# Patient Record
Sex: FEMALE | Race: OTHER | HISPANIC OR LATINO | Employment: UNEMPLOYED | URBAN - METROPOLITAN AREA
[De-identification: names, ages, dates, MRNs, and addresses within clinical notes are randomized per-mention and may not be internally consistent; named-entity substitution may affect disease eponyms.]

---

## 2017-02-08 ENCOUNTER — HISTORICAL (OUTPATIENT)
Dept: GASTROENTEROLOGY | Facility: CLINIC | Age: 52
End: 2017-02-08

## 2019-07-22 ENCOUNTER — HISTORICAL (OUTPATIENT)
Dept: RADIOLOGY | Facility: HOSPITAL | Age: 54
End: 2019-07-22

## 2019-12-06 ENCOUNTER — HISTORICAL (OUTPATIENT)
Dept: RADIOLOGY | Facility: HOSPITAL | Age: 54
End: 2019-12-06

## 2020-01-23 ENCOUNTER — HISTORICAL (OUTPATIENT)
Dept: RADIOLOGY | Facility: HOSPITAL | Age: 55
End: 2020-01-23

## 2020-03-06 ENCOUNTER — HISTORICAL (OUTPATIENT)
Dept: RADIOLOGY | Facility: HOSPITAL | Age: 55
End: 2020-03-06

## 2020-05-28 ENCOUNTER — HISTORICAL (OUTPATIENT)
Dept: RADIOLOGY | Facility: HOSPITAL | Age: 55
End: 2020-05-28

## 2020-10-26 ENCOUNTER — HOSPITAL ENCOUNTER (INPATIENT)
Facility: HOSPITAL | Age: 55
LOS: 3 days | Discharge: HOME | DRG: 841 | End: 2020-10-29
Attending: INTERNAL MEDICINE | Admitting: INTERNAL MEDICINE

## 2020-10-26 DIAGNOSIS — C92.10 CML (CHRONIC MYELOID LEUKEMIA): ICD-10-CM

## 2020-10-26 DIAGNOSIS — C95.00 ACUTE LEUKEMIA: ICD-10-CM

## 2020-10-26 DIAGNOSIS — R07.9 CHEST PAIN: ICD-10-CM

## 2020-10-26 DIAGNOSIS — C92.10 CML (CHRONIC MYELOCYTIC LEUKEMIA): Primary | ICD-10-CM

## 2020-10-26 DIAGNOSIS — D72.829 HYPERLEUKOCYTOSIS: ICD-10-CM

## 2020-10-26 PROBLEM — R16.1 SPLENOMEGALY: Status: ACTIVE | Noted: 2020-10-26

## 2020-10-26 PROBLEM — K76.0 NAFLD (NONALCOHOLIC FATTY LIVER DISEASE): Status: ACTIVE | Noted: 2020-10-26

## 2020-10-26 PROBLEM — E11.9 DM2 (DIABETES MELLITUS, TYPE 2): Status: ACTIVE | Noted: 2020-10-26

## 2020-10-26 PROBLEM — Z72.0 TOBACCO USER: Status: ACTIVE | Noted: 2020-10-26

## 2020-10-26 PROBLEM — E79.0 HYPERURICEMIA: Status: ACTIVE | Noted: 2020-10-26

## 2020-10-26 PROBLEM — E87.6 HYPOKALEMIA: Status: ACTIVE | Noted: 2020-10-26

## 2020-10-26 PROBLEM — E78.00 HYPERCHOLESTEROLEMIA: Status: ACTIVE | Noted: 2020-10-26

## 2020-10-26 PROBLEM — E66.01 SEVERE OBESITY (BMI >= 40): Status: ACTIVE | Noted: 2020-10-26

## 2020-10-26 PROBLEM — I10 HYPERTENSION: Status: ACTIVE | Noted: 2020-10-26

## 2020-10-26 LAB
ABO + RH BLD: NORMAL
ALBUMIN SERPL BCP-MCNC: 3.1 G/DL (ref 3.5–5.2)
ALP SERPL-CCNC: 64 U/L (ref 55–135)
ALT SERPL W/O P-5'-P-CCNC: 9 U/L (ref 10–44)
ANION GAP SERPL CALC-SCNC: 12 MMOL/L (ref 8–16)
ANISOCYTOSIS BLD QL SMEAR: SLIGHT
APTT BLDCRRT: 29 SEC (ref 21–32)
APTT BLDCRRT: 29.3 SEC (ref 21–32)
AST SERPL-CCNC: 17 U/L (ref 10–40)
B-HCG UR QL: NEGATIVE
BASOPHILS # BLD AUTO: ABNORMAL K/UL (ref 0–0.2)
BASOPHILS NFR BLD: 1.5 % (ref 0–1.9)
BILIRUB SERPL-MCNC: 0.7 MG/DL (ref 0.1–1)
BLD GP AB SCN CELLS X3 SERPL QL: NORMAL
BUN SERPL-MCNC: 14 MG/DL (ref 6–20)
CALCIUM SERPL-MCNC: 8.7 MG/DL (ref 8.7–10.5)
CHLORIDE SERPL-SCNC: 106 MMOL/L (ref 95–110)
CO2 SERPL-SCNC: 23 MMOL/L (ref 23–29)
CREAT SERPL-MCNC: 0.7 MG/DL (ref 0.5–1.4)
DIFFERENTIAL METHOD: ABNORMAL
EOSINOPHIL # BLD AUTO: ABNORMAL K/UL (ref 0–0.5)
EOSINOPHIL NFR BLD: 3 % (ref 0–8)
ERYTHROCYTE [DISTWIDTH] IN BLOOD BY AUTOMATED COUNT: 17.7 % (ref 11.5–14.5)
EST. GFR  (AFRICAN AMERICAN): >60 ML/MIN/1.73 M^2
EST. GFR  (NON AFRICAN AMERICAN): >60 ML/MIN/1.73 M^2
FIBRINOGEN PPP-MCNC: 370 MG/DL (ref 182–366)
FIBRINOGEN PPP-MCNC: 378 MG/DL (ref 182–366)
GLUCOSE SERPL-MCNC: 177 MG/DL (ref 70–110)
HBV CORE AB SERPL QL IA: NEGATIVE
HBV SURFACE AG SERPL QL IA: NEGATIVE
HCT VFR BLD AUTO: 31.9 % (ref 37–48.5)
HGB BLD-MCNC: 10 G/DL (ref 12–16)
HYPOCHROMIA BLD QL SMEAR: ABNORMAL
IMM GRANULOCYTES # BLD AUTO: ABNORMAL K/UL (ref 0–0.04)
IMM GRANULOCYTES NFR BLD AUTO: ABNORMAL % (ref 0–0.5)
INR PPP: 1.1 (ref 0.8–1.2)
INR PPP: 1.1 (ref 0.8–1.2)
LDH SERPL L TO P-CCNC: 996 U/L (ref 110–260)
LYMPHOCYTES # BLD AUTO: ABNORMAL K/UL (ref 1–4.8)
LYMPHOCYTES NFR BLD: 24 % (ref 18–48)
MAGNESIUM SERPL-MCNC: 1.8 MG/DL (ref 1.6–2.6)
MCH RBC QN AUTO: 31.5 PG (ref 27–31)
MCHC RBC AUTO-ENTMCNC: 31.3 G/DL (ref 32–36)
MCV RBC AUTO: 101 FL (ref 82–98)
METAMYELOCYTES NFR BLD MANUAL: 2.5 %
MONOCYTES # BLD AUTO: ABNORMAL K/UL (ref 0.3–1)
MONOCYTES NFR BLD: 1 % (ref 4–15)
MYELOCYTES NFR BLD MANUAL: 25 %
NEUTROPHILS NFR BLD: 23.5 % (ref 38–73)
NEUTS BAND NFR BLD MANUAL: 8.5 %
NRBC BLD-RTO: 2 /100 WBC
PHOSPHATE SERPL-MCNC: 4.9 MG/DL (ref 2.7–4.5)
PLATELET # BLD AUTO: 120 K/UL (ref 150–350)
PLATELET BLD QL SMEAR: ABNORMAL
PMV BLD AUTO: 12.4 FL (ref 9.2–12.9)
POCT GLUCOSE: 140 MG/DL (ref 70–110)
POCT GLUCOSE: 170 MG/DL (ref 70–110)
POCT GLUCOSE: 182 MG/DL (ref 70–110)
POIKILOCYTOSIS BLD QL SMEAR: SLIGHT
POLYCHROMASIA BLD QL SMEAR: ABNORMAL
POTASSIUM SERPL-SCNC: 3.3 MMOL/L (ref 3.5–5.1)
PROMYELOCYTES NFR BLD MANUAL: 10 %
PROT SERPL-MCNC: 6.4 G/DL (ref 6–8.4)
PROTHROMBIN TIME: 12.4 SEC (ref 9–12.5)
PROTHROMBIN TIME: 12.4 SEC (ref 9–12.5)
RBC # BLD AUTO: 3.17 M/UL (ref 4–5.4)
SARS-COV-2 RDRP RESP QL NAA+PROBE: NEGATIVE
SODIUM SERPL-SCNC: 141 MMOL/L (ref 136–145)
TROPONIN I SERPL DL<=0.01 NG/ML-MCNC: 0.01 NG/ML (ref 0–0.03)
URATE SERPL-MCNC: 9.2 MG/DL (ref 2.4–5.7)
WBC # BLD AUTO: 320.6 K/UL (ref 3.9–12.7)
WBC OTHER NFR BLD MANUAL: 1 %

## 2020-10-26 PROCEDURE — 38221 DX BONE MARROW BIOPSIES: CPT | Mod: LT,,, | Performed by: NURSE PRACTITIONER

## 2020-10-26 PROCEDURE — 85610 PROTHROMBIN TIME: CPT | Mod: 91

## 2020-10-26 PROCEDURE — 63600175 PHARM REV CODE 636 W HCPCS

## 2020-10-26 PROCEDURE — 85384 FIBRINOGEN ACTIVITY: CPT

## 2020-10-26 PROCEDURE — 88185 FLOWCYTOMETRY/TC ADD-ON: CPT | Performed by: PATHOLOGY

## 2020-10-26 PROCEDURE — 84100 ASSAY OF PHOSPHORUS: CPT

## 2020-10-26 PROCEDURE — 25000003 PHARM REV CODE 250: Performed by: NURSE PRACTITIONER

## 2020-10-26 PROCEDURE — 99233 PR SUBSEQUENT HOSPITAL CARE,LEVL III: ICD-10-PCS | Mod: ,,, | Performed by: INTERNAL MEDICINE

## 2020-10-26 PROCEDURE — 88342 CHG IMMUNOCYTOCHEMISTRY: ICD-10-PCS | Mod: 26,,, | Performed by: PATHOLOGY

## 2020-10-26 PROCEDURE — 36415 COLL VENOUS BLD VENIPUNCTURE: CPT

## 2020-10-26 PROCEDURE — 88305 TISSUE EXAM BY PATHOLOGIST: CPT | Performed by: PATHOLOGY

## 2020-10-26 PROCEDURE — 93010 ELECTROCARDIOGRAM REPORT: CPT | Mod: ,,, | Performed by: INTERNAL MEDICINE

## 2020-10-26 PROCEDURE — 81245 FLT3 GENE: CPT

## 2020-10-26 PROCEDURE — 85060 PATHOLOGIST REVIEW: ICD-10-PCS | Mod: ,,, | Performed by: PATHOLOGY

## 2020-10-26 PROCEDURE — 86850 RBC ANTIBODY SCREEN: CPT

## 2020-10-26 PROCEDURE — 88237 TISSUE CULTURE BONE MARROW: CPT

## 2020-10-26 PROCEDURE — 83735 ASSAY OF MAGNESIUM: CPT

## 2020-10-26 PROCEDURE — 85027 COMPLETE CBC AUTOMATED: CPT

## 2020-10-26 PROCEDURE — 81025 URINE PREGNANCY TEST: CPT

## 2020-10-26 PROCEDURE — 88341 PR IHC OR ICC EACH ADD'L SINGLE ANTIBODY  STAINPR: ICD-10-PCS | Mod: 26,,, | Performed by: PATHOLOGY

## 2020-10-26 PROCEDURE — 85730 THROMBOPLASTIN TIME PARTIAL: CPT

## 2020-10-26 PROCEDURE — 38221 PR BONE MARROW BIOPSY(IES); DIAGNOSTIC: ICD-10-PCS | Mod: LT,,, | Performed by: NURSE PRACTITIONER

## 2020-10-26 PROCEDURE — 85097 PR  BONE MARROW,SMEAR INTERPRETATION: ICD-10-PCS | Mod: ,,, | Performed by: PATHOLOGY

## 2020-10-26 PROCEDURE — 88189 PR  FLOWCYTOMETRY/READ, 16 & > MARKERS: ICD-10-PCS | Mod: ,,, | Performed by: PATHOLOGY

## 2020-10-26 PROCEDURE — 87340 HEPATITIS B SURFACE AG IA: CPT

## 2020-10-26 PROCEDURE — 88313 SPECIAL STAINS GROUP 2: CPT | Mod: 26,,, | Performed by: PATHOLOGY

## 2020-10-26 PROCEDURE — 86704 HEP B CORE ANTIBODY TOTAL: CPT

## 2020-10-26 PROCEDURE — C9399 UNCLASSIFIED DRUGS OR BIOLOG: HCPCS | Performed by: STUDENT IN AN ORGANIZED HEALTH CARE EDUCATION/TRAINING PROGRAM

## 2020-10-26 PROCEDURE — 88311 DECALCIFY TISSUE: CPT | Performed by: PATHOLOGY

## 2020-10-26 PROCEDURE — 80053 COMPREHEN METABOLIC PANEL: CPT

## 2020-10-26 PROCEDURE — 88313 PR  SPECIAL STAINS,GROUP II: ICD-10-PCS | Mod: 26,,, | Performed by: PATHOLOGY

## 2020-10-26 PROCEDURE — 99233 SBSQ HOSP IP/OBS HIGH 50: CPT | Mod: ,,, | Performed by: INTERNAL MEDICINE

## 2020-10-26 PROCEDURE — 88305 TISSUE EXAM BY PATHOLOGIST: ICD-10-PCS | Mod: 26,,, | Performed by: PATHOLOGY

## 2020-10-26 PROCEDURE — 84550 ASSAY OF BLOOD/URIC ACID: CPT

## 2020-10-26 PROCEDURE — U0002 COVID-19 LAB TEST NON-CDC: HCPCS

## 2020-10-26 PROCEDURE — 88342 IMHCHEM/IMCYTCHM 1ST ANTB: CPT | Mod: 26,,, | Performed by: PATHOLOGY

## 2020-10-26 PROCEDURE — 81450 HL NEO GSAP 5-50DNA/DNA&RNA: CPT

## 2020-10-26 PROCEDURE — 88189 FLOWCYTOMETRY/READ 16 & >: CPT | Mod: ,,, | Performed by: PATHOLOGY

## 2020-10-26 PROCEDURE — 84484 ASSAY OF TROPONIN QUANT: CPT

## 2020-10-26 PROCEDURE — 83615 LACTATE (LD) (LDH) ENZYME: CPT

## 2020-10-26 PROCEDURE — 88341 IMHCHEM/IMCYTCHM EA ADD ANTB: CPT | Mod: 26,,, | Performed by: PATHOLOGY

## 2020-10-26 PROCEDURE — 88271 CYTOGENETICS DNA PROBE: CPT | Mod: 59

## 2020-10-26 PROCEDURE — 93010 EKG 12-LEAD: ICD-10-PCS | Mod: ,,, | Performed by: INTERNAL MEDICINE

## 2020-10-26 PROCEDURE — 25000003 PHARM REV CODE 250: Performed by: STUDENT IN AN ORGANIZED HEALTH CARE EDUCATION/TRAINING PROGRAM

## 2020-10-26 PROCEDURE — 88305 TISSUE EXAM BY PATHOLOGIST: CPT | Mod: 26,,, | Performed by: PATHOLOGY

## 2020-10-26 PROCEDURE — 88313 SPECIAL STAINS GROUP 2: CPT | Mod: 59 | Performed by: PATHOLOGY

## 2020-10-26 PROCEDURE — 93005 ELECTROCARDIOGRAM TRACING: CPT

## 2020-10-26 PROCEDURE — 85007 BL SMEAR W/DIFF WBC COUNT: CPT

## 2020-10-26 PROCEDURE — 81206 BCR/ABL1 GENE MAJOR BP: CPT

## 2020-10-26 PROCEDURE — 88342 IMHCHEM/IMCYTCHM 1ST ANTB: CPT | Performed by: PATHOLOGY

## 2020-10-26 PROCEDURE — 88311 DECALCIFY TISSUE: CPT | Mod: 26,,, | Performed by: PATHOLOGY

## 2020-10-26 PROCEDURE — 85060 BLOOD SMEAR INTERPRETATION: CPT | Mod: ,,, | Performed by: PATHOLOGY

## 2020-10-26 PROCEDURE — 88311 PR  DECALCIFY TISSUE: ICD-10-PCS | Mod: 26,,, | Performed by: PATHOLOGY

## 2020-10-26 PROCEDURE — 38221 DX BONE MARROW BIOPSIES: CPT

## 2020-10-26 PROCEDURE — 63600175 PHARM REV CODE 636 W HCPCS: Performed by: NURSE PRACTITIONER

## 2020-10-26 PROCEDURE — 85097 BONE MARROW INTERPRETATION: CPT | Mod: ,,, | Performed by: PATHOLOGY

## 2020-10-26 PROCEDURE — 20600001 HC STEP DOWN PRIVATE ROOM

## 2020-10-26 PROCEDURE — 88341 IMHCHEM/IMCYTCHM EA ADD ANTB: CPT | Performed by: PATHOLOGY

## 2020-10-26 PROCEDURE — 88184 FLOWCYTOMETRY/ TC 1 MARKER: CPT | Performed by: PATHOLOGY

## 2020-10-26 RX ORDER — HYDROXYUREA 500 MG/1
1000 CAPSULE ORAL 2 TIMES DAILY
Status: DISCONTINUED | OUTPATIENT
Start: 2020-10-26 | End: 2020-10-26

## 2020-10-26 RX ORDER — GABAPENTIN 300 MG/1
300 CAPSULE ORAL 3 TIMES DAILY
Status: DISCONTINUED | OUTPATIENT
Start: 2020-10-26 | End: 2020-10-29 | Stop reason: HOSPADM

## 2020-10-26 RX ORDER — TRAMADOL HYDROCHLORIDE 50 MG/1
50 TABLET ORAL EVERY 4 HOURS PRN
Status: DISCONTINUED | OUTPATIENT
Start: 2020-10-26 | End: 2020-10-29 | Stop reason: HOSPADM

## 2020-10-26 RX ORDER — LIDOCAINE HYDROCHLORIDE 20 MG/ML
10 INJECTION, SOLUTION EPIDURAL; INFILTRATION; INTRACAUDAL; PERINEURAL ONCE AS NEEDED
Status: COMPLETED | OUTPATIENT
Start: 2020-10-26 | End: 2020-10-26

## 2020-10-26 RX ORDER — IBUPROFEN 200 MG
24 TABLET ORAL
Status: DISCONTINUED | OUTPATIENT
Start: 2020-10-26 | End: 2020-10-29 | Stop reason: HOSPADM

## 2020-10-26 RX ORDER — METOPROLOL TARTRATE 25 MG/1
25 TABLET ORAL 2 TIMES DAILY
Status: DISCONTINUED | OUTPATIENT
Start: 2020-10-26 | End: 2020-10-29 | Stop reason: HOSPADM

## 2020-10-26 RX ORDER — LIDOCAINE HYDROCHLORIDE 20 MG/ML
10 SOLUTION OROPHARYNGEAL ONCE
Status: COMPLETED | OUTPATIENT
Start: 2020-10-26 | End: 2020-10-26

## 2020-10-26 RX ORDER — HYDROXYUREA 500 MG/1
4000 CAPSULE ORAL 2 TIMES DAILY
Status: DISCONTINUED | OUTPATIENT
Start: 2020-10-26 | End: 2020-10-29 | Stop reason: HOSPADM

## 2020-10-26 RX ORDER — ALLOPURINOL 300 MG/1
300 TABLET ORAL 2 TIMES DAILY
Status: DISCONTINUED | OUTPATIENT
Start: 2020-10-26 | End: 2020-10-29 | Stop reason: HOSPADM

## 2020-10-26 RX ORDER — ONDANSETRON 2 MG/ML
8 INJECTION INTRAMUSCULAR; INTRAVENOUS EVERY 8 HOURS PRN
Status: DISCONTINUED | OUTPATIENT
Start: 2020-10-26 | End: 2020-10-27

## 2020-10-26 RX ORDER — ALUMINUM HYDROXIDE, MAGNESIUM HYDROXIDE, AND SIMETHICONE 2400; 240; 2400 MG/30ML; MG/30ML; MG/30ML
30 SUSPENSION ORAL ONCE
Status: COMPLETED | OUTPATIENT
Start: 2020-10-26 | End: 2020-10-26

## 2020-10-26 RX ORDER — SODIUM CHLORIDE 9 MG/ML
INJECTION, SOLUTION INTRAVENOUS CONTINUOUS
Status: DISCONTINUED | OUTPATIENT
Start: 2020-10-26 | End: 2020-10-29 | Stop reason: HOSPADM

## 2020-10-26 RX ORDER — FLUCONAZOLE 200 MG/1
400 TABLET ORAL DAILY
Status: DISCONTINUED | OUTPATIENT
Start: 2020-10-26 | End: 2020-10-29

## 2020-10-26 RX ORDER — INSULIN ASPART 100 [IU]/ML
0-5 INJECTION, SOLUTION INTRAVENOUS; SUBCUTANEOUS
Status: DISCONTINUED | OUTPATIENT
Start: 2020-10-26 | End: 2020-10-29 | Stop reason: HOSPADM

## 2020-10-26 RX ORDER — HYDROMORPHONE HYDROCHLORIDE 1 MG/ML
0.5 INJECTION, SOLUTION INTRAMUSCULAR; INTRAVENOUS; SUBCUTANEOUS ONCE AS NEEDED
Status: COMPLETED | OUTPATIENT
Start: 2020-10-26 | End: 2020-10-26

## 2020-10-26 RX ORDER — SODIUM CHLORIDE 0.9 % (FLUSH) 0.9 %
10 SYRINGE (ML) INJECTION
Status: DISCONTINUED | OUTPATIENT
Start: 2020-10-26 | End: 2020-10-29 | Stop reason: HOSPADM

## 2020-10-26 RX ORDER — GLUCAGON 1 MG
1 KIT INJECTION
Status: DISCONTINUED | OUTPATIENT
Start: 2020-10-26 | End: 2020-10-29 | Stop reason: HOSPADM

## 2020-10-26 RX ORDER — LEVOFLOXACIN 500 MG/1
500 TABLET, FILM COATED ORAL DAILY
Status: DISCONTINUED | OUTPATIENT
Start: 2020-10-26 | End: 2020-10-29

## 2020-10-26 RX ORDER — POTASSIUM CHLORIDE 750 MG/1
40 CAPSULE, EXTENDED RELEASE ORAL ONCE
Status: COMPLETED | OUTPATIENT
Start: 2020-10-26 | End: 2020-10-26

## 2020-10-26 RX ORDER — ACYCLOVIR 800 MG/1
800 TABLET ORAL 2 TIMES DAILY
Status: DISCONTINUED | OUTPATIENT
Start: 2020-10-26 | End: 2020-10-26

## 2020-10-26 RX ORDER — IBUPROFEN 200 MG
16 TABLET ORAL
Status: DISCONTINUED | OUTPATIENT
Start: 2020-10-26 | End: 2020-10-29 | Stop reason: HOSPADM

## 2020-10-26 RX ORDER — ONDANSETRON 2 MG/ML
INJECTION INTRAMUSCULAR; INTRAVENOUS
Status: COMPLETED
Start: 2020-10-26 | End: 2020-10-26

## 2020-10-26 RX ORDER — ACYCLOVIR 200 MG/1
400 CAPSULE ORAL 2 TIMES DAILY
Status: DISCONTINUED | OUTPATIENT
Start: 2020-10-26 | End: 2020-10-29

## 2020-10-26 RX ORDER — BENAZEPRIL HYDROCHLORIDE 10 MG/1
40 TABLET ORAL DAILY
Status: DISCONTINUED | OUTPATIENT
Start: 2020-10-26 | End: 2020-10-29 | Stop reason: HOSPADM

## 2020-10-26 RX ORDER — ATORVASTATIN CALCIUM 20 MG/1
40 TABLET, FILM COATED ORAL DAILY
Status: DISCONTINUED | OUTPATIENT
Start: 2020-10-26 | End: 2020-10-29 | Stop reason: HOSPADM

## 2020-10-26 RX ADMIN — HYDROXYUREA 4000 MG: 500 CAPSULE ORAL at 09:10

## 2020-10-26 RX ADMIN — ACYCLOVIR 400 MG: 200 CAPSULE ORAL at 10:10

## 2020-10-26 RX ADMIN — HYDROMORPHONE HYDROCHLORIDE 0.5 MG: 1 INJECTION, SOLUTION INTRAMUSCULAR; INTRAVENOUS; SUBCUTANEOUS at 02:10

## 2020-10-26 RX ADMIN — LIDOCAINE HYDROCHLORIDE 10 ML: 20 SOLUTION ORAL; TOPICAL at 06:10

## 2020-10-26 RX ADMIN — ONDANSETRON 8 MG: 2 INJECTION INTRAMUSCULAR; INTRAVENOUS at 05:10

## 2020-10-26 RX ADMIN — HYDROXYUREA 4000 MG: 500 CAPSULE ORAL at 10:10

## 2020-10-26 RX ADMIN — METOPROLOL TARTRATE 25 MG: 25 TABLET, FILM COATED ORAL at 09:10

## 2020-10-26 RX ADMIN — GABAPENTIN 300 MG: 300 CAPSULE ORAL at 09:10

## 2020-10-26 RX ADMIN — ALUMINUM HYDROXIDE, MAGNESIUM HYDROXIDE, AND DIMETHICONE 30 ML: 400; 400; 40 SUSPENSION ORAL at 09:10

## 2020-10-26 RX ADMIN — POTASSIUM CHLORIDE 40 MEQ: 750 CAPSULE, EXTENDED RELEASE ORAL at 09:10

## 2020-10-26 RX ADMIN — THERA TABS 1 TABLET: TAB at 09:10

## 2020-10-26 RX ADMIN — ALLOPURINOL 300 MG: 300 TABLET ORAL at 09:10

## 2020-10-26 RX ADMIN — LEVOFLOXACIN 500 MG: 500 TABLET, FILM COATED ORAL at 09:10

## 2020-10-26 RX ADMIN — TRAMADOL HYDROCHLORIDE 50 MG: 50 TABLET ORAL at 06:10

## 2020-10-26 RX ADMIN — TRAMADOL HYDROCHLORIDE 50 MG: 50 TABLET ORAL at 04:10

## 2020-10-26 RX ADMIN — ACYCLOVIR 400 MG: 200 CAPSULE ORAL at 09:10

## 2020-10-26 RX ADMIN — SODIUM CHLORIDE: 0.9 INJECTION, SOLUTION INTRAVENOUS at 09:10

## 2020-10-26 RX ADMIN — BENAZEPRIL HYDROCHLORIDE 40 MG: 10 TABLET ORAL at 09:10

## 2020-10-26 RX ADMIN — LIDOCAINE HYDROCHLORIDE 200 MG: 20 INJECTION, SOLUTION EPIDURAL; INFILTRATION; INTRACAUDAL; PERINEURAL at 02:10

## 2020-10-26 RX ADMIN — ATORVASTATIN CALCIUM 40 MG: 20 TABLET, FILM COATED ORAL at 09:10

## 2020-10-26 RX ADMIN — INSULIN DETEMIR 10 UNITS: 100 INJECTION, SOLUTION SUBCUTANEOUS at 09:10

## 2020-10-26 RX ADMIN — SODIUM CHLORIDE: 0.9 INJECTION, SOLUTION INTRAVENOUS at 07:10

## 2020-10-26 RX ADMIN — GABAPENTIN 300 MG: 300 CAPSULE ORAL at 04:10

## 2020-10-26 RX ADMIN — FLUCONAZOLE 400 MG: 200 TABLET ORAL at 09:10

## 2020-10-26 NOTE — PLAN OF CARE
Plan of care reviewed with the patient upon admission. Alert and oriented. GCS 15. Complaining of a headache at time time. Pt speaks Bel[place]ian only. Remained free from falls and injuries throughout shift. VSS. On 2 LPM O2. Bed in low locked position. Call bell and personal items within reach. Will continue to monitor.

## 2020-10-26 NOTE — HOSPITAL COURSE
10/26/2020: Admitted over night/early morning with hyper leukocytosis suspicious for leukemia. Has had ongoing fatigue, night sweats, headaches, and severe LUQ abdominal pain for several weeks now. Outside CT reportedly showing severe splenomegaly. Large spleen palpated on exam. Will obtain abd u/s for baseline measurement. Started on IVF, hydrea 4 grams BID and allopurinol 300 mg BID. Will plan for BMBx today. CML suspected at this time, but will check AML FISH, NGS, and flt3 in addition to BCR/ABL p210. HIV neg. Placed Hep B orders. She reports hx of tubal ligation, but will still r/o pregnancy via urine pregnancy test. Replacing K+ for K+ level of 3.3.  10/27/2020 Leukocytosis significantly  improving with hydrea 4 mg BID. Scheduled dose of zofran for nausea/vomitting. Enlarged spleen on examination, abdominal ultrasound shows splenomegaly (25x7.6 cm) and hepatomegaly(23 cm). Reported headache without any focal neuro deficit, controlled with tramadol. Uric acid improved with current dose of allopurinol.   10/28/2020: WBC count down to 186K today. BMBx results still pending. Uric acid down-trending. Abdominal pain and nausea improved. TKI submitted for insurance approval. Blood glucose in 200. Levemir dose increased.   10/29/2020: WBC count down to 107K today. Continuing 4 grams Hydrea bid. Will discharge on 2 grams BID. Patient is uninsured.  assisting with insurance situation. Outpatient f/u contingent upon insurance type. Had fever of 101.5 over night. Blood cx with NG thus far. U/a not suggestive of UTI. No sign of infection to BMBx site. C/o of coughing up phlegm. Will repeat COVID swab and check RIP, which includes influenza. Blood glucose continues to be elevated. Increased levemir dose to home dose of 20 units BID. Patient instructed to f/u with local PCP soon after discharge.
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
## 2020-10-26 NOTE — NURSING TRANSFER
Nursing Transfer Note      10/26/2020     Transfer OSH to [facility]    Transfer via EMS stretcher    Transfer with O2    Transported by EMS    Medicines sent: N/A    Chart send with patient: YES    Notified: Dr. Edwards    Patient reassessed at: 10/26/20 0446    Upon arrival to floor: Pt was moved to hospital bed. Pt does not speak english. MARRTI used for interpretor. Admission completed. MD notified. VSS. On 2 lmp O2. Bed in low locked position. Call bell and personal items within reach. Will continue to monitor. .

## 2020-10-26 NOTE — ASSESSMENT & PLAN NOTE
Takes basal bolus insulin at home in addition to oral antihyperglycemics. States she takes 50u basal BID, 10u prandial BID wm.   LDSSI, 10u aspart BID  POCT glucose x4

## 2020-10-26 NOTE — SUBJECTIVE & OBJECTIVE
Subjective:     Interval History: Admitted over night/early morning with hyper leukocytosis suspicious for leukemia. Has had ongoing fatigue, night sweats, headaches, and severe LUQ abdominal pain for several weeks now. Outside CT reportedly showing severe splenomegaly. Large spleen palpated on exam. Will obtain abd u/s for baseline measurement. Started on IVF, hydrea 4 grams BID and allopurinol 300 mg BID. Will plan for BMBx today. CML suspected at this time, but will check AML FISH, NGS, and flt3 in addition to BCR/ABL p210. HIV neg. Placed Hep B orders. She reports hx of tubal ligation, but will still r/o pregnancy via urine pregnancy test. Replacing K+ for K+ level of 3.3.    Objective:     Vital Signs (Most Recent):  Temp: 98.3 °F (36.8 °C) (10/26/20 1113)  Pulse: 72 (10/26/20 1113)  Resp: 18 (10/26/20 1113)  BP: 135/76 (10/26/20 1113)  SpO2: (!) 93 % (10/26/20 1113) Vital Signs (24h Range):  Temp:  [98.1 °F (36.7 °C)-98.5 °F (36.9 °C)] 98.3 °F (36.8 °C)  Pulse:  [72-86] 72  Resp:  [14-22] 18  SpO2:  [92 %-93 %] 93 %  BP: (121-135)/(56-76) 135/76        There is no height or weight on file to calculate BMI.  There is no height or weight on file to calculate BSA.    ECOG SCORE         [unfilled]    Intake/Output - Last 3 Shifts     None          Physical Exam  Constitutional:       Appearance: She is well-developed.   HENT:      Head: Normocephalic and atraumatic.      Mouth/Throat:      Pharynx: No oropharyngeal exudate.   Eyes:      Conjunctiva/sclera: Conjunctivae normal.      Pupils: Pupils are equal, round, and reactive to light.   Neck:      Musculoskeletal: Normal range of motion and neck supple.   Cardiovascular:      Rate and Rhythm: Normal rate and regular rhythm.      Heart sounds: Normal heart sounds. No murmur.   Pulmonary:      Effort: Pulmonary effort is normal.      Breath sounds: Normal breath sounds.   Abdominal:      General: Bowel sounds are normal. There is no distension.      Tenderness:  There is no abdominal tenderness.      Comments: Abdomen obese and distended. Large spleen palpated.   Musculoskeletal: Normal range of motion.         General: No deformity.   Skin:     General: Skin is warm and dry.      Findings: No erythema or rash.   Neurological:      Mental Status: She is alert and oriented to person, place, and time.   Psychiatric:         Behavior: Behavior normal.         Thought Content: Thought content normal.         Judgment: Judgment normal.         Significant Labs:   CBC:   Recent Labs   Lab 10/26/20  0622   .60*   HGB 10.0*   HCT 31.9*   *    and CMP:   Recent Labs   Lab 10/26/20  0622      K 3.3*      CO2 23   *   BUN 14   CREATININE 0.7   CALCIUM 8.7   PROT 6.4   ALBUMIN 3.1*   BILITOT 0.7   ALKPHOS 64   AST 17   ALT 9*   ANIONGAP 12   EGFRNONAA >60.0       Diagnostic Results:  I have reviewed all pertinent imaging results/findings within the past 24 hours.
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
## 2020-10-26 NOTE — SUBJECTIVE & OBJECTIVE
Patient information was obtained from patient, spouse/SO and past medical records.     Oncology History:     Medications Prior to Admission   Medication Sig Dispense Refill Last Dose    albuterol (VENTOLIN HFA) 90 mcg/actuation inhaler Inhale 2 puffs into the lungs every 6 (six) hours as needed for Wheezing. Rescue       atorvastatin (LIPITOR) 40 MG tablet Take 40 mg by mouth once daily.       benazepril (LOTENSIN) 40 MG tablet Take 40 mg by mouth once daily.       cyclobenzaprine (FLEXERIL) 10 MG tablet Take 10 mg by mouth 3 (three) times daily as needed for Muscle spasms.       fluticasone propionate (FLONASE) 50 mcg/actuation nasal spray 1 spray by Each Nostril route once daily.       gabapentin (NEURONTIN) 300 MG capsule Take 300 mg by mouth 3 (three) times daily.       glimepiride (AMARYL) 4 MG tablet Take 4 mg by mouth 2 (two) times daily.       guaiFENesin (MUCINEX) 600 mg 12 hr tablet Take 1,200 mg by mouth 2 (two) times daily.       insulin  unit/mL injection Inject 20 Units into the skin 2 (two) times daily before meals.       levocetirizine (XYZAL) 5 MG tablet Take 5 mg by mouth every evening.       metFORMIN (GLUCOPHAGE) 1000 MG tablet Take 1,000 mg by mouth 2 (two) times daily with meals.       metoprolol tartrate (LOPRESSOR) 25 MG tablet Take 25 mg by mouth 2 (two) times daily.       multivitamin capsule Take 1 capsule by mouth once daily.       naproxen (NAPROSYN) 500 MG tablet Take 500 mg by mouth 2 (two) times daily as needed.       omeprazole (PRILOSEC) 20 MG capsule Take 20 mg by mouth once daily.       traMADol (ULTRAM) 50 mg tablet Take 50 mg by mouth every 6 (six) hours as needed for Pain.          Patient has no known allergies.     Past Medical History:   Diagnosis Date    Cancer     DM2 (diabetes mellitus, type 2)     HTN (hypertension)     NAFLD (nonalcoholic fatty liver disease)     Neuropathy      Past Surgical History:   Procedure Laterality Date    ABDOMINAL  SURGERY       SECTION       Family History     None        Tobacco Use    Smoking status: Not on file   Substance and Sexual Activity    Alcohol use: Not on file    Drug use: Not on file    Sexual activity: Not on file       Review of Systems   Constitutional: Positive for chills, fatigue and fever.        Pt with limited english;  at bedside translate.    HENT: Negative for nosebleeds and sore throat.    Eyes: Positive for visual disturbance.   Respiratory: Positive for cough and shortness of breath. Negative for wheezing.    Cardiovascular: Positive for chest pain and leg swelling. Negative for palpitations.   Gastrointestinal: Positive for abdominal distention and abdominal pain. Negative for diarrhea, nausea and vomiting.   Genitourinary: Negative for difficulty urinating and dysuria.   Musculoskeletal: Positive for back pain and myalgias.   Skin: Negative for rash and wound.   Neurological: Negative for syncope and weakness.   Hematological: Negative for adenopathy. Does not bruise/bleed easily.   Psychiatric/Behavioral: Negative for confusion and decreased concentration.     Objective:     Vital Signs (Most Recent):    Vital Signs (24h Range):  Temp:  [98.1 °F (36.7 °C)] 98.1 °F (36.7 °C)  Pulse:  [86] 86  Resp:  [14] 14  SpO2:  [92 %] 92 %  BP: (125)/(56) 125/56        There is no height or weight on file to calculate BMI.  There is no height or weight on file to calculate BSA.    ECOG SCORE         [unfilled]    Lines/Drains/Airways     None                 Physical Exam  Constitutional:       General: She is not in acute distress.     Appearance: Normal appearance. She is obese. She is not ill-appearing.   HENT:      Head: Normocephalic and atraumatic.      Nose: Nose normal.      Mouth/Throat:      Mouth: Mucous membranes are moist.      Pharynx: No oropharyngeal exudate or posterior oropharyngeal erythema.   Eyes:      General: No scleral icterus.        Right eye: No discharge.          Left eye: No discharge.      Pupils: Pupils are equal, round, and reactive to light.   Cardiovascular:      Rate and Rhythm: Normal rate and regular rhythm.      Pulses: Normal pulses.      Heart sounds: Normal heart sounds. No murmur.   Pulmonary:      Effort: Pulmonary effort is normal.      Breath sounds: Rales (bibasilar) present.   Abdominal:      General: Bowel sounds are normal. There is distension.      Palpations: Abdomen is soft. There is hepatomegaly and splenomegaly.      Tenderness: There is abdominal tenderness in the left upper quadrant. There is no guarding or rebound.      Hernia: No hernia is present.   Musculoskeletal:         General: No deformity.      Right lower leg: No edema.      Left lower leg: No edema.   Skin:     General: Skin is warm.      Capillary Refill: Capillary refill takes less than 2 seconds.   Neurological:      General: No focal deficit present.      Mental Status: She is alert and oriented to person, place, and time. Mental status is at baseline.      Cranial Nerves: No cranial nerve deficit.   Psychiatric:         Mood and Affect: Mood normal.         Behavior: Behavior normal.         Significant Labs:   CBC: No results for input(s): WBC, HGB, HCT, PLT in the last 48 hours., CMP: No results for input(s): NA, K, CL, CO2, GLU, BUN, CREATININE, CALCIUM, PROT, ALBUMIN, BILITOT, ALKPHOS, AST, ALT, ANIONGAP, EGFRNONAA in the last 48 hours.    Invalid input(s): ESTGFAFRICA, LDH: No results for input(s): LDHCSF, BFSOURCE in the last 48 hours., Uric Acid No results for input(s): URICACID in the last 48 hours. and All pertinent labs from the last 24 hours have been reviewed.    Diagnostic Results:  I have reviewed all pertinent imaging results/findings within the past 24 hours.

## 2020-10-26 NOTE — ASSESSMENT & PLAN NOTE
Severe leucocytosis with peripheral blast 4-13%, clinically concerning for acute leukemia versus accelerated phase of chronic myeloid leukemia. 3 months of generalized fatigue/ hot flashes -B symptoms 2/2 above.    - NS infusion at 100cc/hr  -allopurinol 300mg BID  -antimicrobial prophylaxis with Levaquin 500mg, Acyclovir 800mg, and Diflucan 400mg  - If worsening respiratory status or change in neurologic status, will place CVC to initiate leukophoresis

## 2020-10-26 NOTE — ASSESSMENT & PLAN NOTE
-severe splenomegaly on outside CT per report  -large spleen palpated on exam  -2/2 hyperleukocytosis  - Abdominal US shows splenomegaly (25x7.6 cm) and hepatomegaly(23 cm)

## 2020-10-26 NOTE — PROGRESS NOTES
C/o chest pain this afternoon. Also having nausea. Checked EKG. Showing NSR with left posterior fascicular block. Checking troponin. Started prn zofran, and ordered GI cocktail.    Monse Mcdonald, KENDALLP  Hematology/Oncology/Bone Marrow Transplant

## 2020-10-26 NOTE — PROGRESS NOTES
[facility]  Hematology  Bone Marrow Transplant  Progress Note    Patient Name: Fela Sawyer  Admission Date: 10/26/2020  Hospital Length of Stay: 0 days  Code Status: Full Code    Subjective:     Interval History: Admitted over night/early morning with hyper leukocytosis suspicious for leukemia. Has had ongoing fatigue, night sweats, headaches, and severe LUQ abdominal pain for several weeks now. Outside CT reportedly showing severe splenomegaly. Large spleen palpated on exam. Will obtain abd u/s for baseline measurement. Started on IVF, hydrea 4 grams BID and allopurinol 300 mg BID. Will plan for BMBx today. CML suspected at this time, but will check AML FISH, NGS, and flt3 in addition to BCR/ABL p210. HIV neg. Placed Hep B orders. She reports hx of tubal ligation, but will still r/o pregnancy via urine pregnancy test. Replacing K+ for K+ level of 3.3.    Objective:     Vital Signs (Most Recent):  Temp: 98.3 °F (36.8 °C) (10/26/20 1113)  Pulse: 72 (10/26/20 1113)  Resp: 18 (10/26/20 1113)  BP: 135/76 (10/26/20 1113)  SpO2: (!) 93 % (10/26/20 1113) Vital Signs (24h Range):  Temp:  [98.1 °F (36.7 °C)-98.5 °F (36.9 °C)] 98.3 °F (36.8 °C)  Pulse:  [72-86] 72  Resp:  [14-22] 18  SpO2:  [92 %-93 %] 93 %  BP: (121-135)/(56-76) 135/76        There is no height or weight on file to calculate BMI.  There is no height or weight on file to calculate BSA.    ECOG SCORE         [unfilled]    Intake/Output - Last 3 Shifts     None          Physical Exam  Constitutional:       Appearance: She is well-developed.   HENT:      Head: Normocephalic and atraumatic.      Mouth/Throat:      Pharynx: No oropharyngeal exudate.   Eyes:      Conjunctiva/sclera: Conjunctivae normal.      Pupils: Pupils are equal, round, and reactive to light.   Neck:      Musculoskeletal: Normal range of motion and neck supple.   Cardiovascular:      Rate and Rhythm: Normal rate and regular rhythm.      Heart sounds: Normal heart sounds. No  murmur.   Pulmonary:      Effort: Pulmonary effort is normal.      Breath sounds: Normal breath sounds.   Abdominal:      General: Bowel sounds are normal. There is no distension.      Tenderness: There is no abdominal tenderness.      Comments: Abdomen obese and distended. Large spleen palpated.   Musculoskeletal: Normal range of motion.         General: No deformity.   Skin:     General: Skin is warm and dry.      Findings: No erythema or rash.   Neurological:      Mental Status: She is alert and oriented to person, place, and time.   Psychiatric:         Behavior: Behavior normal.         Thought Content: Thought content normal.         Judgment: Judgment normal.         Significant Labs:   CBC:   Recent Labs   Lab 10/26/20  0622   .60*   HGB 10.0*   HCT 31.9*   *    and CMP:   Recent Labs   Lab 10/26/20  0622      K 3.3*      CO2 23   *   BUN 14   CREATININE 0.7   CALCIUM 8.7   PROT 6.4   ALBUMIN 3.1*   BILITOT 0.7   ALKPHOS 64   AST 17   ALT 9*   ANIONGAP 12   EGFRNONAA >60.0       Diagnostic Results:  I have reviewed all pertinent imaging results/findings within the past 24 hours.    Assessment/Plan:     * Hyperleukocytosis  -3 months of generalized fatigue/ hot flashes   -WBC count ~ 350K prior to transfer. 329K today with peripheral blasts of 1%  -suspect accelerated phase of chronic myeloid leukemia at this time  - NS infusion at 100cc/hr  -allopurinol 300mg BID  -antimicrobial prophylaxis with Levaquin 500mg, Acyclovir 800mg, and Diflucan 400mg  -cytoreduction with 4 grams hydrea BID  - If worsening respiratory status or change in neurologic status, will place CVC to initiate leukophoresis. No indication at this time.  -LDH, Uric acid, electrolytes BID to monitor for TLS  -CBC/Fibrinogen/INR BID to monitor for DIC, transfuse to goal fibrinogen >150  -planning BMBx today    Hypokalemia  -K+ 3.3  -replaced  -daily CMP    Hyperuricemia  -uric acid 9.2  -monitoring for  TLS  -electrolytes stable  -allopurinol 300 mg BID    Hypercholesterolemia  Continue home statin    Hypertension  Continue home antihypertensives    Severe obesity (BMI >= 40)  Adjust medication doses as indicated    Diabetes mellitus  Takes basal bolus insulin at home in addition to oral antihyperglycemics. States she takes 50u basal BID, 10u prandial BID wm.   LDSSI, 10u aspart BID  POCT glucose x4        VTE Risk Mitigation (From admission, onward)         Ordered     IP VTE LOW RISK PATIENT  Once      10/26/20 9138                Disposition: Inpatient.    Monse Mcdonald, NP  Bone Marrow Transplant  [facility]

## 2020-10-26 NOTE — ASSESSMENT & PLAN NOTE
-3 months of generalized fatigue/ hot flashes   -WBC count ~ 350K prior to transfer. 329K today with peripheral blasts of 1%  -suspect accelerated phase of chronic myeloid leukemia at this time  - NS infusion at 100cc/hr  -allopurinol 300mg BID  -antimicrobial prophylaxis with Levaquin 500mg, Acyclovir 800mg, and Diflucan 400mg  -cytoreduction with 4 grams hydrea BID  - If worsening respiratory status or change in neurologic status, will place CVC to initiate leukophoresis. No indication at this time.  -LDH, Uric acid, electrolytes BID to monitor for TLS  -CBC/Fibrinogen/INR BID to monitor for DIC, transfuse to goal fibrinogen >150  -planning BMBx today

## 2020-10-26 NOTE — ASSESSMENT & PLAN NOTE
Severe leucocytosis with peripheral blast 4-13%, clinically concerning for acute leukemia versus accelerated phase of chronic myeloid leukemia. APL thought to be less likely. 3 months of generalized fatigue/ hot flashes     - NS infusion at 100cc/hr  -allopurinol 300mg BID  -antimicrobial prophylaxis with Levaquin 500mg, Acyclovir 800mg, and Diflucan 400mg  - If worsening respiratory status or change in neurologic status, will place CVC to initiate leukophoresis  -LDH/Uric acid BID to monitor for TLS  -CBC/Fibrinogen/INR BID to monitor for DIC, transfuse to goal fibrinogen >150   Dear  Duane,    Please review the enclosed prep instructions for your procedure with Gael Daly MD.    COLONOSCOPY INSTRUCTIONS - MORNING PROCEDURE  Gael Daly MD     A sedative will be given to you for the exam.  A responsible adult 18 years of age or older must accompany you from the hospital the day of your exam.  You may not leave by yourself or drive yourself home.  You may not drive for the rest of the day or return to work.  · If you do not have a  who is a responsible adult and who is 18 years of age or older, your appointment will be cancelled.   · You may take Tylenol (acetaminophen). Do not take aspirin the day of procedure.  · If you are diabetic, please see special instructions sheet.  · If you take blood thinners (Coumadin, Warfarin, Plavix), see special instructions sheet.  · You need to call your insurance company to verify coverage for your procedure.   · Medicare and state insurances do NOT need to verify coverage for your procedure.     WHAT YOU NEED TO DO: Please pick-up a Nulytely Prep Kit  at the pharmacy your physician sent the prescription to.    DAY BEFORE EXAMINATION: ON Sunday , 12/6/20  • Mix PREP according to instructions and refrigerate.  • DO NOT EAT ANY SOLID FOOD ALL DAY.   • You may drink clear liquids, such as soda, clear fruit juice, coffee or tea without milk products, beef or chicken broth, Popsicles,Gatore Elizabeth, Propell, Jell-O, and hard candy.  Avoid anything with RED coloring.  We encourage you to drink a lot of fluids for couple of days prior to procedure.  • Begin drinking the solution at 3:00 p.m (No later than 6:00pm).  Drink an 8-ounce glass every 15-20 minutes.  It is best to drink the whole glass rapidly rather than sipping small amounts. May use a straw.  • Continue drinking until the bottle is empty.  It usually takes 3 to 5 hours to completely drink the bottle, so give yourself plenty of time.  • You may drink clear liquids or suck on hard candy or  Popsicles while drinking the Prep.  • Some people feel full or bloated after about 90 minutes of drinking the Prep. This disappears with time, especially when you start passing stool.  If you feel bloated, stop drinking for about 30-45 minutes and see if you can pass some stool. The problem should resolve and you should be able to start drinking again.  If you still have problems, call us for instructions.    DAY OF EXAMINATION:  • Do not eat or drink anything after midnight the night before your exam.  • Take your regular blood pressure and heart medications on the morning of the test with a sip of water.    If you have any questions regarding these instructions, please call [phone].    Thank you for entrusting your care to [facility]
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
## 2020-10-26 NOTE — PROGRESS NOTES
PROCEDURE NOTE:  Date of Procedure: 10/26/2020  Bone Marrow Biopsy and Aspiration  Indication: leukocytosis  Consent: Informed consent was obtained from patient.  Timeout: Done and documented.  Position: Right lateral  Site: Left posterior illiac crest.  Prep: Betadine.  Needle used: 11 gauge Jamshidi needle.  Anesthetic: 1% lidocaine 5 cc.  Biopsy: The biopsy needle was introduced into the marrow cavity, and core biopsies x 3 obtained without difficulty and sent for routine histologic examination. Unable to obtain aspirate.  Complications: None.  Disposition: The patient was discharged home per anesthesia protocol.  Blood loss: Minimal.     Monse Mcdonald, FNP  Hematology/Oncology/Bone Marrow Transplant

## 2020-10-26 NOTE — PLAN OF CARE
UM noted patient is listed as self-pay. JOO sent an email to [facility] department and requested insurance follow-up and possible Medicaid screening if eligible. [facility] department to follow-up.     Amaris Alejo RN, BSN, CM  Utilization Management  [facility]

## 2020-10-27 PROBLEM — R11.2 NAUSEA & VOMITING: Status: ACTIVE | Noted: 2020-10-27

## 2020-10-27 PROBLEM — G44.89 OTHER HEADACHE SYNDROME: Status: ACTIVE | Noted: 2020-10-27

## 2020-10-27 LAB
ALBUMIN SERPL BCP-MCNC: 3.2 G/DL (ref 3.5–5.2)
ALP SERPL-CCNC: 63 U/L (ref 55–135)
ALT SERPL W/O P-5'-P-CCNC: 8 U/L (ref 10–44)
ANION GAP SERPL CALC-SCNC: 9 MMOL/L (ref 8–16)
ANION GAP SERPL CALC-SCNC: 9 MMOL/L (ref 8–16)
AST SERPL-CCNC: 19 U/L (ref 10–40)
BASOPHILS # BLD AUTO: ABNORMAL K/UL (ref 0–0.2)
BASOPHILS NFR BLD: 6 % (ref 0–1.9)
BILIRUB SERPL-MCNC: 0.6 MG/DL (ref 0.1–1)
BUN SERPL-MCNC: 15 MG/DL (ref 6–20)
BUN SERPL-MCNC: 16 MG/DL (ref 6–20)
CALCIUM SERPL-MCNC: 8.4 MG/DL (ref 8.7–10.5)
CALCIUM SERPL-MCNC: 8.4 MG/DL (ref 8.7–10.5)
CHLORIDE SERPL-SCNC: 104 MMOL/L (ref 95–110)
CHLORIDE SERPL-SCNC: 105 MMOL/L (ref 95–110)
CO2 SERPL-SCNC: 24 MMOL/L (ref 23–29)
CO2 SERPL-SCNC: 24 MMOL/L (ref 23–29)
CREAT SERPL-MCNC: 0.7 MG/DL (ref 0.5–1.4)
CREAT SERPL-MCNC: 0.7 MG/DL (ref 0.5–1.4)
DIFFERENTIAL METHOD: ABNORMAL
EOSINOPHIL # BLD AUTO: ABNORMAL K/UL (ref 0–0.5)
EOSINOPHIL NFR BLD: 3 % (ref 0–8)
ERYTHROCYTE [DISTWIDTH] IN BLOOD BY AUTOMATED COUNT: 17.5 % (ref 11.5–14.5)
EST. GFR  (AFRICAN AMERICAN): >60 ML/MIN/1.73 M^2
EST. GFR  (AFRICAN AMERICAN): >60 ML/MIN/1.73 M^2
EST. GFR  (NON AFRICAN AMERICAN): >60 ML/MIN/1.73 M^2
EST. GFR  (NON AFRICAN AMERICAN): >60 ML/MIN/1.73 M^2
FIBRINOGEN PPP-MCNC: 424 MG/DL (ref 182–366)
FIBRINOGEN PPP-MCNC: 445 MG/DL (ref 182–366)
GLUCOSE SERPL-MCNC: 245 MG/DL (ref 70–110)
GLUCOSE SERPL-MCNC: 262 MG/DL (ref 70–110)
HCT VFR BLD AUTO: 33 % (ref 37–48.5)
HGB BLD-MCNC: 10.4 G/DL (ref 12–16)
HYPOCHROMIA BLD QL SMEAR: ABNORMAL
IMM GRANULOCYTES # BLD AUTO: ABNORMAL K/UL (ref 0–0.04)
IMM GRANULOCYTES NFR BLD AUTO: ABNORMAL % (ref 0–0.5)
LDH SERPL L TO P-CCNC: 1187 U/L (ref 110–260)
LYMPHOCYTES # BLD AUTO: ABNORMAL K/UL (ref 1–4.8)
LYMPHOCYTES NFR BLD: 2 % (ref 18–48)
MAGNESIUM SERPL-MCNC: 2.1 MG/DL (ref 1.6–2.6)
MCH RBC QN AUTO: 30.4 PG (ref 27–31)
MCHC RBC AUTO-ENTMCNC: 31.5 G/DL (ref 32–36)
MCV RBC AUTO: 97 FL (ref 82–98)
METAMYELOCYTES NFR BLD MANUAL: 10 %
MONOCYTES # BLD AUTO: ABNORMAL K/UL (ref 0.3–1)
MONOCYTES NFR BLD: 1 % (ref 4–15)
MYELOCYTES NFR BLD MANUAL: 23 %
NEUTROPHILS NFR BLD: 41 % (ref 38–73)
NEUTS BAND NFR BLD MANUAL: 10 %
NRBC BLD-RTO: 2 /100 WBC
OVALOCYTES BLD QL SMEAR: ABNORMAL
PATH REV BLD -IMP: NORMAL
PHOSPHATE SERPL-MCNC: 3.6 MG/DL (ref 2.7–4.5)
PHOSPHATE SERPL-MCNC: 3.8 MG/DL (ref 2.7–4.5)
PLATELET # BLD AUTO: 132 K/UL (ref 150–350)
PMV BLD AUTO: 12.4 FL (ref 9.2–12.9)
POCT GLUCOSE: 173 MG/DL (ref 70–110)
POCT GLUCOSE: 212 MG/DL (ref 70–110)
POCT GLUCOSE: 224 MG/DL (ref 70–110)
POCT GLUCOSE: 224 MG/DL (ref 70–110)
POIKILOCYTOSIS BLD QL SMEAR: SLIGHT
POLYCHROMASIA BLD QL SMEAR: ABNORMAL
POTASSIUM SERPL-SCNC: 3.9 MMOL/L (ref 3.5–5.1)
POTASSIUM SERPL-SCNC: 4.2 MMOL/L (ref 3.5–5.1)
PROMYELOCYTES NFR BLD MANUAL: 4 %
PROT SERPL-MCNC: 6.6 G/DL (ref 6–8.4)
RBC # BLD AUTO: 3.42 M/UL (ref 4–5.4)
SODIUM SERPL-SCNC: 137 MMOL/L (ref 136–145)
SODIUM SERPL-SCNC: 138 MMOL/L (ref 136–145)
URATE SERPL-MCNC: 5.1 MG/DL (ref 2.4–5.7)
URATE SERPL-MCNC: 6 MG/DL (ref 2.4–5.7)
WBC # BLD AUTO: 218.8 K/UL (ref 3.9–12.7)

## 2020-10-27 PROCEDURE — 25000003 PHARM REV CODE 250: Performed by: NURSE PRACTITIONER

## 2020-10-27 PROCEDURE — 36415 COLL VENOUS BLD VENIPUNCTURE: CPT

## 2020-10-27 PROCEDURE — 83615 LACTATE (LD) (LDH) ENZYME: CPT

## 2020-10-27 PROCEDURE — 63600175 PHARM REV CODE 636 W HCPCS: Performed by: INTERNAL MEDICINE

## 2020-10-27 PROCEDURE — 99233 SBSQ HOSP IP/OBS HIGH 50: CPT | Mod: ,,, | Performed by: INTERNAL MEDICINE

## 2020-10-27 PROCEDURE — 20600001 HC STEP DOWN PRIVATE ROOM

## 2020-10-27 PROCEDURE — 25000003 PHARM REV CODE 250: Performed by: STUDENT IN AN ORGANIZED HEALTH CARE EDUCATION/TRAINING PROGRAM

## 2020-10-27 PROCEDURE — 84550 ASSAY OF BLOOD/URIC ACID: CPT

## 2020-10-27 PROCEDURE — 84100 ASSAY OF PHOSPHORUS: CPT

## 2020-10-27 PROCEDURE — 63600175 PHARM REV CODE 636 W HCPCS: Performed by: STUDENT IN AN ORGANIZED HEALTH CARE EDUCATION/TRAINING PROGRAM

## 2020-10-27 PROCEDURE — 80048 BASIC METABOLIC PNL TOTAL CA: CPT | Mod: 91

## 2020-10-27 PROCEDURE — 85027 COMPLETE CBC AUTOMATED: CPT

## 2020-10-27 PROCEDURE — 80053 COMPREHEN METABOLIC PANEL: CPT

## 2020-10-27 PROCEDURE — 84100 ASSAY OF PHOSPHORUS: CPT | Mod: 91

## 2020-10-27 PROCEDURE — 80048 BASIC METABOLIC PNL TOTAL CA: CPT

## 2020-10-27 PROCEDURE — 83735 ASSAY OF MAGNESIUM: CPT

## 2020-10-27 PROCEDURE — 85384 FIBRINOGEN ACTIVITY: CPT

## 2020-10-27 PROCEDURE — 99233 PR SUBSEQUENT HOSPITAL CARE,LEVL III: ICD-10-PCS | Mod: ,,, | Performed by: INTERNAL MEDICINE

## 2020-10-27 PROCEDURE — 85007 BL SMEAR W/DIFF WBC COUNT: CPT

## 2020-10-27 PROCEDURE — 84550 ASSAY OF BLOOD/URIC ACID: CPT | Mod: 91

## 2020-10-27 RX ORDER — ONDANSETRON 2 MG/ML
8 INJECTION INTRAMUSCULAR; INTRAVENOUS EVERY 8 HOURS
Status: DISCONTINUED | OUTPATIENT
Start: 2020-10-27 | End: 2020-10-27

## 2020-10-27 RX ORDER — ONDANSETRON 2 MG/ML
8 INJECTION INTRAMUSCULAR; INTRAVENOUS EVERY 8 HOURS
Status: DISCONTINUED | OUTPATIENT
Start: 2020-10-27 | End: 2020-10-29 | Stop reason: HOSPADM

## 2020-10-27 RX ORDER — POLYETHYLENE GLYCOL 3350 17 G/17G
17 POWDER, FOR SOLUTION ORAL 2 TIMES DAILY
Status: DISCONTINUED | OUTPATIENT
Start: 2020-10-27 | End: 2020-10-29 | Stop reason: HOSPADM

## 2020-10-27 RX ADMIN — INSULIN ASPART 2 UNITS: 100 INJECTION, SOLUTION INTRAVENOUS; SUBCUTANEOUS at 08:10

## 2020-10-27 RX ADMIN — ALLOPURINOL 300 MG: 300 TABLET ORAL at 09:10

## 2020-10-27 RX ADMIN — TRAMADOL HYDROCHLORIDE 50 MG: 50 TABLET ORAL at 08:10

## 2020-10-27 RX ADMIN — HYDROXYUREA 4000 MG: 500 CAPSULE ORAL at 09:10

## 2020-10-27 RX ADMIN — GABAPENTIN 300 MG: 300 CAPSULE ORAL at 03:10

## 2020-10-27 RX ADMIN — INSULIN DETEMIR 10 UNITS: 100 INJECTION, SOLUTION SUBCUTANEOUS at 09:10

## 2020-10-27 RX ADMIN — ACYCLOVIR 400 MG: 200 CAPSULE ORAL at 09:10

## 2020-10-27 RX ADMIN — ONDANSETRON 8 MG: 2 INJECTION INTRAMUSCULAR; INTRAVENOUS at 08:10

## 2020-10-27 RX ADMIN — GABAPENTIN 300 MG: 300 CAPSULE ORAL at 09:10

## 2020-10-27 RX ADMIN — METOPROLOL TARTRATE 25 MG: 25 TABLET, FILM COATED ORAL at 09:10

## 2020-10-27 RX ADMIN — THERA TABS 1 TABLET: TAB at 09:10

## 2020-10-27 RX ADMIN — LEVOFLOXACIN 500 MG: 500 TABLET, FILM COATED ORAL at 09:10

## 2020-10-27 RX ADMIN — ONDANSETRON 8 MG: 2 INJECTION INTRAMUSCULAR; INTRAVENOUS at 03:10

## 2020-10-27 RX ADMIN — TRAMADOL HYDROCHLORIDE 50 MG: 50 TABLET ORAL at 01:10

## 2020-10-27 RX ADMIN — ATORVASTATIN CALCIUM 40 MG: 20 TABLET, FILM COATED ORAL at 09:10

## 2020-10-27 RX ADMIN — POLYETHYLENE GLYCOL 3350 17 G: 17 POWDER, FOR SOLUTION ORAL at 09:10

## 2020-10-27 RX ADMIN — INSULIN DETEMIR 10 UNITS: 100 INJECTION, SOLUTION SUBCUTANEOUS at 08:10

## 2020-10-27 RX ADMIN — ONDANSETRON 8 MG: 2 INJECTION INTRAMUSCULAR; INTRAVENOUS at 09:10

## 2020-10-27 RX ADMIN — BENAZEPRIL HYDROCHLORIDE 40 MG: 10 TABLET ORAL at 09:10

## 2020-10-27 RX ADMIN — SODIUM CHLORIDE: 0.9 INJECTION, SOLUTION INTRAVENOUS at 05:10

## 2020-10-27 RX ADMIN — INSULIN ASPART 2 UNITS: 100 INJECTION, SOLUTION INTRAVENOUS; SUBCUTANEOUS at 12:10

## 2020-10-27 RX ADMIN — FLUCONAZOLE 400 MG: 200 TABLET ORAL at 09:10

## 2020-10-27 NOTE — ASSESSMENT & PLAN NOTE
-3 months of generalized fatigue/ hot flashes   -WBC count ~ 350K prior to transfer. 329K today with peripheral blasts of 1%  -suspect accelerated phase of chronic myeloid leukemia at this time  - NS infusion at 100cc/hr  -allopurinol 300mg BID  -antimicrobial prophylaxis with Levaquin 500mg, Acyclovir 800mg, and Diflucan 400mg  -cytoreduction with 4 grams hydrea BID  - If worsening respiratory status or change in neurologic status, will place CVC to initiate leukophoresis. No indication at this time.  -LDH, Uric acid, electrolytes BID to monitor for TLS  -CBC/Fibrinogen/INR BID to monitor for DIC, transfuse to goal fibrinogen >150  - BMBx on 10/26, result pending

## 2020-10-27 NOTE — PLAN OF CARE
Uneventful shift. Pt has had no c/o pain this shift. Pt blood sugar monitored ac/hs. Abd US pending. Pt has remained free from injury this shift. Bed in low locked position. Call light and personal belongings within reach. Side rails up x2. Nonskid socks in place. Pt instructed to call with any needs. Will continue to monitor.

## 2020-10-27 NOTE — PLAN OF CARE
Patient AAOx4. Abdominal ultrasound completed. Fluids continued; NS @ 100mL/hr. Complaints of headache today; relieved with PRN tramadol. Abd US completed today. Accu-checks continued ACHS; correction dose insulin administered as ordered. Bed in low, locked position. Call light in reach. Instructed to call if needed.

## 2020-10-27 NOTE — ASSESSMENT & PLAN NOTE
- Uric acid on admission 9.2, recent level 6.0  -monitoring for TLS  -electrolytes stable  -allopurinol 300 mg BID

## 2020-10-27 NOTE — SUBJECTIVE & OBJECTIVE
Subjective:     Interval History:Leukocytosis significantly  improving with hydrea 4 mg BID. Scheduled dose of zofran for nausea/vomitting. Enlarged spleen on examination, abdominal ultrasound shows splenomegaly (25x7.6 cm) and hepatomegaly(23 cm). Reported headache without any focal neuro deficit, controlled with tramadol. Uric acid improved with current dose of allopurinol.     Objective:     Vital Signs (Most Recent):  Temp: 97.7 °F (36.5 °C) (10/27/20 1142)  Pulse: 73 (10/27/20 1142)  Resp: 19 (10/27/20 1142)  BP: 122/69 (10/27/20 1142)  SpO2: (!) 93 % (10/27/20 1142) Vital Signs (24h Range):  Temp:  [97.7 °F (36.5 °C)-99.1 °F (37.3 °C)] 97.7 °F (36.5 °C)  Pulse:  [73-88] 73  Resp:  [16-22] 19  SpO2:  [92 %-96 %] 93 %  BP: ()/(54-73) 122/69     Weight: 109.5 kg (241 lb 6.5 oz)  There is no height or weight on file to calculate BMI.  There is no height or weight on file to calculate BSA.      Intake/Output - Last 3 Shifts       10/25 0700 - 10/26 0659 10/26 0700 - 10/27 0659 10/27 0700 - 10/28 0659    P.O.  480     I.V. (mL/kg)  2301.3 (21)     Total Intake(mL/kg)  2781.3 (25.4)     Urine (mL/kg/hr)  800 (0.3) 2000 (2.8)    Total Output  800 2000    Net  +1981.3 -2000           Urine Occurrence  4 x           Physical Exam  Constitutional:       Appearance: She is well-developed.   HENT:      Head: Normocephalic and atraumatic.      Mouth/Throat:      Pharynx: No oropharyngeal exudate.   Eyes:      Conjunctiva/sclera: Conjunctivae normal.      Pupils: Pupils are equal, round, and reactive to light.   Neck:      Musculoskeletal: Normal range of motion and neck supple.   Cardiovascular:      Rate and Rhythm: Normal rate and regular rhythm.      Heart sounds: Normal heart sounds. No murmur.   Pulmonary:      Effort: Pulmonary effort is normal.      Breath sounds: Normal breath sounds.   Abdominal:      General: Bowel sounds are normal. There is no distension.      Tenderness: There is no abdominal  tenderness.      Comments: Abdomen obese and distended. Large spleen palpated.   Musculoskeletal: Normal range of motion.         General: No deformity.   Skin:     General: Skin is warm and dry.      Findings: No erythema or rash.   Neurological:      Mental Status: She is alert and oriented to person, place, and time.   Psychiatric:         Behavior: Behavior normal.         Thought Content: Thought content normal.         Judgment: Judgment normal.         Significant Labs:   CBC:   Recent Labs   Lab 10/26/20  0622 10/27/20  0812   .60* 218.80*   HGB 10.0* 10.4*   HCT 31.9* 33.0*   * 132*    and CMP:   Recent Labs   Lab 10/26/20  0622 10/27/20  0018 10/27/20  0812    138 137   K 3.3* 4.2 3.9    105 104   CO2 23 24 24   * 262* 245*   BUN 14 16 15   CREATININE 0.7 0.7 0.7   CALCIUM 8.7 8.4* 8.4*   PROT 6.4  --  6.6   ALBUMIN 3.1*  --  3.2*   BILITOT 0.7  --  0.6   ALKPHOS 64  --  63   AST 17  --  19   ALT 9*  --  8*   ANIONGAP 12 9 9   EGFRNONAA >60.0 >60.0 >60.0       Diagnostic Results:  I have reviewed all pertinent imaging results/findings within the past 24 hours.

## 2020-10-27 NOTE — PROGRESS NOTES
[facility]  Hematology  Bone Marrow Transplant  Progress Note    Patient Name: Fela Sawyer  Admission Date: 10/26/2020  Hospital Length of Stay: 1 days  Code Status: Full Code    Subjective:     Interval History:Leukocytosis improving with hydrea 4 mg BID. Scheduled dose of zofran for nausea/vomitting. Enlarged spleen on examination, abdominal ultrasound shows splenomegaly (25x7.6 cm) and hepatomegaly(23 cm). Reported headache without any focal neuro deficit, controlled with tramadol. Uric acid improved with current dose of allopurinol.     Objective:     Vital Signs (Most Recent):  Temp: 97.7 °F (36.5 °C) (10/27/20 1142)  Pulse: 73 (10/27/20 1142)  Resp: 19 (10/27/20 1142)  BP: 122/69 (10/27/20 1142)  SpO2: (!) 93 % (10/27/20 1142) Vital Signs (24h Range):  Temp:  [97.7 °F (36.5 °C)-99.1 °F (37.3 °C)] 97.7 °F (36.5 °C)  Pulse:  [73-88] 73  Resp:  [16-22] 19  SpO2:  [92 %-96 %] 93 %  BP: ()/(54-73) 122/69     Weight: 109.5 kg (241 lb 6.5 oz)  There is no height or weight on file to calculate BMI.  There is no height or weight on file to calculate BSA.      Intake/Output - Last 3 Shifts       10/25 0700 - 10/26 0659 10/26 0700 - 10/27 0659 10/27 0700 - 10/28 0659    P.O.  480     I.V. (mL/kg)  2301.3 (21)     Total Intake(mL/kg)  2781.3 (25.4)     Urine (mL/kg/hr)  800 (0.3) 2000 (2.8)    Total Output  800 2000    Net  +1981.3 -2000           Urine Occurrence  4 x           Physical Exam  Constitutional:       Appearance: She is well-developed.   HENT:      Head: Normocephalic and atraumatic.      Mouth/Throat:      Pharynx: No oropharyngeal exudate.   Eyes:      Conjunctiva/sclera: Conjunctivae normal.      Pupils: Pupils are equal, round, and reactive to light.   Neck:      Musculoskeletal: Normal range of motion and neck supple.   Cardiovascular:      Rate and Rhythm: Normal rate and regular rhythm.      Heart sounds: Normal heart sounds. No murmur.   Pulmonary:      Effort: Pulmonary  effort is normal.      Breath sounds: Normal breath sounds.   Abdominal:      General: Bowel sounds are normal. There is no distension.      Tenderness: There is no abdominal tenderness.      Comments: Abdomen obese and distended. Large spleen palpated.   Musculoskeletal: Normal range of motion.         General: No deformity.   Skin:     General: Skin is warm and dry.      Findings: No erythema or rash.   Neurological:      Mental Status: She is alert and oriented to person, place, and time.   Psychiatric:         Behavior: Behavior normal.         Thought Content: Thought content normal.         Judgment: Judgment normal.         Significant Labs:   CBC:   Recent Labs   Lab 10/26/20  0622 10/27/20  0812   .60* 218.80*   HGB 10.0* 10.4*   HCT 31.9* 33.0*   * 132*    and CMP:   Recent Labs   Lab 10/26/20  0622 10/27/20  0018 10/27/20  0812    138 137   K 3.3* 4.2 3.9    105 104   CO2 23 24 24   * 262* 245*   BUN 14 16 15   CREATININE 0.7 0.7 0.7   CALCIUM 8.7 8.4* 8.4*   PROT 6.4  --  6.6   ALBUMIN 3.1*  --  3.2*   BILITOT 0.7  --  0.6   ALKPHOS 64  --  63   AST 17  --  19   ALT 9*  --  8*   ANIONGAP 12 9 9   EGFRNONAA >60.0 >60.0 >60.0       Diagnostic Results:  I have reviewed all pertinent imaging results/findings within the past 24 hours.    Assessment/Plan:     * Hyperleukocytosis  -3 months of generalized fatigue/ hot flashes   -WBC count ~ 350K prior to transfer. 329K today with peripheral blasts of 1%  -suspect accelerated phase of chronic myeloid leukemia at this time  - NS infusion at 100cc/hr  -allopurinol 300mg BID  -antimicrobial prophylaxis with Levaquin 500mg, Acyclovir 800mg, and Diflucan 400mg  -cytoreduction with 4 grams hydrea BID  - If worsening respiratory status or change in neurologic status, will place CVC to initiate leukophoresis. No indication at this time.  -LDH, Uric acid, electrolytes BID to monitor for TLS  -CBC/Fibrinogen/INR BID to monitor for DIC,  transfuse to goal fibrinogen >150  - BMBx on 10/26, result pending    Nausea & vomiting  - Nausea, vomiting x 3 over night  - Scheduled dose of Zofran     Other headache syndrome  - Reported headache without any focal neuro deficit  - Resolved with dose of tramadol    Splenomegaly  -severe splenomegaly on outside CT per report  -large spleen palpated on exam  -2/2 hyperleukocytosis  - Abdominal US shows splenomegaly (25x7.6 cm) and hepatomegaly(23 cm)              Hypokalemia  -K+ 3.3  -replaced  -daily CMP    Hyperuricemia  - Uric acid on admission 9.2, recent level 6.0  -monitoring for TLS  -electrolytes stable  -allopurinol 300 mg BID    Hypercholesterolemia  Continue home statin    Hypertension  Continue home antihypertensives    Severe obesity (BMI >= 40)  Adjust medication doses as indicated    Diabetes mellitus  Takes basal bolus insulin at home in addition to oral antihyperglycemics. States she takes 50u basal BID, 10u prandial BID wm.   LDSSI, 10u aspart BID  POCT glucose x4      VTE Risk Mitigation (From admission, onward)         Ordered     IP VTE LOW RISK PATIENT  Once      10/26/20 0454                Disposition: Remains inpatient    Cehlsie Samaniego NP  Bone Marrow Transplant  [facility]

## 2020-10-27 NOTE — PLAN OF CARE
Side rails up x2; call bell in place; bed in lowest, locked position; skid proof socks on; no evidence of skin breakdown; care plan explained to patient; pt remains free of injury.  Pt tolerated po, voids, ambulates in room. Pt with c/o pain tramadol given. BM biopsy completed site bandaid site c/d/I. Pt with c/o n/v after premed dilaudid iv given. Zofran 8 mg iv given. CBG monitored no insulin needed. Pt with c/o chest pain. 12 Lead EKG completed, new orders for troponin levels received. Pt given viscosus lidocaine solution for possible acid reflux. U/S of abdomen pending, ivf infusing, VSS and afebrile. Pt stated Chest discomfort/pain improved with zofran.

## 2020-10-28 DIAGNOSIS — C92.10 CML (CHRONIC MYELOID LEUKEMIA): Primary | ICD-10-CM

## 2020-10-28 PROBLEM — R16.2 HEPATOSPLENOMEGALY: Status: ACTIVE | Noted: 2020-10-26

## 2020-10-28 LAB
ALBUMIN SERPL BCP-MCNC: 3 G/DL (ref 3.5–5.2)
ALBUMIN SERPL BCP-MCNC: 3.1 G/DL (ref 3.5–5.2)
ALP SERPL-CCNC: 69 U/L (ref 55–135)
ALP SERPL-CCNC: 73 U/L (ref 55–135)
ALT SERPL W/O P-5'-P-CCNC: 10 U/L (ref 10–44)
ALT SERPL W/O P-5'-P-CCNC: 11 U/L (ref 10–44)
ANION GAP SERPL CALC-SCNC: 6 MMOL/L (ref 8–16)
ANION GAP SERPL CALC-SCNC: 8 MMOL/L (ref 8–16)
ANION GAP SERPL CALC-SCNC: 9 MMOL/L (ref 8–16)
ANISOCYTOSIS BLD QL SMEAR: SLIGHT
AST SERPL-CCNC: 17 U/L (ref 10–40)
AST SERPL-CCNC: 18 U/L (ref 10–40)
BASO STIPL BLD QL SMEAR: ABNORMAL
BASOPHILS # BLD AUTO: ABNORMAL K/UL (ref 0–0.2)
BASOPHILS NFR BLD: 6 % (ref 0–1.9)
BILIRUB SERPL-MCNC: 0.5 MG/DL (ref 0.1–1)
BILIRUB SERPL-MCNC: 0.6 MG/DL (ref 0.1–1)
BILIRUB UR QL STRIP: NEGATIVE
BODY SITE - BONE MARROW: NORMAL
BUN SERPL-MCNC: 14 MG/DL (ref 6–20)
BUN SERPL-MCNC: 16 MG/DL (ref 6–20)
BUN SERPL-MCNC: 20 MG/DL (ref 6–20)
CALCIUM SERPL-MCNC: 8.5 MG/DL (ref 8.7–10.5)
CALCIUM SERPL-MCNC: 8.6 MG/DL (ref 8.7–10.5)
CALCIUM SERPL-MCNC: 8.8 MG/DL (ref 8.7–10.5)
CHLORIDE SERPL-SCNC: 102 MMOL/L (ref 95–110)
CHLORIDE SERPL-SCNC: 103 MMOL/L (ref 95–110)
CHLORIDE SERPL-SCNC: 104 MMOL/L (ref 95–110)
CLARITY UR REFRACT.AUTO: CLEAR
CLINICAL DIAGNOSIS - BONE MARROW: NORMAL
CO2 SERPL-SCNC: 25 MMOL/L (ref 23–29)
CO2 SERPL-SCNC: 26 MMOL/L (ref 23–29)
CO2 SERPL-SCNC: 28 MMOL/L (ref 23–29)
COLOR UR AUTO: YELLOW
CREAT SERPL-MCNC: 0.7 MG/DL (ref 0.5–1.4)
CREAT SERPL-MCNC: 0.7 MG/DL (ref 0.5–1.4)
CREAT SERPL-MCNC: 0.8 MG/DL (ref 0.5–1.4)
DIFFERENTIAL METHOD: ABNORMAL
EOSINOPHIL # BLD AUTO: ABNORMAL K/UL (ref 0–0.5)
EOSINOPHIL NFR BLD: 5 % (ref 0–8)
ERYTHROCYTE [DISTWIDTH] IN BLOOD BY AUTOMATED COUNT: 17.2 % (ref 11.5–14.5)
EST. GFR  (AFRICAN AMERICAN): >60 ML/MIN/1.73 M^2
EST. GFR  (NON AFRICAN AMERICAN): >60 ML/MIN/1.73 M^2
FIBRINOGEN PPP-MCNC: 411 MG/DL (ref 182–366)
FIBRINOGEN PPP-MCNC: 416 MG/DL (ref 182–366)
FLOW CYTOMETRY ANTIBODIES ANALYZED - BONE MARROW: NORMAL
FLOW CYTOMETRY COMMENT - BONE MARROW: NORMAL
FLOW CYTOMETRY INTERPRETATION - BONE MARROW: NORMAL
GLUCOSE SERPL-MCNC: 227 MG/DL (ref 70–110)
GLUCOSE SERPL-MCNC: 254 MG/DL (ref 70–110)
GLUCOSE SERPL-MCNC: 323 MG/DL (ref 70–110)
GLUCOSE UR QL STRIP: ABNORMAL
HCT VFR BLD AUTO: 35 % (ref 37–48.5)
HGB BLD-MCNC: 10.5 G/DL (ref 12–16)
HGB UR QL STRIP: NEGATIVE
HYPOCHROMIA BLD QL SMEAR: ABNORMAL
IMM GRANULOCYTES # BLD AUTO: ABNORMAL K/UL (ref 0–0.04)
IMM GRANULOCYTES NFR BLD AUTO: ABNORMAL % (ref 0–0.5)
KETONES UR QL STRIP: NEGATIVE
LDH SERPL L TO P-CCNC: 1032 U/L (ref 110–260)
LEUKOCYTE ESTERASE UR QL STRIP: NEGATIVE
LYMPHOCYTES # BLD AUTO: ABNORMAL K/UL (ref 1–4.8)
LYMPHOCYTES NFR BLD: 7 % (ref 18–48)
MAGNESIUM SERPL-MCNC: 2 MG/DL (ref 1.6–2.6)
MAGNESIUM SERPL-MCNC: 2.1 MG/DL (ref 1.6–2.6)
MCH RBC QN AUTO: 30.1 PG (ref 27–31)
MCHC RBC AUTO-ENTMCNC: 30 G/DL (ref 32–36)
MCV RBC AUTO: 100 FL (ref 82–98)
METAMYELOCYTES NFR BLD MANUAL: 11.5 %
MONOCYTES # BLD AUTO: ABNORMAL K/UL (ref 0.3–1)
MONOCYTES NFR BLD: 2.5 % (ref 4–15)
MYELOCYTES NFR BLD MANUAL: 8 %
NEUTROPHILS NFR BLD: 45.5 % (ref 38–73)
NEUTS BAND NFR BLD MANUAL: 10.5 %
NITRITE UR QL STRIP: NEGATIVE
NRBC BLD-RTO: 2 /100 WBC
OVALOCYTES BLD QL SMEAR: ABNORMAL
PATH REV BLD -IMP: NORMAL
PH UR STRIP: 6 [PH] (ref 5–8)
PHOSPHATE SERPL-MCNC: 4.2 MG/DL (ref 2.7–4.5)
PHOSPHATE SERPL-MCNC: 4.6 MG/DL (ref 2.7–4.5)
PHOSPHATE SERPL-MCNC: 4.6 MG/DL (ref 2.7–4.5)
PLATELET # BLD AUTO: 134 K/UL (ref 150–350)
PLATELET BLD QL SMEAR: ABNORMAL
PMV BLD AUTO: 12.6 FL (ref 9.2–12.9)
POCT GLUCOSE: 229 MG/DL (ref 70–110)
POCT GLUCOSE: 244 MG/DL (ref 70–110)
POCT GLUCOSE: 264 MG/DL (ref 70–110)
POCT GLUCOSE: 290 MG/DL (ref 70–110)
POIKILOCYTOSIS BLD QL SMEAR: SLIGHT
POLYCHROMASIA BLD QL SMEAR: ABNORMAL
POTASSIUM SERPL-SCNC: 4.5 MMOL/L (ref 3.5–5.1)
POTASSIUM SERPL-SCNC: 4.5 MMOL/L (ref 3.5–5.1)
POTASSIUM SERPL-SCNC: 5 MMOL/L (ref 3.5–5.1)
PROMYELOCYTES NFR BLD MANUAL: 4 %
PROT SERPL-MCNC: 6.5 G/DL (ref 6–8.4)
PROT SERPL-MCNC: 6.7 G/DL (ref 6–8.4)
PROT UR QL STRIP: NEGATIVE
RBC # BLD AUTO: 3.49 M/UL (ref 4–5.4)
SMUDGE CELLS BLD QL SMEAR: PRESENT
SODIUM SERPL-SCNC: 135 MMOL/L (ref 136–145)
SODIUM SERPL-SCNC: 136 MMOL/L (ref 136–145)
SODIUM SERPL-SCNC: 140 MMOL/L (ref 136–145)
SP GR UR STRIP: 1.01 (ref 1–1.03)
URATE SERPL-MCNC: 3.5 MG/DL (ref 2.4–5.7)
URATE SERPL-MCNC: 3.5 MG/DL (ref 2.4–5.7)
URATE SERPL-MCNC: 4.1 MG/DL (ref 2.4–5.7)
URN SPEC COLLECT METH UR: ABNORMAL
WBC # BLD AUTO: 186 K/UL (ref 3.9–12.7)

## 2020-10-28 PROCEDURE — 99233 SBSQ HOSP IP/OBS HIGH 50: CPT | Mod: ,,, | Performed by: INTERNAL MEDICINE

## 2020-10-28 PROCEDURE — 83735 ASSAY OF MAGNESIUM: CPT | Mod: 91

## 2020-10-28 PROCEDURE — 80053 COMPREHEN METABOLIC PANEL: CPT | Mod: 91

## 2020-10-28 PROCEDURE — 85007 BL SMEAR W/DIFF WBC COUNT: CPT

## 2020-10-28 PROCEDURE — 85027 COMPLETE CBC AUTOMATED: CPT

## 2020-10-28 PROCEDURE — 36415 COLL VENOUS BLD VENIPUNCTURE: CPT

## 2020-10-28 PROCEDURE — 25000003 PHARM REV CODE 250: Performed by: NURSE PRACTITIONER

## 2020-10-28 PROCEDURE — 83615 LACTATE (LD) (LDH) ENZYME: CPT

## 2020-10-28 PROCEDURE — 99900035 HC TECH TIME PER 15 MIN (STAT)

## 2020-10-28 PROCEDURE — 99233 PR SUBSEQUENT HOSPITAL CARE,LEVL III: ICD-10-PCS | Mod: ,,, | Performed by: INTERNAL MEDICINE

## 2020-10-28 PROCEDURE — 81003 URINALYSIS AUTO W/O SCOPE: CPT

## 2020-10-28 PROCEDURE — 83735 ASSAY OF MAGNESIUM: CPT

## 2020-10-28 PROCEDURE — 20600001 HC STEP DOWN PRIVATE ROOM

## 2020-10-28 PROCEDURE — 87040 BLOOD CULTURE FOR BACTERIA: CPT

## 2020-10-28 PROCEDURE — 63600175 PHARM REV CODE 636 W HCPCS: Performed by: INTERNAL MEDICINE

## 2020-10-28 PROCEDURE — 84550 ASSAY OF BLOOD/URIC ACID: CPT | Mod: 91

## 2020-10-28 PROCEDURE — 25000003 PHARM REV CODE 250: Performed by: STUDENT IN AN ORGANIZED HEALTH CARE EDUCATION/TRAINING PROGRAM

## 2020-10-28 PROCEDURE — 84100 ASSAY OF PHOSPHORUS: CPT

## 2020-10-28 PROCEDURE — 85384 FIBRINOGEN ACTIVITY: CPT | Mod: 91

## 2020-10-28 PROCEDURE — 80053 COMPREHEN METABOLIC PANEL: CPT

## 2020-10-28 PROCEDURE — 27000221 HC OXYGEN, UP TO 24 HOURS

## 2020-10-28 PROCEDURE — 85060 PATHOLOGIST REVIEW: ICD-10-PCS | Mod: ,,, | Performed by: PATHOLOGY

## 2020-10-28 PROCEDURE — 84100 ASSAY OF PHOSPHORUS: CPT | Mod: 91

## 2020-10-28 PROCEDURE — 85060 BLOOD SMEAR INTERPRETATION: CPT | Mod: ,,, | Performed by: PATHOLOGY

## 2020-10-28 PROCEDURE — 84550 ASSAY OF BLOOD/URIC ACID: CPT

## 2020-10-28 RX ORDER — IMATINIB MESYLATE 400 MG/1
400 TABLET, FILM COATED ORAL DAILY
Qty: 30 TABLET | Refills: 0 | Status: ON HOLD | OUTPATIENT
Start: 2020-10-28 | End: 2023-06-02 | Stop reason: ALTCHOICE

## 2020-10-28 RX ORDER — HYDROXYZINE HYDROCHLORIDE 25 MG/1
25 TABLET, FILM COATED ORAL 3 TIMES DAILY PRN
Status: DISCONTINUED | OUTPATIENT
Start: 2020-10-28 | End: 2020-10-29 | Stop reason: HOSPADM

## 2020-10-28 RX ADMIN — THERA TABS 1 TABLET: TAB at 09:10

## 2020-10-28 RX ADMIN — HYDROXYUREA 4000 MG: 500 CAPSULE ORAL at 09:10

## 2020-10-28 RX ADMIN — METOPROLOL TARTRATE 25 MG: 25 TABLET, FILM COATED ORAL at 09:10

## 2020-10-28 RX ADMIN — LEVOFLOXACIN 500 MG: 500 TABLET, FILM COATED ORAL at 09:10

## 2020-10-28 RX ADMIN — ACYCLOVIR 400 MG: 200 CAPSULE ORAL at 09:10

## 2020-10-28 RX ADMIN — POLYETHYLENE GLYCOL 3350 17 G: 17 POWDER, FOR SOLUTION ORAL at 09:10

## 2020-10-28 RX ADMIN — TRAMADOL HYDROCHLORIDE 50 MG: 50 TABLET ORAL at 04:10

## 2020-10-28 RX ADMIN — FLUCONAZOLE 400 MG: 200 TABLET ORAL at 09:10

## 2020-10-28 RX ADMIN — INSULIN ASPART 2 UNITS: 100 INJECTION, SOLUTION INTRAVENOUS; SUBCUTANEOUS at 05:10

## 2020-10-28 RX ADMIN — GABAPENTIN 300 MG: 300 CAPSULE ORAL at 03:10

## 2020-10-28 RX ADMIN — ONDANSETRON 8 MG: 2 INJECTION INTRAMUSCULAR; INTRAVENOUS at 01:10

## 2020-10-28 RX ADMIN — INSULIN ASPART 1 UNITS: 100 INJECTION, SOLUTION INTRAVENOUS; SUBCUTANEOUS at 09:10

## 2020-10-28 RX ADMIN — ONDANSETRON 8 MG: 2 INJECTION INTRAMUSCULAR; INTRAVENOUS at 09:10

## 2020-10-28 RX ADMIN — ALLOPURINOL 300 MG: 300 TABLET ORAL at 09:10

## 2020-10-28 RX ADMIN — INSULIN DETEMIR 10 UNITS: 100 INJECTION, SOLUTION SUBCUTANEOUS at 08:10

## 2020-10-28 RX ADMIN — TRAMADOL HYDROCHLORIDE 50 MG: 50 TABLET ORAL at 09:10

## 2020-10-28 RX ADMIN — ONDANSETRON 8 MG: 2 INJECTION INTRAMUSCULAR; INTRAVENOUS at 06:10

## 2020-10-28 RX ADMIN — BENAZEPRIL HYDROCHLORIDE 40 MG: 10 TABLET ORAL at 09:10

## 2020-10-28 RX ADMIN — INSULIN ASPART 2 UNITS: 100 INJECTION, SOLUTION INTRAVENOUS; SUBCUTANEOUS at 08:10

## 2020-10-28 RX ADMIN — SODIUM CHLORIDE: 0.9 INJECTION, SOLUTION INTRAVENOUS at 01:10

## 2020-10-28 RX ADMIN — GABAPENTIN 300 MG: 300 CAPSULE ORAL at 09:10

## 2020-10-28 RX ADMIN — SODIUM CHLORIDE: 0.9 INJECTION, SOLUTION INTRAVENOUS at 11:10

## 2020-10-28 RX ADMIN — HYDROXYZINE HYDROCHLORIDE 25 MG: 25 TABLET, FILM COATED ORAL at 09:10

## 2020-10-28 RX ADMIN — ATORVASTATIN CALCIUM 40 MG: 20 TABLET, FILM COATED ORAL at 09:10

## 2020-10-28 RX ADMIN — INSULIN ASPART 3 UNITS: 100 INJECTION, SOLUTION INTRAVENOUS; SUBCUTANEOUS at 12:10

## 2020-10-28 NOTE — PROGRESS NOTES
Admit Assessment    Patient Identification  Fela Sawyer   :  1965  Admit Date:  10/26/2020  Attending Provider:  Bobby Yuen MD              Referral:   Pt was admitted to  with a diagnosis of Hyperleukocytosis, and was admitted this hospital stay due to Acute leukemia [C95.00].   is involved; was referred to the Social Work Department via routine referral.  Patient presents as a 55 y.o. year old  female.    Persons interviewed: patient and son.    Living Situation:  Lives with  and three sons in [place].  Independent with ADLS.    Resides at [address] [place] [address],   phone: [phone] (home).      (RETIRED) Functional Status Prior  Ambulation Prior: 0-->independent  Transferrin-->independent  Toiletin-->independent  Bathin-->independent  Dressin-->independent  Eatin-->independent  Communication: understands/communicates without difficulty  Swallowing: swallows foods/liquids without difficulty    Current or Past Agencies and Description of Services/Supplies    DME  Agency Name: none (other than diabetes-related supplies)  Equipment Currently Used at Home: none    Home Health  Agency Name: none    IV Infusion  Agency Name: none    Nutrition: Oral.    Outpatient Pharmacy:   No Pharmacies Listed    Patient Preference of agencies include none expressed.    Patient/Caregiver informed of right to choose providers or agencies.  Patient provides permission to release any necessary information to [facility] and to [facility] agencies as needed to facilitate patient care, treatment planning, and patient discharge planning.  Written and verbal resources provided.      Coping   Coping fairly well; requesting spiritual care from [place] speaker.       Adjustment to Diagnosis and Treatment  Adequate.    Emotional/Behavioral/Cognitive Issues   None noted.         History/Current Symptoms of Anxiety/Depression: No:   History/Current  Substance Use:   Social History     Tobacco Use    Smoking status: Not on file   Substance and Sexual Activity    Alcohol use: Not on file    Drug use: Not on file    Sexual activity: Not on file       Indications of Abuse/Neglect: No:   Abuse Screen (yes response referral indicated)  Feels Unsafe at Home or Work/School: no  Feels Threatened by Someone: no  Does Anyone Try to Keep You From Having Contact with Others or Doing Things Outside Your Home?: no  Physical Signs of Abuse Present: no    Financial:  Payor/Plan Subscr  Sex Relation Sub. Ins. ID Effective Group Num         Currently uninsured; pending [facility]AP eval.                   Other identified concerns/needs: TBD.    Plan: return home to care of family.    Interventions/Referrals: TBD.  Patient/caregiver engaged in treatment planning process.     providing psychosocial and supportive counseling, resources, education, assistance and discharge planning as appropriate.  Patient/caregiver state understanding of  available resources,  following, remains available.  Given [facility] contact info and encouraged to call with any needs or concerns.

## 2020-10-28 NOTE — PLAN OF CARE
Side rails up x2; call bell in place; bed in lowest, locked position; skid proof socks on; no evidence of skin breakdown; care plan explained to patient; pt remains free of injury.  Pt tolerated po, voids, ambulates, no BM today. Pt with c/o pain and itching to biopsy site, and c/o HA. Tramadol and Hydroxyzine given, IVF infusing , VSS and afebrile

## 2020-10-28 NOTE — ASSESSMENT & PLAN NOTE
Takes basal bolus insulin at home in addition to oral antihyperglycemics. States she takes 50u basal BID, 10u prandial BID wm.   LDSSI, 10u aspart BID. Increased dose to 15 units given blood glucose in 200s.  POCT glucose x4

## 2020-10-28 NOTE — PLAN OF CARE
Uneventful shift. Pt has had no c/o pain this shift. IVF infusing. Pt blood sugars monitored ac/hs. Pt remains on O2. Pt has had no c/o nausea this shift. Pt has remained free from injury this shift. Bed in low locked position. Call light and personal belongings within reach. Side rails up x2. Nonskid socks in place. Pt instructed to call with any needs. Will continue to monitor.

## 2020-10-28 NOTE — ASSESSMENT & PLAN NOTE
- Uric acid on admission 9.2  - monitoring for TLS  -electrolytes stable  -allopurinol 300 mg BID  -uric acid level down to 4.1 today  -no evidence of TLS at this time

## 2020-10-28 NOTE — ASSESSMENT & PLAN NOTE
-3 months of generalized fatigue/ hot flashes   -WBC count ~ 350K prior to transfer. 329K today with peripheral blasts of 1%  -suspect accelerated phase of chronic myeloid leukemia at this time  - NS infusion at 100cc/hr  -allopurinol 300mg BID  -antimicrobial prophylaxis with Levaquin 500mg, Acyclovir 800mg, and Diflucan 400mg  -cytoreduction with 4 grams hydrea BID  - If worsening respiratory status or change in neurologic status, will place CVC to initiate leukophoresis. No indication at this time.  -LDH, Uric acid, electrolytes BID to monitor for TLS  -CBC/Fibrinogen/INR BID to monitor for DIC, transfuse to goal fibrinogen >150  - BMBx on 10/26, result pending  - WBC down to 186K today  - prescription for TKI sent for insurance approval

## 2020-10-28 NOTE — CHAPLAIN
Responded to unit referral. Pt requesting spiritual care visit. Pt and one visitor in room upon visit. Pt is primarily English speaking and would prefer a  who is English speaking. Referral sent to house  to visit pt on 10.28.2020.

## 2020-10-28 NOTE — PROGRESS NOTES
[facility]  Hematology  Bone Marrow Transplant  Progress Note    Patient Name: Fela Sawyer  Admission Date: 10/26/2020  Hospital Length of Stay: 2 days  Code Status: Full Code    Subjective:     Interval History: WBC count down to 186K today. BMBx results still pending. Uric acid down-trending. Abdominal pain and nausea improved. TKI submitted for insurance approval. Blood glucose in 200. Levemir dose increased.     Objective:     Vital Signs (Most Recent):  Temp: 98.5 °F (36.9 °C) (10/28/20 1217)  Pulse: 71 (10/28/20 1217)  Resp: 16 (10/28/20 1217)  BP: 123/63 (10/28/20 1217)  SpO2: 97 % (10/28/20 1217) Vital Signs (24h Range):  Temp:  [98.2 °F (36.8 °C)-98.6 °F (37 °C)] 98.5 °F (36.9 °C)  Pulse:  [71-85] 71  Resp:  [16-22] 16  SpO2:  [93 %-97 %] 97 %  BP: (107-167)/(55-82) 123/63     Weight: 109.5 kg (241 lb 6.5 oz)  There is no height or weight on file to calculate BMI.  There is no height or weight on file to calculate BSA.    ECOG SCORE         [unfilled]    Intake/Output - Last 3 Shifts       10/26 0700 - 10/27 0659 10/27 0700 - 10/28 0659 10/28 0700 - 10/29 0659    P.O. 480 345     I.V. (mL/kg) 2301.3 (21) 2136 (19.5)     Total Intake(mL/kg) 2781.3 (25.4) 2481 (22.7)     Urine (mL/kg/hr) 800 (0.3) 3400 (1.3)     Total Output 800 3400     Net +1981.3 -919            Urine Occurrence 4 x 2 x           Physical Exam  Constitutional:       Appearance: She is well-developed.   HENT:      Head: Normocephalic and atraumatic.      Mouth/Throat:      Pharynx: No oropharyngeal exudate.   Eyes:      Conjunctiva/sclera: Conjunctivae normal.      Pupils: Pupils are equal, round, and reactive to light.   Neck:      Musculoskeletal: Normal range of motion and neck supple.   Cardiovascular:      Rate and Rhythm: Normal rate and regular rhythm.      Heart sounds: Normal heart sounds. No murmur.   Pulmonary:      Effort: Pulmonary effort is normal.      Breath sounds: Normal breath sounds.   Abdominal:       General: Bowel sounds are normal. There is no distension.      Tenderness: There is no abdominal tenderness.      Comments: Abdomen obese and distended. Large spleen palpated.   Musculoskeletal: Normal range of motion.         General: No deformity.   Skin:     General: Skin is warm and dry.      Findings: No erythema or rash.   Neurological:      Mental Status: She is alert and oriented to person, place, and time.   Psychiatric:         Behavior: Behavior normal.         Thought Content: Thought content normal.         Judgment: Judgment normal.         Significant Labs:   CBC:   Recent Labs   Lab 10/27/20  0812 10/28/20  0332   .80* 186.00*   HGB 10.4* 10.5*   HCT 33.0* 35.0*   * 134*    and CMP:   Recent Labs   Lab 10/27/20  0812 10/27/20  2341 10/28/20  0332    135* 136   K 3.9 4.5 4.5    103 102   CO2 24 26 25   * 323* 227*   BUN 15 16 14   CREATININE 0.7 0.7 0.7   CALCIUM 8.4* 8.6* 8.5*   PROT 6.6  --  6.7   ALBUMIN 3.2*  --  3.1*   BILITOT 0.6  --  0.6   ALKPHOS 63  --  69   AST 19  --  18   ALT 8*  --  10   ANIONGAP 9 6* 9   EGFRNONAA >60.0 >60.0 >60.0       Diagnostic Results:  I have reviewed all pertinent imaging results/findings within the past 24 hours.    Assessment/Plan:     * Hyperleukocytosis  -3 months of generalized fatigue/ hot flashes   -WBC count ~ 350K prior to transfer. 329K today with peripheral blasts of 1%  -suspect accelerated phase of chronic myeloid leukemia at this time  - NS infusion at 100cc/hr  -allopurinol 300mg BID  -antimicrobial prophylaxis with Levaquin 500mg, Acyclovir 800mg, and Diflucan 400mg  -cytoreduction with 4 grams hydrea BID  - If worsening respiratory status or change in neurologic status, will place CVC to initiate leukophoresis. No indication at this time.  -LDH, Uric acid, electrolytes BID to monitor for TLS  -CBC/Fibrinogen/INR BID to monitor for DIC, transfuse to goal fibrinogen >150  - BMBx on 10/26, result pending  - WBC down  to 186K today  - prescription for TKI sent for insurance approval    Nausea & vomiting  - Nausea, vomiting x 3 over night  - Scheduled Zofran and improvement noted    Other headache syndrome  - Reported headache without any focal neuro deficit  - Resolved with dose of tramadol    Hepatosplenomegaly  -severe splenomegaly on outside CT per report  -large spleen palpated on exam  -2/2 hyperleukocytosis  - Abdominal US shows splenomegaly (25x7.6 cm) and hepatomegaly(23 cm)              Hypokalemia  -K+ wnl at 4.5 today  -replaced  -daily CMP    Hyperuricemia  - Uric acid on admission 9.2  - monitoring for TLS  -electrolytes stable  -allopurinol 300 mg BID  -uric acid level down to 4.1 today  -no evidence of TLS at this time    Hypercholesterolemia  Continue home statin    Hypertension  Continue home antihypertensives    Severe obesity (BMI >= 40)  Adjust medication doses as indicated    Diabetes mellitus  Takes basal bolus insulin at home in addition to oral antihyperglycemics. States she takes 50u basal BID, 10u prandial BID wm.   LDSSI, 10u aspart BID. Increased dose to 15 units given blood glucose in 200s.  POCT glucose x4        VTE Risk Mitigation (From admission, onward)         Ordered     IP VTE LOW RISK PATIENT  Once      10/26/20 0335                Disposition: Inpatient    Monse Mcdonald, NP  Bone Marrow Transplant  [facility]

## 2020-10-28 NOTE — PLAN OF CARE
Side rails up x2; call bell in place; bed in lowest, locked position; skid proof socks on; no evidence of skin breakdown; care plan explained to patient; pt remains free of injury.  Pt tolerated po, voids, no BM today, ambulates, c/o pain and itching to biopsy site, and HA. Tramadol and Hydroxyzine given. CBG monitored insulin given, IVF infusing, VSS and afebrile.

## 2020-10-28 NOTE — SUBJECTIVE & OBJECTIVE
Subjective:     Interval History: WBC count down to 186K today. BMBx results still pending. Uric acid down-trending. Abdominal pain and nausea improved. TKI submitted for insurance approval. Blood glucose in 200. Levemir dose increased.     Objective:     Vital Signs (Most Recent):  Temp: 98.5 °F (36.9 °C) (10/28/20 1217)  Pulse: 71 (10/28/20 1217)  Resp: 16 (10/28/20 1217)  BP: 123/63 (10/28/20 1217)  SpO2: 97 % (10/28/20 1217) Vital Signs (24h Range):  Temp:  [98.2 °F (36.8 °C)-98.6 °F (37 °C)] 98.5 °F (36.9 °C)  Pulse:  [71-85] 71  Resp:  [16-22] 16  SpO2:  [93 %-97 %] 97 %  BP: (107-167)/(55-82) 123/63     Weight: 109.5 kg (241 lb 6.5 oz)  There is no height or weight on file to calculate BMI.  There is no height or weight on file to calculate BSA.    ECOG SCORE         [unfilled]    Intake/Output - Last 3 Shifts       10/26 0700 - 10/27 0659 10/27 0700 - 10/28 0659 10/28 0700 - 10/29 0659    P.O. 480 345     I.V. (mL/kg) 2301.3 (21) 2136 (19.5)     Total Intake(mL/kg) 2781.3 (25.4) 2481 (22.7)     Urine (mL/kg/hr) 800 (0.3) 3400 (1.3)     Total Output 800 3400     Net +1981.3 -919            Urine Occurrence 4 x 2 x           Physical Exam  Constitutional:       Appearance: She is well-developed.   HENT:      Head: Normocephalic and atraumatic.      Mouth/Throat:      Pharynx: No oropharyngeal exudate.   Eyes:      Conjunctiva/sclera: Conjunctivae normal.      Pupils: Pupils are equal, round, and reactive to light.   Neck:      Musculoskeletal: Normal range of motion and neck supple.   Cardiovascular:      Rate and Rhythm: Normal rate and regular rhythm.      Heart sounds: Normal heart sounds. No murmur.   Pulmonary:      Effort: Pulmonary effort is normal.      Breath sounds: Normal breath sounds.   Abdominal:      General: Bowel sounds are normal. There is no distension.      Tenderness: There is no abdominal tenderness.      Comments: Abdomen obese and distended. Large spleen palpated.   Musculoskeletal:  Normal range of motion.         General: No deformity.   Skin:     General: Skin is warm and dry.      Findings: No erythema or rash.   Neurological:      Mental Status: She is alert and oriented to person, place, and time.   Psychiatric:         Behavior: Behavior normal.         Thought Content: Thought content normal.         Judgment: Judgment normal.         Significant Labs:   CBC:   Recent Labs   Lab 10/27/20  0812 10/28/20  0332   .80* 186.00*   HGB 10.4* 10.5*   HCT 33.0* 35.0*   * 134*    and CMP:   Recent Labs   Lab 10/27/20  0812 10/27/20  2341 10/28/20  0332    135* 136   K 3.9 4.5 4.5    103 102   CO2 24 26 25   * 323* 227*   BUN 15 16 14   CREATININE 0.7 0.7 0.7   CALCIUM 8.4* 8.6* 8.5*   PROT 6.6  --  6.7   ALBUMIN 3.2*  --  3.1*   BILITOT 0.6  --  0.6   ALKPHOS 63  --  69   AST 19  --  18   ALT 8*  --  10   ANIONGAP 9 6* 9   EGFRNONAA >60.0 >60.0 >60.0       Diagnostic Results:  I have reviewed all pertinent imaging results/findings within the past 24 hours.

## 2020-10-29 ENCOUNTER — SPECIALTY PHARMACY (OUTPATIENT)
Dept: PHARMACY | Facility: CLINIC | Age: 55
End: 2020-10-29

## 2020-10-29 VITALS
TEMPERATURE: 97 F | HEIGHT: 63 IN | RESPIRATION RATE: 16 BRPM | SYSTOLIC BLOOD PRESSURE: 114 MMHG | OXYGEN SATURATION: 96 % | HEART RATE: 70 BPM | WEIGHT: 237 LBS | DIASTOLIC BLOOD PRESSURE: 55 MMHG | BODY MASS INDEX: 41.99 KG/M2

## 2020-10-29 PROBLEM — R50.9 FEVER: Status: ACTIVE | Noted: 2020-10-29

## 2020-10-29 PROBLEM — D72.829 HYPERLEUKOCYTOSIS: Status: ACTIVE | Noted: 2020-10-29

## 2020-10-29 PROBLEM — C92.10 CML (CHRONIC MYELOCYTIC LEUKEMIA): Status: ACTIVE | Noted: 2020-10-26

## 2020-10-29 PROBLEM — R05.9 COUGH: Status: ACTIVE | Noted: 2020-10-29

## 2020-10-29 LAB
ABO + RH BLD: NORMAL
ADENOVIRUS: NOT DETECTED
ALBUMIN SERPL BCP-MCNC: 2.9 G/DL (ref 3.5–5.2)
ALP SERPL-CCNC: 66 U/L (ref 55–135)
ALT SERPL W/O P-5'-P-CCNC: 11 U/L (ref 10–44)
ANION GAP SERPL CALC-SCNC: 8 MMOL/L (ref 8–16)
ANION GAP SERPL CALC-SCNC: 8 MMOL/L (ref 8–16)
ANISOCYTOSIS BLD QL SMEAR: SLIGHT
AST SERPL-CCNC: 16 U/L (ref 10–40)
BASOPHILS # BLD AUTO: ABNORMAL K/UL (ref 0–0.2)
BASOPHILS NFR BLD: 6 % (ref 0–1.9)
BILIRUB SERPL-MCNC: 0.6 MG/DL (ref 0.1–1)
BLD GP AB SCN CELLS X3 SERPL QL: NORMAL
BORDETELLA PARAPERTUSSIS (IS1001): NOT DETECTED
BORDETELLA PERTUSSIS (PTXP): NOT DETECTED
BUN SERPL-MCNC: 17 MG/DL (ref 6–20)
BUN SERPL-MCNC: 18 MG/DL (ref 6–20)
CALCIUM SERPL-MCNC: 8.4 MG/DL (ref 8.7–10.5)
CALCIUM SERPL-MCNC: 8.6 MG/DL (ref 8.7–10.5)
CHLAMYDIA PNEUMONIAE: NOT DETECTED
CHLORIDE SERPL-SCNC: 103 MMOL/L (ref 95–110)
CHLORIDE SERPL-SCNC: 103 MMOL/L (ref 95–110)
CO2 SERPL-SCNC: 24 MMOL/L (ref 23–29)
CO2 SERPL-SCNC: 25 MMOL/L (ref 23–29)
CORONAVIRUS 229E, COMMON COLD VIRUS: NOT DETECTED
CORONAVIRUS HKU1, COMMON COLD VIRUS: NOT DETECTED
CORONAVIRUS NL63, COMMON COLD VIRUS: NOT DETECTED
CORONAVIRUS OC43, COMMON COLD VIRUS: NOT DETECTED
CREAT SERPL-MCNC: 0.7 MG/DL (ref 0.5–1.4)
CREAT SERPL-MCNC: 0.7 MG/DL (ref 0.5–1.4)
DIFFERENTIAL METHOD: ABNORMAL
EOSINOPHIL # BLD AUTO: ABNORMAL K/UL (ref 0–0.5)
EOSINOPHIL NFR BLD: 4 % (ref 0–8)
ERYTHROCYTE [DISTWIDTH] IN BLOOD BY AUTOMATED COUNT: 17.3 % (ref 11.5–14.5)
EST. GFR  (AFRICAN AMERICAN): >60 ML/MIN/1.73 M^2
EST. GFR  (AFRICAN AMERICAN): >60 ML/MIN/1.73 M^2
EST. GFR  (NON AFRICAN AMERICAN): >60 ML/MIN/1.73 M^2
EST. GFR  (NON AFRICAN AMERICAN): >60 ML/MIN/1.73 M^2
FIBRINOGEN PPP-MCNC: 396 MG/DL (ref 182–366)
FLUBV RNA NPH QL NAA+NON-PROBE: NOT DETECTED
GLUCOSE SERPL-MCNC: 278 MG/DL (ref 70–110)
GLUCOSE SERPL-MCNC: 347 MG/DL (ref 70–110)
HCT VFR BLD AUTO: 33.8 % (ref 37–48.5)
HGB BLD-MCNC: 10.4 G/DL (ref 12–16)
HPIV1 RNA NPH QL NAA+NON-PROBE: NOT DETECTED
HPIV2 RNA NPH QL NAA+NON-PROBE: NOT DETECTED
HPIV3 RNA NPH QL NAA+NON-PROBE: NOT DETECTED
HPIV4 RNA NPH QL NAA+NON-PROBE: NOT DETECTED
HUMAN METAPNEUMOVIRUS: NOT DETECTED
HYPOCHROMIA BLD QL SMEAR: ABNORMAL
IMM GRANULOCYTES # BLD AUTO: ABNORMAL K/UL (ref 0–0.04)
IMM GRANULOCYTES NFR BLD AUTO: ABNORMAL % (ref 0–0.5)
INFLUENZA A (SUBTYPES H1,H1-2009,H3): NOT DETECTED
LDH SERPL L TO P-CCNC: 875 U/L (ref 110–260)
LYMPHOCYTES # BLD AUTO: ABNORMAL K/UL (ref 1–4.8)
LYMPHOCYTES NFR BLD: 5 % (ref 18–48)
MAGNESIUM SERPL-MCNC: 2 MG/DL (ref 1.6–2.6)
MCH RBC QN AUTO: 29.5 PG (ref 27–31)
MCHC RBC AUTO-ENTMCNC: 30.8 G/DL (ref 32–36)
MCV RBC AUTO: 96 FL (ref 82–98)
METAMYELOCYTES NFR BLD MANUAL: 2 %
MONOCYTES # BLD AUTO: ABNORMAL K/UL (ref 0.3–1)
MONOCYTES NFR BLD: 0 % (ref 4–15)
MYCOPLASMA PNEUMONIAE: NOT DETECTED
MYELOCYTES NFR BLD MANUAL: 4 %
NEUTROPHILS NFR BLD: 68 % (ref 38–73)
NEUTS BAND NFR BLD MANUAL: 11 %
NRBC BLD-RTO: 3 /100 WBC
OVALOCYTES BLD QL SMEAR: ABNORMAL
PHOSPHATE SERPL-MCNC: 4.6 MG/DL (ref 2.7–4.5)
PLATELET # BLD AUTO: 121 K/UL (ref 150–350)
PMV BLD AUTO: 12.4 FL (ref 9.2–12.9)
POCT GLUCOSE: 217 MG/DL (ref 70–110)
POCT GLUCOSE: 257 MG/DL (ref 70–110)
POCT GLUCOSE: 260 MG/DL (ref 70–110)
POIKILOCYTOSIS BLD QL SMEAR: SLIGHT
POLYCHROMASIA BLD QL SMEAR: ABNORMAL
POTASSIUM SERPL-SCNC: 4.3 MMOL/L (ref 3.5–5.1)
POTASSIUM SERPL-SCNC: 4.8 MMOL/L (ref 3.5–5.1)
PROT SERPL-MCNC: 6.3 G/DL (ref 6–8.4)
RBC # BLD AUTO: 3.53 M/UL (ref 4–5.4)
RESPIRATORY INFECTION PANEL SOURCE: NORMAL
RSV RNA NPH QL NAA+NON-PROBE: NOT DETECTED
RV+EV RNA NPH QL NAA+NON-PROBE: NOT DETECTED
SARS-COV-2 RDRP RESP QL NAA+PROBE: NEGATIVE
SODIUM SERPL-SCNC: 135 MMOL/L (ref 136–145)
SODIUM SERPL-SCNC: 136 MMOL/L (ref 136–145)
URATE SERPL-MCNC: 3.3 MG/DL (ref 2.4–5.7)
WBC # BLD AUTO: 107.7 K/UL (ref 3.9–12.7)

## 2020-10-29 PROCEDURE — U0002 COVID-19 LAB TEST NON-CDC: HCPCS

## 2020-10-29 PROCEDURE — 84550 ASSAY OF BLOOD/URIC ACID: CPT

## 2020-10-29 PROCEDURE — 25000003 PHARM REV CODE 250: Performed by: STUDENT IN AN ORGANIZED HEALTH CARE EDUCATION/TRAINING PROGRAM

## 2020-10-29 PROCEDURE — 63600175 PHARM REV CODE 636 W HCPCS: Performed by: INTERNAL MEDICINE

## 2020-10-29 PROCEDURE — 85384 FIBRINOGEN ACTIVITY: CPT

## 2020-10-29 PROCEDURE — 25000003 PHARM REV CODE 250: Performed by: NURSE PRACTITIONER

## 2020-10-29 PROCEDURE — 99233 SBSQ HOSP IP/OBS HIGH 50: CPT | Mod: ,,, | Performed by: INTERNAL MEDICINE

## 2020-10-29 PROCEDURE — 80053 COMPREHEN METABOLIC PANEL: CPT

## 2020-10-29 PROCEDURE — 99900035 HC TECH TIME PER 15 MIN (STAT)

## 2020-10-29 PROCEDURE — 85007 BL SMEAR W/DIFF WBC COUNT: CPT

## 2020-10-29 PROCEDURE — 84100 ASSAY OF PHOSPHORUS: CPT

## 2020-10-29 PROCEDURE — 83735 ASSAY OF MAGNESIUM: CPT

## 2020-10-29 PROCEDURE — 83615 LACTATE (LD) (LDH) ENZYME: CPT

## 2020-10-29 PROCEDURE — 36415 COLL VENOUS BLD VENIPUNCTURE: CPT

## 2020-10-29 PROCEDURE — 80048 BASIC METABOLIC PNL TOTAL CA: CPT

## 2020-10-29 PROCEDURE — 86850 RBC ANTIBODY SCREEN: CPT

## 2020-10-29 PROCEDURE — 85027 COMPLETE CBC AUTOMATED: CPT

## 2020-10-29 PROCEDURE — 87633 RESP VIRUS 12-25 TARGETS: CPT

## 2020-10-29 PROCEDURE — 99233 PR SUBSEQUENT HOSPITAL CARE,LEVL III: ICD-10-PCS | Mod: ,,, | Performed by: INTERNAL MEDICINE

## 2020-10-29 RX ORDER — ALLOPURINOL 300 MG/1
300 TABLET ORAL DAILY
Qty: 30 TABLET | Refills: 0 | Status: ON HOLD | OUTPATIENT
Start: 2020-10-29 | End: 2022-12-12 | Stop reason: HOSPADM

## 2020-10-29 RX ORDER — LEVOFLOXACIN 750 MG/1
750 TABLET ORAL DAILY
Qty: 6 TABLET | Refills: 0 | Status: CANCELLED | OUTPATIENT
Start: 2020-10-30 | End: 2020-11-05

## 2020-10-29 RX ORDER — HYDROXYUREA 500 MG/1
2000 CAPSULE ORAL 2 TIMES DAILY
Qty: 112 CAPSULE | Refills: 0 | Status: CANCELLED | OUTPATIENT
Start: 2020-10-29 | End: 2020-11-12

## 2020-10-29 RX ORDER — POLYETHYLENE GLYCOL 3350 17 G/17G
17 POWDER, FOR SOLUTION ORAL 2 TIMES DAILY PRN
Refills: 0 | Status: CANCELLED
Start: 2020-10-29

## 2020-10-29 RX ORDER — ALLOPURINOL 300 MG/1
300 TABLET ORAL DAILY
Qty: 30 TABLET | Refills: 0 | Status: CANCELLED | OUTPATIENT
Start: 2020-10-29

## 2020-10-29 RX ORDER — HYDROXYZINE HYDROCHLORIDE 25 MG/1
25 TABLET, FILM COATED ORAL 3 TIMES DAILY PRN
Qty: 30 TABLET | Refills: 0 | Status: CANCELLED | OUTPATIENT
Start: 2020-10-29

## 2020-10-29 RX ORDER — ONDANSETRON 4 MG/1
8 TABLET, FILM COATED ORAL EVERY 8 HOURS PRN
Qty: 30 TABLET | Refills: 1 | Status: CANCELLED | OUTPATIENT
Start: 2020-10-29

## 2020-10-29 RX ORDER — HYDROXYUREA 500 MG/1
2000 CAPSULE ORAL 2 TIMES DAILY
Qty: 240 CAPSULE | Refills: 0 | Status: SHIPPED | OUTPATIENT
Start: 2020-10-29 | End: 2020-11-28

## 2020-10-29 RX ORDER — LACTULOSE 10 G/15ML
20 SOLUTION ORAL 2 TIMES DAILY PRN
Status: DISCONTINUED | OUTPATIENT
Start: 2020-10-29 | End: 2020-10-29 | Stop reason: HOSPADM

## 2020-10-29 RX ORDER — LEVOFLOXACIN 750 MG/1
750 TABLET ORAL DAILY
Qty: 6 TABLET | Refills: 0 | Status: ON HOLD | OUTPATIENT
Start: 2020-10-30 | End: 2022-12-12 | Stop reason: HOSPADM

## 2020-10-29 RX ADMIN — GABAPENTIN 300 MG: 300 CAPSULE ORAL at 02:10

## 2020-10-29 RX ADMIN — LEVOFLOXACIN 750 MG: 500 TABLET, FILM COATED ORAL at 09:10

## 2020-10-29 RX ADMIN — INSULIN ASPART 3 UNITS: 100 INJECTION, SOLUTION INTRAVENOUS; SUBCUTANEOUS at 12:10

## 2020-10-29 RX ADMIN — INSULIN ASPART 2 UNITS: 100 INJECTION, SOLUTION INTRAVENOUS; SUBCUTANEOUS at 05:10

## 2020-10-29 RX ADMIN — INSULIN ASPART 3 UNITS: 100 INJECTION, SOLUTION INTRAVENOUS; SUBCUTANEOUS at 08:10

## 2020-10-29 RX ADMIN — ATORVASTATIN CALCIUM 40 MG: 20 TABLET, FILM COATED ORAL at 09:10

## 2020-10-29 RX ADMIN — BENAZEPRIL HYDROCHLORIDE 40 MG: 10 TABLET ORAL at 09:10

## 2020-10-29 RX ADMIN — ACYCLOVIR 400 MG: 200 CAPSULE ORAL at 09:10

## 2020-10-29 RX ADMIN — SODIUM CHLORIDE: 0.9 INJECTION, SOLUTION INTRAVENOUS at 08:10

## 2020-10-29 RX ADMIN — POLYETHYLENE GLYCOL 3350 17 G: 17 POWDER, FOR SOLUTION ORAL at 09:10

## 2020-10-29 RX ADMIN — METOPROLOL TARTRATE 25 MG: 25 TABLET, FILM COATED ORAL at 09:10

## 2020-10-29 RX ADMIN — ALLOPURINOL 300 MG: 300 TABLET ORAL at 09:10

## 2020-10-29 RX ADMIN — ONDANSETRON 8 MG: 2 INJECTION INTRAMUSCULAR; INTRAVENOUS at 05:10

## 2020-10-29 RX ADMIN — TRAMADOL HYDROCHLORIDE 50 MG: 50 TABLET ORAL at 08:10

## 2020-10-29 RX ADMIN — THERA TABS 1 TABLET: TAB at 09:10

## 2020-10-29 RX ADMIN — HYDROXYUREA 4000 MG: 500 CAPSULE ORAL at 11:10

## 2020-10-29 RX ADMIN — GABAPENTIN 300 MG: 300 CAPSULE ORAL at 09:10

## 2020-10-29 RX ADMIN — LACTULOSE 20 G: 20 SOLUTION ORAL at 12:10

## 2020-10-29 RX ADMIN — ONDANSETRON 8 MG: 2 INJECTION INTRAMUSCULAR; INTRAVENOUS at 02:10

## 2020-10-29 NOTE — ASSESSMENT & PLAN NOTE
-severe splenomegaly on outside CT per report  -large spleen palpated on exam  -2/2 hyperleukocytosis  - Abdominal US shows splenomegaly (25x7.6 cm) and hepatomegaly(23 cm)  - expect improvement and WBC count improves  - continue Tramadol for pain

## 2020-10-29 NOTE — ASSESSMENT & PLAN NOTE
- spiked temp of 101.5 last night (10/28/20)  - patient c/o coughing phlegm  - re-swabbed for COVID and neg  - will check RIP

## 2020-10-29 NOTE — ASSESSMENT & PLAN NOTE
-3 months of generalized fatigue/ hot flashes   -WBC count ~ 350K prior to transfer. 329K today with peripheral blasts of 1%  -suspect accelerated phase of chronic myeloid leukemia at this time  - NS infusion at 100cc/hr  -allopurinol 300mg BID  -antimicrobial prophylaxis with Levaquin 500mg, Acyclovir 800mg, and Diflucan 400mg  -cytoreduction with 4 grams hydrea BID. Will discharge on 2 grams BID  - If worsening respiratory status or change in neurologic status, will place CVC to initiate leukophoresis. No indication at this time.  -LDH, Uric acid, electrolytes BID to monitor for TLS  -CBC/Fibrinogen/INR BID to monitor for DIC, transfuse to goal fibrinogen >150  - BMBx on 10/26 showing CML  - WBC down to 107K today  - prescription for imatinib sent for insurance approval. Patient currently does not have insurance. Social work is assisting with this.  - will try to establish her with oncologist in [place] once insurance is obtained

## 2020-10-29 NOTE — ASSESSMENT & PLAN NOTE
Takes basal bolus insulin at home in addition to oral antihyperglycemics. States she takes 50u basal BID, 10u prandial BID wm.   LDSSI, 10u aspart BID. Increased dose to home dose of 20 units BID given blood glucose in 200s.  POCT glucose x4  Instructed to f/u soon after discharge with PCP

## 2020-10-29 NOTE — SUBJECTIVE & OBJECTIVE
Subjective:     Interval History: WBC count down to 107K today. Continuing 4 grams Hydrea bid. Will discharge on 2 grams BID. Patient is uninsured.  assisting with insurance situation. Outpatient f/u contingent upon insurance type. Had fever of 101.5 over night. Blood cx with NG thus far. U/a not suggestive of UTI. No sign of infection to BMBx site. C/o of coughing up phlegm. Will repeat COVID swab and check RIP, which includes influenza. Blood glucose continues to be elevated. Increased levemir dose to home dose of 20 units BID. Patient instructed to f/u with local PCP soon after discharge.    Objective:     Vital Signs (Most Recent):  Temp: 96.7 °F (35.9 °C) (10/29/20 1100)  Pulse: 69 (10/29/20 1100)  Resp: 16 (10/29/20 1100)  BP: 136/73 (10/29/20 1100)  SpO2: 95 % (10/29/20 1100) Vital Signs (24h Range):  Temp:  [96.7 °F (35.9 °C)-101.5 °F (38.6 °C)] 96.7 °F (35.9 °C)  Pulse:  [69-97] 69  Resp:  [16-24] 16  SpO2:  [92 %-96 %] 95 %  BP: ()/(52-73) 136/73     Weight: 107.5 kg (236 lb 15.9 oz)  Body mass index is 41.98 kg/m².  Body surface area is 2.19 meters squared.    ECOG SCORE         [unfilled]    Intake/Output - Last 3 Shifts       10/27 0700 - 10/28 0659 10/28 0700 - 10/29 0659 10/29 0700 - 10/30 0659    P.O. 345 1080     I.V. (mL/kg) 2136 (19.5) 1200 (11.2)     Total Intake(mL/kg) 2481 (22.7) 2280 (21.2)     Urine (mL/kg/hr) 3400 (1.3) 2503 (1)     Total Output 3400 2503     Net -919 -223            Urine Occurrence 2 x            Physical Exam  Constitutional:       Appearance: She is well-developed.   HENT:      Head: Normocephalic and atraumatic.      Mouth/Throat:      Pharynx: No oropharyngeal exudate.   Eyes:      Conjunctiva/sclera: Conjunctivae normal.      Pupils: Pupils are equal, round, and reactive to light.   Neck:      Musculoskeletal: Normal range of motion and neck supple.   Cardiovascular:      Rate and Rhythm: Normal rate and regular rhythm.      Heart sounds: Normal  heart sounds. No murmur.   Pulmonary:      Effort: Pulmonary effort is normal.      Breath sounds: Normal breath sounds.   Abdominal:      General: Bowel sounds are normal. There is no distension.      Tenderness: There is no abdominal tenderness.      Comments: Abdomen obese and distended. Large spleen palpated.   Musculoskeletal: Normal range of motion.         General: No deformity.   Skin:     General: Skin is warm and dry.      Findings: No erythema or rash.      Comments: No sign of infection to left iliac crest BMBx site   Neurological:      Mental Status: She is alert and oriented to person, place, and time.   Psychiatric:         Behavior: Behavior normal.         Thought Content: Thought content normal.         Judgment: Judgment normal.         Significant Labs:   CBC:   Recent Labs   Lab 10/28/20  0332 10/29/20  0528   .00* 107.70*   HGB 10.5* 10.4*   HCT 35.0* 33.8*   * 121*    and CMP:   Recent Labs   Lab 10/28/20  0332 10/28/20  1553 10/29/20  0032 10/29/20  0528    140 136 135*   K 4.5 5.0 4.3 4.8    104 103 103   CO2 25 28 25 24   * 254* 347* 278*   BUN 14 20 18 17   CREATININE 0.7 0.8 0.7 0.7   CALCIUM 8.5* 8.8 8.6* 8.4*   PROT 6.7 6.5  --  6.3   ALBUMIN 3.1* 3.0*  --  2.9*   BILITOT 0.6 0.5  --  0.6   ALKPHOS 69 73  --  66   AST 18 17  --  16   ALT 10 11  --  11   ANIONGAP 9 8 8 8   EGFRNONAA >60.0 >60.0 >60.0 >60.0       Diagnostic Results:  I have reviewed all pertinent imaging results/findings within the past 24 hours.

## 2020-10-29 NOTE — PROGRESS NOTES
[facility]  Hematology  Bone Marrow Transplant  Progress Note    Patient Name: Fela Sawyer  Admission Date: 10/26/2020  Hospital Length of Stay: 3 days  Code Status: Full Code    Subjective:     Interval History: WBC count down to 107K today. Continuing 4 grams Hydrea bid. Will discharge on 2 grams BID. Patient is uninsured.  assisting with insurance situation. Outpatient f/u contingent upon insurance type. Had fever of 101.5 over night. Blood cx with NG thus far. U/a not suggestive of UTI. No sign of infection to BMBx site. C/o of coughing up phlegm. Will repeat COVID swab and check RIP, which includes influenza. Blood glucose continues to be elevated. Increased levemir dose to home dose of 20 units BID. Patient instructed to f/u with local PCP soon after discharge.    Objective:     Vital Signs (Most Recent):  Temp: 96.7 °F (35.9 °C) (10/29/20 1100)  Pulse: 69 (10/29/20 1100)  Resp: 16 (10/29/20 1100)  BP: 136/73 (10/29/20 1100)  SpO2: 95 % (10/29/20 1100) Vital Signs (24h Range):  Temp:  [96.7 °F (35.9 °C)-101.5 °F (38.6 °C)] 96.7 °F (35.9 °C)  Pulse:  [69-97] 69  Resp:  [16-24] 16  SpO2:  [92 %-96 %] 95 %  BP: ()/(52-73) 136/73     Weight: 107.5 kg (236 lb 15.9 oz)  Body mass index is 41.98 kg/m².  Body surface area is 2.19 meters squared.    ECOG SCORE         [unfilled]    Intake/Output - Last 3 Shifts       10/27 0700 - 10/28 0659 10/28 0700 - 10/29 0659 10/29 0700 - 10/30 0659    P.O. 345 1080     I.V. (mL/kg) 2136 (19.5) 1200 (11.2)     Total Intake(mL/kg) 2481 (22.7) 2280 (21.2)     Urine (mL/kg/hr) 3400 (1.3) 2503 (1)     Total Output 3400 2503     Net -919 -210            Urine Occurrence 2 x            Physical Exam  Constitutional:       Appearance: She is well-developed.   HENT:      Head: Normocephalic and atraumatic.      Mouth/Throat:      Pharynx: No oropharyngeal exudate.   Eyes:      Conjunctiva/sclera: Conjunctivae normal.      Pupils: Pupils are equal,  round, and reactive to light.   Neck:      Musculoskeletal: Normal range of motion and neck supple.   Cardiovascular:      Rate and Rhythm: Normal rate and regular rhythm.      Heart sounds: Normal heart sounds. No murmur.   Pulmonary:      Effort: Pulmonary effort is normal.      Breath sounds: Normal breath sounds.   Abdominal:      General: Bowel sounds are normal. There is no distension.      Tenderness: There is no abdominal tenderness.      Comments: Abdomen obese and distended. Large spleen palpated.   Musculoskeletal: Normal range of motion.         General: No deformity.   Skin:     General: Skin is warm and dry.      Findings: No erythema or rash.      Comments: No sign of infection to left iliac crest BMBx site   Neurological:      Mental Status: She is alert and oriented to person, place, and time.   Psychiatric:         Behavior: Behavior normal.         Thought Content: Thought content normal.         Judgment: Judgment normal.         Significant Labs:   CBC:   Recent Labs   Lab 10/28/20  0332 10/29/20  0528   .00* 107.70*   HGB 10.5* 10.4*   HCT 35.0* 33.8*   * 121*    and CMP:   Recent Labs   Lab 10/28/20  0332 10/28/20  1553 10/29/20  0032 10/29/20  0528    140 136 135*   K 4.5 5.0 4.3 4.8    104 103 103   CO2 25 28 25 24   * 254* 347* 278*   BUN 14 20 18 17   CREATININE 0.7 0.8 0.7 0.7   CALCIUM 8.5* 8.8 8.6* 8.4*   PROT 6.7 6.5  --  6.3   ALBUMIN 3.1* 3.0*  --  2.9*   BILITOT 0.6 0.5  --  0.6   ALKPHOS 69 73  --  66   AST 18 17  --  16   ALT 10 11  --  11   ANIONGAP 9 8 8 8   EGFRNONAA >60.0 >60.0 >60.0 >60.0       Diagnostic Results:  I have reviewed all pertinent imaging results/findings within the past 24 hours.    Assessment/Plan:     * CML (chronic myelocytic leukemia)  -3 months of generalized fatigue/ hot flashes   -WBC count ~ 350K prior to transfer. 329K today with peripheral blasts of 1%  -suspect accelerated phase of chronic myeloid leukemia at this  time  - NS infusion at 100cc/hr  -allopurinol 300mg BID  -antimicrobial prophylaxis with Levaquin 500mg, Acyclovir 800mg, and Diflucan 400mg  -cytoreduction with 4 grams hydrea BID. Will discharge on 2 grams BID  - If worsening respiratory status or change in neurologic status, will place CVC to initiate leukophoresis. No indication at this time.  -LDH, Uric acid, electrolytes BID to monitor for TLS  -CBC/Fibrinogen/INR BID to monitor for DIC, transfuse to goal fibrinogen >150  - BMBx on 10/26 showing CML  - WBC down to 107K today  - prescription for imatinib sent for insurance approval. Patient currently does not have insurance. Social work is assisting with this.  - will try to establish her with oncologist in [place] once insurance is obtained    Hyperleukocytosis  -see CML    Fever  - temp of 101.5 night of 10/28/20  - pan cultured. Blood cx not growing thus far. U/a not indicative of UTI. cx pending  - c/o coughing phlegm  - re-swabbed for COVID and neg  - RIP pending  - started on a 7 day course of empiric LVQ    Hepatosplenomegaly  -severe splenomegaly on outside CT per report  -large spleen palpated on exam  -2/2 hyperleukocytosis  - Abdominal US shows splenomegaly (25x7.6 cm) and hepatomegaly(23 cm)  - expect improvement and WBC count improves  - continue Tramadol for pain              Diabetes mellitus  Takes basal bolus insulin at home in addition to oral antihyperglycemics. States she takes 50u basal BID, 10u prandial BID wm.   LDSSI, 10u aspart BID. Increased dose to home dose of 20 units BID given blood glucose in 200s.  POCT glucose x4  Instructed to f/u soon after discharge with PCP    Cough  - spiked temp of 101.5 last night (10/28/20)  - patient c/o coughing phlegm  - re-swabbed for COVID and neg  - will check RIP    Nausea & vomiting  - Nausea, vomiting x 3 over night  - Scheduled Zofran and improvement noted    Other headache syndrome  - Reported headache without any focal neuro deficit  -  Resolved with dose of tramadol    Hypokalemia  -K+ wnl at 4.8 today  -replace prn  -daily CMP    Hyperuricemia  - Uric acid on admission 9.2  - monitoring for TLS  -electrolytes stable  -allopurinol 300 mg BID  -uric acid level down to 3.3 today  -no evidence of TLS at this time    Hypercholesterolemia  Continue home statin    Hypertension  Continue home antihypertensives    Severe obesity (BMI >= 40)  Adjust medication doses as indicated        VTE Risk Mitigation (From admission, onward)         Ordered     IP VTE LOW RISK PATIENT  Once      10/26/20 6014                Disposition: Inpatient    Monse Mcdonald, NP  Bone Marrow Transplant  [facility]

## 2020-10-29 NOTE — ASSESSMENT & PLAN NOTE
- Uric acid on admission 9.2  - monitoring for TLS  -electrolytes stable  -allopurinol 300 mg BID  -uric acid level down to 3.3 today  -no evidence of TLS at this time

## 2020-10-29 NOTE — ASSESSMENT & PLAN NOTE
- temp of 101.5 night of 10/28/20  - pan cultured. Blood cx not growing thus far. U/a not indicative of UTI. cx pending  - c/o coughing phlegm  - re-swabbed for COVID and neg  - RIP pending  - started on a 7 day course of empiric LVQ

## 2020-10-30 LAB
AML FISH ADDITIONAL INFORMATION (BM): NORMAL
AML FISH DISCLAIMER (BM): NORMAL
AML FISH REASON FOR REFERRAL (BM): NORMAL
AML FISH RELEASED BY (BM): NORMAL
AML FISH RESULT (BM): NORMAL
AML FISH RESULT SUMMARY (BM): NORMAL
AML FISH RESULT TABLE (BM): NORMAL
CLINICAL CYTOGENETICIST REVIEW: NORMAL
FAMLB SPECIMEN: NORMAL
FLT3 RESULT: NORMAL
PATH REPORT.FINAL DX SPEC: NORMAL
REF LAB TEST METHOD: NORMAL
SPECIMEN SOURCE: NORMAL
SPECIMEN TYPE: NORMAL
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
## 2020-10-30 NOTE — PLAN OF CARE
Side rails up x2; call bell in place; bed in lowest, locked position; skid proof socks on; no evidence of skin breakdown; care plan explained to patient; pt remains free of injury.  Pt tolerated po, voids, no BM today, ambulates, c/o pain, and HA. Tramadol given. CBG monitored insulin given, VSS and afebrile iv dd dressing applied. Pt received prescription medications from out pt pharmacy. Discharge instructions, medications, prescriptions reviewed with pt and son. Pt and son also spoke with Dr benitez and Monse COTTON, they verbalized understanding for follow up care in [facility]. Escort ordered for transportation.

## 2020-11-02 LAB
BACTERIA BLD CULT: NORMAL
BACTERIA BLD CULT: NORMAL
BCR/ABL,P210 RESULT: NORMAL
CHROM BANDING METHOD: NORMAL
CHROMOSOME ANALYSIS BM ADDITIONAL INFORMATION: NORMAL
CHROMOSOME ANALYSIS BM RELEASED BY: NORMAL
CHROMOSOME ANALYSIS BM RESULT SUMMARY: NORMAL
CLINICAL CYTOGENETICIST REVIEW: NORMAL
KARYOTYP MAR: NORMAL
PATH REPORT.FINAL DX SPEC: NORMAL
REASON FOR REFERRAL (NARRATIVE): NORMAL
REF LAB TEST METHOD: NORMAL
SPECIMEN SOURCE: NORMAL
SPECIMEN TYPE: NORMAL
SPECIMEN: NORMAL

## 2020-11-06 LAB
ANNOTATION COMMENT IMP: NORMAL
DX: NORMAL
NGS CLINCIAL TRIALS: NORMAL
NGS INTERPRETATION: NORMAL
NGS ONCOHEME PANEL GENE LIST: NORMAL
NGS PATHOGENIC MUTATIONS DETECTED: NORMAL
NGS REVIEWED BY:: NORMAL
NGS VARIANTS OF UNKNOWN SIGNIFICANCE: NORMAL
NGSHM RESULT, BLOOD: NORMAL
REF LAB TEST METHOD: NORMAL
SPECIMEN SOURCE: NORMAL
TEST PERFORMANCE INFO SPEC: NORMAL

## 2020-11-09 LAB
COMMENT: NORMAL
FINAL PATHOLOGIC DIAGNOSIS: NORMAL
GROSS: NORMAL
MICROSCOPIC EXAM: NORMAL
SUPPLEMENTAL DIAGNOSIS: NORMAL

## 2020-11-13 NOTE — TELEPHONE ENCOUNTER
Contacted patient to discuss eligibility guidelines for Gleevec FA. Patient asked for a callback in a couple of hours. Will be back in touch to confirm if patient is or is not eligible for FA.

## 2020-12-02 ENCOUNTER — TELEPHONE (OUTPATIENT)
Dept: HEMATOLOGY/ONCOLOGY | Facility: CLINIC | Age: 55
End: 2020-12-02

## 2020-12-02 NOTE — TELEPHONE ENCOUNTER
----- Message from Lindsey Barone sent at 12/2/2020  4:40 PM CST -----  Regarding: Gleevec Assistance application prescription  I am refaxing the prescription for the patients application  to you @ [phone] for an additional signature. If you would like the paperwork faxed to an alternate number please let me know.  Thank you  Jenni  [id number]

## 2020-12-04 ENCOUNTER — DOCUMENTATION ONLY (OUTPATIENT)
Dept: HEMATOLOGY/ONCOLOGY | Facility: CLINIC | Age: 55
End: 2020-12-04

## 2020-12-04 NOTE — PROGRESS NOTES
Received request from pharmacy to assist with application for assistance requiring confirmation of no income.  Spoke to patient son Kvng, who she had previously consented to speak on her behalf when a  was not present, who confirms she has been unable to return to work and currently has no income following her hospitalization.  Will provide required documentation for pharmacy assistance to this effect.  Will follow.

## 2020-12-14 ENCOUNTER — HISTORICAL (OUTPATIENT)
Dept: HEMATOLOGY/ONCOLOGY | Facility: CLINIC | Age: 55
End: 2020-12-14

## 2020-12-22 NOTE — TELEPHONE ENCOUNTER
FOR DOCUMENTATION ONLY:  Financial Assistance for Gleevec is approved from 12- to 12-  Source [employer] Patient Assistance

## 2020-12-23 ENCOUNTER — HISTORICAL (OUTPATIENT)
Dept: HEMATOLOGY/ONCOLOGY | Facility: CLINIC | Age: 55
End: 2020-12-23

## 2020-12-29 ENCOUNTER — HISTORICAL (OUTPATIENT)
Dept: HEMATOLOGY/ONCOLOGY | Facility: CLINIC | Age: 55
End: 2020-12-29

## 2021-01-06 ENCOUNTER — HISTORICAL (OUTPATIENT)
Dept: HEMATOLOGY/ONCOLOGY | Facility: CLINIC | Age: 56
End: 2021-01-06

## 2021-01-13 ENCOUNTER — HISTORICAL (OUTPATIENT)
Dept: HEMATOLOGY/ONCOLOGY | Facility: CLINIC | Age: 56
End: 2021-01-13

## 2021-02-17 ENCOUNTER — HISTORICAL (OUTPATIENT)
Dept: HEMATOLOGY/ONCOLOGY | Facility: CLINIC | Age: 56
End: 2021-02-17

## 2021-02-24 ENCOUNTER — HISTORICAL (OUTPATIENT)
Dept: HEMATOLOGY/ONCOLOGY | Facility: CLINIC | Age: 56
End: 2021-02-24

## 2021-03-04 ENCOUNTER — HISTORICAL (OUTPATIENT)
Dept: HEMATOLOGY/ONCOLOGY | Facility: CLINIC | Age: 56
End: 2021-03-04

## 2021-03-31 ENCOUNTER — HISTORICAL (OUTPATIENT)
Dept: HEMATOLOGY/ONCOLOGY | Facility: CLINIC | Age: 56
End: 2021-03-31

## 2021-05-03 ENCOUNTER — HISTORICAL (OUTPATIENT)
Dept: HEMATOLOGY/ONCOLOGY | Facility: CLINIC | Age: 56
End: 2021-05-03

## 2021-05-03 ENCOUNTER — HISTORICAL (OUTPATIENT)
Dept: RADIOLOGY | Facility: HOSPITAL | Age: 56
End: 2021-05-03

## 2021-05-19 ENCOUNTER — HISTORICAL (OUTPATIENT)
Dept: RADIOLOGY | Facility: HOSPITAL | Age: 56
End: 2021-05-19

## 2021-08-03 ENCOUNTER — HISTORICAL (OUTPATIENT)
Dept: HEMATOLOGY/ONCOLOGY | Facility: CLINIC | Age: 56
End: 2021-08-03

## 2022-01-31 ENCOUNTER — HISTORICAL (OUTPATIENT)
Dept: ADMINISTRATIVE | Facility: HOSPITAL | Age: 57
End: 2022-01-31

## 2022-01-31 LAB
ABS NEUT (OLG): 0.66 (ref 2.1–9.2)
ALBUMIN SERPL-MCNC: 3.5 G/DL (ref 3.5–5)
ALBUMIN/GLOB SERPL: 1.4 {RATIO} (ref 1.1–2)
ALP SERPL-CCNC: 49 U/L (ref 40–150)
ALT SERPL-CCNC: 27 U/L (ref 0–55)
ANISOCYTOSIS BLD QL SMEAR: NORMAL
AST SERPL-CCNC: 20 U/L (ref 5–34)
BASOPHILS # BLD AUTO: 0 10*3/UL (ref 0–0.2)
BASOPHILS NFR BLD AUTO: 0 %
BILIRUB SERPL-MCNC: 0.5 MG/DL
BILIRUBIN DIRECT+TOT PNL SERPL-MCNC: 0.2 (ref 0–0.5)
BILIRUBIN DIRECT+TOT PNL SERPL-MCNC: 0.3 (ref 0–0.8)
BUN SERPL-MCNC: 11.4 MG/DL (ref 9.8–20.1)
CALCIUM SERPL-MCNC: 9.3 MG/DL (ref 8.7–10.5)
CHLORIDE SERPL-SCNC: 102 MMOL/L (ref 98–107)
CO2 SERPL-SCNC: 27 MMOL/L (ref 22–29)
CREAT SERPL-MCNC: 0.75 MG/DL (ref 0.55–1.02)
EOSINOPHIL # BLD AUTO: 0.1 10*3/UL (ref 0–0.9)
EOSINOPHIL NFR BLD AUTO: 2 %
ERYTHROCYTE [DISTWIDTH] IN BLOOD BY AUTOMATED COUNT: 14.4 % (ref 11.5–14.5)
FLAG2 (OHS): 80
FLAG3 (OHS): 80
FLAGS (OHS): 70
GLOBULIN SER-MCNC: 2.5 G/DL (ref 2.4–3.5)
GLUCOSE SERPL-MCNC: 303 MG/DL (ref 74–100)
HCT VFR BLD AUTO: 36.2 % (ref 35–46)
HEMOLYSIS INTERF INDEX SERPL-ACNC: 3
HGB BLD-MCNC: 12.1 G/DL (ref 12–16)
HYPOCHROMIA BLD QL SMEAR: NORMAL
ICTERIC INTERF INDEX SERPL-ACNC: 0
IMM GRANULOCYTES # BLD AUTO: 0.06 10*3/UL
IMM GRANULOCYTES NFR BLD AUTO: 1 %
IMM. NE 1 SUSPECT FLAG (OHS): 10
IMM. NE 2 SUSPECT FLAG (OHS): 300
LIPEMIC INTERF INDEX SERPL-ACNC: 2
LOW EVENT # SUSPECT FLAG (OHS): 70
LYMPHOCYTES # BLD AUTO: 2.3 10*3/UL (ref 0.6–4.6)
LYMPHOCYTES NFR BLD AUTO: 38 %
MANUAL DIFF? (OHS): NO
MCH RBC QN AUTO: 34 PG (ref 26–34)
MCHC RBC AUTO-ENTMCNC: 33.4 G/DL (ref 31–37)
MCV RBC AUTO: 101.7 FL (ref 80–100)
MO BLASTS SUSPECT FLAG (OHS): 300
MONOCYTES # BLD AUTO: 3 10*3/UL (ref 0.1–1.3)
MONOCYTES NFR BLD AUTO: 49 %
NEUTROPHILS # BLD AUTO: 0.66 10*3/UL (ref 2.1–9.2)
NEUTROPHILS NFR BLD AUTO: 11 %
NRBC BLD AUTO-RTO: 0.8 % (ref 0–0.2)
NRBCS SUSPECT FLAG (OHS): 0
PLATELET # BLD AUTO: 29 10*3/UL (ref 130–400)
PLATELET # BLD EST: NORMAL 10*3/UL
PLATELET CLUMPS SUSPECT FLAG (OHS): 90
PLATELETS.RETICULATED NFR BLD AUTO: 10.3 % (ref 0.9–11.2)
PMV BLD AUTO: 12.1 FL (ref 7.4–10.4)
POTASSIUM SERPL-SCNC: 4.2 MMOL/L (ref 3.5–5.1)
PROT SERPL-MCNC: 6 G/DL (ref 6.4–8.3)
RBC # BLD AUTO: 3.56 10*6/UL (ref 4–5.2)
SCAN RECIEVED (OHS): YES
SODIUM SERPL-SCNC: 137 MMOL/L (ref 136–145)
TSH SERPL-ACNC: 1.41 M[IU]/L (ref 0.35–4.94)
WBC # SPEC AUTO: 6.2 10*3/UL (ref 4.5–11)

## 2022-02-04 ENCOUNTER — HISTORICAL (OUTPATIENT)
Dept: ADMINISTRATIVE | Facility: HOSPITAL | Age: 57
End: 2022-02-04

## 2022-03-21 ENCOUNTER — TELEPHONE (OUTPATIENT)
Dept: HEMATOLOGY/ONCOLOGY | Facility: CLINIC | Age: 57
End: 2022-03-21

## 2022-03-21 DIAGNOSIS — C92.00 ACUTE MYELOID LEUKEMIA NOT HAVING ACHIEVED REMISSION: Primary | ICD-10-CM

## 2022-03-21 NOTE — TELEPHONE ENCOUNTER
ISAAC Eng spoke with ISAAC Rios who sent referral. Blanca confirmed that Fela Scot and Fela Michelle are the same person. Pt needs to establish care with [facility] heme/onc but has no insurance. Referral sent to finance for assistance. ISAAC Eng sent message to Jacinto Jones. Isaac Eng also provided [facility] 's contact info since pt is ready for d/c today and finance check turn-around-time is unknown

## 2022-03-22 ENCOUNTER — TELEPHONE (OUTPATIENT)
Dept: HEMATOLOGY/ONCOLOGY | Facility: CLINIC | Age: 57
End: 2022-03-22

## 2022-03-22 NOTE — TELEPHONE ENCOUNTER
Santiago Guallpa, ISAAC  Good Morning       Olamide unfortunately I can't assist this patient. When a patient doesn't have Medical Insurance he/she must apply for Medicaid  first. Proof of a denial must be proven before a Financial Counselor can get involved.             Previous Messages       ----- Message -----   From: Olamide Guallpa RN   Sent: 3/22/2022   9:40 AM CDT   To: Santiago Henderson, I placed a referral yesterday for this pt. AML admitted in another hospital looking to establish care with our BMT clinic. No insurance     Would you mind reaching out?     Thank you for your help,   Olamide

## 2022-04-08 ENCOUNTER — HISTORICAL (OUTPATIENT)
Dept: ADMINISTRATIVE | Facility: HOSPITAL | Age: 57
End: 2022-04-08

## 2022-04-08 LAB
ABS NEUT (OLG): 3.85 (ref 2.1–9.2)
ALBUMIN SERPL-MCNC: 2.7 G/DL (ref 3.5–5)
ALBUMIN/GLOB SERPL: 0.9 {RATIO} (ref 1.1–2)
ALP SERPL-CCNC: 129 U/L (ref 40–150)
ALT SERPL-CCNC: 19 U/L (ref 0–55)
ANISOCYTOSIS BLD QL SMEAR: NORMAL
AST SERPL-CCNC: 17 U/L (ref 5–34)
BASOPHILS # BLD AUTO: 0 10*3/UL (ref 0–0.2)
BASOPHILS NFR BLD AUTO: 1 %
BILIRUB SERPL-MCNC: 0.5 MG/DL
BILIRUBIN DIRECT+TOT PNL SERPL-MCNC: 0.2 (ref 0–0.8)
BILIRUBIN DIRECT+TOT PNL SERPL-MCNC: 0.3 (ref 0–0.5)
BUN SERPL-MCNC: 9.4 MG/DL (ref 9.8–20.1)
CALCIUM SERPL-MCNC: 9.4 MG/DL (ref 8.7–10.5)
CHLORIDE SERPL-SCNC: 102 MMOL/L (ref 98–107)
CO2 SERPL-SCNC: 27 MMOL/L (ref 22–29)
CREAT SERPL-MCNC: 0.6 MG/DL (ref 0.55–1.02)
EOSINOPHIL # BLD AUTO: 0.1 10*3/UL (ref 0–0.9)
EOSINOPHIL NFR BLD AUTO: 2 %
ERYTHROCYTE [DISTWIDTH] IN BLOOD BY AUTOMATED COUNT: 24.2 % (ref 11.5–14.5)
FLAG2 (OHS): 70
FLAG3 (OHS): 80
FLAGS (OHS): 60
GLOBULIN SER-MCNC: 3.1 G/DL (ref 2.4–3.5)
GLUCOSE SERPL-MCNC: 193 MG/DL (ref 74–100)
HCT VFR BLD AUTO: 25.5 % (ref 35–46)
HEMOLYSIS INTERF INDEX SERPL-ACNC: 1
HGB BLD-MCNC: 7.7 G/DL (ref 12–16)
HYPOCHROMIA BLD QL SMEAR: NORMAL
ICTERIC INTERF INDEX SERPL-ACNC: 1
IMM GRANULOCYTES # BLD AUTO: 0.25 10*3/UL
IMM GRANULOCYTES NFR BLD AUTO: 4 %
IMM. NE 1 SUSPECT FLAG (OHS): 30
IMM. NE 2 SUSPECT FLAG (OHS): 40
LIPEMIC INTERF INDEX SERPL-ACNC: 8
LOW EVENT # SUSPECT FLAG (OHS): 70
LYMPHOCYTES # BLD AUTO: 1.6 10*3/UL (ref 0.6–4.6)
LYMPHOCYTES NFR BLD AUTO: 25 %
MACROCYTES BLD QL SMEAR: NORMAL
MANUAL DIFF? (OHS): NO
MCH RBC QN AUTO: 31.8 PG (ref 26–34)
MCHC RBC AUTO-ENTMCNC: 30.2 G/DL (ref 31–37)
MCV RBC AUTO: 105.4 FL (ref 80–100)
MO BLASTS SUSPECT FLAG (OHS): 40
MONOCYTES # BLD AUTO: 0.5 10*3/UL (ref 0.1–1.3)
MONOCYTES NFR BLD AUTO: 8 %
NEUTROPHILS # BLD AUTO: 3.85 10*3/UL (ref 2.1–9.2)
NEUTROPHILS NFR BLD AUTO: 61 %
NRBC BLD AUTO-RTO: 2.4 % (ref 0–0.2)
NRBCS SUSPECT FLAG (OHS): 0
PLATELET # BLD AUTO: 96 10*3/UL (ref 130–400)
PLATELET # BLD EST: NORMAL 10*3/UL
PLATELET CLUMPS SUSPECT FLAG (OHS): 30
PMV BLD AUTO: 11 FL (ref 7.4–10.4)
POIKILOCYTOSIS BLD QL SMEAR: NORMAL
POLYCHROMASIA BLD QL SMEAR: NORMAL
POTASSIUM SERPL-SCNC: 3.8 MMOL/L (ref 3.5–5.1)
PROT SERPL-MCNC: 5.8 G/DL (ref 6.4–8.3)
RBC # BLD AUTO: 2.42 10*6/UL (ref 4–5.2)
SODIUM SERPL-SCNC: 138 MMOL/L (ref 136–145)
TSH SERPL-ACNC: 1.34 M[IU]/L (ref 0.35–4.94)
WBC # SPEC AUTO: 6.4 10*3/UL (ref 4.5–11)
ZZGIANT PLATELETS (OHS): 20

## 2022-04-10 ENCOUNTER — HISTORICAL (OUTPATIENT)
Dept: ADMINISTRATIVE | Facility: HOSPITAL | Age: 57
End: 2022-04-10

## 2022-04-29 VITALS
HEIGHT: 63 IN | OXYGEN SATURATION: 94 % | BODY MASS INDEX: 47.73 KG/M2 | WEIGHT: 269.38 LBS | DIASTOLIC BLOOD PRESSURE: 85 MMHG | SYSTOLIC BLOOD PRESSURE: 134 MMHG

## 2022-05-06 DIAGNOSIS — C92.10 CML (CHRONIC MYELOCYTIC LEUKEMIA): Primary | ICD-10-CM

## 2022-05-06 RX ORDER — ONDANSETRON HYDROCHLORIDE 8 MG/1
8 TABLET, FILM COATED ORAL EVERY 8 HOURS PRN
Qty: 42 TABLET | Refills: 0 | Status: SHIPPED | OUTPATIENT
Start: 2022-05-06 | End: 2022-05-20

## 2022-05-09 DIAGNOSIS — C92.10 CHRONIC MYELOID LEUKEMIA: Primary | ICD-10-CM

## 2022-05-10 ENCOUNTER — DOCUMENTATION ONLY (OUTPATIENT)
Dept: HEMATOLOGY/ONCOLOGY | Facility: CLINIC | Age: 57
End: 2022-05-10
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
## 2022-07-18 ENCOUNTER — DOCUMENTATION ONLY (OUTPATIENT)
Dept: HEMATOLOGY/ONCOLOGY | Facility: CLINIC | Age: 57
End: 2022-07-18

## 2022-07-18 NOTE — NURSING
Spoke with patient's son regarding request for Dr. Nation to write prescription for hydrocodone/APAP to obtain medication assistance through [facility]. Patient's son, Juan M, is unable to clarify patient's pain that requires this pain medication. NN informed Juan M that Dr. Nation says that patient's oncology diagnosis does not warrant pain medication. Juan M voiced understanding and that he will follow up with patient's doctor in [place].

## 2022-12-10 ENCOUNTER — HOSPITAL ENCOUNTER (INPATIENT)
Facility: HOSPITAL | Age: 57
LOS: 2 days | Discharge: HOME | DRG: 871 | End: 2022-12-12
Attending: FAMILY MEDICINE | Admitting: INTERNAL MEDICINE

## 2022-12-10 DIAGNOSIS — R94.31 QT PROLONGATION: ICD-10-CM

## 2022-12-10 DIAGNOSIS — R06.02 SOB (SHORTNESS OF BREATH): ICD-10-CM

## 2022-12-10 DIAGNOSIS — R01.1 MURMUR: ICD-10-CM

## 2022-12-10 DIAGNOSIS — J09.X1 INFLUENZA A WITH PNEUMONIA: Primary | ICD-10-CM

## 2022-12-10 DIAGNOSIS — R09.02 HYPOXEMIA: ICD-10-CM

## 2022-12-10 DIAGNOSIS — R50.9 FEVER: ICD-10-CM

## 2022-12-10 LAB
ALBUMIN SERPL-MCNC: 3.6 GM/DL (ref 3.5–5)
ALBUMIN/GLOB SERPL: 1.2 RATIO (ref 1.1–2)
ALP SERPL-CCNC: 44 UNIT/L (ref 40–150)
ALT SERPL-CCNC: 17 UNIT/L (ref 0–55)
AST SERPL-CCNC: 26 UNIT/L (ref 5–34)
BASOPHILS # BLD AUTO: 0.02 X10(3)/MCL (ref 0–0.2)
BASOPHILS NFR BLD AUTO: 0.4 %
BILIRUBIN DIRECT+TOT PNL SERPL-MCNC: 0.9 MG/DL
BUN SERPL-MCNC: 12.4 MG/DL (ref 9.8–20.1)
CALCIUM SERPL-MCNC: 8.9 MG/DL (ref 8.4–10.2)
CHLORIDE SERPL-SCNC: 105 MMOL/L (ref 98–107)
CK MB SERPL-MCNC: 0.4 NG/ML
CK SERPL-CCNC: 52 U/L (ref 29–168)
CO2 SERPL-SCNC: 21 MMOL/L (ref 22–29)
CREAT SERPL-MCNC: 0.85 MG/DL (ref 0.55–1.02)
EOSINOPHIL # BLD AUTO: 0.01 X10(3)/MCL (ref 0–0.9)
EOSINOPHIL NFR BLD AUTO: 0.2 %
ERYTHROCYTE [DISTWIDTH] IN BLOOD BY AUTOMATED COUNT: 18.7 % (ref 11.5–17)
FLUAV AG UPPER RESP QL IA.RAPID: DETECTED
FLUBV AG UPPER RESP QL IA.RAPID: NOT DETECTED
GFR SERPLBLD CREATININE-BSD FMLA CKD-EPI: >60 MLS/MIN/1.73/M2
GLOBULIN SER-MCNC: 2.9 GM/DL (ref 2.4–3.5)
GLUCOSE SERPL-MCNC: 217 MG/DL (ref 74–100)
HCT VFR BLD AUTO: 26.6 % (ref 37–47)
HGB BLD-MCNC: 8.6 GM/DL (ref 12–16)
IMM GRANULOCYTES # BLD AUTO: 0.07 X10(3)/MCL (ref 0–0.04)
IMM GRANULOCYTES NFR BLD AUTO: 1.5 %
LACTATE SERPL-SCNC: 2.2 MMOL/L (ref 0.5–2.2)
LYMPHOCYTES # BLD AUTO: 0.88 X10(3)/MCL (ref 0.6–4.6)
LYMPHOCYTES NFR BLD AUTO: 19.4 %
MCH RBC QN AUTO: 33.5 PG (ref 27–31)
MCHC RBC AUTO-ENTMCNC: 32.3 MG/DL (ref 33–36)
MCV RBC AUTO: 103.5 FL (ref 80–94)
MONOCYTES # BLD AUTO: 0.33 X10(3)/MCL (ref 0.1–1.3)
MONOCYTES NFR BLD AUTO: 7.3 %
NEUTROPHILS # BLD AUTO: 3.2 X10(3)/MCL (ref 2.1–9.2)
NEUTROPHILS NFR BLD AUTO: 71.2 %
NRBC BLD AUTO-RTO: 0.9 %
PLATELET # BLD AUTO: 75 X10(3)/MCL (ref 130–400)
PMV BLD AUTO: 13.2 FL (ref 7.4–10.4)
POTASSIUM SERPL-SCNC: 3.5 MMOL/L (ref 3.5–5.1)
PROT SERPL-MCNC: 6.5 GM/DL (ref 6.4–8.3)
RBC # BLD AUTO: 2.57 X10(6)/MCL (ref 4.2–5.4)
SARS-COV-2 RNA RESP QL NAA+PROBE: NOT DETECTED
SODIUM SERPL-SCNC: 138 MMOL/L (ref 136–145)
STREP A PCR (OHS): NOT DETECTED
TROPONIN I SERPL-MCNC: <0.01 NG/ML (ref 0–0.04)
WBC # SPEC AUTO: 4.5 X10(3)/MCL (ref 4.5–11.5)

## 2022-12-10 PROCEDURE — G0378 HOSPITAL OBSERVATION PER HR: HCPCS

## 2022-12-10 PROCEDURE — 87651 STREP A DNA AMP PROBE: CPT | Performed by: FAMILY MEDICINE

## 2022-12-10 PROCEDURE — 83036 HEMOGLOBIN GLYCOSYLATED A1C: CPT

## 2022-12-10 PROCEDURE — 96361 HYDRATE IV INFUSION ADD-ON: CPT

## 2022-12-10 PROCEDURE — 0240U COVID/FLU A&B PCR: CPT | Performed by: FAMILY MEDICINE

## 2022-12-10 PROCEDURE — 99285 EMERGENCY DEPT VISIT HI MDM: CPT | Mod: 25

## 2022-12-10 PROCEDURE — 87040 BLOOD CULTURE FOR BACTERIA: CPT | Performed by: FAMILY MEDICINE

## 2022-12-10 PROCEDURE — 82010 KETONE BODYS QUAN: CPT

## 2022-12-10 PROCEDURE — 93005 ELECTROCARDIOGRAM TRACING: CPT

## 2022-12-10 PROCEDURE — 80053 COMPREHEN METABOLIC PANEL: CPT | Performed by: FAMILY MEDICINE

## 2022-12-10 PROCEDURE — 83605 ASSAY OF LACTIC ACID: CPT | Performed by: FAMILY MEDICINE

## 2022-12-10 PROCEDURE — 25000003 PHARM REV CODE 250: Performed by: FAMILY MEDICINE

## 2022-12-10 PROCEDURE — 85025 COMPLETE CBC W/AUTO DIFF WBC: CPT | Performed by: FAMILY MEDICINE

## 2022-12-10 PROCEDURE — 82550 ASSAY OF CK (CPK): CPT | Performed by: FAMILY MEDICINE

## 2022-12-10 PROCEDURE — 96374 THER/PROPH/DIAG INJ IV PUSH: CPT

## 2022-12-10 PROCEDURE — 82553 CREATINE MB FRACTION: CPT | Performed by: FAMILY MEDICINE

## 2022-12-10 PROCEDURE — 11000001 HC ACUTE MED/SURG PRIVATE ROOM

## 2022-12-10 PROCEDURE — 36415 COLL VENOUS BLD VENIPUNCTURE: CPT | Performed by: FAMILY MEDICINE

## 2022-12-10 PROCEDURE — 63600175 PHARM REV CODE 636 W HCPCS: Performed by: FAMILY MEDICINE

## 2022-12-10 PROCEDURE — 84484 ASSAY OF TROPONIN QUANT: CPT | Performed by: FAMILY MEDICINE

## 2022-12-10 RX ORDER — FLUTICASONE PROPIONATE 50 MCG
1 SPRAY, SUSPENSION (ML) NASAL DAILY
Status: DISCONTINUED | OUTPATIENT
Start: 2022-12-11 | End: 2022-12-12 | Stop reason: HOSPADM

## 2022-12-10 RX ORDER — ATORVASTATIN CALCIUM 40 MG/1
40 TABLET, FILM COATED ORAL DAILY
Status: DISCONTINUED | OUTPATIENT
Start: 2022-12-11 | End: 2022-12-12 | Stop reason: HOSPADM

## 2022-12-10 RX ORDER — GABAPENTIN 300 MG/1
300 CAPSULE ORAL 3 TIMES DAILY
Status: DISCONTINUED | OUTPATIENT
Start: 2022-12-11 | End: 2022-12-12 | Stop reason: HOSPADM

## 2022-12-10 RX ORDER — KETOROLAC TROMETHAMINE 30 MG/ML
30 INJECTION, SOLUTION INTRAMUSCULAR; INTRAVENOUS
Status: COMPLETED | OUTPATIENT
Start: 2022-12-10 | End: 2022-12-10

## 2022-12-10 RX ORDER — OSELTAMIVIR PHOSPHATE 75 MG/1
75 CAPSULE ORAL 2 TIMES DAILY
Status: DISCONTINUED | OUTPATIENT
Start: 2022-12-11 | End: 2022-12-12 | Stop reason: HOSPADM

## 2022-12-10 RX ORDER — SODIUM CHLORIDE, SODIUM LACTATE, POTASSIUM CHLORIDE, CALCIUM CHLORIDE 600; 310; 30; 20 MG/100ML; MG/100ML; MG/100ML; MG/100ML
INJECTION, SOLUTION INTRAVENOUS CONTINUOUS
Status: DISCONTINUED | OUTPATIENT
Start: 2022-12-11 | End: 2022-12-12 | Stop reason: HOSPADM

## 2022-12-10 RX ORDER — METOPROLOL TARTRATE 25 MG/1
25 TABLET, FILM COATED ORAL 2 TIMES DAILY
Status: DISCONTINUED | OUTPATIENT
Start: 2022-12-11 | End: 2022-12-12 | Stop reason: HOSPADM

## 2022-12-10 RX ORDER — LISINOPRIL 10 MG/1
40 TABLET ORAL DAILY
Status: DISCONTINUED | OUTPATIENT
Start: 2022-12-11 | End: 2022-12-12 | Stop reason: HOSPADM

## 2022-12-10 RX ORDER — IPRATROPIUM BROMIDE AND ALBUTEROL SULFATE 2.5; .5 MG/3ML; MG/3ML
3 SOLUTION RESPIRATORY (INHALATION) EVERY 6 HOURS PRN
Status: DISCONTINUED | OUTPATIENT
Start: 2022-12-11 | End: 2022-12-12 | Stop reason: HOSPADM

## 2022-12-10 RX ORDER — IBUPROFEN 200 MG
16 TABLET ORAL
Status: DISCONTINUED | OUTPATIENT
Start: 2022-12-11 | End: 2022-12-10

## 2022-12-10 RX ORDER — OSELTAMIVIR PHOSPHATE 75 MG/1
75 CAPSULE ORAL
Status: COMPLETED | OUTPATIENT
Start: 2022-12-10 | End: 2022-12-10

## 2022-12-10 RX ORDER — INSULIN ASPART 100 [IU]/ML
0-5 INJECTION, SOLUTION INTRAVENOUS; SUBCUTANEOUS
Status: DISCONTINUED | OUTPATIENT
Start: 2022-12-11 | End: 2022-12-10

## 2022-12-10 RX ORDER — SODIUM CHLORIDE 9 MG/ML
INJECTION, SOLUTION INTRAVENOUS CONTINUOUS
Status: DISCONTINUED | OUTPATIENT
Start: 2022-12-10 | End: 2022-12-10

## 2022-12-10 RX ORDER — IMATINIB MESYLATE 400 MG/1
400 TABLET, FILM COATED ORAL DAILY
Status: DISCONTINUED | OUTPATIENT
Start: 2022-12-11 | End: 2022-12-11

## 2022-12-10 RX ORDER — PANTOPRAZOLE SODIUM 40 MG/1
40 TABLET, DELAYED RELEASE ORAL DAILY
Status: DISCONTINUED | OUTPATIENT
Start: 2022-12-11 | End: 2022-12-12 | Stop reason: HOSPADM

## 2022-12-10 RX ORDER — ENOXAPARIN SODIUM 100 MG/ML
40 INJECTION SUBCUTANEOUS EVERY 24 HOURS
Status: DISCONTINUED | OUTPATIENT
Start: 2022-12-11 | End: 2022-12-12 | Stop reason: HOSPADM

## 2022-12-10 RX ORDER — ACETAMINOPHEN 500 MG
1000 TABLET ORAL
Status: COMPLETED | OUTPATIENT
Start: 2022-12-10 | End: 2022-12-10

## 2022-12-10 RX ORDER — SODIUM CHLORIDE 0.9 % (FLUSH) 0.9 %
10 SYRINGE (ML) INJECTION
Status: DISCONTINUED | OUTPATIENT
Start: 2022-12-11 | End: 2022-12-12 | Stop reason: HOSPADM

## 2022-12-10 RX ORDER — GLUCAGON 1 MG
1 KIT INJECTION
Status: DISCONTINUED | OUTPATIENT
Start: 2022-12-11 | End: 2022-12-10

## 2022-12-10 RX ORDER — IBUPROFEN 200 MG
24 TABLET ORAL
Status: DISCONTINUED | OUTPATIENT
Start: 2022-12-11 | End: 2022-12-10

## 2022-12-10 RX ORDER — TALC
6 POWDER (GRAM) TOPICAL NIGHTLY PRN
Status: DISCONTINUED | OUTPATIENT
Start: 2022-12-11 | End: 2022-12-12 | Stop reason: HOSPADM

## 2022-12-10 RX ADMIN — ACETAMINOPHEN 1000 MG: 500 TABLET ORAL at 09:12

## 2022-12-10 RX ADMIN — SODIUM CHLORIDE: 9 INJECTION, SOLUTION INTRAVENOUS at 10:12

## 2022-12-10 RX ADMIN — OSELTAMIVIR PHOSPHATE 75 MG: 75 CAPSULE ORAL at 10:12

## 2022-12-10 RX ADMIN — SODIUM CHLORIDE 1000 ML: 9 INJECTION, SOLUTION INTRAVENOUS at 09:12

## 2022-12-10 RX ADMIN — KETOROLAC TROMETHAMINE 30 MG: 30 INJECTION, SOLUTION INTRAMUSCULAR; INTRAVENOUS at 10:12

## 2022-12-11 LAB
ALBUMIN SERPL-MCNC: 3.1 GM/DL (ref 3.5–5)
ALBUMIN/GLOB SERPL: 1.2 RATIO (ref 1.1–2)
ALP SERPL-CCNC: 39 UNIT/L (ref 40–150)
ALT SERPL-CCNC: 15 UNIT/L (ref 0–55)
APPEARANCE UR: CLEAR
AST SERPL-CCNC: 23 UNIT/L (ref 5–34)
B-OH-BUTYR SERPL-MCNC: 0.2 MMOL/L
BACTERIA #/AREA URNS AUTO: ABNORMAL /HPF
BASOPHILS # BLD AUTO: 0.01 X10(3)/MCL (ref 0–0.2)
BASOPHILS NFR BLD AUTO: 0.3 %
BILIRUB UR QL STRIP.AUTO: NEGATIVE MG/DL
BILIRUBIN DIRECT+TOT PNL SERPL-MCNC: 0.7 MG/DL
BUN SERPL-MCNC: 11.6 MG/DL (ref 9.8–20.1)
CALCIUM SERPL-MCNC: 8.2 MG/DL (ref 8.4–10.2)
CHLORIDE SERPL-SCNC: 108 MMOL/L (ref 98–107)
CHOLEST SERPL-MCNC: 121 MG/DL
CHOLEST/HDLC SERPL: 4 {RATIO} (ref 0–5)
CO2 SERPL-SCNC: 21 MMOL/L (ref 22–29)
COLOR UR AUTO: ABNORMAL
CREAT SERPL-MCNC: 0.88 MG/DL (ref 0.55–1.02)
EOSINOPHIL # BLD AUTO: 0.01 X10(3)/MCL (ref 0–0.9)
EOSINOPHIL NFR BLD AUTO: 0.3 %
ERYTHROCYTE [DISTWIDTH] IN BLOOD BY AUTOMATED COUNT: 19.1 % (ref 11.5–17)
EST. AVERAGE GLUCOSE BLD GHB EST-MCNC: 200.1 MG/DL
GFR SERPLBLD CREATININE-BSD FMLA CKD-EPI: >60 MLS/MIN/1.73/M2
GLOBULIN SER-MCNC: 2.5 GM/DL (ref 2.4–3.5)
GLUCOSE SERPL-MCNC: 260 MG/DL (ref 74–100)
GLUCOSE UR QL STRIP.AUTO: ABNORMAL MG/DL
HBA1C MFR BLD: 8.6 %
HCT VFR BLD AUTO: 24.7 % (ref 37–47)
HDLC SERPL-MCNC: 31 MG/DL (ref 35–60)
HGB BLD-MCNC: 7.9 GM/DL (ref 12–16)
HYALINE CASTS #/AREA URNS LPF: ABNORMAL /LPF
IMM GRANULOCYTES # BLD AUTO: 0.04 X10(3)/MCL (ref 0–0.04)
IMM GRANULOCYTES NFR BLD AUTO: 1.2 %
KETONES UR QL STRIP.AUTO: NEGATIVE MG/DL
LACTATE SERPL-SCNC: 1.9 MMOL/L (ref 0.5–2.2)
LDLC SERPL CALC-MCNC: 49 MG/DL (ref 50–140)
LEUKOCYTE ESTERASE UR QL STRIP.AUTO: NEGATIVE UNIT/L
LYMPHOCYTES # BLD AUTO: 0.71 X10(3)/MCL (ref 0.6–4.6)
LYMPHOCYTES NFR BLD AUTO: 22 %
MCH RBC QN AUTO: 33.5 PG (ref 27–31)
MCHC RBC AUTO-ENTMCNC: 32 MG/DL (ref 33–36)
MCV RBC AUTO: 104.7 FL (ref 80–94)
MONOCYTES # BLD AUTO: 0.25 X10(3)/MCL (ref 0.1–1.3)
MONOCYTES NFR BLD AUTO: 7.7 %
NEUTROPHILS # BLD AUTO: 2.2 X10(3)/MCL (ref 2.1–9.2)
NEUTROPHILS NFR BLD AUTO: 68.5 %
NITRITE UR QL STRIP.AUTO: NEGATIVE
NRBC BLD AUTO-RTO: 1.2 %
PH UR STRIP.AUTO: 5.5 [PH]
PLATELET # BLD AUTO: 62 X10(3)/MCL (ref 130–400)
PMV BLD AUTO: 12.1 FL (ref 7.4–10.4)
POCT GLUCOSE: 283 MG/DL (ref 70–110)
POCT GLUCOSE: 304 MG/DL (ref 70–110)
POCT GLUCOSE: 339 MG/DL (ref 70–110)
POCT GLUCOSE: 340 MG/DL (ref 70–110)
POTASSIUM SERPL-SCNC: 3.5 MMOL/L (ref 3.5–5.1)
PROT SERPL-MCNC: 5.6 GM/DL (ref 6.4–8.3)
PROT UR QL STRIP.AUTO: NEGATIVE MG/DL
RBC # BLD AUTO: 2.36 X10(6)/MCL (ref 4.2–5.4)
RBC #/AREA URNS AUTO: ABNORMAL /HPF
RBC UR QL AUTO: NEGATIVE UNIT/L
SODIUM SERPL-SCNC: 140 MMOL/L (ref 136–145)
SP GR UR STRIP.AUTO: 1.01
SQUAMOUS #/AREA URNS LPF: ABNORMAL /HPF
TRIGL SERPL-MCNC: 205 MG/DL (ref 37–140)
UROBILINOGEN UR STRIP-ACNC: NORMAL MG/DL
VLDLC SERPL CALC-MCNC: 41 MG/DL
WBC # SPEC AUTO: 3.2 X10(3)/MCL (ref 4.5–11.5)
WBC #/AREA URNS AUTO: ABNORMAL /HPF

## 2022-12-11 PROCEDURE — 63600175 PHARM REV CODE 636 W HCPCS

## 2022-12-11 PROCEDURE — 87205 SMEAR GRAM STAIN: CPT | Performed by: STUDENT IN AN ORGANIZED HEALTH CARE EDUCATION/TRAINING PROGRAM

## 2022-12-11 PROCEDURE — 80061 LIPID PANEL: CPT

## 2022-12-11 PROCEDURE — 81001 URINALYSIS AUTO W/SCOPE: CPT | Performed by: FAMILY MEDICINE

## 2022-12-11 PROCEDURE — 36415 COLL VENOUS BLD VENIPUNCTURE: CPT | Performed by: FAMILY MEDICINE

## 2022-12-11 PROCEDURE — 99900035 HC TECH TIME PER 15 MIN (STAT)

## 2022-12-11 PROCEDURE — 87070 CULTURE OTHR SPECIMN AEROBIC: CPT | Performed by: STUDENT IN AN ORGANIZED HEALTH CARE EDUCATION/TRAINING PROGRAM

## 2022-12-11 PROCEDURE — 63600175 PHARM REV CODE 636 W HCPCS: Performed by: STUDENT IN AN ORGANIZED HEALTH CARE EDUCATION/TRAINING PROGRAM

## 2022-12-11 PROCEDURE — 25000003 PHARM REV CODE 250

## 2022-12-11 PROCEDURE — 94761 N-INVAS EAR/PLS OXIMETRY MLT: CPT

## 2022-12-11 PROCEDURE — 11000001 HC ACUTE MED/SURG PRIVATE ROOM

## 2022-12-11 PROCEDURE — 80053 COMPREHEN METABOLIC PANEL: CPT

## 2022-12-11 PROCEDURE — 25000242 PHARM REV CODE 250 ALT 637 W/ HCPCS

## 2022-12-11 PROCEDURE — 83605 ASSAY OF LACTIC ACID: CPT | Performed by: FAMILY MEDICINE

## 2022-12-11 PROCEDURE — 21400001 HC TELEMETRY ROOM

## 2022-12-11 PROCEDURE — 85025 COMPLETE CBC W/AUTO DIFF WBC: CPT

## 2022-12-11 PROCEDURE — 36415 COLL VENOUS BLD VENIPUNCTURE: CPT

## 2022-12-11 RX ORDER — FLUCONAZOLE 200 MG/1
200 TABLET ORAL DAILY
Status: ON HOLD | COMMUNITY
Start: 2022-11-23 | End: 2023-08-07 | Stop reason: HOSPADM

## 2022-12-11 RX ORDER — LOSARTAN POTASSIUM 25 MG/1
25 TABLET ORAL DAILY
Status: ON HOLD | COMMUNITY
End: 2022-12-12 | Stop reason: HOSPADM

## 2022-12-11 RX ORDER — ACETAMINOPHEN 325 MG/1
650 TABLET ORAL EVERY 6 HOURS PRN
Status: DISCONTINUED | OUTPATIENT
Start: 2022-12-11 | End: 2022-12-12 | Stop reason: HOSPADM

## 2022-12-11 RX ORDER — AMLODIPINE BESYLATE 10 MG/1
10 TABLET ORAL DAILY
Status: ON HOLD | COMMUNITY
End: 2024-04-01 | Stop reason: HOSPADM

## 2022-12-11 RX ORDER — ONDANSETRON HYDROCHLORIDE 8 MG/1
8 TABLET, FILM COATED ORAL EVERY 8 HOURS PRN
Status: ON HOLD | COMMUNITY
End: 2023-09-18 | Stop reason: HOSPADM

## 2022-12-11 RX ORDER — IBUPROFEN 200 MG
24 TABLET ORAL
Status: DISCONTINUED | OUTPATIENT
Start: 2022-12-11 | End: 2022-12-12 | Stop reason: HOSPADM

## 2022-12-11 RX ORDER — OLANZAPINE 10 MG/1
10 TABLET ORAL NIGHTLY
Status: ON HOLD | COMMUNITY
End: 2023-06-02 | Stop reason: ALTCHOICE

## 2022-12-11 RX ORDER — INSULIN ASPART 100 [IU]/ML
0-5 INJECTION, SOLUTION INTRAVENOUS; SUBCUTANEOUS
Status: DISCONTINUED | OUTPATIENT
Start: 2022-12-11 | End: 2022-12-12 | Stop reason: HOSPADM

## 2022-12-11 RX ORDER — IBUPROFEN 200 MG
16 TABLET ORAL
Status: DISCONTINUED | OUTPATIENT
Start: 2022-12-11 | End: 2022-12-12 | Stop reason: HOSPADM

## 2022-12-11 RX ORDER — INSULIN GLARGINE 100 [IU]/ML
50 INJECTION, SOLUTION SUBCUTANEOUS NIGHTLY
Status: ON HOLD | COMMUNITY
Start: 2021-08-18 | End: 2022-12-12 | Stop reason: SDUPTHER

## 2022-12-11 RX ORDER — CEPHALEXIN 500 MG/1
500 CAPSULE ORAL EVERY 12 HOURS
Status: ON HOLD | COMMUNITY
End: 2022-12-12 | Stop reason: HOSPADM

## 2022-12-11 RX ORDER — FUROSEMIDE 20 MG/1
20 TABLET ORAL DAILY
COMMUNITY
End: 2023-09-20 | Stop reason: SDUPTHER

## 2022-12-11 RX ORDER — INSULIN LISPRO 100 [IU]/ML
10 INJECTION, SOLUTION INTRAVENOUS; SUBCUTANEOUS
Status: ON HOLD | COMMUNITY
End: 2022-12-12 | Stop reason: SDUPTHER

## 2022-12-11 RX ORDER — ACYCLOVIR 400 MG/1
TABLET ORAL 2 TIMES DAILY
Status: ON HOLD | COMMUNITY
End: 2023-05-29 | Stop reason: HOSPADM

## 2022-12-11 RX ORDER — MONTELUKAST SODIUM 10 MG/1
10 TABLET ORAL DAILY
Status: ON HOLD | COMMUNITY
End: 2024-04-01 | Stop reason: HOSPADM

## 2022-12-11 RX ORDER — GLUCAGON 1 MG
1 KIT INJECTION
Status: DISCONTINUED | OUTPATIENT
Start: 2022-12-11 | End: 2022-12-12 | Stop reason: HOSPADM

## 2022-12-11 RX ORDER — HYDROCODONE BITARTRATE AND ACETAMINOPHEN 5; 325 MG/1; MG/1
1 TABLET ORAL EVERY 6 HOURS PRN
Status: DISCONTINUED | OUTPATIENT
Start: 2022-12-11 | End: 2022-12-12 | Stop reason: HOSPADM

## 2022-12-11 RX ORDER — HYDROCODONE BITARTRATE AND ACETAMINOPHEN 5; 325 MG/1; MG/1
1 TABLET ORAL EVERY 6 HOURS PRN
Status: ON HOLD | COMMUNITY
End: 2023-11-21 | Stop reason: SDUPTHER

## 2022-12-11 RX ADMIN — INSULIN DETEMIR 50 UNITS: 100 INJECTION, SOLUTION SUBCUTANEOUS at 09:12

## 2022-12-11 RX ADMIN — GABAPENTIN 300 MG: 300 CAPSULE ORAL at 09:12

## 2022-12-11 RX ADMIN — GABAPENTIN 300 MG: 300 CAPSULE ORAL at 04:12

## 2022-12-11 RX ADMIN — SODIUM CHLORIDE, POTASSIUM CHLORIDE, SODIUM LACTATE AND CALCIUM CHLORIDE: 600; 310; 30; 20 INJECTION, SOLUTION INTRAVENOUS at 01:12

## 2022-12-11 RX ADMIN — ENOXAPARIN SODIUM 40 MG: 40 INJECTION SUBCUTANEOUS at 04:12

## 2022-12-11 RX ADMIN — VANCOMYCIN HYDROCHLORIDE 1500 MG: 1 INJECTION, POWDER, LYOPHILIZED, FOR SOLUTION INTRAVENOUS at 12:12

## 2022-12-11 RX ADMIN — SODIUM CHLORIDE, POTASSIUM CHLORIDE, SODIUM LACTATE AND CALCIUM CHLORIDE: 600; 310; 30; 20 INJECTION, SOLUTION INTRAVENOUS at 10:12

## 2022-12-11 RX ADMIN — ACETAMINOPHEN 650 MG: 325 TABLET ORAL at 06:12

## 2022-12-11 RX ADMIN — INSULIN ASPART 3 UNITS: 100 INJECTION, SOLUTION INTRAVENOUS; SUBCUTANEOUS at 08:12

## 2022-12-11 RX ADMIN — ATORVASTATIN CALCIUM 40 MG: 40 TABLET, FILM COATED ORAL at 08:12

## 2022-12-11 RX ADMIN — INSULIN ASPART 4 UNITS: 100 INJECTION, SOLUTION INTRAVENOUS; SUBCUTANEOUS at 11:12

## 2022-12-11 RX ADMIN — PIPERACILLIN AND TAZOBACTAM 4.5 G: 4; .5 INJECTION, POWDER, LYOPHILIZED, FOR SOLUTION INTRAVENOUS; PARENTERAL at 12:12

## 2022-12-11 RX ADMIN — GABAPENTIN 300 MG: 300 CAPSULE ORAL at 08:12

## 2022-12-11 RX ADMIN — METOPROLOL TARTRATE 25 MG: 25 TABLET, FILM COATED ORAL at 10:12

## 2022-12-11 RX ADMIN — PIPERACILLIN AND TAZOBACTAM 4.5 G: 4; .5 INJECTION, POWDER, LYOPHILIZED, FOR SOLUTION INTRAVENOUS; PARENTERAL at 08:12

## 2022-12-11 RX ADMIN — METOPROLOL TARTRATE 25 MG: 25 TABLET, FILM COATED ORAL at 09:12

## 2022-12-11 RX ADMIN — HYDROCODONE BITARTRATE AND ACETAMINOPHEN 1 TABLET: 5; 325 TABLET ORAL at 05:12

## 2022-12-11 RX ADMIN — SODIUM CHLORIDE, POTASSIUM CHLORIDE, SODIUM LACTATE AND CALCIUM CHLORIDE: 600; 310; 30; 20 INJECTION, SOLUTION INTRAVENOUS at 09:12

## 2022-12-11 RX ADMIN — ACETAMINOPHEN 650 MG: 325 TABLET ORAL at 04:12

## 2022-12-11 RX ADMIN — PIPERACILLIN AND TAZOBACTAM 4.5 G: 4; .5 INJECTION, POWDER, LYOPHILIZED, FOR SOLUTION INTRAVENOUS; PARENTERAL at 04:12

## 2022-12-11 RX ADMIN — FLUTICASONE PROPIONATE 50 MCG: 50 SPRAY, METERED NASAL at 08:12

## 2022-12-11 RX ADMIN — SODIUM CHLORIDE, POTASSIUM CHLORIDE, SODIUM LACTATE AND CALCIUM CHLORIDE 1000 ML: 600; 310; 30; 20 INJECTION, SOLUTION INTRAVENOUS at 12:12

## 2022-12-11 RX ADMIN — INSULIN ASPART 2 UNITS: 100 INJECTION, SOLUTION INTRAVENOUS; SUBCUTANEOUS at 09:12

## 2022-12-11 RX ADMIN — LISINOPRIL 40 MG: 10 TABLET ORAL at 08:12

## 2022-12-11 RX ADMIN — PANTOPRAZOLE SODIUM 40 MG: 40 TABLET, DELAYED RELEASE ORAL at 08:12

## 2022-12-11 RX ADMIN — INSULIN ASPART 4 UNITS: 100 INJECTION, SOLUTION INTRAVENOUS; SUBCUTANEOUS at 05:12

## 2022-12-11 RX ADMIN — OSELTAMIVIR PHOSPHATE 75 MG: 75 CAPSULE ORAL at 08:12

## 2022-12-11 RX ADMIN — OSELTAMIVIR PHOSPHATE 75 MG: 75 CAPSULE ORAL at 09:12

## 2022-12-11 RX ADMIN — VANCOMYCIN HYDROCHLORIDE 1250 MG: 1.25 INJECTION, POWDER, LYOPHILIZED, FOR SOLUTION INTRAVENOUS at 01:12

## 2022-12-11 NOTE — ED PROVIDER NOTES
Encounter Date: 12/10/2022       History     Chief Complaint   Patient presents with    Fever    Chills     Currently undergoing chemo for leukemia and is running fever, cough some SOB.      Cough     58 y/o  female with hx of CML ( on chemo, followed by [facility]) presents with fever.      Onset- today   Timing- constant   Context- cancer patient presenting with fever.   at bedside,.  Patient reports cough, fever, body aches.  Reviewed medication list patient currently taking levofloxacin.     The history is provided by the patient. A  was used.   Review of patient's allergies indicates:  No Known Allergies  Past Medical History:   Diagnosis Date    Cancer     DM2 (diabetes mellitus, type 2)     HTN (hypertension)     NAFLD (nonalcoholic fatty liver disease)     Neuropathy      Past Surgical History:   Procedure Laterality Date    ABDOMINAL SURGERY       SECTION       SECTION      CHOLECYSTECTOMY       Family History   Problem Relation Age of Onset    Diabetes Mother     Diabetes Father     Cancer Neg Hx      Social History     Tobacco Use    Smoking status: Never    Smokeless tobacco: Never   Substance Use Topics    Alcohol use: Not Currently    Drug use: Not Currently     Review of Systems   Constitutional:  Positive for chills, fatigue and fever.   HENT:  Negative for sore throat.    Respiratory:  Positive for cough and shortness of breath. Negative for wheezing.    Cardiovascular:  Negative for chest pain, palpitations and leg swelling.   Gastrointestinal:  Negative for nausea and vomiting.   Genitourinary:  Negative for dysuria.   Musculoskeletal:  Positive for myalgias. Negative for back pain.   Skin:  Negative for rash.   Neurological:  Negative for dizziness.   Hematological:  Does not bruise/bleed easily.     Physical Exam     Initial Vitals [12/10/22 2025]   BP Pulse Resp Temp SpO2   118/65 102 17 (!) 101 °F (38.3 °C) (!) 94 %      MAP       --          Physical Exam    Nursing note and vitals reviewed.  Constitutional: She appears well-developed and well-nourished. No distress.   HENT:   Head: Normocephalic and atraumatic.   Eyes: Conjunctivae are normal.   Cardiovascular:  Regular rhythm and intact distal pulses.           Tachycardia    Pulmonary/Chest: No respiratory distress. She has no wheezes. She has rales.   Abdominal: Abdomen is soft. Bowel sounds are normal. There is no abdominal tenderness. There is no rebound and no guarding.   Musculoskeletal:         General: Normal range of motion.     Neurological: She is alert and oriented to person, place, and time. Gait normal.   Skin: Skin is warm and dry. Capillary refill takes less than 2 seconds.   Psychiatric: She has a normal mood and affect. Her behavior is normal. Judgment and thought content normal.       ED Course   Critical Care    Date/Time: 12/10/2022 9:30 PM  Performed by: Marvel Snowden MD  Authorized by: Marvel Snowden MD   Total critical care time (exclusive of procedural time) : 30 minutes  Critical care was necessary to treat or prevent imminent or life-threatening deterioration of the following conditions: dehydration, sepsis, respiratory failure and circulatory failure.  Critical care was time spent personally by me on the following activities: development of treatment plan with patient or surrogate, evaluation of patient's response to treatment, examination of patient, obtaining history from patient or surrogate, ordering and performing treatments and interventions, ordering and review of laboratory studies, ordering and review of radiographic studies, pulse oximetry, re-evaluation of patient's condition and review of old charts.      Labs Reviewed   COVID/FLU A&B PCR - Abnormal; Notable for the following components:       Result Value    Influenza A PCR Detected (*)     All other components within normal limits    Narrative:     The Xpert Xpress SARS-CoV-2/FLU/RSV plus is a rapid, multiplexed  real-time PCR test intended for the simultaneous qualitative detection and differentiation of SARS-CoV-2, Influenza A, Influenza B, and respiratory syncytial virus (RSV) viral RNA in either nasopharyngeal swab or nasal swab specimens.         COMPREHENSIVE METABOLIC PANEL - Abnormal; Notable for the following components:    Carbon Dioxide 21 (*)     Glucose Level 217 (*)     All other components within normal limits   CBC WITH DIFFERENTIAL - Abnormal; Notable for the following components:    RBC 2.57 (*)     Hgb 8.6 (*)     Hct 26.6 (*)     .5 (*)     MCH 33.5 (*)     MCHC 32.3 (*)     RDW 18.7 (*)     Platelet 75 (*)     MPV 13.2 (*)     IG# 0.07 (*)     All other components within normal limits   STREP GROUP A BY PCR - Normal    Narrative:     The Xpert Xpress Strep A test is a rapid, qualitative in vitro diagnostic test for the detection of Streptococcus pyogenes (Group A ß-hemolytic Streptococcus, Strep A) in throat swab specimens from patients with signs and symptoms of pharyngitis.     BLOOD CULTURE [facility]   BLOOD CULTURE [facility]   CBC W/ AUTO DIFFERENTIAL    Narrative:     The following orders were created for panel order CBC Auto Differential.  Procedure                               Abnormality         Status                     ---------                               -----------         ------                     CBC with Differential[[id number]]        Abnormal            Final result                 Please view results for these tests on the individual orders.   EXTRA TUBES    Narrative:     The following orders were created for panel order EXTRA TUBES.  Procedure                               Abnormality         Status                     ---------                               -----------         ------                     Light Blue Top Hold[[id number]]                              In process                 Gold Top Hold[[id number]]                                    In process                   Please  view results for these tests on the individual orders.   LIGHT BLUE TOP HOLD   GOLD TOP HOLD   LACTIC ACID, PLASMA   URINALYSIS   CK-MB   CK   TROPONIN I          Imaging Results              X-Ray Chest PA And Lateral (Preliminary result)  Result time 12/10/22 22:33:47      ED Interpretation by Marvel Snowden MD (12/10/22 22:33:47, [facility], Emergency Medicine)    Positive for Left middle and lower lobe opacity.                                      Medications   ketorolac injection 30 mg (has no administration in time range)   0.9%  NaCl infusion (has no administration in time range)   oseltamivir capsule 75 mg (has no administration in time range)   acetaminophen tablet 1,000 mg (1,000 mg Oral Given 12/10/22 2100)   sodium chloride 0.9% bolus 1,000 mL (1,000 mLs Intravenous New Bag 12/10/22 2132)     Medical Decision Making:   ED Management:  Cancer patient that presents with fever. Workup shows pt is positive for Flu A. CXR  concerning for developing infiltrate. As I reexamined multiple times in ED- pt oxygen noted to go down to 90%. Fever treated with tylenol and then Toradol.   Pt placed on oxygen, discussed admission with patient and  at bedside. Medicine consulted for admission.                          Clinical Impression:   Final diagnoses:  [R50.9] Fever  [J09.X1] Influenza A with pneumonia (Primary)  [R06.02] SOB (shortness of breath)  [R09.02] Hypoxemia        ED Disposition Condition    Observation Stable                Marvel Snowden MD  12/10/22 3055

## 2022-12-11 NOTE — H&P
[facility] Medicine History and Physical     Attending Physician: Blake Ortiz MD    Subjective:      Brief HPI:  Mrs. Fela Ellison is a 57 y.o. female with a PMHx of T2DM, HTN and CML who presents to [facility]'s ED on 12/10/22 with a fever and cough w/ sputum production in setting of CML. Patient states she began having a headache, sore throat and cough yesterday night when going to sleep. When she awoke this morning, her cough had progressed to a cough with brownish-sputum production, in addition to new onset fever and chest pain with coughing. Patient came to [facility] because she was told if she runs fever while receiving therapy for CML, to immediately go to the ED. Patient follows with Oncologist in [place] for management of her CML. She denies SOB at rest, but endorses STRICKLAND since this morning.    Significant ED workup includes: 101 °F, 102 bpm, RR 17, /65; H/H 8.6/26.6, CO2 21, Glucose 217, Lactate 2.2, Cardiac enzymes wnl and Influenza A Positive (COVID & Strep A negative).    Social Hx: 15 pk/yr hx, quit date 2 years ago. Denies alcohol or illicit drug use.     Surgical Hx:    section x2  Cholecystectomy    Family Hx:  Mom - T2DM ()  Dad- T2DM ()  Sister - T2DM  Son/Daughter - T2DM    Past Medical History:   Diagnosis Date    Cancer     DM2 (diabetes mellitus, type 2)     HTN (hypertension)     NAFLD (nonalcoholic fatty liver disease)     Neuropathy        Past Surgical History:   Procedure Laterality Date    ABDOMINAL SURGERY       SECTION       SECTION      CHOLECYSTECTOMY         Review of patient's allergies indicates:  No Known Allergies    Current Outpatient Medications   Medication Instructions    albuterol (VENTOLIN HFA) 90 mcg/actuation inhaler 2 puffs, Inhalation, Every 6 hours PRN, Rescue     allopurinoL (ZYLOPRIM) 300 mg, Oral, Daily    atorvastatin (LIPITOR) 40 mg, Oral, Daily    benazepriL (LOTENSIN) 40 mg, Oral, Daily    cyclobenzaprine (FLEXERIL) 10 mg,  Oral, 3 times daily PRN    fluticasone propionate (FLONASE) 50 mcg/actuation nasal spray 1 spray, Each Nostril, Daily    gabapentin (NEURONTIN) 300 mg, Oral, 3 times daily    glimepiride (AMARYL) 4 mg, Oral, 2 times daily    guaiFENesin (MUCINEX) 1,200 mg, Oral, 2 times daily    imatinib (GLEEVEC) 400 mg, Oral, Daily    insulin NPH 20 Units, Subcutaneous, 2 times daily before meals    levocetirizine (XYZAL) 5 mg, Oral, Nightly    levoFLOXacin (LEVAQUIN) 750 mg, Oral, Daily    metFORMIN (GLUCOPHAGE) 1,000 mg, Oral, 2 times daily with meals    metoprolol tartrate (LOPRESSOR) 25 mg, Oral, 2 times daily    omeprazole (PRILOSEC) 20 mg, Oral, Daily    traMADoL (ULTRAM) 50 mg, Oral, Every 6 hours PRN        Family History       Problem Relation (Age of Onset)    Diabetes Mother, Father            Tobacco Use    Smoking status: Never    Smokeless tobacco: Never   Substance and Sexual Activity    Alcohol use: Not Currently    Drug use: Not Currently    Sexual activity: Not on file        Review of Systems:  Review of Systems   Constitutional:  Positive for fever. Negative for chills and diaphoresis.   HENT:  Positive for sore throat.    Eyes:  Negative for blurred vision.   Respiratory:  Positive for cough and sputum production (brown sputum.). Negative for shortness of breath and wheezing.    Cardiovascular:  Positive for chest pain (Pleuritic, hurts with coughing.). Negative for palpitations, orthopnea, leg swelling and PND.   Gastrointestinal:  Negative for abdominal pain, constipation, diarrhea, nausea and vomiting.   Genitourinary:  Positive for urgency (Endorsing urge incontinence in the morning when waking up.). Negative for dysuria, flank pain and frequency.   Musculoskeletal:  Negative for falls and myalgias.   Skin:  Negative for rash.   Neurological:  Positive for headaches. Negative for dizziness, seizures, loss of consciousness and weakness.        Objective:     Vital Signs:  Vital Signs (Most Recent):  Temp:  (!) 101 °F (38.3 °C) (12/10/22 2025)  Pulse: 102 (12/10/22 2025)  Resp: 17 (12/10/22 2025)  BP: 118/65 (12/10/22 2025)  SpO2: (!) 94 % (12/10/22 2025)   Vital Signs (24h Range):  Temp:  [101 °F (38.3 °C)] 101 °F (38.3 °C)  Pulse:  [102] 102  Resp:  [17] 17  SpO2:  [94 %] 94 %  BP: (118)/(65) 118/65   Body mass index is 42.16 kg/m².   No intake or output data in the 24 hours ending 12/10/22 2336    Physical Examination:  Physical Exam  Vitals and nursing note reviewed.   Constitutional:       General: She is not in acute distress.     Appearance: Normal appearance. She is obese. She is not ill-appearing or diaphoretic.   HENT:      Head: Normocephalic.      Mouth/Throat:      Mouth: Mucous membranes are moist.   Eyes:      General: No scleral icterus.     Extraocular Movements: Extraocular movements intact.      Pupils: Pupils are equal, round, and reactive to light.   Cardiovascular:      Rate and Rhythm: Normal rate and regular rhythm.      Pulses: Normal pulses.      Heart sounds: Murmur (S1 murmur noted in inferior sternal border.) heard.   Pulmonary:      Effort: Pulmonary effort is normal. No respiratory distress.      Breath sounds: No wheezing or rales.      Comments: Diffuse decreased breath sounds.    Abdominal:      General: There is no distension.      Palpations: Abdomen is soft.      Tenderness: There is no abdominal tenderness. There is no guarding.   Musculoskeletal:         General: Normal range of motion.      Cervical back: Normal range of motion.      Right lower leg: No edema.      Left lower leg: No edema.   Skin:     General: Skin is warm.      Capillary Refill: Capillary refill takes less than 2 seconds.      Coloration: Skin is not jaundiced or pale.      Findings: No lesion or rash.   Neurological:      General: No focal deficit present.      Mental Status: She is alert and oriented to person, place, and time. Mental status is at baseline.   Psychiatric:         Mood and Affect: Mood normal.          Behavior: Behavior normal.         Thought Content: Thought content normal.         Laboratory:    Recent Labs   Lab 12/10/22  2144   WBC 4.5   HGB 8.6*   HCT 26.6*   PLT 75*   .5*   RDW 18.7*     Recent Labs   Lab 12/10/22  2157   TROPONINI <0.010     Recent Labs   Lab 12/10/22  2157   TROPONINI <0.010     No results for input(s): CHOL, HDL, LDLCALC, TRIG, CHOLHDL in the last 168 hours. Recent Labs   Lab 12/10/22  2144      K 3.5   CHLORIDE 105   CO2 21*   BUN 12.4   CREATININE 0.85   CALCIUM 8.9     Recent Labs   Lab 12/10/22  2144   ALBUMIN 3.6   BILITOT 0.9   AST 26   ALKPHOS 44   ALT 17     No results for input(s): IRON, TIBC, FERRITIN, SATURATEDIRO, [id number], FOLATE in the last 168 hours.  No results for input(s): TSH, Y6VYVFQ, HGBA1C, INR, PROTIME, PTT in the last 168 hours.       Microbiology Data:  Microbiology Results (last 7 days)       Procedure Component Value Units Date/Time    Blood Culture #1 **CANNOT BE ORDERED STAT** [[id number]] Collected: 12/10/22 2246    Order Status: Sent Specimen: Blood Updated: 12/10/22 2250    Blood Culture #2 **CANNOT BE ORDERED STAT** [[id number]] Collected: 12/10/22 2245    Order Status: Sent Specimen: Blood Updated: 12/10/22 2250             Other Results:    Radiology:  Imaging Results              X-Ray Chest PA And Lateral (Preliminary result)  Result time 12/10/22 22:33:47      ED Interpretation by Marvel Snowden MD (12/10/22 22:33:47, [facility], Emergency Medicine)    Positive for Left middle and lower lobe opacity.                                     Current Medications:     Infusions:   [START ON 12/11/2022] lactated ringers           Scheduled:   [START ON 12/11/2022] atorvastatin  40 mg Oral Daily    [START ON 12/11/2022] enoxaparin  40 mg Subcutaneous Daily    [START ON 12/11/2022] fluticasone propionate  1 spray Each Nostril Daily    [START ON 12/11/2022] gabapentin  300 mg Oral TID    [START ON 12/11/2022] lactated  ringers  1,000 mL Intravenous Once    [START ON 12/11/2022] lisinopriL  40 mg Oral Daily    [START ON 12/11/2022] metoprolol tartrate  25 mg Oral BID    [START ON 12/11/2022] NON FORMULARY MEDICATION 400 mg  400 mg Oral Daily    [START ON 12/11/2022] oseltamivir  75 mg Oral BID    [START ON 12/11/2022] pantoprazole  40 mg Oral Daily    [START ON 12/11/2022] piperacillin-tazobactam (ZOSYN) IVPB  4.5 g Intravenous Q8H         PRN:   [START ON 12/11/2022] albuterol-ipratropium    [START ON 12/11/2022] dextrose 10%    [START ON 12/11/2022] dextrose 10%    [START ON 12/11/2022] glucagon (human recombinant)    [START ON 12/11/2022] glucose    [START ON 12/11/2022] glucose    [START ON 12/11/2022] insulin aspart U-100    [START ON 12/11/2022] melatonin    [START ON 12/11/2022] sodium chloride 0.9%    [START ON 12/11/2022] vancomycin - pharmacy to dose        Antibiotics and Day Number of Therapy:  Antibiotics (From admission, onward)      Start     Stop Route Frequency Ordered    12/11/22 0033  vancomycin - pharmacy to dose  (vancomycin IVPB)        See Hyperspace for full Linked Orders Report.    -- IV pharmacy to manage frequency 12/10/22 2334    12/11/22 0030  piperacillin-tazobactam (ZOSYN) 4.5 gram/100 mL IVPB         -- IV Every 8 hours (non-standard times) 12/10/22 2324                 Assessment & Plan:     Influenza A Positive  Influenza Pneumonitis versus Post-Influenza PNA  -CURB-65 score 0  - SIRS 2/4 on admit, Febrile to 101 °F and 102 bpm  - Given 2 L NS boluses in ED  - Ordered 1 L LR bolus, thereafter LR cont rate of 125 mL/hr  - Blood cultures x2 pending  - Started on Vancomycin, Zosyn and Tamiflu  - SpO2 97% on RA during interview  - Lactic Acid 2.2    New Murmur  - Grade I systolic murmur heard at the inferior sternal border  - Patient denying hx of murmur  - Ordered TTE  - EKG pending    HTN  - Initial BP of 118/65  - Continue home meds: Lopressor 25 mg BID and Lisinopril 40 mg once daily  - UA  pending    T2DM  - Ordered A1c  - Ordered BHOB level  - Initiated SSI-L  - Patient taking Lispro 10 units and NPH insulin 20 units in the morning; Lispro 10 units and NPH Insulin 50 units nightly  - Holding Metformin, Glipizide, and home insulin regime at this time  - Patient followed by Grace Goddard DNP, for Endocrinology in [place]    HLD  - Ordered Lipid Panel  - Continue home med: Lipitor 40 mg once daily    CML  - Patient followed by Oncologist (Dr. Rupesh Cameron) in [place]  - Current home med: Nilotinib 400 mg BID; Medication is not available [facility] at [facility]  - Will discuss with patient's  about retrieving medication for in hospital use  - Per chart review, patient receiving Azacitidine infusions (last received on 12/6), 28 day cycles for MDS    Macrocytic Anemia  - H/H on admit 8.6/26.6, .5  - H/H on 12/5/22 was 9.7/28.6  - Continue to monitor      CODE STATUS: Full  Access: PIV  Antibiotics: Zosyn, Vanc and Tamiflu  Diet: Diabetic Cardiac diet  DVT Prophylaxis: Lovenox  GI Prophylaxis: Protonix  Fluids:  mL/hr      Disposition: Patient admitted on 12/10/22 for fever in setting of CML. Influenza A positive, concern for PNA pneumonitis versus post-influenza PNA. Started on Vanc, Zosyn and Tamiflu. Blood cultures x2, UA, EKG, TTE, BHOB pending. Further dispo planning pending.        Bobby Ventura MD  [facility], HO-1

## 2022-12-11 NOTE — PROGRESS NOTES
Pharmacokinetic Initial Assessment: IV Vancomycin    Assessment/Plan:    Initiate intravenous vancomycin with loading dose of 1500 mg once followed by a maintenance dose of vancomycin 1250mg IV every 12 hours  Desired empiric serum trough concentration is 15 to 20 mcg/mL  Draw vancomycin trough level 60 min prior to fourth dose on 12/12 at approximately 1200  Pharmacy will continue to follow and monitor vancomycin.      Please contact pharmacy at extension [phone] with any questions regarding this assessment.     Thank you for the consult,   Adriennejersey Aranda       Patient brief summary:  Fela Ellison is a 57 y.o. female initiated on antimicrobial therapy with IV Vancomycin for treatment of suspected bacteremia    Drug Allergies:   Review of patient's allergies indicates:  No Known Allergies    Actual Body Weight:   108 kg    Renal Function:   Estimated Creatinine Clearance: 88.1 mL/min (based on SCr of 0.85 mg/dL).,     Dialysis Method (if applicable):  N/A    CBC (last 72 hours):  Recent Labs   Lab Result Units 12/10/22  2144   WBC x10(3)/mcL 4.5   Hgb gm/dL 8.6*   Hct % 26.6*   Platelet x10(3)/mcL 75*   Mono % % 7.3   Eos % % 0.2   Basophil % % 0.4       Metabolic Panel (last 72 hours):  Recent Labs   Lab Result Units 12/10/22  2144   Sodium Level mmol/L 138   Potassium Level mmol/L 3.5   Chloride mmol/L 105   Carbon Dioxide mmol/L 21*   Glucose Level mg/dL 217*   Blood Urea Nitrogen mg/dL 12.4   Creatinine mg/dL 0.85   Albumin Level gm/dL 3.6   Bilirubin Total mg/dL 0.9   Alkaline Phosphatase unit/L 44   Aspartate Aminotransferase unit/L 26   Alanine Aminotransferase unit/L 17       Drug levels (last 3 results):  No results for input(s): VANCOMYCINRA, VANCORANDOM, VANCOMYCINPE, VANCOPEAK, VANCOMYCINTR, VANCOTROUGH in the last 72 hours.    Microbiologic Results:  Microbiology Results (last 7 days)       Procedure Component Value Units Date/Time    Blood Culture #1 **CANNOT BE ORDERED STAT** [[id number]] Collected:  12/10/22 2246    Order Status: Sent Specimen: Blood Updated: 12/10/22 2250    Blood Culture #2 **CANNOT BE ORDERED STAT** [[id number]] Collected: 12/10/22 2245    Order Status: Sent Specimen: Blood Updated: 12/10/22 2250

## 2022-12-11 NOTE — PLAN OF CARE
PGY2 ADDENDUM:      Patient was seen in conjunction with intern, agree with assessment and plan on initial IM H&P with included inclusions and addendum. Of note, the patient is a 57 y.o. female with PMH of T2DM, HTN and CML. She presented to [facility] ED on 12/10/2022 with a primary complaint of fever.  Patient currently on monoclonal antibody this is why she was told to report to emergency room for fever.  She denied any chest pain with the exception of pleuritic chest pain that is exerted by coughing.  On physical exam a grade 2 systolic murmur is noted likely flow murmur, lungs are clear to auscultation bilaterally there is no signs of edema.  Please see entire notes for significant lab workup in the ED.  Admitted patient to Internal Medicine for sepsis 2/2 influenza.    Assessment & Plan:     Sepsis 2/2 influenza  Systolic cardiac murmur  Hypertension  Type 2 diabetes  Hyperlipidemia  CML    -SIRS 2/4 on admission  -Initial Lactic acid 2.2, repeat ordered  -Received resuscitation fluids at 30 mL/kg within the first 3 hours  -LR @ 125 cc/hr  -Broad spectrum coverage with:  Vancomycin, Zosyn and Tamiflu  -CXR: performed in the ED:  Some concern for right-sided hilar adenopathy  -Blood cx x2, Echo, Gram stain, Lactate, Resp cx pending  -PO Zofran 4 mg every 4 hours as needed for nausea  -Levemir 50 units at night, initiated LDSSI  -continue home antihypertensives  -continue home Lipitor  -continue home monoclonal antibody    CODE STATUS: Full Code  Access:  PIV  Antimicrobials:  Vancomycin, Zosyn and Tamiflu  Diet: Diet diabetic Cardiac   DVT Prophylaxis: Lovenox ppx  GI Prophylaxis:  None  Fluids: LR @ 125 cc/hr    Disposition:  Will admit for close monitoring.    Eric Vigil MD  Internal Medicine - PGY-2

## 2022-12-11 NOTE — PLAN OF CARE
Patient had fever of 101.7 at 4 AM this morning, relieve appropriately with Tylenol. Patient's chemotherapy was corrected from Imatinib to Nilotinib 200 mg BID. Will continue treating patient for Influenza A with additionally broad spectrum coverage. Blood cultures x2 pending, sputum cultures pending, gram stain pending. TTE pending.    - BHOB 0.2 (wnl)  - A1c 8.6  - Started on Detemir 50 units nightly; continue - SSI-L  - Possible discharge tomorrow    Bobby Ventura MD  [facility], HO-1

## 2022-12-12 VITALS
OXYGEN SATURATION: 91 % | BODY MASS INDEX: 43.05 KG/M2 | TEMPERATURE: 98 F | DIASTOLIC BLOOD PRESSURE: 81 MMHG | WEIGHT: 243 LBS | SYSTOLIC BLOOD PRESSURE: 143 MMHG | RESPIRATION RATE: 20 BRPM | HEART RATE: 73 BPM | HEIGHT: 63 IN

## 2022-12-12 PROBLEM — R50.81 NEUTROPENIC FEVER: Status: ACTIVE | Noted: 2022-12-12

## 2022-12-12 PROBLEM — J10.1 INFLUENZA A: Status: ACTIVE | Noted: 2022-12-12

## 2022-12-12 PROBLEM — D70.9 NEUTROPENIC FEVER: Status: ACTIVE | Noted: 2022-12-12

## 2022-12-12 LAB
ALBUMIN SERPL-MCNC: 3.3 GM/DL (ref 3.5–5)
ALBUMIN/GLOB SERPL: 1.1 RATIO (ref 1.1–2)
ALP SERPL-CCNC: 44 UNIT/L (ref 40–150)
ALT SERPL-CCNC: 18 UNIT/L (ref 0–55)
AST SERPL-CCNC: 24 UNIT/L (ref 5–34)
AV INDEX (PROSTH): 0.8
AV MEAN GRADIENT: 7 MMHG
AV PEAK GRADIENT: 13 MMHG
AV VALVE AREA: 2.55 CM2
AV VELOCITY RATIO: 0.66
BASOPHILS # BLD AUTO: 0.01 X10(3)/MCL (ref 0–0.2)
BASOPHILS NFR BLD AUTO: 0.3 %
BILIRUBIN DIRECT+TOT PNL SERPL-MCNC: 0.7 MG/DL
BSA FOR ECHO PROCEDURE: 2.21 M2
BUN SERPL-MCNC: 9.2 MG/DL (ref 9.8–20.1)
CALCIUM SERPL-MCNC: 8.6 MG/DL (ref 8.4–10.2)
CHLORIDE SERPL-SCNC: 111 MMOL/L (ref 98–107)
CO2 SERPL-SCNC: 22 MMOL/L (ref 22–29)
CREAT SERPL-MCNC: 0.85 MG/DL (ref 0.55–1.02)
CV ECHO LV RWT: 0.57 CM
DOP CALC AO PEAK VEL: 1.78 M/S
DOP CALC AO VTI: 36.8 CM
DOP CALC LVOT AREA: 3.2 CM2
DOP CALC LVOT DIAMETER: 2.02 CM
DOP CALC LVOT PEAK VEL: 1.18 M/S
DOP CALC LVOT STROKE VOLUME: 93.85 CM3
DOP CALC MV VTI: 32.8 CM
DOP CALCLVOT PEAK VEL VTI: 29.3 CM
E WAVE DECELERATION TIME: 253.22 MSEC
E/A RATIO: 0.92
ECHO LV POSTERIOR WALL: 1.2 CM (ref 0.6–1.1)
EJECTION FRACTION: 70 %
EOSINOPHIL # BLD AUTO: 0.02 X10(3)/MCL (ref 0–0.9)
EOSINOPHIL NFR BLD AUTO: 0.7 %
ERYTHROCYTE [DISTWIDTH] IN BLOOD BY AUTOMATED COUNT: 19.4 % (ref 11.5–17)
FRACTIONAL SHORTENING: 42 % (ref 28–44)
GFR SERPLBLD CREATININE-BSD FMLA CKD-EPI: >60 MLS/MIN/1.73/M2
GLOBULIN SER-MCNC: 2.9 GM/DL (ref 2.4–3.5)
GLUCOSE SERPL-MCNC: 326 MG/DL (ref 74–100)
GRAM STN SPEC: NORMAL
HCT VFR BLD AUTO: 26.5 % (ref 37–47)
HGB BLD-MCNC: 8.3 GM/DL (ref 12–16)
HR MV ECHO: 70 BPM
IMM GRANULOCYTES # BLD AUTO: 0.02 X10(3)/MCL (ref 0–0.04)
IMM GRANULOCYTES NFR BLD AUTO: 0.7 %
INTERVENTRICULAR SEPTUM: 1.31 CM (ref 0.6–1.1)
LEFT ATRIUM SIZE: 3.17 CM
LEFT INTERNAL DIMENSION IN SYSTOLE: 2.46 CM (ref 2.1–4)
LEFT VENTRICLE DIASTOLIC VOLUME INDEX: 37.67 ML/M2
LEFT VENTRICLE DIASTOLIC VOLUME: 79.11 ML
LEFT VENTRICLE MASS INDEX: 91 G/M2
LEFT VENTRICLE SYSTOLIC VOLUME INDEX: 10.2 ML/M2
LEFT VENTRICLE SYSTOLIC VOLUME: 21.45 ML
LEFT VENTRICULAR INTERNAL DIMENSION IN DIASTOLE: 4.21 CM (ref 3.5–6)
LEFT VENTRICULAR MASS: 191 G
LV LATERAL E/E' RATIO: 13.5 M/S
LVOT MG: 3.12 MMHG
LVOT MV: 0.82 CM/S
LYMPHOCYTES # BLD AUTO: 1.48 X10(3)/MCL (ref 0.6–4.6)
LYMPHOCYTES NFR BLD AUTO: 48.5 %
MCH RBC QN AUTO: 33.5 PG (ref 27–31)
MCHC RBC AUTO-ENTMCNC: 31.3 MG/DL (ref 33–36)
MCV RBC AUTO: 106.9 FL (ref 80–94)
MONOCYTES # BLD AUTO: 0.25 X10(3)/MCL (ref 0.1–1.3)
MONOCYTES NFR BLD AUTO: 8.2 %
MV MEAN GRADIENT: 2 MMHG
MV PEAK A VEL: 1.17 M/S
MV PEAK E VEL: 1.08 M/S
MV PEAK GRADIENT: 3 MMHG
MV STENOSIS PRESSURE HALF TIME: 73.43 MS
MV VALVE AREA BY CONTINUITY EQUATION: 2.86 CM2
MV VALVE AREA P 1/2 METHOD: 3 CM2
NEUTROPHILS # BLD AUTO: 1.3 X10(3)/MCL (ref 2.1–9.2)
NEUTROPHILS NFR BLD AUTO: 41.6 %
NRBC BLD AUTO-RTO: 1 %
PISA MRMAX VEL: 4.98 M/S
PISA TR MAX VEL: 2.64 M/S
PLATELET # BLD AUTO: 52 X10(3)/MCL (ref 130–400)
PLATELETS.RETICULATED NFR BLD AUTO: 3.3 % (ref 0.9–11.2)
PMV BLD AUTO: 11.2 FL (ref 7.4–10.4)
POCT GLUCOSE: 263 MG/DL (ref 70–110)
POCT GLUCOSE: 313 MG/DL (ref 70–110)
POTASSIUM SERPL-SCNC: 3.9 MMOL/L (ref 3.5–5.1)
PROT SERPL-MCNC: 6.2 GM/DL (ref 6.4–8.3)
RBC # BLD AUTO: 2.48 X10(6)/MCL (ref 4.2–5.4)
SODIUM SERPL-SCNC: 144 MMOL/L (ref 136–145)
TDI LATERAL: 0.08 M/S
TR MAX PG: 28 MMHG
TRICUSPID ANNULAR PLANE SYSTOLIC EXCURSION: 2.45 CM
VANCOMYCIN TROUGH SERPL-MCNC: 6.9 UG/ML (ref 15–20)
WBC # SPEC AUTO: 3.1 X10(3)/MCL (ref 4.5–11.5)

## 2022-12-12 PROCEDURE — 25500020 PHARM REV CODE 255: Performed by: INTERNAL MEDICINE

## 2022-12-12 PROCEDURE — 63600175 PHARM REV CODE 636 W HCPCS

## 2022-12-12 PROCEDURE — 25000003 PHARM REV CODE 250

## 2022-12-12 PROCEDURE — 99900035 HC TECH TIME PER 15 MIN (STAT)

## 2022-12-12 PROCEDURE — 80202 ASSAY OF VANCOMYCIN: CPT

## 2022-12-12 PROCEDURE — 93005 ELECTROCARDIOGRAM TRACING: CPT

## 2022-12-12 PROCEDURE — 94761 N-INVAS EAR/PLS OXIMETRY MLT: CPT

## 2022-12-12 PROCEDURE — 80053 COMPREHEN METABOLIC PANEL: CPT

## 2022-12-12 PROCEDURE — 36415 COLL VENOUS BLD VENIPUNCTURE: CPT

## 2022-12-12 PROCEDURE — 85025 COMPLETE CBC W/AUTO DIFF WBC: CPT

## 2022-12-12 RX ORDER — AZITHROMYCIN 250 MG/1
TABLET, FILM COATED ORAL
Qty: 6 TABLET | Refills: 0 | Status: SHIPPED | OUTPATIENT
Start: 2022-12-12 | End: 2022-12-12 | Stop reason: HOSPADM

## 2022-12-12 RX ORDER — PANTOPRAZOLE SODIUM 40 MG/1
40 TABLET, DELAYED RELEASE ORAL DAILY
Qty: 30 TABLET | Refills: 11 | Status: ON HOLD | OUTPATIENT
Start: 2022-12-13 | End: 2023-03-19 | Stop reason: HOSPADM

## 2022-12-12 RX ORDER — ALLOPURINOL 300 MG/1
300 TABLET ORAL DAILY
Qty: 30 TABLET | Refills: 0
Start: 2022-12-12 | End: 2023-01-11

## 2022-12-12 RX ORDER — OSELTAMIVIR PHOSPHATE 75 MG/1
75 CAPSULE ORAL 2 TIMES DAILY
Qty: 18 CAPSULE | Refills: 0 | Status: SHIPPED | OUTPATIENT
Start: 2022-12-12 | End: 2022-12-21

## 2022-12-12 RX ORDER — INSULIN GLARGINE 100 [IU]/ML
60 INJECTION, SOLUTION SUBCUTANEOUS NIGHTLY
Qty: 30 EACH | Refills: 6 | Status: ON HOLD | OUTPATIENT
Start: 2022-12-12 | End: 2023-06-02 | Stop reason: ALTCHOICE

## 2022-12-12 RX ORDER — INSULIN ASPART 100 [IU]/ML
10 INJECTION, SOLUTION INTRAVENOUS; SUBCUTANEOUS
Status: DISCONTINUED | OUTPATIENT
Start: 2022-12-12 | End: 2022-12-12 | Stop reason: HOSPADM

## 2022-12-12 RX ORDER — LEVOFLOXACIN 500 MG/1
500 TABLET, FILM COATED ORAL DAILY
Qty: 30 TABLET | Status: ON HOLD
Start: 2022-12-12 | End: 2023-06-02 | Stop reason: ALTCHOICE

## 2022-12-12 RX ORDER — GABAPENTIN 300 MG/1
300 CAPSULE ORAL 3 TIMES DAILY
Qty: 90 CAPSULE | Refills: 11 | Status: SHIPPED | OUTPATIENT
Start: 2022-12-12 | End: 2023-09-20 | Stop reason: DRUGHIGH

## 2022-12-12 RX ORDER — LOSARTAN POTASSIUM 25 MG/1
25 TABLET ORAL DAILY
Qty: 90 TABLET | Refills: 3 | Status: ON HOLD
Start: 2022-12-12 | End: 2024-04-01 | Stop reason: HOSPADM

## 2022-12-12 RX ORDER — LISINOPRIL 40 MG/1
40 TABLET ORAL DAILY
Qty: 90 TABLET | Refills: 3 | Status: SHIPPED | OUTPATIENT
Start: 2022-12-13 | End: 2022-12-12 | Stop reason: HOSPADM

## 2022-12-12 RX ORDER — INSULIN LISPRO 100 [IU]/ML
15 INJECTION, SOLUTION INTRAVENOUS; SUBCUTANEOUS
Qty: 12 EACH | Refills: 6 | Status: ON HOLD | OUTPATIENT
Start: 2022-12-12 | End: 2023-08-07 | Stop reason: SDUPTHER

## 2022-12-12 RX ADMIN — INSULIN ASPART 3 UNITS: 100 INJECTION, SOLUTION INTRAVENOUS; SUBCUTANEOUS at 08:12

## 2022-12-12 RX ADMIN — PANTOPRAZOLE SODIUM 40 MG: 40 TABLET, DELAYED RELEASE ORAL at 08:12

## 2022-12-12 RX ADMIN — PIPERACILLIN AND TAZOBACTAM 4.5 G: 4; .5 INJECTION, POWDER, LYOPHILIZED, FOR SOLUTION INTRAVENOUS; PARENTERAL at 12:12

## 2022-12-12 RX ADMIN — PIPERACILLIN AND TAZOBACTAM 4.5 G: 4; .5 INJECTION, POWDER, LYOPHILIZED, FOR SOLUTION INTRAVENOUS; PARENTERAL at 08:12

## 2022-12-12 RX ADMIN — METOPROLOL TARTRATE 25 MG: 25 TABLET, FILM COATED ORAL at 08:12

## 2022-12-12 RX ADMIN — INSULIN ASPART 10 UNITS: 100 INJECTION, SOLUTION INTRAVENOUS; SUBCUTANEOUS at 01:12

## 2022-12-12 RX ADMIN — INSULIN ASPART 4 UNITS: 100 INJECTION, SOLUTION INTRAVENOUS; SUBCUTANEOUS at 01:12

## 2022-12-12 RX ADMIN — VANCOMYCIN HYDROCHLORIDE 1500 MG: 1 INJECTION, POWDER, LYOPHILIZED, FOR SOLUTION INTRAVENOUS at 01:12

## 2022-12-12 RX ADMIN — SODIUM CHLORIDE, POTASSIUM CHLORIDE, SODIUM LACTATE AND CALCIUM CHLORIDE: 600; 310; 30; 20 INJECTION, SOLUTION INTRAVENOUS at 05:12

## 2022-12-12 RX ADMIN — HUMAN ALBUMIN MICROSPHERES AND PERFLUTREN 0.66 MG: 10; .22 INJECTION, SOLUTION INTRAVENOUS at 01:12

## 2022-12-12 RX ADMIN — LISINOPRIL 40 MG: 10 TABLET ORAL at 08:12

## 2022-12-12 RX ADMIN — GABAPENTIN 300 MG: 300 CAPSULE ORAL at 08:12

## 2022-12-12 RX ADMIN — OSELTAMIVIR PHOSPHATE 75 MG: 75 CAPSULE ORAL at 08:12

## 2022-12-12 RX ADMIN — ATORVASTATIN CALCIUM 40 MG: 40 TABLET, FILM COATED ORAL at 08:12

## 2022-12-12 RX ADMIN — VANCOMYCIN HYDROCHLORIDE 1250 MG: 1.25 INJECTION, POWDER, LYOPHILIZED, FOR SOLUTION INTRAVENOUS at 12:12

## 2022-12-12 NOTE — PROGRESS NOTES
"Inpatient Nutrition Evaluation    Admit Date: 12/10/2022   Total duration of encounter: 2 days    Nutrition Recommendation/Prescription     Continue Diabetic, Cardiac diet  Monitor Weights Weekly     Nutrition Assessment     Chart Review    Reason Seen: continuous nutrition monitoring    Diagnosis:  Sepsis d/t influenza, Systolic Cardiac Murmur, HTN, DM, HLD, CML    Relevant Medical History: DM, HTN, CML    Nutrition-Related Medications: LR @ 125 ml/hr, Atorvastatin, Insulin, Lisinopril, Protonix, Vanc, Zosyn    Nutrition-Related Labs:  22 -- Glu 326 H, K 3.9, BUN 9.2, Cr 0.85, Hgb A1C 8.6    Diet Order: Diet diabetic Cardiac  Oral Supplement Order: none  Appetite/Oral Intake: good/% of meals  Factors Affecting Nutritional Intake: none identified  Food/Mu-ism/Cultural Preferences: none reported  Food Allergies: none reported       Wound(s):   skin intact     Comments    22 -- Pt reports good appetite; no n/v; LBM reported on 12/10; no indication of wt loss per EMR wt hx    Anthropometrics    Height: 5' 3" (160 cm) Height Method: Stated  Last Weight: 110.2 kg (243 lb) (22 0019) Weight Method: Bed Scale  BMI (Calculated): 43.1  BMI Classification: obese grade III (BMI >/=40)     Ideal Body Weight (IBW), Female: 115 lb     % Ideal Body Weight, Female (lb): 211.3 %                    Usual Body Weight (UBW), k kg  % Usual Body Weight: 102.27     Usual Weight Provided By: EMR weight history    Wt Readings from Last 5 Encounters:   22 110.2 kg (243 lb)   10/18/22 108.9 kg (239 lb)   22 107 kg (235 lb)     Weight Change(s) Since Admission:  Admit Weight: 108 kg (238 lb) (12/10/22 2025)      Patient Education    Not applicable.    Monitoring & Evaluation     Dietitian will monitor food and beverage intake, weight change, glucose/endocrine profile, and gastrointestinal profile.  Nutrition Risk/Follow-Up: low (follow-up in 5-7 days)  Patients assigned 'low nutrition risk' status do " not qualify for a full nutritional assessment but will be monitored and re-evaluated in a 5-7 day time period. Please consult if re-evaluation needed sooner.

## 2022-12-12 NOTE — DISCHARGE SUMMARY
[facility] Discharge Summary    Admitting Physician: Blake Ortiz MD  Attending Physician: Blake Ortiz MD  Date of Admit: 12/10/2022  Date of Discharge: 12/12/2022    Condition: Stable  Outcome: Condition has improved and patient is now ready for discharge.  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    Diabetes mellitus    Severe obesity (BMI >= 40)    NAFLD (nonalcoholic fatty liver disease)    Hypertension    Tobacco user    Hypercholesterolemia    Hyperuricemia    Hypokalemia    Hepatosplenomegaly    Other headache syndrome    Nausea & vomiting    Cough    Fever    Hyperleukocytosis    Neutropenic fever    Influenza A       Principal Problem:  Neutropenic Fever, Influenza A Infection    Consultants and Procedures:     Consultants:  IP CONSULT TO INTERNAL MEDICINE  PHARMACY TO DOSE VANCOMYCIN CONSULT  IP CONSULT TO SOCIAL WORK/CASE MANAGEMENT       Brief Admission History:      Mrs. Fela Ellison is a 57 y.o. female with a PMHx of T2DM, HTN and CML who presents to [facility]'s ED on 12/10/22 with a fever and cough w/ sputum production in setting of CML. Patient states she began having a headache, sore throat and cough yesterday night when going to sleep. When she awoke this morning, her cough had progressed to a cough with brownish-sputum production, in addition to new onset fever and chest pain with coughing. Patient came to [facility] because she was told if she runs fever while receiving therapy for CML, to immediately go to the ED. Patient follows with Oncologist in [place] for management of her CML. She denies SOB at rest, but endorses STRICKLAND since this morning. Significant ED workup includes: 101 °F, 102 bpm, RR 17, /65; H/H 8.6/26.6, CO2 21, Glucose 217, Lactate 2.2, Cardiac enzymes wnl and Influenza A Positive (COVID & Strep A negative).    Hospital Course with  "Pertinent Findings:      CXR on admission was notable for possible interstitial opacity concerning for atypical process. Patient was started on broad spectrum antibiotics and oseltamivir, as well as fluid resuscitation. Sugars mildly elevated, a1c noted to be 8.6% and insulin increased/ Patient was seen this AM improved on SOB, VSS with no fever.   TASHIA noted to have mild to moderate mitral regurgitation with an EF of 70%. Blood cultures on no growth day 1. Patient thus discharged to continue outpatient medications covering for atypicals, to complete oseltamivir for 9 more days. Will follow up with PCP at scheduled appointment.    Discharge physical exam:  Vitals  BP: (!) 143/81  Temp: 97.7 °F (36.5 °C)  Temp src: Oral  Pulse: 73  Resp: 20  SpO2: (!) 91 %  Height: 5' 3" (160 cm)  Weight: 110.2 kg (243 lb)    Physical Exam  Constitutional:       General: She is not in acute distress.     Appearance: Normal appearance. She is obese. She is not ill-appearing.   HENT:      Head: Normocephalic and atraumatic.      Nose: Nose normal.      Mouth/Throat:      Mouth: Mucous membranes are dry.      Pharynx: Oropharynx is clear.   Eyes:      Extraocular Movements: Extraocular movements intact.      Conjunctiva/sclera: Conjunctivae normal.      Pupils: Pupils are equal, round, and reactive to light.   Cardiovascular:      Rate and Rhythm: Normal rate and regular rhythm.      Pulses: Normal pulses.      Heart sounds: Normal heart sounds.   Pulmonary:      Effort: Pulmonary effort is normal.      Breath sounds: Wheezing present.   Abdominal:      General: Abdomen is flat. Bowel sounds are normal.      Palpations: Abdomen is soft.   Musculoskeletal:         General: No swelling. Normal range of motion.   Skin:     General: Skin is warm.      Capillary Refill: Capillary refill takes less than 2 seconds.   Neurological:      General: No focal deficit present.      Mental Status: She is alert.   Psychiatric:         Mood and Affect: " Mood normal.         Behavior: Behavior normal.         TIME SPENT ON DISCHARGE: 35 minutes    Discharge Medications:         Medication List        START taking these medications      oseltamivir 75 MG capsule  Commonly known as: TAMIFLU  Take 1 capsule (75 mg total) by mouth 2 (two) times daily. for 9 days     pantoprazole 40 MG tablet  Commonly known as: PROTONIX  Take 1 tablet (40 mg total) by mouth once daily.  Start taking on: December 13, 2022  Replaces: omeprazole 20 MG capsule            CHANGE how you take these medications      gabapentin 300 MG capsule  Commonly known as: NEURONTIN  Take 1 capsule (300 mg total) by mouth 3 (three) times daily.  What changed: when to take this     insulin glargine 100 unit/mL injection  Commonly known as: Lantus  Inject 60 Units into the skin nightly.  What changed: how much to take     insulin lispro 100 unit/mL injection  Inject 15 Units into the skin 3 (three) times daily before meals.  What changed: how much to take     levoFLOXacin 500 MG tablet  Commonly known as: LEVAQUIN  Take 1 tablet (500 mg total) by mouth once daily.  What changed:   medication strength  how much to take            CONTINUE taking these medications      acyclovir 400 MG tablet  Commonly known as: ZOVIRAX     allopurinoL 300 MG tablet  Commonly known as: ZYLOPRIM  Take 1 tablet (300 mg total) by mouth once daily.     amLODIPine 10 MG tablet  Commonly known as: NORVASC     atorvastatin 40 MG tablet  Commonly known as: LIPITOR     fluconazole 200 MG Tab  Commonly known as: DIFLUCAN     furosemide 20 MG tablet  Commonly known as: LASIX     HYDROcodone-acetaminophen 5-325 mg per tablet  Commonly known as: NORCO     imatinib 400 MG Tab  Commonly known as: GLEEVEC  Take 1 tablet (400 mg total) by mouth once daily.     losartan 25 MG tablet  Commonly known as: COZAAR  Take 1 tablet (25 mg total) by mouth once daily.     metFORMIN 1000 MG tablet  Commonly known as: GLUCOPHAGE     metoprolol tartrate 25  MG tablet  Commonly known as: LOPRESSOR     montelukast 10 mg tablet  Commonly known as: SINGULAIR     OLANZapine 10 MG tablet  Commonly known as: ZyPREXA     ondansetron 8 MG tablet  Commonly known as: ZOFRAN     VENTOLIN HFA 90 mcg/actuation inhaler  Generic drug: albuterol            STOP taking these medications      omeprazole 20 MG capsule  Commonly known as: PRILOSEC  Replaced by: pantoprazole 40 MG tablet               Where to Get Your Medications        These medications were sent to [address] [address]      Phone: [phone]   gabapentin 300 MG capsule  insulin glargine 100 unit/mL injection  insulin lispro 100 unit/mL injection  oseltamivir 75 MG capsule  pantoprazole 40 MG tablet       Information about where to get these medications is not yet available    Ask your nurse or doctor about these medications  allopurinoL 300 MG tablet  levoFLOXacin 500 MG tablet  losartan 25 MG tablet         Discharge Instructions:         Fela Ellison is being discharged Home or Self Care.      Follow-Up Appointments:   Follow-up Information       Primary Doctor No. Go to.    Why: Scheduled Appointment             [facility] - Emergency Dept Follow up.    Specialty: Emergency Medicine  Why: If symptoms worsen  Contact information:  [facility] [address] [phone]                             To address at follow-up:      At this time, Fela Ellison is determined to have maximized benefits of IP hospitalization. she is discharged in stable condition with OP f/u recommendations and instructions. All questions answered, and family verbalized agreement with the POC. They were given return precautions prior to d/c including symptoms that should prompt return to ED or to call PCP. Total time spent of DC of 35 minutes.       Rupesh Thomas MD  [facility] Internal Medicine PGY-1

## 2022-12-12 NOTE — PROGRESS NOTES
Pharmacokinetic Assessment Follow Up: IV Vancomycin    Vancomycin serum concentration assessment(s):    The trough level was drawn correctly and can be used to guide therapy at this time. The measurement is below the desired definitive target range of 15 to 20 mcg/mL.    Vancomycin Regimen Plan:    Change regimen to Vancomycin 1500 mg IV every 12 hours with next serum trough concentration measured at 0000 prior to 4th dose on 12/14/2022    Drug levels (last 3 results):  Recent Labs   Lab Result Units 12/12/22  1154   Vancomycin Trough ug/ml 6.9*       Pharmacy will continue to follow and monitor vancomycin.    Please contact pharmacy at extension [phone] for questions regarding this assessment.    Thank you for the consult,   Lorrie Luna       Patient brief summary:  Fela Ellison is a 57 y.o. female initiated on antimicrobial therapy with IV Vancomycin for treatment of bacteremia    The patient's current regimen is 1500 mg IV q12h    Drug Allergies:   Review of patient's allergies indicates:  No Known Allergies    Actual Body Weight:   110.2 kg    Renal Function:   Estimated Creatinine Clearance: 89.1 mL/min (based on SCr of 0.85 mg/dL).,     Dialysis Method (if applicable):  N/A    CBC (last 72 hours):  Recent Labs   Lab Result Units 12/10/22  2144 12/10/22  2350 12/11/22 0410 12/12/22  0326   WBC x10(3)/mcL 4.5  --  3.2* 3.1*   Hgb gm/dL 8.6*  --  7.9* 8.3*   Hemoglobin A1c %  --  8.6*  --   --    Hct % 26.6*  --  24.7* 26.5*   Platelet x10(3)/mcL 75*  --  62* 52*   Mono % % 7.3  --  7.7 8.2   Eos % % 0.2  --  0.3 0.7   Basophil % % 0.4  --  0.3 0.3       Metabolic Panel (last 72 hours):  Recent Labs   Lab Result Units 12/10/22  2144 12/11/22  0027 12/11/22 0410 12/12/22  0326   Sodium Level mmol/L 138  --  140 144   Potassium Level mmol/L 3.5  --  3.5 3.9   Chloride mmol/L 105  --  108* 111*   Carbon Dioxide mmol/L 21*  --  21* 22   Glucose Level mg/dL 217*  --  260* 326*   Glucose, UA mg/dL  --  Trace*  --   --     Blood Urea Nitrogen mg/dL 12.4  --  11.6 9.2*   Creatinine mg/dL 0.85  --  0.88 0.85   Albumin Level gm/dL 3.6  --  3.1* 3.3*   Bilirubin Total mg/dL 0.9  --  0.7 0.7   Alkaline Phosphatase unit/L 44  --  39* 44   Aspartate Aminotransferase unit/L 26  --  23 24   Alanine Aminotransferase unit/L 17  --  15 18       Vancomycin Administrations:  vancomycin given in the last 96 hours                     vancomycin (VANCOCIN) 1,250 mg in sodium chloride 0.9% 250 mL IVPB (mg) 1,250 mg New Bag 12/12/22 0041     1,250 mg New Bag 12/11/22 1304    vancomycin (VANCOCIN) 1,500 mg in sodium chloride 0.9% 250 mL IVPB (mg) 1,500 mg New Bag 12/11/22 0054                    Microbiologic Results:  Microbiology Results (last 7 days)       Procedure Component Value Units Date/Time    Blood Culture #1 **CANNOT BE ORDERED STAT** [[id number]]  (Normal) Collected: 12/10/22 2246    Order Status: Completed Specimen: Blood Updated: 12/12/22 1100     CULTURE, BLOOD ([facility]) No Growth At 24 Hours    Blood Culture #2 **CANNOT BE ORDERED STAT** [[id number]]  (Normal) Collected: 12/10/22 2245    Order Status: Completed Specimen: Blood Updated: 12/12/22 1100     CULTURE, BLOOD ([facility]) No Growth At 24 Hours    Respiratory Culture [[id number]] Collected: 12/11/22 1137    Order Status: Completed Specimen: Sputum, Expectorated Updated: 12/12/22 0832     Respiratory Culture Rare normal respiratory chad    Gram Stain [[id number]] Collected: 12/11/22 1137    Order Status: Completed Specimen: Sputum, Expectorated Updated: 12/12/22 0737     GRAM STAIN Quality 3+      Few Gram Negative Rods      Rare Gram positive cocci

## 2022-12-13 ENCOUNTER — PATIENT OUTREACH (OUTPATIENT)
Dept: ADMINISTRATIVE | Facility: CLINIC | Age: 57
End: 2022-12-13

## 2022-12-13 LAB — BACTERIA SPEC CULT: NORMAL

## 2022-12-13 NOTE — PROGRESS NOTES
C3 nurse spoke with Fela Elliosn and patient's  via interpreterTayari [phone] for a TCC post hospital discharge follow up call. The patient has a scheduled HOSFU appointment with Dr. Walters on 12/22/2022. Chemo appointment 01/09/2023.       Patient stated taking;  Tasigna (nilotinib)200 mg twice a day

## 2022-12-14 NOTE — PHYSICIAN QUERY
PT Name: Fela Ellison  MR #: [id number]     DOCUMENTATION CLARIFICATION     CDS: Angélica Romero RN, CCDS   Contact Information: [email or web address]    This form is a permanent document in the medical record.    Query Date: December 14, 2022    By submitting this query, we are merely seeking further clarification of documentation.  Please utilize your independent clinical judgment when addressing the question(s) below.  The Medical Record contains the following:   Indicators   Supporting Clinical Findings Location in Medical Record    x Pneumonia documented  Influenza A with pneumonia     Influenza Pneumonitis versus Post-Influenza PNA   12/10 ED, Dr. Snowden     12/11 H&P, Dr. Ventura / Dr. Ortiz    x Chest X-Ray/CT Scan  12/10 CXR: There are hazy interstitial opacities, most pronounced in the left perihilar lung zone.    Interstitial opacities may be related to atypical infection or interstitial edema.  Radiology    PaO2      PaCO2       O2 sat      x WBC      12/10/22 21:44 12/11/22 04:10 12/12/22 03:26   WBC 4.5 3.2 (L) 3.1 (L)        Labs    x Vital Signs  Vital Signs (24h Range):   Temp:  [101 °F (38.3 °C)] 101 °F (38.3 °C)   Pulse:  [102] 102   Resp:  [17] 17   SpO2:  [94 %] 94 %   BP: (118)/(65) 118/65  12/11 H&P, Dr. Ventura / Dr. Ortiz    x Cultures   12/11 Respiratory Culture: Normal respiratory chad   Microbiology    Treatment   NS 100mL/hr continuous infusion   Acetaminophen 1g PO x1   Acetaminophen 650mg PO Q6H PRN x2   LR 1L Bolus x1   LR 125mL/hr continuous infusion    Tamiflu 75mg PO Daily   Zosyn 4.5g IV Q8H    NS 1L Bolus x1   Vancomycin 1.25g IV Q12H   Vancomycin 1.5g IV Q12H x2  12/10 MAR   12/10 MAR   12/11 MAR   12/11 MAR   12/11 MAR   12/10-12/12 MAR   12/11-12/12 MAR   12/10 MAR   12/11-12/12 MAR   12/11 MAR    Supplemental O2      Dysphagia/Swallow study      x Other  57 y.o. female with a PMHx of T2DM, HTN and CML who presents to [facility]'s ED on 12/10/22 with a fever and cough  w/ sputum production in setting of CML.   Influenza A Positive   Influenza Pneumonitis versus Post-Influenza PNA   - SIRS 2/4 on admit, Febrile to 101 °F and 102 bpm     12/10/22 20:56   Influenza A, Molecular Detected !     12/11 H&P, Dr. Ventura / Dr. Ortiz             Labs     Provider, please clarify the final determination of the diagnosis of Influenza A Pneumonia:    [ x  ]  Influenza A Pneumonia ruled in   [   ]  Influenza A Pneumonia ruled out   [   ]  Influenza A Pneumonia unable to be ruled out at the time of discharge   [   ]  Other clarification (please specify): _________   [  ]  Clinically undetermined     Please document in your progress notes daily for the duration of treatment, until resolved, and include in your discharge summary.     Form No. [id number]

## 2022-12-14 NOTE — PHYSICIAN QUERY
PT Name: Fela Ellison  MR #: [id number]     DOCUMENTATION CLARIFICATION     CDS: Angélica Romero RN, CCDS   Contact Information: [email or web address]    This form is a permanent document in the medical record.     Query Date: December 14, 2022    By submitting this query, we are merely seeking further clarification of documentation.  Please utilize your independent clinical judgment when addressing the question(s) below.  The Medical Record contains the following:  Indicators Supporting Clinical Findings Location in Medical Record   x HR         RR          BP        Temp Vital Signs (24h Range):  Temp:  [101 °F (38.3 °C)] 101 °F (38.3 °C)  Pulse:  [102] 102  Resp:  [17] 17  SpO2:  [94 %] 94 %  BP: (118)/(65) 118/65 12/11 H&P, Dr. Ventura / Dr. Ortiz   x Lactic Acid          Procalcitonin  12/10/22 22:45 12/11/22 00:47   Lactate, Gregory 2.2 1.9    Labs   x WBC           Bands          CRP     12/10/22 21:44 12/11/22 04:10 12/12/22 03:26   WBC 4.5 3.2 (L) 3.1 (L)    Labs   x Culture(s) 12/10 Blood Culture: Preliminary, No growth at 72 hours  12/11 Respiratory Culture: Normal respiratory chad  Microbiology    AMS, Confusion, LOC, etc.      Organ Dysfunction/Failure     x Bacteremia or Sepsis / Septic Sepsis 2/2 influenza  SIRS 2/4 on admission 12/11  Plan of Care, Dr. Vigil   x Known or Suspected Source of Infection documented Influenza A Positive  Influenza Pneumonitis versus Post-Influenza PNA 12/11 H&P, Dr. Ventura / Dr. Ortiz    (Failed) Outpatient Treatment      Medication     x Treatment NS 100mL/hr continuous infusion  Acetaminophen 1g PO x1  Acetaminophen 650mg PO Q6H PRN x2  LR 1L Bolus x1  LR 125mL/hr continuous infusion   Tamiflu 75mg PO Daily  Zosyn 4.5g IV Q8H  NS 1L Bolus x1  Vancomycin 1.25g IV Q12H  Vancomycin 1.5g IV Q12H x2 12/10 MAR  12/10 MAR  12/11 MAR  12/11 MAR  12/11 MAR  12/10-12/12 MAR  12/11-12/12 MAR  12/10 MAR  12/11-12/12 MAR  12/11 MAR   x Other 57 y.o. female with  a PMHx of T2DM, HTN and CML who presents to [facility]'s ED on 12/10/22 with a fever and cough w/ sputum production in setting of CML.  Influenza A Positive  Influenza Pneumonitis versus Post-Influenza PNA  - SIRS 2/4 on admit, Febrile to 101 °F and 102 bpm      12/10/22 20:56   Influenza A, Molecular Detected !    12/11 H&P, Dr. Ventura / Dr. Ortiz            Labs        Provider, please clarify the final determination of the diagnosis of Sepsis.    [  x ]  Sepsis Influenza A Ruled In   [   ]  Sepsis Influenza A Ruled Out   [   ]  Sepsis Influenza A unable to be ruled out at the time of discharge   [   ]  Other clarification (please specify): __________   [  ]  Clinically Undetermined       Please document in your progress notes daily for the duration of treatment until resolved and include in your discharge summary.

## 2022-12-16 LAB
BACTERIA BLD CULT: NORMAL
BACTERIA BLD CULT: NORMAL

## 2023-03-17 ENCOUNTER — HOSPITAL ENCOUNTER (EMERGENCY)
Facility: HOSPITAL | Age: 58
Discharge: ANOTHER HOSPITAL | End: 2023-03-18
Attending: FAMILY MEDICINE

## 2023-03-17 DIAGNOSIS — C92.10 BLAST CRISIS PHASE OF CHRONIC MYELOID LEUKEMIA: Primary | ICD-10-CM

## 2023-03-17 DIAGNOSIS — D64.9 ANEMIA, UNSPECIFIED TYPE: ICD-10-CM

## 2023-03-17 DIAGNOSIS — C92.10 CML (CHRONIC MYELOCYTIC LEUKEMIA): ICD-10-CM

## 2023-03-17 LAB
ABO + RH BLD: NORMAL
ABS NEUT CALC (OHS): 0.52 X10(3)/MCL (ref 2.1–9.2)
ALBUMIN SERPL-MCNC: 2.9 G/DL (ref 3.5–5)
ALBUMIN/GLOB SERPL: 0.9 RATIO (ref 1.1–2)
ALP SERPL-CCNC: 65 UNIT/L (ref 40–150)
ALT SERPL-CCNC: 15 UNIT/L (ref 0–55)
ANISOCYTOSIS BLD QL SMEAR: ABNORMAL
AST SERPL-CCNC: 10 UNIT/L (ref 5–34)
BILIRUBIN DIRECT+TOT PNL SERPL-MCNC: 0.6 MG/DL
BLD PROD TYP BPU: NORMAL
BLOOD UNIT EXPIRATION DATE: NORMAL
BLOOD UNIT TYPE CODE: 7300
BUN SERPL-MCNC: 12.1 MG/DL (ref 9.8–20.1)
CALCIUM SERPL-MCNC: 9.3 MG/DL (ref 8.4–10.2)
CHLORIDE SERPL-SCNC: 105 MMOL/L (ref 98–107)
CO2 SERPL-SCNC: 25 MMOL/L (ref 22–29)
CREAT SERPL-MCNC: 0.83 MG/DL (ref 0.55–1.02)
CROSSMATCH INTERPRETATION: NORMAL
D DIMER PPP IA.FEU-MCNC: 0.64 UG/ML FEU (ref 0–0.5)
DISPENSE STATUS: NORMAL
ERYTHROCYTE [DISTWIDTH] IN BLOOD BY AUTOMATED COUNT: 14.2 % (ref 11.5–17)
FIBRINOGEN PPP-MCNC: 659 MG/DL (ref 210–463)
GFR SERPLBLD CREATININE-BSD FMLA CKD-EPI: >60 MLS/MIN/1.73/M2
GLOBULIN SER-MCNC: 3.3 GM/DL (ref 2.4–3.5)
GLUCOSE SERPL-MCNC: 157 MG/DL (ref 74–100)
GROUP & RH: NORMAL
HCT VFR BLD AUTO: 16.9 % (ref 37–47)
HGB BLD-MCNC: 5.6 G/DL (ref 12–16)
INDIRECT COOMBS GEL: NORMAL
LDH SERPL-CCNC: 244 U/L (ref 125–220)
LYMPH ABN # BLD MANUAL: 1 %
LYMPHOCYTES NFR BLD MANUAL: 2.32 X10(3)/MCL
LYMPHOCYTES NFR BLD MANUAL: 80 % (ref 13–40)
MACROCYTES BLD QL SMEAR: ABNORMAL
MCH RBC QN AUTO: 31.8 PG
MCHC RBC AUTO-ENTMCNC: 33.1 G/DL (ref 33–36)
MCV RBC AUTO: 96 FL (ref 80–94)
MICROCYTES BLD QL SMEAR: SLIGHT
MONOCYTES NFR BLD MANUAL: 0.03 X10(3)/MCL (ref 0.1–1.3)
MONOCYTES NFR BLD MANUAL: 1 % (ref 2–11)
NEUTROPHILS NFR BLD MANUAL: 18 % (ref 47–80)
NRBC BLD AUTO-RTO: 1.4 %
OVALOCYTES (OLG): SLIGHT
PLATELET # BLD AUTO: 1 X10(3)/MCL (ref 130–400)
PLATELET # BLD EST: ABNORMAL 10*3/UL
PMV BLD AUTO: ABNORMAL FL
POCT GLUCOSE: 165 MG/DL (ref 70–110)
POIKILOCYTOSIS BLD QL SMEAR: ABNORMAL
POLYCHROMASIA BLD QL SMEAR: ABNORMAL
POTASSIUM SERPL-SCNC: 4 MMOL/L (ref 3.5–5.1)
PROT SERPL-MCNC: 6.2 GM/DL (ref 6.4–8.3)
RBC # BLD AUTO: 1.76 X10(6)/MCL (ref 4.2–5.4)
SCHISTOCYTE (OLG): ABNORMAL
SODIUM SERPL-SCNC: 142 MMOL/L (ref 136–145)
STIPPLED RBC (OHS): SLIGHT
TEAR DROP CELL (OLG): SLIGHT
UNIT NUMBER: NORMAL
URATE SERPL-MCNC: 3.3 MG/DL (ref 2.6–6)
WBC # SPEC AUTO: 2.9 X10(3)/MCL (ref 4.5–11.5)

## 2023-03-17 PROCEDURE — 86900 BLOOD TYPING SEROLOGIC ABO: CPT | Performed by: FAMILY MEDICINE

## 2023-03-17 PROCEDURE — 85610 PROTHROMBIN TIME: CPT | Performed by: PHYSICIAN ASSISTANT

## 2023-03-17 PROCEDURE — 83615 LACTATE (LD) (LDH) ENZYME: CPT | Performed by: PHYSICIAN ASSISTANT

## 2023-03-17 PROCEDURE — 85379 FIBRIN DEGRADATION QUANT: CPT | Performed by: PHYSICIAN ASSISTANT

## 2023-03-17 PROCEDURE — P9040 RBC LEUKOREDUCED IRRADIATED: HCPCS | Performed by: FAMILY MEDICINE

## 2023-03-17 PROCEDURE — 85060 BLOOD SMEAR INTERPRETATION: CPT | Performed by: PHYSICIAN ASSISTANT

## 2023-03-17 PROCEDURE — 99285 EMERGENCY DEPT VISIT HI MDM: CPT | Mod: 25

## 2023-03-17 PROCEDURE — 63600175 PHARM REV CODE 636 W HCPCS: Performed by: FAMILY MEDICINE

## 2023-03-17 PROCEDURE — 36430 TRANSFUSION BLD/BLD COMPNT: CPT

## 2023-03-17 PROCEDURE — 85384 FIBRINOGEN ACTIVITY: CPT | Performed by: PHYSICIAN ASSISTANT

## 2023-03-17 PROCEDURE — 86920 COMPATIBILITY TEST SPIN: CPT | Performed by: FAMILY MEDICINE

## 2023-03-17 PROCEDURE — 96372 THER/PROPH/DIAG INJ SC/IM: CPT | Mod: 59 | Performed by: FAMILY MEDICINE

## 2023-03-17 PROCEDURE — 85027 COMPLETE CBC AUTOMATED: CPT | Performed by: PHYSICIAN ASSISTANT

## 2023-03-17 PROCEDURE — 96374 THER/PROPH/DIAG INJ IV PUSH: CPT

## 2023-03-17 PROCEDURE — 84550 ASSAY OF BLOOD/URIC ACID: CPT | Performed by: PHYSICIAN ASSISTANT

## 2023-03-17 PROCEDURE — 80053 COMPREHEN METABOLIC PANEL: CPT | Performed by: PHYSICIAN ASSISTANT

## 2023-03-17 PROCEDURE — 96375 TX/PRO/DX INJ NEW DRUG ADDON: CPT

## 2023-03-17 PROCEDURE — 82962 GLUCOSE BLOOD TEST: CPT

## 2023-03-17 RX ORDER — MORPHINE SULFATE 2 MG/ML
2 INJECTION, SOLUTION INTRAMUSCULAR; INTRAVENOUS
Status: COMPLETED | OUTPATIENT
Start: 2023-03-17 | End: 2023-03-17

## 2023-03-17 RX ORDER — GLUCAGON 1 MG
1 KIT INJECTION
Status: DISCONTINUED | OUTPATIENT
Start: 2023-03-17 | End: 2023-03-18 | Stop reason: HOSPADM

## 2023-03-17 RX ORDER — HYDROCODONE BITARTRATE AND ACETAMINOPHEN 500; 5 MG/1; MG/1
TABLET ORAL
Status: DISCONTINUED | OUTPATIENT
Start: 2023-03-17 | End: 2023-03-18 | Stop reason: HOSPADM

## 2023-03-17 RX ORDER — DEXTROSE 40 %
15 GEL (GRAM) ORAL
Status: DISCONTINUED | OUTPATIENT
Start: 2023-03-17 | End: 2023-03-18 | Stop reason: HOSPADM

## 2023-03-17 RX ORDER — ONDANSETRON 2 MG/ML
4 INJECTION INTRAMUSCULAR; INTRAVENOUS
Status: COMPLETED | OUTPATIENT
Start: 2023-03-17 | End: 2023-03-17

## 2023-03-17 RX ORDER — DEXTROSE 40 %
30 GEL (GRAM) ORAL
Status: DISCONTINUED | OUTPATIENT
Start: 2023-03-17 | End: 2023-03-18 | Stop reason: HOSPADM

## 2023-03-17 RX ORDER — INSULIN ASPART 100 [IU]/ML
1-10 INJECTION, SOLUTION INTRAVENOUS; SUBCUTANEOUS
Status: DISCONTINUED | OUTPATIENT
Start: 2023-03-17 | End: 2023-03-18 | Stop reason: HOSPADM

## 2023-03-17 RX ADMIN — ONDANSETRON 4 MG: 2 INJECTION INTRAMUSCULAR; INTRAVENOUS at 09:03

## 2023-03-17 RX ADMIN — INSULIN ASPART 2 UNITS: 100 INJECTION, SOLUTION INTRAVENOUS; SUBCUTANEOUS at 11:03

## 2023-03-17 RX ADMIN — MORPHINE SULFATE 2 MG: 2 INJECTION, SOLUTION INTRAMUSCULAR; INTRAVENOUS at 09:03

## 2023-03-18 ENCOUNTER — HOSPITAL ENCOUNTER (OUTPATIENT)
Facility: HOSPITAL | Age: 58
Discharge: HOME | End: 2023-03-19
Attending: EMERGENCY MEDICINE | Admitting: INTERNAL MEDICINE

## 2023-03-18 VITALS
TEMPERATURE: 98 F | SYSTOLIC BLOOD PRESSURE: 134 MMHG | BODY MASS INDEX: 43.22 KG/M2 | OXYGEN SATURATION: 98 % | WEIGHT: 243.94 LBS | HEART RATE: 71 BPM | RESPIRATION RATE: 17 BRPM | DIASTOLIC BLOOD PRESSURE: 79 MMHG | HEIGHT: 63 IN

## 2023-03-18 DIAGNOSIS — D64.9 SYMPTOMATIC ANEMIA: ICD-10-CM

## 2023-03-18 DIAGNOSIS — C92.10 BLASTIC PHASE CHRONIC MYELOID LEUKEMIA: Primary | ICD-10-CM

## 2023-03-18 DIAGNOSIS — D69.6 SEVERE THROMBOCYTOPENIA: ICD-10-CM

## 2023-03-18 LAB
ABO + RH BLD: NORMAL
ABS NEUT (OLG): 0.84 X10(3)/MCL (ref 2.1–9.2)
ALBUMIN SERPL-MCNC: 3 G/DL (ref 3.5–5)
ALBUMIN/GLOB SERPL: 0.9 RATIO (ref 1.1–2)
ALP SERPL-CCNC: 61 UNIT/L (ref 40–150)
ALT SERPL-CCNC: 18 UNIT/L (ref 0–55)
ANISOCYTOSIS BLD QL SMEAR: ABNORMAL
AST SERPL-CCNC: 13 UNIT/L (ref 5–34)
BASOPHILS # BLD AUTO: 0 X10(3)/MCL (ref 0–0.2)
BASOPHILS NFR BLD AUTO: 0 %
BILIRUBIN DIRECT+TOT PNL SERPL-MCNC: 1.2 MG/DL
BLD PROD TYP BPU: NORMAL
BLOOD UNIT EXPIRATION DATE: NORMAL
BLOOD UNIT TYPE CODE: 1700
BLOOD UNIT TYPE CODE: 1700
BLOOD UNIT TYPE CODE: 5100
BLOOD UNIT TYPE CODE: 600
BUN SERPL-MCNC: 12.7 MG/DL (ref 9.8–20.1)
CALCIUM SERPL-MCNC: 8.8 MG/DL (ref 8.4–10.2)
CHLORIDE SERPL-SCNC: 106 MMOL/L (ref 98–107)
CO2 SERPL-SCNC: 26 MMOL/L (ref 22–29)
CREAT SERPL-MCNC: 0.69 MG/DL (ref 0.55–1.02)
CROSSMATCH INTERPRETATION: NORMAL
DISPENSE STATUS: NORMAL
EOSINOPHIL # BLD AUTO: 0.02 X10(3)/MCL (ref 0–0.9)
EOSINOPHIL NFR BLD AUTO: 0.9 %
EOSINOPHIL NFR BLD MANUAL: 0.03 X10(3)/MCL (ref 0–0.9)
EOSINOPHIL NFR BLD MANUAL: 1 %
ERYTHROCYTE [DISTWIDTH] IN BLOOD BY AUTOMATED COUNT: 15.8 % (ref 11.5–17)
ERYTHROCYTE [DISTWIDTH] IN BLOOD BY AUTOMATED COUNT: 17.6 % (ref 11.5–17)
GFR SERPLBLD CREATININE-BSD FMLA CKD-EPI: >60 MLS/MIN/1.73/M2
GLOBULIN SER-MCNC: 3.2 GM/DL (ref 2.4–3.5)
GLUCOSE SERPL-MCNC: 203 MG/DL (ref 74–100)
GROUP & RH: NORMAL
HCT VFR BLD AUTO: 17.3 % (ref 37–47)
HCT VFR BLD AUTO: 21.2 % (ref 37–47)
HEMATOLOGIST REVIEW: NORMAL
HGB BLD-MCNC: 5.9 G/DL (ref 12–16)
HGB BLD-MCNC: 7.5 G/DL (ref 12–16)
IMM GRANULOCYTES # BLD AUTO: 0.05 X10(3)/MCL (ref 0–0.04)
IMM GRANULOCYTES NFR BLD AUTO: 2.2 %
INDIRECT COOMBS GEL: NORMAL
INSTRUMENT WBC (OLG): 2.8 X10(3)/MCL
LYMPHOCYTES # BLD AUTO: 1.37 X10(3)/MCL (ref 0.6–4.6)
LYMPHOCYTES NFR BLD AUTO: 60.9 %
LYMPHOCYTES NFR BLD MANUAL: 1.93 X10(3)/MCL
LYMPHOCYTES NFR BLD MANUAL: 69 %
MCH RBC QN AUTO: 30.7 PG
MCH RBC QN AUTO: 31.4 PG
MCHC RBC AUTO-ENTMCNC: 34.1 G/DL (ref 33–36)
MCHC RBC AUTO-ENTMCNC: 35.4 G/DL (ref 33–36)
MCV RBC AUTO: 86.9 FL (ref 80–94)
MCV RBC AUTO: 92 FL (ref 80–94)
MICROCYTES BLD QL SMEAR: ABNORMAL
MONOCYTES # BLD AUTO: 0.09 X10(3)/MCL (ref 0.1–1.3)
MONOCYTES NFR BLD AUTO: 4 %
NEUTROPHILS # BLD AUTO: 0.72 X10(3)/MCL (ref 2.1–9.2)
NEUTROPHILS NFR BLD AUTO: 32 %
NEUTROPHILS NFR BLD MANUAL: 30 %
NRBC BLD AUTO-RTO: 1.3 %
NRBC BLD AUTO-RTO: 1.4 %
NRBC BLD MANUAL-RTO: 2 %
PLATELET # BLD AUTO: 1 X10(3)/MCL (ref 130–400)
PLATELET # BLD AUTO: 45 X10(3)/MCL (ref 130–400)
PLATELET # BLD EST: ABNORMAL 10*3/UL
PMV BLD AUTO: 10.2 FL (ref 7.4–10.4)
PMV BLD AUTO: ABNORMAL FL
POCT GLUCOSE: 210 MG/DL (ref 70–110)
POIKILOCYTOSIS BLD QL SMEAR: ABNORMAL
POTASSIUM SERPL-SCNC: 4.1 MMOL/L (ref 3.5–5.1)
PROT SERPL-MCNC: 6.2 GM/DL (ref 6.4–8.3)
RBC # BLD AUTO: 1.88 X10(6)/MCL (ref 4.2–5.4)
RBC # BLD AUTO: 2.44 X10(6)/MCL (ref 4.2–5.4)
RBC MORPH BLD: ABNORMAL
SODIUM SERPL-SCNC: 141 MMOL/L (ref 136–145)
TEAR DROP CELL (OLG): ABNORMAL
UNIT NUMBER: NORMAL
WBC # SPEC AUTO: 2.3 X10(3)/MCL (ref 4.5–11.5)
WBC # SPEC AUTO: 2.8 X10(3)/MCL (ref 4.5–11.5)

## 2023-03-18 PROCEDURE — 25000003 PHARM REV CODE 250: Performed by: INTERNAL MEDICINE

## 2023-03-18 PROCEDURE — G0378 HOSPITAL OBSERVATION PER HR: HCPCS

## 2023-03-18 PROCEDURE — 80053 COMPREHEN METABOLIC PANEL: CPT | Performed by: INTERNAL MEDICINE

## 2023-03-18 PROCEDURE — 85060 BLOOD SMEAR INTERPRETATION: CPT | Performed by: EMERGENCY MEDICINE

## 2023-03-18 PROCEDURE — 27000221 HC OXYGEN, UP TO 24 HOURS

## 2023-03-18 PROCEDURE — 63700000 PHARM REV CODE 250 ALT 637 W/O HCPCS: Performed by: INTERNAL MEDICINE

## 2023-03-18 PROCEDURE — 36430 TRANSFUSION BLD/BLD COMPNT: CPT

## 2023-03-18 PROCEDURE — 99285 EMERGENCY DEPT VISIT HI MDM: CPT | Mod: 25

## 2023-03-18 PROCEDURE — 85027 COMPLETE CBC AUTOMATED: CPT | Performed by: EMERGENCY MEDICINE

## 2023-03-18 PROCEDURE — P9037 PLATE PHERES LEUKOREDU IRRAD: HCPCS | Performed by: EMERGENCY MEDICINE

## 2023-03-18 PROCEDURE — P9040 RBC LEUKOREDUCED IRRADIATED: HCPCS | Performed by: EMERGENCY MEDICINE

## 2023-03-18 PROCEDURE — 86923 COMPATIBILITY TEST ELECTRIC: CPT | Mod: 91 | Performed by: EMERGENCY MEDICINE

## 2023-03-18 PROCEDURE — 86900 BLOOD TYPING SEROLOGIC ABO: CPT | Performed by: EMERGENCY MEDICINE

## 2023-03-18 PROCEDURE — 85025 COMPLETE CBC W/AUTO DIFF WBC: CPT | Performed by: EMERGENCY MEDICINE

## 2023-03-18 PROCEDURE — 85027 COMPLETE CBC AUTOMATED: CPT | Mod: 91 | Performed by: INTERNAL MEDICINE

## 2023-03-18 PROCEDURE — 63600175 PHARM REV CODE 636 W HCPCS: Performed by: INTERNAL MEDICINE

## 2023-03-18 PROCEDURE — 96372 THER/PROPH/DIAG INJ SC/IM: CPT | Performed by: INTERNAL MEDICINE

## 2023-03-18 RX ORDER — TALC
6 POWDER (GRAM) TOPICAL NIGHTLY PRN
Status: DISCONTINUED | OUTPATIENT
Start: 2023-03-18 | End: 2023-03-19 | Stop reason: HOSPADM

## 2023-03-18 RX ORDER — HYDROCODONE BITARTRATE AND ACETAMINOPHEN 5; 325 MG/1; MG/1
1 TABLET ORAL EVERY 6 HOURS PRN
Status: DISCONTINUED | OUTPATIENT
Start: 2023-03-18 | End: 2023-03-19 | Stop reason: HOSPADM

## 2023-03-18 RX ORDER — FUROSEMIDE 20 MG/1
20 TABLET ORAL DAILY
Status: DISCONTINUED | OUTPATIENT
Start: 2023-03-18 | End: 2023-03-19 | Stop reason: HOSPADM

## 2023-03-18 RX ORDER — METFORMIN HYDROCHLORIDE 500 MG/1
1000 TABLET ORAL 2 TIMES DAILY WITH MEALS
Status: DISCONTINUED | OUTPATIENT
Start: 2023-03-18 | End: 2023-03-19 | Stop reason: HOSPADM

## 2023-03-18 RX ORDER — ACYCLOVIR 200 MG/1
400 CAPSULE ORAL 2 TIMES DAILY
Status: DISCONTINUED | OUTPATIENT
Start: 2023-03-18 | End: 2023-03-19 | Stop reason: HOSPADM

## 2023-03-18 RX ORDER — ATORVASTATIN CALCIUM 40 MG/1
40 TABLET, FILM COATED ORAL DAILY
Status: DISCONTINUED | OUTPATIENT
Start: 2023-03-18 | End: 2023-03-19 | Stop reason: HOSPADM

## 2023-03-18 RX ORDER — HYDROCODONE BITARTRATE AND ACETAMINOPHEN 500; 5 MG/1; MG/1
TABLET ORAL
Status: DISCONTINUED | OUTPATIENT
Start: 2023-03-18 | End: 2023-03-19 | Stop reason: HOSPADM

## 2023-03-18 RX ORDER — LOSARTAN POTASSIUM 25 MG/1
25 TABLET ORAL DAILY
Status: DISCONTINUED | OUTPATIENT
Start: 2023-03-18 | End: 2023-03-19 | Stop reason: HOSPADM

## 2023-03-18 RX ORDER — PANTOPRAZOLE SODIUM 40 MG/1
40 TABLET, DELAYED RELEASE ORAL DAILY
Status: DISCONTINUED | OUTPATIENT
Start: 2023-03-18 | End: 2023-03-19 | Stop reason: HOSPADM

## 2023-03-18 RX ORDER — GABAPENTIN 300 MG/1
300 CAPSULE ORAL 3 TIMES DAILY
Status: DISCONTINUED | OUTPATIENT
Start: 2023-03-18 | End: 2023-03-19 | Stop reason: HOSPADM

## 2023-03-18 RX ORDER — MONTELUKAST SODIUM 10 MG/1
10 TABLET ORAL DAILY
Status: DISCONTINUED | OUTPATIENT
Start: 2023-03-18 | End: 2023-03-19 | Stop reason: HOSPADM

## 2023-03-18 RX ORDER — GLUCAGON 1 MG
1 KIT INJECTION
Status: DISCONTINUED | OUTPATIENT
Start: 2023-03-18 | End: 2023-03-19 | Stop reason: HOSPADM

## 2023-03-18 RX ORDER — METOPROLOL TARTRATE 25 MG/1
25 TABLET, FILM COATED ORAL 2 TIMES DAILY
Status: DISCONTINUED | OUTPATIENT
Start: 2023-03-18 | End: 2023-03-19 | Stop reason: HOSPADM

## 2023-03-18 RX ORDER — OLANZAPINE 5 MG/1
10 TABLET ORAL NIGHTLY
Status: DISCONTINUED | OUTPATIENT
Start: 2023-03-18 | End: 2023-03-19 | Stop reason: HOSPADM

## 2023-03-18 RX ORDER — IBUPROFEN 200 MG
24 TABLET ORAL
Status: DISCONTINUED | OUTPATIENT
Start: 2023-03-18 | End: 2023-03-19 | Stop reason: HOSPADM

## 2023-03-18 RX ORDER — IBUPROFEN 200 MG
16 TABLET ORAL
Status: DISCONTINUED | OUTPATIENT
Start: 2023-03-18 | End: 2023-03-19 | Stop reason: HOSPADM

## 2023-03-18 RX ORDER — SODIUM CHLORIDE 0.9 % (FLUSH) 0.9 %
10 SYRINGE (ML) INJECTION
Status: DISCONTINUED | OUTPATIENT
Start: 2023-03-18 | End: 2023-03-19 | Stop reason: HOSPADM

## 2023-03-18 RX ORDER — AMLODIPINE BESYLATE 5 MG/1
10 TABLET ORAL DAILY
Status: DISCONTINUED | OUTPATIENT
Start: 2023-03-18 | End: 2023-03-19 | Stop reason: HOSPADM

## 2023-03-18 RX ORDER — INSULIN ASPART 100 [IU]/ML
1-10 INJECTION, SOLUTION INTRAVENOUS; SUBCUTANEOUS
Status: DISCONTINUED | OUTPATIENT
Start: 2023-03-18 | End: 2023-03-19 | Stop reason: HOSPADM

## 2023-03-18 RX ORDER — LEVOFLOXACIN 500 MG/1
500 TABLET, FILM COATED ORAL DAILY
Status: DISCONTINUED | OUTPATIENT
Start: 2023-03-18 | End: 2023-03-19 | Stop reason: HOSPADM

## 2023-03-18 RX ORDER — FLUCONAZOLE 200 MG/1
200 TABLET ORAL DAILY
Status: DISCONTINUED | OUTPATIENT
Start: 2023-03-18 | End: 2023-03-19 | Stop reason: HOSPADM

## 2023-03-18 RX ADMIN — GABAPENTIN 300 MG: 300 CAPSULE ORAL at 09:03

## 2023-03-18 RX ADMIN — MONTELUKAST SODIUM 10 MG: 10 TABLET, COATED ORAL at 08:03

## 2023-03-18 RX ADMIN — METFORMIN HYDROCHLORIDE 1000 MG: 500 TABLET, FILM COATED ORAL at 08:03

## 2023-03-18 RX ADMIN — GABAPENTIN 300 MG: 300 CAPSULE ORAL at 02:03

## 2023-03-18 RX ADMIN — LOSARTAN POTASSIUM 25 MG: 25 TABLET, FILM COATED ORAL at 08:03

## 2023-03-18 RX ADMIN — FLUCONAZOLE 200 MG: 100 TABLET ORAL at 08:03

## 2023-03-18 RX ADMIN — METFORMIN HYDROCHLORIDE 1000 MG: 500 TABLET, FILM COATED ORAL at 04:03

## 2023-03-18 RX ADMIN — PANTOPRAZOLE SODIUM 40 MG: 40 TABLET, DELAYED RELEASE ORAL at 08:03

## 2023-03-18 RX ADMIN — LEVOFLOXACIN 500 MG: 500 TABLET, FILM COATED ORAL at 08:03

## 2023-03-18 RX ADMIN — INSULIN DETEMIR 35 UNITS: 100 INJECTION, SOLUTION SUBCUTANEOUS at 09:03

## 2023-03-18 RX ADMIN — METOPROLOL TARTRATE 25 MG: 25 TABLET, FILM COATED ORAL at 09:03

## 2023-03-18 RX ADMIN — AMLODIPINE BESYLATE 10 MG: 5 TABLET ORAL at 08:03

## 2023-03-18 RX ADMIN — OLANZAPINE 10 MG: 5 TABLET, FILM COATED ORAL at 09:03

## 2023-03-18 RX ADMIN — FUROSEMIDE 20 MG: 20 TABLET ORAL at 08:03

## 2023-03-18 RX ADMIN — GABAPENTIN 300 MG: 300 CAPSULE ORAL at 08:03

## 2023-03-18 RX ADMIN — METOPROLOL TARTRATE 25 MG: 25 TABLET, FILM COATED ORAL at 08:03

## 2023-03-18 RX ADMIN — ACYCLOVIR 400 MG: 200 CAPSULE ORAL at 09:03

## 2023-03-18 RX ADMIN — ACYCLOVIR 400 MG: 200 CAPSULE ORAL at 08:03

## 2023-03-18 RX ADMIN — ATORVASTATIN CALCIUM 40 MG: 40 TABLET, FILM COATED ORAL at 08:03

## 2023-03-18 NOTE — PROGRESS NOTES
Patient presented to our facility because outpatient lab work revealed anemia.  She is currently undergoing chemotherapy for CML which was noted to be in blastic phase she follows closely with hematologist/oncologist in [place].    Patient is asymptomatic at this time.  Will repeat H and H post transfusion right now and again in a.m. and monitor appropriately.  If less than 7 will plan for repeat transfusion.  Will need to follow up with heme oncology establish in outpatient setting.  If patient hemodynamically stable, she can likely discharge in 24-48 hours.  Repeat labs in a.m  Reviewed and restarted appropriate home medications.

## 2023-03-18 NOTE — CONSULTS
[facility] INTERNAL MEDICINE  CONSULT NOTE    SUBJECTIVE:      HPI: Fela Ellison is a 57 y.o. female with PMH of CML currently not in remission (diagnosed in 2020), DM II w/ neuropathy, HTN, NAFLD presented to the ED after being told by her oncologist in [place] that her H/H were low and advised her to visit the nearest ED for blood transfusion. She states having increased weakness for the past week and worsening bruising that started 3-4 days ago. No other acute complaints at this time.     PMH: As stated above  Family HX: Parents (DM)  Allergy: NKDA  Social HX: Never used tobacco products, alcohol, and illicit drug  Surgical HX: C/S, cholecystectomy  Home meds:   Current Outpatient Medications   Medication Instructions    acyclovir (ZOVIRAX) 400 MG tablet Oral, 2 times daily    albuterol (PROVENTIL/VENTOLIN HFA) 90 mcg/actuation inhaler 2 puffs, Inhalation, Every 6 hours PRN, Rescue     amLODIPine (NORVASC) 10 mg, Oral, Daily    atorvastatin (LIPITOR) 40 mg, Oral, Daily    fluconazole (DIFLUCAN) 200 mg, Oral, Daily    furosemide (LASIX) 20 mg, Oral, Daily    gabapentin (NEURONTIN) 300 mg, Oral, 3 times daily    HYDROcodone-acetaminophen (NORCO) 5-325 mg per tablet 1 tablet, Oral, Every 6 hours PRN    imatinib (GLEEVEC) 400 mg, Oral, Daily    insulin glargine (LANTUS) 60 Units, Subcutaneous, Nightly    insulin lispro 15 Units, Subcutaneous, 3 times daily before meals    levoFLOXacin (LEVAQUIN) 500 mg, Oral, Daily    losartan (COZAAR) 25 mg, Oral, Daily    metFORMIN (GLUCOPHAGE) 1,000 mg, Oral, 2 times daily with meals    metoprolol tartrate (LOPRESSOR) 25 mg, Oral, 2 times daily    montelukast (SINGULAIR) 10 mg, Oral, Daily    OLANZapine (ZYPREXA) 10 mg, Oral, Nightly    ondansetron (ZOFRAN) 8 mg, Oral, Every 8 hours PRN    pantoprazole (PROTONIX) 40 mg, Oral, Daily      In the ED: WBC 2.9k, H/H 5.6/16.9, PLT 1,000, . CXR revealed enlarged cardiac silhouette with increased pulmonary vascular markings.  "    ROS:   (+) Weakness, easy bruising  (-) Chest pain, palpitations, shortness of breath, fever, night sweat, chills, diarrhea, constipation, hematuria, hematochezia, melena     OBJECTIVE:     Vital signs:   BP (!) 151/80   Pulse 83   Temp 98.8 °F (37.1 °C) (Oral)   Resp (!) 22   Ht 5' 3" (1.6 m)   Wt 110.7 kg (243 lb 15.4 oz)   SpO2 95%   BMI 43.22 kg/m²      Physical Examination:  General: Obese w/ mild distress  HEENT: NC/AT; PERRL; nasal mucosa moist and clear; petechiae in buccal mucosa and lips  Pulm: CTA bilaterally, normal work of breathing on room air  CV: S1, S2 w/o murmurs or gallops; no edema noted  GI: Hepatomegaly palpated; normal bowel sounds in all quadrants  MSK: Full ROM of all extremities and spine w/o limitation or discomfort  Derm: Petechiae on chest and multiple bruises on lower abdomen  Neuro: AAOx4; motor/sensory function intact  Psych: Cooperative; appropriate mood and affect    Laboratory:  Most Recent Data:  CBC:   Lab Results   Component Value Date    WBC 2.9 (L) 03/17/2023    HGB 5.6 (LL) 03/17/2023    HCT 16.9 (LL) 03/17/2023    PLT 1 (LL) 03/17/2023    MCV 96.0 (H) 03/17/2023    RDW 14.2 03/17/2023     WBC Differential:   Recent Labs   Lab 03/17/23  1901   WBC 2.9*   HGB 5.6*   HCT 16.9*   PLT 1*   MCV 96.0*     BMP:   Lab Results   Component Value Date     03/17/2023    K 4.0 03/17/2023     10/29/2020    CO2 25 03/17/2023    BUN 12.1 03/17/2023    CREATININE 0.83 03/17/2023     (H) 10/29/2020    CALCIUM 9.3 03/17/2023    MG 2.0 10/29/2020    PHOS 4.6 (H) 10/29/2020     LFTs:   Lab Results   Component Value Date    PROT 6.3 10/29/2020    ALBUMIN 2.9 (L) 03/17/2023    BILITOT 0.6 03/17/2023    AST 10 03/17/2023    ALKPHOS 65 03/17/2023    ALT 15 03/17/2023     Coags:   Lab Results   Component Value Date    INR 0.99 03/17/2023    PROTIME 12.9 03/17/2023     FLP:   Lab Results   Component Value Date    CHOL 121 12/11/2022    HDL 31 (L) 12/11/2022    TRIG 205 (H) " 12/11/2022     DM:   Lab Results   Component Value Date    HGBA1C 8.2 (H) 01/09/2023    HGBA1C 8.6 (H) 12/10/2022    CREATININE 0.83 03/17/2023     Thyroid:   Lab Results   Component Value Date    TSH 1.3404 04/08/2022     Anemia: No results found for: IRON, TIBC, FERRITIN, [id number], FOLATE  Cardiac:   Lab Results   Component Value Date    TROPONINI <0.010 12/10/2022     Urinalysis:   Lab Results   Component Value Date    COLORU Colorless 02/04/2022    PHUA 5.5 12/11/2022    SPECGRAV 1.015 10/28/2020    NITRITE Negative 02/04/2022    KETONESU Negative 02/04/2022    UROBILINOGEN Normal 12/11/2022    WBCUA 0-5 12/11/2022       ASSESSMENT & PLAN:     Blastic phase of CML not currently in remission (diagnosed in 2020)  -Spoke with patient's oncologist in [place] over the phone. Per oncologist, progression of disease was observed in December 2022, her chemo regimen was changed to pazopanib + azacitidine approximately 3 weeks ago. Her oncologist plans to see her on Monday (3/20/2023) and agreed with giving 2 units PRBC and 2 units platelet at this time.   -Given the lack of oncology services at [facility], transferring patient is of the patient's best interest at this time.  -Spoke to medicine team attending and ED provider regarding transferring patient to [place] where she has been receiving oncology services.     Eda Rick DO   [facility] Internal Medicine, PGY-II

## 2023-03-18 NOTE — ED NOTES
Bed: 11  Expected date:   Expected time:   Means of arrival:   Comments:  [facility]  
[facility]

## 2023-03-18 NOTE — ED PROVIDER NOTES
Encounter Date: 3/17/2023       History     Chief Complaint   Patient presents with    Weakness     Seen at [facility] LISA dx anemia      Patient reports to the ER for evaluation of anemia on labs today requiring transfusion; pt reports that she was being seen for ingrown toenails that require wound care/podiatry and that due to her CML diagnosis and reports of some petechiae on her chest/abdomen, labs were performed to check her hemoglobin/hematocrit and platelet count; after finishing [facility] clinic for her toes, she was sent home and on the way home she was called and told her labs showed a critically low hemoglobin/hematocrit of 5.5/16.2 and to reports to the nearest ER, which is [facility] since the patient is from [place]; pt reports generalized weakness as her only complaint, but does reports multiple blood transfusions in the past for similar issues    The history is provided by the patient.   Illness   The problem occurs occasionally. Pertinent negatives include no fever, no decreased vision, no vomiting, no sore throat, no swollen glands, no neck stiffness, no shortness of breath, no wheezing, no rash and no pain. Services received include tests performed. Recently, medical care has been given by a specialist and at another facility.   Review of patient's allergies indicates:  No Known Allergies  Past Medical History:   Diagnosis Date    Cancer     DM2 (diabetes mellitus, type 2)     HTN (hypertension)     NAFLD (nonalcoholic fatty liver disease)     Neuropathy      Past Surgical History:   Procedure Laterality Date    ABDOMINAL SURGERY       SECTION       SECTION      CHOLECYSTECTOMY       Family History   Problem Relation Age of Onset    Diabetes Mother     Diabetes Father     Cancer Neg Hx      Social History     Tobacco Use    Smoking status: Never    Smokeless tobacco: Never   Substance Use Topics    Alcohol use: Not Currently    Drug use: Not Currently     Review of Systems   Constitutional:   Negative for fever.   HENT:  Negative for sore throat.    Eyes: Negative.  Negative for pain.   Respiratory:  Negative for shortness of breath and wheezing.    Cardiovascular:  Negative for chest pain.   Gastrointestinal:  Negative for vomiting.   Genitourinary:  Negative for dysuria.   Musculoskeletal:  Negative for back pain.   Skin:  Negative for rash.   Neurological:  Positive for weakness.   Hematological:  Does not bruise/bleed easily.   Psychiatric/Behavioral: Negative.       Physical Exam     Initial Vitals [03/17/23 1757]   BP Pulse Resp Temp SpO2   122/74 84 16 98.8 °F (37.1 °C) 95 %      MAP       --         Physical Exam    Vitals reviewed.  Constitutional: She appears well-developed and well-nourished.   HENT:   Head: Normocephalic and atraumatic.   Eyes: Conjunctivae and EOM are normal. Pupils are equal, round, and reactive to light.   Neck: Neck supple.   Normal range of motion.  Cardiovascular:  Normal rate, regular rhythm and normal heart sounds.           Pulmonary/Chest: Breath sounds normal. No respiratory distress. She has no wheezes. She exhibits no tenderness.   Abdominal: Abdomen is soft. Bowel sounds are normal. There is no abdominal tenderness.   Musculoskeletal:         General: Normal range of motion.      Cervical back: Normal range of motion and neck supple.     Neurological: She is alert and oriented to person, place, and time. She displays normal reflexes. No cranial nerve deficit or sensory deficit.   Skin: Skin is warm and dry. Petechiae noted.        Area marked in a square appears as a hematoma/bruising that the patient and family report is from her chemotherapy from x2 weeks ago and does not appear to be larger, and [place]d is aware    Area marked with lines are small area of petichiae   Psychiatric: She has a normal mood and affect. Her behavior is normal. Judgment and thought content normal.       ED Course   Procedures  Labs Reviewed   COMPREHENSIVE METABOLIC PANEL -  Abnormal; Notable for the following components:       Result Value    Glucose Level 157 (*)     Protein Total 6.2 (*)     Albumin Level 2.9 (*)     Albumin/Globulin Ratio 0.9 (*)     All other components within normal limits   CBC WITH DIFFERENTIAL - Abnormal; Notable for the following components:    WBC 2.9 (*)     RBC 1.76 (*)     Hgb 5.6 (*)     Hct 16.9 (*)     MCV 96.0 (*)     Platelet 1 (*)     All other components within normal limits   D DIMER, QUANTITATIVE - Abnormal; Notable for the following components:    D-Dimer 0.64 (*)     All other components within normal limits   FIBRINOGEN - Abnormal; Notable for the following components:    Fibrinogen 659.0 (*)     All other components within normal limits   LACTATE DEHYDROGENASE - Abnormal; Notable for the following components:    Lactate Dehydrogenase 244 (*)     All other components within normal limits   MANUAL DIFFERENTIAL - Abnormal; Notable for the following components:    Neut Man 18 (*)     Lymph Man 80 (*)     Monocyte Man 1 (*)     Abs Neut calc 0.522 (*)     Abs Mono 0.029 (*)     Anisocyte 1+ (*)     Poik 1+ (*)     Microcyte Slight (*)     Macrocyte 1+ (*)     Polychrom 1+ (*)     Schistocyte 1+ (*)     Stippled RBC Slight (*)     Tear drop cell Slight (*)     Ovalocytes Slight (*)     Platelet Est Decreased (*)     All other components within normal limits   URIC ACID - Normal   CBC W/ AUTO DIFFERENTIAL    Narrative:     The following orders were created for panel order CBC auto differential.  Procedure                               Abnormality         Status                     ---------                               -----------         ------                     CBC with Differential[[id number]]        Abnormal            Final result               Manual Differential[[id number]]          Abnormal            Final result                 Please view results for these tests on the individual orders.   PROTIME-INR   EXTRA TUBES    Narrative:     The  following orders were created for panel order EXTRA TUBES.  Procedure                               Abnormality         Status                     ---------                               -----------         ------                     Red Top Hold[[id number]]                                     In process                 Light Green Top Hold[[id number]]                             In process                 Gold Top Hold[[id number]]                                    In process                 Pink Top Hold[[id number]]                                    In process                   Please view results for these tests on the individual orders.   RED TOP HOLD   LIGHT GREEN TOP HOLD   GOLD TOP HOLD   PINK TOP HOLD   EXTRA TUBES    Narrative:     The following orders were created for panel order EXTRA TUBES.  Procedure                               Abnormality         Status                     ---------                               -----------         ------                     Light Green Top Hold[[id number]]                             In process                 Lavender Top Hold[[id number]]                                In process                 Gold Top Hold[[id number]]                                    In process                 Pink Top Hold[[id number]]                                    In process                   Please view results for these tests on the individual orders.   LIGHT GREEN TOP HOLD   LAVENDER TOP HOLD   GOLD TOP HOLD   PINK TOP HOLD   PATH REVIEW OF BLOOD SMEAR   TYPE & SCREEN   PREPARE RBC SOFT   PREPARE PLATELETS (DOSE) SOFT   PREPARE PLATELETS (DOSE) SOFT          Imaging Results              X-Ray Chest 1 View (Final result)  Result time 03/17/23 19:09:57      Final result by Laila Frost MD (03/17/23 19:09:57)                   Impression:      Enlarged cardiac silhouette with vascular congestion without overt edema.      Electronically signed by: Laila  "Santosh  Date:    03/17/2023  Time:    19:09               Narrative:    EXAMINATION:  XR CHEST 1 VIEW    CLINICAL HISTORY:  weakness;    TECHNIQUE:  Single frontal view of the chest was performed.    COMPARISON:  12/10/2022    FINDINGS:  LINES AND TUBES: EKG/telemetry leads overlie the chest.    MEDIASTINUM AND ELEONORA: Cardiac silhouette is enlarged.    LUNGS: Pulmonary vascular congestion.    PLEURA:No pleural effusion. No pneumothorax.    OTHER: No acute osseous abnormality.                                       Medications   0.9%  NaCl infusion (for blood administration) (has no administration in time range)   0.9%  NaCl infusion (for blood administration) (has no administration in time range)   0.9%  NaCl infusion (for blood administration) (has no administration in time range)   morphine injection 2 mg (2 mg Intravenous Given 3/17/23 2157)   ondansetron injection 4 mg (4 mg Intravenous Given 3/17/23 2158)                 ED Course as of 03/17/23 2202   Fri Mar 17, 2023   1908 Transitioned to Dr Snowden [AL]   1910 Pt care transitioned to me at 1900. I, Dr. Snowden have seen and examined the patient.   Pt has given me permission to use her son as - Kvng at bedside. Pt declines  services at this time. Pt reports that she gets all of her CML treatment in [facility] where her oncologist is ( Dr. Russell) She reports she lives in [place], and Dr. Russell is trying to get her set up here. She reports she went to [facility] today to see her podiatrist for her toenails. On the way back home to [place], Dr. Russell called her and told her to go to the nearest  ER " because I need blood".  Pt endorses weakness. Discussed transfer with patient and family at bedside as we do not have hem/onc.  Pt reports she wants to say in [place] if she will get transferred. Spoke with Dr. Russell ( pt Hem/onc).   Reviewed labs that Dr. Russell ordered in the Epic system. Manual diff shows 2 % blasts.  Discussed case with him. Explained to " him that we do not have hem/onc at this facility.  He reports that she only needs 2 units PRBCs and 2 units platelets.  He reports her last blood transfusion was 4 weeks ago with no problem . Pt gets frequent blood transfusions secondary to CML. He reports pt is very well known to him.   Her last ECHO 3 weeks ago showed EF 55%. I explained to him that I will try my best to keep the patient here but she might need to be transferred.  He verbalized understanding.      Dr. Russell number [phone] [AK]   1930 IM consulted.   [AK]   2013 Spoke with Internal Medicine on-call they recommend transfer back to [facility] as this facility does not have Heme-Onc.  Please see their consultation note. [AK]   2138 Spoke with Dr. Mckeon  At [facility] who has accepted the patient.  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

## 2023-03-18 NOTE — NURSING
Nurses Note -- 4 Eyes      3/18/2023   10:52 AM      Skin assessed during: Admit      [x] No Pressure Injuries Present    []Prevention Measures Documented    Bruising noted from chemo treatments, generalized. No pressure injury present.     [] Yes- Altered Skin Integrity Present or Discovered   [] LDA Added if Not in Epic (Describe Wound)   [] New Altered Skin Integrity was Present on Admit and Documented in LDA   [] Wound Image Taken    Wound Care Consulted? No    Attending Nurse:  Priti Nichols RN     Second RN/Staff Member:  Ld Overton LPN

## 2023-03-18 NOTE — H&P
[facility] Hospital Medicine History & Physical Examination       Patient Name: Fela Ellison  MRN: [id number]  Patient Class: OP- Observation   Admission Date: 3/18/2023  2:12 AM  Length of Stay: 0  Admitting Service: Hospital Medicine   Attending Physician: Jalil Tariq MD   Primary Care Provider: Primary Doctor No  History source: EMR, patient and/or patient's family    CHIEF COMPLAINT   Anemia (Pt arrives via [facility], Transfer from [facility], for anemia. )    HISTORY OF PRESENT ILLNESS:   Patient is a pleasant 57-year-old female with history of hypertension, diabetes and CML who was followed by Dr. ANGELIC Forman in [place] and is currently in blastic phase as of December and receiving chemotherapy (pazopanib and as azacitidine) with reported last treatment 2023.  She had minor gum bleeding and significant bruising on her abdomen over the last few days for which outpatient laboratory work was ordered by her oncologist in [place] and based on the results she was referred to the ER for transfusion.  She denies fever chills, active bleeding, nausea or vomiting.    On arrival to the ER she was afebrile hemodynamically stable but laboratory work did show platelets of 1000, hemoglobin of 5.6 and WBC of 2.9.  ER physician spoke with patient's oncologist in [place] who recommended no need for transfuse for to that facility but to transfuse 2 units of platelets, 2 units of blood, and patient has an appointment scheduled at their facility on 2023.  Her last transfusion was reportedly 4 weeks ago.     PAST MEDICAL HISTORY:   CML not in remission, and blastic phase, on  (pazopanib and as azacitidine)  Chronic immunosuppression on prophylactic antibiotics  Type 2 diabetes mellitus  Essential hypertension    PAST SURGICAL HISTORY:     Past Surgical History:   Procedure Laterality Date    ABDOMINAL SURGERY       SECTION       SECTION      CHOLECYSTECTOMY          ALLERGIES:   Patient has no known allergies.    FAMILY HISTORY:   Reviewed and non-contributory     SOCIAL HISTORY:     Social History     Tobacco Use    Smoking status: Never    Smokeless tobacco: Never   Substance Use Topics    Alcohol use: Not Currently        HOME MEDICATIONS:     Prior to Admission medications    Medication Sig Start Date End Date Taking? Authorizing Provider   acyclovir (ZOVIRAX) 400 MG tablet Take by mouth 2 (two) times daily.    Historical Provider   albuterol (PROVENTIL/VENTOLIN HFA) 90 mcg/actuation inhaler Inhale 2 puffs into the lungs every 6 (six) hours as needed for Wheezing. Rescue    Historical Provider   amLODIPine (NORVASC) 10 MG tablet Take 10 mg by mouth once daily.    Historical Provider   atorvastatin (LIPITOR) 40 MG tablet Take 40 mg by mouth once daily.    Historical Provider   fluconazole (DIFLUCAN) 200 MG Tab Take 200 mg by mouth once daily. 11/23/22   Historical Provider   furosemide (LASIX) 20 MG tablet Take 20 mg by mouth once daily.    Historical Provider   gabapentin (NEURONTIN) 300 MG capsule Take 1 capsule (300 mg total) by mouth 3 (three) times daily. 12/12/22 12/12/23  Rupesh Thomas MD   HYDROcodone-acetaminophen (NORCO) 5-325 mg per tablet Take 1 tablet by mouth every 6 (six) hours as needed for Pain.    Historical Provider   imatinib (GLEEVEC) 400 MG Tab Take 1 tablet (400 mg total) by mouth once daily. 10/28/20   Bobby Yuen MD   insulin glargine (LANTUS) 100 unit/mL injection Inject 60 Units into the skin nightly. 12/12/22   Rupesh Thomas MD   insulin lispro 100 unit/mL injection Inject 15 Units into the skin 3 (three) times daily before meals. 12/12/22   Rupesh Thomas MD   levoFLOXacin (LEVAQUIN) 500 MG tablet Take 1 tablet (500 mg total) by mouth once daily. 12/12/22   Rupesh Thomas MD   losartan (COZAAR) 25 MG tablet Take 1 tablet (25 mg total) by mouth once daily. 12/12/22 12/12/23  Rupesh Thomas MD   metFORMIN  (GLUCOPHAGE) 1000 MG tablet Take 1,000 mg by mouth 2 (two) times daily with meals.    Historical Provider   metoprolol tartrate (LOPRESSOR) 25 MG tablet Take 25 mg by mouth 2 (two) times daily.    Historical Provider   montelukast (SINGULAIR) 10 mg tablet Take 10 mg by mouth once daily.    Historical Provider   OLANZapine (ZYPREXA) 10 MG tablet Take 10 mg by mouth every evening.    Historical Provider   ondansetron (ZOFRAN) 8 MG tablet Take 8 mg by mouth every 8 (eight) hours as needed for Nausea.    Historical Provider   pantoprazole (PROTONIX) 40 MG tablet Take 1 tablet (40 mg total) by mouth once daily. 12/13/22 12/13/23  Rupesh Thomas MD       REVIEW OF SYSTEMS:   Except as documented, all other systems reviewed and negative     PHYSICAL EXAM:   T 99 °F (37.2 °C)   BP (!) 113/58   P 75   RR 18   O2 99 %  GENERAL: awake, alert, oriented and in no acute distress, non-toxic appearing   HEENT: normocephalic atraumatic   NECK: supple   LUNGS: Clear bilaterally, no wheezing or rales, no accessory muscle use   CVS: Regular rate and rhythm, normal peripheral perfusion  ABD: Soft, non-tender, non-distended, bowel sounds present  EXTREMITIES: no clubbing or cyanosis  SKIN: Warm, dry.  Bilateral great toenails have been removed with overlying scab been no bleeding.  Large bruise in the left lower quadrant from prior injections.  NEURO: alert and oriented, grossly without focal deficits   PSYCHIATRIC: Cooperative    LABS AND IMAGING:     Recent Labs     03/17/23 1901 03/18/23  0254   WBC 2.9* 2.8*   RBC 1.76* 1.88*   HGB 5.6* 5.9*   HCT 16.9* 17.3*   MCV 96.0* 92.0   MCH 31.8 31.4   MCHC 33.1 34.1   RDW 14.2 15.8   PLT 1* 1*     No results for input(s): LACTIC in the last 72 hours.  Recent Labs     03/17/23 1901 03/17/23 1924   INR 0.99  --    D-DIMER  --  0.64*     No results for input(s): HGBA1C, CHOL, TRIG, LDL, VLDL, HDL in the last 72 hours.   Recent Labs     03/17/23 1901 03/17/23 1924     --    K 4.0   --    CHLORIDE 105  --    CO2 25  --    BUN 12.1  --    CREATININE 0.83  --    GLUCOSE 157*  --    CALCIUM 9.3  --    ALBUMIN 2.9*  --    GLOBULIN 3.3  --    ALKPHOS 65  --    ALT 15  --    AST 10  --    BILITOT 0.6  --    LDH  --  244*     No results for input(s): BNP, CPK, TROPONINI in the last 72 hours.           ASSESSMENT & PLAN:   Pancytopenia, requiring transfusion  CML not in remission, and blastic phase, on  (pazopanib and as azacitidine)  Chronic immunosuppression on prophylactic antibiotics  Type 2 diabetes mellitus  Essential hypertension    - patient's oncologist Dr Heber Forman ([phone]) recommends transfusing 2 units of blood 2 units of platelets.    - repeat counts afterwards, patient's hope is to be discharged after transfusion  - resume home medications as appropriate  - needs close follow-up at discharge vs inpatient oncology consultation if counts do not improve    DVT prophylaxis: none, severe thrombocytopenia   Code status: full     If patient was admitted under observational status it is with my approval/permission.     At least 55 min was spent on this history and physical.  Time seen: 5 AM 3/18/23  Critical care time = 35 min; Critical care diagnosis = severe anemia/transfusion   Jalil Tariq MD

## 2023-03-18 NOTE — Clinical Note
Diagnosis: Symptomatic anemia [1285180]   Future Attending Provider: INDIRA MYESR [[id number]]   Admitting Provider:: INDIRA MYERS [[id number]]

## 2023-03-18 NOTE — ED PROVIDER NOTES
Encounter Date: 3/17/2023       History     Chief Complaint   Patient presents with    Anemia     Pt arrives via [facility], Transfer from [facility], for anemia.      57-year-old female with a history of CML, followed in [place], presents to the emergency department as transfer from [facility] for transfusion.  She was called by her oncologist today with outpatient labs demonstrating severe anemia and thrombocytopenia, directed to go to the nearest emergency department for transfusion.  She went to [facility], labs there with hemoglobin 5, platelets of 1.  They had consulted Hospital Medicine there for admission; however, did not want to keep the patient there due to lack of hematology/oncology resources.  They did call and talk with the patient's hematologist who advised no indication to transfer to [place] at this time, can be managed locally as just needs transfusion and they will follow up with her on Monday.  Patient denies any clinical bleeding at home.  Denies any hematochezia or melena.  Reports that she has had some generalized weakness but otherwise has felt okay.    The history is provided by the patient, the spouse and medical records.   Anemia  This is a recurrent problem. The current episode started more than 1 week ago. The problem has not changed since onset.Pertinent negatives include no chest pain, no abdominal pain, no headaches and no shortness of breath. Nothing aggravates the symptoms. Nothing relieves the symptoms.   Review of patient's allergies indicates:  No Known Allergies  Past Medical History:   Diagnosis Date    Cancer     DM2 (diabetes mellitus, type 2)     HTN (hypertension)     NAFLD (nonalcoholic fatty liver disease)     Neuropathy      Past Surgical History:   Procedure Laterality Date    ABDOMINAL SURGERY       SECTION       SECTION      CHOLECYSTECTOMY       Family History   Problem Relation Age of Onset    Diabetes Mother     Diabetes Father     Cancer Neg Hx      Social History      Tobacco Use    Smoking status: Never    Smokeless tobacco: Never   Substance Use Topics    Alcohol use: Not Currently    Drug use: Not Currently     Review of Systems   Constitutional:  Positive for fatigue.   Respiratory:  Negative for shortness of breath.    Cardiovascular:  Negative for chest pain.   Gastrointestinal:  Negative for abdominal pain, anal bleeding and blood in stool.   Genitourinary:  Negative for hematuria.   Skin:  Negative for wound.   Allergic/Immunologic: Positive for immunocompromised state.   Neurological:  Negative for headaches.     Physical Exam     Initial Vitals [03/18/23 0217]   BP Pulse Resp Temp SpO2   (!) 113/58 75 18 99 °F (37.2 °C) 99 %      MAP       --         Physical Exam    Nursing note and vitals reviewed.  Constitutional: She appears well-developed and well-nourished. No distress.   HENT:   Head: Normocephalic and atraumatic.   Mouth/Throat: Oropharynx is clear and moist.   Eyes: Conjunctivae are normal. No scleral icterus.   Neck: Neck supple.   Normal range of motion.  Cardiovascular:  Normal rate and regular rhythm.           Pulmonary/Chest: No respiratory distress.   Abdominal: Abdomen is soft. She exhibits no distension. There is no abdominal tenderness.   Musculoskeletal:      Cervical back: Normal range of motion and neck supple.     Neurological: She is alert and oriented to person, place, and time. GCS score is 15. GCS eye subscore is 4. GCS verbal subscore is 5. GCS motor subscore is 6.   Skin: Skin is warm and dry. No rash noted.       ED Course   Critical Care    Date/Time: 3/18/2023 2:39 AM  Performed by: Chelsea Melendez MD  Authorized by: Chelsea Melendez MD   Direct patient critical care time: 13 minutes  Additional history critical care time: 5 minutes  Ordering / reviewing critical care time: 3 minutes  Documentation critical care time: 4 minutes  Consulting other physicians critical care time: 6 minutes  Total critical care time (exclusive of  procedural time) : 31 minutes  Critical care time was exclusive of separately billable procedures and treating other patients.  Critical care was necessary to treat or prevent imminent or life-threatening deterioration of the following conditions: circulatory failure (severe anemia, thrombocytopenia).  Critical care was time spent personally by me on the following activities: blood draw for specimens, development of treatment plan with patient or surrogate, discussions with consultants, evaluation of patient's response to treatment, examination of patient, obtaining history from patient or surrogate, ordering and performing treatments and interventions, ordering and review of laboratory studies, pulse oximetry, re-evaluation of patient's condition and review of old charts.      Labs Reviewed   CBC WITH DIFFERENTIAL - Abnormal; Notable for the following components:       Result Value    WBC 2.8 (*)     RBC 1.88 (*)     Hgb 5.9 (*)     Hct 17.3 (*)     Platelet 1 (*)     All other components within normal limits   MANUAL DIFFERENTIAL - Abnormal; Notable for the following components:    Abs Neut 0.84 (*)     RBC Morph Abnormal (*)     Anisocyte 1+ (*)     Poik 1+ (*)     Microcyte 1+ (*)     Tear drop cell 1+ (*)     Platelet Est Decreased (*)     All other components within normal limits   CBC W/ AUTO DIFFERENTIAL    Narrative:     The following orders were created for panel order CBC auto differential.  Procedure                               Abnormality         Status                     ---------                               -----------         ------                     CBC with Differential[[id number]]        Abnormal            Final result               Manual Differential[[id number]]          Abnormal            Edited Result - FINAL        Please view results for these tests on the individual orders.   TYPE & SCREEN   PREPARE PLATELETS (DOSE) SOFT                 Medications   0.9%  NaCl infusion (for blood  administration) (has no administration in time range)   0.9%  NaCl infusion (for blood administration) (has no administration in time range)         Medical Decision Making  Problems Addressed:  Blastic phase chronic myeloid leukemia: chronic illness or injury with exacerbation, progression, or side effects of treatment  Severe thrombocytopenia: acute illness or injury that poses a threat to life or bodily functions  Symptomatic anemia: acute illness or injury that poses a threat to life or bodily functions      ED assessment:    Ms. Ellison was transferred here for higher level of care, plan for transfusion of PRBCs and platelets per direction of her [place] hematologist, outpatient follow-up scheduled for Monday.  Denies any overt bleeding at home, presently hemodynamically stable.  Received 1 unit PRBC transfusion prior to transfer.      Differential diagnosis (including but not limited to):   CML with associated pancytopenia, medication effect, lysis, considered acute blood loss anemia however patient denies any symptoms to suggest acute blood loss though certainly is at risk given severe thrombocytopenia    ED management:   Patient remained hemodynamically stable while in the emergency department.  Repeat CBC following 1st unit PRBC transfusion with minimal improvement, additional 2 unit PRBCs and 2 unit platelet transfusion has been ordered and initiated in the emergency department.  Case discussed with the hospitalist who is agreeable to admission at this time.    Amount and/or Complexity of Data Reviewed  Independent historian: spouse    Summary of history:  Reports no overt bleeding.  Has had intermittent transfusions throughout her course, usually coordinated as outpatient.  External data reviewed: transfer records and prior labs  Summary of data reviewed:  As below, reviewed the transfer note as well as the IM consult note and the contact as reported with the patient's hematologist who advised does  not need to be transferred to [place], transfusion can be completed locally and they will follow up with the patient on Monday.  Risk and benefits of testing: discussed   Labs: ordered and reviewed      Discussion of management or test interpretation with external provider(s): discussed with hospitalist physician   Summary of discussion:  As above, discussed with hospitalist who accepts admission at this time.    Risk  Decision regarding hospitalization  Shared decision making     Critical Care  30-74 minutes     I, Chelsea Melendez MD personally performed the history, PE, MDM, and procedures as documented above and agree with the scribe's documentation.           ED Course as of 03/18/23 2252   Sat Mar 18, 2023   0356 I reviewed the note from the sending facility.  They discussed with the patient's oncologist who advised she did not need transfer to [place], simply needed 2 unit PRBC and 2 unit platelet transfusion and they will follow up with her on Monday.  She has received PRBCs prior to transfer, will check CBC and plan to transfuse platelets here, arms overnight to ensure labs appropriately improving. [KS]      ED Course User Index  [KS] Chelsea Melendez MD                 Clinical Impression:   Final diagnoses:  [D64.9] Symptomatic anemia  [C92.10] Blastic phase chronic myeloid leukemia (Primary)  [D69.6] Severe thrombocytopenia        ED Disposition Condition    Observation Stable                Chelsea Melendez MD  03/18/23 6260

## 2023-03-19 VITALS
HEART RATE: 77 BPM | DIASTOLIC BLOOD PRESSURE: 77 MMHG | RESPIRATION RATE: 18 BRPM | HEIGHT: 63 IN | BODY MASS INDEX: 42.7 KG/M2 | OXYGEN SATURATION: 96 % | WEIGHT: 241 LBS | SYSTOLIC BLOOD PRESSURE: 135 MMHG | TEMPERATURE: 98 F

## 2023-03-19 LAB
BASOPHILS # BLD AUTO: 0 X10(3)/MCL (ref 0–0.2)
BASOPHILS NFR BLD AUTO: 0 %
EOSINOPHIL # BLD AUTO: 0.02 X10(3)/MCL (ref 0–0.9)
EOSINOPHIL NFR BLD AUTO: 0.8 %
ERYTHROCYTE [DISTWIDTH] IN BLOOD BY AUTOMATED COUNT: 17.8 % (ref 11.5–17)
HCT VFR BLD AUTO: 21.5 % (ref 37–47)
HGB BLD-MCNC: 7.6 G/DL (ref 12–16)
IMM GRANULOCYTES # BLD AUTO: 0.04 X10(3)/MCL (ref 0–0.04)
IMM GRANULOCYTES NFR BLD AUTO: 1.6 %
LYMPHOCYTES # BLD AUTO: 1.53 X10(3)/MCL (ref 0.6–4.6)
LYMPHOCYTES NFR BLD AUTO: 59.8 %
MCH RBC QN AUTO: 30.9 PG
MCHC RBC AUTO-ENTMCNC: 35.3 G/DL (ref 33–36)
MCV RBC AUTO: 87.4 FL (ref 80–94)
MONOCYTES # BLD AUTO: 0.09 X10(3)/MCL (ref 0.1–1.3)
MONOCYTES NFR BLD AUTO: 3.5 %
NEUTROPHILS # BLD AUTO: 0.88 X10(3)/MCL (ref 2.1–9.2)
NEUTROPHILS NFR BLD AUTO: 34.3 %
NRBC BLD AUTO-RTO: 1.2 %
PLATELET # BLD AUTO: 38 X10(3)/MCL (ref 130–400)
PMV BLD AUTO: 9.6 FL (ref 7.4–10.4)
POCT GLUCOSE: 157 MG/DL (ref 70–110)
RBC # BLD AUTO: 2.46 X10(6)/MCL (ref 4.2–5.4)
WBC # SPEC AUTO: 2.6 X10(3)/MCL (ref 4.5–11.5)

## 2023-03-19 PROCEDURE — 25000003 PHARM REV CODE 250: Performed by: INTERNAL MEDICINE

## 2023-03-19 PROCEDURE — 85025 COMPLETE CBC W/AUTO DIFF WBC: CPT | Performed by: STUDENT IN AN ORGANIZED HEALTH CARE EDUCATION/TRAINING PROGRAM

## 2023-03-19 PROCEDURE — G0378 HOSPITAL OBSERVATION PER HR: HCPCS

## 2023-03-19 RX ADMIN — METFORMIN HYDROCHLORIDE 1000 MG: 500 TABLET, FILM COATED ORAL at 09:03

## 2023-03-19 RX ADMIN — MONTELUKAST SODIUM 10 MG: 10 TABLET, COATED ORAL at 09:03

## 2023-03-19 RX ADMIN — AMLODIPINE BESYLATE 10 MG: 5 TABLET ORAL at 09:03

## 2023-03-19 RX ADMIN — METOPROLOL TARTRATE 25 MG: 25 TABLET, FILM COATED ORAL at 09:03

## 2023-03-19 RX ADMIN — ACYCLOVIR 400 MG: 200 CAPSULE ORAL at 09:03

## 2023-03-19 RX ADMIN — LOSARTAN POTASSIUM 25 MG: 25 TABLET, FILM COATED ORAL at 09:03

## 2023-03-19 RX ADMIN — ATORVASTATIN CALCIUM 40 MG: 40 TABLET, FILM COATED ORAL at 09:03

## 2023-03-19 RX ADMIN — FUROSEMIDE 20 MG: 20 TABLET ORAL at 09:03

## 2023-03-19 RX ADMIN — PANTOPRAZOLE SODIUM 40 MG: 40 TABLET, DELAYED RELEASE ORAL at 09:03

## 2023-03-19 RX ADMIN — LEVOFLOXACIN 500 MG: 500 TABLET, FILM COATED ORAL at 09:03

## 2023-03-19 RX ADMIN — GABAPENTIN 300 MG: 300 CAPSULE ORAL at 09:03

## 2023-03-19 RX ADMIN — FLUCONAZOLE 200 MG: 100 TABLET ORAL at 09:03

## 2023-03-19 NOTE — NURSING
DC note: Discharge instructions explained and given to patient and spouse. All questions answered. NAD noted.

## 2023-03-19 NOTE — DISCHARGE SUMMARY
[facility] Hospital Medicine Discharge Summary    Admit Date: 3/18/2023  Discharge Date and Time: 3/19/463423:23 AM  Admitting Physician: LEEROY Team  Discharging Physician: Wesley Morris MD.  Primary Care Physician: Primary Doctor No      Discharge Diagnoses:  Pancytopenia, requiring transfusion  CML not in remission, and blastic phase, on  (pazopanib and as azacitidine)  Chronic immunosuppression on prophylactic antibiotics  Type 2 diabetes mellitus  Essential hypertension    Hospital Course:   Patient is a pleasant 57-year-old female with history of hypertension, diabetes and CML who was followed by Dr. ANGELIC Forman in [place] and is currently in blastic phase as of December and receiving chemotherapy (pazopanib and as azacitidine) with reported last treatment 03/07/2023.  She had minor gum bleeding and significant bruising on her abdomen over the last few days for which outpatient laboratory work was ordered by her oncologist in [place] and based on the results she was referred to the ER for transfusion.  She denies fever chills, active bleeding, nausea or vomiting.     On arrival to the ER she was afebrile hemodynamically stable but laboratory work did show platelets of 1000, hemoglobin of 5.6 and WBC of 2.9.  ER physician spoke with patient's oncologist in [place] who recommended no need for transfer to that facility but to transfuse 2 units of platelets, 2 units of blood, and patient has an appointment scheduled at their facility on 03/20/2023.  Her last transfusion was reportedly 4 weeks ago.     Patient was transfused blood products per her hematologist recommendations. She as stable and asymptomatic. Pancytopenia improved. She had no bleeding, rash or lesions.    at bedside, states he will be taking her to [facility] tomorrow and meet with their Hematologist. She will be discharged home today. Advised to hold home PPI.     Pt was seen and examined on the day of  discharge  Vitals:  VITAL SIGNS: 24 HRS MIN & MAX LAST   Temp  Min: 97.9 °F (36.6 °C)  Max: 99.2 °F (37.3 °C) 98.1 °F (36.7 °C)   BP  Min: 111/71  Max: 137/87 135/77   Pulse  Min: 64  Max: 77  77   Resp  Min: 18  Max: 18 18   SpO2  Min: 95 %  Max: 99 % 96 %       Physical Exam:  Heart RRR  Lungs clear   Abdomen soft and non tender   Neuro: No FND      Procedures Performed: No admission procedures for hospital encounter.     Significant Diagnostic Studies: See Full reports for all details    Recent Labs   Lab 03/18/23  0254 03/18/23  1827 03/19/23  0746   WBC 2.8* 2.3* 2.6*   RBC 1.88* 2.44* 2.46*   HGB 5.9* 7.5* 7.6*   HCT 17.3* 21.2* 21.5*   MCV 92.0 86.9 87.4   MCH 31.4 30.7 30.9   MCHC 34.1 35.4 35.3   RDW 15.8 17.6* 17.8*   PLT 1* 45* 38*   MPV  --  10.2 9.6       Recent Labs   Lab 03/17/23  1901 03/18/23  1827    141   K 4.0 4.1   CO2 25 26   BUN 12.1 12.7   CREATININE 0.83 0.69   CALCIUM 9.3 8.8   ALBUMIN 2.9* 3.0*   ALKPHOS 65 61   ALT 15 18   AST 10 13   BILITOT 0.6 1.2        Microbiology Results (last 7 days)       ** No results found for the last 168 hours. **             X-Ray Chest 1 View  Narrative: EXAMINATION:  XR CHEST 1 VIEW    CLINICAL HISTORY:  weakness;    TECHNIQUE:  Single frontal view of the chest was performed.    COMPARISON:  12/10/2022    FINDINGS:  LINES AND TUBES: EKG/telemetry leads overlie the chest.    MEDIASTINUM AND ELEONORA: Cardiac silhouette is enlarged.    LUNGS: Pulmonary vascular congestion.    PLEURA:No pleural effusion. No pneumothorax.    OTHER: No acute osseous abnormality.  Impression: Enlarged cardiac silhouette with vascular congestion without overt edema.    Electronically signed by: Laila Frost  Date:    03/17/2023  Time:    19:09         Medication List        CONTINUE taking these medications      acyclovir 400 MG tablet  Commonly known as: ZOVIRAX     albuterol 90 mcg/actuation inhaler  Commonly known as: PROVENTIL/VENTOLIN HFA     amLODIPine 10 MG  tablet  Commonly known as: NORVASC     atorvastatin 40 MG tablet  Commonly known as: LIPITOR     fluconazole 200 MG Tab  Commonly known as: DIFLUCAN     furosemide 20 MG tablet  Commonly known as: LASIX     gabapentin 300 MG capsule  Commonly known as: NEURONTIN  Take 1 capsule (300 mg total) by mouth 3 (three) times daily.     HYDROcodone-acetaminophen 5-325 mg per tablet  Commonly known as: NORCO     imatinib 400 MG Tab  Commonly known as: GLEEVEC  Take 1 tablet (400 mg total) by mouth once daily.     insulin glargine 100 unit/mL injection  Commonly known as: Lantus  Inject 60 Units into the skin nightly.     insulin lispro 100 unit/mL injection  Inject 15 Units into the skin 3 (three) times daily before meals.     levoFLOXacin 500 MG tablet  Commonly known as: LEVAQUIN  Take 1 tablet (500 mg total) by mouth once daily.     losartan 25 MG tablet  Commonly known as: COZAAR  Take 1 tablet (25 mg total) by mouth once daily.     metFORMIN 1000 MG tablet  Commonly known as: GLUCOPHAGE     metoprolol tartrate 25 MG tablet  Commonly known as: LOPRESSOR     montelukast 10 mg tablet  Commonly known as: SINGULAIR     OLANZapine 10 MG tablet  Commonly known as: ZyPREXA     ondansetron 8 MG tablet  Commonly known as: ZOFRAN            STOP taking these medications      pantoprazole 40 MG tablet  Commonly known as: PROTONIX               Explained in detail to the patient about the discharge plan, medications, and follow-up visits. Pt understands and agrees with the treatment plan  Discharge Disposition: Home   Discharged Condition: stable  Diet-   Dietary Orders (From admission, onward)       Start     Ordered    03/18/23 0508  Diet diabetic  (Diet/Nutrition [facility])  Diet effective now         03/18/23 0508                   Medications Per [place] med rec  Activities as tolerated    For further questions contact hospitalist office    Discharge time 33 minutes    For worsening symptoms, chest pain, shortness of breath, increased  abdominal pain, high grade fever, stroke or stroke like symptoms, immediately go to the nearest Emergency Room or call 911 as soon as possible.      Wesley Don M.D, on 3/19/2023. at 10:23 AM.

## 2023-03-20 LAB — HEMATOLOGIST REVIEW: NORMAL

## 2023-03-24 ENCOUNTER — TELEPHONE (OUTPATIENT)
Dept: HEMATOLOGY/ONCOLOGY | Facility: CLINIC | Age: 58
End: 2023-03-24

## 2023-03-27 ENCOUNTER — TELEPHONE (OUTPATIENT)
Dept: HEMATOLOGY/ONCOLOGY | Facility: CLINIC | Age: 58
End: 2023-03-27

## 2023-03-27 NOTE — TELEPHONE ENCOUNTER
Left message on home phone and called [phone]; spoke to son Juan M who will relay message to pt:    ----- Message -----   From: Jason Nation MD   Sent: 3/26/2023  10:55 PM CDT   To: Daisy Castillo LPN, [facility] Staff     This patient needs to follow-up with [facility], [place], for acute myeloid leukemia developing from CML.     Please cancel appointment with us.

## 2023-04-10 DIAGNOSIS — D64.9 SYMPTOMATIC ANEMIA: ICD-10-CM

## 2023-04-10 DIAGNOSIS — D72.829 HYPERLEUKOCYTOSIS: ICD-10-CM

## 2023-04-10 DIAGNOSIS — C92.10 BLAST CRISIS PHASE OF CHRONIC MYELOID LEUKEMIA: Primary | ICD-10-CM

## 2023-04-11 ENCOUNTER — OFFICE VISIT (OUTPATIENT)
Dept: HEMATOLOGY/ONCOLOGY | Facility: CLINIC | Age: 58
End: 2023-04-11
Attending: INTERNAL MEDICINE

## 2023-04-11 ENCOUNTER — TELEPHONE (OUTPATIENT)
Dept: HEMATOLOGY/ONCOLOGY | Facility: CLINIC | Age: 58
End: 2023-04-11

## 2023-04-11 VITALS
OXYGEN SATURATION: 98 % | BODY MASS INDEX: 42.06 KG/M2 | SYSTOLIC BLOOD PRESSURE: 125 MMHG | HEIGHT: 63 IN | TEMPERATURE: 99 F | DIASTOLIC BLOOD PRESSURE: 68 MMHG | HEART RATE: 74 BPM | WEIGHT: 237.38 LBS | RESPIRATION RATE: 20 BRPM

## 2023-04-11 DIAGNOSIS — D64.9 SYMPTOMATIC ANEMIA: ICD-10-CM

## 2023-04-11 DIAGNOSIS — D72.829 HYPERLEUKOCYTOSIS: ICD-10-CM

## 2023-04-11 DIAGNOSIS — D69.6 THROMBOCYTOPENIA: ICD-10-CM

## 2023-04-11 DIAGNOSIS — C92.10 BLAST CRISIS PHASE OF CHRONIC MYELOID LEUKEMIA: Primary | ICD-10-CM

## 2023-04-11 DIAGNOSIS — D64.9 ANEMIA, UNSPECIFIED TYPE: Primary | ICD-10-CM

## 2023-04-11 PROCEDURE — 99213 OFFICE O/P EST LOW 20 MIN: CPT | Mod: PBBFAC | Performed by: INTERNAL MEDICINE

## 2023-04-11 PROCEDURE — 99215 OFFICE O/P EST HI 40 MIN: CPT | Mod: S$PBB,,, | Performed by: INTERNAL MEDICINE

## 2023-04-11 PROCEDURE — 99215 PR OFFICE/OUTPT VISIT, EST, LEVL V, 40-54 MIN: ICD-10-PCS | Mod: S$PBB,,, | Performed by: INTERNAL MEDICINE

## 2023-04-11 RX ORDER — DIPHENHYDRAMINE HYDROCHLORIDE 50 MG/ML
25 INJECTION INTRAMUSCULAR; INTRAVENOUS
Status: CANCELLED | OUTPATIENT
Start: 2023-04-11

## 2023-04-11 RX ORDER — ACETAMINOPHEN 325 MG/1
650 TABLET ORAL
Status: CANCELLED | OUTPATIENT
Start: 2023-04-11
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
## 2023-04-11 NOTE — Clinical Note
Orders for today:   Transfuse PRBC x2 units today  Management of AML per oncologist in [place] Check CBC and CMP twice a week (every Monday and Thursday)  Transfuse PRBC x2 units if hemoglobin < 7 gm/dL; all unit irradiated and leukopoor  Platelet transfusion x1 unit single donor platelets if platelet count <10 K or if bleeding; all units irradiated and leukopoor  Follow-up with NP in 2 weeks

## 2023-04-12 ENCOUNTER — DOCUMENTATION ONLY (OUTPATIENT)
Dept: HEMATOLOGY/ONCOLOGY | Facility: CLINIC | Age: 58
End: 2023-04-12

## 2023-04-12 ENCOUNTER — INFUSION (OUTPATIENT)
Dept: INFUSION THERAPY | Facility: HOSPITAL | Age: 58
End: 2023-04-12
Attending: INTERNAL MEDICINE

## 2023-04-12 VITALS
DIASTOLIC BLOOD PRESSURE: 77 MMHG | TEMPERATURE: 98 F | RESPIRATION RATE: 20 BRPM | OXYGEN SATURATION: 95 % | SYSTOLIC BLOOD PRESSURE: 134 MMHG | HEART RATE: 67 BPM

## 2023-04-12 DIAGNOSIS — D64.9 ANEMIA, UNSPECIFIED TYPE: ICD-10-CM

## 2023-04-12 LAB
ABO + RH BLD: NORMAL
ABO + RH BLD: NORMAL
BLD PROD TYP BPU: NORMAL
BLD PROD TYP BPU: NORMAL
BLOOD UNIT EXPIRATION DATE: NORMAL
BLOOD UNIT EXPIRATION DATE: NORMAL
BLOOD UNIT TYPE CODE: 7300
BLOOD UNIT TYPE CODE: 7300
CROSSMATCH INTERPRETATION: NORMAL
CROSSMATCH INTERPRETATION: NORMAL
DISPENSE STATUS: NORMAL
DISPENSE STATUS: NORMAL
UNIT NUMBER: NORMAL
UNIT NUMBER: NORMAL

## 2023-04-12 PROCEDURE — 96375 TX/PRO/DX INJ NEW DRUG ADDON: CPT

## 2023-04-12 PROCEDURE — P9040 RBC LEUKOREDUCED IRRADIATED: HCPCS | Performed by: INTERNAL MEDICINE

## 2023-04-12 PROCEDURE — 25000003 PHARM REV CODE 250: Performed by: INTERNAL MEDICINE

## 2023-04-12 PROCEDURE — 63600175 PHARM REV CODE 636 W HCPCS: Performed by: INTERNAL MEDICINE

## 2023-04-12 PROCEDURE — 36430 TRANSFUSION BLD/BLD COMPNT: CPT

## 2023-04-12 PROCEDURE — 96374 THER/PROPH/DIAG INJ IV PUSH: CPT

## 2023-04-12 RX ORDER — ACETAMINOPHEN 325 MG/1
650 TABLET ORAL
Status: COMPLETED | OUTPATIENT
Start: 2023-04-12 | End: 2023-04-12

## 2023-04-12 RX ORDER — DIPHENHYDRAMINE HYDROCHLORIDE 50 MG/ML
25 INJECTION INTRAMUSCULAR; INTRAVENOUS
Status: COMPLETED | OUTPATIENT
Start: 2023-04-12 | End: 2023-04-12

## 2023-04-12 RX ADMIN — DIPHENHYDRAMINE HYDROCHLORIDE 25 MG: 50 INJECTION, SOLUTION INTRAMUSCULAR; INTRAVENOUS at 07:04

## 2023-04-12 RX ADMIN — ACETAMINOPHEN 650 MG: 325 TABLET ORAL at 07:04

## 2023-04-12 NOTE — NURSING
0730 Patient is here to receive 2 U LR IRR PRBC's accompanied by her spouse and son , who speaks english and is . Patient arrived by wheelchair.

## 2023-04-12 NOTE — NURSING
"ISAAC Eng met with patient, her spouse and son regarding financial resources. Patient's son, Joseph, interpreted at patient's request. Joseph reports that patient was denied for SSDI due to not having (immigration) "papers". Says patient is now working with an  but this will take time. Requesting assistance in paying her bills such as utilities, rent/mortgage, etc. ISAAC Eng provided contact information for [employer], Cancer [facility], [facility]. [facility], [place] [id number], and [employer]. ISAAC Eng explained that once these resources are contacted patient will received paperwork to complete. Once completed, if there is a section for medical staff to complete, notify ISAAC Eng to coordinate completion of paperwork and to assist in sending to appropriate resource. Patient and family voice understanding and agree to contact ISAAC Eng as needed.   "

## 2023-04-13 ENCOUNTER — DOCUMENTATION ONLY (OUTPATIENT)
Dept: HEMATOLOGY/ONCOLOGY | Facility: CLINIC | Age: 58
End: 2023-04-13

## 2023-04-17 ENCOUNTER — APPOINTMENT (OUTPATIENT)
Dept: HEMATOLOGY/ONCOLOGY | Facility: CLINIC | Age: 58
End: 2023-04-17

## 2023-04-17 ENCOUNTER — TELEPHONE (OUTPATIENT)
Dept: HEMATOLOGY/ONCOLOGY | Facility: CLINIC | Age: 58
End: 2023-04-17

## 2023-04-17 DIAGNOSIS — C92.10 BLAST CRISIS PHASE OF CHRONIC MYELOID LEUKEMIA: ICD-10-CM

## 2023-04-17 DIAGNOSIS — D72.829 HYPERLEUKOCYTOSIS: ICD-10-CM

## 2023-04-17 LAB
ALBUMIN SERPL-MCNC: 3.8 G/DL (ref 3.5–5)
ALBUMIN/GLOB SERPL: 1.3 RATIO (ref 1.1–2)
ALP SERPL-CCNC: 77 UNIT/L (ref 40–150)
ALT SERPL-CCNC: 15 UNIT/L (ref 0–55)
AST SERPL-CCNC: 11 UNIT/L (ref 5–34)
BASOPHILS # BLD AUTO: 0 X10(3)/MCL (ref 0–0.2)
BASOPHILS NFR BLD AUTO: 0 %
BILIRUBIN DIRECT+TOT PNL SERPL-MCNC: 0.6 MG/DL
BUN SERPL-MCNC: 13.6 MG/DL (ref 9.8–20.1)
CALCIUM SERPL-MCNC: 9.6 MG/DL (ref 8.4–10.2)
CHLORIDE SERPL-SCNC: 109 MMOL/L (ref 98–107)
CO2 SERPL-SCNC: 25 MMOL/L (ref 22–29)
CREAT SERPL-MCNC: 0.68 MG/DL (ref 0.55–1.02)
EOSINOPHIL # BLD AUTO: 0 X10(3)/MCL (ref 0–0.9)
EOSINOPHIL NFR BLD AUTO: 0 %
ERYTHROCYTE [DISTWIDTH] IN BLOOD BY AUTOMATED COUNT: 19.2 % (ref 11.5–17)
GFR SERPLBLD CREATININE-BSD FMLA CKD-EPI: >60 MLS/MIN/1.73/M2
GLOBULIN SER-MCNC: 3 GM/DL (ref 2.4–3.5)
GLUCOSE SERPL-MCNC: 199 MG/DL (ref 74–100)
HCT VFR BLD AUTO: 25.9 % (ref 37–47)
HGB BLD-MCNC: 8.6 G/DL (ref 12–16)
IMM GRANULOCYTES # BLD AUTO: 0.02 X10(3)/MCL (ref 0–0.04)
IMM GRANULOCYTES NFR BLD AUTO: 0.8 %
LYMPHOCYTES # BLD AUTO: 1.42 X10(3)/MCL (ref 0.6–4.6)
LYMPHOCYTES NFR BLD AUTO: 53.4 %
MCH RBC QN AUTO: 31 PG (ref 27–31)
MCHC RBC AUTO-ENTMCNC: 33.2 G/DL (ref 33–36)
MCV RBC AUTO: 93.5 FL (ref 80–94)
MONOCYTES # BLD AUTO: 0.14 X10(3)/MCL (ref 0.1–1.3)
MONOCYTES NFR BLD AUTO: 5.3 %
NEUTROPHILS # BLD AUTO: 1.08 X10(3)/MCL (ref 2.1–9.2)
NEUTROPHILS NFR BLD AUTO: 40.5 %
NRBC BLD AUTO-RTO: 2.3 %
PLATELET # BLD AUTO: 29 X10(3)/MCL (ref 130–400)
PLATELETS.RETICULATED NFR BLD AUTO: 7.9 % (ref 0.9–11.2)
PMV BLD AUTO: 10.5 FL (ref 7.4–10.4)
POTASSIUM SERPL-SCNC: 3.8 MMOL/L (ref 3.5–5.1)
PROT SERPL-MCNC: 6.8 GM/DL (ref 6.4–8.3)
RBC # BLD AUTO: 2.77 X10(6)/MCL (ref 4.2–5.4)
SODIUM SERPL-SCNC: 144 MMOL/L (ref 136–145)
WBC # SPEC AUTO: 2.7 X10(3)/MCL (ref 4.5–11.5)

## 2023-04-17 PROCEDURE — 80053 COMPREHEN METABOLIC PANEL: CPT

## 2023-04-17 PROCEDURE — 85025 COMPLETE CBC W/AUTO DIFF WBC: CPT

## 2023-04-17 PROCEDURE — 36415 COLL VENOUS BLD VENIPUNCTURE: CPT

## 2023-04-21 ENCOUNTER — TELEPHONE (OUTPATIENT)
Dept: HEMATOLOGY/ONCOLOGY | Facility: CLINIC | Age: 58
End: 2023-04-21
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
## 2023-04-24 ENCOUNTER — TELEPHONE (OUTPATIENT)
Dept: HEMATOLOGY/ONCOLOGY | Facility: CLINIC | Age: 58
End: 2023-04-24

## 2023-04-24 ENCOUNTER — OFFICE VISIT (OUTPATIENT)
Dept: HEMATOLOGY/ONCOLOGY | Facility: CLINIC | Age: 58
End: 2023-04-24

## 2023-04-24 VITALS
HEART RATE: 73 BPM | WEIGHT: 239.38 LBS | TEMPERATURE: 98 F | RESPIRATION RATE: 20 BRPM | BODY MASS INDEX: 42.41 KG/M2 | HEIGHT: 63 IN | OXYGEN SATURATION: 96 % | SYSTOLIC BLOOD PRESSURE: 130 MMHG | DIASTOLIC BLOOD PRESSURE: 74 MMHG

## 2023-04-24 DIAGNOSIS — C92.10 CHRONIC MYELOID LEUKEMIA: Primary | ICD-10-CM

## 2023-04-24 PROCEDURE — 99213 OFFICE O/P EST LOW 20 MIN: CPT | Mod: PBBFAC

## 2023-04-24 PROCEDURE — 99214 OFFICE O/P EST MOD 30 MIN: CPT | Mod: S$PBB,,,

## 2023-04-24 PROCEDURE — 99214 PR OFFICE/OUTPT VISIT, EST, LEVL IV, 30-39 MIN: ICD-10-PCS | Mod: S$PBB,,,

## 2023-04-24 RX ORDER — GLIMEPIRIDE 4 MG/1
4 TABLET ORAL
Status: ON HOLD | COMMUNITY
End: 2023-06-02 | Stop reason: ALTCHOICE

## 2023-04-24 RX ORDER — LISINOPRIL 10 MG/1
40 TABLET ORAL
Status: ON HOLD | COMMUNITY
Start: 2021-08-18 | End: 2023-06-02 | Stop reason: ALTCHOICE

## 2023-04-24 RX ORDER — CHOLECALCIFEROL (VITAMIN D3) 25 MCG
1000 TABLET ORAL
Status: ON HOLD | COMMUNITY
End: 2023-06-02 | Stop reason: ALTCHOICE

## 2023-04-24 RX ORDER — IBUPROFEN 600 MG/1
TABLET ORAL
COMMUNITY
Start: 2023-03-29 | End: 2023-12-30 | Stop reason: CLARIF

## 2023-04-24 RX ORDER — ALPRAZOLAM 0.25 MG/1
0.25 TABLET ORAL 3 TIMES DAILY PRN
COMMUNITY
Start: 2023-04-04 | End: 2023-12-30 | Stop reason: CLARIF

## 2023-04-24 RX ORDER — ALLOPURINOL 300 MG/1
300 TABLET ORAL
Status: ON HOLD | COMMUNITY
Start: 2023-04-07 | End: 2023-05-29 | Stop reason: HOSPADM

## 2023-04-24 RX ORDER — LORATADINE 10 MG/1
10 TABLET ORAL
Status: ON HOLD | COMMUNITY
End: 2023-06-02 | Stop reason: ALTCHOICE

## 2023-04-24 RX ORDER — BUSPIRONE HYDROCHLORIDE 5 MG/1
5 TABLET ORAL
Status: ON HOLD | COMMUNITY
End: 2023-05-29 | Stop reason: HOSPADM

## 2023-04-24 RX ORDER — METOPROLOL SUCCINATE 25 MG/1
25 TABLET, EXTENDED RELEASE ORAL 2 TIMES DAILY
Status: ON HOLD | COMMUNITY
End: 2023-06-02

## 2023-04-24 RX ORDER — LANOLIN ALCOHOL/MO/W.PET/CERES
400 CREAM (GRAM) TOPICAL
Status: ON HOLD | COMMUNITY
End: 2023-06-02 | Stop reason: ALTCHOICE

## 2023-04-24 RX ORDER — ALLOPURINOL 300 MG/1
300 TABLET ORAL DAILY
Status: ON HOLD | COMMUNITY
Start: 2023-02-27 | End: 2024-04-01 | Stop reason: HOSPADM

## 2023-04-24 RX ORDER — AMMONIUM LACTATE 12 G/100G
CREAM TOPICAL DAILY PRN
COMMUNITY
End: 2023-12-30 | Stop reason: CLARIF

## 2023-04-24 RX ORDER — ESCITALOPRAM OXALATE 10 MG/1
10 TABLET ORAL
Status: ON HOLD | COMMUNITY
Start: 2021-08-18 | End: 2023-05-29 | Stop reason: HOSPADM

## 2023-04-24 RX ORDER — HYDROXYZINE PAMOATE 25 MG/1
25 CAPSULE ORAL 3 TIMES DAILY PRN
Status: ON HOLD | COMMUNITY
Start: 2023-04-13 | End: 2023-06-02 | Stop reason: ALTCHOICE

## 2023-04-24 RX ORDER — HUMAN INSULIN 100 [IU]/ML
20 INJECTION, SUSPENSION SUBCUTANEOUS
Status: ON HOLD | COMMUNITY
Start: 2023-03-22 | End: 2024-04-01 | Stop reason: HOSPADM
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
## 2023-05-08 ENCOUNTER — OFFICE VISIT (OUTPATIENT)
Dept: HEMATOLOGY/ONCOLOGY | Facility: CLINIC | Age: 58
End: 2023-05-08
Attending: INTERNAL MEDICINE

## 2023-05-08 VITALS
RESPIRATION RATE: 20 BRPM | DIASTOLIC BLOOD PRESSURE: 78 MMHG | WEIGHT: 240 LBS | HEART RATE: 71 BPM | HEIGHT: 63 IN | SYSTOLIC BLOOD PRESSURE: 134 MMHG | TEMPERATURE: 98 F | BODY MASS INDEX: 42.52 KG/M2 | OXYGEN SATURATION: 97 %

## 2023-05-08 DIAGNOSIS — D69.6 THROMBOCYTOPENIA: ICD-10-CM

## 2023-05-08 DIAGNOSIS — C92.10 BLAST CRISIS PHASE OF CHRONIC MYELOID LEUKEMIA: Primary | ICD-10-CM

## 2023-05-08 DIAGNOSIS — D64.9 SYMPTOMATIC ANEMIA: ICD-10-CM

## 2023-05-08 PROCEDURE — 99215 OFFICE O/P EST HI 40 MIN: CPT | Mod: PBBFAC | Performed by: INTERNAL MEDICINE

## 2023-05-08 PROCEDURE — 99213 OFFICE O/P EST LOW 20 MIN: CPT | Mod: S$PBB,,, | Performed by: INTERNAL MEDICINE

## 2023-05-08 PROCEDURE — 99213 PR OFFICE/OUTPT VISIT, EST, LEVL III, 20-29 MIN: ICD-10-PCS | Mod: S$PBB,,, | Performed by: INTERNAL MEDICINE

## 2023-05-08 NOTE — Clinical Note
Orders for today:   Check CBC and CMP weekly Transfuse packed PRBCs if hemoglobin < 7  Transfuse platelets if platelet count < 10 K  All blood units irradiated and leukopoor  Follow-up with NP in 2 weeks.

## 2023-05-24 ENCOUNTER — OFFICE VISIT (OUTPATIENT)
Dept: HEMATOLOGY/ONCOLOGY | Facility: CLINIC | Age: 58
End: 2023-05-24

## 2023-05-24 ENCOUNTER — HOSPITAL ENCOUNTER (OUTPATIENT)
Dept: RADIOLOGY | Facility: HOSPITAL | Age: 58
Discharge: HOME | End: 2023-05-24
Payer: MEDICAID

## 2023-05-24 ENCOUNTER — DOCUMENTATION ONLY (OUTPATIENT)
Dept: HEMATOLOGY/ONCOLOGY | Facility: CLINIC | Age: 58
End: 2023-05-24

## 2023-05-24 VITALS
SYSTOLIC BLOOD PRESSURE: 119 MMHG | HEIGHT: 65 IN | DIASTOLIC BLOOD PRESSURE: 66 MMHG | BODY MASS INDEX: 39.92 KG/M2 | HEART RATE: 66 BPM | WEIGHT: 239.63 LBS | RESPIRATION RATE: 20 BRPM | TEMPERATURE: 99 F

## 2023-05-24 DIAGNOSIS — R05.9 COUGH IN ADULT: ICD-10-CM

## 2023-05-24 DIAGNOSIS — C92.00 ACUTE MYELOID LEUKEMIA NOT HAVING ACHIEVED REMISSION: Primary | ICD-10-CM

## 2023-05-24 DIAGNOSIS — D69.6 THROMBOCYTOPENIA: ICD-10-CM

## 2023-05-24 PROCEDURE — 71046 X-RAY EXAM CHEST 2 VIEWS: CPT | Mod: TC

## 2023-05-24 PROCEDURE — 99215 PR OFFICE/OUTPT VISIT, EST, LEVL V, 40-54 MIN: ICD-10-PCS | Mod: S$PBB,,,

## 2023-05-24 PROCEDURE — 99215 OFFICE O/P EST HI 40 MIN: CPT | Mod: PBBFAC,25

## 2023-05-24 PROCEDURE — 99215 OFFICE O/P EST HI 40 MIN: CPT | Mod: S$PBB,,,

## 2023-05-24 RX ORDER — FLUCONAZOLE 200 MG/1
1 TABLET ORAL DAILY
Status: ON HOLD | COMMUNITY
Start: 2023-05-08 | End: 2023-05-29 | Stop reason: HOSPADM

## 2023-05-24 RX ORDER — QUETIAPINE FUMARATE 25 MG/1
25 TABLET, FILM COATED ORAL NIGHTLY
Status: ON HOLD | COMMUNITY
Start: 2023-03-27 | End: 2023-06-02 | Stop reason: ALTCHOICE

## 2023-05-24 RX ORDER — ONDANSETRON 4 MG/1
4 TABLET, ORALLY DISINTEGRATING ORAL EVERY 8 HOURS PRN
Status: ON HOLD | COMMUNITY
Start: 2023-05-16 | End: 2023-05-29 | Stop reason: HOSPADM

## 2023-05-24 RX ORDER — OFLOXACIN 3 MG/ML
SOLUTION AURICULAR (OTIC)
Status: ON HOLD | COMMUNITY
Start: 2023-04-27 | End: 2023-06-02 | Stop reason: ALTCHOICE

## 2023-05-24 RX ORDER — ACYCLOVIR 400 MG/1
400 TABLET ORAL
Status: ON HOLD | COMMUNITY
Start: 2022-04-08 | End: 2023-05-29 | Stop reason: HOSPADM

## 2023-05-24 RX ORDER — HYDROCORTISONE 25 MG/ML
SOLUTION TOPICAL 2 TIMES DAILY
Status: ON HOLD | COMMUNITY
Start: 2023-05-16 | End: 2023-08-07 | Stop reason: HOSPADM

## 2023-05-24 RX ORDER — DOXYCYCLINE 100 MG/1
1 CAPSULE ORAL
Status: ON HOLD | COMMUNITY
End: 2023-06-02 | Stop reason: ALTCHOICE

## 2023-05-24 RX ORDER — LOSARTAN POTASSIUM 25 MG/1
25 TABLET ORAL
Status: ON HOLD | COMMUNITY
Start: 2022-04-08 | End: 2023-06-02 | Stop reason: SDUPTHER

## 2023-05-24 RX ORDER — HYDROCHLOROTHIAZIDE 12.5 MG/1
TABLET ORAL
Status: ON HOLD | COMMUNITY
End: 2023-06-02 | Stop reason: ALTCHOICE

## 2023-05-24 RX ORDER — AMOXICILLIN AND CLAVULANATE POTASSIUM 875; 125 MG/1; MG/1
TABLET, FILM COATED ORAL
Status: ON HOLD | COMMUNITY
End: 2023-06-02 | Stop reason: ALTCHOICE

## 2023-05-24 RX ORDER — SILVER SULFADIAZINE 10 G/1000G
CREAM TOPICAL
COMMUNITY
Start: 2023-01-20 | End: 2023-12-30 | Stop reason: CLARIF

## 2023-05-24 RX ORDER — INSULIN LISPRO 100 [IU]/ML
INJECTION, SOLUTION INTRAVENOUS; SUBCUTANEOUS
Status: ON HOLD | COMMUNITY
Start: 2023-03-27 | End: 2023-06-02 | Stop reason: ALTCHOICE

## 2023-05-24 RX ORDER — OMEPRAZOLE 40 MG/1
1 CAPSULE, DELAYED RELEASE ORAL DAILY
Status: ON HOLD | COMMUNITY
Start: 2023-05-22 | End: 2024-04-01 | Stop reason: HOSPADM

## 2023-05-24 RX ORDER — AMLODIPINE BESYLATE 10 MG/1
10 TABLET ORAL
Status: ON HOLD | COMMUNITY
Start: 2022-04-08 | End: 2023-05-29 | Stop reason: HOSPADM

## 2023-05-24 RX ORDER — ONDANSETRON 4 MG/1
8 TABLET, ORALLY DISINTEGRATING ORAL EVERY 8 HOURS PRN
COMMUNITY
Start: 2023-05-16 | End: 2023-12-28 | Stop reason: DRUGHIGH

## 2023-05-24 RX ORDER — MULTIVIT WITH IRON,MINERALS
1 TABLET ORAL
Status: ON HOLD | COMMUNITY
End: 2023-06-02 | Stop reason: ALTCHOICE

## 2023-05-24 RX ORDER — VORICONAZOLE 40 MG/ML
200 POWDER, FOR SUSPENSION ORAL
Status: ON HOLD | COMMUNITY
Start: 2022-04-08 | End: 2023-06-02 | Stop reason: ALTCHOICE

## 2023-05-24 RX ORDER — KETOCONAZOLE 20 MG/G
CREAM TOPICAL
COMMUNITY
Start: 2023-05-22 | End: 2023-12-30 | Stop reason: CLARIF

## 2023-05-24 RX ORDER — ATORVASTATIN CALCIUM 40 MG/1
40 TABLET, FILM COATED ORAL
Status: ON HOLD | COMMUNITY
End: 2023-05-29 | Stop reason: HOSPADM

## 2023-05-24 RX ORDER — IMATINIB MESYLATE 400 MG/1
TABLET, FILM COATED ORAL
Status: ON HOLD | COMMUNITY
Start: 2021-08-10 | End: 2023-05-29 | Stop reason: HOSPADM

## 2023-05-24 RX ORDER — INSULIN LISPRO 100 [IU]/ML
20 INJECTION, SOLUTION INTRAVENOUS; SUBCUTANEOUS
Status: ON HOLD | COMMUNITY
Start: 2023-04-06 | End: 2023-06-02 | Stop reason: ALTCHOICE

## 2023-05-24 RX ORDER — SILVER SULFADIAZINE 10 G/1000G
CREAM TOPICAL
Status: ON HOLD | COMMUNITY
Start: 2023-01-20 | End: 2023-08-07 | Stop reason: HOSPADM

## 2023-05-24 RX ORDER — PROMETHAZINE HYDROCHLORIDE AND DEXTROMETHORPHAN HYDROBROMIDE 6.25; 15 MG/5ML; MG/5ML
SYRUP ORAL
Status: ON HOLD | COMMUNITY
Start: 2023-04-27 | End: 2023-06-02 | Stop reason: ALTCHOICE

## 2023-05-24 RX ORDER — HYDROCORTISONE 25 MG/G
CREAM TOPICAL 2 TIMES DAILY
COMMUNITY
Start: 2023-05-22 | End: 2023-12-30 | Stop reason: CLARIF
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
## 2023-05-24 NOTE — NURSING
Received critical result from [facility] lab for platelets = 26. (Range 130-400)  Last result on 5/8/23 platelets were 66.  Verbal report given to SHARI Bradford

## 2023-05-28 ENCOUNTER — HOSPITAL ENCOUNTER (OUTPATIENT)
Facility: HOSPITAL | Age: 58
Discharge: HOME | End: 2023-05-29
Attending: FAMILY MEDICINE | Admitting: INTERNAL MEDICINE
Payer: MEDICAID

## 2023-05-28 DIAGNOSIS — D64.9 SYMPTOMATIC ANEMIA: ICD-10-CM

## 2023-05-28 DIAGNOSIS — E11.9 TYPE 2 DIABETES MELLITUS WITHOUT COMPLICATION, WITH LONG-TERM CURRENT USE OF INSULIN: ICD-10-CM

## 2023-05-28 DIAGNOSIS — C92.10 BLAST CRISIS PHASE OF CHRONIC MYELOID LEUKEMIA: ICD-10-CM

## 2023-05-28 DIAGNOSIS — Z79.4 TYPE 2 DIABETES MELLITUS WITHOUT COMPLICATION, WITH LONG-TERM CURRENT USE OF INSULIN: ICD-10-CM

## 2023-05-28 DIAGNOSIS — C92.10 CML (CHRONIC MYELOCYTIC LEUKEMIA): ICD-10-CM

## 2023-05-28 DIAGNOSIS — D69.6 THROMBOCYTOPENIA: Primary | ICD-10-CM

## 2023-05-28 LAB
ABO + RH BLD: NORMAL
ABO + RH BLD: NORMAL
ABS NEUT CALC (OHS): 0.75 X10(3)/MCL (ref 2.1–9.2)
ABS NEUT CALC (OHS): 1.13 X10(3)/MCL (ref 2.1–9.2)
ALBUMIN SERPL-MCNC: 3.3 G/DL (ref 3.5–5)
ALBUMIN/GLOB SERPL: 1.5 RATIO (ref 1.1–2)
ALP SERPL-CCNC: 68 UNIT/L (ref 40–150)
ALT SERPL-CCNC: 24 UNIT/L (ref 0–55)
ANISOCYTOSIS BLD QL SMEAR: ABNORMAL
ANISOCYTOSIS BLD QL SMEAR: ABNORMAL
APTT PPP: 21.8 SECONDS
AST SERPL-CCNC: 14 UNIT/L (ref 5–34)
BILIRUBIN DIRECT+TOT PNL SERPL-MCNC: 0.5 MG/DL
BLD PROD TYP BPU: NORMAL
BLD PROD TYP BPU: NORMAL
BLOOD UNIT EXPIRATION DATE: NORMAL
BLOOD UNIT EXPIRATION DATE: NORMAL
BLOOD UNIT TYPE CODE: 5100
BLOOD UNIT TYPE CODE: 5100
BUN SERPL-MCNC: 18.1 MG/DL (ref 9.8–20.1)
CALCIUM SERPL-MCNC: 8.7 MG/DL (ref 8.4–10.2)
CHLORIDE SERPL-SCNC: 109 MMOL/L (ref 98–107)
CO2 SERPL-SCNC: 21 MMOL/L (ref 22–29)
CREAT SERPL-MCNC: 0.81 MG/DL (ref 0.55–1.02)
CROSSMATCH INTERPRETATION: NORMAL
CROSSMATCH INTERPRETATION: NORMAL
D DIMER PPP IA.FEU-MCNC: 0.3 UG/ML FEU (ref 0–0.5)
DISPENSE STATUS: NORMAL
DISPENSE STATUS: NORMAL
ERYTHROCYTE [DISTWIDTH] IN BLOOD BY AUTOMATED COUNT: 20.4 % (ref 11.5–17)
ERYTHROCYTE [DISTWIDTH] IN BLOOD BY AUTOMATED COUNT: 20.9 % (ref 11.5–17)
FERRITIN SERPL-MCNC: 298.15 NG/ML (ref 4.63–204)
FIBRINOGEN PPP-MCNC: 379 MG/DL (ref 210–463)
FOLATE SERPL-MCNC: 10.8 NG/ML (ref 7–31.4)
GFR SERPLBLD CREATININE-BSD FMLA CKD-EPI: >60 MLS/MIN/1.73/M2
GLOBULIN SER-MCNC: 2.2 GM/DL (ref 2.4–3.5)
GLUCOSE SERPL-MCNC: 228 MG/DL (ref 74–100)
GLUCOSE SERPL-MCNC: 271 MG/DL (ref 70–110)
GROUP & RH: NORMAL
HCT VFR BLD AUTO: 25.6 % (ref 37–47)
HCT VFR BLD AUTO: 25.7 % (ref 37–47)
HCT VFR BLD AUTO: 27.2 % (ref 37–47)
HGB BLD-MCNC: 8 G/DL (ref 12–16)
HGB BLD-MCNC: 8.1 G/DL (ref 12–16)
HGB BLD-MCNC: 8.6 G/DL (ref 12–16)
INDIRECT COOMBS GEL: NORMAL
IRON SATN MFR SERPL: 29 % (ref 20–50)
IRON SERPL-MCNC: 85 UG/DL (ref 50–170)
LYMPHOCYTES NFR BLD MANUAL: 1.02 X10(3)/MCL
LYMPHOCYTES NFR BLD MANUAL: 1.33 X10(3)/MCL
LYMPHOCYTES NFR BLD MANUAL: 47 % (ref 13–40)
LYMPHOCYTES NFR BLD MANUAL: 62 % (ref 13–40)
MACROCYTES BLD QL SMEAR: ABNORMAL
MAGNESIUM SERPL-MCNC: 1.9 MG/DL (ref 1.6–2.6)
MCH RBC QN AUTO: 33.5 PG (ref 27–31)
MCH RBC QN AUTO: 33.9 PG (ref 27–31)
MCHC RBC AUTO-ENTMCNC: 31.3 G/DL (ref 33–36)
MCHC RBC AUTO-ENTMCNC: 31.6 G/DL (ref 33–36)
MCV RBC AUTO: 107.1 FL (ref 80–94)
MCV RBC AUTO: 107.1 FL (ref 80–94)
MONOCYTES NFR BLD MANUAL: 0.02 X10(3)/MCL (ref 0.1–1.3)
MONOCYTES NFR BLD MANUAL: 0.06 X10(3)/MCL (ref 0.1–1.3)
MONOCYTES NFR BLD MANUAL: 1 % (ref 2–11)
MONOCYTES NFR BLD MANUAL: 3 % (ref 2–11)
NEUTROPHILS NFR BLD MANUAL: 35 % (ref 47–80)
NEUTROPHILS NFR BLD MANUAL: 52 % (ref 47–80)
NRBC BLD AUTO-RTO: 7 %
NRBC BLD AUTO-RTO: 7.4 %
NRBC BLD MANUAL-RTO: 10 %
NRBC BLD MANUAL-RTO: 5 %
PLATELET # BLD AUTO: 6 X10(3)/MCL (ref 130–400)
PLATELET # BLD AUTO: 81 X10(3)/MCL (ref 130–400)
PLATELET # BLD EST: ABNORMAL 10*3/UL
PLATELET # BLD EST: ABNORMAL 10*3/UL
PLATELETS.RETICULATED NFR BLD AUTO: 6.9 % (ref 0.9–11.2)
PMV BLD AUTO: 10.9 FL (ref 7.4–10.4)
PMV BLD AUTO: ABNORMAL FL
POCT GLUCOSE: 156 MG/DL (ref 70–110)
POCT GLUCOSE: 173 MG/DL (ref 70–110)
POCT GLUCOSE: 233 MG/DL (ref 70–110)
POCT GLUCOSE: 271 MG/DL (ref 70–110)
POIKILOCYTOSIS BLD QL SMEAR: SLIGHT
POLYCHROMASIA BLD QL SMEAR: ABNORMAL
POLYCHROMASIA BLD QL SMEAR: SLIGHT
POTASSIUM SERPL-SCNC: 3.5 MMOL/L (ref 3.5–5.1)
PROT SERPL-MCNC: 5.5 GM/DL (ref 6.4–8.3)
RBC # BLD AUTO: 2.39 X10(6)/MCL (ref 4.2–5.4)
RBC # BLD AUTO: 2.54 X10(6)/MCL (ref 4.2–5.4)
RBC MORPH BLD: ABNORMAL
RBC MORPH BLD: ABNORMAL
SODIUM SERPL-SCNC: 142 MMOL/L (ref 136–145)
SPECIMEN OUTDATE: NORMAL
STIPPLED RBC (OHS): SLIGHT
TEAR DROP CELL (OLG): ABNORMAL
TIBC SERPL-MCNC: 208 UG/DL (ref 70–310)
TIBC SERPL-MCNC: 293 UG/DL (ref 250–450)
TRANSFERRIN SERPL-MCNC: 249 MG/DL (ref 180–382)
TSH SERPL-ACNC: 1.53 UIU/ML (ref 0.35–4.94)
UNIT NUMBER: NORMAL
UNIT NUMBER: NORMAL
URATE SERPL-MCNC: 2.7 MG/DL (ref 2.6–6)
VIT B12 SERPL-MCNC: 217 PG/ML (ref 213–816)
WBC # SPEC AUTO: 2.15 X10(3)/MCL (ref 4.5–11.5)
WBC # SPEC AUTO: 2.17 X10(3)/MCL (ref 4.5–11.5)

## 2023-05-28 PROCEDURE — 36430 TRANSFUSION BLD/BLD COMPNT: CPT

## 2023-05-28 PROCEDURE — 96372 THER/PROPH/DIAG INJ SC/IM: CPT | Performed by: INTERNAL MEDICINE

## 2023-05-28 PROCEDURE — 86900 BLOOD TYPING SEROLOGIC ABO: CPT | Performed by: FAMILY MEDICINE

## 2023-05-28 PROCEDURE — 63600175 PHARM REV CODE 636 W HCPCS

## 2023-05-28 PROCEDURE — 96372 THER/PROPH/DIAG INJ SC/IM: CPT

## 2023-05-28 PROCEDURE — 63700000 PHARM REV CODE 250 ALT 637 W/O HCPCS

## 2023-05-28 PROCEDURE — 63600175 PHARM REV CODE 636 W HCPCS: Performed by: INTERNAL MEDICINE

## 2023-05-28 PROCEDURE — G0378 HOSPITAL OBSERVATION PER HR: HCPCS

## 2023-05-28 PROCEDURE — 84550 ASSAY OF BLOOD/URIC ACID: CPT

## 2023-05-28 PROCEDURE — 83735 ASSAY OF MAGNESIUM: CPT | Performed by: FAMILY MEDICINE

## 2023-05-28 PROCEDURE — 25000003 PHARM REV CODE 250

## 2023-05-28 PROCEDURE — 99900035 HC TECH TIME PER 15 MIN (STAT)

## 2023-05-28 PROCEDURE — S0088 IMATINIB 100 MG: HCPCS

## 2023-05-28 PROCEDURE — 85014 HEMATOCRIT: CPT

## 2023-05-28 PROCEDURE — 80053 COMPREHEN METABOLIC PANEL: CPT | Performed by: FAMILY MEDICINE

## 2023-05-28 PROCEDURE — 83550 IRON BINDING TEST: CPT

## 2023-05-28 PROCEDURE — 82746 ASSAY OF FOLIC ACID SERUM: CPT

## 2023-05-28 PROCEDURE — 85027 COMPLETE CBC AUTOMATED: CPT | Mod: 91 | Performed by: FAMILY MEDICINE

## 2023-05-28 PROCEDURE — 84443 ASSAY THYROID STIM HORMONE: CPT

## 2023-05-28 PROCEDURE — 94760 N-INVAS EAR/PLS OXIMETRY 1: CPT

## 2023-05-28 PROCEDURE — 99285 EMERGENCY DEPT VISIT HI MDM: CPT | Mod: 25

## 2023-05-28 PROCEDURE — 85379 FIBRIN DEGRADATION QUANT: CPT

## 2023-05-28 PROCEDURE — P9037 PLATE PHERES LEUKOREDU IRRAD: HCPCS | Performed by: FAMILY MEDICINE

## 2023-05-28 PROCEDURE — 85730 THROMBOPLASTIN TIME PARTIAL: CPT

## 2023-05-28 PROCEDURE — 82962 GLUCOSE BLOOD TEST: CPT

## 2023-05-28 PROCEDURE — 85384 FIBRINOGEN ACTIVITY: CPT

## 2023-05-28 PROCEDURE — 85610 PROTHROMBIN TIME: CPT

## 2023-05-28 PROCEDURE — 85027 COMPLETE CBC AUTOMATED: CPT | Performed by: FAMILY MEDICINE

## 2023-05-28 PROCEDURE — 25000003 PHARM REV CODE 250: Performed by: FAMILY MEDICINE

## 2023-05-28 PROCEDURE — 82728 ASSAY OF FERRITIN: CPT

## 2023-05-28 PROCEDURE — 82607 VITAMIN B-12: CPT

## 2023-05-28 PROCEDURE — 85007 BL SMEAR W/DIFF WBC COUNT: CPT | Mod: 91 | Performed by: FAMILY MEDICINE

## 2023-05-28 RX ORDER — VORICONAZOLE 40 MG/ML
200 POWDER, FOR SUSPENSION ORAL EVERY 12 HOURS
Status: DISCONTINUED | OUTPATIENT
Start: 2023-05-28 | End: 2023-05-28

## 2023-05-28 RX ORDER — OLANZAPINE 10 MG/1
10 TABLET ORAL NIGHTLY
Status: DISCONTINUED | OUTPATIENT
Start: 2023-05-28 | End: 2023-05-29 | Stop reason: HOSPADM

## 2023-05-28 RX ORDER — ESCITALOPRAM OXALATE 10 MG/1
10 TABLET ORAL DAILY
Status: DISCONTINUED | OUTPATIENT
Start: 2023-05-28 | End: 2023-05-29 | Stop reason: HOSPADM

## 2023-05-28 RX ORDER — QUETIAPINE FUMARATE 25 MG/1
25 TABLET, FILM COATED ORAL NIGHTLY
Status: DISCONTINUED | OUTPATIENT
Start: 2023-05-28 | End: 2023-05-29 | Stop reason: HOSPADM

## 2023-05-28 RX ORDER — ALBUTEROL SULFATE 0.83 MG/ML
2.5 SOLUTION RESPIRATORY (INHALATION) EVERY 6 HOURS PRN
Status: DISCONTINUED | OUTPATIENT
Start: 2023-05-28 | End: 2023-05-29 | Stop reason: HOSPADM

## 2023-05-28 RX ORDER — BUSPIRONE HYDROCHLORIDE 5 MG/1
5 TABLET ORAL 2 TIMES DAILY
Status: DISCONTINUED | OUTPATIENT
Start: 2023-05-28 | End: 2023-05-29 | Stop reason: HOSPADM

## 2023-05-28 RX ORDER — GLUCAGON 1 MG
1 KIT INJECTION
Status: DISCONTINUED | OUTPATIENT
Start: 2023-05-28 | End: 2023-05-29 | Stop reason: HOSPADM

## 2023-05-28 RX ORDER — ACYCLOVIR 400 MG/1
400 TABLET ORAL 2 TIMES DAILY
Status: DISCONTINUED | OUTPATIENT
Start: 2023-05-28 | End: 2023-05-29 | Stop reason: HOSPADM

## 2023-05-28 RX ORDER — FLUCONAZOLE 100 MG/1
200 TABLET ORAL DAILY
Status: DISCONTINUED | OUTPATIENT
Start: 2023-05-28 | End: 2023-05-29 | Stop reason: HOSPADM

## 2023-05-28 RX ORDER — INSULIN ASPART 100 [IU]/ML
15 INJECTION, SOLUTION INTRAVENOUS; SUBCUTANEOUS
Status: DISCONTINUED | OUTPATIENT
Start: 2023-05-28 | End: 2023-05-29 | Stop reason: HOSPADM

## 2023-05-28 RX ORDER — DEXTROSE 40 %
15 GEL (GRAM) ORAL
Status: DISCONTINUED | OUTPATIENT
Start: 2023-05-28 | End: 2023-05-29 | Stop reason: HOSPADM

## 2023-05-28 RX ORDER — INSULIN ASPART 100 [IU]/ML
1-10 INJECTION, SOLUTION INTRAVENOUS; SUBCUTANEOUS
Status: DISCONTINUED | OUTPATIENT
Start: 2023-05-28 | End: 2023-05-29 | Stop reason: HOSPADM

## 2023-05-28 RX ORDER — NALOXONE HCL 0.4 MG/ML
0.02 VIAL (ML) INJECTION
Status: DISCONTINUED | OUTPATIENT
Start: 2023-05-28 | End: 2023-05-29 | Stop reason: HOSPADM

## 2023-05-28 RX ORDER — HYDROCODONE BITARTRATE AND ACETAMINOPHEN 500; 5 MG/1; MG/1
TABLET ORAL
Status: DISCONTINUED | OUTPATIENT
Start: 2023-05-28 | End: 2023-05-29 | Stop reason: HOSPADM

## 2023-05-28 RX ORDER — ATORVASTATIN CALCIUM 40 MG/1
40 TABLET, FILM COATED ORAL DAILY
Status: DISCONTINUED | OUTPATIENT
Start: 2023-05-28 | End: 2023-05-29 | Stop reason: HOSPADM

## 2023-05-28 RX ORDER — IMATINIB MESYLATE 400 MG/1
400 TABLET, FILM COATED ORAL DAILY
Status: DISCONTINUED | OUTPATIENT
Start: 2023-05-28 | End: 2023-05-29 | Stop reason: HOSPADM

## 2023-05-28 RX ORDER — INSULIN LISPRO 100 [IU]/ML
20 INJECTION, SOLUTION INTRAVENOUS; SUBCUTANEOUS NIGHTLY
Status: DISCONTINUED | OUTPATIENT
Start: 2023-05-28 | End: 2023-05-28

## 2023-05-28 RX ORDER — LOSARTAN POTASSIUM 25 MG/1
25 TABLET ORAL DAILY
Status: DISCONTINUED | OUTPATIENT
Start: 2023-05-28 | End: 2023-05-29 | Stop reason: HOSPADM

## 2023-05-28 RX ORDER — METOPROLOL SUCCINATE 25 MG/1
25 TABLET, EXTENDED RELEASE ORAL 2 TIMES DAILY
Status: DISCONTINUED | OUTPATIENT
Start: 2023-05-28 | End: 2023-05-29 | Stop reason: HOSPADM

## 2023-05-28 RX ORDER — INSULIN LISPRO 100 [IU]/ML
15 INJECTION, SOLUTION INTRAVENOUS; SUBCUTANEOUS
Status: DISCONTINUED | OUTPATIENT
Start: 2023-05-28 | End: 2023-05-28

## 2023-05-28 RX ORDER — HYDROCHLOROTHIAZIDE 12.5 MG/1
12.5 TABLET ORAL DAILY
Status: DISCONTINUED | OUTPATIENT
Start: 2023-05-28 | End: 2023-05-29 | Stop reason: HOSPADM

## 2023-05-28 RX ORDER — GABAPENTIN 300 MG/1
300 CAPSULE ORAL 3 TIMES DAILY
Status: DISCONTINUED | OUTPATIENT
Start: 2023-05-28 | End: 2023-05-29 | Stop reason: HOSPADM

## 2023-05-28 RX ORDER — SODIUM CHLORIDE 0.9 % (FLUSH) 0.9 %
10 SYRINGE (ML) INJECTION EVERY 12 HOURS PRN
Status: DISCONTINUED | OUTPATIENT
Start: 2023-05-28 | End: 2023-05-29 | Stop reason: HOSPADM

## 2023-05-28 RX ORDER — ALPRAZOLAM 0.25 MG/1
0.25 TABLET ORAL 3 TIMES DAILY PRN
Status: DISCONTINUED | OUTPATIENT
Start: 2023-05-28 | End: 2023-05-29 | Stop reason: HOSPADM

## 2023-05-28 RX ORDER — FUROSEMIDE 20 MG/1
20 TABLET ORAL DAILY
Status: DISCONTINUED | OUTPATIENT
Start: 2023-05-28 | End: 2023-05-29 | Stop reason: HOSPADM

## 2023-05-28 RX ORDER — ALLOPURINOL 100 MG/1
300 TABLET ORAL DAILY
Status: DISCONTINUED | OUTPATIENT
Start: 2023-05-28 | End: 2023-05-29 | Stop reason: HOSPADM

## 2023-05-28 RX ORDER — AMLODIPINE BESYLATE 10 MG/1
10 TABLET ORAL DAILY
Status: DISCONTINUED | OUTPATIENT
Start: 2023-05-28 | End: 2023-05-29 | Stop reason: HOSPADM

## 2023-05-28 RX ORDER — DEXTROSE 40 %
30 GEL (GRAM) ORAL
Status: DISCONTINUED | OUTPATIENT
Start: 2023-05-28 | End: 2023-05-29 | Stop reason: HOSPADM

## 2023-05-28 RX ADMIN — FUROSEMIDE 20 MG: 20 TABLET ORAL at 08:05

## 2023-05-28 RX ADMIN — ACYCLOVIR 400 MG: 400 TABLET ORAL at 09:05

## 2023-05-28 RX ADMIN — ATORVASTATIN CALCIUM 40 MG: 40 TABLET, FILM COATED ORAL at 09:05

## 2023-05-28 RX ADMIN — GABAPENTIN 300 MG: 300 CAPSULE ORAL at 09:05

## 2023-05-28 RX ADMIN — FLUCONAZOLE 200 MG: 100 TABLET ORAL at 09:05

## 2023-05-28 RX ADMIN — INSULIN ASPART 15 UNITS: 100 INJECTION, SOLUTION INTRAVENOUS; SUBCUTANEOUS at 11:05

## 2023-05-28 RX ADMIN — OLANZAPINE 10 MG: 10 TABLET, FILM COATED ORAL at 09:05

## 2023-05-28 RX ADMIN — SODIUM CHLORIDE: 9 INJECTION, SOLUTION INTRAVENOUS at 10:05

## 2023-05-28 RX ADMIN — INSULIN ASPART 15 UNITS: 100 INJECTION, SOLUTION INTRAVENOUS; SUBCUTANEOUS at 04:05

## 2023-05-28 RX ADMIN — BUSPIRONE HYDROCHLORIDE 5 MG: 5 TABLET ORAL at 09:05

## 2023-05-28 RX ADMIN — QUETIAPINE FUMARATE 25 MG: 25 TABLET ORAL at 09:05

## 2023-05-28 RX ADMIN — INSULIN DETEMIR 20 UNITS: 100 INJECTION, SOLUTION SUBCUTANEOUS at 10:05

## 2023-05-28 RX ADMIN — AMLODIPINE BESYLATE 10 MG: 10 TABLET ORAL at 09:05

## 2023-05-28 RX ADMIN — LOSARTAN POTASSIUM 25 MG: 25 TABLET, FILM COATED ORAL at 08:05

## 2023-05-28 RX ADMIN — METOPROLOL SUCCINATE 25 MG: 25 TABLET, EXTENDED RELEASE ORAL at 09:05

## 2023-05-28 RX ADMIN — INSULIN ASPART 2 UNITS: 100 INJECTION, SOLUTION INTRAVENOUS; SUBCUTANEOUS at 04:05

## 2023-05-28 RX ADMIN — ALLOPURINOL 300 MG: 300 TABLET ORAL at 09:05

## 2023-05-28 RX ADMIN — ESCITALOPRAM OXALATE 10 MG: 10 TABLET ORAL at 08:05

## 2023-05-28 RX ADMIN — IMATINIB MESYLATE 400 MG: 400 TABLET, FILM COATED ORAL at 09:05

## 2023-05-28 RX ADMIN — HYDROCHLOROTHIAZIDE 12.5 MG: 12.5 TABLET ORAL at 09:05

## 2023-05-28 RX ADMIN — INSULIN ASPART 1 UNITS: 100 INJECTION, SOLUTION INTRAVENOUS; SUBCUTANEOUS at 10:05

## 2023-05-28 RX ADMIN — GABAPENTIN 300 MG: 300 CAPSULE ORAL at 04:05

## 2023-05-28 RX ADMIN — ALPRAZOLAM 0.25 MG: 0.25 TABLET ORAL at 09:05

## 2023-05-28 NOTE — H&P
[facility] INTERNAL MEDICINE  ADMISSION HISTORY AND PHYSICAL    Resident Team: [facility] Medicine List 1  Attending Physician: Kim Snowden MD    Date of Admit: 2023    SUBJECTIVE:      HPI: Fela Ellison is a 57 y.o. female with PMH of diabetes mellitus type 2 on long-term insulin, CML on ponatinib/venetoclax and azacitidine, nonalcoholic fatty liver disease, neuropathy, hypertension presented to the ED with a CC of rash to the bilateral calves and abdomen that began yesterday.  Patient has also been feeling fatigued for the last week.  No abnormal bleeding.  No vaginal bleeding, bleeding from the gums, or bleeding from IV sites.  Last chemotherapy treatment was on 2022.  No other associated symptoms.  She denies fever, chills, nausea, vomiting, diarrhea, chest pain, abdominal pain, numbness, weakness, tingling, back pain, chest pain, and shortness of breath.   is available for translation.  Of note, patient has been evaluated at this facility multiple times requiring platelet and blood transfusion.  PRBCs have to be irradiated and leuko reduced.  Patient is followed by Oncology at [facility].  She is compliant.    In the ED:  CBC showed a platelet count of 6.  H&H is 8.6 and 27.2 respectively.  MCV is 107.1.  Patient is leukopenic at 2.15.  Glucose is 233.  1 unit of platelets ordered per ED.    PMH: As stated above  Family HX:   Family History   Problem Relation Age of Onset    Diabetes Mother     Diabetes Father     Cancer Neg Hx       Allergy: Review of patient's allergies indicates:  No Known Allergies  Social HX:   Social History     Socioeconomic History    Marital status:    Tobacco Use    Smoking status: Never    Smokeless tobacco: Never   Substance and Sexual Activity    Alcohol use: Not Currently    Drug use: Not Currently   Social History Narrative    ** Merged History Encounter **          Past Surgical History:   Procedure Laterality Date    ABDOMINAL SURGERY       SECTION        SECTION      CHOLECYSTECTOMY        Home meds:   Current Outpatient Medications   Medication Instructions    acyclovir (ZOVIRAX) 400 MG tablet Oral, 2 times daily    acyclovir (ZOVIRAX) 400 mg, Oral    albuterol (PROVENTIL/VENTOLIN HFA) 90 mcg/actuation inhaler 2 puffs, Inhalation, Every 6 hours PRN, Rescue     allopurinoL (ZYLOPRIM) 300 mg, Oral    allopurinoL (ZYLOPRIM) 300 mg, Oral    ALPRAZolam (XANAX) 0.25 mg, Oral    amLODIPine (NORVASC) 10 mg, Oral, Daily    amLODIPine (NORVASC) 10 mg, Oral    ammonium lactate 12 % Crea Topical, Daily PRN    amoxicillin-clavulanate 875-125mg (AUGMENTIN) 875-125 mg per tablet 1 tablet Orally every 12 hrs for 10 day(s)    atorvastatin (LIPITOR) 40 mg, Oral, Daily    atorvastatin (LIPITOR) 40 mg, Oral    busPIRone (BUSPAR) 5 mg, Oral    doxycycline (VIBRAMYCIN) 100 MG Cap 1 capsule    EScitalopram oxalate (LEXAPRO) 10 mg, Oral    fluconazole (DIFLUCAN) 200 MG Tab 1 tablet, Oral, Daily    fluconazole (DIFLUCAN) 200 mg, Oral, Daily    furosemide (LASIX) 20 mg, Oral, Daily    gabapentin (NEURONTIN) 300 mg, Oral, 3 times daily    glimepiride (AMARYL) 4 mg, Oral    hydroCHLOROthiazide (HYDRODIURIL) 12.5 MG Tab TAKE 1 TABLET BY MOUTH IN THE MORNING ONCE A DAY FOR FLUID RETENTION for 30    HYDROcodone-acetaminophen (NORCO) 5-325 mg per tablet 1 tablet, Oral, Every 6 hours PRN    hydrocortisone 2.5 % cream Topical, 2 times daily    hydrOXYzine pamoate (VISTARIL) 25 mg, Oral, 3 times daily PRN    ibuprofen (ADVIL,MOTRIN) 600 MG tablet TAKE 1 TABLET WITH FOOD OR MILK AS NEEDED THREE TIMES A DAY ORALLY 30    imatinib (GLEEVEC) 400 MG Tab Oral    imatinib (GLEEVEC) 400 mg, Oral, Daily    insulin glargine (LANTUS) 60 Units, Subcutaneous, Nightly    insulin lispro 100 unit/mL pen SMARTSIG:10 Unit(s) SUB-Q Every Evening    insulin lispro 15 Units, Subcutaneous, 3 times daily before meals    insulin lispro 20 Units, Subcutaneous    insulin NPH (NOVOLIN N NPH U-100 INSULIN) 100 unit/mL  "injection INJECT 20 UNITS UNDER THE SKIN EVERY MORNING AND 50 UNITS EVERY NIGHT AT BEDTIME    ketoconazole (NIZORAL) 2 % cream Topical    ketoconazole-hydrocortisone 2-2.5 % Crea 1 application, Topical    levoFLOXacin (LEVAQUIN) 500 mg, Oral, Daily    lisinopriL 40 mg, Oral    loratadine (CLARITIN) 10 mg, Oral    losartan (COZAAR) 25 mg, Oral, Daily    losartan (COZAAR) 25 mg, Oral    magnesium oxide (MAG-OX) 400 mg, Oral    metFORMIN (GLUCOPHAGE) 1,000 mg, Oral, 2 times daily with meals    metoprolol succinate (TOPROL-XL) 25 mg, Oral, 2 times daily    metoprolol tartrate (LOPRESSOR) 25 mg, Oral, 2 times daily    montelukast (SINGULAIR) 10 mg, Oral, Daily    ofloxacin (FLOXIN) 0.3 % otic solution instill 10 drops into affected ear ONCE a DAY FOR SEVEN DAYS    OLANZapine (ZYPREXA) 10 mg, Oral, Nightly    omeprazole (PRILOSEC) 40 MG capsule 1 capsule, Oral, Daily    ondansetron (ZOFRAN) 8 mg, Oral, Every 8 hours PRN    ondansetron (ZOFRAN-ODT) 4 mg, Oral, Every 8 hours PRN    ondansetron (ZOFRAN-ODT) 4 mg, Oral, Every 8 hours PRN    potassium gluconate 2.5 mEq Tab 1 tablet, Oral    promethazine-dextromethorphan (PROMETHAZINE-DM) 6.25-15 mg/5 mL Syrp take 5ml's BY MOUTH EVERY 6 HOURS AS NEEDED    QUEtiapine (SEROQUEL) 25 mg, Oral, Nightly    silver sulfADIAZINE 1% (SILVADENE) 1 % cream Topical    SSD 1 % cream Topical (Top)    TEXACORT 2.5 % Soln Topical (Top), 2 times daily    vitamin D (VITAMIN D3) 1,000 Units, Oral    voriconazole (VFEND) 200 mg, Oral          ROS:   (+) fatigue, easy bruising and bleeding  (-) Chest pain, palpitations, shortness of breath, fever, night sweat, chills, diarrhea, constipation     OBJECTIVE:     Vital signs:   /72 (BP Location: Right arm, Patient Position: Sitting)   Pulse 91   Temp 98.2 °F (36.8 °C) (Tympanic)   Resp 18   Ht 5' 3" (1.6 m)   Wt 108.4 kg (239 lb)   SpO2 98%   BMI 42.34 kg/m²      Physical Examination:  General: Well nourished w/o distress.  Morbidly " obese.  HEENT: NC/AT; PERRL; nasal and oral mucosa moist and clear; no sinus tenderness; no thyromegaly  Neck: Full ROM; no lymphadenopathy  Pulm: CTA bilaterally, normal work of breathing  CV: S1, S2 w/o murmurs or gallops; no edema noted  GI:  Distention with palpable hiatal hernia.  No guarding, rebound, or tenderness.  Bowel sounds normal.  Splenomegaly  MSK: Full ROM of all extremities and spine w/o limitation or discomfort  Derm:  Petechiae noted to the bilateral lower extremities.  Also noted to the abdomen.  Is ecchymosis to the abdomen.  Neuro: AAOx4; CN II-XII intact; motor/sensory function intact  Psych: Cooperative; appropriate mood and affect    Laboratory:  Most Recent Data:  CBC:   Lab Results   Component Value Date    WBC 2.15 (L) 05/28/2023    HGB 8.6 (L) 05/28/2023    HCT 27.2 (L) 05/28/2023    PLT 6 (LL) 05/28/2023    .1 (H) 05/28/2023    RDW 20.9 (H) 05/28/2023     WBC Differential:   Recent Labs   Lab 05/24/23  1316 05/28/23  0150   WBC 2.54* 2.15*   HGB 9.4* 8.6*   HCT 29.0* 27.2*   PLT 26* 6*   .1* 107.1*     BMP:   Lab Results   Component Value Date     05/28/2023    K 3.5 05/28/2023     10/29/2020    CO2 21 (L) 05/28/2023    BUN 18.1 05/28/2023    CREATININE 0.81 05/28/2023     (H) 10/29/2020    CALCIUM 8.7 05/28/2023    MG 1.90 05/28/2023    PHOS 4.6 (H) 10/29/2020     LFTs:   Lab Results   Component Value Date    PROT 6.3 10/29/2020    ALBUMIN 3.3 (L) 05/28/2023    BILITOT 0.5 05/28/2023    AST 14 05/28/2023    ALKPHOS 68 05/28/2023    ALT 24 05/28/2023     Coags:   Lab Results   Component Value Date    INR 0.99 03/17/2023    PROTIME 12.9 03/17/2023     FLP:   Lab Results   Component Value Date    CHOL 121 12/11/2022    HDL 31 (L) 12/11/2022    TRIG 205 (H) 12/11/2022     DM:   Lab Results   Component Value Date    HGBA1C 9.0 (H) 03/17/2023    HGBA1C 8.2 (H) 01/09/2023    HGBA1C 8.6 (H) 12/10/2022    CREATININE 0.81 05/28/2023     Thyroid:   Lab Results    Component Value Date    TSH 1.3404 04/08/2022     Anemia: No results found for: IRON, TIBC, FERRITIN, [id number], FOLATE  Cardiac:   Lab Results   Component Value Date    TROPONINI <0.010 12/10/2022     Urinalysis:   Lab Results   Component Value Date    COLORU Colorless 02/04/2022    PHUA 5.5 12/11/2022    SPECGRAV 1.015 10/28/2020    NITRITE Negative 02/04/2022    KETONESU Negative 02/04/2022    UROBILINOGEN Normal 12/11/2022    WBCUA 0-5 12/11/2022       Imaging:   Imaging Results    None           ASSESSMENT & PLAN:     Severe thrombocytopenia secondary to CML  -platelet count 6 upon admission  -1 unit of platelets ordered per ED  -DIC workup pending: Fibrinogen, D-dimer, PT INR, APTT  -will monitor    Macrocytic anemia  -H&H 8.6 and 27.2 upon admission with MCV of 107.1  -2 units PRBC prepared; transfuse hemoglobin below 7  -H&H Q 4    CML on ponatinib/venetoclax and azacitidine  -diagnosed in 2020  -history of blast crisis  -WBC count at about 2  -last chemotherapy session Tuesday 5-23  -will continue oral medications: Acyclovir, fluconazole, Gleevec, voriconazole, allopurinol    Diabetes mellitus type 2/neuropathy  -restarting home insulin  -moderate sliding scale insulin and place  -holding all antihyperglycemics  -on gabapentin 300 mg t.i.d.    Hypertension  -will continue oral home medications  -amlodipine 10 mg, furosemide 20 mg, hydrochlorothiazide 12.5 mg, and lisinopril 40 mg, losartan 25 mg, Toprol 25 mg    Depression/anxiety  -restart oral meds  -BuSpar 5 mg, Lexapro 10 mg, Seroquel 25 mg, Zyprexa 10 mg  -Xanax 0.25 mg p.r.n.    DVT PPx: None (thrombocytopenia)  GI PPx:  None  Diet:  Diabetic  ABX:  None  Fluids:  None  PRNs:  None  O2: Room air  PCP: SHARI Ashton    Disposition (05/28/2023):  Discharge plan:  Admit patient to hospital medicine observation for platelet transfusion and H&H monitoring.  Transfuse PRBCs if hemoglobin falls below 7.  DIC workup pending.      Lucas FRYEU  Internal Medicine, PGY-I

## 2023-05-28 NOTE — ED PROVIDER NOTES
Encounter Date: 2023       History     Chief Complaint   Patient presents with    Rash    cramping foot      states noticed small bilateral petechial rash on legs around 2pm.  States worsened and now complains of cramping to right foot.  Patient had blood work done last week and Plts were low.       Patient is a 57-year-old female brought in the emergency room for evaluation due to rash on bilateral lower extremities.  Patient has a history of CML, currently followed in oncology here as well as in [place].  Patient has a history of pancytopenia also with thrombocytopenia.  When they noticed this evening that patient had petechiae bilateral lower extremities decided come the emergency room for evaluation.  Patient reports that she has been feeling okay lately, sometimes slightly lightheaded but no significant fatigue.  No chest pain or shortness of breath.    The history is provided by the patient. The history is limited by a language barrier.   Review of patient's allergies indicates:  No Known Allergies  Past Medical History:   Diagnosis Date    Cancer     CML (chronic myelocytic leukemia)     DM2 (diabetes mellitus, type 2)     HTN (hypertension)     NAFLD (nonalcoholic fatty liver disease)     Neuropathy      Past Surgical History:   Procedure Laterality Date    ABDOMINAL SURGERY       SECTION       SECTION      CHOLECYSTECTOMY       Family History   Problem Relation Age of Onset    Diabetes Mother     Diabetes Father     Cancer Neg Hx      Social History     Tobacco Use    Smoking status: Never    Smokeless tobacco: Never   Substance Use Topics    Alcohol use: Not Currently    Drug use: Not Currently     Review of Systems   Constitutional:  Negative for chills, fatigue and fever.   HENT:  Negative for ear pain, rhinorrhea and sore throat.    Eyes:  Negative for photophobia and pain.   Respiratory:  Negative for cough, shortness of breath and wheezing.    Cardiovascular:  Negative  for chest pain.   Gastrointestinal:  Negative for abdominal pain, diarrhea, nausea and vomiting.   Genitourinary:  Negative for dysuria.   Neurological:  Negative for dizziness, weakness and headaches.   All other systems reviewed and are negative.    Physical Exam     Initial Vitals [05/28/23 0117]   BP Pulse Resp Temp SpO2   133/72 91 18 98.2 °F (36.8 °C) 98 %      MAP       --         Physical Exam    Nursing note and vitals reviewed.  Constitutional: She appears well-developed and well-nourished. No distress.   HENT:   Head: Normocephalic and atraumatic.   Eyes: Conjunctivae and EOM are normal. Pupils are equal, round, and reactive to light.   Neck: Neck supple.   Normal range of motion.  Cardiovascular:  Normal rate, regular rhythm, normal heart sounds and intact distal pulses.           Pulmonary/Chest: Breath sounds normal. No respiratory distress. She has no wheezes. She has no rhonchi. She has no rales.   Abdominal: Abdomen is soft. Bowel sounds are normal. She exhibits no distension. There is no abdominal tenderness. There is no rebound and no guarding.   Musculoskeletal:         General: Normal range of motion.      Cervical back: Normal range of motion and neck supple.     Neurological: She is alert and oriented to person, place, and time.   Skin: Skin is warm and dry. Capillary refill takes less than 2 seconds. No erythema.   Non blanchable   Psychiatric: She has a normal mood and affect. Her behavior is normal. Judgment and thought content normal.           ED Course   Critical Care    Date/Time: 5/31/2023 9:48 PM  Performed by: Freddy Ford MD  Authorized by: Freddy Ford MD   Direct patient critical care time: 10 minutes  Additional history critical care time: 10 minutes  Ordering / reviewing critical care time: 10 minutes  Documentation critical care time: 15 minutes  Consulting other physicians critical care time: 5 minutes  Total critical care time (exclusive of procedural time)  : 50 minutes  Critical care time was exclusive of separately billable procedures and treating other patients and teaching time.  Critical care was necessary to treat or prevent imminent or life-threatening deterioration of the following conditions: hepatic failure and circulatory failure.  Critical care was time spent personally by me on the following activities: discussions with consultants, evaluation of patient's response to treatment, examination of patient, ordering and review of laboratory studies, re-evaluation of patient's condition and review of old charts.      Labs Reviewed   COMPREHENSIVE METABOLIC PANEL - Abnormal; Notable for the following components:       Result Value    Chloride 109 (*)     Carbon Dioxide 21 (*)     Glucose Level 228 (*)     Protein Total 5.5 (*)     Albumin Level 3.3 (*)     Globulin 2.2 (*)     All other components within normal limits   CBC WITH DIFFERENTIAL - Abnormal; Notable for the following components:    WBC 2.15 (*)     RBC 2.54 (*)     Hgb 8.6 (*)     Hct 27.2 (*)     .1 (*)     MCH 33.9 (*)     MCHC 31.6 (*)     RDW 20.9 (*)     Platelet 6 (*)     All other components within normal limits   MANUAL DIFFERENTIAL - Abnormal; Notable for the following components:    Neut Man 35 (*)     Lymph Man 62 (*)     Abs Neut calc 0.7525 (*)     Abs Mono 0.0645 (*)     RBC Morph Abnormal (*)     Anisocyte 2+ (*)     Poik Slight (*)     Macrocyte 1+ (*)     Polychrom Slight (*)     Platelet Est Decreased (*)     All other components within normal limits   FERRITIN - Abnormal; Notable for the following components:    Ferritin Level 298.15 (*)     All other components within normal limits   HEMOGLOBIN AND HEMATOCRIT, BLOOD - Abnormal; Notable for the following components:    Hgb 8.1 (*)     Hct 25.7 (*)     All other components within normal limits   POCT GLUCOSE - Abnormal; Notable for the following components:    POCT Glucose 233 (*)     All other components within normal limits    MAGNESIUM - Normal   URIC ACID - Normal   FIBRINOGEN - Normal   IRON AND TIBC - Normal   TSH - Normal   VITAMIN B12 - Normal   FOLATE - Normal   CBC W/ AUTO DIFFERENTIAL    Narrative:     The following orders were created for panel order CBC Auto Differential.  Procedure                               Abnormality         Status                     ---------                               -----------         ------                     CBC with Differential[[id number]]        Abnormal            Final result               Manual Differential[[id number]]          Abnormal            Final result                 Please view results for these tests on the individual orders.   EXTRA TUBES    Narrative:     The following orders were created for panel order EXTRA TUBES.  Procedure                               Abnormality         Status                     ---------                               -----------         ------                     Light Blue Top Hold[[id number]]                              In process                 Lavender Top Hold[[id number]]                                In process                 Gold Top Hold[[id number]]                                    In process                   Please view results for these tests on the individual orders.   LIGHT BLUE TOP HOLD   LAVENDER TOP HOLD   GOLD TOP HOLD   TYPE & SCREEN   PREPARE PLATELETS (DOSE) SOFT   PREPARE PLATELETS (DOSE) SOFT          Imaging Results              X-Ray Abdomen Flat And Erect (Final result)  Result time 05/28/23 15:25:19      Final result by Maged Connolly MD (05/28/23 15:25:19)                   Impression:      No acute radiographic findings.      Electronically signed by: Maged Connolly  Date:    05/28/2023  Time:    15:25               Narrative:    EXAMINATION:  XR ABDOMEN FLAT AND ERECT    CLINICAL HISTORY:  distension;  Chronic myeloid leukemia, BCR/ABL-positive, not having achieved remission    COMPARISON:  None    FINDINGS:  Flat  and upright views of the abdomen demonstrate a nonspecific, nonobstructive bowel gas pattern.  Moderate stool is scattered in the colon.  No free air is seen.  There are cholecystectomy clips.                                       Medications   potassium chloride 10 mEq in 100 mL IVPB (10 mEq Intravenous New Bag 5/29/23 1334)   potassium chloride CR capsule 30 mEq (30 mEq Oral Given 5/29/23 1024)                 ED Course as of 05/31/23 2149   Sun May 28, 2023   0341 Platelets(!!): 6 [MW]   0341 Hemoglobin(!): 8.6 [MW]   0341 Hematocrit(!): 27.2 [MW]   0341 WBC(!): 2.15  Patient noted to be thrombocytopenic at a platelet count of 6.  Internal medicine consulted for platelet transfusion. [MW]   0343 A-line started on patient.  Will now order ABG. [MW]      ED Course User Index  [MW] Freddy Ford MD                 Clinical Impression:   Final diagnoses:  [D69.6] Thrombocytopenia (Primary)        ED Disposition Condition    Observation Stable                Freddy Ford MD  05/31/23 2149

## 2023-05-29 VITALS
HEART RATE: 78 BPM | WEIGHT: 239 LBS | RESPIRATION RATE: 18 BRPM | OXYGEN SATURATION: 97 % | HEIGHT: 63 IN | DIASTOLIC BLOOD PRESSURE: 54 MMHG | SYSTOLIC BLOOD PRESSURE: 122 MMHG | BODY MASS INDEX: 42.35 KG/M2 | TEMPERATURE: 98 F

## 2023-05-29 LAB
ALBUMIN SERPL-MCNC: 3.1 G/DL (ref 3.5–5)
ALBUMIN/GLOB SERPL: 1.3 RATIO (ref 1.1–2)
ALP SERPL-CCNC: 61 UNIT/L (ref 40–150)
ALT SERPL-CCNC: 26 UNIT/L (ref 0–55)
AST SERPL-CCNC: 14 UNIT/L (ref 5–34)
BASOPHILS # BLD AUTO: 0 X10(3)/MCL
BASOPHILS NFR BLD AUTO: 0 %
BILIRUBIN DIRECT+TOT PNL SERPL-MCNC: 0.8 MG/DL
BUN SERPL-MCNC: 15.8 MG/DL (ref 9.8–20.1)
CALCIUM SERPL-MCNC: 8.6 MG/DL (ref 8.4–10.2)
CHLORIDE SERPL-SCNC: 108 MMOL/L (ref 98–107)
CO2 SERPL-SCNC: 25 MMOL/L (ref 22–29)
CREAT SERPL-MCNC: 0.76 MG/DL (ref 0.55–1.02)
EOSINOPHIL # BLD AUTO: 0 X10(3)/MCL (ref 0–0.9)
EOSINOPHIL NFR BLD AUTO: 0 %
ERYTHROCYTE [DISTWIDTH] IN BLOOD BY AUTOMATED COUNT: 20.2 % (ref 11.5–17)
GFR SERPLBLD CREATININE-BSD FMLA CKD-EPI: >60 MLS/MIN/1.73/M2
GLOBULIN SER-MCNC: 2.3 GM/DL (ref 2.4–3.5)
GLUCOSE SERPL-MCNC: 241 MG/DL (ref 74–100)
HCT VFR BLD AUTO: 24.5 % (ref 37–47)
HGB BLD-MCNC: 7.6 G/DL (ref 12–16)
IMM GRANULOCYTES # BLD AUTO: 0.05 X10(3)/MCL (ref 0–0.04)
IMM GRANULOCYTES NFR BLD AUTO: 3.2 %
LYMPHOCYTES # BLD AUTO: 0.95 X10(3)/MCL (ref 0.6–4.6)
LYMPHOCYTES NFR BLD AUTO: 60.9 %
MAGNESIUM SERPL-MCNC: 2.1 MG/DL (ref 1.6–2.6)
MCH RBC QN AUTO: 32.9 PG (ref 27–31)
MCHC RBC AUTO-ENTMCNC: 31 G/DL (ref 33–36)
MCV RBC AUTO: 106.1 FL (ref 80–94)
MONOCYTES # BLD AUTO: 0.03 X10(3)/MCL (ref 0.1–1.3)
MONOCYTES NFR BLD AUTO: 1.9 %
NEUTROPHILS # BLD AUTO: 0.53 X10(3)/MCL (ref 2.1–9.2)
NEUTROPHILS NFR BLD AUTO: 34 %
NRBC BLD AUTO-RTO: 10.3 %
PHOSPHATE SERPL-MCNC: 3.6 MG/DL (ref 2.3–4.7)
PLATELET # BLD AUTO: 74 X10(3)/MCL (ref 130–400)
PMV BLD AUTO: 0 FL (ref 7.4–10.4)
POCT GLUCOSE: 243 MG/DL (ref 70–110)
POCT GLUCOSE: 247 MG/DL (ref 70–110)
POCT GLUCOSE: 330 MG/DL (ref 70–110)
POCT GLUCOSE: 345 MG/DL (ref 70–110)
POTASSIUM SERPL-SCNC: 3.4 MMOL/L (ref 3.5–5.1)
PROT SERPL-MCNC: 5.4 GM/DL (ref 6.4–8.3)
RBC # BLD AUTO: 2.31 X10(6)/MCL (ref 4.2–5.4)
SODIUM SERPL-SCNC: 143 MMOL/L (ref 136–145)
WBC # SPEC AUTO: 1.56 X10(3)/MCL (ref 4.5–11.5)

## 2023-05-29 PROCEDURE — 96374 THER/PROPH/DIAG INJ IV PUSH: CPT

## 2023-05-29 PROCEDURE — 85025 COMPLETE CBC W/AUTO DIFF WBC: CPT

## 2023-05-29 PROCEDURE — 63600175 PHARM REV CODE 636 W HCPCS

## 2023-05-29 PROCEDURE — 63600175 PHARM REV CODE 636 W HCPCS: Performed by: INTERNAL MEDICINE

## 2023-05-29 PROCEDURE — 25000003 PHARM REV CODE 250

## 2023-05-29 PROCEDURE — 84100 ASSAY OF PHOSPHORUS: CPT

## 2023-05-29 PROCEDURE — 96372 THER/PROPH/DIAG INJ SC/IM: CPT

## 2023-05-29 PROCEDURE — 83735 ASSAY OF MAGNESIUM: CPT

## 2023-05-29 PROCEDURE — 96372 THER/PROPH/DIAG INJ SC/IM: CPT | Performed by: INTERNAL MEDICINE

## 2023-05-29 PROCEDURE — 80053 COMPREHEN METABOLIC PANEL: CPT

## 2023-05-29 PROCEDURE — 63700000 PHARM REV CODE 250 ALT 637 W/O HCPCS

## 2023-05-29 PROCEDURE — 96376 TX/PRO/DX INJ SAME DRUG ADON: CPT

## 2023-05-29 PROCEDURE — 94760 N-INVAS EAR/PLS OXIMETRY 1: CPT

## 2023-05-29 PROCEDURE — G0378 HOSPITAL OBSERVATION PER HR: HCPCS

## 2023-05-29 PROCEDURE — 99900035 HC TECH TIME PER 15 MIN (STAT)

## 2023-05-29 RX ORDER — ESCITALOPRAM OXALATE 10 MG/1
10 TABLET ORAL DAILY
Qty: 30 TABLET | Refills: 11 | Status: ON HOLD | OUTPATIENT
Start: 2023-05-30 | End: 2023-06-02 | Stop reason: ALTCHOICE

## 2023-05-29 RX ORDER — POTASSIUM CHLORIDE 750 MG/1
30 CAPSULE, EXTENDED RELEASE ORAL ONCE
Status: COMPLETED | OUTPATIENT
Start: 2023-05-29 | End: 2023-05-29

## 2023-05-29 RX ORDER — ACYCLOVIR 400 MG/1
400 TABLET ORAL 2 TIMES DAILY
Qty: 60 TABLET | Refills: 11 | Status: ON HOLD | OUTPATIENT
Start: 2023-05-29 | End: 2024-04-01 | Stop reason: HOSPADM

## 2023-05-29 RX ORDER — BUSPIRONE HYDROCHLORIDE 5 MG/1
5 TABLET ORAL 2 TIMES DAILY
Qty: 60 TABLET | Refills: 11 | Status: ON HOLD | OUTPATIENT
Start: 2023-05-29 | End: 2023-06-02 | Stop reason: ALTCHOICE

## 2023-05-29 RX ORDER — POTASSIUM CHLORIDE 7.45 MG/ML
10 INJECTION INTRAVENOUS
Status: COMPLETED | OUTPATIENT
Start: 2023-05-29 | End: 2023-05-29

## 2023-05-29 RX ADMIN — ATORVASTATIN CALCIUM 40 MG: 40 TABLET, FILM COATED ORAL at 10:05

## 2023-05-29 RX ADMIN — INSULIN DETEMIR 20 UNITS: 100 INJECTION, SOLUTION SUBCUTANEOUS at 08:05

## 2023-05-29 RX ADMIN — BUSPIRONE HYDROCHLORIDE 5 MG: 5 TABLET ORAL at 10:05

## 2023-05-29 RX ADMIN — FLUCONAZOLE 200 MG: 100 TABLET ORAL at 10:05

## 2023-05-29 RX ADMIN — INSULIN ASPART 4 UNITS: 100 INJECTION, SOLUTION INTRAVENOUS; SUBCUTANEOUS at 08:05

## 2023-05-29 RX ADMIN — OLANZAPINE 10 MG: 10 TABLET, FILM COATED ORAL at 08:05

## 2023-05-29 RX ADMIN — POTASSIUM CHLORIDE 10 MEQ: 7.46 INJECTION, SOLUTION INTRAVENOUS at 01:05

## 2023-05-29 RX ADMIN — METOPROLOL SUCCINATE 25 MG: 25 TABLET, EXTENDED RELEASE ORAL at 08:05

## 2023-05-29 RX ADMIN — QUETIAPINE FUMARATE 25 MG: 25 TABLET ORAL at 08:05

## 2023-05-29 RX ADMIN — FUROSEMIDE 20 MG: 20 TABLET ORAL at 10:05

## 2023-05-29 RX ADMIN — INSULIN ASPART 4 UNITS: 100 INJECTION, SOLUTION INTRAVENOUS; SUBCUTANEOUS at 04:05

## 2023-05-29 RX ADMIN — POTASSIUM CHLORIDE 10 MEQ: 7.46 INJECTION, SOLUTION INTRAVENOUS at 10:05

## 2023-05-29 RX ADMIN — GABAPENTIN 300 MG: 300 CAPSULE ORAL at 10:05

## 2023-05-29 RX ADMIN — LOSARTAN POTASSIUM 25 MG: 25 TABLET, FILM COATED ORAL at 10:05

## 2023-05-29 RX ADMIN — ACYCLOVIR 400 MG: 400 TABLET ORAL at 08:05

## 2023-05-29 RX ADMIN — INSULIN ASPART 8 UNITS: 100 INJECTION, SOLUTION INTRAVENOUS; SUBCUTANEOUS at 11:05

## 2023-05-29 RX ADMIN — HYDROCHLOROTHIAZIDE 12.5 MG: 12.5 TABLET ORAL at 10:05

## 2023-05-29 RX ADMIN — ESCITALOPRAM OXALATE 10 MG: 10 TABLET ORAL at 10:05

## 2023-05-29 RX ADMIN — INSULIN ASPART 15 UNITS: 100 INJECTION, SOLUTION INTRAVENOUS; SUBCUTANEOUS at 04:05

## 2023-05-29 RX ADMIN — GABAPENTIN 300 MG: 300 CAPSULE ORAL at 08:05

## 2023-05-29 RX ADMIN — ACYCLOVIR 400 MG: 400 TABLET ORAL at 10:05

## 2023-05-29 RX ADMIN — GABAPENTIN 300 MG: 300 CAPSULE ORAL at 04:05

## 2023-05-29 RX ADMIN — POTASSIUM CHLORIDE 10 MEQ: 7.46 INJECTION, SOLUTION INTRAVENOUS at 12:05

## 2023-05-29 RX ADMIN — METOPROLOL SUCCINATE 25 MG: 25 TABLET, EXTENDED RELEASE ORAL at 10:05

## 2023-05-29 RX ADMIN — POTASSIUM CHLORIDE 30 MEQ: 10 CAPSULE, COATED, EXTENDED RELEASE ORAL at 10:05

## 2023-05-29 RX ADMIN — ALLOPURINOL 300 MG: 300 TABLET ORAL at 10:05

## 2023-05-29 RX ADMIN — AMLODIPINE BESYLATE 10 MG: 10 TABLET ORAL at 10:05

## 2023-05-29 RX ADMIN — BUSPIRONE HYDROCHLORIDE 5 MG: 5 TABLET ORAL at 08:05

## 2023-05-29 RX ADMIN — INSULIN ASPART 15 UNITS: 100 INJECTION, SOLUTION INTRAVENOUS; SUBCUTANEOUS at 12:05

## 2023-05-29 NOTE — PROGRESS NOTES
"Inpatient Nutrition Evaluation    Admit Date: 5/28/2023   Total duration of encounter: 1 day    Nutrition Recommendation/Prescription     Continue Diabetic diet; consider adding Cardiac restriction  Replenish electrolytes as needed  Monitor po intake, wt, labs    Nutrition Assessment     Chart Review    Reason Seen: continuous nutrition monitoring and malnutrition screening tool (MST)    Malnutrition Screening Tool Results   Have you recently lost weight without trying?: Unsure  Have you been eating poorly because of a decreased appetite?: No   MST Score: 2     Diagnosis: Severe thrombocytopenia secondary to CML  Macrocytic anemia  CML on ponatinib/venetoclax and azacitidine  Diabetes mellitus type 2/neuropathy  Hypertension  Depression/anxiety    Relevant Medical History: diabetes mellitus type 2 on long-term insulin, CML on ponatinib/venetoclax and azacitidine, nonalcoholic fatty liver disease, neuropathy, hypertension    Nutrition-Related Medications: allopurinol, statin, lasix, HCTZ, novolog, levemir, KCl, SSI    Nutrition-Related Labs: 5/29-K 3.4, Cl 108, Gluc 241, Pro Tot 5.4, Alb 3.1, GFR >60, H&H 7.6/24.5      Diet Order: Diet diabetic  Oral Supplement Order: none  Appetite/Oral Intake: good/% of meals  Factors Affecting Nutritional Intake: none identified  Food/[employer]/Cultural Preferences: none reported  Food Allergies: no known food allergies    Skin Integrity: other (see comments) (petechiae noted bilaterial extremities)  Wound(s):   see above    Comments  5/29: Visited pt, resting during visit. Pt's  present in room.  reports pt with good po intake of meals with no GI complaints.  reports pt with good appetite and no recent unintentional wt loss pta; states pt's UBW ~227-242#; current wt stable.      Anthropometrics    Height: 5' 3" (160 cm) Height Method: Stated  Last Weight: 108.4 kg (239 lb) (05/28/23 0117) Weight Method: Stated  BMI (Calculated): 42.3  BMI " Classification: obese grade III (BMI >/=40)     Ideal Body Weight (IBW), Female: 115 lb     % Ideal Body Weight, Female (lb): 207.83 %                    Usual Body Weight (UBW), k.18 kg (-242#)  % Usual Body Weight: 105.29     Usual Weight Provided By: family/caregiver and family/caregiver denies unintentional weight loss    Wt Readings from Last 5 Encounters:   23 108.4 kg (239 lb)   23 108.7 kg (239 lb 9.6 oz)   23 108.9 kg (240 lb)   23 108.6 kg (239 lb 6.4 oz)   23 107.7 kg (237 lb 6.4 oz)     Weight Change(s) Since Admission:  Admit Weight: 108.4 kg (239 lb) (23 0117)  : -242# per pt ; wt stable    Patient Education    Not applicable.    Monitoring & Evaluation     Dietitian will monitor food and beverage intake, weight, electrolyte/renal panel, and glucose/endocrine profile.  Nutrition Risk/Follow-Up: low (follow-up in 5-7 days)  Patients assigned 'low nutrition risk' status do not qualify for a full nutritional assessment but will be monitored and re-evaluated in a 5-7 day time period. Please consult if re-evaluation needed sooner.

## 2023-05-29 NOTE — DISCHARGE SUMMARY
[facility] Internal Medicine Discharge Summary    Admitting Physician: Kim Snowden MD  Attending Physician: Kim Snowden MD  Date of Admit: 5/28/2023  Date of Discharge: 5/29/2023    Condition: Stable  Outcome: Condition has improved and patient is now ready for discharge.  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 female with PMH of diabetes mellitus type 2 on long-term insulin, CML on ponatinib/venetoclax and azacitidine, nonalcoholic fatty liver disease, neuropathy, hypertension presented to the ED with a CC of rash to the bilateral calves and abdomen that began yesterday.  Patient has also been feeling fatigued for the last week.  No abnormal bleeding.  No vaginal bleeding, bleeding from the gums, or bleeding from IV sites.  Last chemotherapy treatment was on 05/23/2022.  No other associated symptoms.  She denies fever, chills, nausea, vomiting, diarrhea, chest pain, abdominal pain, numbness, weakness, tingling, back pain, chest pain, and shortness of breath.   is available for translation.  Of note, patient has been evaluated at this facility multiple times requiring platelet and blood transfusion.  PRBCs have to be irradiated and leuko reduced.  Patient is followed by Oncology at [facility].  She is compliant.     In the ED:  " CBC showed a platelet count of 6.  H&H is 8.6 and 27.2 respectively.  MCV is 107.1.  Patient is leukopenic at 2.15.  Glucose is 233.  1 unit of platelets ordered per ED.    Hospital Course with Pertinent Findings:      Patient admitted to internal medicine for severe thrombocytopenia with platelet count 6 secondary to CML and H&H 8.6/27.2.  She was transfused 1 unit platelets with improvement in platelets to 81.  H&H stable on discharge.  Vitals and labs stable.  She will be discharged on her home medications.  She will follow-up in Internal Medicine post wards clinic.    Discharge physical exam:  Vitals  BP: 108/66  Temp: 98.3 °F (36.8 °C)  Temp Source: Oral  Pulse: 69  Resp: 20  SpO2: 97 %  Height: 5' 3" (160 cm)  Weight: 108.4 kg (239 lb)    General: Well nourished w/o distress.  Morbidly obese.  HEENT: NC/AT; PERRL; nasal and oral mucosa moist and clear; no sinus tenderness; no thyromegaly  Neck: Full ROM; no lymphadenopathy  Pulm: CTA bilaterally, normal work of breathing  CV: S1, S2 w/o murmurs or gallops; no edema noted  GI:  Distention with palpable hiatal hernia.  No guarding, rebound, or tenderness.  Bowel sounds normal.  Splenomegaly  MSK: Full ROM of all extremities and spine w/o limitation or discomfort  Derm:  Resolving petechiae noted to the bilateral lower extremities.    Neuro: AAOx4; CN II-XII intact; motor/sensory function intact  Psych: Cooperative; appropriate mood and affect  TIME SPENT ON DISCHARGE: 35 minutes    Discharge Medications:         Medication List        CHANGE how you take these medications      acyclovir 400 MG tablet  Commonly known as: ZOVIRAX  Take 1 tablet (400 mg total) by mouth 2 (two) times daily.  What changed:   how much to take  Another medication with the same name was removed. Continue taking this medication, and follow the directions you see here.     allopurinoL 300 MG tablet  Commonly known as: ZYLOPRIM  What changed: Another medication with the same name was " removed. Continue taking this medication, and follow the directions you see here.     amLODIPine 10 MG tablet  Commonly known as: NORVASC  What changed: Another medication with the same name was removed. Continue taking this medication, and follow the directions you see here.     atorvastatin 40 MG tablet  Commonly known as: LIPITOR  What changed: Another medication with the same name was removed. Continue taking this medication, and follow the directions you see here.     busPIRone 5 MG Tab  Commonly known as: BUSPAR  Take 1 tablet (5 mg total) by mouth 2 (two) times daily.  What changed: when to take this     EScitalopram oxalate 10 MG tablet  Commonly known as: LEXAPRO  Take 1 tablet (10 mg total) by mouth once daily.  Start taking on: May 30, 2023  What changed: when to take this     fluconazole 200 MG Tab  Commonly known as: DIFLUCAN  What changed: Another medication with the same name was removed. Continue taking this medication, and follow the directions you see here.     imatinib 400 MG Tab  Commonly known as: GLEEVEC  Take 1 tablet (400 mg total) by mouth once daily.  What changed: Another medication with the same name was removed. Continue taking this medication, and follow the directions you see here.     ondansetron 4 MG Tbdl  Commonly known as: ZOFRAN-ODT  What changed: Another medication with the same name was removed. Continue taking this medication, and follow the directions you see here.            CONTINUE taking these medications      albuterol 90 mcg/actuation inhaler  Commonly known as: PROVENTIL/VENTOLIN HFA     ALPRAZolam 0.25 MG tablet  Commonly known as: XANAX     ammonium lactate 12 % Crea     amoxicillin-clavulanate 875-125mg 875-125 mg per tablet  Commonly known as: AUGMENTIN     doxycycline 100 MG Cap  Commonly known as: VIBRAMYCIN     furosemide 20 MG tablet  Commonly known as: LASIX     gabapentin 300 MG capsule  Commonly known as: NEURONTIN  Take 1 capsule (300 mg total) by mouth 3  (three) times daily.     glimepiride 4 MG tablet  Commonly known as: AMARYL     hydroCHLOROthiazide 12.5 MG Tab  Commonly known as: HYDRODIURIL     HYDROcodone-acetaminophen 5-325 mg per tablet  Commonly known as: NORCO     * TEXACORT 2.5 % Soln  Generic drug: hydrocortisone     * hydrocortisone 2.5 % cream     hydrOXYzine pamoate 25 MG Cap  Commonly known as: VISTARIL     ibuprofen 600 MG tablet  Commonly known as: ADVIL,MOTRIN     insulin glargine 100 unit/mL injection  Commonly known as: Lantus  Inject 60 Units into the skin nightly.     * insulin lispro 100 unit/mL injection  Inject 15 Units into the skin 3 (three) times daily before meals.     * insulin lispro 100 unit/mL pen     * insulin lispro 100 unit/mL injection     ketoconazole 2 % cream  Commonly known as: NIZORAL     ketoconazole-hydrocortisone 2-2.5 % Crea     levoFLOXacin 500 MG tablet  Commonly known as: LEVAQUIN  Take 1 tablet (500 mg total) by mouth once daily.     lisinopriL 10 MG tablet     loratadine 10 mg tablet  Commonly known as: CLARITIN     * losartan 25 MG tablet  Commonly known as: COZAAR     * losartan 25 MG tablet  Commonly known as: COZAAR  Take 1 tablet (25 mg total) by mouth once daily.     magnesium oxide 400 mg (241.3 mg magnesium) tablet  Commonly known as: MAG-OX     metFORMIN 1000 MG tablet  Commonly known as: GLUCOPHAGE     metoprolol succinate 25 MG 24 hr tablet  Commonly known as: TOPROL-XL     metoprolol tartrate 25 MG tablet  Commonly known as: LOPRESSOR     montelukast 10 mg tablet  Commonly known as: SINGULAIR     NovoLIN N NPH U-100 Insulin 100 unit/mL injection  Generic drug: insulin NPH     ofloxacin 0.3 % otic solution  Commonly known as: FLOXIN     OLANZapine 10 MG tablet  Commonly known as: ZyPREXA     omeprazole 40 MG capsule  Commonly known as: PRILOSEC     ondansetron 8 MG tablet  Commonly known as: ZOFRAN     potassium gluconate 2.5 mEq Tab     promethazine-dextromethorphan 6.25-15 mg/5 mL Syrp  Commonly known  as: PROMETHAZINE-DM     QUEtiapine 25 MG Tab  Commonly known as: SEROQUEL     * SSD 1 % cream  Generic drug: silver sulfADIAZINE 1%     * silver sulfADIAZINE 1% 1 % cream  Commonly known as: SILVADENE     vitamin D 1000 units Tab  Commonly known as: VITAMIN D3     voriconazole 200 mg/5 mL (40 mg/mL) Susr  Commonly known as: VFEND           * This list has 9 medication(s) that are the same as other medications prescribed for you. Read the directions carefully, and ask your doctor or other care provider to review them with you.                   Where to Get Your Medications        These medications were sent to [place] [id number] [facility]  [address] [facility] [address]      Phone: [phone]   acyclovir 400 MG tablet  busPIRone 5 MG Tab  EScitalopram oxalate 10 MG tablet         Discharge Instructions:         Fela Ellison is being discharged .    Discharge Procedure Orders   Ambulatory referral/consult to Internal Medicine   Standing Status: Future   Referral Priority: Routine Referral Type: Consultation   Referral Reason: Specialty Services Required   Requested Specialty: Internal Medicine   Number of Visits Requested: 1        Follow-Up Appointments:        To address at follow-up:  -The following labs are to be drawn at the Post Jin visit: CBC, CMP  -The following imaging studies are to be ordered at the post jin visit:     At this time, Fela Ellison is determined to have maximized benefits of IP hospitalization. she is discharged in stable condition with OP f/u recommendations and instructions. All questions answered, and patient verbalized agreement with the POC. They were given return precautions prior to d/c including symptoms that should prompt return to ED or to call PCP. Total time spent of DC of 35 minutes.       Natalya Ojeda MD  [facility] Internal Medicine, HO[facility]

## 2023-06-01 ENCOUNTER — HOSPITAL ENCOUNTER (OUTPATIENT)
Facility: HOSPITAL | Age: 58
Discharge: HOME | End: 2023-06-04
Attending: FAMILY MEDICINE | Admitting: INTERNAL MEDICINE
Payer: MEDICAID

## 2023-06-01 DIAGNOSIS — D69.6 THROMBOCYTOPENIA: Primary | ICD-10-CM

## 2023-06-01 DIAGNOSIS — D64.9 ANEMIA: ICD-10-CM

## 2023-06-01 DIAGNOSIS — D64.9 ANEMIA, UNSPECIFIED TYPE: ICD-10-CM

## 2023-06-01 DIAGNOSIS — C92.10 CML (CHRONIC MYELOCYTIC LEUKEMIA): ICD-10-CM

## 2023-06-01 DIAGNOSIS — C92.10 CML (CHRONIC MYELOID LEUKEMIA): ICD-10-CM

## 2023-06-01 PROCEDURE — 99285 EMERGENCY DEPT VISIT HI MDM: CPT

## 2023-06-02 LAB
ABO + RH BLD: NORMAL
ABO + RH BLD: NORMAL
ABS NEUT CALC (OHS): 0.5 X10(3)/MCL (ref 2.1–9.2)
ABS NEUT CALC (OHS): 0.61 X10(3)/MCL (ref 2.1–9.2)
ALBUMIN SERPL-MCNC: 3.3 G/DL (ref 3.5–5)
ALBUMIN/GLOB SERPL: 1.2 RATIO (ref 1.1–2)
ALP SERPL-CCNC: 74 UNIT/L (ref 40–150)
ALT SERPL-CCNC: 22 UNIT/L (ref 0–55)
ANISOCYTOSIS BLD QL SMEAR: ABNORMAL
ANISOCYTOSIS BLD QL SMEAR: ABNORMAL
APPEARANCE UR: CLEAR
APTT PPP: 24.9 SECONDS
AST SERPL-CCNC: 15 UNIT/L (ref 5–34)
BACTERIA #/AREA URNS AUTO: ABNORMAL /HPF
BILIRUB UR QL STRIP.AUTO: NEGATIVE MG/DL
BILIRUBIN DIRECT+TOT PNL SERPL-MCNC: 0.7 MG/DL
BLD PROD TYP BPU: NORMAL
BLD PROD TYP BPU: NORMAL
BLOOD UNIT EXPIRATION DATE: NORMAL
BLOOD UNIT EXPIRATION DATE: NORMAL
BLOOD UNIT TYPE CODE: 7300
BLOOD UNIT TYPE CODE: 7300
BUN SERPL-MCNC: 12.9 MG/DL (ref 9.8–20.1)
CALCIUM SERPL-MCNC: 9.6 MG/DL (ref 8.4–10.2)
CHLORIDE SERPL-SCNC: 103 MMOL/L (ref 98–107)
CO2 SERPL-SCNC: 25 MMOL/L (ref 22–29)
COLOR UR: ABNORMAL
CREAT SERPL-MCNC: 0.79 MG/DL (ref 0.55–1.02)
CROSSMATCH INTERPRETATION: NORMAL
CROSSMATCH INTERPRETATION: NORMAL
DISPENSE STATUS: NORMAL
DISPENSE STATUS: NORMAL
ERYTHROCYTE [DISTWIDTH] IN BLOOD BY AUTOMATED COUNT: 20.3 % (ref 11.5–17)
ERYTHROCYTE [DISTWIDTH] IN BLOOD BY AUTOMATED COUNT: 22.2 % (ref 11.5–17)
FIBRINOGEN PPP-MCNC: 637 MG/DL (ref 210–463)
GFR SERPLBLD CREATININE-BSD FMLA CKD-EPI: >60 MLS/MIN/1.73/M2
GLOBULIN SER-MCNC: 2.8 GM/DL (ref 2.4–3.5)
GLUCOSE SERPL-MCNC: 301 MG/DL (ref 74–100)
GLUCOSE UR QL STRIP.AUTO: ABNORMAL MG/DL
GROUP & RH: NORMAL
HCT VFR BLD AUTO: 24.8 % (ref 37–47)
HCT VFR BLD AUTO: 29.8 % (ref 37–47)
HGB BLD-MCNC: 10.2 G/DL (ref 12–16)
HGB BLD-MCNC: 7.9 G/DL (ref 12–16)
HYALINE CASTS #/AREA URNS LPF: ABNORMAL /LPF
HYPOCHROMIA BLD QL SMEAR: ABNORMAL
INDIRECT COOMBS GEL: NORMAL
KETONES UR QL STRIP.AUTO: ABNORMAL MG/DL
LEUKOCYTE ESTERASE UR QL STRIP.AUTO: 75 UNIT/L
LIPASE SERPL-CCNC: 36 U/L
LYMPHOCYTES NFR BLD MANUAL: 1.17 X10(3)/MCL
LYMPHOCYTES NFR BLD MANUAL: 1.31 X10(3)/MCL
LYMPHOCYTES NFR BLD MANUAL: 68 % (ref 13–40)
LYMPHOCYTES NFR BLD MANUAL: 68 % (ref 13–40)
MACROCYTES BLD QL SMEAR: ABNORMAL
MACROCYTES BLD QL SMEAR: ABNORMAL
MCH RBC QN AUTO: 33.3 PG (ref 27–31)
MCH RBC QN AUTO: 33.8 PG (ref 27–31)
MCHC RBC AUTO-ENTMCNC: 31.9 G/DL (ref 33–36)
MCHC RBC AUTO-ENTMCNC: 34.2 G/DL (ref 33–36)
MCV RBC AUTO: 106 FL (ref 80–94)
MCV RBC AUTO: 97.4 FL (ref 80–94)
METAMYELOCYTES NFR BLD MANUAL: ABNORMAL %
MICROCYTES BLD QL SMEAR: ABNORMAL
MICROCYTES BLD QL SMEAR: ABNORMAL
MONOCYTES NFR BLD MANUAL: 0.05 X10(3)/MCL (ref 0.1–1.3)
MONOCYTES NFR BLD MANUAL: 3 % (ref 2–11)
MUCOUS THREADS URNS QL MICRO: ABNORMAL /LPF
MYELOCYTES NFR BLD MANUAL: 4 %
NEUTROPHILS NFR BLD MANUAL: 28 % (ref 47–80)
NEUTROPHILS NFR BLD MANUAL: 29 % (ref 47–80)
NITRITE UR QL STRIP.AUTO: NEGATIVE
NRBC BLD AUTO-RTO: 24 %
NRBC BLD AUTO-RTO: 40.7 %
NRBC BLD MANUAL-RTO: 11 %
NRBC BLD MANUAL-RTO: 4 %
PH UR STRIP.AUTO: 5.5 [PH]
PLATELET # BLD AUTO: 14 X10(3)/MCL (ref 130–400)
PLATELET # BLD AUTO: 15 X10(3)/MCL (ref 130–400)
PLATELET # BLD EST: ABNORMAL 10*3/UL
PLATELET # BLD EST: ADEQUATE 10*3/UL
PLATELETS.RETICULATED NFR BLD AUTO: 1.7 % (ref 0.9–11.2)
PMV BLD AUTO: 0 FL (ref 7.4–10.4)
PMV BLD AUTO: ABNORMAL FL
POCT GLUCOSE: 167 MG/DL (ref 70–110)
POCT GLUCOSE: 211 MG/DL (ref 70–110)
POCT GLUCOSE: 221 MG/DL (ref 70–110)
POCT GLUCOSE: 222 MG/DL (ref 70–110)
POCT GLUCOSE: 234 MG/DL (ref 70–110)
POIKILOCYTOSIS BLD QL SMEAR: ABNORMAL
POIKILOCYTOSIS BLD QL SMEAR: ABNORMAL
POLYCHROMASIA BLD QL SMEAR: ABNORMAL
POTASSIUM SERPL-SCNC: 3.7 MMOL/L (ref 3.5–5.1)
PROT SERPL-MCNC: 6.1 GM/DL (ref 6.4–8.3)
PROT UR QL STRIP.AUTO: ABNORMAL MG/DL
RBC # BLD AUTO: 2.34 X10(6)/MCL (ref 4.2–5.4)
RBC # BLD AUTO: 3.06 X10(6)/MCL (ref 4.2–5.4)
RBC #/AREA URNS AUTO: ABNORMAL /HPF
RBC MORPH BLD: ABNORMAL
RBC MORPH BLD: ABNORMAL
RBC UR QL AUTO: NEGATIVE UNIT/L
SODIUM SERPL-SCNC: 139 MMOL/L (ref 136–145)
SP GR UR STRIP.AUTO: 1.02
SPECIMEN OUTDATE: NORMAL
SQUAMOUS #/AREA URNS LPF: ABNORMAL /HPF
STOMATOCYTES (OLG): ABNORMAL
STOMATOCYTES (OLG): ABNORMAL
TEAR DROP CELL (OLG): ABNORMAL
UNIT NUMBER: NORMAL
UNIT NUMBER: NORMAL
UROBILINOGEN UR STRIP-ACNC: NORMAL MG/DL
WBC # SPEC AUTO: 1.72 X10(3)/MCL (ref 4.5–11.5)
WBC # SPEC AUTO: 1.92 X10(3)/MCL (ref 4.5–11.5)
WBC #/AREA URNS AUTO: ABNORMAL /HPF

## 2023-06-02 PROCEDURE — 81001 URINALYSIS AUTO W/SCOPE: CPT | Performed by: PHYSICIAN ASSISTANT

## 2023-06-02 PROCEDURE — 86900 BLOOD TYPING SEROLOGIC ABO: CPT | Performed by: FAMILY MEDICINE

## 2023-06-02 PROCEDURE — 85007 BL SMEAR W/DIFF WBC COUNT: CPT | Mod: 91

## 2023-06-02 PROCEDURE — 82962 GLUCOSE BLOOD TEST: CPT

## 2023-06-02 PROCEDURE — 94761 N-INVAS EAR/PLS OXIMETRY MLT: CPT

## 2023-06-02 PROCEDURE — G0378 HOSPITAL OBSERVATION PER HR: HCPCS

## 2023-06-02 PROCEDURE — 94640 AIRWAY INHALATION TREATMENT: CPT | Mod: XB

## 2023-06-02 PROCEDURE — 25000003 PHARM REV CODE 250

## 2023-06-02 PROCEDURE — 25000242 PHARM REV CODE 250 ALT 637 W/ HCPCS

## 2023-06-02 PROCEDURE — 85384 FIBRINOGEN ACTIVITY: CPT

## 2023-06-02 PROCEDURE — 83690 ASSAY OF LIPASE: CPT | Performed by: PHYSICIAN ASSISTANT

## 2023-06-02 PROCEDURE — 96374 THER/PROPH/DIAG INJ IV PUSH: CPT

## 2023-06-02 PROCEDURE — 36430 TRANSFUSION BLD/BLD COMPNT: CPT

## 2023-06-02 PROCEDURE — 85730 THROMBOPLASTIN TIME PARTIAL: CPT

## 2023-06-02 PROCEDURE — 85027 COMPLETE CBC AUTOMATED: CPT | Performed by: PHYSICIAN ASSISTANT

## 2023-06-02 PROCEDURE — 80053 COMPREHEN METABOLIC PANEL: CPT | Performed by: PHYSICIAN ASSISTANT

## 2023-06-02 PROCEDURE — 86920 COMPATIBILITY TEST SPIN: CPT | Mod: 91

## 2023-06-02 PROCEDURE — 27100098 HC SPACER

## 2023-06-02 PROCEDURE — 25000003 PHARM REV CODE 250: Performed by: FAMILY MEDICINE

## 2023-06-02 PROCEDURE — 63600175 PHARM REV CODE 636 W HCPCS

## 2023-06-02 PROCEDURE — 85610 PROTHROMBIN TIME: CPT

## 2023-06-02 PROCEDURE — 63600175 PHARM REV CODE 636 W HCPCS: Performed by: FAMILY MEDICINE

## 2023-06-02 PROCEDURE — P9040 RBC LEUKOREDUCED IRRADIATED: HCPCS

## 2023-06-02 PROCEDURE — 96372 THER/PROPH/DIAG INJ SC/IM: CPT

## 2023-06-02 PROCEDURE — 85027 COMPLETE CBC AUTOMATED: CPT | Mod: 91

## 2023-06-02 RX ORDER — ONDANSETRON 4 MG/1
4 TABLET, ORALLY DISINTEGRATING ORAL EVERY 8 HOURS PRN
Status: DISCONTINUED | OUTPATIENT
Start: 2023-06-02 | End: 2023-06-04 | Stop reason: HOSPADM

## 2023-06-02 RX ORDER — BUSPIRONE HYDROCHLORIDE 5 MG/1
5 TABLET ORAL 2 TIMES DAILY
Status: DISCONTINUED | OUTPATIENT
Start: 2023-06-02 | End: 2023-06-04 | Stop reason: HOSPADM

## 2023-06-02 RX ORDER — AMLODIPINE BESYLATE 10 MG/1
10 TABLET ORAL DAILY
Status: DISCONTINUED | OUTPATIENT
Start: 2023-06-02 | End: 2023-06-04 | Stop reason: HOSPADM

## 2023-06-02 RX ORDER — TALC
6 POWDER (GRAM) TOPICAL NIGHTLY PRN
Status: DISCONTINUED | OUTPATIENT
Start: 2023-06-02 | End: 2023-06-04 | Stop reason: HOSPADM

## 2023-06-02 RX ORDER — ACYCLOVIR 400 MG/1
400 TABLET ORAL 2 TIMES DAILY
Status: DISCONTINUED | OUTPATIENT
Start: 2023-06-02 | End: 2023-06-04 | Stop reason: HOSPADM

## 2023-06-02 RX ORDER — METOPROLOL SUCCINATE 25 MG/1
25 TABLET, EXTENDED RELEASE ORAL 2 TIMES DAILY
Status: DISCONTINUED | OUTPATIENT
Start: 2023-06-02 | End: 2023-06-04 | Stop reason: HOSPADM

## 2023-06-02 RX ORDER — IBUPROFEN 200 MG
24 TABLET ORAL
Status: DISCONTINUED | OUTPATIENT
Start: 2023-06-02 | End: 2023-06-04 | Stop reason: HOSPADM

## 2023-06-02 RX ORDER — LOSARTAN POTASSIUM 25 MG/1
25 TABLET ORAL DAILY
Status: DISCONTINUED | OUTPATIENT
Start: 2023-06-02 | End: 2023-06-04 | Stop reason: HOSPADM

## 2023-06-02 RX ORDER — GABAPENTIN 300 MG/1
300 CAPSULE ORAL 3 TIMES DAILY
Status: DISCONTINUED | OUTPATIENT
Start: 2023-06-02 | End: 2023-06-04 | Stop reason: HOSPADM

## 2023-06-02 RX ORDER — MONTELUKAST SODIUM 5 MG/1
5 TABLET, CHEWABLE ORAL DAILY
Status: DISCONTINUED | OUTPATIENT
Start: 2023-06-02 | End: 2023-06-04 | Stop reason: HOSPADM

## 2023-06-02 RX ORDER — SODIUM CHLORIDE 0.9 % (FLUSH) 0.9 %
10 SYRINGE (ML) INJECTION
Status: DISCONTINUED | OUTPATIENT
Start: 2023-06-02 | End: 2023-06-04 | Stop reason: HOSPADM

## 2023-06-02 RX ORDER — FUROSEMIDE 20 MG/1
20 TABLET ORAL DAILY
Status: DISCONTINUED | OUTPATIENT
Start: 2023-06-02 | End: 2023-06-04 | Stop reason: HOSPADM

## 2023-06-02 RX ORDER — HYDROXYZINE PAMOATE 25 MG/1
25 CAPSULE ORAL 3 TIMES DAILY PRN
Status: DISCONTINUED | OUTPATIENT
Start: 2023-06-02 | End: 2023-06-04 | Stop reason: HOSPADM

## 2023-06-02 RX ORDER — IBUPROFEN 200 MG
16 TABLET ORAL
Status: DISCONTINUED | OUTPATIENT
Start: 2023-06-02 | End: 2023-06-04 | Stop reason: HOSPADM

## 2023-06-02 RX ORDER — INSULIN ASPART 100 [IU]/ML
0-15 INJECTION, SOLUTION INTRAVENOUS; SUBCUTANEOUS
Status: DISCONTINUED | OUTPATIENT
Start: 2023-06-02 | End: 2023-06-04 | Stop reason: HOSPADM

## 2023-06-02 RX ORDER — HYDROCODONE BITARTRATE AND ACETAMINOPHEN 5; 325 MG/1; MG/1
1 TABLET ORAL EVERY 6 HOURS PRN
Status: DISCONTINUED | OUTPATIENT
Start: 2023-06-02 | End: 2023-06-04 | Stop reason: HOSPADM

## 2023-06-02 RX ORDER — PANTOPRAZOLE SODIUM 40 MG/1
40 TABLET, DELAYED RELEASE ORAL DAILY
Status: DISCONTINUED | OUTPATIENT
Start: 2023-06-02 | End: 2023-06-04 | Stop reason: HOSPADM

## 2023-06-02 RX ORDER — ATORVASTATIN CALCIUM 40 MG/1
40 TABLET, FILM COATED ORAL DAILY
Status: DISCONTINUED | OUTPATIENT
Start: 2023-06-02 | End: 2023-06-04 | Stop reason: HOSPADM

## 2023-06-02 RX ORDER — OLANZAPINE 10 MG/1
10 TABLET ORAL NIGHTLY
Status: DISCONTINUED | OUTPATIENT
Start: 2023-06-02 | End: 2023-06-04 | Stop reason: HOSPADM

## 2023-06-02 RX ORDER — HYDROCHLOROTHIAZIDE 12.5 MG/1
12.5 TABLET ORAL DAILY
Status: DISCONTINUED | OUTPATIENT
Start: 2023-06-02 | End: 2023-06-04 | Stop reason: HOSPADM

## 2023-06-02 RX ORDER — CETIRIZINE HYDROCHLORIDE 10 MG/1
10 TABLET ORAL DAILY
Status: DISCONTINUED | OUTPATIENT
Start: 2023-06-02 | End: 2023-06-04 | Stop reason: HOSPADM

## 2023-06-02 RX ORDER — ALLOPURINOL 100 MG/1
300 TABLET ORAL DAILY
Status: DISCONTINUED | OUTPATIENT
Start: 2023-06-02 | End: 2023-06-04 | Stop reason: HOSPADM

## 2023-06-02 RX ORDER — AMMONIUM LACTATE 12 G/100G
1 CREAM TOPICAL DAILY PRN
Status: DISCONTINUED | OUTPATIENT
Start: 2023-06-02 | End: 2023-06-04 | Stop reason: HOSPADM

## 2023-06-02 RX ORDER — SILVER SULFADIAZINE 10 G/1000G
CREAM TOPICAL DAILY
Status: DISCONTINUED | OUTPATIENT
Start: 2023-06-02 | End: 2023-06-04 | Stop reason: HOSPADM

## 2023-06-02 RX ORDER — INSULIN ASPART 100 [IU]/ML
15 INJECTION, SOLUTION INTRAVENOUS; SUBCUTANEOUS
Status: DISCONTINUED | OUTPATIENT
Start: 2023-06-02 | End: 2023-06-04 | Stop reason: HOSPADM

## 2023-06-02 RX ORDER — ESCITALOPRAM OXALATE 10 MG/1
10 TABLET ORAL DAILY
Status: DISCONTINUED | OUTPATIENT
Start: 2023-06-02 | End: 2023-06-04 | Stop reason: HOSPADM

## 2023-06-02 RX ORDER — ALPRAZOLAM 0.25 MG/1
0.25 TABLET ORAL 2 TIMES DAILY PRN
Status: DISCONTINUED | OUTPATIENT
Start: 2023-06-02 | End: 2023-06-04 | Stop reason: HOSPADM

## 2023-06-02 RX ORDER — ALBUTEROL SULFATE 90 UG/1
2 AEROSOL, METERED RESPIRATORY (INHALATION) EVERY 6 HOURS PRN
Status: DISCONTINUED | OUTPATIENT
Start: 2023-06-02 | End: 2023-06-04 | Stop reason: HOSPADM

## 2023-06-02 RX ORDER — IBUPROFEN 600 MG/1
600 TABLET ORAL EVERY 8 HOURS PRN
Status: DISCONTINUED | OUTPATIENT
Start: 2023-06-02 | End: 2023-06-04 | Stop reason: HOSPADM

## 2023-06-02 RX ORDER — HYDROCODONE BITARTRATE AND ACETAMINOPHEN 500; 5 MG/1; MG/1
TABLET ORAL
Status: DISCONTINUED | OUTPATIENT
Start: 2023-06-02 | End: 2023-06-04 | Stop reason: HOSPADM

## 2023-06-02 RX ORDER — HYDROCORTISONE 25 MG/G
CREAM TOPICAL 2 TIMES DAILY
Status: DISCONTINUED | OUTPATIENT
Start: 2023-06-02 | End: 2023-06-04 | Stop reason: HOSPADM

## 2023-06-02 RX ORDER — IMATINIB MESYLATE 400 MG/1
400 TABLET, FILM COATED ORAL DAILY
Status: DISCONTINUED | OUTPATIENT
Start: 2023-06-02 | End: 2023-06-04 | Stop reason: HOSPADM

## 2023-06-02 RX ORDER — PONATINIB HYDROCHLORIDE 30 MG/1
30 TABLET, FILM COATED ORAL DAILY
Status: ON HOLD | COMMUNITY
End: 2024-04-01 | Stop reason: HOSPADM

## 2023-06-02 RX ORDER — GLUCAGON 1 MG
1 KIT INJECTION
Status: DISCONTINUED | OUTPATIENT
Start: 2023-06-02 | End: 2023-06-04 | Stop reason: HOSPADM

## 2023-06-02 RX ADMIN — GABAPENTIN 300 MG: 300 CAPSULE ORAL at 02:06

## 2023-06-02 RX ADMIN — ESCITALOPRAM OXALATE 10 MG: 10 TABLET ORAL at 09:06

## 2023-06-02 RX ADMIN — MONTELUKAST SODIUM 5 MG: 5 TABLET, CHEWABLE ORAL at 09:06

## 2023-06-02 RX ADMIN — METOPROLOL SUCCINATE 25 MG: 25 TABLET, EXTENDED RELEASE ORAL at 09:06

## 2023-06-02 RX ADMIN — HYDROCORTISONE: 25 CREAM TOPICAL at 09:06

## 2023-06-02 RX ADMIN — AMLODIPINE BESYLATE 10 MG: 10 TABLET ORAL at 09:06

## 2023-06-02 RX ADMIN — INSULIN DETEMIR 60 UNITS: 100 INJECTION, SOLUTION SUBCUTANEOUS at 09:06

## 2023-06-02 RX ADMIN — GABAPENTIN 300 MG: 300 CAPSULE ORAL at 09:06

## 2023-06-02 RX ADMIN — FUROSEMIDE 20 MG: 20 TABLET ORAL at 09:06

## 2023-06-02 RX ADMIN — LOSARTAN POTASSIUM 25 MG: 25 TABLET, FILM COATED ORAL at 09:06

## 2023-06-02 RX ADMIN — ALBUTEROL SULFATE 2 PUFF: 90 AEROSOL, METERED RESPIRATORY (INHALATION) at 07:06

## 2023-06-02 RX ADMIN — PANTOPRAZOLE SODIUM 40 MG: 40 TABLET, DELAYED RELEASE ORAL at 09:06

## 2023-06-02 RX ADMIN — OLANZAPINE 10 MG: 10 TABLET, FILM COATED ORAL at 02:06

## 2023-06-02 RX ADMIN — ALLOPURINOL 300 MG: 100 TABLET ORAL at 09:06

## 2023-06-02 RX ADMIN — ATORVASTATIN CALCIUM 40 MG: 40 TABLET, FILM COATED ORAL at 09:06

## 2023-06-02 RX ADMIN — CETIRIZINE HYDROCHLORIDE 10 MG: 10 TABLET, FILM COATED ORAL at 09:06

## 2023-06-02 RX ADMIN — SILVER SULFADIAZINE: 10 CREAM TOPICAL at 09:06

## 2023-06-02 RX ADMIN — ACYCLOVIR 400 MG: 400 TABLET ORAL at 09:06

## 2023-06-02 RX ADMIN — IBUPROFEN 600 MG: 600 TABLET, FILM COATED ORAL at 07:06

## 2023-06-02 RX ADMIN — HUMAN INSULIN 4 UNITS: 100 INJECTION, SOLUTION SUBCUTANEOUS at 02:06

## 2023-06-02 RX ADMIN — POTASSIUM BICARBONATE 40 MEQ: 391 TABLET, EFFERVESCENT ORAL at 01:06

## 2023-06-02 RX ADMIN — OLANZAPINE 10 MG: 10 TABLET, FILM COATED ORAL at 09:06

## 2023-06-02 RX ADMIN — INSULIN ASPART 12 UNITS: 100 INJECTION, SOLUTION INTRAVENOUS; SUBCUTANEOUS at 12:06

## 2023-06-02 RX ADMIN — ALBUTEROL SULFATE 2 PUFF: 90 AEROSOL, METERED RESPIRATORY (INHALATION) at 01:06

## 2023-06-02 RX ADMIN — ALPRAZOLAM 0.25 MG: 0.25 TABLET ORAL at 09:06

## 2023-06-02 RX ADMIN — BUSPIRONE HYDROCHLORIDE 5 MG: 5 TABLET ORAL at 09:06

## 2023-06-02 RX ADMIN — HYDROCHLOROTHIAZIDE 12.5 MG: 12.5 TABLET ORAL at 09:06

## 2023-06-02 RX ADMIN — INSULIN ASPART 15 UNITS: 100 INJECTION, SOLUTION INTRAVENOUS; SUBCUTANEOUS at 09:06

## 2023-06-02 RX ADMIN — INSULIN ASPART 15 UNITS: 100 INJECTION, SOLUTION INTRAVENOUS; SUBCUTANEOUS at 06:06

## 2023-06-02 NOTE — H&P
[facility] Medicine  History & Physical Note      Patient Name: Fela Ellison  : 1965  MRN: [id number]  Patient Class: OP- Observation   Admission Date: 2023   Length of Stay: 0  Admitting Service: Hospital Medicine  Attending Physician: Blake Ortiz MD  PCP: Primary Doctor No  Source of history: Patient, patient's family, and EMR.   Code status: Full Code    Chief Complaint   Rash and Abdominal Pain (Rash   to  both  legs)    History of Present Illness   57 y.o. female with CML, thrombocytopenia, amenia, DM2, HTN, depression presents to ED for worsening rash on lower extremities and area where she injects her insulin. Discharged 3 days ago for similar episode where patient received transfusion of platelets and RBC. She is followed by oncology, receiving chemo with last session on . Denies CP, hemoptysis, hematochezia/melena, SOB, HA, vision changes.     ROS   Pertinent positive and negative as mentioned in HPI     Past Medical History     Past Medical History:   Diagnosis Date    Cancer     CML (chronic myelocytic leukemia)     DM2 (diabetes mellitus, type 2)     HTN (hypertension)     NAFLD (nonalcoholic fatty liver disease)     Neuropathy      Past Surgical History     Past Surgical History:   Procedure Laterality Date    ABDOMINAL SURGERY       SECTION       SECTION      CHOLECYSTECTOMY       Social History     Social History     Tobacco Use    Smoking status: Never    Smokeless tobacco: Never   Substance Use Topics    Alcohol use: Not Currently      Family History   Reviewed and noncontributory    Allergies   Patient has no known allergies.  Home Medications     Prior to Admission medications    Medication Sig Start Date End Date Taking? Authorizing Provider   acyclovir (ZOVIRAX) 400 MG tablet Take 1 tablet (400 mg total) by mouth 2 (two) times daily. 23  Natalya Ojeda MD   albuterol (PROVENTIL/VENTOLIN HFA) 90 mcg/actuation  inhaler Inhale 2 puffs into the lungs every 6 (six) hours as needed for Wheezing. Rescue    Historical Provider   allopurinoL (ZYLOPRIM) 300 MG tablet Take 300 mg by mouth. 2/27/23   Historical Provider   ALPRAZolam (XANAX) 0.25 MG tablet Take 0.25 mg by mouth. 4/4/23   Historical Provider   amLODIPine (NORVASC) 10 MG tablet Take 10 mg by mouth once daily.    Historical Provider   ammonium lactate 12 % Crea Apply topically daily as needed.    Historical Provider   amoxicillin-clavulanate 875-125mg (AUGMENTIN) 875-125 mg per tablet 1 tablet Orally every 12 hrs for 10 day(s)    Historical Provider   atorvastatin (LIPITOR) 40 MG tablet Take 40 mg by mouth once daily.    Historical Provider   busPIRone (BUSPAR) 5 MG Tab Take 1 tablet (5 mg total) by mouth 2 (two) times daily. 5/29/23 5/28/24  Natalya Ojeda MD   doxycycline (VIBRAMYCIN) 100 MG Cap 1 capsule.    Historical Provider   EScitalopram oxalate (LEXAPRO) 10 MG tablet Take 1 tablet (10 mg total) by mouth once daily. 5/30/23 5/29/24  Natalya Ojeda MD   fluconazole (DIFLUCAN) 200 MG Tab Take 200 mg by mouth once daily. 11/23/22   Historical Provider   furosemide (LASIX) 20 MG tablet Take 20 mg by mouth once daily.    Historical Provider   gabapentin (NEURONTIN) 300 MG capsule Take 1 capsule (300 mg total) by mouth 3 (three) times daily. 12/12/22 12/12/23  Rupesh Thomas MD   glimepiride (AMARYL) 4 MG tablet Take 4 mg by mouth.    Historical Provider   hydroCHLOROthiazide (HYDRODIURIL) 12.5 MG Tab TAKE 1 TABLET BY MOUTH IN THE MORNING ONCE A DAY FOR FLUID RETENTION for 30    Historical Provider   HYDROcodone-acetaminophen (NORCO) 5-325 mg per tablet Take 1 tablet by mouth every 6 (six) hours as needed for Pain.    Historical Provider   hydrocortisone 2.5 % cream Apply topically 2 (two) times daily. 5/22/23 5/21/24  Historical Provider   hydrOXYzine pamoate (VISTARIL) 25 MG Cap Take 25 mg by mouth 3 (three) times daily as needed. 4/13/23   Historical  Provider   ibuprofen (ADVIL,MOTRIN) 600 MG tablet TAKE 1 TABLET WITH FOOD OR MILK AS NEEDED THREE TIMES A DAY ORALLY 30 3/29/23   Historical Provider   imatinib (GLEEVEC) 400 MG Tab Take 1 tablet (400 mg total) by mouth once daily. 10/28/20   Bobby Yuen MD   insulin glargine (LANTUS) 100 unit/mL injection Inject 60 Units into the skin nightly. 12/12/22   Rupesh Thomas MD   insulin lispro 100 unit/mL injection Inject 15 Units into the skin 3 (three) times daily before meals. 12/12/22   Rupesh Thomas MD   insulin lispro 100 unit/mL injection Inject 20 Units into the skin. 4/6/23 7/26/23  Historical Provider   insulin lispro 100 unit/mL pen SMARTSIG:10 Unit(s) SUB-Q Every Evening 3/27/23   Historical Provider   insulin NPH (NOVOLIN N NPH U-100 INSULIN) 100 unit/mL injection INJECT 20 UNITS UNDER THE SKIN EVERY MORNING AND 50 UNITS EVERY NIGHT AT BEDTIME 3/22/23   Historical Provider   ketoconazole (NIZORAL) 2 % cream Apply topically. 5/22/23 5/21/24  Historical Provider   levoFLOXacin (LEVAQUIN) 500 MG tablet Take 1 tablet (500 mg total) by mouth once daily. 12/12/22   Rupehs Thomas MD   lisinopriL 10 MG tablet Take 40 mg by mouth. 8/18/21   Historical Provider   loratadine (CLARITIN) 10 mg tablet Take 10 mg by mouth.    Historical Provider   losartan (COZAAR) 25 MG tablet Take 1 tablet (25 mg total) by mouth once daily. 12/12/22 12/12/23  Rupesh Thomas MD   losartan (COZAAR) 25 MG tablet Take 25 mg by mouth. 4/8/22   Historical Provider   magnesium oxide (MAG-OX) 400 mg (241.3 mg magnesium) tablet Take 400 mg by mouth.    Historical Provider   metFORMIN (GLUCOPHAGE) 1000 MG tablet Take 1,000 mg by mouth 2 (two) times daily with meals.    Historical Provider   metoprolol succinate (TOPROL-XL) 25 MG 24 hr tablet Take 25 mg by mouth 2 (two) times daily.    Historical Provider   metoprolol tartrate (LOPRESSOR) 25 MG tablet Take 25 mg by mouth 2 (two) times daily.    Historical Provider    montelukast (SINGULAIR) 10 mg tablet Take 10 mg by mouth once daily.    Historical Provider   ofloxacin (FLOXIN) 0.3 % otic solution instill 10 drops into affected ear ONCE a DAY FOR SEVEN DAYS 4/27/23   Historical Provider   OLANZapine (ZYPREXA) 10 MG tablet Take 10 mg by mouth every evening.    Historical Provider   omeprazole (PRILOSEC) 40 MG capsule Take 1 capsule by mouth once daily. 5/22/23   Historical Provider   ondansetron (ZOFRAN) 8 MG tablet Take 8 mg by mouth every 8 (eight) hours as needed for Nausea.    Historical Provider   ondansetron (ZOFRAN-ODT) 4 MG TbDL Take 4 mg by mouth every 8 (eight) hours as needed. 5/16/23   Historical Provider   potassium gluconate 2.5 mEq Tab Take 1 tablet by mouth.    Historical Provider   promethazine-dextromethorphan (PROMETHAZINE-DM) 6.25-15 mg/5 mL Syrp take 5ml's BY MOUTH EVERY 6 HOURS AS NEEDED 4/27/23   Historical Provider   QUEtiapine (SEROQUEL) 25 MG Tab Take 25 mg by mouth every evening. 3/27/23   Historical Provider   silver sulfADIAZINE 1% (SILVADENE) 1 % cream Apply topically. 1/20/23 1/20/24  Historical Provider   SSD 1 % cream Apply topically. 1/20/23   Historical Provider   TEXACORT 2.5 % Soln Apply topically 2 (two) times daily. 5/16/23   Historical Provider   vitamin D (VITAMIN D3) 1000 units Tab Take 1,000 Units by mouth.    Historical Provider   voriconazole (VFEND) 200 mg/5 mL (40 mg/mL) SusR Take 200 mg by mouth. 4/8/22   Historical Provider      Inpatient Medications   Scheduled Meds   acyclovir  400 mg Oral BID    allopurinoL  300 mg Oral Daily    amLODIPine  10 mg Oral Daily    atorvastatin  40 mg Oral Daily    busPIRone  5 mg Oral BID    cetirizine  10 mg Oral Daily    EScitalopram oxalate  10 mg Oral Daily    furosemide  20 mg Oral Daily    gabapentin  300 mg Oral TID    hydroCHLOROthiazide  12.5 mg Oral Daily    hydrocortisone   Topical (Top) BID    imatinib  400 mg Oral Daily    insulin aspart U-100  15 Units Subcutaneous TIDWM    insulin  detemir U-100  60 Units Subcutaneous QHS    losartan  25 mg Oral Daily    metoprolol succinate  25 mg Oral BID    montelukast  5 mg Oral Daily    OLANZapine  10 mg Oral QHS    pantoprazole  40 mg Oral Daily    silver sulfADIAZINE 1%   Topical (Top) Daily     Continuous Infusions    PRN Meds  albuterol, ALPRAZolam, ammonium lactate, dextrose 50%, dextrose 50%, glucagon (human recombinant), glucose, glucose, HYDROcodone-acetaminophen, hydrOXYzine pamoate, ibuprofen, insulin aspart U-100, melatonin, ondansetron, sodium chloride 0.9%    Physical Exam   Vital Signs  Temp:  [100.1 °F (37.8 °C)]   Pulse:  [73-89]   Resp:  [18-20]   BP: (145-154)/(77-78)   SpO2:  [94 %-95 %]       General: Appears comfortable  HEENT: NC/AT  Neck:  No JVD  CVS: Regular rhythm. Normal S1/S2.  Abdomen: nondistended, normoactive BS, soft and non-tender.  MSK: No obvious deformity or joint swelling  Skin: Warm and dry. Chronic with acute areas of petechiae on lower legs b/l.  Neuro: AAOx3, no focal neurological deficit. CN II-XII grossly intact   Psych: Cooperative              Labs     CBC  Recent Labs     06/02/23  0014   WBC 1.92*   RBC 2.34*   HGB 7.9*   HCT 24.8*   .0*   MCH 33.8*   MCHC 31.9*   RDW 20.3*   PLT 15*     Diabetes  Recent Labs     06/02/23  0014   GLUCOSE 301*      BMP  Recent Labs     06/02/23  0014      K 3.7   CHLORIDE 103   CO2 25   BUN 12.9   CREATININE 0.79   GLUCOSE 301*   CALCIUM 9.6     LFTs  Recent Labs     06/02/23  0014   ALBUMIN 3.3*   GLOBULIN 2.8   ALKPHOS 74   ALT 22   AST 15   BILITOT 0.7   LIPASE 36        Microbiology Results (last 7 days)       ** No results found for the last 168 hours. **           Imaging     No orders to display     Assessment & Plan     Severe thrombocytopenia secondary to CML   Plt 15 in ED   pending: Fibrinogen, PT INR, APTT   Per 05/2023 onc note, transfuse if plt<10  Hold DVT ppx       M Anemia  H/H 7.9/24.8 upon admission with MCV of 106  Will transfuse 2u pRBC  Per  05/2023 onc note, transfuse if hgb <8     CML on ponatinib/venetoclax and azacitidine  Dx in 2020  Hx blast crisis   WBC in ED 1.92  Last chemo session 4/20/23  Continue home medication: Acyclovir, Gleevec, allopurinol     DM2 with neuropathy  Last a1c 9.0 in 03/2023  Continue home insulin 60U nightly and aspart 15U TIDWM  Hold PO antihyperglycemics  High intensity scale SSI  Continue home gabapentin     5.   HTN  Continue home medications: amlodipine 10 mg, furosemide 20 mg, hydrochlorothiazide 12.5 mg, losartan 25 mg, Toprol 25 mg     6.   Depression/Anxiety   Continue home medications: buspar, lexapro, zyprexa  Hold Seroquel d/t thrombocytopenia       Disposition: admit to obs, transfuse pRBC, CTM thrombocytopenia     Access: pIV  Antibiotics: none  Diet: diabetic  DVT Prophylaxis: SCDs none 2/2 thrombocytopenia  GI Prophylaxis: Protonix  Fluids: none    Tammy Lundberg MD  [facility], [facility]

## 2023-06-02 NOTE — PLAN OF CARE
Problem: Adult Inpatient Plan of Care  Goal: Plan of Care Review  Outcome: Ongoing, Progressing  Goal: Patient-Specific Goal (Individualized)  Outcome: Ongoing, Progressing  Goal: Absence of Hospital-Acquired Illness or Injury  Outcome: Ongoing, Progressing  Goal: Optimal Comfort and Wellbeing  Outcome: Ongoing, Progressing  Goal: Readiness for Transition of Care  Outcome: Ongoing, Progressing     Problem: Bariatric Environmental Safety  Goal: Safety Maintained with Care  Outcome: Ongoing, Progressing     Problem: Diabetes Comorbidity  Goal: Blood Glucose Level Within Targeted Range  Outcome: Ongoing, Progressing     Problem: Infection  Goal: Absence of Infection Signs and Symptoms  Outcome: Ongoing, Progressing

## 2023-06-02 NOTE — ED PROVIDER NOTES
Encounter Date: 2023       History     Chief Complaint   Patient presents with    Rash    Abdominal Pain     Rash   to  both  legs     Patient is a 57-year-old female presents emergency room tonight for evaluation due to concerns of worsening lower extremity rash.  Patient has a history of CML, currently followed by oncology clinic.  History of pancytopenia as well as thrombocytopenia.  Patient was recently admitted to the hospital due to thrombocytopenia.  Reports tonight, that the rash is slightly worsening lower extremities, therefore family decided come back to emergency room for evaluation.  Patient denies chest pain or shortness of breath.  Patient reports to areas on her anterior abdominal wall that are uncomfortable.  Reports that she had received insulin in these areas.    The history is provided by the patient.   Review of patient's allergies indicates:  No Known Allergies  Past Medical History:   Diagnosis Date    Cancer     CML (chronic myelocytic leukemia)     DM2 (diabetes mellitus, type 2)     HTN (hypertension)     NAFLD (nonalcoholic fatty liver disease)     Neuropathy      Past Surgical History:   Procedure Laterality Date    ABDOMINAL SURGERY       SECTION       SECTION      CHOLECYSTECTOMY       Family History   Problem Relation Age of Onset    Diabetes Mother     Diabetes Father     Cancer Neg Hx      Social History     Tobacco Use    Smoking status: Never    Smokeless tobacco: Never   Substance Use Topics    Alcohol use: Not Currently    Drug use: Not Currently     Review of Systems   Constitutional:  Negative for chills, fatigue and fever.   HENT:  Negative for ear pain, rhinorrhea and sore throat.    Eyes:  Negative for photophobia and pain.   Respiratory:  Negative for cough, shortness of breath and wheezing.    Cardiovascular:  Negative for chest pain.   Gastrointestinal:  Positive for abdominal pain. Negative for diarrhea, nausea and vomiting.   Genitourinary:  Negative  for dysuria.   Skin:  Positive for rash.   Neurological:  Negative for dizziness, weakness and headaches.   All other systems reviewed and are negative.    Physical Exam     Initial Vitals [06/01/23 2343]   BP Pulse Resp Temp SpO2   (!) 145/78 89 20 100.1 °F (37.8 °C) (!) 94 %      MAP       --         Physical Exam    Nursing note and vitals reviewed.  Constitutional: She appears well-developed and well-nourished. No distress.   HENT:   Head: Normocephalic and atraumatic.   Eyes: Conjunctivae and EOM are normal. Pupils are equal, round, and reactive to light.   Neck: Neck supple.   Normal range of motion.  Cardiovascular:  Normal rate, regular rhythm, normal heart sounds and intact distal pulses.           Pulmonary/Chest: Breath sounds normal. No respiratory distress. She has no wheezes. She has no rhonchi. She has no rales.   Abdominal: Abdomen is soft. Bowel sounds are normal. She exhibits no distension. There is no abdominal tenderness. There is no rebound and no guarding.   Musculoskeletal:         General: Normal range of motion.      Cervical back: Normal range of motion and neck supple.     Neurological: She is alert and oriented to person, place, and time.   Skin: Skin is warm and dry. Capillary refill takes less than 2 seconds. No erythema.   Area of healing ecchymosis of bilateral lower extremities, with slight area of petechiae around periphery on inferior aspect.    On abdominal wall, 2 subcutaneous nodules of the lower abdomen without erythema or drainage.   Psychiatric: She has a normal mood and affect. Her behavior is normal. Judgment and thought content normal.       ED Course   Procedures  Labs Reviewed   COMPREHENSIVE METABOLIC PANEL - Abnormal; Notable for the following components:       Result Value    Glucose Level 301 (*)     Protein Total 6.1 (*)     Albumin Level 3.3 (*)     All other components within normal limits   URINALYSIS, REFLEX TO URINE CULTURE - Abnormal; Notable for the following  components:    Protein, UA 1+ (*)     Glucose, UA 4+ (*)     Ketones, UA Trace (*)     Leukocyte Esterase, UA 75 (*)     Bacteria, UA Trace (*)     Squamous Epithelial Cells, UA Trace (*)     Mucous, UA Trace (*)     All other components within normal limits   CBC WITH DIFFERENTIAL - Abnormal; Notable for the following components:    WBC 1.92 (*)     RBC 2.34 (*)     Hgb 7.9 (*)     Hct 24.8 (*)     .0 (*)     MCH 33.8 (*)     MCHC 31.9 (*)     RDW 20.3 (*)     Platelet 15 (*)     MPV 0.0 (*)     All other components within normal limits   MANUAL DIFFERENTIAL - Abnormal; Notable for the following components:    Neut Man 28 (*)     Lymph Man 68 (*)     Abs Neut calc 0.6144 (*)     RBC Morph Abnormal (*)     Anisocyte 1+ (*)     Poik 1+ (*)     Microcyte 1+ (*)     Macrocyte 1+ (*)     Hypochrom 1+ (*)     Tear drop cell 1+ (*)     Stomatocytes 1+ (*)     Platelet Est Decreased (*)     All other components within normal limits   POCT GLUCOSE - Abnormal; Notable for the following components:    POCT Glucose 234 (*)     All other components within normal limits   LIPASE - Normal   CBC W/ AUTO DIFFERENTIAL    Narrative:     The following orders were created for panel order CBC auto differential.  Procedure                               Abnormality         Status                     ---------                               -----------         ------                     CBC with Differential[[id number]]        Abnormal            Final result               Manual Differential[[id number]]          Abnormal            Final result                 Please view results for these tests on the individual orders.   EXTRA TUBES    Narrative:     The following orders were created for panel order EXTRA TUBES.  Procedure                               Abnormality         Status                     ---------                               -----------         ------                     Light Blue Top Hold[[id number]]                               In process                 Light Green Top Hold[[id number]]                             In process                 Lavender Top Hold[[id number]]                                In process                 Gold Top Hold[[id number]]                                    In process                 Pink Top Hold[[id number]]                                    In process                   Please view results for these tests on the individual orders.   LIGHT BLUE TOP HOLD   LIGHT GREEN TOP HOLD   LAVENDER TOP HOLD   GOLD TOP HOLD   PINK TOP HOLD   TYPE & SCREEN   POCT GLUCOSE MONITORING CONTINUOUS          Imaging Results    None          Medications   acyclovir tablet 400 mg (has no administration in time range)   albuterol inhaler 2 puff (has no administration in time range)   allopurinoL tablet 300 mg (has no administration in time range)   ALPRAZolam tablet 0.25 mg (has no administration in time range)   amLODIPine tablet 10 mg (has no administration in time range)   ammonium lactate 12 % Crea 1 g (has no administration in time range)   atorvastatin tablet 40 mg (has no administration in time range)   busPIRone tablet 5 mg (has no administration in time range)   EScitalopram oxalate tablet 10 mg (has no administration in time range)   furosemide tablet 20 mg (has no administration in time range)   gabapentin capsule 300 mg (has no administration in time range)   hydroCHLOROthiazide tablet 12.5 mg (has no administration in time range)   HYDROcodone-acetaminophen 5-325 mg per tablet 1 tablet (has no administration in time range)   hydrocortisone 2.5 % cream (has no administration in time range)   hydrOXYzine pamoate capsule 25 mg (has no administration in time range)   ibuprofen tablet 600 mg (has no administration in time range)   imatinib (GLEEVEC) tablet 400 mg (has no administration in time range)   insulin detemir U-100 injection 60 Units (0 Units Subcutaneous Hold 6/2/23 1565)   insulin aspart U-100 injection 15  Units (has no administration in time range)   cetirizine tablet 10 mg (has no administration in time range)   losartan tablet 25 mg (has no administration in time range)   metoprolol succinate (TOPROL-XL) 24 hr tablet 25 mg (has no administration in time range)   montelukast chewable tablet 5 mg (has no administration in time range)   OLANZapine tablet 10 mg (has no administration in time range)   pantoprazole EC tablet 40 mg (has no administration in time range)   ondansetron disintegrating tablet 4 mg (has no administration in time range)   silver sulfADIAZINE 1% cream (has no administration in time range)   glucose chewable tablet 16 g (has no administration in time range)   glucose chewable tablet 24 g (has no administration in time range)   dextrose 50% injection 12.5 g (has no administration in time range)   dextrose 50% injection 25 g (has no administration in time range)   glucagon (human recombinant) injection 1 mg (has no administration in time range)   sodium chloride 0.9% flush 10 mL (has no administration in time range)   melatonin tablet 6 mg (has no administration in time range)   insulin aspart U-100 injection 0-15 Units (has no administration in time range)   potassium bicarbonate disintegrating tablet 40 mEq (40 mEq Oral Given 6/2/23 0148)   insulin regular injection 4 Units 0.04 mL (4 Units Intravenous Given 6/2/23 0200)     Medical Decision Making:   History:   Old Records Summarized: records from previous admission(s).       <> Summary of Records: Patient recently admitted to the hospital on 05/28/2023 for platelet transfusion, discharged on 05/29/2023 I have reviewed the discharge summary from this hospitalization.  Initial Assessment:   Patient is a 57-year-old female presenting with: Petechiae bilateral lower extremity, mostly chronic, but a few areas that appears slightly acute.  Patient overall feels in her normal state of health, eating and drinking well without difficulty.      To  subcutaneous nodules on the anterior abdomen, when right lower abdomen, the other left lower abdomen.  Patient reports these are secondary to where she had insulin previously given.  No erythema or fluctuance to the area.  Differential diagnosis includes contusion, scarring, abscess.  Bedside ultrasound performed on both areas, and no fluid accumulation appreciated.  Differential Diagnosis:   Thrombocytopenia, lower extremity rash, lower abdominal cellulitis/scarring.           ED Course as of 06/02/23 0236   Fri Jun 02, 2023   0046 Sodium: 139 [MW]   0046 Potassium: 3.7 [MW]   0046 Chloride: 103 [MW]   0046 CO2: 25 [MW]   0046 Glucose(!): 301 [MW]   0046 BUN: 12.9 [MW]   0046 Creatinine: 0.79 [MW]   0046 Calcium: 9.6 [MW]   0046 PROTEIN TOTAL(!): 6.1 [MW]   0046 Albumin(!): 3.3 [MW]   0046 Globulin, Total: 2.8 [MW]   0046 BILIRUBIN TOTAL: 0.7 [MW]   0046 Alkaline Phosphatase: 74 [MW]   0046 ALT: 22 [MW]   0046 AST: 15 [MW]   0046 eGFR: >60 [MW]   0046 Lipase: 36 [MW]   0117 Color, UA: Light-Yellow [MW]   0117 Appearance, UA: Clear [MW]   0117 Specific Gravity,UA: 1.018 [MW]   0117 Protein, UA(!): 1+ [MW]   0117 Glucose, UA(!): 4+ [MW]   0117 Ketones, UA(!): Trace [MW]   0117 Bilirubin, UA: Negative [MW]   0117 Urobilinogen, UA: Normal [MW]   0117 NITRITE UA: Negative [MW]   0117 Leukocytes, UA(!): 75 [MW]   0129 WBC(!!): 1.92 [MW]   0129 Hemoglobin(!): 7.9 [MW]   0129 Hematocrit(!): 24.8 [MW]   0129 Platelets(!!): 15  Wbc 1.92, Hemoglobin 7.9, Plt 15.  Usually patient received pRBCs for Hb < 8 and Plt < 15.  Will discuss with IM regarding observation admission and transfusion. [MW]   0132 On labs, glucose 301.  Will give 4 units of regular insulin IV for hyerglycemia; will give 40meq of KCL due to potassium of 3.7 [MW]   8016 Internal Medicine has seen and evaluated the patient.  Please see consultation note.  Will admit for transfusion and monitoring for worsening thrombocytopenia. [MW]      ED Course User  Index  [MW] Freddy Ford MD                 Clinical Impression:   Final diagnoses:  [D64.9] Anemia        ED Disposition Condition    Observation Stable                Freddy Ford MD  06/02/23 0236

## 2023-06-03 LAB
ABO + RH BLD: NORMAL
ABS NEUT CALC (OHS): 0.43 X10(3)/MCL (ref 2.1–9.2)
ALBUMIN SERPL-MCNC: 3.1 G/DL (ref 3.5–5)
ALBUMIN/GLOB SERPL: 1.1 RATIO (ref 1.1–2)
ALP SERPL-CCNC: 67 UNIT/L (ref 40–150)
ALT SERPL-CCNC: 19 UNIT/L (ref 0–55)
ANISOCYTOSIS BLD QL SMEAR: ABNORMAL
AST SERPL-CCNC: 14 UNIT/L (ref 5–34)
BILIRUBIN DIRECT+TOT PNL SERPL-MCNC: 0.9 MG/DL
BLD PROD TYP BPU: NORMAL
BLOOD UNIT EXPIRATION DATE: NORMAL
BLOOD UNIT TYPE CODE: 6200
BUN SERPL-MCNC: 13.1 MG/DL (ref 9.8–20.1)
CALCIUM SERPL-MCNC: 9.7 MG/DL (ref 8.4–10.2)
CHLORIDE SERPL-SCNC: 102 MMOL/L (ref 98–107)
CO2 SERPL-SCNC: 27 MMOL/L (ref 22–29)
CREAT SERPL-MCNC: 0.72 MG/DL (ref 0.55–1.02)
CROSSMATCH INTERPRETATION: NORMAL
DISPENSE STATUS: NORMAL
ERYTHROCYTE [DISTWIDTH] IN BLOOD BY AUTOMATED COUNT: 22.5 % (ref 11.5–17)
GFR SERPLBLD CREATININE-BSD FMLA CKD-EPI: >60 MLS/MIN/1.73/M2
GLOBULIN SER-MCNC: 2.8 GM/DL (ref 2.4–3.5)
GLUCOSE SERPL-MCNC: 294 MG/DL (ref 74–100)
HCT VFR BLD AUTO: 31.9 % (ref 37–47)
HGB BLD-MCNC: 11 G/DL (ref 12–16)
LYMPHOCYTES NFR BLD MANUAL: 0.88 X10(3)/MCL
LYMPHOCYTES NFR BLD MANUAL: 66 % (ref 13–40)
MCH RBC QN AUTO: 33.7 PG (ref 27–31)
MCHC RBC AUTO-ENTMCNC: 34.5 G/DL (ref 33–36)
MCV RBC AUTO: 97.9 FL (ref 80–94)
MONOCYTES NFR BLD MANUAL: 0.03 X10(3)/MCL (ref 0.1–1.3)
MONOCYTES NFR BLD MANUAL: 2 % (ref 2–11)
NEUTROPHILS NFR BLD MANUAL: 32 % (ref 47–80)
NRBC BLD AUTO-RTO: 47.4 %
NRBC BLD MANUAL-RTO: 19 %
OVALOCYTES (OLG): ABNORMAL
PLATELET # BLD AUTO: 7 X10(3)/MCL (ref 130–400)
PLATELET # BLD EST: ABNORMAL 10*3/UL
PLATELETS.RETICULATED NFR BLD AUTO: 5.1 % (ref 0.9–11.2)
PMV BLD AUTO: ABNORMAL FL
POCT GLUCOSE: 209 MG/DL (ref 70–110)
POCT GLUCOSE: 254 MG/DL (ref 70–110)
POCT GLUCOSE: 287 MG/DL (ref 70–110)
POCT GLUCOSE: 305 MG/DL (ref 70–110)
POCT GLUCOSE: 314 MG/DL (ref 70–110)
POIKILOCYTOSIS BLD QL SMEAR: ABNORMAL
POLYCHROMASIA BLD QL SMEAR: ABNORMAL
POTASSIUM SERPL-SCNC: 3.6 MMOL/L (ref 3.5–5.1)
PROT SERPL-MCNC: 5.9 GM/DL (ref 6.4–8.3)
RBC # BLD AUTO: 3.26 X10(6)/MCL (ref 4.2–5.4)
RBC MORPH BLD: ABNORMAL
SODIUM SERPL-SCNC: 140 MMOL/L (ref 136–145)
UNIT NUMBER: NORMAL
WBC # SPEC AUTO: 1.33 X10(3)/MCL (ref 4.5–11.5)

## 2023-06-03 PROCEDURE — 94640 AIRWAY INHALATION TREATMENT: CPT

## 2023-06-03 PROCEDURE — 25000003 PHARM REV CODE 250

## 2023-06-03 PROCEDURE — P9037 PLATE PHERES LEUKOREDU IRRAD: HCPCS | Performed by: STUDENT IN AN ORGANIZED HEALTH CARE EDUCATION/TRAINING PROGRAM

## 2023-06-03 PROCEDURE — 85027 COMPLETE CBC AUTOMATED: CPT

## 2023-06-03 PROCEDURE — 94761 N-INVAS EAR/PLS OXIMETRY MLT: CPT

## 2023-06-03 PROCEDURE — 96372 THER/PROPH/DIAG INJ SC/IM: CPT

## 2023-06-03 PROCEDURE — 80053 COMPREHEN METABOLIC PANEL: CPT

## 2023-06-03 PROCEDURE — 36430 TRANSFUSION BLD/BLD COMPNT: CPT

## 2023-06-03 PROCEDURE — 63600175 PHARM REV CODE 636 W HCPCS

## 2023-06-03 PROCEDURE — G0378 HOSPITAL OBSERVATION PER HR: HCPCS

## 2023-06-03 PROCEDURE — 94760 N-INVAS EAR/PLS OXIMETRY 1: CPT

## 2023-06-03 RX ORDER — LEVOFLOXACIN 750 MG/1
750 TABLET ORAL DAILY
Status: DISCONTINUED | OUTPATIENT
Start: 2023-06-03 | End: 2023-06-04 | Stop reason: HOSPADM

## 2023-06-03 RX ORDER — HYDROCODONE BITARTRATE AND ACETAMINOPHEN 500; 5 MG/1; MG/1
TABLET ORAL
Status: DISCONTINUED | OUTPATIENT
Start: 2023-06-03 | End: 2023-06-04 | Stop reason: HOSPADM

## 2023-06-03 RX ADMIN — INSULIN ASPART 4 UNITS: 100 INJECTION, SOLUTION INTRAVENOUS; SUBCUTANEOUS at 09:06

## 2023-06-03 RX ADMIN — HYDROCODONE BITARTRATE AND ACETAMINOPHEN 1 TABLET: 5; 325 TABLET ORAL at 03:06

## 2023-06-03 RX ADMIN — INSULIN ASPART 10 UNITS: 100 INJECTION, SOLUTION INTRAVENOUS; SUBCUTANEOUS at 12:06

## 2023-06-03 RX ADMIN — INSULIN ASPART 15 UNITS: 100 INJECTION, SOLUTION INTRAVENOUS; SUBCUTANEOUS at 05:06

## 2023-06-03 RX ADMIN — HYDROCORTISONE: 25 CREAM TOPICAL at 09:06

## 2023-06-03 RX ADMIN — HYDROCHLOROTHIAZIDE 12.5 MG: 12.5 TABLET ORAL at 08:06

## 2023-06-03 RX ADMIN — ATORVASTATIN CALCIUM 40 MG: 40 TABLET, FILM COATED ORAL at 08:06

## 2023-06-03 RX ADMIN — GABAPENTIN 300 MG: 300 CAPSULE ORAL at 08:06

## 2023-06-03 RX ADMIN — INSULIN ASPART 15 UNITS: 100 INJECTION, SOLUTION INTRAVENOUS; SUBCUTANEOUS at 08:06

## 2023-06-03 RX ADMIN — ALBUTEROL SULFATE 2 PUFF: 90 AEROSOL, METERED RESPIRATORY (INHALATION) at 07:06

## 2023-06-03 RX ADMIN — INSULIN DETEMIR 60 UNITS: 100 INJECTION, SOLUTION SUBCUTANEOUS at 09:06

## 2023-06-03 RX ADMIN — GABAPENTIN 300 MG: 300 CAPSULE ORAL at 03:06

## 2023-06-03 RX ADMIN — OLANZAPINE 10 MG: 10 TABLET, FILM COATED ORAL at 09:06

## 2023-06-03 RX ADMIN — ALBUTEROL SULFATE 2 PUFF: 90 AEROSOL, METERED RESPIRATORY (INHALATION) at 01:06

## 2023-06-03 RX ADMIN — CETIRIZINE HYDROCHLORIDE 10 MG: 10 TABLET, FILM COATED ORAL at 08:06

## 2023-06-03 RX ADMIN — ACYCLOVIR 400 MG: 400 TABLET ORAL at 08:06

## 2023-06-03 RX ADMIN — METOPROLOL SUCCINATE 25 MG: 25 TABLET, EXTENDED RELEASE ORAL at 09:06

## 2023-06-03 RX ADMIN — ACYCLOVIR 400 MG: 400 TABLET ORAL at 09:06

## 2023-06-03 RX ADMIN — MONTELUKAST SODIUM 5 MG: 5 TABLET, CHEWABLE ORAL at 08:06

## 2023-06-03 RX ADMIN — METOPROLOL SUCCINATE 25 MG: 25 TABLET, EXTENDED RELEASE ORAL at 08:06

## 2023-06-03 RX ADMIN — INSULIN ASPART 15 UNITS: 100 INJECTION, SOLUTION INTRAVENOUS; SUBCUTANEOUS at 12:06

## 2023-06-03 RX ADMIN — ALBUTEROL SULFATE 2 PUFF: 90 AEROSOL, METERED RESPIRATORY (INHALATION) at 08:06

## 2023-06-03 RX ADMIN — HYDROCORTISONE: 25 CREAM TOPICAL at 08:06

## 2023-06-03 RX ADMIN — GABAPENTIN 300 MG: 300 CAPSULE ORAL at 09:06

## 2023-06-03 RX ADMIN — INSULIN ASPART 6 UNITS: 100 INJECTION, SOLUTION INTRAVENOUS; SUBCUTANEOUS at 05:06

## 2023-06-03 RX ADMIN — FUROSEMIDE 20 MG: 20 TABLET ORAL at 08:06

## 2023-06-03 RX ADMIN — INSULIN ASPART 9 UNITS: 100 INJECTION, SOLUTION INTRAVENOUS; SUBCUTANEOUS at 08:06

## 2023-06-03 RX ADMIN — BUSPIRONE HYDROCHLORIDE 5 MG: 5 TABLET ORAL at 08:06

## 2023-06-03 RX ADMIN — AMLODIPINE BESYLATE 10 MG: 10 TABLET ORAL at 08:06

## 2023-06-03 RX ADMIN — HYDROCODONE BITARTRATE AND ACETAMINOPHEN 1 TABLET: 5; 325 TABLET ORAL at 09:06

## 2023-06-03 RX ADMIN — LOSARTAN POTASSIUM 25 MG: 25 TABLET, FILM COATED ORAL at 08:06

## 2023-06-03 RX ADMIN — PANTOPRAZOLE SODIUM 40 MG: 40 TABLET, DELAYED RELEASE ORAL at 08:06

## 2023-06-03 RX ADMIN — BUSPIRONE HYDROCHLORIDE 5 MG: 5 TABLET ORAL at 09:06

## 2023-06-03 RX ADMIN — HYDROCODONE BITARTRATE AND ACETAMINOPHEN 1 TABLET: 5; 325 TABLET ORAL at 08:06

## 2023-06-03 RX ADMIN — LEVOFLOXACIN 750 MG: 750 TABLET, FILM COATED ORAL at 03:06

## 2023-06-03 RX ADMIN — ALLOPURINOL 300 MG: 100 TABLET ORAL at 08:06

## 2023-06-03 RX ADMIN — ESCITALOPRAM OXALATE 10 MG: 10 TABLET ORAL at 08:06

## 2023-06-03 RX ADMIN — SILVER SULFADIAZINE: 10 CREAM TOPICAL at 08:06

## 2023-06-03 RX ADMIN — ALPRAZOLAM 0.25 MG: 0.25 TABLET ORAL at 09:06

## 2023-06-03 NOTE — PROGRESS NOTES
[facility]ards   Progress Note     Resident Team: [place] List 3  Attending Physician: Blake Ortiz MD  Resident: Alexx Kessler  Intern: Cris Rodriguez   [facility] Length of Stay: 0 days    Subjective:      Brief HPI:  57 y.o. female with CML, thrombocytopenia, amenia, DM2, HTN, depression presents to ED for worsening rash on lower extremities and area where she injects her insulin. Discharged 3 days ago for similar episode where patient received transfusion of platelets and RBC. She is followed by oncology, receiving chemo with last session on 04/20. Denies CP, hemoptysis, hematochezia/melena, SOB, HA, vision changes.    Interval History:   Patient had no acute events overnight. Unable to get in touch with Heme/Onc on call yesterday as no numbers listed on call schedule. Patient repeat platelet was 14 yesterday. Patient was transfused yesterday with improvement of H/H to 10.2/29.8. No labs in this AM. Will reorder now.      Review of Systems   Constitutional:  Negative for chills and fever.   HENT:  Negative for congestion, sinus pain and sore throat.    Eyes:  Negative for blurred vision and double vision.   Respiratory:  Negative for cough, sputum production, shortness of breath and wheezing.    Cardiovascular:  Negative for chest pain, palpitations and leg swelling.   Gastrointestinal:  Negative for abdominal pain, nausea and vomiting.   Genitourinary:  Negative for dysuria.   Musculoskeletal:  Negative for myalgias.        Rash present   Neurological:  Negative for dizziness, focal weakness, seizures and weakness.        Objective:     Vital Signs (Most Recent):  Temp: 97 °F (36.1 °C) (06/03/23 0352)  Pulse: 70 (06/03/23 0352)  Resp: 18 (06/03/23 0352)  BP: 126/77 (06/03/23 0352)  SpO2: (!) 93 % (06/03/23 0352) Vital Signs (24h Range):  Temp:  [97 °F (36.1 °C)-99.5 °F (37.5 °C)] 97 °F (36.1 °C)  Pulse:  [66-79] 70  Resp:  [18-20] 18  SpO2:  [93 %-97 %] 93 %  BP: (113-148)/(65-83) 126/77        Physical Examination:  Physical Exam  Constitutional:       Appearance: Normal appearance.   HENT:      Head: Normocephalic and atraumatic.      Mouth/Throat:      Mouth: Mucous membranes are moist.      Pharynx: Oropharynx is clear.   Eyes:      Conjunctiva/sclera: Conjunctivae normal.      Pupils: Pupils are equal, round, and reactive to light.   Cardiovascular:      Rate and Rhythm: Normal rate and regular rhythm.      Pulses: Normal pulses.      Heart sounds: No murmur heard.  Pulmonary:      Effort: Pulmonary effort is normal. No respiratory distress.      Breath sounds: No wheezing.   Chest:      Chest wall: No tenderness.   Abdominal:      General: Abdomen is flat. Bowel sounds are normal. There is no distension.      Palpations: Abdomen is soft.      Tenderness: There is no abdominal tenderness.   Musculoskeletal:         General: No swelling. Normal range of motion.      Cervical back: Normal range of motion.   Skin:     General: Skin is warm.      Comments: Petechial rash present on extremities   Neurological:      General: No focal deficit present.      Mental Status: She is alert and oriented to person, place, and time.        Laboratory:  Most Recent Data:  CBC:   Recent Labs   Lab 06/02/23  0014 06/02/23  1543   WBC 1.92* 1.72*   HGB 7.9* 10.2*   HCT 24.8* 29.8*   PLT 15* 14*     CMP:   Recent Labs   Lab 06/02/23  0014   CALCIUM 9.6   ALBUMIN 3.3*      K 3.7   CO2 25   BUN 12.9   CREATININE 0.79   ALKPHOS 74   ALT 22   AST 15   BILITOT 0.7         Microbiology Data Reviewed: yes  Pertinent Findings:  N/A      Radiology:  Imaging Results    None         Current Medications:     Infusions:       Scheduled:   acyclovir  400 mg Oral BID    allopurinoL  300 mg Oral Daily    amLODIPine  10 mg Oral Daily    atorvastatin  40 mg Oral Daily    busPIRone  5 mg Oral BID    cetirizine  10 mg Oral Daily    EScitalopram oxalate  10 mg Oral Daily    furosemide  20 mg Oral Daily    gabapentin  300 mg Oral  TID    hydroCHLOROthiazide  12.5 mg Oral Daily    hydrocortisone   Topical (Top) BID    imatinib  400 mg Oral Daily    insulin aspart U-100  15 Units Subcutaneous TIDWM    insulin detemir U-100  60 Units Subcutaneous QHS    losartan  25 mg Oral Daily    metoprolol succinate  25 mg Oral BID    montelukast  5 mg Oral Daily    OLANZapine  10 mg Oral QHS    pantoprazole  40 mg Oral Daily    silver sulfADIAZINE 1%   Topical (Top) Daily        PRN:  sodium chloride, albuterol, ALPRAZolam, ammonium lactate, dextrose 50%, dextrose 50%, glucagon (human recombinant), glucose, glucose, HYDROcodone-acetaminophen, hydrOXYzine pamoate, ibuprofen, insulin aspart U-100, melatonin, ondansetron, sodium chloride 0.9%    Antibiotics and Day Number of Therapy:  None      Intake/Output Summary (Last 24 hours) at 6/3/2023 0708  Last data filed at 6/2/2023 1800  Gross per 24 hour   Intake 1240.67 ml   Output --   Net 1240.67 ml       Lines/Drains/Airways       Peripheral Intravenous Line  Duration                  Peripheral IV - Single Lumen 06/02/23 0203 18 G Posterior;Right Hand 1 day                      Assessment & Plan:     1) Rash on leg, likely 2/2 thrombocytopenia  - Platelets 15 in the ED  - Fibrinogen 637, PT/INR 11.8/0.88, aPTT 24.9  - Oncology recommends transfusing PLT if plt < 10  - No Heme/Onc on call  - Consider platelet transfusion in setting of symptomatic thrombocytopenia    2) Macrocytic Anemia  - H/H 7.9/24.8 with MCV of 106  - Transfused 2u pRBC  - Oncology recommends transfusing if hgb < 8  - Will check CMP in AM    3) CML on ponatinib/venetoclax and azacitidine  - Diagnosed in 2020 with history of blast crisis  - WBC in ED 1.92, last visit 1 week ago  - Repeat platelets 14 yesterday afternoon  - Unable to get in touch with Heme/Onc to further discuss case    4) Diabetes mellitus, Type 2  - Start on Detemir 60 units nightly and Aspart 15 units with meals  - Start on H-SSI    5) Hypertension  -Continue amlodipine  10, Lasix 20, HCTZ 12.5, Losartan 25, Toprol 25    6) Depression/Anxiety  -Continue buspar, lexapro, zyprexa  -Hold seroquel 2/2 thrombocytopenia      CODE STATUS: Full Code  Access: Peripheral  Antibiotics: None  Diet:  cardiac, diabetic  DVT Prophylaxis: none  GI Prophylaxis: none needed  Fluids: none.     Disposition: day 0 of admission for  thrombocytopenia and macrocytic anemia. Received 2U pRBCs and responded appropriately. Awaiting CBC for evaluation of thrombocytopenia.    Fede Lynch MD  [facility], [facility]

## 2023-06-03 NOTE — CARE UPDATE
On CBC this AM, patient WBC 1.33 and absolute neutrophil calc 0.42. This leaves patient with . Due to decreasing ANC and closing to 500, plan to start patient on prophylactic Levaquin at this time. Neutropenic precautions already in place. Also found to be thrombocytopenic with platelet 7. Will transfuse 1 pack platelets today.    Fede Lynch MD  [facility] [place] [facility], [facility]

## 2023-06-04 VITALS
RESPIRATION RATE: 18 BRPM | WEIGHT: 233.25 LBS | HEART RATE: 85 BPM | SYSTOLIC BLOOD PRESSURE: 124 MMHG | HEIGHT: 63 IN | DIASTOLIC BLOOD PRESSURE: 70 MMHG | OXYGEN SATURATION: 95 % | TEMPERATURE: 99 F | BODY MASS INDEX: 41.33 KG/M2

## 2023-06-04 LAB
ANION GAP SERPL CALC-SCNC: 8 MEQ/L
BUN SERPL-MCNC: 14.7 MG/DL (ref 9.8–20.1)
CALCIUM SERPL-MCNC: 9.3 MG/DL (ref 8.4–10.2)
CHLORIDE SERPL-SCNC: 102 MMOL/L (ref 98–107)
CO2 SERPL-SCNC: 30 MMOL/L (ref 22–29)
CREAT SERPL-MCNC: 0.77 MG/DL (ref 0.55–1.02)
CREAT/UREA NIT SERPL: 19
ERYTHROCYTE [DISTWIDTH] IN BLOOD BY AUTOMATED COUNT: 20.8 % (ref 11.5–17)
GFR SERPLBLD CREATININE-BSD FMLA CKD-EPI: >60 MLS/MIN/1.73/M2
GLUCOSE SERPL-MCNC: 245 MG/DL (ref 74–100)
HCT VFR BLD AUTO: 31.4 % (ref 37–47)
HGB BLD-MCNC: 10.4 G/DL (ref 12–16)
MAGNESIUM SERPL-MCNC: 1.8 MG/DL (ref 1.6–2.6)
MCH RBC QN AUTO: 33 PG (ref 27–31)
MCHC RBC AUTO-ENTMCNC: 33.1 G/DL (ref 33–36)
MCV RBC AUTO: 99.7 FL (ref 80–94)
NRBC BLD AUTO-RTO: 23.3 %
PHOSPHATE SERPL-MCNC: 4.3 MG/DL (ref 2.3–4.7)
PLATELET # BLD AUTO: 37 X10(3)/MCL (ref 130–400)
PLATELETS.RETICULATED NFR BLD AUTO: 1.3 % (ref 0.9–11.2)
PMV BLD AUTO: 11.2 FL (ref 7.4–10.4)
POCT GLUCOSE: 207 MG/DL (ref 70–110)
POCT GLUCOSE: 284 MG/DL (ref 70–110)
POTASSIUM SERPL-SCNC: 3.5 MMOL/L (ref 3.5–5.1)
RBC # BLD AUTO: 3.15 X10(6)/MCL (ref 4.2–5.4)
SODIUM SERPL-SCNC: 140 MMOL/L (ref 136–145)
WBC # SPEC AUTO: 2.15 X10(3)/MCL (ref 4.5–11.5)

## 2023-06-04 PROCEDURE — 85027 COMPLETE CBC AUTOMATED: CPT | Performed by: STUDENT IN AN ORGANIZED HEALTH CARE EDUCATION/TRAINING PROGRAM

## 2023-06-04 PROCEDURE — 63600175 PHARM REV CODE 636 W HCPCS

## 2023-06-04 PROCEDURE — 96372 THER/PROPH/DIAG INJ SC/IM: CPT

## 2023-06-04 PROCEDURE — 80048 BASIC METABOLIC PNL TOTAL CA: CPT | Performed by: STUDENT IN AN ORGANIZED HEALTH CARE EDUCATION/TRAINING PROGRAM

## 2023-06-04 PROCEDURE — 94640 AIRWAY INHALATION TREATMENT: CPT

## 2023-06-04 PROCEDURE — 25000003 PHARM REV CODE 250

## 2023-06-04 PROCEDURE — 84100 ASSAY OF PHOSPHORUS: CPT | Performed by: STUDENT IN AN ORGANIZED HEALTH CARE EDUCATION/TRAINING PROGRAM

## 2023-06-04 PROCEDURE — 94761 N-INVAS EAR/PLS OXIMETRY MLT: CPT

## 2023-06-04 PROCEDURE — G0378 HOSPITAL OBSERVATION PER HR: HCPCS

## 2023-06-04 PROCEDURE — 83735 ASSAY OF MAGNESIUM: CPT | Performed by: STUDENT IN AN ORGANIZED HEALTH CARE EDUCATION/TRAINING PROGRAM

## 2023-06-04 RX ORDER — LORATADINE 10 MG/1
10 TABLET ORAL DAILY
Refills: 0 | Status: ON HOLD
Start: 2023-06-04 | End: 2023-11-08 | Stop reason: HOSPADM

## 2023-06-04 RX ORDER — BUSPIRONE HYDROCHLORIDE 5 MG/1
5 TABLET ORAL 2 TIMES DAILY
Qty: 60 TABLET | Refills: 11 | Status: SHIPPED | OUTPATIENT
Start: 2023-06-04 | End: 2023-12-30 | Stop reason: CLARIF

## 2023-06-04 RX ORDER — ESCITALOPRAM OXALATE 10 MG/1
10 TABLET ORAL DAILY
Qty: 30 TABLET | Refills: 11 | Status: SHIPPED | OUTPATIENT
Start: 2023-06-04 | End: 2023-12-30 | Stop reason: CLARIF

## 2023-06-04 RX ORDER — HYDROXYZINE PAMOATE 25 MG/1
25 CAPSULE ORAL EVERY 8 HOURS PRN
Qty: 60 CAPSULE | Refills: 0 | Status: ON HOLD | OUTPATIENT
Start: 2023-06-04 | End: 2023-11-08 | Stop reason: HOSPADM

## 2023-06-04 RX ORDER — INSULIN GLARGINE 100 [IU]/ML
60 INJECTION, SOLUTION SUBCUTANEOUS NIGHTLY
Qty: 30 EACH | Refills: 6 | Status: SHIPPED | OUTPATIENT
Start: 2023-06-04 | End: 2023-09-29 | Stop reason: SDUPTHER

## 2023-06-04 RX ADMIN — INSULIN ASPART 9 UNITS: 100 INJECTION, SOLUTION INTRAVENOUS; SUBCUTANEOUS at 12:06

## 2023-06-04 RX ADMIN — INSULIN ASPART 6 UNITS: 100 INJECTION, SOLUTION INTRAVENOUS; SUBCUTANEOUS at 10:06

## 2023-06-04 RX ADMIN — LEVOFLOXACIN 750 MG: 750 TABLET, FILM COATED ORAL at 09:06

## 2023-06-04 RX ADMIN — INSULIN ASPART 15 UNITS: 100 INJECTION, SOLUTION INTRAVENOUS; SUBCUTANEOUS at 10:06

## 2023-06-04 RX ADMIN — MONTELUKAST SODIUM 5 MG: 5 TABLET, CHEWABLE ORAL at 09:06

## 2023-06-04 RX ADMIN — FUROSEMIDE 20 MG: 20 TABLET ORAL at 09:06

## 2023-06-04 RX ADMIN — LOSARTAN POTASSIUM 25 MG: 25 TABLET, FILM COATED ORAL at 09:06

## 2023-06-04 RX ADMIN — ESCITALOPRAM OXALATE 10 MG: 10 TABLET ORAL at 09:06

## 2023-06-04 RX ADMIN — HYDROCHLOROTHIAZIDE 12.5 MG: 12.5 TABLET ORAL at 09:06

## 2023-06-04 RX ADMIN — ALLOPURINOL 300 MG: 100 TABLET ORAL at 09:06

## 2023-06-04 RX ADMIN — ATORVASTATIN CALCIUM 40 MG: 40 TABLET, FILM COATED ORAL at 09:06

## 2023-06-04 RX ADMIN — ALBUTEROL SULFATE 2 PUFF: 90 AEROSOL, METERED RESPIRATORY (INHALATION) at 07:06

## 2023-06-04 RX ADMIN — AMLODIPINE BESYLATE 10 MG: 10 TABLET ORAL at 09:06

## 2023-06-04 RX ADMIN — INSULIN ASPART 15 UNITS: 100 INJECTION, SOLUTION INTRAVENOUS; SUBCUTANEOUS at 12:06

## 2023-06-04 RX ADMIN — HYDROCORTISONE: 25 CREAM TOPICAL at 09:06

## 2023-06-04 RX ADMIN — SILVER SULFADIAZINE: 10 CREAM TOPICAL at 09:06

## 2023-06-04 RX ADMIN — BUSPIRONE HYDROCHLORIDE 5 MG: 5 TABLET ORAL at 09:06

## 2023-06-04 RX ADMIN — GABAPENTIN 300 MG: 300 CAPSULE ORAL at 10:06

## 2023-06-04 RX ADMIN — ACYCLOVIR 400 MG: 400 TABLET ORAL at 09:06

## 2023-06-04 RX ADMIN — PANTOPRAZOLE SODIUM 40 MG: 40 TABLET, DELAYED RELEASE ORAL at 09:06

## 2023-06-04 RX ADMIN — CETIRIZINE HYDROCHLORIDE 10 MG: 10 TABLET, FILM COATED ORAL at 09:06

## 2023-06-04 RX ADMIN — METOPROLOL SUCCINATE 25 MG: 25 TABLET, EXTENDED RELEASE ORAL at 09:06

## 2023-06-04 NOTE — PLAN OF CARE
Problem: Adult Inpatient Plan of Care  Goal: Plan of Care Review  Outcome: Ongoing, Progressing  Goal: Patient-Specific Goal (Individualized)  Outcome: Ongoing, Progressing  Goal: Absence of Hospital-Acquired Illness or Injury  Outcome: Ongoing, Progressing  Goal: Optimal Comfort and Wellbeing  Outcome: Ongoing, Progressing  Goal: Readiness for Transition of Care  Outcome: Ongoing, Progressing     Problem: Bariatric Environmental Safety  Goal: Safety Maintained with Care  Outcome: Ongoing, Progressing     Problem: Diabetes Comorbidity  Goal: Blood Glucose Level Within Targeted Range  Outcome: Ongoing, Progressing     Problem: Infection  Goal: Absence of Infection Signs and Symptoms  Outcome: Ongoing, Progressing     Problem: Fall Injury Risk  Goal: Absence of Fall and Fall-Related Injury  Outcome: Ongoing, Progressing

## 2023-06-04 NOTE — DISCHARGE SUMMARY
[facility] Discharge Summary    Admitting Physician: Blake Ortiz MD  Attending Physician: No att. providers found  Date of Admit: 6/1/2023  Date of Discharge: 6/4/2023    Condition: Stable  Outcome: Patient tolerated treatment/procedure well without complication and is now ready for discharge.  DISPOSITION: Home or Self Care        Discharge Diagnoses:     Patient Active Problem List   Diagnosis    CML (chronic myelocytic leukemia)    Diabetes mellitus    Severe obesity (BMI >= 40)    NAFLD (nonalcoholic fatty liver disease)    Hypertension    Tobacco user    Hypercholesterolemia    Hyperuricemia    Hypokalemia    Hepatosplenomegaly    Other headache syndrome    Nausea & vomiting    Cough    Fever    Hyperleukocytosis    Neutropenic fever    Influenza A    Anemia    Thrombocytopenia       Principal Problem:  Anemia    Consultants and Procedures:     Consultants:  None    Procedures:   * No surgery found *      Brief Admission History:      57 y.o. female with CML, thrombocytopenia, amenia, DM2, HTN, depression presents to ED for worsening rash on lower extremities and area where she injects her insulin. Discharged 3 days ago for similar episode where patient received transfusion of platelets and RBC. She is followed by oncology, receiving chemo with last session on 04/20. Denies CP, hemoptysis, hematochezia/melena, SOB, HA, vision changes.        Hospital Course with Pertinent Findings:      Admitted to internal medicine for further management of anemia. Oncology instructed to transfuse when Hgb < 8 and platelets < 10k. Patient hgb on admission was 7.9, and Plt was 15, down from 70s. Patient was monitored overnight and platelets did drop down to 7, and patient was transfused with one pack. CBC responded accordingly to transfusions of 2 pRBC and 1 dose of platelets. Petechiae improved upon discharge. Patient did have an elevated temp while receiving pRBC transfusion and received one dose of  "levaquin, but did not develop any persistent fevers. Vitals stable upon discharge. No other complaints otherwise. Strict ED precautions given.     Discharge physical exam:  Vitals  BP: 124/70  Temp: 98.7 °F (37.1 °C)  Temp Source: Oral  Pulse: 85  Resp: 18  SpO2: 95 %  Height: 5' 3" (160 cm)  Weight: 105.8 kg (233 lb 4 oz)    Physical Exam  Vitals and nursing note reviewed.   Constitutional:       General: She is not in acute distress.     Appearance: Normal appearance. She is obese. She is not ill-appearing.   HENT:      Mouth/Throat:      Mouth: Mucous membranes are moist.      Pharynx: Oropharynx is clear. No oropharyngeal exudate.   Eyes:      Extraocular Movements: Extraocular movements intact.   Cardiovascular:      Rate and Rhythm: Normal rate and regular rhythm.      Pulses: Normal pulses.      Heart sounds: Normal heart sounds. No murmur heard.    No friction rub. No gallop.   Pulmonary:      Effort: Pulmonary effort is normal.   Abdominal:      Palpations: Abdomen is soft.      Tenderness: There is abdominal tenderness.      Comments: Tenderness at chemotherapy injection site   Musculoskeletal:         General: Normal range of motion.      Right lower leg: No edema.      Left lower leg: No edema.   Skin:     General: Skin is warm and dry.      Capillary Refill: Capillary refill takes less than 2 seconds.      Findings: Rash (petechiae no longer present) present.   Neurological:      General: No focal deficit present.      Mental Status: She is alert and oriented to person, place, and time.         TIME SPENT ON DISCHARGE: 35 minutes    Discharge Medications:         Medication List        CHANGE how you take these medications      hydrOXYzine pamoate 25 MG Cap  Commonly known as: VISTARIL  Take 1 capsule (25 mg total) by mouth every 8 (eight) hours as needed.  What changed: when to take this     loratadine 10 mg tablet  Commonly known as: CLARITIN  Take 1 tablet (10 mg total) by mouth once daily.  What " changed: when to take this            CONTINUE taking these medications      acyclovir 400 MG tablet  Commonly known as: ZOVIRAX  Take 1 tablet (400 mg total) by mouth 2 (two) times daily.     albuterol 90 mcg/actuation inhaler  Commonly known as: PROVENTIL/VENTOLIN HFA     allopurinoL 300 MG tablet  Commonly known as: ZYLOPRIM     ALPRAZolam 0.25 MG tablet  Commonly known as: XANAX     amLODIPine 10 MG tablet  Commonly known as: NORVASC     ammonium lactate 12 % Crea     atorvastatin 40 MG tablet  Commonly known as: LIPITOR     busPIRone 5 MG Tab  Commonly known as: BUSPAR  Take 1 tablet (5 mg total) by mouth 2 (two) times daily.     EScitalopram oxalate 10 MG tablet  Commonly known as: LEXAPRO  Take 1 tablet (10 mg total) by mouth once daily.     fluconazole 200 MG Tab  Commonly known as: DIFLUCAN     furosemide 20 MG tablet  Commonly known as: LASIX     gabapentin 300 MG capsule  Commonly known as: NEURONTIN  Take 1 capsule (300 mg total) by mouth 3 (three) times daily.     HYDROcodone-acetaminophen 5-325 mg per tablet  Commonly known as: NORCO     * TEXACORT 2.5 % Soln  Generic drug: hydrocortisone     * hydrocortisone 2.5 % cream     ibuprofen 600 MG tablet  Commonly known as: ADVIL,MOTRIN     ICLUSIG 30 mg Tab  Generic drug: PONATinib     insulin glargine 100 unit/mL injection  Commonly known as: Lantus  Inject 60 Units into the skin nightly.     insulin lispro 100 unit/mL injection  Inject 15 Units into the skin 3 (three) times daily before meals.     ketoconazole 2 % cream  Commonly known as: NIZORAL     losartan 25 MG tablet  Commonly known as: COZAAR  Take 1 tablet (25 mg total) by mouth once daily.     metFORMIN 1000 MG tablet  Commonly known as: GLUCOPHAGE     metoprolol tartrate 25 MG tablet  Commonly known as: LOPRESSOR     montelukast 10 mg tablet  Commonly known as: SINGULAIR     NovoLIN N NPH U-100 Insulin 100 unit/mL injection  Generic drug: insulin NPH     omeprazole 40 MG capsule  Commonly known  as: PRILOSEC     ondansetron 4 MG Tbdl  Commonly known as: ZOFRAN-ODT     ondansetron 8 MG tablet  Commonly known as: ZOFRAN     * SSD 1 % cream  Generic drug: silver sulfADIAZINE 1%     * silver sulfADIAZINE 1% 1 % cream  Commonly known as: SILVADENE     venetoclax 100 mg Tab  Commonly known as: VENCLEXTA           * This list has 4 medication(s) that are the same as other medications prescribed for you. Read the directions carefully, and ask your doctor or other care provider to review them with you.                   Where to Get Your Medications        These medications were sent to [address] [address]      Phone: [phone]   busPIRone 5 MG Tab  EScitalopram oxalate 10 MG tablet  hydrOXYzine pamoate 25 MG Cap  insulin glargine 100 unit/mL injection       Information about where to get these medications is not yet available    Ask your nurse or doctor about these medications  loratadine 10 mg tablet         Discharge Instructions:         Fela Ellison is being discharged Home or Self Care.    No discharge procedures on file.     Follow-Up Appointments:   Follow-up Information       [facility] - [facility] Dept Follow up.    Specialty: Emergency Medicine  Why: If symptoms worsen  Contact information:  [address] [place] [id number] [phone]             Jason Nation MD Follow up.    Specialty: Hematology and Oncology  Contact information:  [address] [place] [address]  [phone]                               To address at follow-up:  -The following labs are to be drawn at the Post Jin visit: cbc  -The following imaging studies are to be ordered at the post jin visit: none    At this time, Fela Ellison is determined to have maximized benefits of IP hospitalization. she is discharged in stable condition with OP f/u recommendations and instructions. All  questions answered, and patient and family verbalized agreement with the POC. They were given return precautions prior to d/c including symptoms that should prompt return to ED or to call PCP. Total time spent of DC of 35 minutes.       Julio Cesar Rodriguez DO  [facility], PGY-1

## 2023-06-09 ENCOUNTER — OFFICE VISIT (OUTPATIENT)
Dept: HEMATOLOGY/ONCOLOGY | Facility: CLINIC | Age: 58
End: 2023-06-09

## 2023-06-09 VITALS
WEIGHT: 233.25 LBS | BODY MASS INDEX: 41.33 KG/M2 | HEIGHT: 63 IN | SYSTOLIC BLOOD PRESSURE: 123 MMHG | RESPIRATION RATE: 20 BRPM | TEMPERATURE: 99 F | DIASTOLIC BLOOD PRESSURE: 74 MMHG | HEART RATE: 77 BPM | OXYGEN SATURATION: 95 %

## 2023-06-09 DIAGNOSIS — C92.00 ACUTE MYELOID LEUKEMIA NOT HAVING ACHIEVED REMISSION: Primary | ICD-10-CM

## 2023-06-09 PROCEDURE — 99215 OFFICE O/P EST HI 40 MIN: CPT | Mod: PBBFAC | Performed by: NURSE PRACTITIONER

## 2023-06-09 PROCEDURE — 99215 OFFICE O/P EST HI 40 MIN: CPT | Mod: S$PBB,,, | Performed by: NURSE PRACTITIONER

## 2023-06-09 PROCEDURE — 99215 PR OFFICE/OUTPT VISIT, EST, LEVL V, 40-54 MIN: ICD-10-PCS | Mod: S$PBB,,, | Performed by: NURSE PRACTITIONER
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
## 2023-07-02 ENCOUNTER — HOSPITAL ENCOUNTER (EMERGENCY)
Facility: HOSPITAL | Age: 58
Discharge: HOME | End: 2023-07-03
Attending: EMERGENCY MEDICINE
Payer: MEDICAID

## 2023-07-02 DIAGNOSIS — D61.818 PANCYTOPENIA: Primary | ICD-10-CM

## 2023-07-02 LAB
ABO + RH BLD: NORMAL
ABS NEUT CALC (OHS): 0.49 X10(3)/MCL (ref 2.1–9.2)
ALBUMIN SERPL-MCNC: 3.1 G/DL (ref 3.5–5)
ALBUMIN/GLOB SERPL: 1.3 RATIO (ref 1.1–2)
ALP SERPL-CCNC: 64 UNIT/L (ref 40–150)
ALT SERPL-CCNC: 16 UNIT/L (ref 0–55)
ANISOCYTOSIS BLD QL SMEAR: ABNORMAL
APTT PPP: 22.4 SECONDS
AST SERPL-CCNC: 11 UNIT/L (ref 5–34)
BILIRUBIN DIRECT+TOT PNL SERPL-MCNC: 0.5 MG/DL
BLD PROD TYP BPU: NORMAL
BLOOD UNIT EXPIRATION DATE: NORMAL
BLOOD UNIT TYPE CODE: 6200
BUN SERPL-MCNC: 18.7 MG/DL (ref 9.8–20.1)
CALCIUM SERPL-MCNC: 9 MG/DL (ref 8.4–10.2)
CHLORIDE SERPL-SCNC: 107 MMOL/L (ref 98–107)
CO2 SERPL-SCNC: 25 MMOL/L (ref 22–29)
CREAT SERPL-MCNC: 1.09 MG/DL (ref 0.55–1.02)
CROSSMATCH INTERPRETATION: NORMAL
DISPENSE STATUS: NORMAL
ERYTHROCYTE [DISTWIDTH] IN BLOOD BY AUTOMATED COUNT: 18.3 % (ref 11.5–17)
GFR SERPLBLD CREATININE-BSD FMLA CKD-EPI: 60 MLS/MIN/1.73/M2
GLOBULIN SER-MCNC: 2.3 GM/DL (ref 2.4–3.5)
GLUCOSE SERPL-MCNC: 311 MG/DL (ref 74–100)
GROUP & RH: NORMAL
HCT VFR BLD AUTO: 25.9 % (ref 37–47)
HGB BLD-MCNC: 8.4 G/DL (ref 12–16)
INDIRECT COOMBS GEL: NORMAL
LYMPHOCYTES NFR BLD MANUAL: 1.19 X10(3)/MCL
LYMPHOCYTES NFR BLD MANUAL: 70 % (ref 13–40)
MACROCYTES BLD QL SMEAR: ABNORMAL
MCH RBC QN AUTO: 31.6 PG (ref 27–31)
MCHC RBC AUTO-ENTMCNC: 32.4 G/DL (ref 33–36)
MCV RBC AUTO: 97.4 FL (ref 80–94)
MICROCYTES BLD QL SMEAR: ABNORMAL
MONOCYTES NFR BLD MANUAL: 0.02 X10(3)/MCL (ref 0.1–1.3)
MONOCYTES NFR BLD MANUAL: 1 % (ref 2–11)
NEUTROPHILS NFR BLD MANUAL: 29 % (ref 47–80)
NRBC BLD AUTO-RTO: 9.4 %
NRBC BLD MANUAL-RTO: 7 %
OVALOCYTES (OLG): SLIGHT
PLATELET # BLD AUTO: 7 X10(3)/MCL (ref 130–400)
PLATELET # BLD EST: ABNORMAL 10*3/UL
PMV BLD AUTO: ABNORMAL FL
POIKILOCYTOSIS BLD QL SMEAR: ABNORMAL
POLYCHROMASIA BLD QL SMEAR: ABNORMAL
POTASSIUM SERPL-SCNC: 3.9 MMOL/L (ref 3.5–5.1)
PROT SERPL-MCNC: 5.4 GM/DL (ref 6.4–8.3)
RBC # BLD AUTO: 2.66 X10(6)/MCL (ref 4.2–5.4)
RBC MORPH BLD: ABNORMAL
SCHISTOCYTE (OLG): ABNORMAL
SMUDGE CELL (OLG): SLIGHT
SODIUM SERPL-SCNC: 142 MMOL/L (ref 136–145)
SPECIMEN OUTDATE: NORMAL
STIPPLED RBC (OHS): ABNORMAL
TEAR DROP CELL (OLG): SLIGHT
UNIT NUMBER: NORMAL
WBC # SPEC AUTO: 1.7 X10(3)/MCL (ref 4.5–11.5)

## 2023-07-02 PROCEDURE — 99285 EMERGENCY DEPT VISIT HI MDM: CPT | Mod: 25

## 2023-07-02 PROCEDURE — 85049 AUTOMATED PLATELET COUNT: CPT | Performed by: EMERGENCY MEDICINE

## 2023-07-02 PROCEDURE — 80053 COMPREHEN METABOLIC PANEL: CPT | Performed by: EMERGENCY MEDICINE

## 2023-07-02 PROCEDURE — 85610 PROTHROMBIN TIME: CPT | Performed by: EMERGENCY MEDICINE

## 2023-07-02 PROCEDURE — 25000003 PHARM REV CODE 250: Performed by: EMERGENCY MEDICINE

## 2023-07-02 PROCEDURE — P9037 PLATE PHERES LEUKOREDU IRRAD: HCPCS | Performed by: EMERGENCY MEDICINE

## 2023-07-02 PROCEDURE — 86900 BLOOD TYPING SEROLOGIC ABO: CPT | Performed by: EMERGENCY MEDICINE

## 2023-07-02 PROCEDURE — 85060 BLOOD SMEAR INTERPRETATION: CPT | Performed by: EMERGENCY MEDICINE

## 2023-07-02 PROCEDURE — 36430 TRANSFUSION BLD/BLD COMPNT: CPT

## 2023-07-02 PROCEDURE — 96360 HYDRATION IV INFUSION INIT: CPT

## 2023-07-02 PROCEDURE — 85730 THROMBOPLASTIN TIME PARTIAL: CPT | Performed by: EMERGENCY MEDICINE

## 2023-07-02 PROCEDURE — 85027 COMPLETE CBC AUTOMATED: CPT | Performed by: EMERGENCY MEDICINE

## 2023-07-02 RX ORDER — HYDROCODONE BITARTRATE AND ACETAMINOPHEN 500; 5 MG/1; MG/1
TABLET ORAL
Status: DISCONTINUED | OUTPATIENT
Start: 2023-07-02 | End: 2023-07-03 | Stop reason: HOSPADM

## 2023-07-02 RX ADMIN — SODIUM CHLORIDE 1000 ML: 9 INJECTION, SOLUTION INTRAVENOUS at 08:07

## 2023-07-03 VITALS
RESPIRATION RATE: 18 BRPM | TEMPERATURE: 98 F | DIASTOLIC BLOOD PRESSURE: 84 MMHG | BODY MASS INDEX: 40.63 KG/M2 | HEART RATE: 76 BPM | WEIGHT: 238 LBS | HEIGHT: 64 IN | OXYGEN SATURATION: 95 % | SYSTOLIC BLOOD PRESSURE: 136 MMHG

## 2023-07-03 LAB
PLATELET # BLD AUTO: 46 X10(3)/MCL (ref 130–400)
PLATELETS.RETICULATED NFR BLD AUTO: 3 % (ref 0.9–11.2)
POCT GLUCOSE: 199 MG/DL (ref 70–110)

## 2023-07-03 PROCEDURE — 82962 GLUCOSE BLOOD TEST: CPT

## 2023-07-03 NOTE — ED PROVIDER NOTES
ED PROVIDER NOTE  2023    CHIEF COMPLAINT:   Chief Complaint   Patient presents with    Rash     Pt has red spots on bilateral legs below the knees. Pt reports she receives chemotherapy once a month in [facility], for leukemia, last tx was on . Pt c/o burning pain to bilateral legs that began 2 days ago. Pt reports this has happened before and that she needed to receive blood as a result. Pt vss.        HISTORY OF PRESENT ILLNESS:   Fela Ellison is a 57 y.o. female who presents with chief complaint Rash.  Onset was yesterday whenever she noticed a rash having red spots to both of her lower legs that got much worse today after doing a lot of walking around the neighborhood.  Reports she has had this happened before and has required blood product transfusions.  She was on chemotherapy for leukemia with last infusion 5 days ago.  Denies fever, abdominal pain, nausea, vomiting, diarrhea, headache, neck pain or stiffness, gingival bleeding, joint pain and swelling.    The history is provided by the patient and a significant other. The history is limited by a language barrier. A  was used.       REVIEW OF SYSTEMS: as noted in the HPI.  NURSING NOTES REVIEWED      PAST MEDICAL/SURGICAL HISTORY:   Past Medical History:   Diagnosis Date    Cancer     CML (chronic myelocytic leukemia)     DM2 (diabetes mellitus, type 2)     HTN (hypertension)     NAFLD (nonalcoholic fatty liver disease)     Neuropathy       Past Surgical History:   Procedure Laterality Date    ABDOMINAL SURGERY       SECTION       SECTION      CHOLECYSTECTOMY         FAMILY HISTORY:   Family History   Problem Relation Age of Onset    Diabetes Mother     Diabetes Father     Cancer Neg Hx        SOCIAL HISTORY:   Social History     Tobacco Use    Smoking status: Never    Smokeless tobacco: Never   Substance Use Topics    Alcohol use: Not Currently    Drug use: Not Currently       ALLERGIES: Review of patient's allergies  indicates:  No Known Allergies    PHYSICAL EXAM:  Initial Vitals [07/02/23 1848]   BP Pulse Resp Temp SpO2   126/79 77 20 99.7 °F (37.6 °C) 97 %      MAP       --         Physical Exam    Nursing note and vitals reviewed.  Constitutional: She appears well-developed and well-nourished. She is Obese .   HENT:   Head: Normocephalic and atraumatic.   Mouth/Throat: Uvula is midline and mucous membranes are normal.   Eyes: EOM are normal. Pupils are equal, round, and reactive to light.   Neck: Trachea normal. Neck supple.   Cardiovascular:  Normal rate, regular rhythm and normal pulses.           Pulmonary/Chest: Effort normal and breath sounds normal.   Abdominal: Abdomen is soft. Bowel sounds are normal. There is no rebound and no guarding.   Musculoskeletal:         General: Normal range of motion.      Cervical back: Neck supple.     Neurological: She is alert and oriented to person, place, and time. GCS eye subscore is 4. GCS verbal subscore is 5. GCS motor subscore is 6.   Skin: Skin is warm and dry. Petechiae noted.   Petechia bilateral lower legs   Psychiatric: She has a normal mood and affect. Her speech is normal. Thought content normal.       RESULTS:  Labs Reviewed   COMPREHENSIVE METABOLIC PANEL - Abnormal; Notable for the following components:       Result Value    Glucose Level 311 (*)     Creatinine 1.09 (*)     Protein Total 5.4 (*)     Albumin Level 3.1 (*)     Globulin 2.3 (*)     All other components within normal limits   CBC WITH DIFFERENTIAL - Abnormal; Notable for the following components:    WBC 1.70 (*)     RBC 2.66 (*)     Hgb 8.4 (*)     Hct 25.9 (*)     MCV 97.4 (*)     MCH 31.6 (*)     MCHC 32.4 (*)     RDW 18.3 (*)     Platelet 7 (*)     All other components within normal limits   MANUAL DIFFERENTIAL - Abnormal; Notable for the following components:    Neutrophils % 29 (*)     Lymphs % 70 (*)     Monocytes % 1 (*)     Neutrophils Abs Calc 0.493 (*)     Monocytes Abs 0.017 (*)     Platelets  Decreased (*)     RBC Morph Abnormal (*)     Anisocytosis 1+ (*)     Poikilocytosis 1+ (*)     Microcytosis 1+ (*)     Macrocytosis 1+ (*)     Polychromasia 2+ (*)     Schistocytes 1+ (*)     Stippled RBCs 1+ (*)     Smudge Cells Slight (*)     Tear Drops Slight (*)     Ovalocytes Slight (*)     All other components within normal limits   PLATELET COUNT - Abnormal; Notable for the following components:    Platelet 46 (*)     All other components within normal limits   POCT GLUCOSE - Abnormal; Notable for the following components:    POCT Glucose 199 (*)     All other components within normal limits   APTT - Normal   PROTIME-INR   CBC W/ AUTO DIFFERENTIAL    Narrative:     The following orders were created for panel order CBC auto differential.  Procedure                               Abnormality         Status                     ---------                               -----------         ------                     CBC with Differential[[id number]]        Abnormal            Final result               Manual Differential[[id number]]          Abnormal            Final result                 Please view results for these tests on the individual orders.   EXTRA TUBES    Narrative:     The following orders were created for panel order EXTRA TUBES.  Procedure                               Abnormality         Status                     ---------                               -----------         ------                     Red Top Hold[[id number]]                                     In process                 Light Green Top Hold[[id number]]                             In process                 Pink Top Hold[[id number]]                                    In process                   Please view results for these tests on the individual orders.   RED TOP HOLD   LIGHT GREEN TOP HOLD   PINK TOP HOLD   EXTRA TUBES    Narrative:     The following orders were created for panel order EXTRA TUBES.  Procedure                                Abnormality         Status                     ---------                               -----------         ------                     Lavender Top Hold[[id number]]                                In process                   Please view results for these tests on the individual orders.   LAVENDER TOP HOLD   PATH REVIEW OF BLOOD SMEAR   EXTRA TUBES    Narrative:     The following orders were created for panel order EXTRA TUBES.  Procedure                               Abnormality         Status                     ---------                               -----------         ------                     Light Blue Top Hold[[id number]]                              In process                   Please view results for these tests on the individual orders.   LIGHT BLUE TOP HOLD   TYPE & SCREEN   PREPARE PLATELETS (DOSE) SOFT     Imaging Results    None         PROCEDURES:  Procedures    ECG:       ED COURSE AND MEDICAL DECISION MAKING:  Medications   sodium chloride 0.9% bolus 1,000 mL 1,000 mL (0 mLs Intravenous Stopped 7/2/23 2215)     ED Course as of 07/04/23 0627   Sun Jul 02, 2023 1944 WBC(!!): 1.70 [IB]   1944 Platelets(!!): 7 [IB]   1944 Hemoglobin(!): 8.4 [IB]   2001 Creatinine(!): 1.09  Increased from 0.71. [IB]   2001 Glucose(!): 311 [IB]   2014 Neutrophils Abs Calc(!): 0.493 [IB]   Mon Jul 03, 2023   0040 Patient and  would prefer to be discharged home. Will have medicine come and see her and make sure that this is in her best interest given her severe neutropenia. [IB]      ED Course User Index  [IB] Manoj Overton, DO        Medical Decision Making  57-year-old female who presents for evaluation of petechial rash, stating that when she is had this in the past she was required transfusion.  CBC shows pancytopenia with platelet count of 7, so she was given pack of platelets with improvement in her platelet count to 46.  She has no evidence of active bleeding.  She does have neutropenia but is afebrile in  his not had any recent fevers to warrant septic workup.  Admission was offered, but patient and significant other state that they would prefer to be discharged home and we will just follow up in clinic as they have an upcoming appointment.  Given strict ED return precautions. I have spoken with the patient and/or caregivers. I have explained the patient's condition, diagnoses and treatment plan based on the information available to me at this time. I have answered the patient's and/or caregiver's questions and addressed any concerns. The patient and/or caregivers have as good an understanding of the patient's diagnosis, condition and treatment plan as can be expected at this point. The vital signs have been stable. The patient's condition is stable and appropriate for discharge from the emergency department.     The patient will pursue further outpatient evaluation with the primary care physician or other designated or consulting physician as outlined in the discharge instructions. The patient and/or caregivers are agreeable to this plan of care and follow-up instructions have been explained in detail. The patient and/or caregivers have received these instructions in written format and have expressed an understanding of the discharge instructions. The patient and/or caregivers are aware that any significant change in condition or worsening of symptoms should prompt an immediate return to this or the closest emergency department or a call to 911.    Amount and/or Complexity of Data Reviewed  External Data Reviewed: labs, radiology and notes.     Details: ? The left ventricle is normal in size with mild concentric hypertrophy and normal systolic function.  ? The estimated ejection fraction is 70%.  ? Indeterminate left ventricular diastolic function.  ? Moderate right ventricular enlargement with normal right ventricular systolic function.  ? Mild-to-moderate mitral regurgitation.  ? There is no pulmonary  hypertension.  ? Moderate right atrial enlargement.  ? Mild left atrial enlargement.  Labs: ordered. Decision-making details documented in ED Course.    Risk  Decision regarding hospitalization.        CLINICAL IMPRESSION:  1. Pancytopenia        DISPOSITION:   ED Disposition Condition    Discharge Stable            ED Prescriptions    None       Follow-up Information       Follow up With Specialties Details Why Contact Info    [facility] Dept Emergency Medicine  If symptoms worsen [address] [place] [address]  [phone]               Manoj Overton,   07/04/23 0627

## 2023-07-10 LAB — HEMATOLOGIST REVIEW: NORMAL

## 2023-07-12 DIAGNOSIS — C92.00 ACUTE MYELOID LEUKEMIA NOT HAVING ACHIEVED REMISSION: Primary | ICD-10-CM

## 2023-07-13 ENCOUNTER — APPOINTMENT (OUTPATIENT)
Dept: HEMATOLOGY/ONCOLOGY | Facility: CLINIC | Age: 58
End: 2023-07-13

## 2023-07-13 ENCOUNTER — OFFICE VISIT (OUTPATIENT)
Dept: HEMATOLOGY/ONCOLOGY | Facility: CLINIC | Age: 58
End: 2023-07-13

## 2023-07-13 VITALS
BODY MASS INDEX: 40.63 KG/M2 | RESPIRATION RATE: 20 BRPM | HEART RATE: 75 BPM | OXYGEN SATURATION: 96 % | TEMPERATURE: 98 F | DIASTOLIC BLOOD PRESSURE: 80 MMHG | HEIGHT: 64 IN | SYSTOLIC BLOOD PRESSURE: 130 MMHG | WEIGHT: 238 LBS

## 2023-07-13 DIAGNOSIS — D69.6 THROMBOCYTOPENIA: ICD-10-CM

## 2023-07-13 DIAGNOSIS — L72.9 SKIN CYSTS, GENERALIZED: ICD-10-CM

## 2023-07-13 DIAGNOSIS — D72.829 HYPERLEUKOCYTOSIS: ICD-10-CM

## 2023-07-13 DIAGNOSIS — C92.00 ACUTE MYELOID LEUKEMIA NOT HAVING ACHIEVED REMISSION: ICD-10-CM

## 2023-07-13 DIAGNOSIS — N89.8 VAGINAL CYSTS: ICD-10-CM

## 2023-07-13 DIAGNOSIS — Z12.31 BREAST CANCER SCREENING BY MAMMOGRAM: Primary | ICD-10-CM

## 2023-07-13 DIAGNOSIS — C92.10 BLAST CRISIS PHASE OF CHRONIC MYELOID LEUKEMIA: ICD-10-CM

## 2023-07-13 LAB
ABS NEUT CALC (OHS): 0.94 X10(3)/MCL (ref 2.1–9.2)
ACANTHOCYTES (OLG): SLIGHT
ALBUMIN SERPL-MCNC: 3 G/DL (ref 3.5–5)
ALBUMIN/GLOB SERPL: 0.9 RATIO (ref 1.1–2)
ALP SERPL-CCNC: 66 UNIT/L (ref 40–150)
ALT SERPL-CCNC: 25 UNIT/L (ref 0–55)
ANISOCYTOSIS BLD QL SMEAR: ABNORMAL
AST SERPL-CCNC: 13 UNIT/L (ref 5–34)
BILIRUBIN DIRECT+TOT PNL SERPL-MCNC: 0.5 MG/DL
BUN SERPL-MCNC: 12.8 MG/DL (ref 9.8–20.1)
CALCIUM SERPL-MCNC: 9.8 MG/DL (ref 8.4–10.2)
CHLORIDE SERPL-SCNC: 102 MMOL/L (ref 98–107)
CO2 SERPL-SCNC: 26 MMOL/L (ref 22–29)
CREAT SERPL-MCNC: 0.74 MG/DL (ref 0.55–1.02)
ERYTHROCYTE [DISTWIDTH] IN BLOOD BY AUTOMATED COUNT: 19.3 % (ref 11.5–17)
GFR SERPLBLD CREATININE-BSD FMLA CKD-EPI: >60 MLS/MIN/1.73/M2
GLOBULIN SER-MCNC: 3.3 GM/DL (ref 2.4–3.5)
GLUCOSE SERPL-MCNC: 315 MG/DL (ref 74–100)
HCT VFR BLD AUTO: 29.7 % (ref 37–47)
HGB BLD-MCNC: 9.1 G/DL (ref 12–16)
LYMPHOCYTES NFR BLD MANUAL: 1.2 X10(3)/MCL
LYMPHOCYTES NFR BLD MANUAL: 55 % (ref 13–40)
MACROCYTES BLD QL SMEAR: ABNORMAL
MCH RBC QN AUTO: 30.5 PG (ref 27–31)
MCHC RBC AUTO-ENTMCNC: 30.6 G/DL (ref 33–36)
MCV RBC AUTO: 99.7 FL (ref 80–94)
MONOCYTES NFR BLD MANUAL: 0.04 X10(3)/MCL (ref 0.1–1.3)
MONOCYTES NFR BLD MANUAL: 2 % (ref 2–11)
NEUTROPHILS NFR BLD MANUAL: 43 % (ref 47–80)
NRBC BLD AUTO-RTO: 12.8 %
NRBC BLD MANUAL-RTO: 17 %
PLATELET # BLD AUTO: 25 X10(3)/MCL (ref 130–400)
PLATELET # BLD EST: ABNORMAL 10*3/UL
PMV BLD AUTO: ABNORMAL FL
POIKILOCYTOSIS BLD QL SMEAR: SLIGHT
POLYCHROMASIA BLD QL SMEAR: ABNORMAL
POTASSIUM SERPL-SCNC: 4.1 MMOL/L (ref 3.5–5.1)
PROT SERPL-MCNC: 6.3 GM/DL (ref 6.4–8.3)
RBC # BLD AUTO: 2.98 X10(6)/MCL (ref 4.2–5.4)
RBC MORPH BLD: ABNORMAL
SODIUM SERPL-SCNC: 141 MMOL/L (ref 136–145)
STIPPLED RBC (OHS): SLIGHT
WBC # SPEC AUTO: 2.18 X10(3)/MCL (ref 4.5–11.5)

## 2023-07-13 PROCEDURE — 80053 COMPREHEN METABOLIC PANEL: CPT

## 2023-07-13 PROCEDURE — 99215 OFFICE O/P EST HI 40 MIN: CPT | Mod: PBBFAC | Performed by: NURSE PRACTITIONER

## 2023-07-13 PROCEDURE — 99215 OFFICE O/P EST HI 40 MIN: CPT | Mod: S$PBB,,, | Performed by: NURSE PRACTITIONER

## 2023-07-13 PROCEDURE — 36415 COLL VENOUS BLD VENIPUNCTURE: CPT

## 2023-07-13 PROCEDURE — 85027 COMPLETE CBC AUTOMATED: CPT

## 2023-07-13 PROCEDURE — 99215 PR OFFICE/OUTPT VISIT, EST, LEVL V, 40-54 MIN: ICD-10-PCS | Mod: S$PBB,,, | Performed by: NURSE PRACTITIONER
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
## 2023-07-15 ENCOUNTER — HOSPITAL ENCOUNTER (EMERGENCY)
Facility: HOSPITAL | Age: 58
Discharge: HOME | End: 2023-07-15
Attending: INTERNAL MEDICINE
Payer: MEDICAID

## 2023-07-15 VITALS
RESPIRATION RATE: 18 BRPM | SYSTOLIC BLOOD PRESSURE: 130 MMHG | TEMPERATURE: 99 F | DIASTOLIC BLOOD PRESSURE: 84 MMHG | HEART RATE: 70 BPM | OXYGEN SATURATION: 100 % | WEIGHT: 269.63 LBS | BODY MASS INDEX: 47.77 KG/M2 | HEIGHT: 63 IN

## 2023-07-15 DIAGNOSIS — N76.4 LEFT GENITAL LABIAL ABSCESS: ICD-10-CM

## 2023-07-15 DIAGNOSIS — D61.818 PANCYTOPENIA: Primary | ICD-10-CM

## 2023-07-15 DIAGNOSIS — L02.412 ABSCESS OF AXILLA, LEFT: ICD-10-CM

## 2023-07-15 DIAGNOSIS — E11.65 UNCONTROLLED TYPE 2 DIABETES MELLITUS WITH HYPERGLYCEMIA: ICD-10-CM

## 2023-07-15 LAB
ABS NEUT CALC (OHS): 1 X10(3)/MCL (ref 2.1–9.2)
ALBUMIN SERPL-MCNC: 3 G/DL (ref 3.5–5)
ALBUMIN/GLOB SERPL: 0.9 RATIO (ref 1.1–2)
ALP SERPL-CCNC: 61 UNIT/L (ref 40–150)
ALT SERPL-CCNC: 22 UNIT/L (ref 0–55)
ANISOCYTOSIS BLD QL SMEAR: ABNORMAL
AST SERPL-CCNC: 14 UNIT/L (ref 5–34)
BILIRUBIN DIRECT+TOT PNL SERPL-MCNC: 0.7 MG/DL
BUN SERPL-MCNC: 10.8 MG/DL (ref 9.8–20.1)
CALCIUM SERPL-MCNC: 9.2 MG/DL (ref 8.4–10.2)
CHLORIDE SERPL-SCNC: 104 MMOL/L (ref 98–107)
CO2 SERPL-SCNC: 22 MMOL/L (ref 22–29)
CREAT SERPL-MCNC: 0.69 MG/DL (ref 0.55–1.02)
ERYTHROCYTE [DISTWIDTH] IN BLOOD BY AUTOMATED COUNT: 18.6 % (ref 11.5–17)
GFR SERPLBLD CREATININE-BSD FMLA CKD-EPI: >60 MLS/MIN/1.73/M2
GLOBULIN SER-MCNC: 3.3 GM/DL (ref 2.4–3.5)
GLUCOSE SERPL-MCNC: 322 MG/DL (ref 74–100)
GROUP & RH: NORMAL
HCT VFR BLD AUTO: 28.4 % (ref 37–47)
HGB BLD-MCNC: 8.8 G/DL (ref 12–16)
HYPOCHROMIA BLD QL SMEAR: ABNORMAL
INDIRECT COOMBS GEL: NORMAL
LACTATE SERPL-SCNC: 2.4 MMOL/L (ref 0.5–2.2)
LACTATE SERPL-SCNC: 3.5 MMOL/L (ref 0.5–2.2)
LYMPHOCYTES NFR BLD MANUAL: 1.2 X10(3)/MCL
LYMPHOCYTES NFR BLD MANUAL: 53 % (ref 13–40)
MCH RBC QN AUTO: 30.2 PG (ref 27–31)
MCHC RBC AUTO-ENTMCNC: 31 G/DL (ref 33–36)
MCV RBC AUTO: 97.6 FL (ref 80–94)
MICROCYTES BLD QL SMEAR: ABNORMAL
MONOCYTES NFR BLD MANUAL: 0.07 X10(3)/MCL (ref 0.1–1.3)
MONOCYTES NFR BLD MANUAL: 3 % (ref 2–11)
NEUTROPHILS NFR BLD MANUAL: 44 % (ref 47–80)
NRBC BLD AUTO-RTO: 6.6 %
PLATELET # BLD AUTO: 44 X10(3)/MCL (ref 130–400)
PLATELET # BLD EST: ABNORMAL 10*3/UL
PLATELETS.RETICULATED NFR BLD AUTO: 8.7 % (ref 0.9–11.2)
PMV BLD AUTO: ABNORMAL FL
POCT GLUCOSE: 227 MG/DL (ref 70–110)
POCT GLUCOSE: 265 MG/DL (ref 70–110)
POIKILOCYTOSIS BLD QL SMEAR: ABNORMAL
POTASSIUM SERPL-SCNC: 4.1 MMOL/L (ref 3.5–5.1)
PROT SERPL-MCNC: 6.3 GM/DL (ref 6.4–8.3)
RBC # BLD AUTO: 2.91 X10(6)/MCL (ref 4.2–5.4)
RBC MORPH BLD: ABNORMAL
SODIUM SERPL-SCNC: 139 MMOL/L (ref 136–145)
SPECIMEN OUTDATE: NORMAL
WBC # SPEC AUTO: 2.27 X10(3)/MCL (ref 4.5–11.5)

## 2023-07-15 PROCEDURE — 87040 BLOOD CULTURE FOR BACTERIA: CPT | Performed by: INTERNAL MEDICINE

## 2023-07-15 PROCEDURE — 63600175 PHARM REV CODE 636 W HCPCS: Performed by: INTERNAL MEDICINE

## 2023-07-15 PROCEDURE — 25000003 PHARM REV CODE 250: Performed by: INTERNAL MEDICINE

## 2023-07-15 PROCEDURE — 96365 THER/PROPH/DIAG IV INF INIT: CPT

## 2023-07-15 PROCEDURE — 10060 I&D ABSCESS SIMPLE/SINGLE: CPT

## 2023-07-15 PROCEDURE — 96361 HYDRATE IV INFUSION ADD-ON: CPT

## 2023-07-15 PROCEDURE — 99284 EMERGENCY DEPT VISIT MOD MDM: CPT

## 2023-07-15 PROCEDURE — 82962 GLUCOSE BLOOD TEST: CPT

## 2023-07-15 PROCEDURE — 80053 COMPREHEN METABOLIC PANEL: CPT | Performed by: INTERNAL MEDICINE

## 2023-07-15 PROCEDURE — 83605 ASSAY OF LACTIC ACID: CPT | Performed by: INTERNAL MEDICINE

## 2023-07-15 PROCEDURE — 96372 THER/PROPH/DIAG INJ SC/IM: CPT | Performed by: INTERNAL MEDICINE

## 2023-07-15 PROCEDURE — 56405 I&D VULVA/PERINEAL ABSCESS: CPT

## 2023-07-15 PROCEDURE — 96375 TX/PRO/DX INJ NEW DRUG ADDON: CPT

## 2023-07-15 PROCEDURE — 85027 COMPLETE CBC AUTOMATED: CPT | Performed by: INTERNAL MEDICINE

## 2023-07-15 PROCEDURE — 86900 BLOOD TYPING SEROLOGIC ABO: CPT | Performed by: INTERNAL MEDICINE

## 2023-07-15 RX ORDER — CLINDAMYCIN PHOSPHATE 600 MG/50ML
600 INJECTION, SOLUTION INTRAVENOUS
Status: COMPLETED | OUTPATIENT
Start: 2023-07-15 | End: 2023-07-15

## 2023-07-15 RX ORDER — HYDROMORPHONE HYDROCHLORIDE 1 MG/ML
1 INJECTION, SOLUTION INTRAMUSCULAR; INTRAVENOUS; SUBCUTANEOUS
Status: COMPLETED | OUTPATIENT
Start: 2023-07-15 | End: 2023-07-15

## 2023-07-15 RX ORDER — CLINDAMYCIN HYDROCHLORIDE 300 MG/1
300 CAPSULE ORAL EVERY 6 HOURS
Qty: 40 CAPSULE | Refills: 0 | Status: SHIPPED | OUTPATIENT
Start: 2023-07-15 | End: 2023-07-25

## 2023-07-15 RX ORDER — SODIUM CHLORIDE 9 MG/ML
500 INJECTION, SOLUTION INTRAVENOUS
Status: COMPLETED | OUTPATIENT
Start: 2023-07-15 | End: 2023-07-15

## 2023-07-15 RX ORDER — INSULIN ASPART 100 [IU]/ML
2 INJECTION, SOLUTION INTRAVENOUS; SUBCUTANEOUS
Status: COMPLETED | OUTPATIENT
Start: 2023-07-15 | End: 2023-07-15

## 2023-07-15 RX ADMIN — HYDROMORPHONE HYDROCHLORIDE 1 MG: 1 INJECTION, SOLUTION INTRAMUSCULAR; INTRAVENOUS; SUBCUTANEOUS at 10:07

## 2023-07-15 RX ADMIN — INSULIN ASPART 2 UNITS: 100 INJECTION, SOLUTION INTRAVENOUS; SUBCUTANEOUS at 12:07

## 2023-07-15 RX ADMIN — LIDOCAINE HYDROCHLORIDE 5 ML: 10; .005 INJECTION, SOLUTION EPIDURAL; INFILTRATION; INTRACAUDAL; PERINEURAL at 11:07

## 2023-07-15 RX ADMIN — CLINDAMYCIN PHOSPHATE 600 MG: 600 INJECTION, SOLUTION INTRAVENOUS at 12:07

## 2023-07-15 RX ADMIN — SODIUM CHLORIDE 500 ML: 9 INJECTION, SOLUTION INTRAVENOUS at 11:07

## 2023-07-15 NOTE — ED PROVIDER NOTES
Encounter Date: 7/15/2023       History     Chief Complaint   Patient presents with    Abscess     Abcess to vaginal area and L hollie      Presents with an abscess to her vaginal area and her left axilla for the last 3 days. Hx of leukemia, in chemotherapy. Was evaluated by her PCP 2 days ago who prescribed her Doxyxycline. States pain among vagina abscess worsening. Took her morphine today AM w/o relief    The history is provided by the patient and the spouse.   Review of patient's allergies indicates:  No Known Allergies  Past Medical History:   Diagnosis Date    Cancer     CML (chronic myelocytic leukemia)     DM2 (diabetes mellitus, type 2)     HTN (hypertension)     NAFLD (nonalcoholic fatty liver disease)     Neuropathy      Past Surgical History:   Procedure Laterality Date    ABDOMINAL SURGERY       SECTION       SECTION      CHOLECYSTECTOMY       Family History   Problem Relation Age of Onset    Diabetes Mother     Diabetes Father     Cancer Neg Hx      Social History     Tobacco Use    Smoking status: Never    Smokeless tobacco: Never   Substance Use Topics    Alcohol use: Not Currently    Drug use: Not Currently     Review of Systems   Constitutional:  Negative for fever.   HENT:  Negative for sore throat.    Respiratory:  Negative for shortness of breath.    Cardiovascular:  Negative for chest pain.   Gastrointestinal:  Negative for nausea.   Genitourinary:  Positive for genital sores and vaginal pain. Negative for dysuria.   Musculoskeletal:  Negative for back pain.   Skin:  Positive for color change and wound. Negative for rash.   Neurological:  Negative for weakness.   Hematological:  Does not bruise/bleed easily.   All other systems reviewed and are negative.    Physical Exam     Initial Vitals [07/15/23 1014]   BP Pulse Resp Temp SpO2   125/80 93 (!) 22 98.4 °F (36.9 °C) 97 %      MAP       --         Physical Exam    Nursing note and vitals reviewed.  Constitutional: She appears  well-developed and well-nourished.   HENT:   Head: Normocephalic and atraumatic.   Mouth/Throat: Oropharynx is clear and moist.   Eyes: Conjunctivae are normal. Pupils are equal, round, and reactive to light.   Neck: Neck supple. No JVD present.   Normal range of motion.  Cardiovascular:  Normal rate, regular rhythm, normal heart sounds and intact distal pulses.           Pulmonary/Chest: Breath sounds normal.   Abdominal: Abdomen is soft. Bowel sounds are normal. She exhibits no distension. There is no abdominal tenderness. There is no rebound and no guarding.   Genitourinary:    Genitourinary Comments: Left external labia 2 cm abscess with associated induration and tenderness     Musculoskeletal:         General: No edema. Normal range of motion.      Cervical back: Normal range of motion and neck supple.     Neurological: She is alert and oriented to person, place, and time. She has normal strength. GCS score is 15. GCS eye subscore is 4. GCS verbal subscore is 5. GCS motor subscore is 6.   Skin: Skin is warm. Abscess noted. No rash noted. There is erythema.   Abdominal wall and bilateral lower legs petechiae      Left axilla 2 cm abscess with purulent drainage, left external labia 2 cm abscess with associated purulent drainage, induration and tenderness   Psychiatric: Her behavior is normal.       ED Course   I & D - Incision and Drainage    Date/Time: 7/15/2023 12:16 PM  Location procedure was performed: [facility] EMERGENCY DEPARTMENT  Performed by: Elroy Evans MD  Authorized by: Elroy Evans MD   Pre-operative diagnosis: left external labia majora abscess  Post-operative diagnosis: left external labia majora abscess  Consent Done: Yes  Consent: Verbal consent obtained. Written consent not obtained.  Risks and benefits: risks, benefits and alternatives were discussed  Consent given by: patient  Patient understanding: patient states understanding of the procedure being  "performed  Patient consent: the patient's understanding of the procedure matches consent given  Procedure consent: procedure consent matches procedure scheduled  Relevant documents: relevant documents present and verified  Test results: test results available and properly labeled  Site marked: the operative site was not marked  Imaging studies: imaging studies not available  Required items: required blood products, implants, devices, and special equipment available  Patient identity confirmed: verbally with patient  Time out: Immediately prior to procedure a "time out" was called to verify the correct patient, procedure, equipment, support staff and site/side marked as required.  Type: abscess  Body area: anogenital  Location details: vulva  Anesthesia: local infiltration    Anesthesia:  Local Anesthetic: lidocaine 2% with epinephrine  Anesthetic total: 2 mL    Patient sedated: no  Risk factor: coagulopathy  Scalpel size: 11  Incision type: single straight  Incision depth: dermal  Complexity: simple  Drainage: bloody and purulent  Drainage amount: scant  Wound treatment: wound left open and incision  Packing material: 1/2 in iodoform gauze  Complications: No  Estimated blood loss (mL): 1  Specimens: No  Implants: No  Patient tolerance: Patient tolerated the procedure well with no immediate complications    Incision depth: dermal      I & D - Incision and Drainage    Date/Time: 7/15/2023 12:18 PM  Location procedure was performed: [facility] EMERGENCY DEPARTMENT  Performed by: Elroy Evans MD  Authorized by: Elroy Evans MD   Pre-operative diagnosis: Left axilla abscess  Post-operative diagnosis: Left axilla abscess  Consent Done: Yes  Consent: Verbal consent obtained. Written consent not obtained.  Risks and benefits: risks, benefits and alternatives were discussed  Consent given by: patient  Patient understanding: patient states understanding of the procedure being performed  Patient " "consent: the patient's understanding of the procedure matches consent given  Procedure consent: procedure consent matches procedure scheduled  Relevant documents: relevant documents present and verified  Test results: test results available and properly labeled  Site marked: the operative site was not marked  Imaging studies: imaging studies not available  Required items: required blood products, implants, devices, and special equipment available  Patient identity confirmed: verbally with patient  Time out: Immediately prior to procedure a "time out" was called to verify the correct patient, procedure, equipment, support staff and site/side marked as required.  Type: abscess  Body area: trunk  Location details: left arm  Anesthesia: local infiltration    Anesthesia:  Local Anesthetic: lidocaine 2% with epinephrine  Anesthetic total: 2 mL  Risk factor: coagulopathy  Scalpel size: 11  Incision type: single straight  Incision depth: dermal  Complexity: simple  Drainage: pus  Drainage amount: scant  Wound treatment: incision and wound left open  Packing material: 1/2 in iodoform gauze  Complications: No  Estimated blood loss (mL): 0  Specimens: No  Implants: No  Patient tolerance: Patient tolerated the procedure well with no immediate complications    Incision depth: dermal      Labs Reviewed   COMPREHENSIVE METABOLIC PANEL - Abnormal; Notable for the following components:       Result Value    Glucose Level 322 (*)     Protein Total 6.3 (*)     Albumin Level 3.0 (*)     Albumin/Globulin Ratio 0.9 (*)     All other components within normal limits   LACTIC ACID, PLASMA - Abnormal; Notable for the following components:    Lactic Acid Level 3.5 (*)     All other components within normal limits   CBC WITH DIFFERENTIAL - Abnormal; Notable for the following components:    WBC 2.27 (*)     RBC 2.91 (*)     Hgb 8.8 (*)     Hct 28.4 (*)     MCV 97.6 (*)     MCHC 31.0 (*)     RDW 18.6 (*)     Platelet 44 (*)     All other " components within normal limits   LACTIC ACID, PLASMA - Abnormal; Notable for the following components:    Lactic Acid Level 2.4 (*)     All other components within normal limits   MANUAL DIFFERENTIAL - Abnormal; Notable for the following components:    Neutrophils % 44 (*)     Lymphs % 53 (*)     Neutrophils Abs Calc 0.9988 (*)     Monocytes Abs 0.0681 (*)     Platelets Decreased (*)     RBC Morph Abnormal (*)     Anisocytosis 2+ (*)     Poikilocytosis 2+ (*)     Microcytosis 2+ (*)     Hypochromasia 2+ (*)     All other components within normal limits   POCT GLUCOSE - Abnormal; Notable for the following components:    POCT Glucose 265 (*)     All other components within normal limits   CHLAMYDIA/GONORRHOEAE(GC), PCR   BLOOD CULTURE [facility]   BLOOD CULTURE [facility]   CBC W/ AUTO DIFFERENTIAL    Narrative:     The following orders were created for panel order CBC auto differential.  Procedure                               Abnormality         Status                     ---------                               -----------         ------                     CBC with Differential[[id number]]        Abnormal            Final result               Manual Differential[[id number]]          Abnormal            Final result                 Please view results for these tests on the individual orders.   URINALYSIS   EXTRA TUBES    Narrative:     The following orders were created for panel order EXTRA TUBES.  Procedure                               Abnormality         Status                     ---------                               -----------         ------                     Light Blue Top Hold[[id number]]                              In process                 Red Top Hold[[id number]]                                     In process                 Gold Top Hold[[id number]]                                    In process                   Please view results for these tests on the individual orders.   LIGHT BLUE TOP HOLD   RED TOP HOLD    GOLD TOP HOLD   EXTRA TUBES    Narrative:     The following orders were created for panel order EXTRA TUBES.  Procedure                               Abnormality         Status                     ---------                               -----------         ------                     Pink Top Hold[[id number]]                                    In process                   Please view results for these tests on the individual orders.   PINK TOP HOLD   TYPE & SCREEN          Imaging Results    None          Medications   HYDROmorphone injection 1 mg (1 mg Intravenous Given 7/15/23 1051)   LIDOcaine-EPINEPHrine (PF) 1%-1:200,000 injection 5 mL (5 mLs Intradermal Given by Other 7/15/23 1150)   0.9%  NaCl infusion (0 mLs Intravenous Stopped 7/15/23 1305)   clindamycin in D5W 600 mg/50 mL IVPB 600 mg (0 mg Intravenous Stopped 7/15/23 1238)   insulin aspart U-100 injection 2 Units (2 Units Subcutaneous Given 7/15/23 1234)     Medical Decision Making:   Differential Diagnosis:   Cellulitis, abscess, mass, cyst among others    Clinical Tests:   Lab Tests: Ordered                        Clinical Impression:   Final diagnoses:  [D61.818] Pancytopenia (Primary)  [N76.4] Left genital labial abscess  [L02.412] Abscess of axilla, left  [E11.65] Uncontrolled type 2 diabetes mellitus with hyperglycemia        ED Disposition Condition    Discharge Stable          ED Prescriptions       Medication Sig Dispense Start Date End Date Auth. Provider    clindamycin (CLEOCIN) 300 MG capsule Take 1 capsule (300 mg total) by mouth every 6 (six) hours. for 10 days 40 capsule 7/15/2023 7/25/2023 Elroy Evans MD          Follow-up Information       Follow up With Specialties Details Why Contact Info    [facility] - Emergency Dept Emergency Medicine  If symptoms worsen [facility] [address] [phone]    [facility]  In 1 week  [address] [address]     Elroy Evans MD Internal Medicine In 2 days For wound re-check; Packing removal [address] [facility] [address]  [phone]               Elroy Evans MD  07/15/23 9732

## 2023-07-17 ENCOUNTER — HOSPITAL ENCOUNTER (EMERGENCY)
Facility: HOSPITAL | Age: 58
Discharge: HOME | End: 2023-07-17
Attending: INTERNAL MEDICINE
Payer: MEDICAID

## 2023-07-17 VITALS
HEART RATE: 77 BPM | TEMPERATURE: 98 F | DIASTOLIC BLOOD PRESSURE: 75 MMHG | HEIGHT: 64 IN | WEIGHT: 237 LBS | BODY MASS INDEX: 40.46 KG/M2 | RESPIRATION RATE: 20 BRPM | SYSTOLIC BLOOD PRESSURE: 120 MMHG | OXYGEN SATURATION: 96 %

## 2023-07-17 DIAGNOSIS — Z09 ENCOUNTER FOR RECHECK OF ABSCESS FOLLOWING INCISION AND DRAINAGE: Primary | ICD-10-CM

## 2023-07-17 PROCEDURE — 99283 EMERGENCY DEPT VISIT LOW MDM: CPT | Mod: 25

## 2023-07-17 PROCEDURE — 25000003 PHARM REV CODE 250: Performed by: PHYSICIAN ASSISTANT

## 2023-07-17 PROCEDURE — 10160 PNXR ASPIR ABSC HMTMA BULLA: CPT

## 2023-07-17 RX ORDER — HYDROCODONE BITARTRATE AND ACETAMINOPHEN 5; 325 MG/1; MG/1
1 TABLET ORAL
Status: COMPLETED | OUTPATIENT
Start: 2023-07-17 | End: 2023-07-17

## 2023-07-17 RX ADMIN — HYDROCODONE BITARTRATE AND ACETAMINOPHEN 1 TABLET: 5; 325 TABLET ORAL at 12:07

## 2023-07-17 NOTE — ED PROVIDER NOTES
Encounter Date: 2023       History     Chief Complaint   Patient presents with    Packing removal     Here for packing removal from groin and left axilla area that was placed after I&D on Saturday.      Patient reports to the ER for an abscess I&D recheck; pt reports improvement of symptoms, with decreased pain and size of the area    The history is provided by the patient.   Abscess   The current episode started two days ago. The problem has been gradually improving. The pain is at a severity of 4/10. The abscess is characterized by painfulness, redness and draining. Pertinent negatives include no fever and no sore throat. Recently, medical care has been given at this facility. Services received include medications given and tests performed.   Review of patient's allergies indicates:  No Known Allergies  Past Medical History:   Diagnosis Date    Cancer     CML (chronic myelocytic leukemia)     DM2 (diabetes mellitus, type 2)     HTN (hypertension)     NAFLD (nonalcoholic fatty liver disease)     Neuropathy      Past Surgical History:   Procedure Laterality Date    ABDOMINAL SURGERY       SECTION       SECTION      CHOLECYSTECTOMY       Family History   Problem Relation Age of Onset    Diabetes Mother     Diabetes Father     Cancer Neg Hx      Social History     Tobacco Use    Smoking status: Never    Smokeless tobacco: Never   Substance Use Topics    Alcohol use: Not Currently    Drug use: Not Currently     Review of Systems   Constitutional:  Negative for fever.   HENT:  Negative for sore throat.    Eyes: Negative.    Respiratory:  Negative for shortness of breath.    Cardiovascular:  Negative for chest pain.   Gastrointestinal:  Negative for nausea.   Genitourinary:  Negative for dysuria.   Musculoskeletal:  Negative for back pain.   Skin:  Negative for rash.   Neurological:  Negative for weakness.   Hematological:  Does not bruise/bleed easily.   Psychiatric/Behavioral: Negative.        Physical Exam     Initial Vitals [07/17/23 1010]   BP Pulse Resp Temp SpO2   120/75 77 18 98.2 °F (36.8 °C) 96 %      MAP       --         Physical Exam    Vitals reviewed.  Constitutional: She appears well-developed and well-nourished.   HENT:   Head: Normocephalic and atraumatic.   Eyes: Conjunctivae and EOM are normal. Pupils are equal, round, and reactive to light.   Neck: Neck supple.   Normal range of motion.  Cardiovascular:  Normal rate, regular rhythm, normal heart sounds and intact distal pulses.           Pulmonary/Chest: Breath sounds normal. No respiratory distress. She has no wheezes. She exhibits no tenderness.   Abdominal: Abdomen is soft. Bowel sounds are normal. There is no abdominal tenderness. There is no rebound.   Musculoskeletal:         General: Normal range of motion.      Cervical back: Normal range of motion and neck supple.     Neurological: She is alert and oriented to person, place, and time. She displays normal reflexes. No cranial nerve deficit or sensory deficit.   Skin: Skin is warm and dry. Abscess noted.        Left arm packing removed; area approx 1.5cm in diameter    Packing removed from Left labia abscess; approx 1cm    Both areas have mild erythema, no warmth, and no necrotic tissue    New dressings applied; no new packing; Trudy RN at bedside   Psychiatric: She has a normal mood and affect. Her behavior is normal. Judgment and thought content normal.       ED Course   Abscess Aspiration    Date/Time: 7/17/2023 11:53 AM  Performed by: SATISH Graves  Authorized by: SATISH Graves     A time out verifies correct patient, procedure, equipment, support staff and site/side marked as required:   Labs Reviewed - No data to display       Imaging Results    None          Medications   HYDROcodone-acetaminophen 5-325 mg per tablet 1 tablet (has no administration in time range)                              Clinical Impression:   Final diagnoses:  [Z09] Encounter for recheck  of abscess following incision and drainage (Primary)        ED Disposition Condition    Discharge Stable          ED Prescriptions    None       Follow-up Information       Follow up With Specialties Details Why Contact Info    discharge followup  In 2 days For wound re-check If your symptoms become WORSE or you DO NOT IMPROVE and you are unable to reach your health care provider, you should RETURN to the emergency department    discharge info    Discussed all pertinent ED information, results, diagnosis and treatment plan; All questions and concerns were addressed at this time. Patient voices understanding of information and instructions. Patient is comfortable with plan and discharge             SATISH Graves  07/17/23 7186

## 2023-07-19 ENCOUNTER — HOSPITAL ENCOUNTER (INPATIENT)
Facility: HOSPITAL | Age: 58
LOS: 1 days | Discharge: HOME | DRG: 872 | End: 2023-07-20
Attending: INTERNAL MEDICINE | Admitting: INTERNAL MEDICINE
Payer: MEDICAID

## 2023-07-19 DIAGNOSIS — D61.818 PANCYTOPENIA: Primary | ICD-10-CM

## 2023-07-19 DIAGNOSIS — N76.0 LABIAL INFECTION: ICD-10-CM

## 2023-07-19 PROBLEM — A41.9 SEVERE SEPSIS: Status: ACTIVE | Noted: 2023-07-19

## 2023-07-19 PROBLEM — N90.7 LABIAL CYST: Status: ACTIVE | Noted: 2023-07-19

## 2023-07-19 PROBLEM — R65.20 SEVERE SEPSIS: Status: ACTIVE | Noted: 2023-07-19

## 2023-07-19 LAB
ABS NEUT CALC (OHS): 0.8 X10(3)/MCL (ref 2.1–9.2)
ALBUMIN SERPL-MCNC: 3.1 G/DL (ref 3.5–5)
ALBUMIN/GLOB SERPL: 1 RATIO (ref 1.1–2)
ALP SERPL-CCNC: 171 UNIT/L (ref 40–150)
ALT SERPL-CCNC: 49 UNIT/L (ref 0–55)
AST SERPL-CCNC: 18 UNIT/L (ref 5–34)
BILIRUBIN DIRECT+TOT PNL SERPL-MCNC: 0.6 MG/DL
BUN SERPL-MCNC: 14 MG/DL (ref 9.8–20.1)
CALCIUM SERPL-MCNC: 9.8 MG/DL (ref 8.4–10.2)
CHLORIDE SERPL-SCNC: 105 MMOL/L (ref 98–107)
CO2 SERPL-SCNC: 22 MMOL/L (ref 22–29)
CREAT SERPL-MCNC: 0.75 MG/DL (ref 0.55–1.02)
CRP SERPL-MCNC: 26 MG/L
ERYTHROCYTE [DISTWIDTH] IN BLOOD BY AUTOMATED COUNT: 18.8 % (ref 11.5–17)
GFR SERPLBLD CREATININE-BSD FMLA CKD-EPI: >60 MLS/MIN/1.73/M2
GLOBULIN SER-MCNC: 3 GM/DL (ref 2.4–3.5)
GLUCOSE SERPL-MCNC: 336 MG/DL (ref 74–100)
HCT VFR BLD AUTO: 28.5 % (ref 37–47)
HGB BLD-MCNC: 8.8 G/DL (ref 12–16)
LACTATE SERPL-SCNC: 3.1 MMOL/L (ref 0.5–2.2)
LACTATE SERPL-SCNC: 3.3 MMOL/L (ref 0.5–2.2)
LYMPHOCYTES NFR BLD MANUAL: 0.98 X10(3)/MCL
LYMPHOCYTES NFR BLD MANUAL: 50 % (ref 13–40)
MCH RBC QN AUTO: 30.6 PG (ref 27–31)
MCHC RBC AUTO-ENTMCNC: 30.9 G/DL (ref 33–36)
MCV RBC AUTO: 99 FL (ref 80–94)
METAMYELOCYTES NFR BLD MANUAL: 1 %
MONOCYTES NFR BLD MANUAL: 0.16 X10(3)/MCL (ref 0.1–1.3)
MONOCYTES NFR BLD MANUAL: 8 % (ref 2–11)
NEUTROPHILS NFR BLD MANUAL: 41 % (ref 47–80)
NRBC BLD AUTO-RTO: 3.6 %
NRBC BLD MANUAL-RTO: 5 %
PLATELET # BLD AUTO: 88 X10(3)/MCL (ref 130–400)
PLATELET # BLD EST: ABNORMAL 10*3/UL
PMV BLD AUTO: 0 FL (ref 7.4–10.4)
POCT GLUCOSE: 152 MG/DL (ref 70–110)
POCT GLUCOSE: 248 MG/DL (ref 70–110)
POCT GLUCOSE: 253 MG/DL (ref 70–110)
POTASSIUM SERPL-SCNC: 4.2 MMOL/L (ref 3.5–5.1)
PROT SERPL-MCNC: 6.1 GM/DL (ref 6.4–8.3)
RBC # BLD AUTO: 2.88 X10(6)/MCL (ref 4.2–5.4)
RBC MORPH BLD: NORMAL
SODIUM SERPL-SCNC: 140 MMOL/L (ref 136–145)
WBC # SPEC AUTO: 1.96 X10(3)/MCL (ref 4.5–11.5)

## 2023-07-19 PROCEDURE — 83605 ASSAY OF LACTIC ACID: CPT | Performed by: PHYSICIAN ASSISTANT

## 2023-07-19 PROCEDURE — 25000003 PHARM REV CODE 250: Performed by: PHYSICIAN ASSISTANT

## 2023-07-19 PROCEDURE — 25000003 PHARM REV CODE 250: Performed by: INTERNAL MEDICINE

## 2023-07-19 PROCEDURE — 96365 THER/PROPH/DIAG IV INF INIT: CPT

## 2023-07-19 PROCEDURE — 63600175 PHARM REV CODE 636 W HCPCS: Performed by: STUDENT IN AN ORGANIZED HEALTH CARE EDUCATION/TRAINING PROGRAM

## 2023-07-19 PROCEDURE — 63600175 PHARM REV CODE 636 W HCPCS: Performed by: INTERNAL MEDICINE

## 2023-07-19 PROCEDURE — 96375 TX/PRO/DX INJ NEW DRUG ADDON: CPT

## 2023-07-19 PROCEDURE — 80053 COMPREHEN METABOLIC PANEL: CPT | Performed by: PHYSICIAN ASSISTANT

## 2023-07-19 PROCEDURE — 96372 THER/PROPH/DIAG INJ SC/IM: CPT | Performed by: PHYSICIAN ASSISTANT

## 2023-07-19 PROCEDURE — 96361 HYDRATE IV INFUSION ADD-ON: CPT

## 2023-07-19 PROCEDURE — 63600175 PHARM REV CODE 636 W HCPCS: Performed by: PHYSICIAN ASSISTANT

## 2023-07-19 PROCEDURE — 82962 GLUCOSE BLOOD TEST: CPT

## 2023-07-19 PROCEDURE — 87040 BLOOD CULTURE FOR BACTERIA: CPT | Performed by: PHYSICIAN ASSISTANT

## 2023-07-19 PROCEDURE — 85027 COMPLETE CBC AUTOMATED: CPT | Performed by: PHYSICIAN ASSISTANT

## 2023-07-19 PROCEDURE — 99285 EMERGENCY DEPT VISIT HI MDM: CPT | Mod: 25

## 2023-07-19 PROCEDURE — 86140 C-REACTIVE PROTEIN: CPT | Performed by: PHYSICIAN ASSISTANT

## 2023-07-19 PROCEDURE — 25000003 PHARM REV CODE 250: Performed by: STUDENT IN AN ORGANIZED HEALTH CARE EDUCATION/TRAINING PROGRAM

## 2023-07-19 PROCEDURE — 21400001 HC TELEMETRY ROOM

## 2023-07-19 RX ORDER — GLUCAGON 1 MG
1 KIT INJECTION
Status: DISCONTINUED | OUTPATIENT
Start: 2023-07-19 | End: 2023-07-20 | Stop reason: HOSPADM

## 2023-07-19 RX ORDER — ONDANSETRON 4 MG/1
4 TABLET, ORALLY DISINTEGRATING ORAL EVERY 8 HOURS PRN
Status: DISCONTINUED | OUTPATIENT
Start: 2023-07-19 | End: 2023-07-20 | Stop reason: HOSPADM

## 2023-07-19 RX ORDER — LOSARTAN POTASSIUM 25 MG/1
25 TABLET ORAL DAILY
Status: DISCONTINUED | OUTPATIENT
Start: 2023-07-20 | End: 2023-07-20 | Stop reason: HOSPADM

## 2023-07-19 RX ORDER — ESCITALOPRAM OXALATE 10 MG/1
10 TABLET ORAL DAILY
Status: DISCONTINUED | OUTPATIENT
Start: 2023-07-20 | End: 2023-07-20 | Stop reason: HOSPADM

## 2023-07-19 RX ORDER — ONDANSETRON 2 MG/ML
4 INJECTION INTRAMUSCULAR; INTRAVENOUS
Status: COMPLETED | OUTPATIENT
Start: 2023-07-19 | End: 2023-07-19

## 2023-07-19 RX ORDER — ACETAMINOPHEN 325 MG/1
650 TABLET ORAL EVERY 8 HOURS PRN
Status: DISCONTINUED | OUTPATIENT
Start: 2023-07-19 | End: 2023-07-20 | Stop reason: HOSPADM

## 2023-07-19 RX ORDER — FUROSEMIDE 20 MG/1
20 TABLET ORAL DAILY
Status: DISCONTINUED | OUTPATIENT
Start: 2023-07-20 | End: 2023-07-20 | Stop reason: HOSPADM

## 2023-07-19 RX ORDER — GABAPENTIN 300 MG/1
300 CAPSULE ORAL 3 TIMES DAILY
Status: DISCONTINUED | OUTPATIENT
Start: 2023-07-19 | End: 2023-07-20 | Stop reason: HOSPADM

## 2023-07-19 RX ORDER — ATORVASTATIN CALCIUM 40 MG/1
40 TABLET, FILM COATED ORAL DAILY
Status: DISCONTINUED | OUTPATIENT
Start: 2023-07-20 | End: 2023-07-20 | Stop reason: HOSPADM

## 2023-07-19 RX ORDER — METOPROLOL TARTRATE 25 MG/1
25 TABLET, FILM COATED ORAL 2 TIMES DAILY
Status: DISCONTINUED | OUTPATIENT
Start: 2023-07-19 | End: 2023-07-20 | Stop reason: HOSPADM

## 2023-07-19 RX ORDER — HYDROCODONE BITARTRATE AND ACETAMINOPHEN 5; 325 MG/1; MG/1
1 TABLET ORAL EVERY 6 HOURS PRN
Status: DISCONTINUED | OUTPATIENT
Start: 2023-07-19 | End: 2023-07-20 | Stop reason: HOSPADM

## 2023-07-19 RX ORDER — HEPARIN SODIUM 5000 [USP'U]/ML
7500 INJECTION, SOLUTION INTRAVENOUS; SUBCUTANEOUS EVERY 12 HOURS
Status: DISCONTINUED | OUTPATIENT
Start: 2023-07-19 | End: 2023-07-20 | Stop reason: HOSPADM

## 2023-07-19 RX ORDER — TALC
6 POWDER (GRAM) TOPICAL NIGHTLY PRN
Status: DISCONTINUED | OUTPATIENT
Start: 2023-07-19 | End: 2023-07-20 | Stop reason: HOSPADM

## 2023-07-19 RX ORDER — FLUCONAZOLE 100 MG/1
200 TABLET ORAL DAILY
Status: DISCONTINUED | OUTPATIENT
Start: 2023-07-20 | End: 2023-07-20 | Stop reason: HOSPADM

## 2023-07-19 RX ORDER — IBUPROFEN 200 MG
24 TABLET ORAL
Status: DISCONTINUED | OUTPATIENT
Start: 2023-07-19 | End: 2023-07-20 | Stop reason: HOSPADM

## 2023-07-19 RX ORDER — IBUPROFEN 200 MG
16 TABLET ORAL
Status: DISCONTINUED | OUTPATIENT
Start: 2023-07-19 | End: 2023-07-20 | Stop reason: HOSPADM

## 2023-07-19 RX ORDER — HYDROMORPHONE HYDROCHLORIDE 1 MG/ML
0.5 INJECTION, SOLUTION INTRAMUSCULAR; INTRAVENOUS; SUBCUTANEOUS
Status: COMPLETED | OUTPATIENT
Start: 2023-07-19 | End: 2023-07-19

## 2023-07-19 RX ORDER — SODIUM CHLORIDE 0.9 % (FLUSH) 0.9 %
10 SYRINGE (ML) INJECTION
Status: DISCONTINUED | OUTPATIENT
Start: 2023-07-19 | End: 2023-07-20 | Stop reason: HOSPADM

## 2023-07-19 RX ORDER — INSULIN ASPART 100 [IU]/ML
6 INJECTION, SOLUTION INTRAVENOUS; SUBCUTANEOUS ONCE
Status: COMPLETED | OUTPATIENT
Start: 2023-07-19 | End: 2023-07-19

## 2023-07-19 RX ORDER — ACYCLOVIR 400 MG/1
400 TABLET ORAL 2 TIMES DAILY
Status: DISCONTINUED | OUTPATIENT
Start: 2023-07-19 | End: 2023-07-20 | Stop reason: HOSPADM

## 2023-07-19 RX ORDER — SODIUM CHLORIDE, SODIUM LACTATE, POTASSIUM CHLORIDE, CALCIUM CHLORIDE 600; 310; 30; 20 MG/100ML; MG/100ML; MG/100ML; MG/100ML
1000 INJECTION, SOLUTION INTRAVENOUS ONCE
Status: COMPLETED | OUTPATIENT
Start: 2023-07-19 | End: 2023-07-19

## 2023-07-19 RX ORDER — ALPRAZOLAM 0.25 MG/1
0.25 TABLET ORAL 3 TIMES DAILY PRN
Status: DISCONTINUED | OUTPATIENT
Start: 2023-07-19 | End: 2023-07-19

## 2023-07-19 RX ORDER — INSULIN ASPART 100 [IU]/ML
0-21 INJECTION, SOLUTION INTRAVENOUS; SUBCUTANEOUS
Status: DISCONTINUED | OUTPATIENT
Start: 2023-07-19 | End: 2023-07-20 | Stop reason: HOSPADM

## 2023-07-19 RX ORDER — AMLODIPINE BESYLATE 10 MG/1
10 TABLET ORAL DAILY
Status: DISCONTINUED | OUTPATIENT
Start: 2023-07-20 | End: 2023-07-20 | Stop reason: HOSPADM

## 2023-07-19 RX ADMIN — BACITRACIN ZINC, POLYMYXIN B SULFATE, NEOMYCIN SULFATE: 400; 5000; 3.5 OINTMENT TOPICAL at 03:07

## 2023-07-19 RX ADMIN — INSULIN DETEMIR 25 UNITS: 100 INJECTION, SOLUTION SUBCUTANEOUS at 08:07

## 2023-07-19 RX ADMIN — INSULIN ASPART 9 UNITS: 100 INJECTION, SOLUTION INTRAVENOUS; SUBCUTANEOUS at 08:07

## 2023-07-19 RX ADMIN — ONDANSETRON 4 MG: 2 INJECTION INTRAMUSCULAR; INTRAVENOUS at 01:07

## 2023-07-19 RX ADMIN — HYDROMORPHONE HYDROCHLORIDE 0.5 MG: 1 INJECTION, SOLUTION INTRAMUSCULAR; INTRAVENOUS; SUBCUTANEOUS at 01:07

## 2023-07-19 RX ADMIN — PIPERACILLIN AND TAZOBACTAM 4.5 G: 4; .5 INJECTION, POWDER, LYOPHILIZED, FOR SOLUTION INTRAVENOUS; PARENTERAL at 01:07

## 2023-07-19 RX ADMIN — HEPARIN SODIUM 7500 UNITS: 5000 INJECTION, SOLUTION INTRAVENOUS; SUBCUTANEOUS at 08:07

## 2023-07-19 RX ADMIN — METOPROLOL TARTRATE 25 MG: 25 TABLET, FILM COATED ORAL at 08:07

## 2023-07-19 RX ADMIN — GABAPENTIN 300 MG: 300 CAPSULE ORAL at 08:07

## 2023-07-19 RX ADMIN — INSULIN ASPART 6 UNITS: 100 INJECTION, SOLUTION INTRAVENOUS; SUBCUTANEOUS at 01:07

## 2023-07-19 RX ADMIN — HYDROCODONE BITARTRATE AND ACETAMINOPHEN 1 TABLET: 5; 325 TABLET ORAL at 08:07

## 2023-07-19 RX ADMIN — VANCOMYCIN HYDROCHLORIDE 2000 MG: 1 INJECTION, POWDER, LYOPHILIZED, FOR SOLUTION INTRAVENOUS at 07:07

## 2023-07-19 RX ADMIN — ACYCLOVIR 400 MG: 400 TABLET ORAL at 08:07

## 2023-07-19 RX ADMIN — PIPERACILLIN AND TAZOBACTAM 4.5 G: 4; .5 INJECTION, POWDER, LYOPHILIZED, FOR SOLUTION INTRAVENOUS; PARENTERAL at 08:07

## 2023-07-19 RX ADMIN — SODIUM CHLORIDE, POTASSIUM CHLORIDE, SODIUM LACTATE AND CALCIUM CHLORIDE 1000 ML: 600; 310; 30; 20 INJECTION, SOLUTION INTRAVENOUS at 05:07

## 2023-07-19 RX ADMIN — SODIUM CHLORIDE, POTASSIUM CHLORIDE, SODIUM LACTATE AND CALCIUM CHLORIDE 1000 ML: 600; 310; 30; 20 INJECTION, SOLUTION INTRAVENOUS at 02:07

## 2023-07-19 NOTE — CONSULTS
[facility] CLINIC NOTE  [facility]  [address] [place] [address]  Phone: [phone]  Fax: [phone]    [facility] Consult - Resident Note    Consulting Service: Internal Medicine  Reason for consult: Labial Lesion  Subjective:        HPI:   Fela Ellison is an 57 y.o. year old woman who has a past medical history of Cancer, CML (chronic myelocytic leukemia), DM2 (diabetes mellitus, type 2), HTN (hypertension), NAFLD (nonalcoholic fatty liver disease), and Neuropathy.  Who presents for worsening pain at site of labial abscess. Currently admitted to medicine for pancytopenia  with gyn consulted for concern for necrotizing fasciitis.     Patient reports about 2 weeks ago noted left liabial swelling which worsened over two weeks leading to difficulty walking and sitting. She was sen in the ED on 7/15 and noted to have left labial abscess which was drained and packed and sent home on a course of clindamycin. She represented on  for removal of packing. She reports discontinuing the clindamycin on Monday due to vomiting. She otherwise denies chills, and reports while the left side is still painful she is not having as much difficulty walking. Also denies vaginal bleeding, chest pain,shortness of breath abnormal discharge lightheadedness or dizziness. She has never had sores like this prior but she also has one on her left axilla which is much improved.    Review of systems  Negative unless noted in HPI    Past Medical History  Past Medical History:   Diagnosis Date    Cancer     CML (chronic myelocytic leukemia)     DM2 (diabetes mellitus, type 2)     HTN (hypertension)     NAFLD (nonalcoholic fatty liver disease)     Neuropathy        Past Surgical History  Past Surgical History:   Procedure Laterality Date    ABDOMINAL SURGERY       SECTION      x4    CHOLECYSTECTOMY         Obstetrical History  OB History    Para Term  AB Living   5 4 2 2 1 4   SAB IAB Ectopic Multiple Live  Births                  # Outcome Date GA Lbr Daniel/2nd Weight Sex Delivery Anes PTL Lv   5 AB            4       CS-Unspec      3       CS-Unspec      2 Term      CS-Unspec      1 Term      CS-Unspec          Gynecologic History  No LMP recorded. Patient is postmenopausal.  Menopausal unsure how many years  Denies abnormal paps    Allergies  Review of patient's allergies indicates:  No Known Allergies    Family History  Family History   Problem Relation Age of Onset    Diabetes Mother     Diabetes Father     Cancer Neg Hx        Social History  Social History     Tobacco Use    Smoking status: Never    Smokeless tobacco: Never   Substance Use Topics    Alcohol use: Not Currently    Drug use: Not Currently       Overview of active problems  Active Problem List with Overview Notes    Diagnosis Date Noted    Severe sepsis 2023    Labial cyst 2023    Thrombocytopenia 2023    Anemia 2023    Neutropenic fever 2022    Influenza A 2022    Cough 10/29/2020    Fever 10/29/2020    Hyperleukocytosis 10/29/2020    Other headache syndrome 10/27/2020    Nausea & vomiting 10/27/2020    CML (chronic myelocytic leukemia) 10/26/2020    Diabetes mellitus 10/26/2020    Severe obesity (BMI >= 40) 10/26/2020    NAFLD (nonalcoholic fatty liver disease) 10/26/2020    Hypertension 10/26/2020    Tobacco user 10/26/2020    Hypercholesterolemia 10/26/2020    Hyperuricemia 10/26/2020    Hypokalemia 10/26/2020    Hepatosplenomegaly 10/26/2020       Medications  Home medications  (Not in a hospital admission)      Current Inpatient medications    Current Facility-Administered Medications:     acetaminophen tablet 650 mg, 650 mg, Oral, Q8H PRN, Albert Kessler DO    acyclovir tablet 400 mg, 400 mg, Oral, BID, Albert Kessler DO    [START ON 2023] amLODIPine tablet 10 mg, 10 mg, Oral, Daily, Albert Kessler DO    [START ON 2023] atorvastatin tablet 40 mg, 40 mg, Oral, Daily,  Albert Kessler, DO    dextrose 10% bolus 125 mL 125 mL, 12.5 g, Intravenous, PRN, Albert Kessler, DO    dextrose 10% bolus 250 mL 250 mL, 25 g, Intravenous, PRN, Albert Kessler, DO    [START ON 7/20/2023] EScitalopram oxalate tablet 10 mg, 10 mg, Oral, Daily, Albert Kessler, DO    [START ON 7/20/2023] fluconazole tablet 200 mg, 200 mg, Oral, Daily, Albert Kessler, DO    [START ON 7/20/2023] furosemide tablet 20 mg, 20 mg, Oral, Daily, Albert Kessler, DO    gabapentin capsule 300 mg, 300 mg, Oral, TID, Albert Kessler, DO    glucagon (human recombinant) injection 1 mg, 1 mg, Intramuscular, PRN, Albert Kessler,     glucose chewable tablet 16 g, 16 g, Oral, PRN, Albert Kessler,     glucose chewable tablet 24 g, 24 g, Oral, PRN, Albert Kessler, DO    heparin (porcine) injection 7,500 Units, 7,500 Units, Subcutaneous, Q12H, Albert Kessler, DO    HYDROcodone-acetaminophen 5-325 mg per tablet 1 tablet, 1 tablet, Oral, Q6H PRN, Albert Kessler, DO    insulin aspart U-100 injection 0-21 Units, 0-21 Units, Subcutaneous, QID (AC + HS) PRN, Albert Kessler, DO    insulin detemir U-100 injection 25 Units, 25 Units, Subcutaneous, BID, Albert Kessler,     lactated ringers infusion, 1,000 mL, Intravenous, Once, Albert Kessler,     [START ON 7/20/2023] losartan tablet 25 mg, 25 mg, Oral, Daily, Albert Kessler, DO    melatonin tablet 6 mg, 6 mg, Oral, Nightly PRN, Albert Kessler, DO    metoprolol tartrate (LOPRESSOR) tablet 25 mg, 25 mg, Oral, BID, Albert eKssler, DO    ondansetron disintegrating tablet 4 mg, 4 mg, Oral, Q8H PRN, Albert Kessler, DO    piperacillin-tazobactam (ZOSYN) 4.5 g in dextrose 5 % in water (D5W) 5 % 100 mL IVPB (MB+), 4.5 g, Intravenous, Q8H, Albert Kessler, DO    sodium chloride 0.9% flush 10 mL, 10 mL, Intravenous, PRN, Albert Kessler, DO    vancomycin (VANCOCIN) 2,000 mg in dextrose 5 % (D5W) 500 mL  IVPB, 2,000 mg, Intravenous, Once, Karen Ramires MD    Pharmacy to dose Vancomycin consult, , , Once **AND** vancomycin - pharmacy to dose, , Intravenous, pharmacy to manage frequency, Albert Kessler, DO    Current Outpatient Medications:     acyclovir (ZOVIRAX) 400 MG tablet, Take 1 tablet (400 mg total) by mouth 2 (two) times daily., Disp: 60 tablet, Rfl: 11    albuterol (PROVENTIL/VENTOLIN HFA) 90 mcg/actuation inhaler, Inhale 2 puffs into the lungs every 6 (six) hours as needed for Wheezing. Rescue, Disp: , Rfl:     allopurinoL (ZYLOPRIM) 300 MG tablet, Take 300 mg by mouth once daily., Disp: , Rfl:     ALPRAZolam (XANAX) 0.25 MG tablet, Take 0.25 mg by mouth 3 (three) times daily as needed for Anxiety., Disp: , Rfl:     amLODIPine (NORVASC) 10 MG tablet, Take 10 mg by mouth once daily., Disp: , Rfl:     ammonium lactate 12 % Crea, Apply topically daily as needed., Disp: , Rfl:     atorvastatin (LIPITOR) 40 MG tablet, Take 40 mg by mouth once daily., Disp: , Rfl:     busPIRone (BUSPAR) 5 MG Tab, Take 1 tablet (5 mg total) by mouth 2 (two) times daily., Disp: 60 tablet, Rfl: 11    clindamycin (CLEOCIN) 300 MG capsule, Take 1 capsule (300 mg total) by mouth every 6 (six) hours. for 10 days, Disp: 40 capsule, Rfl: 0    EScitalopram oxalate (LEXAPRO) 10 MG tablet, Take 1 tablet (10 mg total) by mouth once daily., Disp: 30 tablet, Rfl: 11    fluconazole (DIFLUCAN) 200 MG Tab, Take 200 mg by mouth once daily., Disp: , Rfl:     furosemide (LASIX) 20 MG tablet, Take 20 mg by mouth once daily., Disp: , Rfl:     gabapentin (NEURONTIN) 300 MG capsule, Take 1 capsule (300 mg total) by mouth 3 (three) times daily., Disp: 90 capsule, Rfl: 11    HYDROcodone-acetaminophen (NORCO) 5-325 mg per tablet, Take 1 tablet by mouth every 6 (six) hours as needed for Pain., Disp: , Rfl:     hydrocortisone 2.5 % cream, Apply topically 2 (two) times daily., Disp: , Rfl:     hydrOXYzine pamoate (VISTARIL) 25 MG Cap, Take 1 capsule (25  mg total) by mouth every 8 (eight) hours as needed., Disp: 60 capsule, Rfl: 0    ibuprofen (ADVIL,MOTRIN) 600 MG tablet, TAKE 1 TABLET WITH FOOD OR MILK AS NEEDED THREE TIMES A DAY ORALLY 30, Disp: , Rfl:     insulin glargine (LANTUS) 100 unit/mL injection, Inject 60 Units into the skin nightly., Disp: 30 each, Rfl: 6    insulin lispro 100 unit/mL injection, Inject 15 Units into the skin 3 (three) times daily before meals. (Patient taking differently: Inject 22 Units into the skin 3 (three) times daily before meals. Takes 22 units TID AC while on chemo - (Gives between 12 to 15 units TID AC when not on chemo)), Disp: 12 each, Rfl: 6    insulin NPH (NOVOLIN N NPH U-100 INSULIN) 100 unit/mL injection, 50 Units 2 (two) times daily before meals. No longer 20 in AM, Disp: , Rfl:     ketoconazole (NIZORAL) 2 % cream, Apply topically., Disp: , Rfl:     loratadine (CLARITIN) 10 mg tablet, Take 1 tablet (10 mg total) by mouth once daily., Disp: , Rfl: 0    losartan (COZAAR) 25 MG tablet, Take 1 tablet (25 mg total) by mouth once daily., Disp: 90 tablet, Rfl: 3    metFORMIN (GLUCOPHAGE) 1000 MG tablet, Take 1,000 mg by mouth 2 (two) times daily with meals., Disp: , Rfl:     metoprolol tartrate (LOPRESSOR) 25 MG tablet, Take 25 mg by mouth 2 (two) times daily., Disp: , Rfl:     montelukast (SINGULAIR) 10 mg tablet, Take 10 mg by mouth once daily., Disp: , Rfl:     omeprazole (PRILOSEC) 40 MG capsule, Take 1 capsule by mouth once daily., Disp: , Rfl:     ondansetron (ZOFRAN) 8 MG tablet, Take 8 mg by mouth every 8 (eight) hours as needed for Nausea., Disp: , Rfl:     ondansetron (ZOFRAN-ODT) 4 MG TbDL, Take 4 mg by mouth every 8 (eight) hours as needed., Disp: , Rfl:     PONATinib (ICLUSIG) 30 mg Tab, Take 30 mg by mouth Daily., Disp: , Rfl:     silver sulfADIAZINE 1% (SILVADENE) 1 % cream, Apply topically., Disp: , Rfl:     SSD 1 % cream, Apply topically., Disp: , Rfl:     TEXACORT 2.5 % Soln, Apply topically 2 (two) times  daily., Disp: , Rfl:     venetoclax (VENCLEXTA) 100 mg Tab, Take 400 mg by mouth Only takes while taking chemo ., Disp: , Rfl:        Objective:     Vitals:    07/19/23 1003 07/19/23 1340 07/19/23 1641 07/19/23 1654   BP: 121/71   119/70   Pulse: 86   70   Resp: 18 20  20   Temp: 98.4 °F (36.9 °C)      SpO2: 96%   98%   Weight:   108.9 kg (240 lb)      Body mass index is 41.2 kg/m².    No intake/output data recorded.    Physical Exam:  General: alert and oriented, in no acute distress   Lungs: clear to auscultation bilaterally   Heart: regular rate and rhythm   Abdomen: Obese, distended, non-tender, right sided bruising and firmness on hip in location of prior biopsy, additional upper abdominal scab in area of upper abdomen. Additional firmer areas across abdomen in areas from chemo injections (reports stable over past several months)     Bowel Sounds: Active   Extremities: Non-tender, non-edematous in bilateral lower extremities    External genitalia: No inguinal lymphadenopathy, Groin with seval small skin tags. No erythema on labia minor. Left labia majora with 1 cm carea of induration and warmth no extension into vagina, mild tenderness, no crepitus,    Note: Chaperone present for entirety of exam.    Results:     LABS  Trended:  Recent Labs   Lab 07/13/23  0811 07/15/23  1046 07/19/23  1058   WBC 2.18* 2.27* 1.96*   HGB 9.1* 8.8* 8.8*   HCT 29.7* 28.4* 28.5*   PLT 25* 44* 88*   MCV 99.7* 97.6* 99.0*   RDW 19.3* 18.6* 18.8*    139 140   K 4.1 4.1 4.2   CO2 26 22 22   BUN 12.8 10.8 14.0   CREATININE 0.74 0.69 0.75   CALCIUM 9.8 9.2 9.8   ALBUMIN 3.0* 3.0* 3.1*   BILITOT 0.5 0.7 0.6   AST 13 14 18   ALT 25 22 49   ALKPHOS 66 61 171*     Recent Results (from the past 24 hour(s))   Comprehensive Metabolic Panel    Collection Time: 07/19/23 10:58 AM   Result Value Ref Range    Sodium Level 140 136 - 145 mmol/L    Potassium Level 4.2 3.5 - 5.1 mmol/L    Chloride 105 98 - 107 mmol/L    Carbon Dioxide 22 22 - 29  mmol/L    Glucose Level 336 (H) 74 - 100 mg/dL    Blood Urea Nitrogen 14.0 9.8 - 20.1 mg/dL    Creatinine 0.75 0.55 - 1.02 mg/dL    Calcium Level Total 9.8 8.4 - 10.2 mg/dL    Protein Total 6.1 (L) 6.4 - 8.3 gm/dL    Albumin Level 3.1 (L) 3.5 - 5.0 g/dL    Globulin 3.0 2.4 - 3.5 gm/dL    Albumin/Globulin Ratio 1.0 (L) 1.1 - 2.0 ratio    Bilirubin Total 0.6 <=1.5 mg/dL    Alkaline Phosphatase 171 (H) 40 - 150 unit/L    Alanine Aminotransferase 49 0 - 55 unit/L    Aspartate Aminotransferase 18 5 - 34 unit/L    eGFR >60 mls/min/1.73/m2   C-Reactive Protein    Collection Time: 07/19/23 10:58 AM   Result Value Ref Range    C-Reactive Protein 26.00 (H) <5.00 mg/L   CBC with Differential    Collection Time: 07/19/23 10:58 AM   Result Value Ref Range    WBC 1.96 (LL) 4.50 - 11.50 x10(3)/mcL    RBC 2.88 (L) 4.20 - 5.40 x10(6)/mcL    Hgb 8.8 (L) 12.0 - 16.0 g/dL    Hct 28.5 (L) 37.0 - 47.0 %    MCV 99.0 (H) 80.0 - 94.0 fL    MCH 30.6 27.0 - 31.0 pg    MCHC 30.9 (L) 33.0 - 36.0 g/dL    RDW 18.8 (H) 11.5 - 17.0 %    Platelet 88 (L) 130 - 400 x10(3)/mcL    MPV 0.0 (L) 7.4 - 10.4 fL    NRBC% 3.6 %   Manual Differential    Collection Time: 07/19/23 10:58 AM   Result Value Ref Range    Neutrophils % 41 (L) 47 - 80 %    Lymphs % 50 (H) 13 - 40 %    Monocytes % 8 2 - 11 %    Metamyelocytes % 1 %    nRBC % 5 %    Neutrophils Abs Calc 0.8036 (L) 2.1 - 9.2 x10(3)/mcL    Lymphs Abs 0.98 0.6 - 4.6 x10(3)/mcL    Monocytes Abs 0.1568 0.1 - 1.3 x10(3)/mcL    Platelets Decreased (A) Normal, Adequate    RBC Morph Normal Normal   Lactic Acid, Plasma    Collection Time: 07/19/23  1:10 PM   Result Value Ref Range    Lactic Acid Level 3.1 (H) 0.5 - 2.2 mmol/L   POCT glucose    Collection Time: 07/19/23  1:29 PM   Result Value Ref Range    POCT Glucose 248 (H) 70 - 110 mg/dL   Lactic Acid, Plasma    Collection Time: 07/19/23  3:02 PM   Result Value Ref Range    Lactic Acid Level 3.3 (H) 0.5 - 2.2 mmol/L   POCT glucose    Collection Time: 07/19/23   4:33 PM   Result Value Ref Range    POCT Glucose 152 (H) 70 - 110 mg/dL        MICRO:  No wound cultures collected    IMAGING  N/A     Assessment:      has a past medical history of Cancer, CML (chronic myelocytic leukemia), DM2 (diabetes mellitus, type 2), HTN (hypertension), NAFLD (nonalcoholic fatty liver disease), and Neuropathy     [facility] consulted for assistance with left labial abscess.      Recommendations:     - Patient with left labial abscess, increased risk in setting of diabetes and requiring steroids for cancer treatment and pancytopenia  - In regards to her left labia, low index of suspicion that this is necrotizing fasciitis given no hyperalgesia or lack of sensation, also without crepitus  - Recommend warm compresses to her labia PRN and to keep area as clean as possible  - Antibiotics and remainder of chronic medical condition management per internal medicine    Discussed with staff, Dr. Provost Priscilla Brown  [facility], PGY4

## 2023-07-19 NOTE — PROGRESS NOTES
Pharmacokinetic Initial Assessment: IV Vancomycin    Assessment/Plan:    Initiate intravenous vancomycin with loading dose of 2000 mg once followed by a maintenance dose of vancomycin 1750 mg IV every 12 hours  Desired empiric serum trough concentration is 15 to 20 mcg/mL  Draw vancomycin trough level 60 min prior to fourth dose on 7/21 at approximately 0500  Pharmacy will continue to follow and monitor vancomycin.      Please contact pharmacy at extension [phone] with any questions regarding this assessment.     Thank you for the consult,   Bernard Chavezwin       Patient brief summary:  Fela Ellison is a 57 y.o. female initiated on antimicrobial therapy with IV Vancomycin for treatment of suspected sepsis    Drug Allergies:   Review of patient's allergies indicates:  No Known Allergies    Actual Body Weight:   108.9 kg    Renal Function:   Estimated Creatinine Clearance: 102.2 mL/min (based on SCr of 0.75 mg/dL).,         CBC (last 72 hours):  Recent Labs   Lab Result Units 07/19/23  1058   WBC x10(3)/mcL 1.96*   Hgb g/dL 8.8*   Hct % 28.5*   Platelet x10(3)/mcL 88*   Monocytes % % 8       Metabolic Panel (last 72 hours):  Recent Labs   Lab Result Units 07/19/23  1058   Sodium Level mmol/L 140   Potassium Level mmol/L 4.2   Chloride mmol/L 105   Carbon Dioxide mmol/L 22   Glucose Level mg/dL 336*   Blood Urea Nitrogen mg/dL 14.0   Creatinine mg/dL 0.75   Albumin Level g/dL 3.1*   Bilirubin Total mg/dL 0.6   Alkaline Phosphatase unit/L 171*   Aspartate Aminotransferase unit/L 18   Alanine Aminotransferase unit/L 49       Drug levels (last 3 results):  No results for input(s): VANCOMYCINRA, VANCORANDOM, VANCOMYCINPE, VANCOPEAK, VANCOMYCINTR, VANCOTROUGH in the last 72 hours.    Microbiologic Results:  Microbiology Results (last 7 days)       Procedure Component Value Units Date/Time    Blood Culture [[id number]] Collected: 07/19/23 1310    Order Status: Sent Specimen: Blood from Ankle, Left Updated: 07/19/23 1400    Blood  Culture [[id number]] Collected: 07/19/23 1310    Order Status: Sent Specimen: Blood from Antecubital, Right Updated: 07/19/23 1400

## 2023-07-19 NOTE — H&P
[facility] Medicine Wards   History & Physical Note     Resident Team: [facility] Medicine List 2  Attending Physician: Karen Ramires MD  Resident: Albert Kessler DO  Date of Admit: 7/19/2023    Chief Complaint:     Wound Care (Patient reports having an abscess drained near vagina on Saturday . Reports increased pain following abscess drainage and packing today. Reports continuous red/clear drainage since. )    Subjective:      History of Present Illness:  Fela Ellison is a 57 y.o. 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 ED on 7/19/2023  after being instructed to follow up post I&D of a labial abcess.  History elicited with aid of patient's  at bedside, patient is Portuguese-speaking only.  Patient initially presented on 7/15 with left axillary abscess and left labial abscess.  Patient had I and D on that ED visit and was discharged post I&D with p.o. clindamycin.  Patient then presented on 07/17 for wound recheck and had improved at that time.  Dressings were changed and patient was instructed to follow up again on 07/19/2023.  On this presentation, patient reports that she developed nausea on 07/18/2023 and was able unable to tolerate her p.o. clindamycin for 2 days.  Patient also reporting poor p.o. intake over this time.  She reports that both her axillary and labial I and D's have improved and are continuing to heal.  She denies any acute changes to her wounds, seeping, drainage, vaginal discharge, fevers, chills, chest pain, shortness O breath, vomiting, diarrhea, constipation.  Reports she was developing nausea while taking the clindamycin.  Also reports a history of night sweats however says that this has a chronic secondary to her CML/AML.  Patient was originally instructed to follow-up with her oncologist on 07/17/2023 however her infusion therapy has been postponed secondary to this abscess.  Upon presentation to the ED, patient found to be tachycardic,  tachypneic, with lactic acid elevation.  OBGYN evaluated patient and believed no surgical intervention at this time.  [facility] IM was consulted for admission.      Past Medical History:   has a past medical history of Cancer, CML (chronic myelocytic leukemia), DM2 (diabetes mellitus, type 2), HTN (hypertension), NAFLD (nonalcoholic fatty liver disease), and Neuropathy.     Past Surgical History:   has a past surgical history that includes Abdominal surgery;  section; Cholecystectomy; and  section.     Family History:  family history includes Diabetes in her father and mother.     Social History:   reports that she has never smoked. She has never used smokeless tobacco. She reports that she does not currently use alcohol. She reports that she does not currently use drugs.     Allergies:  has No Known Allergies.     Home Medications:  Current Outpatient Medications   Medication Instructions    acyclovir (ZOVIRAX) 400 mg, Oral, 2 times daily    albuterol (PROVENTIL/VENTOLIN HFA) 90 mcg/actuation inhaler 2 puffs, Inhalation, Every 6 hours PRN, Rescue     allopurinoL (ZYLOPRIM) 300 mg, Oral, Daily    ALPRAZolam (XANAX) 0.25 mg, Oral, 3 times daily PRN    amLODIPine (NORVASC) 10 mg, Oral, Daily    ammonium lactate 12 % Crea Topical, Daily PRN    atorvastatin (LIPITOR) 40 mg, Oral, Daily    busPIRone (BUSPAR) 5 mg, Oral, 2 times daily    clindamycin (CLEOCIN) 300 mg, Oral, Every 6 hours    EScitalopram oxalate (LEXAPRO) 10 mg, Oral, Daily    fluconazole (DIFLUCAN) 200 mg, Oral, Daily    furosemide (LASIX) 20 mg, Oral, Daily    gabapentin (NEURONTIN) 300 mg, Oral, 3 times daily    HYDROcodone-acetaminophen (NORCO) 5-325 mg per tablet 1 tablet, Oral, Every 6 hours PRN    hydrocortisone 2.5 % cream Topical, 2 times daily    hydrOXYzine pamoate (VISTARIL) 25 mg, Oral, Every 8 hours PRN    ibuprofen (ADVIL,MOTRIN) 600 MG tablet TAKE 1 TABLET WITH FOOD OR MILK AS NEEDED THREE TIMES A DAY ORALLY 30     ICLUSIG 30 mg, Oral, Daily    insulin glargine (LANTUS) 60 Units, Subcutaneous, Nightly    insulin lispro 15 Units, Subcutaneous, 3 times daily before meals    ketoconazole (NIZORAL) 2 % cream Topical    loratadine (CLARITIN) 10 mg, Oral, Daily    losartan (COZAAR) 25 mg, Oral, Daily    metFORMIN (GLUCOPHAGE) 1,000 mg, Oral, 2 times daily with meals    metoprolol tartrate (LOPRESSOR) 25 mg, Oral, 2 times daily    montelukast (SINGULAIR) 10 mg, Oral, Daily    NovoLIN N NPH U-100 Insulin 50 Units, 2 times daily before meals, No longer 20 in AM    omeprazole (PRILOSEC) 40 MG capsule 1 capsule, Oral, Daily    ondansetron (ZOFRAN) 8 mg, Oral, Every 8 hours PRN    ondansetron (ZOFRAN-ODT) 4 mg, Oral, Every 8 hours PRN    silver sulfADIAZINE 1% (SILVADENE) 1 % cream Topical    SSD 1 % cream Topical (Top)    TEXACORT 2.5 % Soln Topical (Top), 2 times daily    venetoclax (VENCLEXTA) 400 mg, Oral, Only takes while taking chemo           Review of Systems:  Constitutional: no fever, reports fatigue, reports night sweats, weakness  Eye: no vision loss, eye redness, drainage, or pain  ENMT: no sore throat, ear pain, sinus pain/congestion, nasal congestion/drainage  Respiratory: no cough, no wheezing, no shortness of breath  Cardiovascular: no chest pain, no palpitations, no edema  Gastrointestinal:  Reports nausea, vomiting, or diarrhea. No abdominal pain  Genitourinary: no dysuria, no urinary frequency or urgency, no hematuria  Hema/Lymph: no abnormal bruising or bleeding  Endocrine: no heat or cold intolerance, no excessive thirst or excessive urination  Musculoskeletal: no muscle or joint pain, no joint swelling  Integumentary: no skin rash or abnormal lesion  Neurologic: no headache, no dizziness, no weakness or numbness         Objective:     Vital Signs (Most Recent):  Temp: 98.4 °F (36.9 °C) (07/19/23 1003)  Pulse: 86 (07/19/23 1003)  Resp: 20 (07/19/23 1340)  BP: 121/71 (07/19/23 1003)  SpO2: 96 %  (07/19/23 1003) Vital Signs (24h Range):  Temp:  [98.4 °F (36.9 °C)] 98.4 °F (36.9 °C)  Pulse:  [86] 86  Resp:  [18-20] 20  SpO2:  [96 %] 96 %  BP: (121)/(71) 121/71       Physical Examination:  General: Patient resting comfortably in bed, in no acute distress   Eye: EOMI, clear conjunctiva, eyelids normal  HENT: Head-normocephalic and atraumatic  Neck: full range of motion, no thyromegaly or lymphadenopathy, trachea midline, supple, no palpable thyroid nodules  Respiratory: clear to auscultation bilaterally without wheezes, rales, rhonchi  Cardiovascular: tachycardic rate and rhythm without murmurs.  No gallops or rubs no JVD.  Capillary refill within normal limits.  Gastrointestinal:  Obese abdomen, soft, nontender to palpation, small nodules noted to palpation of left lower quadrant  Musculoskeletal: full range of motion of all extremities/spine without limitation or discomfort  Integumentary:  Lesion noted to left axillary region, healing well, mild serosanguineous drainage, bandage over top, lesion noted to left labial region, mild drainage of serosanguineous fluid, healing well, bandage over top  Neurologic: no signs of peripheral neurological deficit, motor/sensory function intact  Psychiatric:  alert and oriented, cognitive function intact, cooperative with exam, good eye contact, judgement and insight intact, mood and affect full range.      Laboratory:  Most Recent Data:  CBC:  Recent Labs   Lab 07/13/23  0811 07/15/23  1046 07/19/23  1058   WBC 2.18* 2.27* 1.96*   HGB 9.1* 8.8* 8.8*   HCT 29.7* 28.4* 28.5*   PLT 25* 44* 88*   MCV 99.7* 97.6* 99.0*   RDW 19.3* 18.6* 18.8*     CMP:  Recent Labs   Lab 07/13/23  0811 07/15/23  1046 07/19/23  1058   K 4.1 4.1 4.2   CO2 26 22 22   BUN 12.8 10.8 14.0   CREATININE 0.74 0.69 0.75   ALBUMIN 3.0* 3.0* 3.1*   BILITOT 0.5 0.7 0.6   AST 13 14 18   ALKPHOS 66 61 171*   ALT 25 22 49     Coags:   Recent Labs   Lab 07/13/23  0811 07/15/23  1046 07/19/23  1058   LABPROT  6.3* 6.3* 6.1*     DM:   Recent Labs   Lab 07/13/23  0811 07/15/23  1046 07/19/23  1058   CREATININE 0.74 0.69 0.75     Urinalysis:   Lab Results   Component Value Date    COLORU Colorless 02/04/2022    PHUR 5.5 02/04/2022    APPEARANCEUA Clear 06/02/2023    SPECGRAV 1.015 10/28/2020    NITRITE Negative 02/04/2022    PROTEINUA 1+ (A) 06/02/2023    GLUCUA Normal 02/04/2022    KETONESU Negative 02/04/2022    UROBILINOGEN Normal 06/02/2023    BILIRUBINUA Negative 06/02/2023    OCCULTUA Negative 02/04/2022    RBCUA 0-5 06/02/2023    WBCUA 0-5 06/02/2023         Microbiology Data:  Completed 4 days of clindamycin  Vanco/Zosyn (Day 1)    Other Results:  EKG (my interpretation): normal EKG, normal sinus rhythm, unchanged from previous tracings    Radiology:  Imaging Results    None            Lines/Drains/Airways       None                    Assessment & Plan:     Sepsis   Left labial abscess  Left axillary abscess  - patient intolerant of PO clindamycin 2/2 to nausea, previously completed 4 days of therapy  - s/p I&D on 7/15/23 of both abscesses  - Gynecology consulted, appreciate their assistance  - recommend warm compresses, no indication for surgery at this time  - will begin Vanco/Zosyn to continue as IV abx, can likely retransition to PO once patient can tolerate again  - Zofran ODT for nausea  - continue with IV hydration with LR @125 cc/hr    AML  Pancytopenia  - patient follows with oncology at [facility]  - ANC with no indication of blast crisis   - discussed with oncology, will monitor patient for now  - will follow with her personal oncologist as OP for continuation of care  -will continue patient's outpatient acyclovir and fluconazole  -monitor patient's platelets with daily CBC with differential    DM2  -hold home antihyperglycemics   -home regimen of long-acting insulin 50 units b.i.d., short-acting insulin 12-22 units t.i.d.  -begin long-acting insulin 25 units b.i.d., moderate SSI, Accu Cheks a.c. and  HS    HTN  - continue home amlodipine 10 mg daily, furosemide 20 mg daily, Lopressor 25 mg b.i.d.,  -monitor hemodynamics on these medications      CODE STATUS: Full Code   Access: Peripheral  Antibiotics: Vanco/Zosyn (Day 1)   Analgesia: acetaminophen  Diet: Diet diabetic  DVT Prophylaxis: Heparin  GI Prophylaxis: proton pump inhibitor per orders  O2:  room air  Fluids: lactated Ringer's 125 ml/hr.    Consults: OB/GYN    Disposition: day 0 of admission for  sepsis secondary to left labial abscess and left axillary abscess, completed 4 days of p.o. antibiotics, intolerance secondary to nausea, receiving IV antibiotics, potential DC in a.m. if tolerating p.o..    Albert Kessler DO  [facility], -3  07/19/2023

## 2023-07-19 NOTE — ED PROVIDER NOTES
Encounter Date: 2023       History     Chief Complaint   Patient presents with    Wound Care     Patient reports having an abscess drained near vagina on Saturday . Reports increased pain following abscess drainage and packing today. Reports continuous red/clear drainage since.      57-year-old Chinese-speaking female, with diabetes, hypertension, NAFLD, presents to the emergency department for wound check.  She reports having an abscess on her left labia drained & packed on 07/15.  She returned on  for packing removal.  Patient states that she is had increased pain following abscess drainage and packing.  She rates her pain 9/10 and states it is had continuous clear/red drainage.  She has been taking clindamycin however it is caused nausea and vomiting.  She denies fever, chills, shortness of breath, chest pain.    The history is provided by the patient. No  was used.   Review of patient's allergies indicates:  No Known Allergies  Past Medical History:   Diagnosis Date    Cancer     CML (chronic myelocytic leukemia)     DM2 (diabetes mellitus, type 2)     HTN (hypertension)     NAFLD (nonalcoholic fatty liver disease)     Neuropathy      Past Surgical History:   Procedure Laterality Date    ABDOMINAL SURGERY       SECTION       SECTION      CHOLECYSTECTOMY       Family History   Problem Relation Age of Onset    Diabetes Mother     Diabetes Father     Cancer Neg Hx      Social History     Tobacco Use    Smoking status: Never    Smokeless tobacco: Never   Substance Use Topics    Alcohol use: Not Currently    Drug use: Not Currently     Review of Systems   Constitutional:  Negative for appetite change, chills and fever.   Respiratory:  Negative for cough and shortness of breath.    Cardiovascular:  Negative for chest pain.   Gastrointestinal:  Negative for abdominal pain and diarrhea.   Genitourinary:  Negative for dysuria, hematuria and vaginal discharge.   Skin:   Negative for color change and rash.        Painful area on left labia   Neurological:  Negative for dizziness, weakness and headaches.     Physical Exam     Initial Vitals [07/19/23 1003]   BP Pulse Resp Temp SpO2   121/71 86 18 98.4 °F (36.9 °C) 96 %      MAP       --         Physical Exam    Nursing note and vitals reviewed.  Constitutional: She appears well-developed and well-nourished.   HENT:   Nose: Nose normal.   Mouth/Throat: Oropharynx is clear and moist.   Eyes: Conjunctivae are normal.   Neck: Neck supple.   Normal range of motion.  Cardiovascular:  Normal rate, normal heart sounds and intact distal pulses.           Pulmonary/Chest: Breath sounds normal.   Abdominal: Abdomen is soft. Bowel sounds are normal. There is no abdominal tenderness.   Genitourinary: There is tenderness (2 cm x 2 cm indurated tissue inferior left labia with mild erythema, no active drainage) on the left labia.   Musculoskeletal:         General: Normal range of motion.      Cervical back: Normal range of motion and neck supple.     Neurological: She is alert and oriented to person, place, and time. GCS score is 15. GCS eye subscore is 4. GCS verbal subscore is 5. GCS motor subscore is 6.   Skin: Skin is warm. Capillary refill takes less than 2 seconds.       ED Course   Procedures  Labs Reviewed   COMPREHENSIVE METABOLIC PANEL - Abnormal; Notable for the following components:       Result Value    Glucose Level 336 (*)     Protein Total 6.1 (*)     Albumin Level 3.1 (*)     Albumin/Globulin Ratio 1.0 (*)     Alkaline Phosphatase 171 (*)     All other components within normal limits   C-REACTIVE PROTEIN - Abnormal; Notable for the following components:    C-Reactive Protein 26.00 (*)     All other components within normal limits   CBC WITH DIFFERENTIAL - Abnormal; Notable for the following components:    WBC 1.96 (*)     RBC 2.88 (*)     Hgb 8.8 (*)     Hct 28.5 (*)     MCV 99.0 (*)     MCHC 30.9 (*)     RDW 18.8 (*)     Platelet  88 (*)     MPV 0.0 (*)     All other components within normal limits   MANUAL DIFFERENTIAL - Abnormal; Notable for the following components:    Neutrophils % 41 (*)     Lymphs % 50 (*)     Neutrophils Abs Calc 0.8036 (*)     Platelets Decreased (*)     All other components within normal limits   LACTIC ACID, PLASMA - Abnormal; Notable for the following components:    Lactic Acid Level 3.1 (*)     All other components within normal limits   LACTIC ACID, PLASMA - Abnormal; Notable for the following components:    Lactic Acid Level 3.3 (*)     All other components within normal limits   POCT GLUCOSE - Abnormal; Notable for the following components:    POCT Glucose 248 (*)     All other components within normal limits   BLOOD CULTURE OLG   BLOOD CULTURE OLG   CBC W/ AUTO DIFFERENTIAL    Narrative:     The following orders were created for panel order CBC Auto Differential.  Procedure                               Abnormality         Status                     ---------                               -----------         ------                     CBC with Differential[[id number]]        Abnormal            Final result               Manual Differential[[id number]]          Abnormal            Final result                 Please view results for these tests on the individual orders.          Imaging Results    None          Medications   neomycin-bacitracnZn-polymyxnB packet (has no administration in time range)   lactated ringers bolus 1,000 mL (0 mLs Intravenous Stopped 7/19/23 1531)   piperacillin-tazobactam (ZOSYN) 4.5 g in dextrose 5 % in water (D5W) 5 % 100 mL IVPB (MB+) (0 g Intravenous Stopped 7/19/23 1400)   insulin aspart U-100 injection 6 Units (6 Units Subcutaneous Given 7/19/23 1331)   HYDROmorphone injection 0.5 mg (0.5 mg Intravenous Given 7/19/23 1340)   ondansetron injection 4 mg (4 mg Intravenous Given 7/19/23 1340)     Medical Decision Making:   Initial Assessment:   57-year-old Hungarian-speaking female,  with diabetes, hypertension, NAFLD, presents to the emergency department for wound check.  She reports having an abscess on her left labia drained & packed on 07/15.    Differential Diagnosis:   Cellulitis  Skin abscess    Clinical Tests:   Lab Tests: Reviewed and Ordered       <> Summary of Lab: CRP: 26  Glucose 336  Lactate: 3.3  ANC: 803.6  Radiological Study: Ordered and Reviewed  ED Management:  Patient has pancytopenia with healing left labial infection.  With her CML and difficulty taking oral antibiotics, medicine team was consulted for admission for observation    Gyn also consulted for evaluation  Other:   I have discussed this case with another health care provider.       <> Summary of the Discussion: I consulted Dr. Moses for face-to-face evaluation with the patient prior to admission.           ED Course as of 07/19/23 1603   Wed Jul 19, 2023   1234 CRP(!): 26.00 [ER]   1234 Glucose(!): 336 [ER]   1345 POCT Glucose(!): 248 [ER]   1418 ANC: 803.6 [ER]   1444 Consulted Internal medicine for admission for pancytopenia with left labial infection. [ER]   1539 Lactate, Gregory(!): 3.3 [ER]   1545 Gyn at bedside evaluating patient [ER]      ED Course User Index  [ER] SATISH Novoa                 Clinical Impression:   Final diagnoses:  [D61.818] Pancytopenia (Primary)  [N76.0] Labial infection        ED Disposition Condition    Admit Stable                SATISH Novoa  07/19/23 1603

## 2023-07-20 VITALS
RESPIRATION RATE: 20 BRPM | HEIGHT: 64 IN | WEIGHT: 238.13 LBS | BODY MASS INDEX: 40.65 KG/M2 | DIASTOLIC BLOOD PRESSURE: 77 MMHG | TEMPERATURE: 99 F | HEART RATE: 72 BPM | OXYGEN SATURATION: 94 % | SYSTOLIC BLOOD PRESSURE: 123 MMHG

## 2023-07-20 PROBLEM — N76.0 LABIAL INFECTION: Status: ACTIVE | Noted: 2023-07-20

## 2023-07-20 PROBLEM — D61.818 PANCYTOPENIA: Status: ACTIVE | Noted: 2023-07-20

## 2023-07-20 LAB
ABS NEUT CALC (OHS): 0.62 X10(3)/MCL (ref 2.1–9.2)
ANION GAP SERPL CALC-SCNC: 9 MEQ/L
BACTERIA BLD CULT: NORMAL
BACTERIA BLD CULT: NORMAL
BUN SERPL-MCNC: 12.7 MG/DL (ref 9.8–20.1)
CALCIUM SERPL-MCNC: 9.2 MG/DL (ref 8.4–10.2)
CHLORIDE SERPL-SCNC: 105 MMOL/L (ref 98–107)
CO2 SERPL-SCNC: 26 MMOL/L (ref 22–29)
CREAT SERPL-MCNC: 0.8 MG/DL (ref 0.55–1.02)
CREAT/UREA NIT SERPL: 16
ERYTHROCYTE [DISTWIDTH] IN BLOOD BY AUTOMATED COUNT: 19 % (ref 11.5–17)
EST. AVERAGE GLUCOSE BLD GHB EST-MCNC: 171.4 MG/DL
GFR SERPLBLD CREATININE-BSD FMLA CKD-EPI: >60 MLS/MIN/1.73/M2
GLUCOSE SERPL-MCNC: 246 MG/DL (ref 74–100)
HBA1C MFR BLD: 7.6 %
HCT VFR BLD AUTO: 26.7 % (ref 37–47)
HGB BLD-MCNC: 8.1 G/DL (ref 12–16)
LACTATE SERPL-SCNC: 1.8 MMOL/L (ref 0.5–2.2)
LYMPHOCYTES NFR BLD MANUAL: 1.16 X10(3)/MCL
LYMPHOCYTES NFR BLD MANUAL: 60 % (ref 13–40)
MCH RBC QN AUTO: 30 PG (ref 27–31)
MCHC RBC AUTO-ENTMCNC: 30.3 G/DL (ref 33–36)
MCV RBC AUTO: 98.9 FL (ref 80–94)
MONOCYTES NFR BLD MANUAL: 0.16 X10(3)/MCL (ref 0.1–1.3)
MONOCYTES NFR BLD MANUAL: 8 % (ref 2–11)
NEUTROPHILS NFR BLD MANUAL: 32 % (ref 47–80)
NRBC BLD AUTO-RTO: 3.1 %
PLATELET # BLD AUTO: 93 X10(3)/MCL (ref 130–400)
PLATELET # BLD EST: ABNORMAL 10*3/UL
PMV BLD AUTO: 13.6 FL (ref 7.4–10.4)
POCT GLUCOSE: 276 MG/DL (ref 70–110)
POTASSIUM SERPL-SCNC: 3.8 MMOL/L (ref 3.5–5.1)
RBC # BLD AUTO: 2.7 X10(6)/MCL (ref 4.2–5.4)
RBC MORPH BLD: NORMAL
SODIUM SERPL-SCNC: 140 MMOL/L (ref 136–145)
WBC # SPEC AUTO: 1.94 X10(3)/MCL (ref 4.5–11.5)

## 2023-07-20 PROCEDURE — 83605 ASSAY OF LACTIC ACID: CPT | Performed by: STUDENT IN AN ORGANIZED HEALTH CARE EDUCATION/TRAINING PROGRAM

## 2023-07-20 PROCEDURE — 63600175 PHARM REV CODE 636 W HCPCS: Performed by: STUDENT IN AN ORGANIZED HEALTH CARE EDUCATION/TRAINING PROGRAM

## 2023-07-20 PROCEDURE — 25000003 PHARM REV CODE 250: Performed by: STUDENT IN AN ORGANIZED HEALTH CARE EDUCATION/TRAINING PROGRAM

## 2023-07-20 PROCEDURE — 63700000 PHARM REV CODE 250 ALT 637 W/O HCPCS: Performed by: STUDENT IN AN ORGANIZED HEALTH CARE EDUCATION/TRAINING PROGRAM

## 2023-07-20 PROCEDURE — 83036 HEMOGLOBIN GLYCOSYLATED A1C: CPT | Performed by: STUDENT IN AN ORGANIZED HEALTH CARE EDUCATION/TRAINING PROGRAM

## 2023-07-20 PROCEDURE — 25000003 PHARM REV CODE 250: Performed by: INTERNAL MEDICINE

## 2023-07-20 PROCEDURE — 80048 BASIC METABOLIC PNL TOTAL CA: CPT | Performed by: STUDENT IN AN ORGANIZED HEALTH CARE EDUCATION/TRAINING PROGRAM

## 2023-07-20 PROCEDURE — 63600175 PHARM REV CODE 636 W HCPCS: Performed by: INTERNAL MEDICINE

## 2023-07-20 PROCEDURE — 94761 N-INVAS EAR/PLS OXIMETRY MLT: CPT

## 2023-07-20 PROCEDURE — 85027 COMPLETE CBC AUTOMATED: CPT | Performed by: STUDENT IN AN ORGANIZED HEALTH CARE EDUCATION/TRAINING PROGRAM

## 2023-07-20 RX ORDER — DOXYCYCLINE HYCLATE 100 MG
100 TABLET ORAL EVERY 12 HOURS
Qty: 20 TABLET | Refills: 0 | Status: SHIPPED | OUTPATIENT
Start: 2023-07-20 | End: 2023-07-30

## 2023-07-20 RX ORDER — MUPIROCIN 20 MG/G
OINTMENT TOPICAL DAILY
Status: DISCONTINUED | OUTPATIENT
Start: 2023-07-21 | End: 2023-07-20 | Stop reason: HOSPADM

## 2023-07-20 RX ADMIN — METOPROLOL TARTRATE 25 MG: 25 TABLET, FILM COATED ORAL at 09:07

## 2023-07-20 RX ADMIN — FLUCONAZOLE 200 MG: 100 TABLET ORAL at 09:07

## 2023-07-20 RX ADMIN — INSULIN ASPART 9 UNITS: 100 INJECTION, SOLUTION INTRAVENOUS; SUBCUTANEOUS at 05:07

## 2023-07-20 RX ADMIN — INSULIN DETEMIR 50 UNITS: 100 INJECTION, SOLUTION SUBCUTANEOUS at 09:07

## 2023-07-20 RX ADMIN — ESCITALOPRAM OXALATE 10 MG: 10 TABLET ORAL at 09:07

## 2023-07-20 RX ADMIN — LOSARTAN POTASSIUM 25 MG: 25 TABLET, FILM COATED ORAL at 09:07

## 2023-07-20 RX ADMIN — PIPERACILLIN AND TAZOBACTAM 4.5 G: 4; .5 INJECTION, POWDER, LYOPHILIZED, FOR SOLUTION INTRAVENOUS; PARENTERAL at 03:07

## 2023-07-20 RX ADMIN — ACYCLOVIR 400 MG: 400 TABLET ORAL at 09:07

## 2023-07-20 RX ADMIN — ATORVASTATIN CALCIUM 40 MG: 40 TABLET, FILM COATED ORAL at 09:07

## 2023-07-20 RX ADMIN — HYDROCODONE BITARTRATE AND ACETAMINOPHEN 1 TABLET: 5; 325 TABLET ORAL at 10:07

## 2023-07-20 RX ADMIN — VANCOMYCIN HYDROCHLORIDE 1750 MG: 1 INJECTION, POWDER, LYOPHILIZED, FOR SOLUTION INTRAVENOUS at 05:07

## 2023-07-20 RX ADMIN — HYDROCODONE BITARTRATE AND ACETAMINOPHEN 1 TABLET: 5; 325 TABLET ORAL at 05:07

## 2023-07-20 RX ADMIN — AMLODIPINE BESYLATE 10 MG: 10 TABLET ORAL at 09:07

## 2023-07-20 RX ADMIN — HEPARIN SODIUM 7500 UNITS: 5000 INJECTION, SOLUTION INTRAVENOUS; SUBCUTANEOUS at 09:07

## 2023-07-20 RX ADMIN — INSULIN ASPART 12 UNITS: 100 INJECTION, SOLUTION INTRAVENOUS; SUBCUTANEOUS at 09:07

## 2023-07-20 RX ADMIN — GABAPENTIN 300 MG: 300 CAPSULE ORAL at 09:07

## 2023-07-20 RX ADMIN — FUROSEMIDE 20 MG: 20 TABLET ORAL at 09:07

## 2023-07-20 NOTE — PROGRESS NOTES
"Inpatient Nutrition Evaluation    Admit Date: 2023   Total duration of encounter: 1 day    Nutrition Recommendation/Prescription     Continue diabetic diet  Pt education on diet/complete  MVI/fe  Biweekly wt  Will monitor nutrition status     Nutrition Assessment     Chart Review    Reason Seen: continuous nutrition monitoring    Malnutrition Screening Tool Results   Have you recently lost weight without trying?: No  Have you been eating poorly because of a decreased appetite?: No   MST Score: 0     Diagnosis:  Sepsis, L Labial abscess/L axillary abscess, AML, pancytopenia, DM, HTN     Relevant Medical History: CML, AML, IDDM, steatohepatitis, obesity, HTN, labial abscess     Nutrition-Related Medications: acyclovir, atorvastatin, furosemide, heparin, losartan, zosyn, vancomycin     Nutrition-Related Labs:  () H/H 8.1/26.7(L) Gluc 246(H) Bun 12.7 Cr 0.8 K 3.8     Diet Order: Diet diabetic  Oral Supplement Order: none  Appetite/Oral Intake: good/% of meals  Factors Affecting Nutritional Intake: nausea  Food/Presybeterian/Cultural Preferences: none reported  Food Allergies: none reported    Skin Integrity: wound (wounds under left arm and labia)  Wound(s):   as above     Comments    () Pt in bed; family at bedside; reported pt did have nausea /decreased appetite 1-2 days PTA while taking antibiotic; meds now changed; eating better; no wt loss; pt is morbidly obese--BMI 40. Reviewed diabetic diet with pt/family; Ukrainian handout provided for reference. Labs acknowledged--Gluc (H)--hx DM. Possible discharge home today.     Anthropometrics    Height: 5' 4" (162.6 cm)    Last Weight: 108 kg (238 lb 1.6 oz) (23 0549) Weight Method: Bed Scale  BMI (Calculated): 40.8  BMI Classification: obese grade III (BMI >/=40)     Ideal Body Weight (IBW), Female: 120 lb     % Ideal Body Weight, Female (lb): 200.07 %                    Usual Body Weight (UBW), k.7 kg  % Usual Body Weight: 100.49     Usual Weight " Provided By: patient, family/caregiver, and EMR weight history    Wt Readings from Last 5 Encounters:   07/20/23 108 kg (238 lb 1.6 oz)   07/17/23 107.5 kg (237 lb)   07/15/23 122.3 kg (269 lb 10 oz)   07/13/23 108 kg (238 lb)   07/02/23 108 kg (238 lb)     Weight Change(s) Since Admission:  Admit Weight: 108.9 kg (240 lb) (07/19/23 1641)  No wt loss     Patient Education    Education Provided: diabetic diet  Teaching Method: explanation and printed materials  Comprehension: verbalizes understanding  Barriers to Learning: none evident  Expected Compliance: fair  Comments: All questions were answered and dietitian's contact information was provided.     Monitoring & Evaluation     Dietitian will monitor food and beverage intake, parenteral nutrition intake, and glucose/endocrine profile.  Nutrition Risk/Follow-Up: low (follow-up in 5-7 days)  Patients assigned 'low nutrition risk' status do not qualify for a full nutritional assessment but will be monitored and re-evaluated in a 5-7 day time period. Please consult if re-evaluation needed sooner.

## 2023-07-20 NOTE — PLAN OF CARE
Problem: Adult Inpatient Plan of Care  Goal: Plan of Care Review  Outcome: Ongoing, Progressing  Goal: Patient-Specific Goal (Individualized)  Outcome: Ongoing, Progressing  Goal: Absence of Hospital-Acquired Illness or Injury  Outcome: Ongoing, Progressing  Goal: Optimal Comfort and Wellbeing  Outcome: Ongoing, Progressing  Goal: Readiness for Transition of Care  Outcome: Ongoing, Progressing     Problem: Bariatric Environmental Safety  Goal: Safety Maintained with Care  Outcome: Ongoing, Progressing     Problem: Diabetes Comorbidity  Goal: Blood Glucose Level Within Targeted Range  Outcome: Ongoing, Progressing     Problem: Adjustment to Illness (Sepsis/Septic Shock)  Goal: Optimal Coping  Outcome: Ongoing, Progressing     Problem: Bleeding (Sepsis/Septic Shock)  Goal: Absence of Bleeding  Outcome: Ongoing, Progressing     Problem: Glycemic Control Impaired (Sepsis/Septic Shock)  Goal: Blood Glucose Level Within Desired Range  Outcome: Ongoing, Progressing     Problem: Infection Progression (Sepsis/Septic Shock)  Goal: Absence of Infection Signs and Symptoms  Outcome: Ongoing, Progressing     Problem: Nutrition Impaired (Sepsis/Septic Shock)  Goal: Optimal Nutrition Intake  Outcome: Ongoing, Progressing

## 2023-07-20 NOTE — PLAN OF CARE
Problem: Adult Inpatient Plan of Care  Goal: Plan of Care Review  Outcome: Ongoing, Progressing  Goal: Patient-Specific Goal (Individualized)  Outcome: Ongoing, Progressing  Goal: Absence of Hospital-Acquired Illness or Injury  Outcome: Ongoing, Progressing  Goal: Optimal Comfort and Wellbeing  Outcome: Ongoing, Progressing  Goal: Readiness for Transition of Care  Outcome: Ongoing, Progressing     Problem: Bariatric Environmental Safety  Goal: Safety Maintained with Care  Outcome: Ongoing, Progressing     Problem: Diabetes Comorbidity  Goal: Blood Glucose Level Within Targeted Range  Outcome: Ongoing, Progressing     Problem: Adjustment to Illness (Sepsis/Septic Shock)  Goal: Optimal Coping  Outcome: Ongoing, Progressing     Problem: Bleeding (Sepsis/Septic Shock)  Goal: Absence of Bleeding  Outcome: Ongoing, Progressing     Problem: Glycemic Control Impaired (Sepsis/Septic Shock)  Goal: Blood Glucose Level Within Desired Range  Outcome: Ongoing, Progressing     Problem: Infection Progression (Sepsis/Septic Shock)  Goal: Absence of Infection Signs and Symptoms  Outcome: Ongoing, Progressing     Problem: Nutrition Impaired (Sepsis/Septic Shock)  Goal: Optimal Nutrition Intake  Outcome: Ongoing, Progressing     Problem: Impaired Wound Healing  Goal: Optimal Wound Healing  Outcome: Ongoing, Progressing

## 2023-07-20 NOTE — DISCHARGE SUMMARY
[facility] Discharge Summary    Admitting Physician: Kim Snowden MD  Attending Physician: Yahaira att. providers found  Date of Admit: 7/19/2023  Date of Discharge: 7/20/2023    Discharge to: Home or Self Care   Condition: Stable    Discharge Diagnoses     Patient Active Problem List   Diagnosis    CML (chronic myelocytic leukemia)    Diabetes mellitus    Severe obesity (BMI >= 40)    NAFLD (nonalcoholic fatty liver disease)    Hypertension    Tobacco user    Hypercholesterolemia    Hyperuricemia    Hypokalemia    Hepatosplenomegaly    Other headache syndrome    Nausea & vomiting    Cough    Fever    Hyperleukocytosis    Neutropenic fever    Influenza A    Anemia    Thrombocytopenia    Severe sepsis    Labial cyst       Consultants and Procedures     Consultants:         Procedures:   N/A    Brief History of Present Illness      Fela Ellison is a 57 y.o. 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 ED on 7/19/2023  after being instructed to follow up post I&D of a labial abcess.  History elicited with aid of patient's  at bedside, patient is Ukrainian-speaking only.  Patient initially presented on 7/15 with left axillary abscess and left labial abscess.  Patient had I and D on that ED visit and was discharged post I&D with p.o. clindamycin.  Patient then presented on 07/17 for wound recheck and had improved at that time.  Dressings were changed and patient was instructed to follow up again on 07/19/2023.  On this presentation, patient reports that she developed nausea on 07/18/2023 and was able unable to tolerate her p.o. clindamycin for 2 days.  Patient also reporting poor p.o. intake over this time.  She reports that both her axillary and labial I and D's have improved and are continuing to heal.  She denies any acute changes to her wounds, seeping, drainage, vaginal discharge, fevers, chills, chest pain, shortness O breath, vomiting, diarrhea,  constipation.  Reports she was developing nausea while taking the clindamycin.  Also reports a history of night sweats however says that this has a chronic secondary to her CML/AML.  Patient was originally instructed to follow-up with her oncologist on 07/17/2023 however her infusion therapy has been postponed secondary to this abscess.  Upon presentation to the ED, patient found to be tachycardic, tachypneic, with lactic acid elevation.  OBGYN evaluated patient and believed no surgical intervention at this time.  [facility] was consulted for admission.    Hospital Course with Pertinent Findings     Patient admitted for Sepsis secondary to left labial abscess intolerant of oral Clindamycin. She was treated with broad spectrum antibiotics (Vanc/Zosyn), IVF hydration and antiemetics. Gynecology was consulted and recommended conservative management, no surgical intervention. Wound care saw patient at bedside and provided patient with wound care instructions. Patient tolerated po intake and was stable for discharge. Vitals remained stable throughout hospital stay. She is discharged home with Doxycycline 100mg bid x 10days.     Discharge physical exam:  Vitals:    07/20/23 0549 07/20/23 0604 07/20/23 0918 07/20/23 1006   BP:   123/77    Pulse:   72    Resp:   20 20   Temp:   98.5 °F (36.9 °C)    TempSrc:   Oral    SpO2:  98% (!) 94%    Weight: 108 kg (238 lb 1.6 oz)      Height:             General: NAD   HEENT: Atraumatic, Normocephalic.   Pulm: CTAB. No wheezes. No crackles. Symmetrical chest expansion.  Cardio: Regular rate. Regular rhythm. No murmurs/gallops.   Abd: +BS, soft, non-distended, non-tender to palpation.   : Left labia major erythematous, tender, indurated  without drainage, previous I&D incision  Extremities: good 2+ pulses in all extremities. No LE edema.   MSK: No obvious deformities. Moves all extremities purposely.   Neuro: Alert, responsive. Oriented x 3. Responds to questions appropriately  Skin:  left axilla hyperpigmented lesion with previous incision, clean and healing well, mild serosanguinous drainage expressed     See media for photos    Chaperone RN Jazlyn Diaz present for  exam     TIME SPENT ON DISCHARGE: 35 minutes    Discharge Medications        Medication List        START taking these medications      doxycycline 100 MG tablet  Commonly known as: VIBRA-TABS  Take 1 tablet (100 mg total) by mouth every 12 (twelve) hours. for 10 days            CONTINUE taking these medications      acyclovir 400 MG tablet  Commonly known as: ZOVIRAX  Take 1 tablet (400 mg total) by mouth 2 (two) times daily.     albuterol 90 mcg/actuation inhaler  Commonly known as: PROVENTIL/VENTOLIN HFA     allopurinoL 300 MG tablet  Commonly known as: ZYLOPRIM     ALPRAZolam 0.25 MG tablet  Commonly known as: XANAX     amLODIPine 10 MG tablet  Commonly known as: NORVASC     ammonium lactate 12 % Crea     atorvastatin 40 MG tablet  Commonly known as: LIPITOR     busPIRone 5 MG Tab  Commonly known as: BUSPAR  Take 1 tablet (5 mg total) by mouth 2 (two) times daily.     clindamycin 300 MG capsule  Commonly known as: CLEOCIN  Take 1 capsule (300 mg total) by mouth every 6 (six) hours. for 10 days     EScitalopram oxalate 10 MG tablet  Commonly known as: LEXAPRO  Take 1 tablet (10 mg total) by mouth once daily.     fluconazole 200 MG Tab  Commonly known as: DIFLUCAN     furosemide 20 MG tablet  Commonly known as: LASIX     gabapentin 300 MG capsule  Commonly known as: NEURONTIN  Take 1 capsule (300 mg total) by mouth 3 (three) times daily.     HYDROcodone-acetaminophen 5-325 mg per tablet  Commonly known as: NORCO     * TEXACORT 2.5 % Soln  Generic drug: hydrocortisone     * hydrocortisone 2.5 % cream     hydrOXYzine pamoate 25 MG Cap  Commonly known as: VISTARIL  Take 1 capsule (25 mg total) by mouth every 8 (eight) hours as needed.     ibuprofen 600 MG tablet  Commonly known as: ADVIL,MOTRIN     ICLUSIG 30 mg  Tab  Generic drug: PONATinib     insulin glargine 100 unit/mL injection  Commonly known as: Lantus  Inject 60 Units into the skin nightly.     insulin lispro 100 unit/mL injection  Inject 15 Units into the skin 3 (three) times daily before meals.     ketoconazole 2 % cream  Commonly known as: NIZORAL     loratadine 10 mg tablet  Commonly known as: CLARITIN  Take 1 tablet (10 mg total) by mouth once daily.     losartan 25 MG tablet  Commonly known as: COZAAR  Take 1 tablet (25 mg total) by mouth once daily.     metFORMIN 1000 MG tablet  Commonly known as: GLUCOPHAGE     metoprolol tartrate 25 MG tablet  Commonly known as: LOPRESSOR     montelukast 10 mg tablet  Commonly known as: SINGULAIR     NovoLIN N NPH U-100 Insulin 100 unit/mL injection  Generic drug: insulin NPH     omeprazole 40 MG capsule  Commonly known as: PRILOSEC     ondansetron 4 MG Tbdl  Commonly known as: ZOFRAN-ODT     ondansetron 8 MG tablet  Commonly known as: ZOFRAN     * SSD 1 % cream  Generic drug: silver sulfADIAZINE 1%     * silver sulfADIAZINE 1% 1 % cream  Commonly known as: SILVADENE     venetoclax 100 mg Tab  Commonly known as: VENCLEXTA           * This list has 4 medication(s) that are the same as other medications prescribed for you. Read the directions carefully, and ask your doctor or other care provider to review them with you.                   Where to Get Your Medications        These medications were sent to [place] [id number]      Phone: [phone]   doxycycline 100 MG tablet           Discharge Information:      Follow-up Information       Mayra Miranda MD Follow up in 1 week(s).    Specialty: Family Medicine  Why: Office will call you with appointment  Contact information:  [facility] [place] [address]  [phone]                             Mayra Miranda MD  [facility] PGY-3

## 2023-07-21 ENCOUNTER — PATIENT OUTREACH (OUTPATIENT)
Dept: ADMINISTRATIVE | Facility: CLINIC | Age: 58
End: 2023-07-21

## 2023-07-24 LAB
BACTERIA BLD CULT: NORMAL
BACTERIA BLD CULT: NORMAL

## 2023-08-04 DIAGNOSIS — D70.9 NEUTROPENIA, UNSPECIFIED TYPE: Primary | ICD-10-CM

## 2023-08-05 ENCOUNTER — HOSPITAL ENCOUNTER (OUTPATIENT)
Facility: HOSPITAL | Age: 58
Discharge: HOME | End: 2023-08-07
Attending: EMERGENCY MEDICINE | Admitting: INTERNAL MEDICINE
Payer: MEDICAID

## 2023-08-05 DIAGNOSIS — L02.91 ABSCESS: ICD-10-CM

## 2023-08-05 DIAGNOSIS — L03.314 CELLULITIS OF GROIN: ICD-10-CM

## 2023-08-05 DIAGNOSIS — R07.9 CHEST PAIN: ICD-10-CM

## 2023-08-05 DIAGNOSIS — I10 HYPERTENSION, UNSPECIFIED TYPE: ICD-10-CM

## 2023-08-05 DIAGNOSIS — L03.314 CELLULITIS OF LEFT GROIN: Primary | ICD-10-CM

## 2023-08-05 LAB
ABS NEUT CALC (OHS): 0.97 X10(3)/MCL (ref 2.1–9.2)
ALBUMIN SERPL-MCNC: 3.3 G/DL (ref 3.5–5)
ALBUMIN/GLOB SERPL: 1.2 RATIO (ref 1.1–2)
ALP SERPL-CCNC: 70 UNIT/L (ref 40–150)
ALT SERPL-CCNC: 13 UNIT/L (ref 0–55)
ANISOCYTOSIS BLD QL SMEAR: ABNORMAL
AST SERPL-CCNC: 11 UNIT/L (ref 5–34)
BILIRUBIN DIRECT+TOT PNL SERPL-MCNC: 0.4 MG/DL
BUN SERPL-MCNC: 10.8 MG/DL (ref 9.8–20.1)
CALCIUM SERPL-MCNC: 9.3 MG/DL (ref 8.4–10.2)
CHLORIDE SERPL-SCNC: 103 MMOL/L (ref 98–107)
CO2 SERPL-SCNC: 25 MMOL/L (ref 22–29)
CREAT SERPL-MCNC: 0.77 MG/DL (ref 0.55–1.02)
ERYTHROCYTE [DISTWIDTH] IN BLOOD BY AUTOMATED COUNT: 21.1 % (ref 11.5–17)
GFR SERPLBLD CREATININE-BSD FMLA CKD-EPI: >60 MLS/MIN/1.73/M2
GLOBULIN SER-MCNC: 2.8 GM/DL (ref 2.4–3.5)
GLUCOSE SERPL-MCNC: 287 MG/DL (ref 74–100)
HCT VFR BLD AUTO: 26.3 % (ref 37–47)
HGB BLD-MCNC: 8.3 G/DL (ref 12–16)
LYMPHOCYTES NFR BLD MANUAL: 1.46 X10(3)/MCL
LYMPHOCYTES NFR BLD MANUAL: 59 % (ref 13–40)
MACROCYTES BLD QL SMEAR: SLIGHT
MCH RBC QN AUTO: 31 PG (ref 27–31)
MCHC RBC AUTO-ENTMCNC: 31.6 G/DL (ref 33–36)
MCV RBC AUTO: 98.1 FL (ref 80–94)
MICROCYTES BLD QL SMEAR: ABNORMAL
MONOCYTES NFR BLD MANUAL: 0.05 X10(3)/MCL (ref 0.1–1.3)
MONOCYTES NFR BLD MANUAL: 2 % (ref 2–11)
NEUTROPHILS NFR BLD MANUAL: 38 % (ref 47–80)
NEUTS BAND NFR BLD MANUAL: 1 % (ref 0–11)
NRBC BLD AUTO-RTO: 4.4 %
NRBC BLD MANUAL-RTO: 2 %
OVALOCYTES (OLG): SLIGHT
PLATELET # BLD AUTO: 87 X10(3)/MCL (ref 130–400)
PLATELET # BLD EST: ABNORMAL 10*3/UL
PLATELETS.RETICULATED NFR BLD AUTO: 8.1 % (ref 0.9–11.2)
PMV BLD AUTO: ABNORMAL FL
POLYCHROMASIA BLD QL SMEAR: ABNORMAL
POTASSIUM SERPL-SCNC: 3.8 MMOL/L (ref 3.5–5.1)
PROT SERPL-MCNC: 6.1 GM/DL (ref 6.4–8.3)
RBC # BLD AUTO: 2.68 X10(6)/MCL (ref 4.2–5.4)
RBC MORPH BLD: ABNORMAL
SCHISTOCYTE (OLG): SLIGHT
SODIUM SERPL-SCNC: 140 MMOL/L (ref 136–145)
STIPPLED RBC (OHS): ABNORMAL
WBC # SPEC AUTO: 2.48 X10(3)/MCL (ref 4.5–11.5)

## 2023-08-05 PROCEDURE — 80053 COMPREHEN METABOLIC PANEL: CPT | Performed by: PHYSICIAN ASSISTANT

## 2023-08-05 PROCEDURE — 25000003 PHARM REV CODE 250: Performed by: PHYSICIAN ASSISTANT

## 2023-08-05 PROCEDURE — 99285 EMERGENCY DEPT VISIT HI MDM: CPT | Mod: 25

## 2023-08-05 PROCEDURE — 96372 THER/PROPH/DIAG INJ SC/IM: CPT | Mod: 59 | Performed by: PHYSICIAN ASSISTANT

## 2023-08-05 PROCEDURE — 25000003 PHARM REV CODE 250: Performed by: EMERGENCY MEDICINE

## 2023-08-05 PROCEDURE — 85027 COMPLETE CBC AUTOMATED: CPT | Performed by: PHYSICIAN ASSISTANT

## 2023-08-05 PROCEDURE — 83605 ASSAY OF LACTIC ACID: CPT | Performed by: EMERGENCY MEDICINE

## 2023-08-05 PROCEDURE — 96361 HYDRATE IV INFUSION ADD-ON: CPT

## 2023-08-05 PROCEDURE — 87040 BLOOD CULTURE FOR BACTERIA: CPT | Performed by: EMERGENCY MEDICINE

## 2023-08-05 PROCEDURE — 87077 CULTURE AEROBIC IDENTIFY: CPT

## 2023-08-05 PROCEDURE — 63600175 PHARM REV CODE 636 W HCPCS: Performed by: PHYSICIAN ASSISTANT

## 2023-08-05 PROCEDURE — 10060 I&D ABSCESS SIMPLE/SINGLE: CPT

## 2023-08-05 PROCEDURE — G0378 HOSPITAL OBSERVATION PER HR: HCPCS

## 2023-08-05 PROCEDURE — 63600175 PHARM REV CODE 636 W HCPCS: Performed by: EMERGENCY MEDICINE

## 2023-08-05 PROCEDURE — 96365 THER/PROPH/DIAG IV INF INIT: CPT

## 2023-08-05 RX ORDER — IBUPROFEN 200 MG
16 TABLET ORAL
Status: DISCONTINUED | OUTPATIENT
Start: 2023-08-05 | End: 2023-08-07 | Stop reason: HOSPADM

## 2023-08-05 RX ORDER — LABETALOL HCL 20 MG/4 ML
10 SYRINGE (ML) INTRAVENOUS EVERY 4 HOURS PRN
Status: DISCONTINUED | OUTPATIENT
Start: 2023-08-05 | End: 2023-08-06

## 2023-08-05 RX ORDER — IBUPROFEN 200 MG
16 TABLET ORAL
Status: DISCONTINUED | OUTPATIENT
Start: 2023-08-06 | End: 2023-08-07 | Stop reason: HOSPADM

## 2023-08-05 RX ORDER — GLUCAGON 1 MG
1 KIT INJECTION
Status: DISCONTINUED | OUTPATIENT
Start: 2023-08-05 | End: 2023-08-07 | Stop reason: HOSPADM

## 2023-08-05 RX ORDER — SODIUM CHLORIDE, SODIUM LACTATE, POTASSIUM CHLORIDE, CALCIUM CHLORIDE 600; 310; 30; 20 MG/100ML; MG/100ML; MG/100ML; MG/100ML
INJECTION, SOLUTION INTRAVENOUS CONTINUOUS
Status: DISCONTINUED | OUTPATIENT
Start: 2023-08-05 | End: 2023-08-07 | Stop reason: HOSPADM

## 2023-08-05 RX ORDER — IBUPROFEN 200 MG
24 TABLET ORAL
Status: DISCONTINUED | OUTPATIENT
Start: 2023-08-06 | End: 2023-08-07 | Stop reason: HOSPADM

## 2023-08-05 RX ORDER — SODIUM CHLORIDE 0.9 % (FLUSH) 0.9 %
10 SYRINGE (ML) INJECTION EVERY 12 HOURS PRN
Status: DISCONTINUED | OUTPATIENT
Start: 2023-08-05 | End: 2023-08-07 | Stop reason: HOSPADM

## 2023-08-05 RX ORDER — LIDOCAINE HYDROCHLORIDE 10 MG/ML
5 INJECTION, SOLUTION EPIDURAL; INFILTRATION; INTRACAUDAL; PERINEURAL
Status: COMPLETED | OUTPATIENT
Start: 2023-08-05 | End: 2023-08-05

## 2023-08-05 RX ORDER — DIPHENHYDRAMINE HYDROCHLORIDE 50 MG/ML
50 INJECTION INTRAMUSCULAR; INTRAVENOUS
Status: DISCONTINUED | OUTPATIENT
Start: 2023-08-05 | End: 2023-08-05

## 2023-08-05 RX ORDER — INSULIN ASPART 100 [IU]/ML
1-10 INJECTION, SOLUTION INTRAVENOUS; SUBCUTANEOUS
Status: DISCONTINUED | OUTPATIENT
Start: 2023-08-06 | End: 2023-08-07 | Stop reason: HOSPADM

## 2023-08-05 RX ORDER — NALOXONE HCL 0.4 MG/ML
0.02 VIAL (ML) INJECTION
Status: DISCONTINUED | OUTPATIENT
Start: 2023-08-05 | End: 2023-08-07 | Stop reason: HOSPADM

## 2023-08-05 RX ORDER — HYDROMORPHONE HYDROCHLORIDE 1 MG/ML
0.5 INJECTION, SOLUTION INTRAMUSCULAR; INTRAVENOUS; SUBCUTANEOUS
Status: COMPLETED | OUTPATIENT
Start: 2023-08-05 | End: 2023-08-05

## 2023-08-05 RX ORDER — METOCLOPRAMIDE HYDROCHLORIDE 5 MG/ML
10 INJECTION INTRAMUSCULAR; INTRAVENOUS
Status: COMPLETED | OUTPATIENT
Start: 2023-08-05 | End: 2023-08-05

## 2023-08-05 RX ORDER — GLUCAGON 1 MG
1 KIT INJECTION
Status: DISCONTINUED | OUTPATIENT
Start: 2023-08-06 | End: 2023-08-07 | Stop reason: HOSPADM

## 2023-08-05 RX ORDER — GABAPENTIN 300 MG/1
300 CAPSULE ORAL 2 TIMES DAILY
Status: DISCONTINUED | OUTPATIENT
Start: 2023-08-06 | End: 2023-08-07 | Stop reason: HOSPADM

## 2023-08-05 RX ORDER — IBUPROFEN 200 MG
24 TABLET ORAL
Status: DISCONTINUED | OUTPATIENT
Start: 2023-08-05 | End: 2023-08-07 | Stop reason: HOSPADM

## 2023-08-05 RX ORDER — HYDRALAZINE HYDROCHLORIDE 20 MG/ML
10 INJECTION INTRAMUSCULAR; INTRAVENOUS EVERY 4 HOURS PRN
Status: DISCONTINUED | OUTPATIENT
Start: 2023-08-05 | End: 2023-08-07 | Stop reason: HOSPADM

## 2023-08-05 RX ORDER — OXYCODONE AND ACETAMINOPHEN 5; 325 MG/1; MG/1
1 TABLET ORAL EVERY 4 HOURS PRN
Status: DISCONTINUED | OUTPATIENT
Start: 2023-08-05 | End: 2023-08-07 | Stop reason: HOSPADM

## 2023-08-05 RX ORDER — ENOXAPARIN SODIUM 100 MG/ML
40 INJECTION SUBCUTANEOUS EVERY 24 HOURS
Status: DISCONTINUED | OUTPATIENT
Start: 2023-08-05 | End: 2023-08-07 | Stop reason: HOSPADM

## 2023-08-05 RX ORDER — DIPHENHYDRAMINE HCL 25 MG
50 CAPSULE ORAL
Status: COMPLETED | OUTPATIENT
Start: 2023-08-05 | End: 2023-08-05

## 2023-08-05 RX ADMIN — METOCLOPRAMIDE 10 MG: 5 INJECTION, SOLUTION INTRAMUSCULAR; INTRAVENOUS at 08:08

## 2023-08-05 RX ADMIN — SODIUM CHLORIDE 1641 ML: 9 INJECTION, SOLUTION INTRAVENOUS at 10:08

## 2023-08-05 RX ADMIN — LIDOCAINE HYDROCHLORIDE 50 MG: 10 INJECTION, SOLUTION EPIDURAL; INFILTRATION; INTRACAUDAL; PERINEURAL at 09:08

## 2023-08-05 RX ADMIN — PIPERACILLIN AND TAZOBACTAM 4.5 G: 4; .5 INJECTION, POWDER, LYOPHILIZED, FOR SOLUTION INTRAVENOUS; PARENTERAL at 11:08

## 2023-08-05 RX ADMIN — HYDROMORPHONE HYDROCHLORIDE 0.5 MG: 1 INJECTION, SOLUTION INTRAMUSCULAR; INTRAVENOUS; SUBCUTANEOUS at 08:08

## 2023-08-05 RX ADMIN — DIPHENHYDRAMINE HYDROCHLORIDE 50 MG: 25 CAPSULE ORAL at 08:08

## 2023-08-06 LAB
ABS NEUT CALC (OHS): 0.78 X10(3)/MCL (ref 2.1–9.2)
ALBUMIN SERPL-MCNC: 3 G/DL (ref 3.5–5)
ALBUMIN/GLOB SERPL: 1.2 RATIO (ref 1.1–2)
ALP SERPL-CCNC: 70 UNIT/L (ref 40–150)
ALT SERPL-CCNC: 11 UNIT/L (ref 0–55)
ANISOCYTOSIS BLD QL SMEAR: ABNORMAL
AST SERPL-CCNC: 11 UNIT/L (ref 5–34)
BILIRUBIN DIRECT+TOT PNL SERPL-MCNC: 0.4 MG/DL
BUN SERPL-MCNC: 9.2 MG/DL (ref 9.8–20.1)
CALCIUM SERPL-MCNC: 8.5 MG/DL (ref 8.4–10.2)
CHLORIDE SERPL-SCNC: 104 MMOL/L (ref 98–107)
CO2 SERPL-SCNC: 23 MMOL/L (ref 22–29)
CREAT SERPL-MCNC: 0.73 MG/DL (ref 0.55–1.02)
CRP SERPL-MCNC: 44.1 MG/L
ERYTHROCYTE [DISTWIDTH] IN BLOOD BY AUTOMATED COUNT: 21.3 % (ref 11.5–17)
GFR SERPLBLD CREATININE-BSD FMLA CKD-EPI: >60 MLS/MIN/1.73/M2
GLOBULIN SER-MCNC: 2.6 GM/DL (ref 2.4–3.5)
GLUCOSE SERPL-MCNC: 368 MG/DL (ref 74–100)
HCT VFR BLD AUTO: 25.3 % (ref 37–47)
HGB BLD-MCNC: 7.9 G/DL (ref 12–16)
LACTATE SERPL-SCNC: 2 MMOL/L (ref 0.5–2.2)
LYMPHOCYTES NFR BLD MANUAL: 1.34 X10(3)/MCL
LYMPHOCYTES NFR BLD MANUAL: 63 % (ref 13–40)
MACROCYTES BLD QL SMEAR: ABNORMAL
MAGNESIUM SERPL-MCNC: 1.6 MG/DL (ref 1.6–2.6)
MCH RBC QN AUTO: 31.7 PG (ref 27–31)
MCHC RBC AUTO-ENTMCNC: 31.2 G/DL (ref 33–36)
MCV RBC AUTO: 101.6 FL (ref 80–94)
NEUTROPHILS NFR BLD MANUAL: 37 % (ref 47–80)
NRBC BLD AUTO-RTO: 3.8 %
NRBC BLD MANUAL-RTO: 3 %
PHOSPHATE SERPL-MCNC: 3.3 MG/DL (ref 2.3–4.7)
PLATELET # BLD AUTO: 85 X10(3)/MCL (ref 130–400)
PLATELET # BLD EST: ABNORMAL 10*3/UL
PLATELETS.RETICULATED NFR BLD AUTO: 8.6 % (ref 0.9–11.2)
PMV BLD AUTO: 13.7 FL (ref 7.4–10.4)
POCT GLUCOSE: 240 MG/DL (ref 70–110)
POCT GLUCOSE: 252 MG/DL (ref 70–110)
POCT GLUCOSE: 270 MG/DL (ref 70–110)
POCT GLUCOSE: 296 MG/DL (ref 70–110)
POCT GLUCOSE: 319 MG/DL (ref 70–110)
POIKILOCYTOSIS BLD QL SMEAR: ABNORMAL
POLYCHROMASIA BLD QL SMEAR: ABNORMAL
POTASSIUM SERPL-SCNC: 3.5 MMOL/L (ref 3.5–5.1)
PROT SERPL-MCNC: 5.6 GM/DL (ref 6.4–8.3)
RBC # BLD AUTO: 2.49 X10(6)/MCL (ref 4.2–5.4)
RBC MORPH BLD: ABNORMAL
SCHISTOCYTE (OLG): ABNORMAL
SODIUM SERPL-SCNC: 139 MMOL/L (ref 136–145)
WBC # SPEC AUTO: 2.12 X10(3)/MCL (ref 4.5–11.5)

## 2023-08-06 PROCEDURE — 63600175 PHARM REV CODE 636 W HCPCS

## 2023-08-06 PROCEDURE — 96361 HYDRATE IV INFUSION ADD-ON: CPT

## 2023-08-06 PROCEDURE — 86140 C-REACTIVE PROTEIN: CPT | Performed by: STUDENT IN AN ORGANIZED HEALTH CARE EDUCATION/TRAINING PROGRAM

## 2023-08-06 PROCEDURE — 25000003 PHARM REV CODE 250

## 2023-08-06 PROCEDURE — 96372 THER/PROPH/DIAG INJ SC/IM: CPT

## 2023-08-06 PROCEDURE — 25500020 PHARM REV CODE 255: Performed by: INTERNAL MEDICINE

## 2023-08-06 PROCEDURE — G0378 HOSPITAL OBSERVATION PER HR: HCPCS

## 2023-08-06 PROCEDURE — 63700000 PHARM REV CODE 250 ALT 637 W/O HCPCS

## 2023-08-06 PROCEDURE — 96372 THER/PROPH/DIAG INJ SC/IM: CPT | Mod: 59

## 2023-08-06 PROCEDURE — 96367 TX/PROPH/DG ADDL SEQ IV INF: CPT

## 2023-08-06 PROCEDURE — 84100 ASSAY OF PHOSPHORUS: CPT

## 2023-08-06 PROCEDURE — 25000003 PHARM REV CODE 250: Performed by: EMERGENCY MEDICINE

## 2023-08-06 PROCEDURE — 63600175 PHARM REV CODE 636 W HCPCS: Performed by: EMERGENCY MEDICINE

## 2023-08-06 PROCEDURE — 96366 THER/PROPH/DIAG IV INF ADDON: CPT

## 2023-08-06 PROCEDURE — 85027 COMPLETE CBC AUTOMATED: CPT

## 2023-08-06 PROCEDURE — 83735 ASSAY OF MAGNESIUM: CPT

## 2023-08-06 PROCEDURE — 80053 COMPREHEN METABOLIC PANEL: CPT

## 2023-08-06 RX ORDER — OXYCODONE AND ACETAMINOPHEN 5; 325 MG/1; MG/1
TABLET ORAL
Status: DISPENSED
Start: 2023-08-06 | End: 2023-08-06

## 2023-08-06 RX ORDER — BUSPIRONE HYDROCHLORIDE 5 MG/1
5 TABLET ORAL 2 TIMES DAILY
Status: DISCONTINUED | OUTPATIENT
Start: 2023-08-06 | End: 2023-08-07 | Stop reason: HOSPADM

## 2023-08-06 RX ORDER — ACYCLOVIR 200 MG/1
400 CAPSULE ORAL 2 TIMES DAILY
Status: DISCONTINUED | OUTPATIENT
Start: 2023-08-06 | End: 2023-08-07 | Stop reason: HOSPADM

## 2023-08-06 RX ORDER — INSULIN ASPART 100 [IU]/ML
10 INJECTION, SOLUTION INTRAVENOUS; SUBCUTANEOUS
Status: DISCONTINUED | OUTPATIENT
Start: 2023-08-06 | End: 2023-08-07

## 2023-08-06 RX ORDER — ONDANSETRON 4 MG/1
8 TABLET, FILM COATED ORAL EVERY 8 HOURS PRN
Status: DISCONTINUED | OUTPATIENT
Start: 2023-08-06 | End: 2023-08-07 | Stop reason: HOSPADM

## 2023-08-06 RX ORDER — LOSARTAN POTASSIUM 25 MG/1
25 TABLET ORAL DAILY
Status: DISCONTINUED | OUTPATIENT
Start: 2023-08-06 | End: 2023-08-07 | Stop reason: HOSPADM

## 2023-08-06 RX ORDER — ATORVASTATIN CALCIUM 40 MG/1
40 TABLET, FILM COATED ORAL DAILY
Status: DISCONTINUED | OUTPATIENT
Start: 2023-08-06 | End: 2023-08-07 | Stop reason: HOSPADM

## 2023-08-06 RX ORDER — ESCITALOPRAM OXALATE 10 MG/1
10 TABLET ORAL DAILY
Status: DISCONTINUED | OUTPATIENT
Start: 2023-08-06 | End: 2023-08-07 | Stop reason: HOSPADM

## 2023-08-06 RX ORDER — ALPRAZOLAM 0.25 MG/1
0.25 TABLET ORAL 3 TIMES DAILY PRN
Status: DISCONTINUED | OUTPATIENT
Start: 2023-08-06 | End: 2023-08-07 | Stop reason: HOSPADM

## 2023-08-06 RX ORDER — ALLOPURINOL 100 MG/1
300 TABLET ORAL DAILY
Status: DISCONTINUED | OUTPATIENT
Start: 2023-08-06 | End: 2023-08-07 | Stop reason: HOSPADM

## 2023-08-06 RX ORDER — INSULIN ASPART 100 [IU]/ML
10 INJECTION, SOLUTION INTRAVENOUS; SUBCUTANEOUS
Status: DISCONTINUED | OUTPATIENT
Start: 2023-08-06 | End: 2023-08-06

## 2023-08-06 RX ORDER — LABETALOL HCL 20 MG/4 ML
10 SYRINGE (ML) INTRAVENOUS EVERY 4 HOURS PRN
Status: DISCONTINUED | OUTPATIENT
Start: 2023-08-06 | End: 2023-08-07 | Stop reason: HOSPADM

## 2023-08-06 RX ORDER — AMLODIPINE BESYLATE 10 MG/1
10 TABLET ORAL DAILY
Status: DISCONTINUED | OUTPATIENT
Start: 2023-08-06 | End: 2023-08-07 | Stop reason: HOSPADM

## 2023-08-06 RX ORDER — METOPROLOL TARTRATE 25 MG/1
25 TABLET, FILM COATED ORAL 2 TIMES DAILY
Status: DISCONTINUED | OUTPATIENT
Start: 2023-08-06 | End: 2023-08-07 | Stop reason: HOSPADM

## 2023-08-06 RX ORDER — FUROSEMIDE 20 MG/1
20 TABLET ORAL DAILY
Status: DISCONTINUED | OUTPATIENT
Start: 2023-08-06 | End: 2023-08-07 | Stop reason: HOSPADM

## 2023-08-06 RX ORDER — IBUPROFEN 200 MG
24 TABLET ORAL
Status: DISCONTINUED | OUTPATIENT
Start: 2023-08-06 | End: 2023-08-07 | Stop reason: HOSPADM

## 2023-08-06 RX ORDER — FLUCONAZOLE 100 MG/1
200 TABLET ORAL DAILY
Status: DISCONTINUED | OUTPATIENT
Start: 2023-08-06 | End: 2023-08-07 | Stop reason: HOSPADM

## 2023-08-06 RX ORDER — IBUPROFEN 600 MG/1
600 TABLET ORAL EVERY 6 HOURS PRN
Status: DISCONTINUED | OUTPATIENT
Start: 2023-08-06 | End: 2023-08-07 | Stop reason: HOSPADM

## 2023-08-06 RX ORDER — ENOXAPARIN SODIUM 100 MG/ML
INJECTION SUBCUTANEOUS
Status: DISPENSED
Start: 2023-08-06 | End: 2023-08-06

## 2023-08-06 RX ADMIN — METOPROLOL TARTRATE 25 MG: 25 TABLET, FILM COATED ORAL at 08:08

## 2023-08-06 RX ADMIN — BUSPIRONE HYDROCHLORIDE 5 MG: 5 TABLET ORAL at 08:08

## 2023-08-06 RX ADMIN — GABAPENTIN 300 MG: 300 CAPSULE ORAL at 08:08

## 2023-08-06 RX ADMIN — ESCITALOPRAM OXALATE 10 MG: 10 TABLET ORAL at 08:08

## 2023-08-06 RX ADMIN — IOHEXOL 100 ML: 350 INJECTION, SOLUTION INTRAVENOUS at 12:08

## 2023-08-06 RX ADMIN — SODIUM CHLORIDE, POTASSIUM CHLORIDE, SODIUM LACTATE AND CALCIUM CHLORIDE: 600; 310; 30; 20 INJECTION, SOLUTION INTRAVENOUS at 11:08

## 2023-08-06 RX ADMIN — INSULIN ASPART 10 UNITS: 100 INJECTION, SOLUTION INTRAVENOUS; SUBCUTANEOUS at 08:08

## 2023-08-06 RX ADMIN — VANCOMYCIN HYDROCHLORIDE 1250 MG: 1 INJECTION, POWDER, LYOPHILIZED, FOR SOLUTION INTRAVENOUS at 12:08

## 2023-08-06 RX ADMIN — ACYCLOVIR 400 MG: 200 CAPSULE ORAL at 01:08

## 2023-08-06 RX ADMIN — OXYCODONE AND ACETAMINOPHEN 1 TABLET: 325; 5 TABLET ORAL at 08:08

## 2023-08-06 RX ADMIN — ALLOPURINOL 300 MG: 100 TABLET ORAL at 08:08

## 2023-08-06 RX ADMIN — INSULIN ASPART 2 UNITS: 100 INJECTION, SOLUTION INTRAVENOUS; SUBCUTANEOUS at 08:08

## 2023-08-06 RX ADMIN — OXYCODONE AND ACETAMINOPHEN 1 TABLET: 325; 5 TABLET ORAL at 12:08

## 2023-08-06 RX ADMIN — INSULIN DETEMIR 35 UNITS: 100 INJECTION, SOLUTION SUBCUTANEOUS at 08:08

## 2023-08-06 RX ADMIN — INSULIN ASPART 8 UNITS: 100 INJECTION, SOLUTION INTRAVENOUS; SUBCUTANEOUS at 04:08

## 2023-08-06 RX ADMIN — GABAPENTIN 300 MG: 300 CAPSULE ORAL at 01:08

## 2023-08-06 RX ADMIN — AMLODIPINE BESYLATE 10 MG: 10 TABLET ORAL at 08:08

## 2023-08-06 RX ADMIN — LOSARTAN POTASSIUM 25 MG: 25 TABLET ORAL at 08:08

## 2023-08-06 RX ADMIN — OXYCODONE AND ACETAMINOPHEN 1 TABLET: 325; 5 TABLET ORAL at 04:08

## 2023-08-06 RX ADMIN — ACYCLOVIR 400 MG: 200 CAPSULE ORAL at 08:08

## 2023-08-06 RX ADMIN — INSULIN ASPART 6 UNITS: 100 INJECTION, SOLUTION INTRAVENOUS; SUBCUTANEOUS at 08:08

## 2023-08-06 RX ADMIN — INSULIN ASPART 6 UNITS: 100 INJECTION, SOLUTION INTRAVENOUS; SUBCUTANEOUS at 12:08

## 2023-08-06 RX ADMIN — FUROSEMIDE 20 MG: 20 TABLET ORAL at 08:08

## 2023-08-06 RX ADMIN — OXYCODONE AND ACETAMINOPHEN 1 TABLET: 325; 5 TABLET ORAL at 05:08

## 2023-08-06 RX ADMIN — SODIUM CHLORIDE, POTASSIUM CHLORIDE, SODIUM LACTATE AND CALCIUM CHLORIDE: 600; 310; 30; 20 INJECTION, SOLUTION INTRAVENOUS at 12:08

## 2023-08-06 RX ADMIN — INSULIN ASPART 10 UNITS: 100 INJECTION, SOLUTION INTRAVENOUS; SUBCUTANEOUS at 04:08

## 2023-08-06 RX ADMIN — OXYCODONE AND ACETAMINOPHEN 1 TABLET: 325; 5 TABLET ORAL at 09:08

## 2023-08-06 RX ADMIN — ENOXAPARIN SODIUM 40 MG: 40 INJECTION SUBCUTANEOUS at 04:08

## 2023-08-06 RX ADMIN — ENOXAPARIN SODIUM 40 MG: 40 INJECTION SUBCUTANEOUS at 12:08

## 2023-08-06 RX ADMIN — PIPERACILLIN AND TAZOBACTAM 4.5 G: 4; .5 INJECTION, POWDER, LYOPHILIZED, FOR SOLUTION INTRAVENOUS; PARENTERAL at 04:08

## 2023-08-06 RX ADMIN — VANCOMYCIN HYDROCHLORIDE 1250 MG: 1 INJECTION, POWDER, LYOPHILIZED, FOR SOLUTION INTRAVENOUS at 11:08

## 2023-08-06 RX ADMIN — ATORVASTATIN CALCIUM 40 MG: 40 TABLET, FILM COATED ORAL at 08:08

## 2023-08-06 RX ADMIN — FLUCONAZOLE 200 MG: 100 TABLET ORAL at 08:08

## 2023-08-06 RX ADMIN — PIPERACILLIN AND TAZOBACTAM 4.5 G: 4; .5 INJECTION, POWDER, LYOPHILIZED, FOR SOLUTION INTRAVENOUS; PARENTERAL at 08:08

## 2023-08-06 RX ADMIN — VANCOMYCIN HYDROCHLORIDE 2000 MG: 1 INJECTION, POWDER, LYOPHILIZED, FOR SOLUTION INTRAVENOUS at 01:08

## 2023-08-06 RX ADMIN — INSULIN ASPART 10 UNITS: 100 INJECTION, SOLUTION INTRAVENOUS; SUBCUTANEOUS at 12:08

## 2023-08-06 NOTE — PROGRESS NOTES
"Pharmacokinetic Initial Assessment: IV Vancomycin    Assessment/Plan:    Initiate intravenous vancomycin with loading dose of 2000 mg once followed by a maintenance dose of vancomycin 1250mg IV every 12 hours  Desired empiric serum trough concentration is 10 to 20 mcg/mL  Draw vancomycin trough level 60 min prior to fourth dose on 8/7 at approximately 1100  Pharmacy will continue to follow and monitor vancomycin.      Please contact pharmacy at extension [phone] with any questions regarding this assessment.     Thank you for the consult,   Adrienne Aranda       Patient brief summary:  Fela Ellison is a 58 y.o. female initiated on antimicrobial therapy with IV Vancomycin for treatment of suspected skin & soft tissue infection    Drug Allergies:   Review of patient's allergies indicates:  No Known Allergies    Actual Body Weight:   107.5 kg    Renal Function:   Estimated Creatinine Clearance: 97.6 mL/min (based on SCr of 0.77 mg/dL).,     Dialysis Method (if applicable):  N/A    CBC (last 72 hours):  Recent Labs   Lab Result Units 08/05/23  2053   WBC x10(3)/mcL 2.48*   Hgb g/dL 8.3*   Hct % 26.3*   Platelet x10(3)/mcL 87*   Monocytes % % 2       Metabolic Panel (last 72 hours):  Recent Labs   Lab Result Units 08/05/23  2053   Sodium Level mmol/L 140   Potassium Level mmol/L 3.8   Chloride mmol/L 103   Carbon Dioxide mmol/L 25   Glucose Level mg/dL 287*   Blood Urea Nitrogen mg/dL 10.8   Creatinine mg/dL 0.77   Albumin Level g/dL 3.3*   Bilirubin Total mg/dL 0.4   Alkaline Phosphatase unit/L 70   Aspartate Aminotransferase unit/L 11   Alanine Aminotransferase unit/L 13       Drug levels (last 3 results):  No results for input(s): "VANCOMYCINRA", "VANCORANDOM", "VANCOMYCINPE", "VANCOPEAK", "VANCOMYCINTR", "VANCOTROUGH" in the last 72 hours.    Microbiologic Results:  Microbiology Results (last 7 days)       Procedure Component Value Units Date/Time    Blood culture #1 **CANNOT BE ORDERED STAT** [[id number]] Collected: " 08/05/23 2319    Order Status: Sent Specimen: Blood from Antecubital, Left Updated: 08/05/23 2334    Blood culture #2 **CANNOT BE ORDERED STAT** [[id number]] Collected: 08/05/23 2323    Order Status: Sent Specimen: Blood from Hand, Left Updated: 08/05/23 2334    Wound Culture [[id number]] Collected: 08/05/23 2328    Order Status: Sent Specimen: Abscess from Groin

## 2023-08-06 NOTE — CONSULTS
[facility] Consult H&P     Subjective:      History of Present Illness:  Fela Ellison is a 58 y.o.  Gestational Age: <None>  who  has a past medical history of Cancer, CML (chronic myelocytic leukemia), DM2 (diabetes mellitus, type 2), HTN (hypertension), NAFLD (nonalcoholic fatty liver disease), and Neuropathy.. GYN consulted given concern for infectious process in groin.    She presented to ED on , reported painful area on left upper labia majora/left side of mons which had started since Thursday. Of note, underwent I&D of abscess on left inferior labia majora and left axilla in ED 7/15.  With current infectious process, on presentation in ED noted to have erythema, ttp, warmth at the area, but no fluctuance or drainage. I&D was performed with a single straight incision, and scant purulent drainage was noted.  Today, patient reports pain is worst when she moves, but not as much when she is lying still. No drainage. CTAP was performed this morning and demonstrated inflammatory changes consistent with cellulitis, no drainable fluid collection.  Patient at increased risk for infections given diabetes, chemotherapy, pancytopenia; wbc on admission 2.12.    Review of Systems:  Pertinent items are noted in HPI. All other systems are reviewed and are negative.    Past Medical History:  Past Medical History:   Diagnosis Date    Cancer     CML (chronic myelocytic leukemia)     DM2 (diabetes mellitus, type 2)     HTN (hypertension)     NAFLD (nonalcoholic fatty liver disease)     Neuropathy        Past Surgical History:  Past Surgical History:   Procedure Laterality Date    ABDOMINAL SURGERY       SECTION      x4    CHOLECYSTECTOMY         Obstetrical History:  OB History    Para Term  AB Living   5 4 2 2 1 4   SAB IAB Ectopic Multiple Live Births                  # Outcome Date GA Lbr Daniel/2nd Weight Sex Delivery Anes PTL Lv   5 AB            4       CS-Unspec      3        CS-Unspec      2 Term      CS-Unspec      1 Term      CS-Unspec          Gynecologic History:   No LMP recorded. Patient is postmenopausal.  Menopausal unsure how many years  Denies abnormal paps    Allergies:  Review of patient's allergies indicates:  No Known Allergies    Medications:   In-Hospital Scheduled Medications:   acyclovir  400 mg Oral BID    allopurinoL  300 mg Oral Daily    amLODIPine  10 mg Oral Daily    atorvastatin  40 mg Oral Daily    busPIRone  5 mg Oral BID    enoxparin  40 mg Subcutaneous Daily    EScitalopram oxalate  10 mg Oral Daily    fluconazole  200 mg Oral Daily    furosemide  20 mg Oral Daily    gabapentin  300 mg Oral BID    insulin aspart U-100  10 Units Subcutaneous TIDWM    insulin detemir U-100  35 Units Subcutaneous QHS    iohexoL        losartan  25 mg Oral Daily    metoprolol tartrate  25 mg Oral BID    piperacillin-tazobactam (Zosyn) IV (PEDS and ADULTS) (extended infusion is not appropriate)  4.5 g Intravenous Q8H    vancomycin (VANCOCIN) IV (PEDS and ADULTS)  1,250 mg Intravenous Q12H      In-Hospital PRN Medications:  ALPRAZolam, glucagon (human recombinant), glucagon (human recombinant), glucose, glucose, glucose, glucose, glucose, hydrALAZINE **AND** [DISCONTINUED] labetalol, ibuprofen, insulin aspart U-100, iohexoL, labetalol, naloxone, ondansetron, oxyCODONE-acetaminophen, sodium chloride 0.9%   In-Hospital IV Infusion Medications:   lactated ringers 125 mL/hr at 08/06/23 1410      Home Medications:  Prior to Admission medications    Medication Sig Start Date End Date Taking? Authorizing Provider   acyclovir (ZOVIRAX) 400 MG tablet Take 1 tablet (400 mg total) by mouth 2 (two) times daily. 5/29/23 5/28/24  Natalya Ojeda MD   albuterol (PROVENTIL/VENTOLIN HFA) 90 mcg/actuation inhaler Inhale 2 puffs into the lungs every 6 (six) hours as needed for Wheezing. Rescue    Provider, Historical   allopurinoL (ZYLOPRIM) 300 MG tablet Take 300 mg by mouth once daily. 2/27/23    Provider, Historical   ALPRAZolam (XANAX) 0.25 MG tablet Take 0.25 mg by mouth 3 (three) times daily as needed for Anxiety. 4/4/23   Provider, Historical   amLODIPine (NORVASC) 10 MG tablet Take 10 mg by mouth once daily.    Provider, Historical   ammonium lactate 12 % Crea Apply topically daily as needed.    Provider, Historical   atorvastatin (LIPITOR) 40 MG tablet Take 40 mg by mouth once daily.    Provider, Historical   busPIRone (BUSPAR) 5 MG Tab Take 1 tablet (5 mg total) by mouth 2 (two) times daily. 6/4/23 6/3/24  Julio Cesar Rodriguez DO   EScitalopram oxalate (LEXAPRO) 10 MG tablet Take 1 tablet (10 mg total) by mouth once daily. 6/4/23 6/3/24  Julio Cesar Rodriguez DO   fluconazole (DIFLUCAN) 200 MG Tab Take 200 mg by mouth once daily. 11/23/22   Provider, Historical   furosemide (LASIX) 20 MG tablet Take 20 mg by mouth once daily.    Provider, Historical   gabapentin (NEURONTIN) 300 MG capsule Take 1 capsule (300 mg total) by mouth 3 (three) times daily. 12/12/22 12/12/23  Rupesh Thomas MD   HYDROcodone-acetaminophen (NORCO) 5-325 mg per tablet Take 1 tablet by mouth every 6 (six) hours as needed for Pain.    Provider, Historical   hydrocortisone 2.5 % cream Apply topically 2 (two) times daily. 5/22/23 5/21/24  Provider, Historical   hydrOXYzine pamoate (VISTARIL) 25 MG Cap Take 1 capsule (25 mg total) by mouth every 8 (eight) hours as needed. 6/4/23   Julio Cesar Rodriguez DO   ibuprofen (ADVIL,MOTRIN) 600 MG tablet TAKE 1 TABLET WITH FOOD OR MILK AS NEEDED THREE TIMES A DAY ORALLY 30 3/29/23   Provider, Historical   insulin glargine (LANTUS) 100 unit/mL injection Inject 60 Units into the skin nightly. 6/4/23   Julio Cesar Rodriguez DO   insulin lispro 100 unit/mL injection Inject 15 Units into the skin 3 (three) times daily before meals.  Patient taking differently: Inject 22 Units into the skin 3 (three) times daily before meals. Takes 22 units TID AC while on chemo - (Gives between 12 to 15 units TID AC when not on chemo)  12/12/22   Rupesh Thomas MD   insulin NPH (NOVOLIN N NPH U-100 INSULIN) 100 unit/mL injection 50 Units 2 (two) times daily before meals. No longer 20 in AM 3/22/23   Provider, Historical   ketoconazole (NIZORAL) 2 % cream Apply topically. 5/22/23 5/21/24  Provider, Historical   loratadine (CLARITIN) 10 mg tablet Take 1 tablet (10 mg total) by mouth once daily. 6/4/23   Julio Cesar Rodriguez DO   losartan (COZAAR) 25 MG tablet Take 1 tablet (25 mg total) by mouth once daily. 12/12/22 12/12/23  Rupesh Thomas MD   metFORMIN (GLUCOPHAGE) 1000 MG tablet Take 1,000 mg by mouth 2 (two) times daily with meals.    Provider, Historical   metoprolol tartrate (LOPRESSOR) 25 MG tablet Take 25 mg by mouth 2 (two) times daily.    Provider, Historical   montelukast (SINGULAIR) 10 mg tablet Take 10 mg by mouth once daily.    Provider, Historical   omeprazole (PRILOSEC) 40 MG capsule Take 1 capsule by mouth once daily. 5/22/23   Provider, Historical   ondansetron (ZOFRAN) 8 MG tablet Take 8 mg by mouth every 8 (eight) hours as needed for Nausea.    Provider, Historical   ondansetron (ZOFRAN-ODT) 4 MG TbDL Take 4 mg by mouth every 8 (eight) hours as needed. 5/16/23   Provider, Historical   PONATinib (ICLUSIG) 30 mg Tab Take 30 mg by mouth Daily.    Provider, Historical   silver sulfADIAZINE 1% (SILVADENE) 1 % cream Apply topically. 1/20/23 1/20/24  Provider, Historical   SSD 1 % cream Apply topically. 1/20/23   Provider, Historical   TEXACORT 2.5 % Soln Apply topically 2 (two) times daily. 5/16/23   Provider, Historical   venetoclax (VENCLEXTA) 100 mg Tab Take 400 mg by mouth Only takes while taking chemo .    Provider, Historical       Family History:  Family History   Problem Relation Age of Onset    Diabetes Mother     Diabetes Father     Cancer Neg Hx        Social History:  Social History     Tobacco Use    Smoking status: Never    Smokeless tobacco: Never   Substance Use Topics    Alcohol use: Not Currently    Drug use:  "Not Currently        Objective:   Last 24 Hour Vital Signs:  BP  Min: 100/64  Max: 159/82  Temp  Av.1 °F (37.3 °C)  Min: 98.6 °F (37 °C)  Max: 99.6 °F (37.6 °C)  Pulse  Av.8  Min: 80  Max: 100  Resp  Av.9  Min: 18  Max: 22  SpO2  Av.8 %  Min: 91 %  Max: 98 %  Height  Av' 4" (162.6 cm)  Min: 5' 4" (162.6 cm)  Max: 5' 4.02" (162.6 cm)  Weight  Av.5 kg (237 lb)  Min: 107.5 kg (237 lb)  Max: 107.5 kg (237 lb)  I/O last 3 completed shifts:  In: 215 [P.O.:200; IV Piggyback:15]  Out: 600 [Urine:600]  Body mass index is 40.66 kg/m².    Physical Examination:  Vitals:    23 0737 23 0800 23 0843 23 1133   BP: 120/75 120/75  100/64   Pulse: 100 100  80   Resp: (!) 22 (!) 22 19    Temp: 99.6 °F (37.6 °C) 99.6 °F (37.6 °C)  99.3 °F (37.4 °C)   TempSrc: Oral   Oral   SpO2: (!) 94% (!) 94%  96%   Weight:  107.5 kg (237 lb)     Height:  5' 4.02" (1.626 m)       Body mass index is 40.66 kg/m².    Gen: Alert, cooperative, no distress, appears stated age  CV: RRR  Chest: No increased work of breathing  Abdomen: Soft, non-tender to palpation  Extrem: Extremities normal, atraumatic, no cyanosis or edema.  No calf tenderness or erythema.  External genitalia: Groin with seval small skin tags. No erythema on labia minor. Incision from prior I&D noted on inferior left labia majora, minimally ttp. Upper left labia majora and mons firm, indurated, erythematous, without fluctuance. Site of I&D noted without drainage, bruising around area which is in groin fold. TTP, no crepitus.  Note: Chaperone present for entirety of exam.    Laboratory Results:  Recent Results (from the past 24 hour(s))   Comprehensive metabolic panel    Collection Time: 23  8:53 PM   Result Value Ref Range    Sodium Level 140 136 - 145 mmol/L    Potassium Level 3.8 3.5 - 5.1 mmol/L    Chloride 103 98 - 107 mmol/L    Carbon Dioxide 25 22 - 29 mmol/L    Glucose Level 287 (H) 74 - 100 mg/dL    Blood Urea Nitrogen 10.8 " 9.8 - 20.1 mg/dL    Creatinine 0.77 0.55 - 1.02 mg/dL    Calcium Level Total 9.3 8.4 - 10.2 mg/dL    Protein Total 6.1 (L) 6.4 - 8.3 gm/dL    Albumin Level 3.3 (L) 3.5 - 5.0 g/dL    Globulin 2.8 2.4 - 3.5 gm/dL    Albumin/Globulin Ratio 1.2 1.1 - 2.0 ratio    Bilirubin Total 0.4 <=1.5 mg/dL    Alkaline Phosphatase 70 40 - 150 unit/L    Alanine Aminotransferase 13 0 - 55 unit/L    Aspartate Aminotransferase 11 5 - 34 unit/L    eGFR >60 mls/min/1.73/m2   CBC with Differential    Collection Time: 08/05/23  8:53 PM   Result Value Ref Range    WBC 2.48 (L) 4.50 - 11.50 x10(3)/mcL    RBC 2.68 (L) 4.20 - 5.40 x10(6)/mcL    Hgb 8.3 (L) 12.0 - 16.0 g/dL    Hct 26.3 (L) 37.0 - 47.0 %    MCV 98.1 (H) 80.0 - 94.0 fL    MCH 31.0 27.0 - 31.0 pg    MCHC 31.6 (L) 33.0 - 36.0 g/dL    RDW 21.1 (H) 11.5 - 17.0 %    Platelet 87 (L) 130 - 400 x10(3)/mcL    MPV      IPF 8.1 0.9 - 11.2 %    NRBC% 4.4 %   Manual Differential    Collection Time: 08/05/23  8:53 PM   Result Value Ref Range    Neutrophils % 38 (L) 47 - 80 %    Bands % 1 0 - 11 %    Lymphs % 59 (H) 13 - 40 %    Monocytes % 2 2 - 11 %    nRBC % 2 %    Neutrophils Abs Calc 0.9672 (L) 2.1 - 9.2 x10(3)/mcL    Lymphs Abs 1.4632 0.6 - 4.6 x10(3)/mcL    Monocytes Abs 0.0496 (L) 0.1 - 1.3 x10(3)/mcL    Platelets Decreased (A) Normal, Adequate    RBC Morph Abnormal (A) Normal    Anisocytosis 1+ (A) (none)    Microcytosis 1+ (A) (none)    Macrocytosis Slight (A) (none)    Polychromasia 2+ (A) (none)    Schistocytes Slight (A) (none)    Stippled RBCs 1+ (A) (none)    Ovalocytes Slight (A) (none)   Lactic acid, plasma    Collection Time: 08/05/23 11:20 PM   Result Value Ref Range    Lactic Acid Level 2.0 0.5 - 2.2 mmol/L   POCT glucose    Collection Time: 08/06/23 12:55 AM   Result Value Ref Range    POCT Glucose 296 (H) 70 - 110 mg/dL   Comprehensive Metabolic Panel (CMP)    Collection Time: 08/06/23  3:10 AM   Result Value Ref Range    Sodium Level 139 136 - 145 mmol/L    Potassium Level  3.5 3.5 - 5.1 mmol/L    Chloride 104 98 - 107 mmol/L    Carbon Dioxide 23 22 - 29 mmol/L    Glucose Level 368 (H) 74 - 100 mg/dL    Blood Urea Nitrogen 9.2 (L) 9.8 - 20.1 mg/dL    Creatinine 0.73 0.55 - 1.02 mg/dL    Calcium Level Total 8.5 8.4 - 10.2 mg/dL    Protein Total 5.6 (L) 6.4 - 8.3 gm/dL    Albumin Level 3.0 (L) 3.5 - 5.0 g/dL    Globulin 2.6 2.4 - 3.5 gm/dL    Albumin/Globulin Ratio 1.2 1.1 - 2.0 ratio    Bilirubin Total 0.4 <=1.5 mg/dL    Alkaline Phosphatase 70 40 - 150 unit/L    Alanine Aminotransferase 11 0 - 55 unit/L    Aspartate Aminotransferase 11 5 - 34 unit/L    eGFR >60 mls/min/1.73/m2   Magnesium    Collection Time: 08/06/23  3:10 AM   Result Value Ref Range    Magnesium Level 1.60 1.60 - 2.60 mg/dL   Phosphorus    Collection Time: 08/06/23  3:10 AM   Result Value Ref Range    Phosphorus Level 3.3 2.3 - 4.7 mg/dL   CBC with Differential    Collection Time: 08/06/23  3:10 AM   Result Value Ref Range    WBC 2.12 (L) 4.50 - 11.50 x10(3)/mcL    RBC 2.49 (L) 4.20 - 5.40 x10(6)/mcL    Hgb 7.9 (L) 12.0 - 16.0 g/dL    Hct 25.3 (L) 37.0 - 47.0 %    .6 (H) 80.0 - 94.0 fL    MCH 31.7 (H) 27.0 - 31.0 pg    MCHC 31.2 (L) 33.0 - 36.0 g/dL    RDW 21.3 (H) 11.5 - 17.0 %    Platelet 85 (L) 130 - 400 x10(3)/mcL    MPV 13.7 (H) 7.4 - 10.4 fL    IPF 8.6 0.9 - 11.2 %    NRBC% 3.8 %   Manual Differential    Collection Time: 08/06/23  3:10 AM   Result Value Ref Range    Neutrophils % 37 (L) 47 - 80 %    Lymphs % 63 (H) 13 - 40 %    nRBC % 3 %    Neutrophils Abs Calc 0.7844 (L) 2.1 - 9.2 x10(3)/mcL    Lymphs Abs 1.3356 0.6 - 4.6 x10(3)/mcL    Platelets Decreased (A) Normal, Adequate    RBC Morph Abnormal (A) Normal    Anisocytosis 2+ (A) (none)    Poikilocytosis 1+ (A) (none)    Macrocytosis 1+ (A) (none)    Polychromasia 1+ (A) (none)    Schistocytes 1+ (A) (none)   C-Reactive Protein    Collection Time: 08/06/23  3:10 AM   Result Value Ref Range    C-Reactive Protein 44.10 (H) <5.00 mg/L   POCT glucose     Collection Time: 08/06/23  7:38 AM   Result Value Ref Range    POCT Glucose 252 (H) 70 - 110 mg/dL   POCT glucose    Collection Time: 08/06/23 11:33 AM   Result Value Ref Range    POCT Glucose 270 (H) 70 - 110 mg/dL   POCT glucose    Collection Time: 08/06/23  3:18 PM   Result Value Ref Range    POCT Glucose 319 (H) 70 - 110 mg/dL         Radiology:  CT Abdomen Pelvis With Contrast    Result Date: 8/6/2023  EXAMINATION: CT ABDOMEN PELVIS WITH CONTRAST CLINICAL HISTORY: Abdominal abscess/infection suspected;groin abscess; Cutaneous abscess, unspecified TECHNIQUE: Low dose axial images, sagittal and coronal reformations were obtained from the lung bases to the pubic symphysis following the IV administration of contrast. Automatic exposure control (AEC) is utilized to reduce patient radiation exposure. COMPARISON: None. FINDINGS: The lung bases are clear. The liver appears normal.  No liver mass or lesion is seen.  Portal and hepatic veins appear normal. The patient is status post cholecystectomy The pancreas appears normal.  No pancreatic mass or lesion is seen. The spleen shows no acute abnormality. The adrenal glands appear normal.  No adrenal nodule is seen. The kidneys appear normal.  No hydronephrosis is seen.  No hydroureter is seen.  No nephrolithiasis is seen.  No obvious ureteral stones are seen. Urinary bladder appears grossly unremarkable. No colitis is seen.  No diverticulitis is seen.  No obvious colonic mass or lesion is seen. No free air is seen.  No free fluid is seen. There is some inflammatory changes seen in the left inguinal region.  Findings are consistent with cellulitis.  I do not see any evidence of an abscess or fluid collection.     Inflammatory changes seen in the soft tissues in the left inguinal region.  Findings are consistent with cellulitis.  No fluid collection or abscess is seen. Electronically signed by: Jonna Castaneda Date:    08/06/2023 Time:    13:07     Soft Tissue  Abdomen    Result Date: 7/28/2023  INDICATION: C92.10   Blast crisis phase of chronic myeloid leukemia (CMS/HCC) swelling and TTP LLQ, nodules RLQ TECHNIQUE: Targeted sonographic evaluation of the right and left abdominal wall in the lower abdomen, at sites of palpable clinical concern. Images were obtained in grayscale and color. COMPARISON: IR CT procedure from July 5, 2023. FINDINGS: There are multiple punctate sites of palpable clinical concern at the right and left lower abdominal wall, and the patient reports that the most painful is located at the right lower quadrant abdominal wall. These palpable clinical concerns appear to correspond with punctate superficial soft tissue lesions, some of which demonstrate posterior acoustic shadowing, and none which demonstrate internal vascularity on color Doppler imaging. These were also seen on the prior CT from July 5, 2023, and are likely injection granulomas. In particular, the most painful of these palpable clinical concerns likely correlates with the puncture site for the recent IR CT-guided biopsy of the right iliac crest performed on July 5, 2023, suggestive of granulomatous reaction. No large fluid collection or suspicious masses identified.    There are multiple injection granulomas seen at the right and left lower abdominal wall superficially. No large fluid collection or suspicious masses identified. Preliminary Report Dictated By: ROSS WARD MD Electronically Signed By: Brenda Cook MD 7/28/2023 7:33 AM CDT     Assessment:     Fela Ellison is a 58 y.o. female wit ho CML, T2DM, HTN, NAFLD, and neuropathy. GYN consulted for assistance with infectious process on superior left labia majora and mons. Infectious process consistent with cellulitis; no abscess present at this time on physical exam or imaging.     Recommendations:     -left labial/mons cellulitis, no drainable collection at this time  -recommend warm compresses to the area, and to keep  area clean as possible  -antibiotics and management of chronic medical conditions per internal medicine primary team    Discussed with staff, Dr. Provost Chanel Tai MD  [facility] PGY3  08/06/2023 5:19 PM

## 2023-08-06 NOTE — H&P
[facility] Hospital Medicine  History & Physical      Patient Name: Fela Ellison  : 1965  MRN: [id number]  Patient Class: OP- Observation   Admission Date: 2023   Length of Stay: 0  Admitting Service: Hospital Medicine  Attending Physician: Blake Ortiz MD  PCP: No, Primary Doctor  Source of history: Patient, patient's family, and EMR.   Code status: Full Code    Chief Complaint   Abscess (Presents with abscess in left groin. Hx of cancer and on chemo.     History of Present Illness   58 y.o. female has a past medical history of CML (chronic myelocytic leukemia), pancytopenia, DM2 (diabetes mellitus, type 2), HTN (hypertension), NAFLD (nonalcoholic fatty liver disease), Neuropathy, HLD (Hyperlipidemia), former tobacco user, umbilical hernia, and recurrent abscesses presents to ED for painful abscess in left groin that started on Thursday (8/3/2023). Pt most recently came to ED on 7/15 for abscesses of vaginal area. Clindamycin and Doxycycline have both been prescribed in the past month for pt's abscesses. Pt has not been compliant with oral home abx, reports GI intolerance. In ED, an attempt was made to drain abscess and pt was started on IV vancomycin and zosyn. Will start continuous fluids and order wound and blood cx.        Home Meds  Acyclovir 400 bid  Metformin 1000 bid  Metoprolol 25 bid  Gabapentin 300 tid  Furosemide 20 daily  Fluconazole 200 daily  Percocet 5 q6 PRN  Amlodipine 10mg  Losartan 25 daily  Buspirone 5 bid  Escitalopram 10 daily  Alprazolam 0.25 PRN  Insulin Lispro  Insulin Novolin  Insulin Glargine?  Allopurinol 300mg    ROS   Pertinent positive and negative as mentioned in HPI   Review of Systems   Constitutional:  Positive for chills. Negative for fever.   Respiratory:  Negative for cough.    Cardiovascular:  Negative for chest pain and leg swelling.   Gastrointestinal:  Negative for nausea.   Psychiatric/Behavioral:  Negative for memory loss.      Past  Medical History     Past Medical History:   Diagnosis Date    Cancer     CML (chronic myelocytic leukemia)     DM2 (diabetes mellitus, type 2)     HTN (hypertension)     NAFLD (nonalcoholic fatty liver disease)     Neuropathy      Past Surgical History     Past Surgical History:   Procedure Laterality Date    ABDOMINAL SURGERY       SECTION      x4    CHOLECYSTECTOMY       Social History     Social History     Tobacco Use    Smoking status: Never    Smokeless tobacco: Never   Substance Use Topics    Alcohol use: Not Currently      Family History   Reviewed and noncontributory    Allergies   Patient has no known allergies.  Home Medications     Prior to Admission medications    Medication Sig Start Date End Date Taking? Authorizing Provider   acyclovir (ZOVIRAX) 400 MG tablet Take 1 tablet (400 mg total) by mouth 2 (two) times daily. 23  Natalya Ojeda MD   albuterol (PROVENTIL/VENTOLIN HFA) 90 mcg/actuation inhaler Inhale 2 puffs into the lungs every 6 (six) hours as needed for Wheezing. Rescue    Provider, Historical   allopurinoL (ZYLOPRIM) 300 MG tablet Take 300 mg by mouth once daily. 23   Provider, Historical   ALPRAZolam (XANAX) 0.25 MG tablet Take 0.25 mg by mouth 3 (three) times daily as needed for Anxiety. 23   Provider, Historical   amLODIPine (NORVASC) 10 MG tablet Take 10 mg by mouth once daily.    Provider, Historical   ammonium lactate 12 % Crea Apply topically daily as needed.    Provider, Historical   atorvastatin (LIPITOR) 40 MG tablet Take 40 mg by mouth once daily.    Provider, Historical   busPIRone (BUSPAR) 5 MG Tab Take 1 tablet (5 mg total) by mouth 2 (two) times daily. 6/4/23 6/3/24  Julio Cesar Rodriguez DO   EScitalopram oxalate (LEXAPRO) 10 MG tablet Take 1 tablet (10 mg total) by mouth once daily. 6/4/23 6/3/24  Julio Cesar Rodriguez DO   fluconazole (DIFLUCAN) 200 MG Tab Take 200 mg by mouth once daily. 22   Provider, Historical   furosemide (LASIX) 20  MG tablet Take 20 mg by mouth once daily.    Provider, Historical   gabapentin (NEURONTIN) 300 MG capsule Take 1 capsule (300 mg total) by mouth 3 (three) times daily. 12/12/22 12/12/23  Rupesh Thomas MD   HYDROcodone-acetaminophen (NORCO) 5-325 mg per tablet Take 1 tablet by mouth every 6 (six) hours as needed for Pain.    Provider, Historical   hydrocortisone 2.5 % cream Apply topically 2 (two) times daily. 5/22/23 5/21/24  Provider, Historical   hydrOXYzine pamoate (VISTARIL) 25 MG Cap Take 1 capsule (25 mg total) by mouth every 8 (eight) hours as needed. 6/4/23   Julio Cesar Rodriguez DO   ibuprofen (ADVIL,MOTRIN) 600 MG tablet TAKE 1 TABLET WITH FOOD OR MILK AS NEEDED THREE TIMES A DAY ORALLY 30 3/29/23   Provider, Historical   insulin glargine (LANTUS) 100 unit/mL injection Inject 60 Units into the skin nightly. 6/4/23   Julio Cesar Rodriguez DO   insulin lispro 100 unit/mL injection Inject 15 Units into the skin 3 (three) times daily before meals.  Patient taking differently: Inject 22 Units into the skin 3 (three) times daily before meals. Takes 22 units TID AC while on chemo - (Gives between 12 to 15 units TID AC when not on chemo) 12/12/22   Rupesh Thomas MD   insulin NPH (NOVOLIN N NPH U-100 INSULIN) 100 unit/mL injection 50 Units 2 (two) times daily before meals. No longer 20 in AM 3/22/23   Provider, Historical   ketoconazole (NIZORAL) 2 % cream Apply topically. 5/22/23 5/21/24  Provider, Historical   loratadine (CLARITIN) 10 mg tablet Take 1 tablet (10 mg total) by mouth once daily. 6/4/23   Julio Cesar Rodriguez DO   losartan (COZAAR) 25 MG tablet Take 1 tablet (25 mg total) by mouth once daily. 12/12/22 12/12/23  Rupesh Thomas MD   metFORMIN (GLUCOPHAGE) 1000 MG tablet Take 1,000 mg by mouth 2 (two) times daily with meals.    Provider, Historical   metoprolol tartrate (LOPRESSOR) 25 MG tablet Take 25 mg by mouth 2 (two) times daily.    Provider, Historical   montelukast (SINGULAIR) 10 mg tablet Take 10  mg by mouth once daily.    Provider, Historical   omeprazole (PRILOSEC) 40 MG capsule Take 1 capsule by mouth once daily. 5/22/23   Provider, Historical   ondansetron (ZOFRAN) 8 MG tablet Take 8 mg by mouth every 8 (eight) hours as needed for Nausea.    Provider, Historical   ondansetron (ZOFRAN-ODT) 4 MG TbDL Take 4 mg by mouth every 8 (eight) hours as needed. 5/16/23   Provider, Historical   PONATinib (ICLUSIG) 30 mg Tab Take 30 mg by mouth Daily.    Provider, Historical   silver sulfADIAZINE 1% (SILVADENE) 1 % cream Apply topically. 1/20/23 1/20/24  Provider, Historical   SSD 1 % cream Apply topically. 1/20/23   Provider, Historical   TEXACORT 2.5 % Soln Apply topically 2 (two) times daily. 5/16/23   Provider, Historical   venetoclax (VENCLEXTA) 100 mg Tab Take 400 mg by mouth Only takes while taking chemo .    Provider, Historical      Inpatient Medications   Scheduled Meds   enoxparin  40 mg Subcutaneous Daily    piperacillin-tazobactam (Zosyn) IV (PEDS and ADULTS) (extended infusion is not appropriate)  4.5 g Intravenous Q8H    sodium chloride 0.9%  30 mL/kg (Ideal) Intravenous Once    [START ON 8/6/2023] vancomycin (VANCOCIN) IV (PEDS and ADULTS)  2,000 mg Intravenous Once     Continuous Infusions    PRN Meds  glucagon (human recombinant), glucose, glucose, naloxone, sodium chloride 0.9%    Physical Exam   Vital Signs  Temp:  [98.6 °F (37 °C)]   Pulse:  [82-87]   Resp:  [18-20]   BP: (128-134)/(73-86)   SpO2:  [96 %-98 %]       General: Appears comfortable  HEENT: NC/AT  Neck:  No JVD  CVS: Regular rhythm. Normal S1/S2.  Abdomen: moderate distention, normoactive BS, soft and non-tender, hernia present  MSK: No obvious deformity or joint swelling  Skin: Warm and dry  : Indurated warm area representing abscess in L groin  Neuro: AAOx3, no focal neurological deficit. CN II-XII grossly intact   Psych: Cooperative    Labs   I have reviewed the following results below:  CBC  Recent Labs     08/05/23 2053  "  WBC 2.48*   RBC 2.68*   HGB 8.3*   HCT 26.3*   MCV 98.1*   MCH 31.0   MCHC 31.6*   RDW 21.1*   PLT 87*     Anemia  No results for input(s): "HAPTOGLOBIN", "FERRITIN", "IRON", "TIBC" in the last 72 hours.  Coags  No results for input(s): "INR", "APTT", "D-DIMER" in the last 72 hours.  Cardiac  No results for input(s): "BNP", "CPK", "CKMB", "TROPONINI" in the last 72 hours.  ABG/Lactate  No results for input(s): "PH", "PCO2", "PO2", "HCO3", "POCSATURATED", "BE", "LACTIC" in the last 72 hours.   Urinalysis  No results for input(s): "COLORUA", "APPEARANCEUA", "SGUA", "PHUA", "PROTEINUA", "GLUCOSEUA", "KETONESUA", "BLOODUA", "UROBILINOGEN", "NITRITESUA", "LEUKOCYTESUR" in the last 72 hours.   BMP  Recent Labs     08/05/23 2053      K 3.8   CHLORIDE 103   CO2 25   BUN 10.8   CREATININE 0.77   GLUCOSE 287*   CALCIUM 9.3     LFTs  Recent Labs     08/05/23 2053   ALBUMIN 3.3*   GLOBULIN 2.8   ALKPHOS 70   ALT 13   AST 11   BILITOT 0.4     Inflammatory Markers  No results for input(s): "CRP", "LDH", "ESR" in the last 72 hours.  Lipid  No results for input(s): "CHOL", "TRIG", "LDL", "VLDL", "HDL" in the last 72 hours.  Diabetes  Recent Labs     08/05/23 2053   GLUCOSE 287*     Thyroid  No results for input(s): "TSH", "FREET4" in the last 72 hours.       Microbiology Results (last 7 days)       Procedure Component Value Units Date/Time    Wound Culture [[id number]]     Order Status: Sent Specimen: Abscess from Groin     Blood culture #1 **CANNOT BE ORDERED STAT** [[id number]]     Order Status: Sent Specimen: Blood     Blood culture #2 **CANNOT BE ORDERED STAT** [[id number]]     Order Status: Sent Specimen: Blood            Imaging     No orders to display     Assessment & Plan     Abscess   -History of inguinal abscess, last admission for abscess (vaginal) on 7/15   -Prescribed oral clindamycin and doxycyline for abscesses, pt has not been compliant with treatment. Reports GI distress with both abx   -Start IV zosyn and " vancomycin   -wound cx, blood cx ordered     CML/Pancytopenia   -Pt has diagnoses of CML, followed by Oncologist in [place]: Heber Forman MD (hematology oncology fellow, [facility]) and Juan M Michel MD (attending) ([facility])   -Pt has pancytopenia on admission, 2/2 to chemotherapy and radiation. WBC (2.48), Hgb (8.3), and Platelets (87)   -Last chemotherapy treatment was on 6/20, treatment scheduled for 7/20 was delayed due to myelosuppression and vaginal abscess. Next treatment scheduled for 8/20.    -continue home acyclovir 400 bid, fluconazole 200, furosemide 20, allopurinol 300, Norco 5 Q6 PRN    DM2   -On metformin at home, home insulin regiment needs further clarification  -SSI-M, admission glucose 287    HTN   -Hydralazine, Labetalol PRN ordered   -continue home metoprolol 25 bid, amlodipine 10, losartan 25    Neuropathy   -continue home gabapentin 300mg BID    Anxiety   - continue home buspirone 5 bid, escitalopram 10, alprazolam 0.25 prn    Access: PIV  Antibiotics: Zosyn and Vancomycin  Diet: heart healthy  DVT Prophylaxis: subq enoxaparin  GI Prophylaxis: none  Fluids: LR 125cc/hr    Eloy Pratt MD  Family Medicine, [facility]

## 2023-08-06 NOTE — CARE UPDATE
"HPI  Fela Ellison is a 57 y/o female with a past medical history of CML currently on palliative chemotherapy, pancytopenia, DM type 2, and HTN presented to the ED for painful abscess in left groin that began on Thursday (8/3/23) and continued to worsen.     Interval history: VSS, NAEO. Patient is expressing continued pain in groin area and a headache. Was given Narco 5mg at 4am per her nurse Rach, however pain has not improved. She denies fever, chills, nausea, vomiting, change in vision, chest pain, shortness of breath.    Vitals:    08/06/23 0052 08/06/23 0355 08/06/23 0417 08/06/23 0737   BP: 123/77 (!) 159/82  120/75   Pulse: 91 97  100   Resp: 20 20 20 (!) 22   Temp: 99.1 °F (37.3 °C) 98.6 °F (37 °C)  99.6 °F (37.6 °C)   TempSrc: Oral Oral  Oral   SpO2: 96% (!) 94%  (!) 94%   Weight:       Height: 5' 4" (1.626 m)         Physical Exam  Gen: moderate distress de to pain with walking/moving and at rest  CV: regular rate and rhythm  Resp: lungs clear bilaterally  Abd: +BS, soft, non-tender, umbilical hernia present  : warm indurated abscess on left labia with dried crusted red-brown drainage on surrounding area    A/P     Abscess  - History of inguinal abscess, last admission for abscess (vaginal) on 7/15, received I&D  - Recently prescribed oral clindamycin and doxycyline for abscesses, pt has not been compliant with treatment. Reports GI distress with both abx  - Continue IV zosyn and vancomycin  - wound cx, blood cx pending   -narco 5 Q4h PRN, ibuprofen 600mg Q6h PRN for pain  -Gynecology consulted  -CT abdomen/pelvis with contrast did not do ultrasound due to intolerable pain      CML/Pancytopenia  -Pt has diagnoses of CML, currently in acute, followed by Oncologist in [place]: Heber Forman MD (hematology oncology fellow, [employer]) and Juan M Michel MD (attending) ([facility])  -Pt has pancytopenia on admission, 2/2 to chemotherapy and radiation. WBC (2.48), Hgb " (8.3), and Platelets (87)  -Last chemotherapy treatment was on 6/20, treatment scheduled for 7/20 was delayed due to myelosuppression and axillary and vaginal abscesses. Next treatment scheduled for 8/20.   -continue home acyclovir 400 bid, fluconazole 200, furosemide 20, allopurinol 300,      DM2  -On metformin at home, taking lispro 12 units & novolin 20 units in AM, lispro 12 units at 5pm, and lispro 12 units and 50-60 units novolin before bed depending on blood sugar level.   -Initiating detemir 35 units at night and aspart 10 units with meals with prn sliding scale insulin.   - Last HbA1c 7.6 on 7/20/23; admission glucose 287; glucose this morning was 368, she did not receive any insulin last night     HTN  - Hydralazine, Labetalol PRN ordered  - continue home metoprolol 25 bid, amlodipine 10, losartan 25      William Thomas DO  [employer]  Resident, [facility]

## 2023-08-06 NOTE — ED PROVIDER NOTES
Encounter Date: 2023       History     Chief Complaint   Patient presents with    Abscess     Presents with abscess in left groin. Hx of cancer and on chemo.  States she is worried because she feels feverish     Patient reports pain and redness to the left groin that started x3 days ago with assoc chills    The history is provided by the patient.   Abscess   This is a new problem. The current episode started several days ago. The problem occurs occasionally. The problem has been gradually worsening. The abscess is present on the groin. The pain is at a severity of 8/10. The abscess is characterized by redness and painfulness. Pertinent negatives include no anorexia, no decrease in physical activity, not sleeping less, no fever, no vomiting, no rhinorrhea, no sore throat and no decreased responsiveness. Recently, medical care has been given by a specialist, at this facility and at another facility. Services received include tests performed.     Review of patient's allergies indicates:  No Known Allergies  Past Medical History:   Diagnosis Date    Cancer     CML (chronic myelocytic leukemia)     DM2 (diabetes mellitus, type 2)     HTN (hypertension)     NAFLD (nonalcoholic fatty liver disease)     Neuropathy      Past Surgical History:   Procedure Laterality Date    ABDOMINAL SURGERY       SECTION      x4    CHOLECYSTECTOMY       Family History   Problem Relation Age of Onset    Diabetes Mother     Diabetes Father     Cancer Neg Hx      Social History     Tobacco Use    Smoking status: Never    Smokeless tobacco: Never   Substance Use Topics    Alcohol use: Not Currently    Drug use: Not Currently     Review of Systems   Constitutional:  Negative for decreased responsiveness and fever.   HENT:  Negative for rhinorrhea and sore throat.    Eyes: Negative.    Respiratory:  Negative for shortness of breath.    Cardiovascular:  Negative for chest pain.   Gastrointestinal:  Negative for anorexia, nausea and  vomiting.   Genitourinary:  Negative for dysuria.   Musculoskeletal:  Negative for back pain.   Skin:  Negative for rash.   Neurological:  Negative for weakness.   Hematological:  Does not bruise/bleed easily.   Psychiatric/Behavioral: Negative.         Physical Exam     Initial Vitals [08/05/23 1756]   BP Pulse Resp Temp SpO2   134/80 87 20 98.6 °F (37 °C) 98 %      MAP       --         Physical Exam    Vitals reviewed.  Constitutional: She appears well-developed and well-nourished.   HENT:   Head: Normocephalic and atraumatic.   Eyes: Conjunctivae and EOM are normal. Pupils are equal, round, and reactive to light.   Neck: Neck supple.   Normal range of motion.  Cardiovascular:  Normal rate, regular rhythm, normal heart sounds and intact distal pulses.           Pulmonary/Chest: Breath sounds normal. No respiratory distress. She has no wheezes. She exhibits no tenderness.   Abdominal: Abdomen is soft. Bowel sounds are normal. There is no abdominal tenderness.   Genitourinary:    Genitourinary Comments: Erythema with assoc TTP and warmth; no drainage or fluctuance present         Musculoskeletal:         General: Normal range of motion.      Cervical back: Normal range of motion and neck supple.     Neurological: She is alert and oriented to person, place, and time. She displays normal reflexes. No cranial nerve deficit or sensory deficit.   Skin: Skin is warm and dry.   Psychiatric: She has a normal mood and affect. Her behavior is normal. Judgment and thought content normal.         ED Course   I & D - Incision and Drainage    Date/Time: 8/5/2023 10:38 PM  Location procedure was performed: [facility] EMERGENCY DEPARTMENT    Performed by: Manoj Overton DO  Authorized by: Manoj Overton DO  Consent Done: Yes  Consent: Verbal consent obtained.  Risks and benefits: risks, benefits and alternatives were discussed  Consent given by: patient  Patient understanding: patient states understanding of the procedure being  "performed  Patient identity confirmed: verbally with patient  Time out: Immediately prior to procedure a "time out" was called to verify the correct patient, procedure, equipment, support staff and site/side marked as required.  Type: abscess  Body area: trunk  Location details: abdomen  Anesthesia: local infiltration    Anesthesia:  Local Anesthetic: lidocaine 1% without epinephrine    Patient sedated: no  Scalpel size: 11  Incision type: single straight  Incision depth: dermal  Complexity: simple  Drainage: purulent  Drainage amount: scant  Wound treatment: incision, drainage and wound left open  Complications: No  Estimated blood loss (mL): 1  Specimens: No  Implants: No    Incision depth: dermal        Labs Reviewed   COMPREHENSIVE METABOLIC PANEL - Abnormal; Notable for the following components:       Result Value    Glucose Level 287 (*)     Protein Total 6.1 (*)     Albumin Level 3.3 (*)     All other components within normal limits   CBC WITH DIFFERENTIAL - Abnormal; Notable for the following components:    WBC 2.48 (*)     RBC 2.68 (*)     Hgb 8.3 (*)     Hct 26.3 (*)     MCV 98.1 (*)     MCHC 31.6 (*)     RDW 21.1 (*)     Platelet 87 (*)     All other components within normal limits   MANUAL DIFFERENTIAL - Abnormal; Notable for the following components:    Neutrophils % 38 (*)     Lymphs % 59 (*)     Neutrophils Abs Calc 0.9672 (*)     Monocytes Abs 0.0496 (*)     Platelets Decreased (*)     RBC Morph Abnormal (*)     Anisocytosis 1+ (*)     Microcytosis 1+ (*)     Macrocytosis Slight (*)     Polychromasia 2+ (*)     Schistocytes Slight (*)     Stippled RBCs 1+ (*)     Ovalocytes Slight (*)     All other components within normal limits   LACTIC ACID, PLASMA - Normal   BLOOD CULTURE [facility]   BLOOD CULTURE [facility]   WOUND CULTURE ([facility])   CBC W/ AUTO DIFFERENTIAL    Narrative:     The following orders were created for panel order CBC auto differential.  Procedure                               Abnormality         " Status                     ---------                               -----------         ------                     CBC with Differential[[id number]]        Abnormal            Final result               Manual Differential[[id number]]          Abnormal            Final result                 Please view results for these tests on the individual orders.   EXTRA TUBES    Narrative:     The following orders were created for panel order EXTRA TUBES.  Procedure                               Abnormality         Status                     ---------                               -----------         ------                     Light Blue Top Hold[[id number]]                              In process                 Gold Top Hold[[id number]]                                    In process                   Please view results for these tests on the individual orders.   LIGHT BLUE TOP HOLD   GOLD TOP HOLD   PATH REVIEW OF BLOOD SMEAR   EXTRA TUBES    Narrative:     The following orders were created for panel order EXTRA TUBES.  Procedure                               Abnormality         Status                     ---------                               -----------         ------                     Light Blue Top Hold[[id number]]                              In process                 Light Green Top Hold[[id number]]                             In process                 Lavender Top Hold[[id number]]                                In process                 Gold Top Hold[[id number]]                                    In process                   Please view results for these tests on the individual orders.   LIGHT BLUE TOP HOLD   LIGHT GREEN TOP HOLD   LAVENDER TOP HOLD   GOLD TOP HOLD   POCT GLUCOSE MONITORING CONTINUOUS          Imaging Results    None          Medications   piperacillin-tazobactam (ZOSYN) 4.5 g in dextrose 5 % in water (D5W) 100 mL IVPB (MB+) ( Intravenous Verify Only 8/6/23 0030)   vancomycin (VANCOCIN) 2,000 mg in  dextrose 5 % (D5W) 500 mL IVPB (has no administration in time range)   sodium chloride 0.9% flush 10 mL (has no administration in time range)   enoxaparin injection 40 mg (40 mg Subcutaneous Given 8/6/23 0009)   naloxone 0.4 mg/mL injection 0.02 mg (has no administration in time range)   glucose chewable tablet 16 g (has no administration in time range)   glucose chewable tablet 24 g (has no administration in time range)   glucagon (human recombinant) injection 1 mg (has no administration in time range)   glucose chewable tablet 16 g (has no administration in time range)   glucose chewable tablet 24 g (has no administration in time range)   glucagon (human recombinant) injection 1 mg (has no administration in time range)   insulin aspart U-100 injection 1-10 Units (has no administration in time range)   lactated ringers infusion (has no administration in time range)   vancomycin (VANCOCIN) 1,250 mg in dextrose 5 % (D5W) 250 mL IVPB (has no administration in time range)   oxyCODONE-acetaminophen 5-325 mg per tablet 1 tablet (1 tablet Oral Given 8/6/23 0008)   hydrALAZINE injection 10 mg (has no administration in time range)     And   labetalol 20 mg/4 mL (5 mg/mL) IV syring (has no administration in time range)   gabapentin capsule 300 mg (has no administration in time range)   HYDROmorphone injection 0.5 mg (0.5 mg Intramuscular Given 8/5/23 2030)   metoclopramide HCl injection 10 mg (10 mg Intramuscular Given 8/5/23 2028)   diphenhydrAMINE capsule 50 mg (50 mg Oral Given 8/5/23 2034)   LIDOcaine (PF) 10 mg/ml (1%) injection 50 mg (50 mg Infiltration Given by Provider 8/5/23 2145)   sodium chloride 0.9% bolus 1,641 mL 1,641 mL (1,641 mLs Intravenous New Bag 8/5/23 2241)                Attending Attestation:     Physician Attestation Statement for NP/PA:   I have directed and reviewed the workup performed by the PA/NP.  I performed the substantive portion of the medical decision making.     Other NP/PA Attestation  Additions:    History of Present Illness: 58-year-old female who presents with progressively worsening pain and swelling to left groin that began 3 days ago.  She has a history of recurrent abscesses and was recently admitted for the same.  She has a history of CML but has not received chemotherapy recently due to her concurrent infections.  Endorses having some chills but has not had any fever.  This pain in her right groin is aggravated with activity such as sitting up and moving her right leg.   Physical Exam: Morbidly obese female in no acute distress.  She has erythema and warmth with induration small area of fluctuance to left groin consistent with abscess and cellulitis.  Heart regular rate.  Lungs clear to auscultation.   Medical Decision Makin-year-old female presents with progressively worsening cellulitis with a small abscess to left groin that started 3 days ago.  She does have a small fluid collection visualized on bedside ultrasound.  After verbal consent obtained, the skin was anesthetized with lidocaine and a linear incision was made with a very small amount of purulence drained, patient unable to tolerate probing or attempts to manually express the purulence, so dressing was left in place for it to hopefully continue to drain spontaneously.  CBC shows pancytopenia with .  She had been on doxycycline unclear exactly how long she had been on it or if she was taking it as directed.  Given her history of failing to improve with outpatient treatment requiring admission for IV antibiotics in the presence of her current neutropenia, I feel that she warrants admission so sepsis orders were initiated and she was given Zosyn and vancomycin.  I have spoken with the patient and/or caregivers. I have explained the patient's condition, diagnoses and treatment plan based on the information available to me at this time. I have answered the patient's and/or caregiver's questions and addressed any  concerns. The patient and/or caregivers have as good an understanding of the patient's diagnosis, condition and treatment plan as can be expected at this point. The patient has been stabilized within the capability of the emergency department. The patient will be transported for further care and management or will be moved to an observation or inpatient service. I have communicated with the staff or medical practitioner taking over this patient's care.               ED Course as of 08/06/23 0043   Sat Aug 05, 2023   2111 Transitioned to Dr Overton [AL]   2131 WBC(!): 2.48  Improved from 1.2 back on 08/01/2023 [IB]   2131 Hemoglobin(!): 8.3  Previously 9.1 on 08/01/2023. [IB]   2132 Platelets(!): 87  Slightly up from 80 back on 08/01/2023. [IB]   2211 Neutrophils Abs Calc(!): 0.9672 [IB]      ED Course User Index  [AL] Jose Epstein PA  [IB] Manoj Overton DO                 Clinical Impression:   Final diagnoses:  [L02.91] Abscess (Primary)  [L03.314] Cellulitis of groin        ED Disposition Condition    Observation Stable                Manoj Overton DO  08/06/23 0043

## 2023-08-07 VITALS
SYSTOLIC BLOOD PRESSURE: 116 MMHG | HEART RATE: 69 BPM | OXYGEN SATURATION: 94 % | BODY MASS INDEX: 40.46 KG/M2 | WEIGHT: 237 LBS | DIASTOLIC BLOOD PRESSURE: 72 MMHG | HEIGHT: 64 IN | RESPIRATION RATE: 18 BRPM | TEMPERATURE: 98 F

## 2023-08-07 DIAGNOSIS — E11.69 TYPE 2 DIABETES MELLITUS WITH OTHER SPECIFIED COMPLICATION, UNSPECIFIED WHETHER LONG TERM INSULIN USE: Primary | ICD-10-CM

## 2023-08-07 PROBLEM — L03.314 CELLULITIS OF LEFT GROIN: Status: ACTIVE | Noted: 2023-08-07

## 2023-08-07 LAB
ABS NEUT CALC (OHS): 0.48 X10(3)/MCL (ref 2.1–9.2)
ALBUMIN SERPL-MCNC: 2.9 G/DL (ref 3.5–5)
ALBUMIN/GLOB SERPL: 1.1 RATIO (ref 1.1–2)
ALP SERPL-CCNC: 58 UNIT/L (ref 40–150)
ALT SERPL-CCNC: 14 UNIT/L (ref 0–55)
ANISOCYTOSIS BLD QL SMEAR: ABNORMAL
AST SERPL-CCNC: 14 UNIT/L (ref 5–34)
BILIRUBIN DIRECT+TOT PNL SERPL-MCNC: 0.4 MG/DL
BUN SERPL-MCNC: 7.7 MG/DL (ref 9.8–20.1)
CALCIUM SERPL-MCNC: 9.1 MG/DL (ref 8.4–10.2)
CHLORIDE SERPL-SCNC: 104 MMOL/L (ref 98–107)
CO2 SERPL-SCNC: 26 MMOL/L (ref 22–29)
CREAT SERPL-MCNC: 0.64 MG/DL (ref 0.55–1.02)
ERYTHROCYTE [DISTWIDTH] IN BLOOD BY AUTOMATED COUNT: 21.2 % (ref 11.5–17)
GFR SERPLBLD CREATININE-BSD FMLA CKD-EPI: >60 MLS/MIN/1.73/M2
GLOBULIN SER-MCNC: 2.7 GM/DL (ref 2.4–3.5)
GLUCOSE SERPL-MCNC: 295 MG/DL (ref 74–100)
HCT VFR BLD AUTO: 25 % (ref 37–47)
HGB BLD-MCNC: 7.7 G/DL (ref 12–16)
LYMPH ABN # BLD MANUAL: 3 %
LYMPHOCYTES NFR BLD MANUAL: 1.39 X10(3)/MCL
LYMPHOCYTES NFR BLD MANUAL: 69 % (ref 13–40)
MAGNESIUM SERPL-MCNC: 2 MG/DL (ref 1.6–2.6)
MCH RBC QN AUTO: 31.2 PG (ref 27–31)
MCHC RBC AUTO-ENTMCNC: 30.8 G/DL (ref 33–36)
MCV RBC AUTO: 101.2 FL (ref 80–94)
MONOCYTES NFR BLD MANUAL: 0.08 X10(3)/MCL (ref 0.1–1.3)
MONOCYTES NFR BLD MANUAL: 4 % (ref 2–11)
NEUTROPHILS NFR BLD MANUAL: 24 % (ref 47–80)
NRBC BLD AUTO-RTO: 3 %
PHOSPHATE SERPL-MCNC: 4.2 MG/DL (ref 2.3–4.7)
PLATELET # BLD AUTO: 83 X10(3)/MCL (ref 130–400)
PLATELET # BLD EST: ABNORMAL 10*3/UL
PLATELETS.RETICULATED NFR BLD AUTO: 8.4 % (ref 0.9–11.2)
PMV BLD AUTO: ABNORMAL FL
POCT GLUCOSE: 292 MG/DL (ref 70–110)
POCT GLUCOSE: 305 MG/DL (ref 70–110)
POTASSIUM SERPL-SCNC: 3.7 MMOL/L (ref 3.5–5.1)
PROT SERPL-MCNC: 5.6 GM/DL (ref 6.4–8.3)
RBC # BLD AUTO: 2.47 X10(6)/MCL (ref 4.2–5.4)
RBC MORPH BLD: ABNORMAL
SODIUM SERPL-SCNC: 140 MMOL/L (ref 136–145)
VANCOMYCIN TROUGH SERPL-MCNC: 7.8 UG/ML (ref 15–20)
WBC # SPEC AUTO: 2.01 X10(3)/MCL (ref 4.5–11.5)

## 2023-08-07 PROCEDURE — 63600175 PHARM REV CODE 636 W HCPCS

## 2023-08-07 PROCEDURE — 84100 ASSAY OF PHOSPHORUS: CPT

## 2023-08-07 PROCEDURE — 25000003 PHARM REV CODE 250: Performed by: INTERNAL MEDICINE

## 2023-08-07 PROCEDURE — 96366 THER/PROPH/DIAG IV INF ADDON: CPT

## 2023-08-07 PROCEDURE — 85027 COMPLETE CBC AUTOMATED: CPT

## 2023-08-07 PROCEDURE — 25000003 PHARM REV CODE 250

## 2023-08-07 PROCEDURE — 80053 COMPREHEN METABOLIC PANEL: CPT

## 2023-08-07 PROCEDURE — 96372 THER/PROPH/DIAG INJ SC/IM: CPT | Performed by: STUDENT IN AN ORGANIZED HEALTH CARE EDUCATION/TRAINING PROGRAM

## 2023-08-07 PROCEDURE — 25000003 PHARM REV CODE 250: Performed by: STUDENT IN AN ORGANIZED HEALTH CARE EDUCATION/TRAINING PROGRAM

## 2023-08-07 PROCEDURE — 80202 ASSAY OF VANCOMYCIN: CPT

## 2023-08-07 PROCEDURE — 96361 HYDRATE IV INFUSION ADD-ON: CPT

## 2023-08-07 PROCEDURE — 83735 ASSAY OF MAGNESIUM: CPT

## 2023-08-07 PROCEDURE — 63600175 PHARM REV CODE 636 W HCPCS: Performed by: INTERNAL MEDICINE

## 2023-08-07 PROCEDURE — 63700000 PHARM REV CODE 250 ALT 637 W/O HCPCS

## 2023-08-07 PROCEDURE — 63600175 PHARM REV CODE 636 W HCPCS: Performed by: STUDENT IN AN ORGANIZED HEALTH CARE EDUCATION/TRAINING PROGRAM

## 2023-08-07 PROCEDURE — G0378 HOSPITAL OBSERVATION PER HR: HCPCS

## 2023-08-07 PROCEDURE — 96376 TX/PRO/DX INJ SAME DRUG ADON: CPT

## 2023-08-07 PROCEDURE — 96372 THER/PROPH/DIAG INJ SC/IM: CPT

## 2023-08-07 RX ORDER — CIPROFLOXACIN 750 MG/1
750 TABLET, FILM COATED ORAL EVERY 12 HOURS
Qty: 24 TABLET | Refills: 0 | OUTPATIENT
Start: 2023-08-07 | End: 2023-08-19

## 2023-08-07 RX ORDER — DOXYCYCLINE HYCLATE 100 MG
100 TABLET ORAL ONCE
Status: COMPLETED | OUTPATIENT
Start: 2023-08-07 | End: 2023-08-07

## 2023-08-07 RX ORDER — INSULIN ASPART 100 [IU]/ML
12 INJECTION, SOLUTION INTRAVENOUS; SUBCUTANEOUS
Status: DISCONTINUED | OUTPATIENT
Start: 2023-08-07 | End: 2023-08-07 | Stop reason: HOSPADM

## 2023-08-07 RX ORDER — INSULIN LISPRO 100 [IU]/ML
22 INJECTION, SOLUTION INTRAVENOUS; SUBCUTANEOUS
Qty: 10 ML | Refills: 2 | Status: SHIPPED | OUTPATIENT
Start: 2023-08-07 | End: 2023-09-29 | Stop reason: SDUPTHER

## 2023-08-07 RX ORDER — DOXYCYCLINE HYCLATE 100 MG
100 TABLET ORAL 2 TIMES DAILY
Qty: 24 TABLET | Refills: 0 | Status: SHIPPED | OUTPATIENT
Start: 2023-08-07 | End: 2023-08-19

## 2023-08-07 RX ORDER — POTASSIUM CHLORIDE 20 MEQ/1
20 TABLET, EXTENDED RELEASE ORAL ONCE
Status: COMPLETED | OUTPATIENT
Start: 2023-08-07 | End: 2023-08-07

## 2023-08-07 RX ORDER — DOXYCYCLINE HYCLATE 100 MG
100 TABLET ORAL EVERY 12 HOURS
Qty: 24 TABLET | Refills: 0 | OUTPATIENT
Start: 2023-08-07 | End: 2023-08-19

## 2023-08-07 RX ORDER — CIPROFLOXACIN 750 MG/1
750 TABLET, FILM COATED ORAL EVERY 12 HOURS
Qty: 24 TABLET | Refills: 0 | Status: SHIPPED | OUTPATIENT
Start: 2023-08-07 | End: 2023-08-19

## 2023-08-07 RX ADMIN — LOSARTAN POTASSIUM 25 MG: 25 TABLET ORAL at 08:08

## 2023-08-07 RX ADMIN — ESCITALOPRAM OXALATE 10 MG: 10 TABLET ORAL at 08:08

## 2023-08-07 RX ADMIN — INSULIN ASPART 6 UNITS: 100 INJECTION, SOLUTION INTRAVENOUS; SUBCUTANEOUS at 12:08

## 2023-08-07 RX ADMIN — GABAPENTIN 300 MG: 300 CAPSULE ORAL at 08:08

## 2023-08-07 RX ADMIN — BUSPIRONE HYDROCHLORIDE 5 MG: 5 TABLET ORAL at 08:08

## 2023-08-07 RX ADMIN — FLUCONAZOLE 200 MG: 100 TABLET ORAL at 08:08

## 2023-08-07 RX ADMIN — PIPERACILLIN AND TAZOBACTAM 4.5 G: 4; .5 INJECTION, POWDER, LYOPHILIZED, FOR SOLUTION INTRAVENOUS; PARENTERAL at 08:08

## 2023-08-07 RX ADMIN — POTASSIUM CHLORIDE 20 MEQ: 1500 TABLET, EXTENDED RELEASE ORAL at 08:08

## 2023-08-07 RX ADMIN — DOXYCYCLINE HYCLATE 100 MG: 100 TABLET, COATED ORAL at 12:08

## 2023-08-07 RX ADMIN — VANCOMYCIN HYDROCHLORIDE 1500 MG: 1.5 INJECTION, POWDER, LYOPHILIZED, FOR SOLUTION INTRAVENOUS at 01:08

## 2023-08-07 RX ADMIN — INSULIN ASPART 12 UNITS: 100 INJECTION, SOLUTION INTRAVENOUS; SUBCUTANEOUS at 08:08

## 2023-08-07 RX ADMIN — INSULIN ASPART 12 UNITS: 100 INJECTION, SOLUTION INTRAVENOUS; SUBCUTANEOUS at 12:08

## 2023-08-07 RX ADMIN — INSULIN ASPART 8 UNITS: 100 INJECTION, SOLUTION INTRAVENOUS; SUBCUTANEOUS at 08:08

## 2023-08-07 RX ADMIN — METOPROLOL TARTRATE 25 MG: 25 TABLET, FILM COATED ORAL at 08:08

## 2023-08-07 RX ADMIN — CIPROFLOXACIN HYDROCHLORIDE 750 MG: 250 TABLET, FILM COATED ORAL at 12:08

## 2023-08-07 RX ADMIN — ALLOPURINOL 300 MG: 100 TABLET ORAL at 08:08

## 2023-08-07 RX ADMIN — AMLODIPINE BESYLATE 10 MG: 10 TABLET ORAL at 08:08

## 2023-08-07 RX ADMIN — ACYCLOVIR 400 MG: 200 CAPSULE ORAL at 08:08

## 2023-08-07 RX ADMIN — ATORVASTATIN CALCIUM 40 MG: 40 TABLET, FILM COATED ORAL at 08:08

## 2023-08-07 RX ADMIN — FUROSEMIDE 20 MG: 20 TABLET ORAL at 08:08

## 2023-08-07 RX ADMIN — SODIUM CHLORIDE, POTASSIUM CHLORIDE, SODIUM LACTATE AND CALCIUM CHLORIDE: 600; 310; 30; 20 INJECTION, SOLUTION INTRAVENOUS at 07:08

## 2023-08-07 RX ADMIN — OXYCODONE AND ACETAMINOPHEN 1 TABLET: 325; 5 TABLET ORAL at 07:08

## 2023-08-07 NOTE — DISCHARGE SUMMARY
[facility] Discharge Summary    Admitting Physician: Blake Ortiz MD  Attending Physician: Blake Ortiz MD  Date of Admit: 8/5/2023  Date of Discharge: 8/7/2023    Condition: Good  Outcome: Patient tolerated treatment/procedure well without complication and is now ready for discharge.  DISPOSITION: Home or Self Care    Discharge Diagnoses     Principal Problem:  Cellulitis of left groin  Active Hospital Problems    Diagnosis  POA    *Cellulitis of left groin [L03.314]  Unknown      Resolved Hospital Problems   No resolved problems to display.       Patient Active Problem List   Diagnosis    CML (chronic myelocytic leukemia)    Diabetes mellitus    Severe obesity (BMI >= 40)    NAFLD (nonalcoholic fatty liver disease)    Hypertension    Tobacco user    Hypercholesterolemia    Hyperuricemia    Hypokalemia    Hepatosplenomegaly    Other headache syndrome    Nausea & vomiting    Cough    Fever    Hyperleukocytosis    Neutropenic fever    Influenza A    Anemia    Thrombocytopenia    Severe sepsis    Labial cyst    Labial infection    Pancytopenia    Cellulitis of left groin       Consultants and Procedures     Consultants:  IP CONSULT TO HOSPITAL MEDICINE  PHARMACY TO DOSE VANCOMYCIN CONSULT  IP CONSULT TO SOCIAL WORK/CASE MANAGEMENT  IP CONSULT TO GYNECOLOGY      Brief Admission History       58 y.o. female has a past medical history of CML (chronic myelocytic leukemia), pancytopenia, DM2 (diabetes mellitus, type 2), HTN (hypertension), NAFLD (nonalcoholic fatty liver disease), Neuropathy, HLD (Hyperlipidemia), former tobacco user, umbilical hernia, and recurrent abscesses presents to ED for painful abscess in left groin that started on Thursday (8/3/2023). Pt most recently came to ED on 7/15 for abscesses of vaginal area. Clindamycin and Doxycycline have both been prescribed in the past month for pt's abscesses. Pt has not been compliant with oral home abx, reports GI intolerance.    [facility]  "Course with Pertinent Findings     Patient was admitted to internal medicine service for left groin abscess vs cellulitis. She received 4 doses of Zosyn IV and 2 doses of Vancomycin. A CT Abdomen Pelvis With Contrast revealed inflammatory changes seen in the soft tissues in the left inguinal region, consistent with cellulitis with no fluid collection or abscess. Gynecology was consulted and recommended warm compresses and antibiotic treatment. Prior to discharge, her pain was controlled with oral medications. Patient is medically stable and ready for discharge.    Discharge physical exam:  Vitals  BP: 135/81  Temp: 97.9 °F (36.6 °C)  Temp Source: Oral  Pulse: 77  Resp: 18  SpO2: (!) 94 %  Height: 5' 4.02" (162.6 cm)  Weight: 107.5 kg (237 lb)    Physical Exam  Gen: moderate distress due to pain with walking/moving and at rest  CV: regular rate and rhythm  Resp: lungs clear bilaterally  Abd: +BS, soft, non-tender, umbilical hernia present  : clean, dry, erythematous, warm indurated lesion on left labia/mons, tender to palpation    TIME SPENT ON DISCHARGE: 60 minutes    Discharge Medications        Medication List        CHANGE how you take these medications      hydrocortisone 2.5 % cream  What changed: Another medication with the same name was removed. Continue taking this medication, and follow the directions you see here.     silver sulfADIAZINE 1% 1 % cream  Commonly known as: SILVADENE  What changed: Another medication with the same name was removed. Continue taking this medication, and follow the directions you see here.            ASK your doctor about these medications      acyclovir 400 MG tablet  Commonly known as: ZOVIRAX  Take 1 tablet (400 mg total) by mouth 2 (two) times daily.     albuterol 90 mcg/actuation inhaler  Commonly known as: PROVENTIL/VENTOLIN HFA     allopurinoL 300 MG tablet  Commonly known as: ZYLOPRIM     ALPRAZolam 0.25 MG tablet  Commonly known as: XANAX     amLODIPine 10 MG " tablet  Commonly known as: NORVASC     ammonium lactate 12 % Crea     atorvastatin 40 MG tablet  Commonly known as: LIPITOR     busPIRone 5 MG Tab  Commonly known as: BUSPAR  Take 1 tablet (5 mg total) by mouth 2 (two) times daily.     EScitalopram oxalate 10 MG tablet  Commonly known as: LEXAPRO  Take 1 tablet (10 mg total) by mouth once daily.     fluconazole 200 MG Tab  Commonly known as: DIFLUCAN     furosemide 20 MG tablet  Commonly known as: LASIX     gabapentin 300 MG capsule  Commonly known as: NEURONTIN  Take 1 capsule (300 mg total) by mouth 3 (three) times daily.     HYDROcodone-acetaminophen 5-325 mg per tablet  Commonly known as: NORCO     hydrOXYzine pamoate 25 MG Cap  Commonly known as: VISTARIL  Take 1 capsule (25 mg total) by mouth every 8 (eight) hours as needed.     ibuprofen 600 MG tablet  Commonly known as: ADVIL,MOTRIN     ICLUSIG 30 mg Tab  Generic drug: PONATinib     insulin glargine 100 unit/mL injection  Commonly known as: Lantus  Inject 60 Units into the skin nightly.     insulin lispro 100 unit/mL injection  Inject 15 Units into the skin 3 (three) times daily before meals.     ketoconazole 2 % cream  Commonly known as: NIZORAL     loratadine 10 mg tablet  Commonly known as: CLARITIN  Take 1 tablet (10 mg total) by mouth once daily.     losartan 25 MG tablet  Commonly known as: COZAAR  Take 1 tablet (25 mg total) by mouth once daily.     metFORMIN 1000 MG tablet  Commonly known as: GLUCOPHAGE     metoprolol tartrate 25 MG tablet  Commonly known as: LOPRESSOR     montelukast 10 mg tablet  Commonly known as: SINGULAIR     NovoLIN N NPH U-100 Insulin 100 unit/mL injection  Generic drug: insulin NPH     omeprazole 40 MG capsule  Commonly known as: PRILOSEC     ondansetron 4 MG Tbdl  Commonly known as: ZOFRAN-ODT     ondansetron 8 MG tablet  Commonly known as: ZOFRAN     venetoclax 100 mg Tab  Commonly known as: VENCLEXTA              Discharge Information:     Complete all medications as  prescribed.     ED precautions discussed including but not limited to fever/chills, worsening pain, swelling, redness of the infected area, or if you are unable to tolerate the oral antibiotics.  Referred to [facility] and [facility]  Maintain the following appointments:   Follow-up Information       William Thomas DO. Schedule an appointment as soon as possible for a visit.    Specialty: Family Medicine  Why: for 1-2 weeks.  Contact information:  [address] [address]  [phone]                             William Thomas DO  [facility] Resident, HO-1

## 2023-08-07 NOTE — PROGRESS NOTES
"Inpatient Nutrition Evaluation    Admit Date: 2023   Total duration of encounter: 2 days    Nutrition Recommendation/Prescription     Diabetic, Low Na diet  Monitor Weights Biweekly     Nutrition Assessment     Chart Review    Reason Seen: continuous nutrition monitoring    Malnutrition Screening Tool Results   Have you recently lost weight without trying?: No  Have you been eating poorly because of a decreased appetite?: No   MST Score: 0     Diagnosis:  Left labia/mons pubis cellulitis    Relevant Medical History: CML, DM, HTN, NAFLD, Neuropathy, left labial cellulitis s/p I&D on 7/15    Nutrition-Related Medications: LR @ 125 ml/hr, Amlodipine, Atorvastatin, Lasix, Insulin, Vanc, Zosyn    Nutrition-Related Labs:  23 -- H/H 7.7/25 L, Glu 295 H, K 3.7, BUN 7.7 L, Cr 0.64    Diet Order: Diet heart healthy Diabetic  Oral Supplement Order: none  Appetite/Oral Intake: good/% of meals  Factors Affecting Nutritional Intake: none identified  Food/Caodaism/Cultural Preferences: none reported  Food Allergies: none reported    Skin Integrity: incision  Wound(s):   left labial cellulitis s/p I&D on 7/15    Comments    23 -- Pt with good appetite denies difficulty eating; no n/v, LBM ; denies wt loss, wt fluctuations noted per EMR wt hx, no significant changes    Anthropometrics    Height: 5' 4.02" (162.6 cm) Height Method: Stated  Last Weight: 107.5 kg (237 lb) (23 0800) Weight Method: Bed Scale  BMI (Calculated): 40.7  BMI Classification: obese grade III (BMI >/=40)     Ideal Body Weight (IBW), Female: 120.1 lb     % Ideal Body Weight, Female (lb): 197.34 %                    Usual Body Weight (UBW), k kg  % Usual Body Weight: 101.63     Usual Weight Provided By: patient and EMR weight history    Wt Readings from Last 5 Encounters:   23 107.5 kg (237 lb)   23 108 kg (238 lb 1.6 oz)   23 107.5 kg (237 lb)   07/15/23 122.3 kg (269 lb 10 oz)   23 108 kg (238 lb) "     Weight Change(s) Since Admission:  Admit Weight: 107.5 kg (237 lb) (08/05/23 0775)      Patient Education    Not applicable.    Monitoring & Evaluation     Dietitian will monitor food and beverage intake, weight change, electrolyte/renal panel, and glucose/endocrine profile.  Nutrition Risk/Follow-Up: low (follow-up in 5-7 days)  Patients assigned 'low nutrition risk' status do not qualify for a full nutritional assessment but will be monitored and re-evaluated in a 5-7 day time period. Please consult if re-evaluation needed sooner.

## 2023-08-07 NOTE — DISCHARGE INSTRUCTIONS
Complete todos los medicamentos según lo prescrito. Termine toda la botella de ambos antibióticos   y ciprorfloxacina. Panacea 1 dosis de doxiciclina esta noche.Panacea Doxycycline con shonna comida o un vaso lleno de agua para prevenir la indigestión y permanezca en posición vertical andi 30 minutos después.     Se analizan las precauciones para la margarito de urgencias, que incluyen, entre otras, fiebre/escalofríos, empeoramiento del dolor, hinchazón, enrojecimiento del área infectada o si no puede tolerar los antibióticos orales.    Se programará shonna brittney de seguimiento con la clínica de medicina familiar de OU al lado. Alguien lo llamará para programar shonna brittney dentro de 1 a 2 semanas. Si no lo ferrari llamado dentro de los próximos 2 días, llame a la clínica al [phone] para programar shonna.

## 2023-08-07 NOTE — PLAN OF CARE
Problem: Adult Inpatient Plan of Care  Goal: Plan of Care Review  8/7/2023 0554 by Maxine Bustos RN  Outcome: Ongoing, Progressing  8/7/2023 0552 by Maxine Bustos RN  Outcome: Ongoing, Progressing  Goal: Patient-Specific Goal (Individualized)  8/7/2023 0554 by Maxine Bustos RN  Outcome: Ongoing, Progressing  8/7/2023 0552 by Maxine Bustos RN  Outcome: Ongoing, Progressing  Goal: Absence of Hospital-Acquired Illness or Injury  8/7/2023 0554 by Maxine Bustos RN  Outcome: Ongoing, Progressing  8/7/2023 0552 by Maxine Bustos RN  Outcome: Ongoing, Progressing  Goal: Optimal Comfort and Wellbeing  8/7/2023 0554 by Maxine Bustos RN  Outcome: Ongoing, Progressing  8/7/2023 0552 by Maxine Bustos RN  Outcome: Ongoing, Progressing  Goal: Readiness for Transition of Care  8/7/2023 0554 by Maxine Bustos RN  Outcome: Ongoing, Progressing  8/7/2023 0552 by Maxine Bustos RN  Outcome: Ongoing, Progressing     Problem: Bariatric Environmental Safety  Goal: Safety Maintained with Care  8/7/2023 0554 by Maxine Bustos RN  Outcome: Ongoing, Progressing  8/7/2023 0552 by Maxine Bustos RN  Outcome: Ongoing, Progressing     Problem: Diabetes Comorbidity  Goal: Blood Glucose Level Within Targeted Range  8/7/2023 0554 by Maxine Bustos RN  Outcome: Ongoing, Progressing  8/7/2023 0552 by Maxine Bustos RN  Outcome: Ongoing, Progressing     Problem: Adjustment to Illness (Sepsis/Septic Shock)  Goal: Optimal Coping  8/7/2023 0554 by Maxine Bustos RN  Outcome: Ongoing, Progressing  8/7/2023 0552 by Maxine Bustos RN  Outcome: Ongoing, Progressing     Problem: Bleeding (Sepsis/Septic Shock)  Goal: Absence of Bleeding  8/7/2023 0554 by Maxine Bustos RN  Outcome: Ongoing, Progressing  8/7/2023 0552 by Maxine Bsutos RN  Outcome: Ongoing, Progressing     Problem: Glycemic Control Impaired (Sepsis/Septic Shock)  Goal: Blood Glucose Level Within Desired Range  8/7/2023 0554 by Maxine Bustos RN  Outcome: Ongoing, Progressing  8/7/2023  0552 by Maxine Bustos RN  Outcome: Ongoing, Progressing     Problem: Infection Progression (Sepsis/Septic Shock)  Goal: Absence of Infection Signs and Symptoms  8/7/2023 0554 by Maxine Bustos RN  Outcome: Ongoing, Progressing  8/7/2023 0552 by Maxine Bustos RN  Outcome: Ongoing, Progressing     Problem: Nutrition Impaired (Sepsis/Septic Shock)  Goal: Optimal Nutrition Intake  8/7/2023 0554 by Maxine Bustos RN  Outcome: Ongoing, Progressing  8/7/2023 0552 by Maxine Bustos RN  Outcome: Ongoing, Progressing     Problem: Impaired Wound Healing  Goal: Optimal Wound Healing  8/7/2023 0554 by Maxine Bustos RN  Outcome: Ongoing, Progressing  8/7/2023 0552 by Maxine Bustos RN  Outcome: Ongoing, Progressing

## 2023-08-07 NOTE — PROGRESS NOTES
Pharmacokinetic Assessment Follow Up: IV Vancomycin    Vancomycin serum concentration assessment(s):    The trough level was drawn correctly and can be used to guide therapy at this time. The measurement is below the desired definitive target range of 10 to 15 mcg/mL.    Vancomycin Regimen Plan:    Change regimen to Vancomycin 1500 mg IV every 12 hours with next serum trough concentration measured at 1100 prior to 1200 dose on 8/08    Drug levels (last 3 results):  Recent Labs   Lab Result Units 08/07/23  1043   Vancomycin Trough ug/ml 7.8*       Pharmacy will continue to follow and monitor vancomycin.    Please contact pharmacy at extension [phone] for questions regarding this assessment.    Thank you for the consult,   Nusrat Borja       Patient brief summary:  Fela Ellison is a 58 y.o. female initiated on antimicrobial therapy with IV Vancomycin for treatment of skin & soft tissue infection    The patient's current regimen is vancomycin 1500mg q 12 h    Drug Allergies:   Review of patient's allergies indicates:  No Known Allergies    Actual Body Weight:   107.5kg    Renal Function:   Estimated Creatinine Clearance: 117.5 mL/min (based on SCr of 0.64 mg/dL).,     Dialysis Method (if applicable):  N/A    CBC (last 72 hours):  Recent Labs   Lab Result Units 08/05/23 2053 08/06/23 0310 08/07/23  0320   WBC x10(3)/mcL 2.48* 2.12* 2.01*   Hgb g/dL 8.3* 7.9* 7.7*   Hct % 26.3* 25.3* 25.0*   Platelet x10(3)/mcL 87* 85* 83*   Monocytes % % 2  --  4       Metabolic Panel (last 72 hours):  Recent Labs   Lab Result Units 08/05/23 2053 08/06/23  0310 08/07/23  0320   Sodium Level mmol/L 140 139 140   Potassium Level mmol/L 3.8 3.5 3.7   Chloride mmol/L 103 104 104   Carbon Dioxide mmol/L 25 23 26   Glucose Level mg/dL 287* 368* 295*   Blood Urea Nitrogen mg/dL 10.8 9.2* 7.7*   Creatinine mg/dL 0.77 0.73 0.64   Albumin Level g/dL 3.3* 3.0* 2.9*   Bilirubin Total mg/dL 0.4 0.4 0.4   Alkaline Phosphatase unit/L 70 70 58    Aspartate Aminotransferase unit/L 11 11 14   Alanine Aminotransferase unit/L 13 11 14   Magnesium Level mg/dL  --  1.60 2.00   Phosphorus Level mg/dL  --  3.3 4.2       Vancomycin Administrations:  vancomycin given in the last 96 hours                     vancomycin (VANCOCIN) 1,250 mg in dextrose 5 % (D5W) 250 mL IVPB (mg) 1,250 mg New Bag 08/06/23 2358     1,250 mg New Bag  1235    vancomycin (VANCOCIN) 2,000 mg in dextrose 5 % (D5W) 500 mL IVPB (mg) 2,000 mg New Bag 08/06/23 0158                    Microbiologic Results:  Microbiology Results (last 7 days)       Procedure Component Value Units Date/Time    Blood culture #1 **CANNOT BE ORDERED STAT** [[id number]]  (Normal) Collected: 08/05/23 2319    Order Status: Completed Specimen: Blood from Antecubital, Left Updated: 08/07/23 1100     CULTURE, BLOOD ([facility]) No Growth At 24 Hours    Blood culture #2 **CANNOT BE ORDERED STAT** [[id number]]  (Normal) Collected: 08/05/23 2323    Order Status: Completed Specimen: Blood from Hand, Left Updated: 08/07/23 1100     CULTURE, BLOOD ([facility]) No Growth At 24 Hours    Wound Culture [[id number]] Collected: 08/05/23 2328    Order Status: Completed Specimen: Abscess from Groin Updated: 08/07/23 0704     Wound Culture No Growth At 24 Hours

## 2023-08-07 NOTE — CONSULTS
[facility] CLINIC NOTE  [facility]  [address] [place] [address]  Phone: [phone]  Fax: [phone]    [facility] Consult - Resident Note    Consulting Physician: Dr. Ortiz   Reason for consult: L labia/mons pubis  cellulitis   Subjective:        HPI:   Fela Ellison is an 58 y.o. year old woman who has a past medical history of Cancer, CML (chronic myelocytic leukemia), DM2 (diabetes mellitus, type 2), HTN (hypertension), NAFLD (nonalcoholic fatty liver disease), and Neuropathy. and presents to ED on  with painful leeft upper labia majora. CTAP demonstrates cellulitis without a drainable fluid colelction    Today she reports that she feels better. However, does still have that left sided mons/labia pain. She denies any vaginal bleeding and states that there hasn't been much drainage from the labia.  Pt has been ambulating to the bathroom, tolerating PO diet, and pain is well controlled with regimen     Review of systems  Negative unless noted in HPI    Past Medical History  Past Medical History:   Diagnosis Date    Cancer     CML (chronic myelocytic leukemia)     DM2 (diabetes mellitus, type 2)     HTN (hypertension)     NAFLD (nonalcoholic fatty liver disease)     Neuropathy        Past Surgical History  Past Surgical History:   Procedure Laterality Date    ABDOMINAL SURGERY       SECTION      x4    CHOLECYSTECTOMY         Obstetrical History  OB History    Para Term  AB Living   5 4 2 2 1 4   SAB IAB Ectopic Multiple Live Births                  # Outcome Date GA Lbr Daniel/2nd Weight Sex Delivery Anes PTL Lv   5 AB            4       CS-Unspec      3       CS-Unspec      2 Term      CS-Unspec      1 Term      CS-Unspec          Allergies  Review of patient's allergies indicates:  No Known Allergies    Family History  Family History   Problem Relation Age of Onset    Diabetes Mother     Diabetes Father     Cancer Neg Hx        Social History  Social History      Tobacco Use    Smoking status: Never    Smokeless tobacco: Never   Substance Use Topics    Alcohol use: Not Currently    Drug use: Not Currently       Overview of active problems  Active Problem List with Overview Notes    Diagnosis Date Noted    Labial infection 07/20/2023    Pancytopenia 07/20/2023    Severe sepsis 07/19/2023    Labial cyst 07/19/2023    Thrombocytopenia 04/11/2023    Anemia 03/18/2023    Neutropenic fever 12/12/2022    Influenza A 12/12/2022    Cough 10/29/2020    Fever 10/29/2020    Hyperleukocytosis 10/29/2020    Other headache syndrome 10/27/2020    Nausea & vomiting 10/27/2020    CML (chronic myelocytic leukemia) 10/26/2020    Diabetes mellitus 10/26/2020    Severe obesity (BMI >= 40) 10/26/2020    NAFLD (nonalcoholic fatty liver disease) 10/26/2020    Hypertension 10/26/2020    Tobacco user 10/26/2020    Hypercholesterolemia 10/26/2020    Hyperuricemia 10/26/2020    Hypokalemia 10/26/2020    Hepatosplenomegaly 10/26/2020       Medications  Home medications  Medications Prior to Admission   Medication Sig Dispense Refill Last Dose    acyclovir (ZOVIRAX) 400 MG tablet Take 1 tablet (400 mg total) by mouth 2 (two) times daily. 60 tablet 11     albuterol (PROVENTIL/VENTOLIN HFA) 90 mcg/actuation inhaler Inhale 2 puffs into the lungs every 6 (six) hours as needed for Wheezing. Rescue       allopurinoL (ZYLOPRIM) 300 MG tablet Take 300 mg by mouth once daily.       ALPRAZolam (XANAX) 0.25 MG tablet Take 0.25 mg by mouth 3 (three) times daily as needed for Anxiety.       amLODIPine (NORVASC) 10 MG tablet Take 10 mg by mouth once daily.       ammonium lactate 12 % Crea Apply topically daily as needed.       atorvastatin (LIPITOR) 40 MG tablet Take 40 mg by mouth once daily.       busPIRone (BUSPAR) 5 MG Tab Take 1 tablet (5 mg total) by mouth 2 (two) times daily. 60 tablet 11     EScitalopram oxalate (LEXAPRO) 10 MG tablet Take 1 tablet (10 mg total) by mouth once daily. 30 tablet 11      fluconazole (DIFLUCAN) 200 MG Tab Take 200 mg by mouth once daily.       furosemide (LASIX) 20 MG tablet Take 20 mg by mouth once daily.       gabapentin (NEURONTIN) 300 MG capsule Take 1 capsule (300 mg total) by mouth 3 (three) times daily. 90 capsule 11     HYDROcodone-acetaminophen (NORCO) 5-325 mg per tablet Take 1 tablet by mouth every 6 (six) hours as needed for Pain.       hydrocortisone 2.5 % cream Apply topically 2 (two) times daily.       hydrOXYzine pamoate (VISTARIL) 25 MG Cap Take 1 capsule (25 mg total) by mouth every 8 (eight) hours as needed. 60 capsule 0     ibuprofen (ADVIL,MOTRIN) 600 MG tablet TAKE 1 TABLET WITH FOOD OR MILK AS NEEDED THREE TIMES A DAY ORALLY 30       insulin glargine (LANTUS) 100 unit/mL injection Inject 60 Units into the skin nightly. 30 each 6     insulin lispro 100 unit/mL injection Inject 15 Units into the skin 3 (three) times daily before meals. (Patient taking differently: Inject 22 Units into the skin 3 (three) times daily before meals. Takes 22 units TID AC while on chemo - (Gives between 12 to 15 units TID AC when not on chemo)) 12 each 6     insulin NPH (NOVOLIN N NPH U-100 INSULIN) 100 unit/mL injection 50 Units 2 (two) times daily before meals. No longer 20 in AM       ketoconazole (NIZORAL) 2 % cream Apply topically.       loratadine (CLARITIN) 10 mg tablet Take 1 tablet (10 mg total) by mouth once daily.  0     losartan (COZAAR) 25 MG tablet Take 1 tablet (25 mg total) by mouth once daily. 90 tablet 3     metFORMIN (GLUCOPHAGE) 1000 MG tablet Take 1,000 mg by mouth 2 (two) times daily with meals.       metoprolol tartrate (LOPRESSOR) 25 MG tablet Take 25 mg by mouth 2 (two) times daily.       montelukast (SINGULAIR) 10 mg tablet Take 10 mg by mouth once daily.       omeprazole (PRILOSEC) 40 MG capsule Take 1 capsule by mouth once daily.       ondansetron (ZOFRAN) 8 MG tablet Take 8 mg by mouth every 8 (eight) hours as needed for Nausea.       ondansetron  (ZOFRAN-ODT) 4 MG TbDL Take 4 mg by mouth every 8 (eight) hours as needed.       PONATinib (ICLUSIG) 30 mg Tab Take 30 mg by mouth Daily.       silver sulfADIAZINE 1% (SILVADENE) 1 % cream Apply topically.       SSD 1 % cream Apply topically.       TEXACORT 2.5 % Soln Apply topically 2 (two) times daily.       venetoclax (VENCLEXTA) 100 mg Tab Take 400 mg by mouth Only takes while taking chemo .          Current Inpatient medications    Current Facility-Administered Medications:     acyclovir capsule 400 mg, 400 mg, Oral, BID, Eloy Pratt MD, 400 mg at 08/06/23 2038    allopurinoL tablet 300 mg, 300 mg, Oral, Daily, Eloy Pratt MD, 300 mg at 08/06/23 0843    ALPRAZolam tablet 0.25 mg, 0.25 mg, Oral, TID PRN, Eloy Pratt MD    amLODIPine tablet 10 mg, 10 mg, Oral, Daily, Eloy Pratt MD, 10 mg at 08/06/23 0842    atorvastatin tablet 40 mg, 40 mg, Oral, Daily, Eloy Pratt MD, 40 mg at 08/06/23 0843    busPIRone tablet 5 mg, 5 mg, Oral, BID, Eloy Pratt MD, 5 mg at 08/06/23 2037    enoxaparin injection 40 mg, 40 mg, Subcutaneous, Daily, Eloy Pratt MD, 40 mg at 08/06/23 1620    EScitalopram oxalate tablet 10 mg, 10 mg, Oral, Daily, Elyo Pratt MD, 10 mg at 08/06/23 0842    fluconazole tablet 200 mg, 200 mg, Oral, Daily, Eloy Pratt MD, 200 mg at 08/06/23 0842    furosemide tablet 20 mg, 20 mg, Oral, Daily, Eloy Pratt MD, 20 mg at 08/06/23 0843    gabapentin capsule 300 mg, 300 mg, Oral, BID, Eloy Pratt MD, 300 mg at 08/06/23 2037    glucagon (human recombinant) injection 1 mg, 1 mg, Intramuscular, PRN, Eloy Pratt MD    glucagon (human recombinant) injection 1 mg, 1 mg, Intramuscular, Terence LEIVA Gabriel, MD    glucose chewable tablet 16 g, 16 g, Oral, Terence LEIVA Gabriel, MD    glucose chewable tablet 16 g, 16 g, Oral, Terence LEIVA Gabriel, MD    glucose chewable tablet 24 g, 24 g, Oral, Terence LEIVA Gabriel, MD    glucose chewable tablet 24 g, 24 g, Oral, PRN, Terence,  MD Eloy    glucose chewable tablet 24 g, 24 g, Oral, PRN, Blake Ortiz MD    hydrALAZINE injection 10 mg, 10 mg, Intravenous, Q4H PRN **AND** [DISCONTINUED] labetalol 20 mg/4 mL (5 mg/mL) IV syring, 10 mg, Intravenous, Q4H PRN, Eloy Pratt MD    ibuprofen tablet 600 mg, 600 mg, Oral, Q6H PRN, William Thomas DO    insulin aspart U-100 injection 1-10 Units, 1-10 Units, Subcutaneous, QID (AC + HS) PRN, Eloy Pratt MD, 2 Units at 08/06/23 2027    insulin aspart U-100 injection 10 Units, 10 Units, Subcutaneous, TIDWM, William Thomas DO, 10 Units at 08/06/23 1647    insulin detemir U-100 injection 35 Units, 35 Units, Subcutaneous, QHS, William Thomas DO, 35 Units at 08/06/23 2027    labetalol 20 mg/4 mL (5 mg/mL) IV syring, 10 mg, Intravenous, Q4H PRN, Blake Ortiz MD    lactated ringers infusion, , Intravenous, Continuous, Eloy Pratt MD, Last Rate: 125 mL/hr at 08/07/23 0643, Rate Verify at 08/07/23 0643    losartan tablet 25 mg, 25 mg, Oral, Daily, lEoy Pratt MD, 25 mg at 08/06/23 0842    metoprolol tartrate (LOPRESSOR) tablet 25 mg, 25 mg, Oral, BID, Eloy Pratt MD, 25 mg at 08/06/23 2037    naloxone 0.4 mg/mL injection 0.02 mg, 0.02 mg, Intravenous, PRN, Eloy Pratt MD    ondansetron tablet 8 mg, 8 mg, Oral, Q8H PRN, Eloy Pratt MD    oxyCODONE-acetaminophen 5-325 mg per tablet 1 tablet, 1 tablet, Oral, Q4H PRN, Eloy Pratt MD, 1 tablet at 08/06/23 2133    piperacillin-tazobactam (ZOSYN) 4.5 g in dextrose 5 % in water (D5W) 100 mL IVPB (MB+), 4.5 g, Intravenous, Q8H, Ambar Giles MD    sodium chloride 0.9% flush 10 mL, 10 mL, Intravenous, Q12H PRN, lEoy Pratt MD    vancomycin (VANCOCIN) 1,250 mg in dextrose 5 % (D5W) 250 mL IVPB, 1,250 mg, Intravenous, Q12H, Eloy Pratt MD, Stopped at 08/07/23 0128       Objective:     Vitals:    08/06/23 2037 08/06/23 2133 08/06/23 2328 08/07/23 0324   BP: 117/73  118/77 121/77   Pulse: 79  69 67   Resp:  18 16 16    Temp:   97.8 °F (36.6 °C) 98.2 °F (36.8 °C)   TempSrc:   Oral Oral   SpO2:   95% (!) 94%   Weight:       Height:         Body mass index is 40.66 kg/m².    I/O last 3 completed shifts:  In: 5787.4 [P.O.:1650; I.V.:2878.5; IV Piggyback:1258.8]  Out: 1700 [Urine:1700]    Physical Exam:  General: alert and oriented, in no acute distress, resting in bed    Lungs: clear to auscultation bilaterally   Heart: regular rate and rhythm   Abdomen: Soft, non-distended, non-tender   Bowel Sounds: Active   DVT Evaluation: No cords or calf tenderness.  No significant calf/ankle edema.   Extremities: Normal, atraumatic, non-edematous   External genitalia: Normal female genitalia. Left mons visibly edematous and mildly erythematous with skin break down in labial fold. Induration palpated around the edges of wound. No fluctuance appreciated. Unable to palpate lymph nodes due to tenderness. There was a drop of serosanguinous drainage on gauze.  No crepitus    Note: RN chaperone present for entirety of exam.    Results:     LABS  Trended:  Recent Labs   Lab 08/05/23  2053 08/06/23  0310 08/07/23  0320   WBC 2.48* 2.12* 2.01*   HGB 8.3* 7.9* 7.7*   HCT 26.3* 25.3* 25.0*   PLT 87* 85* 83*   MCV 98.1* 101.6* 101.2*   RDW 21.1* 21.3* 21.2*    139 140   K 3.8 3.5 3.7   CO2 25 23 26   BUN 10.8 9.2* 7.7*   CREATININE 0.77 0.73 0.64   CALCIUM 9.3 8.5 9.1   ALBUMIN 3.3* 3.0* 2.9*   BILITOT 0.4 0.4 0.4   AST 11 11 14   ALT 13 11 14   ALKPHOS 70 70 58     Recent Results (from the past 24 hour(s))   POCT glucose    Collection Time: 08/06/23  7:38 AM   Result Value Ref Range    POCT Glucose 252 (H) 70 - 110 mg/dL   POCT glucose    Collection Time: 08/06/23 11:33 AM   Result Value Ref Range    POCT Glucose 270 (H) 70 - 110 mg/dL   POCT glucose    Collection Time: 08/06/23  3:18 PM   Result Value Ref Range    POCT Glucose 319 (H) 70 - 110 mg/dL   POCT glucose    Collection Time: 08/06/23  7:36 PM   Result Value Ref Range    POCT Glucose 240  (H) 70 - 110 mg/dL   Comprehensive Metabolic Panel (CMP)    Collection Time: 08/07/23  3:20 AM   Result Value Ref Range    Sodium Level 140 136 - 145 mmol/L    Potassium Level 3.7 3.5 - 5.1 mmol/L    Chloride 104 98 - 107 mmol/L    Carbon Dioxide 26 22 - 29 mmol/L    Glucose Level 295 (H) 74 - 100 mg/dL    Blood Urea Nitrogen 7.7 (L) 9.8 - 20.1 mg/dL    Creatinine 0.64 0.55 - 1.02 mg/dL    Calcium Level Total 9.1 8.4 - 10.2 mg/dL    Protein Total 5.6 (L) 6.4 - 8.3 gm/dL    Albumin Level 2.9 (L) 3.5 - 5.0 g/dL    Globulin 2.7 2.4 - 3.5 gm/dL    Albumin/Globulin Ratio 1.1 1.1 - 2.0 ratio    Bilirubin Total 0.4 <=1.5 mg/dL    Alkaline Phosphatase 58 40 - 150 unit/L    Alanine Aminotransferase 14 0 - 55 unit/L    Aspartate Aminotransferase 14 5 - 34 unit/L    eGFR >60 mls/min/1.73/m2   Magnesium    Collection Time: 08/07/23  3:20 AM   Result Value Ref Range    Magnesium Level 2.00 1.60 - 2.60 mg/dL   Phosphorus    Collection Time: 08/07/23  3:20 AM   Result Value Ref Range    Phosphorus Level 4.2 2.3 - 4.7 mg/dL   CBC with Differential    Collection Time: 08/07/23  3:20 AM   Result Value Ref Range    WBC 2.01 (L) 4.50 - 11.50 x10(3)/mcL    RBC 2.47 (L) 4.20 - 5.40 x10(6)/mcL    Hgb 7.7 (L) 12.0 - 16.0 g/dL    Hct 25.0 (L) 37.0 - 47.0 %    .2 (H) 80.0 - 94.0 fL    MCH 31.2 (H) 27.0 - 31.0 pg    MCHC 30.8 (L) 33.0 - 36.0 g/dL    RDW 21.2 (H) 11.5 - 17.0 %    Platelet 83 (L) 130 - 400 x10(3)/mcL    MPV      IPF 8.4 0.9 - 11.2 %    NRBC% 3.0 %   Manual Differential    Collection Time: 08/07/23  3:20 AM   Result Value Ref Range    Neutrophils % 24 (L) 47 - 80 %    Lymphs % 69 (H) 13 - 40 %    Monocytes % 4 2 - 11 %    Abnormal Lymphs % 3 %    Neutrophils Abs Calc 0.4824 (L) 2.1 - 9.2 x10(3)/mcL    Lymphs Abs 1.3869 0.6 - 4.6 x10(3)/mcL    Monocytes Abs 0.0804 (L) 0.1 - 1.3 x10(3)/mcL    Platelets Decreased (A) Normal, Adequate    RBC Morph Abnormal (A) Normal    Anisocytosis 1+ (A) (none)        MICRO:  Wound culture:  No growth at 24 hrs     IMAGING  FINDINGS:  The lung bases are clear.     The liver appears normal.  No liver mass or lesion is seen.  Portal and hepatic veins appear normal.     The patient is status post cholecystectomy     The pancreas appears normal.  No pancreatic mass or lesion is seen.     The spleen shows no acute abnormality.     The adrenal glands appear normal.  No adrenal nodule is seen.     The kidneys appear normal.  No hydronephrosis is seen.  No hydroureter is seen.  No nephrolithiasis is seen.  No obvious ureteral stones are seen.     Urinary bladder appears grossly unremarkable.     No colitis is seen.  No diverticulitis is seen.  No obvious colonic mass or lesion is seen.     No free air is seen.  No free fluid is seen.     There is some inflammatory changes seen in the left inguinal region.  Findings are consistent with cellulitis.  I do not see any evidence of an abscess or fluid collection.     Impression:     Inflammatory changes seen in the soft tissues in the left inguinal region.  Findings are consistent with cellulitis.  No fluid collection or abscess is seen.     Assessment:     Fela Ellison is a 58 y.o. female with CML, T2DM, HTN, NAFLD, and neuropathy. GYN consulted for assistance with infectious process on superior left labia majora and mons. Infectious process consistent with cellulitis; no abscess present at this time on physical exam or imaging.. Admitted to Medicine.  Pt is currently hemodynamically and clinically stable.    [facility] consulted for assistance with left mons/labial abscess .      Recommendations:     #Left labial/mons cellulitis   -CTAP does not demonstrate a drainable collection at this time  - recommend warm compresses to the area and keep blaze  - Pt on IV vanc/zosyn   - would cultrure : no growth at 24 hours       Discussed with staff, Dr. Provost Kellie Kent MD   [facility], PGY2

## 2023-08-08 ENCOUNTER — OFFICE VISIT (OUTPATIENT)
Dept: HEMATOLOGY/ONCOLOGY | Facility: CLINIC | Age: 58
End: 2023-08-08

## 2023-08-08 VITALS
OXYGEN SATURATION: 98 % | SYSTOLIC BLOOD PRESSURE: 134 MMHG | TEMPERATURE: 99 F | WEIGHT: 234.38 LBS | RESPIRATION RATE: 18 BRPM | DIASTOLIC BLOOD PRESSURE: 83 MMHG | HEART RATE: 70 BPM | HEIGHT: 64 IN | BODY MASS INDEX: 40.01 KG/M2

## 2023-08-08 DIAGNOSIS — C92.00 ACUTE MYELOID LEUKEMIA NOT HAVING ACHIEVED REMISSION: Primary | ICD-10-CM

## 2023-08-08 DIAGNOSIS — D69.6 THROMBOCYTOPENIA: ICD-10-CM

## 2023-08-08 DIAGNOSIS — L72.9 SKIN CYSTS, GENERALIZED: ICD-10-CM

## 2023-08-08 DIAGNOSIS — D64.9 ANEMIA, UNSPECIFIED TYPE: ICD-10-CM

## 2023-08-08 PROCEDURE — 3008F BODY MASS INDEX DOCD: CPT | Mod: CPTII,,, | Performed by: NURSE PRACTITIONER

## 2023-08-08 PROCEDURE — 3075F PR MOST RECENT SYSTOLIC BLOOD PRESS GE 130-139MM HG: ICD-10-PCS | Mod: CPTII,,, | Performed by: NURSE PRACTITIONER

## 2023-08-08 PROCEDURE — 1111F DSCHRG MED/CURRENT MED MERGE: CPT | Mod: CPTII,,, | Performed by: NURSE PRACTITIONER

## 2023-08-08 PROCEDURE — 99214 OFFICE O/P EST MOD 30 MIN: CPT | Mod: S$PBB,,, | Performed by: NURSE PRACTITIONER

## 2023-08-08 PROCEDURE — 3008F PR BODY MASS INDEX (BMI) DOCUMENTED: ICD-10-PCS | Mod: CPTII,,, | Performed by: NURSE PRACTITIONER

## 2023-08-08 PROCEDURE — 99215 OFFICE O/P EST HI 40 MIN: CPT | Mod: PBBFAC | Performed by: NURSE PRACTITIONER

## 2023-08-08 PROCEDURE — 3079F PR MOST RECENT DIASTOLIC BLOOD PRESSURE 80-89 MM HG: ICD-10-PCS | Mod: CPTII,,, | Performed by: NURSE PRACTITIONER

## 2023-08-08 PROCEDURE — 3079F DIAST BP 80-89 MM HG: CPT | Mod: CPTII,,, | Performed by: NURSE PRACTITIONER

## 2023-08-08 PROCEDURE — 3075F SYST BP GE 130 - 139MM HG: CPT | Mod: CPTII,,, | Performed by: NURSE PRACTITIONER

## 2023-08-08 PROCEDURE — 1111F PR DISCHARGE MEDS RECONCILED W/ CURRENT OUTPATIENT MED LIST: ICD-10-PCS | Mod: CPTII,,, | Performed by: NURSE PRACTITIONER

## 2023-08-08 PROCEDURE — 1159F MED LIST DOCD IN RCRD: CPT | Mod: CPTII,,, | Performed by: NURSE PRACTITIONER

## 2023-08-08 PROCEDURE — 99214 PR OFFICE/OUTPT VISIT, EST, LEVL IV, 30-39 MIN: ICD-10-PCS | Mod: S$PBB,,, | Performed by: NURSE PRACTITIONER

## 2023-08-08 PROCEDURE — 3051F HG A1C>EQUAL 7.0%<8.0%: CPT | Mod: CPTII,,, | Performed by: NURSE PRACTITIONER

## 2023-08-08 PROCEDURE — 3051F PR MOST RECENT HEMOGLOBIN A1C LEVEL 7.0 - < 8.0%: ICD-10-PCS | Mod: CPTII,,, | Performed by: NURSE PRACTITIONER

## 2023-08-08 PROCEDURE — 1159F PR MEDICATION LIST DOCUMENTED IN MEDICAL RECORD: ICD-10-PCS | Mod: CPTII,,, | Performed by: NURSE PRACTITIONER
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
## 2023-08-08 NOTE — Clinical Note
RTC 4 weeks, NP(deepak)labs done prior. CBC, CMP  Needs to come to do labs (repeat CBC) on 8/16/2023.

## 2023-08-09 LAB — BACTERIA SPEC CULT: ABNORMAL

## 2023-08-11 LAB
BACTERIA BLD CULT: NORMAL
BACTERIA BLD CULT: NORMAL

## 2023-08-14 NOTE — PROGRESS NOTES
"Family Medicine Clinic Note     Subjective     Patient ID: Fela Ellison is a 58 y.o. female.    Chief Complaint: hospital f/u (Med refill)    : [id number] Colby    58 y.o. female with a past medical history of CML (chronic myelocytic leukemia), pancytopenia, DM2 (diabetes mellitus, type 2), HTN (hypertension), NAFLD (nonalcoholic fatty liver disease), Neuropathy, HLD (Hyperlipidemia), former tobacco user, umbilical hernia who is following up in clinic today for a post wards visit after being admitted for L groin cellulitis.     Groin Cellulitis  -sent home on Cipro and Doxy  -3 more days of Abx to complete  -Ne recurrent fevers or chills  -Minimal sangunous drainage present from area  -Pain is well controlled  -Given referral to Gyn and [facility] IM clinic to establish PCP care    Review of Systems   Constitutional:  Negative for chills, fever and malaise/fatigue.   Gastrointestinal:  Negative for abdominal pain.   Genitourinary:  Negative for dysuria, hematuria and urgency.   Musculoskeletal:  Negative for myalgias.      Objective     Vitals:    08/16/23 0939   BP: 115/74   BP Location: Left arm   Patient Position: Sitting   BP Method: Large (Automatic)   Pulse: 73   Resp: 18   Temp: 98.1 °F (36.7 °C)   TempSrc: Oral   SpO2: 97%   Weight: 109.9 kg (242 lb 3.2 oz)   Height: 5' 4" (1.626 m)        Medication List with Changes/Refills   Current Medications    ACYCLOVIR (ZOVIRAX) 400 MG TABLET    Take 1 tablet (400 mg total) by mouth 2 (two) times daily.    ALBUTEROL (PROVENTIL/VENTOLIN HFA) 90 MCG/ACTUATION INHALER    Inhale 2 puffs into the lungs every 6 (six) hours as needed for Wheezing. Rescue    ALLOPURINOL (ZYLOPRIM) 300 MG TABLET    Take 300 mg by mouth once daily.    ALPRAZOLAM (XANAX) 0.25 MG TABLET    Take 0.25 mg by mouth 3 (three) times daily as needed for Anxiety.    AMLODIPINE (NORVASC) 10 MG TABLET    Take 10 mg by mouth once daily.    AMMONIUM LACTATE 12 % CREA    Apply topically daily as needed.    " ATORVASTATIN (LIPITOR) 40 MG TABLET    Take 40 mg by mouth once daily.    BUSPIRONE (BUSPAR) 5 MG TAB    Take 1 tablet (5 mg total) by mouth 2 (two) times daily.    CIPROFLOXACIN HCL (CIPRO) 750 MG TABLET    Take 1 tablet (750 mg total) by mouth every 12 (twelve) hours. for 12 days    DOXYCYCLINE (VIBRA-TABS) 100 MG TABLET    Take 1 tablet (100 mg total) by mouth 2 (two) times daily. for 12 days    ESCITALOPRAM OXALATE (LEXAPRO) 10 MG TABLET    Take 1 tablet (10 mg total) by mouth once daily.    FUROSEMIDE (LASIX) 20 MG TABLET    Take 20 mg by mouth once daily.    GABAPENTIN (NEURONTIN) 300 MG CAPSULE    Take 1 capsule (300 mg total) by mouth 3 (three) times daily.    HYDROCODONE-ACETAMINOPHEN (NORCO) 5-325 MG PER TABLET    Take 1 tablet by mouth every 6 (six) hours as needed for Pain.    HYDROCORTISONE 2.5 % CREAM    Apply topically 2 (two) times daily.    HYDROXYZINE PAMOATE (VISTARIL) 25 MG CAP    Take 1 capsule (25 mg total) by mouth every 8 (eight) hours as needed.    IBUPROFEN (ADVIL,MOTRIN) 600 MG TABLET    TAKE 1 TABLET WITH FOOD OR MILK AS NEEDED THREE TIMES A DAY ORALLY 30    INSULIN GLARGINE (LANTUS) 100 UNIT/ML INJECTION    Inject 60 Units into the skin nightly.    INSULIN LISPRO 100 UNIT/ML INJECTION    Inject 22 Units into the skin 3 (three) times daily before meals. Takes 22 units TID AC while on chemo - (Gives between 12 to 15 units TID AC when not on chemo)    INSULIN NPH (NOVOLIN N NPH U-100 INSULIN) 100 UNIT/ML INJECTION    50 Units 2 (two) times daily before meals. No longer 20 in AM    KETOCONAZOLE (NIZORAL) 2 % CREAM    Apply topically.    LORATADINE (CLARITIN) 10 MG TABLET    Take 1 tablet (10 mg total) by mouth once daily.    LOSARTAN (COZAAR) 25 MG TABLET    Take 1 tablet (25 mg total) by mouth once daily.    METFORMIN (GLUCOPHAGE) 1000 MG TABLET    Take 1,000 mg by mouth 2 (two) times daily with meals.    METOPROLOL TARTRATE (LOPRESSOR) 25 MG TABLET    Take 25 mg by mouth 2 (two) times  daily.    MONTELUKAST (SINGULAIR) 10 MG TABLET    Take 10 mg by mouth once daily.    OMEPRAZOLE (PRILOSEC) 40 MG CAPSULE    Take 1 capsule by mouth once daily.    ONDANSETRON (ZOFRAN) 8 MG TABLET    Take 8 mg by mouth every 8 (eight) hours as needed for Nausea.    ONDANSETRON (ZOFRAN-ODT) 4 MG TBDL    Take 4 mg by mouth every 8 (eight) hours as needed.    PONATINIB (ICLUSIG) 30 MG TAB    Take 30 mg by mouth Daily.    SILVER SULFADIAZINE 1% (SILVADENE) 1 % CREAM    Apply topically.    VENETOCLAX (VENCLEXTA) 100 MG TAB    Take 400 mg by mouth Only takes while taking chemo .        Physical Exam  Vitals and nursing note reviewed.   Constitutional:       General: She is not in acute distress.  Cardiovascular:      Rate and Rhythm: Normal rate and regular rhythm.      Heart sounds: No murmur heard.  Pulmonary:      Effort: Pulmonary effort is normal. No respiratory distress.      Breath sounds: No wheezing.   Genitourinary:     Comments: Much improved area present on L groin. Erythema improved compared to exam during hospital admission. Pain upon palpation improved. Mild serosanguinous drainage present on gauze covering area.       Assessment and Plan      1. Cellulitis of left groin    2. Hypertension, unspecified type      -Continue current Abx regimen  -Follow up with Gyn clinic as needed. Follow up with IM clinic to establish PCP care.   -Strict ED precautions discussed with pt and ; they expressed understanding     Orders:  Cellulitis of left groin    Hypertension, unspecified type  -     Ambulatory referral/consult to Internal Medicine         No follow-ups on file.        Ayaan Whitley MD  [facility] -II

## 2023-08-16 ENCOUNTER — CLINICAL SUPPORT (OUTPATIENT)
Dept: HEMATOLOGY/ONCOLOGY | Facility: CLINIC | Age: 58
End: 2023-08-16

## 2023-08-16 ENCOUNTER — OFFICE VISIT (OUTPATIENT)
Dept: FAMILY MEDICINE | Facility: CLINIC | Age: 58
End: 2023-08-16

## 2023-08-16 VITALS
OXYGEN SATURATION: 97 % | TEMPERATURE: 98 F | DIASTOLIC BLOOD PRESSURE: 74 MMHG | RESPIRATION RATE: 18 BRPM | BODY MASS INDEX: 41.35 KG/M2 | HEART RATE: 73 BPM | WEIGHT: 242.19 LBS | HEIGHT: 64 IN | SYSTOLIC BLOOD PRESSURE: 115 MMHG

## 2023-08-16 DIAGNOSIS — I10 HYPERTENSION, UNSPECIFIED TYPE: ICD-10-CM

## 2023-08-16 DIAGNOSIS — D69.6 THROMBOCYTOPENIA: ICD-10-CM

## 2023-08-16 DIAGNOSIS — L03.314 CELLULITIS OF LEFT GROIN: Primary | ICD-10-CM

## 2023-08-16 DIAGNOSIS — C92.10 BLAST CRISIS PHASE OF CHRONIC MYELOID LEUKEMIA: ICD-10-CM

## 2023-08-16 DIAGNOSIS — D64.9 SYMPTOMATIC ANEMIA: ICD-10-CM

## 2023-08-16 LAB
BASOPHILS # BLD AUTO: 0 X10(3)/MCL
BASOPHILS NFR BLD AUTO: 0 %
EOSINOPHIL # BLD AUTO: 0 X10(3)/MCL (ref 0–0.9)
EOSINOPHIL NFR BLD AUTO: 0 %
ERYTHROCYTE [DISTWIDTH] IN BLOOD BY AUTOMATED COUNT: 21.3 % (ref 11.5–17)
HCT VFR BLD AUTO: 26.2 % (ref 37–47)
HGB BLD-MCNC: 7.9 G/DL (ref 12–16)
IMM GRANULOCYTES # BLD AUTO: 0.02 X10(3)/MCL (ref 0–0.04)
IMM GRANULOCYTES NFR BLD AUTO: 1 %
INR PPP: 0.9
INR PPP: 1
LYMPHOCYTES # BLD AUTO: 1.12 X10(3)/MCL (ref 0.6–4.6)
LYMPHOCYTES NFR BLD AUTO: 58 %
MCH RBC QN AUTO: 30.6 PG (ref 27–31)
MCHC RBC AUTO-ENTMCNC: 30.2 G/DL (ref 33–36)
MCV RBC AUTO: 101.6 FL (ref 80–94)
MONOCYTES # BLD AUTO: 0.18 X10(3)/MCL (ref 0.1–1.3)
MONOCYTES NFR BLD AUTO: 9.3 %
NEUTROPHILS # BLD AUTO: 0.61 X10(3)/MCL (ref 2.1–9.2)
NEUTROPHILS NFR BLD AUTO: 31.7 %
NRBC BLD AUTO-RTO: 4.7 %
PLATELET # BLD AUTO: 83 X10(3)/MCL (ref 130–400)
PLATELETS.RETICULATED NFR BLD AUTO: 7.9 % (ref 0.9–11.2)
PMV BLD AUTO: ABNORMAL FL
PROTHROMBIN TIME: 11.8 SECONDS (ref 11.4–14)
PROTHROMBIN TIME: 11.9 SECONDS (ref 11.4–14)
PROTHROMBIN TIME: 12 SECONDS (ref 11.4–14)
PROTHROMBIN TIME: 12.9 SECONDS (ref 11.4–14)
RBC # BLD AUTO: 2.58 X10(6)/MCL (ref 4.2–5.4)
WBC # SPEC AUTO: 1.93 X10(3)/MCL (ref 4.5–11.5)

## 2023-08-16 PROCEDURE — 99215 OFFICE O/P EST HI 40 MIN: CPT | Mod: PBBFAC

## 2023-08-16 PROCEDURE — 36415 COLL VENOUS BLD VENIPUNCTURE: CPT

## 2023-08-16 PROCEDURE — 85025 COMPLETE CBC W/AUTO DIFF WBC: CPT

## 2023-08-17 ENCOUNTER — CLINICAL SUPPORT (OUTPATIENT)
Dept: HEMATOLOGY/ONCOLOGY | Facility: CLINIC | Age: 58
End: 2023-08-17

## 2023-08-17 DIAGNOSIS — D70.9 NEUTROPENIA, UNSPECIFIED TYPE: ICD-10-CM

## 2023-08-17 LAB
BASOPHILS # BLD AUTO: 0 X10(3)/MCL
BASOPHILS NFR BLD AUTO: 0 %
EOSINOPHIL # BLD AUTO: 0 X10(3)/MCL (ref 0–0.9)
EOSINOPHIL NFR BLD AUTO: 0 %
ERYTHROCYTE [DISTWIDTH] IN BLOOD BY AUTOMATED COUNT: 21.5 % (ref 11.5–17)
HCT VFR BLD AUTO: 26.4 % (ref 37–47)
HGB BLD-MCNC: 8.2 G/DL (ref 12–16)
IMM GRANULOCYTES # BLD AUTO: 0.13 X10(3)/MCL (ref 0–0.04)
IMM GRANULOCYTES NFR BLD AUTO: 6.3 %
LYMPHOCYTES # BLD AUTO: 1.2 X10(3)/MCL (ref 0.6–4.6)
LYMPHOCYTES NFR BLD AUTO: 58.5 %
MCH RBC QN AUTO: 31.8 PG (ref 27–31)
MCHC RBC AUTO-ENTMCNC: 31.1 G/DL (ref 33–36)
MCV RBC AUTO: 102.3 FL (ref 80–94)
MONOCYTES # BLD AUTO: 0.2 X10(3)/MCL (ref 0.1–1.3)
MONOCYTES NFR BLD AUTO: 9.8 %
NEUTROPHILS # BLD AUTO: 0.52 X10(3)/MCL (ref 2.1–9.2)
NEUTROPHILS NFR BLD AUTO: 25.4 %
NRBC BLD AUTO-RTO: 4.9 %
PLATELET # BLD AUTO: 86 X10(3)/MCL (ref 130–400)
PLATELETS.RETICULATED NFR BLD AUTO: 9.2 % (ref 0.9–11.2)
PMV BLD AUTO: 10.5 FL (ref 7.4–10.4)
RBC # BLD AUTO: 2.58 X10(6)/MCL (ref 4.2–5.4)
WBC # SPEC AUTO: 2.05 X10(3)/MCL (ref 4.5–11.5)

## 2023-08-17 PROCEDURE — 36415 COLL VENOUS BLD VENIPUNCTURE: CPT

## 2023-08-17 PROCEDURE — 85025 COMPLETE CBC W/AUTO DIFF WBC: CPT

## 2023-08-21 ENCOUNTER — OFFICE VISIT (OUTPATIENT)
Dept: FAMILY MEDICINE | Facility: CLINIC | Age: 58
End: 2023-08-21

## 2023-08-21 ENCOUNTER — CLINICAL SUPPORT (OUTPATIENT)
Dept: HEMATOLOGY/ONCOLOGY | Facility: CLINIC | Age: 58
End: 2023-08-21

## 2023-08-21 VITALS
OXYGEN SATURATION: 97 % | BODY MASS INDEX: 42 KG/M2 | RESPIRATION RATE: 18 BRPM | WEIGHT: 246 LBS | HEART RATE: 75 BPM | TEMPERATURE: 99 F | DIASTOLIC BLOOD PRESSURE: 76 MMHG | SYSTOLIC BLOOD PRESSURE: 119 MMHG | HEIGHT: 64 IN

## 2023-08-21 DIAGNOSIS — D70.9 NEUTROPENIA, UNSPECIFIED TYPE: Primary | ICD-10-CM

## 2023-08-21 DIAGNOSIS — N90.89 LABIAL LESION: ICD-10-CM

## 2023-08-21 DIAGNOSIS — D70.9 NEUTROPENIA, UNSPECIFIED TYPE: ICD-10-CM

## 2023-08-21 LAB
BASOPHILS # BLD AUTO: 0 X10(3)/MCL
BASOPHILS NFR BLD AUTO: 0 %
EOSINOPHIL # BLD AUTO: 0 X10(3)/MCL (ref 0–0.9)
EOSINOPHIL NFR BLD AUTO: 0 %
ERYTHROCYTE [DISTWIDTH] IN BLOOD BY AUTOMATED COUNT: 21.4 % (ref 11.5–17)
HCT VFR BLD AUTO: 26.9 % (ref 37–47)
HGB BLD-MCNC: 8.2 G/DL (ref 12–16)
IMM GRANULOCYTES # BLD AUTO: 0.04 X10(3)/MCL (ref 0–0.04)
IMM GRANULOCYTES NFR BLD AUTO: 1.8 %
LYMPHOCYTES # BLD AUTO: 1.58 X10(3)/MCL (ref 0.6–4.6)
LYMPHOCYTES NFR BLD AUTO: 72.8 %
MCH RBC QN AUTO: 31.7 PG (ref 27–31)
MCHC RBC AUTO-ENTMCNC: 30.5 G/DL (ref 33–36)
MCV RBC AUTO: 103.9 FL (ref 80–94)
MONOCYTES # BLD AUTO: 0.13 X10(3)/MCL (ref 0.1–1.3)
MONOCYTES NFR BLD AUTO: 6 %
NEUTROPHILS # BLD AUTO: 0.42 X10(3)/MCL (ref 2.1–9.2)
NEUTROPHILS NFR BLD AUTO: 19.4 %
NRBC BLD AUTO-RTO: 4.1 %
PLATELET # BLD AUTO: 84 X10(3)/MCL (ref 130–400)
PLATELETS.RETICULATED NFR BLD AUTO: 8.5 % (ref 0.9–11.2)
PMV BLD AUTO: 0 FL (ref 7.4–10.4)
RBC # BLD AUTO: 2.59 X10(6)/MCL (ref 4.2–5.4)
WBC # SPEC AUTO: 2.17 X10(3)/MCL (ref 4.5–11.5)

## 2023-08-21 PROCEDURE — 36415 COLL VENOUS BLD VENIPUNCTURE: CPT

## 2023-08-21 PROCEDURE — 85025 COMPLETE CBC W/AUTO DIFF WBC: CPT

## 2023-08-21 PROCEDURE — 99215 OFFICE O/P EST HI 40 MIN: CPT | Mod: PBBFAC

## 2023-08-21 RX ORDER — CLINDAMYCIN HYDROCHLORIDE 300 MG/1
300 CAPSULE ORAL EVERY 8 HOURS
Qty: 30 CAPSULE | Refills: 0 | Status: SHIPPED | OUTPATIENT
Start: 2023-08-21 | End: 2023-08-31

## 2023-08-21 NOTE — PROGRESS NOTES
[facility] OFFICE VISIT NOTE  Fela Ellison  [id number]  08/21/2023    Chief Complaint   Patient presents with    Hospital Follow Up    Abdominal Pain       Fela Ellison is a 58 y.o. female  presenting to [facility] for bumps on labia.    [facility]    : [id number] Alirio    59 yo F with PMH of CML with acute myeloid blast crisis and pancytopenia (follows with oncology, on therapy), DMII (last A1C 7.6 in July, on insulin and metformin), HTN, nonalcoholic fatty liver disease, HLD, neuropathy. Currently followed by [facility] for primary care/medication management of chronic conditions.    Today, she complains of a painful bump on her left labia that she first noticed about 2 days ago. Has had no bleeding or discharge from the area. Denies fever, nausea/vomiting, abdominal pain, dysuria or vaginal discharge. Had similar lesion to left labia in past that had to be removed. She says she is worried about this new bump because this was how her recent cellulitis initially presented; first as a small bump that grew and became more painful.    Of note, she has history of multiple skin infections. Most recently, admitted to [facility] for left groin cellulitis. Was discharged (8/7/23) on 12 day course of PO ciprofloxacin and doxycycline. Reports she completed antibiotics. Was seen in [facility] for hospital follow up following discharge and improvement of left groin cellulitis noted. Referral was sent to [facility] Gynecology.    Review of Systems   Constitutional:  Positive for chills. Negative for appetite change, fatigue and fever.   Respiratory:  Negative for shortness of breath.    Cardiovascular:  Negative for chest pain and leg swelling.   Gastrointestinal:  Positive for abdominal pain (occasional superficial, burning lower abdominal pain to areas where she receives injectable oncology treatments). Negative for diarrhea, nausea and vomiting.   Genitourinary:  Positive for vaginal pain (painful bump to left labia). Negative for dysuria  "and vaginal discharge.   Skin:         Recent left groin cellulitis. Painful bump to left labia.   Neurological:  Negative for light-headedness and headaches.       Blood pressure 119/76, pulse 75, temperature 98.6 °F (37 °C), temperature source Oral, resp. rate 18, height 5' 4" (1.626 m), weight 111.6 kg (246 lb), SpO2 97 %.     Physical Exam  Vitals reviewed. Exam conducted with a chaperone present.   Constitutional:       General: She is not in acute distress.     Appearance: She is obese. She is not toxic-appearing.   Cardiovascular:      Rate and Rhythm: Normal rate and regular rhythm.      Heart sounds: Murmur heard.      Comments: Grade III/VI systolic murmur.  Radial pulses 2+  Pulmonary:      Effort: Pulmonary effort is normal. No respiratory distress.      Breath sounds: No wheezing.      Comments: Clear breath sounds throughout  Abdominal:      General: Bowel sounds are normal.      Palpations: Abdomen is soft.      Tenderness: There is abdominal tenderness (tenderness to palpation of lower abdomen).      Comments: Subcutaneous nodules palpated; bandlike distribution along lower abdomen. No overlying eythema. Pt states these are areas where she receives injectable oncology treatment.    Genitourinary:     General: Normal vulva.      Labia:         Left: Lesion present.           Comments: 1. Area of patient's concern today. Approximately 1x1cm area of induration with minimal overlying erythema. Tender to palpation. No drainage or bleeding. No fluctuance appreciated.    2. Area of previous labial lesion that patient states was removed. Smaller 0.5x0.5cm area of induration. Nontender. No overlying erythema. No fluctuance, drainage or bleeding.    3. Area of previous left groin cellulitis. Approximately 4x4cm area of induration with minimal tenderness to palpation. No erythema; no drainage or bleeding.  Musculoskeletal:      Right lower leg: No edema.      Left lower leg: No edema.   Skin:     General: Skin " is warm.   Neurological:      Mental Status: She is alert and oriented to person, place, and time.   Psychiatric:         Behavior: Behavior normal.         Thought Content: Thought content normal.         Current Medications:   Current Outpatient Medications   Medication Sig Dispense Refill    acyclovir (ZOVIRAX) 400 MG tablet Take 1 tablet (400 mg total) by mouth 2 (two) times daily. 60 tablet 11    albuterol (PROVENTIL/VENTOLIN HFA) 90 mcg/actuation inhaler Inhale 2 puffs into the lungs every 6 (six) hours as needed for Wheezing. Rescue      allopurinoL (ZYLOPRIM) 300 MG tablet Take 300 mg by mouth once daily.      ALPRAZolam (XANAX) 0.25 MG tablet Take 0.25 mg by mouth 3 (three) times daily as needed for Anxiety.      amLODIPine (NORVASC) 10 MG tablet Take 10 mg by mouth once daily.      ammonium lactate 12 % Crea Apply topically daily as needed.      atorvastatin (LIPITOR) 40 MG tablet Take 40 mg by mouth once daily.      busPIRone (BUSPAR) 5 MG Tab Take 1 tablet (5 mg total) by mouth 2 (two) times daily. 60 tablet 11    clindamycin (CLEOCIN) 300 MG capsule Take 1 capsule (300 mg total) by mouth every 8 (eight) hours. for 10 days 30 capsule 0    EScitalopram oxalate (LEXAPRO) 10 MG tablet Take 1 tablet (10 mg total) by mouth once daily. 30 tablet 11    furosemide (LASIX) 20 MG tablet Take 20 mg by mouth once daily.      gabapentin (NEURONTIN) 300 MG capsule Take 1 capsule (300 mg total) by mouth 3 (three) times daily. 90 capsule 11    HYDROcodone-acetaminophen (NORCO) 5-325 mg per tablet Take 1 tablet by mouth every 6 (six) hours as needed for Pain.      hydrocortisone 2.5 % cream Apply topically 2 (two) times daily.      hydrOXYzine pamoate (VISTARIL) 25 MG Cap Take 1 capsule (25 mg total) by mouth every 8 (eight) hours as needed. 60 capsule 0    ibuprofen (ADVIL,MOTRIN) 600 MG tablet TAKE 1 TABLET WITH FOOD OR MILK AS NEEDED THREE TIMES A DAY ORALLY 30      insulin glargine (LANTUS) 100 unit/mL injection  Inject 60 Units into the skin nightly. 30 each 6    insulin lispro 100 unit/mL injection Inject 22 Units into the skin 3 (three) times daily before meals. Takes 22 units TID AC while on chemo - (Gives between 12 to 15 units TID AC when not on chemo) 10 mL 2    insulin NPH (NOVOLIN N NPH U-100 INSULIN) 100 unit/mL injection 50 Units 2 (two) times daily before meals. No longer 20 in AM      ketoconazole (NIZORAL) 2 % cream Apply topically.      loratadine (CLARITIN) 10 mg tablet Take 1 tablet (10 mg total) by mouth once daily.  0    losartan (COZAAR) 25 MG tablet Take 1 tablet (25 mg total) by mouth once daily. 90 tablet 3    metFORMIN (GLUCOPHAGE) 1000 MG tablet Take 1,000 mg by mouth 2 (two) times daily with meals.      metoprolol tartrate (LOPRESSOR) 25 MG tablet Take 25 mg by mouth 2 (two) times daily.      montelukast (SINGULAIR) 10 mg tablet Take 10 mg by mouth once daily.      omeprazole (PRILOSEC) 40 MG capsule Take 1 capsule by mouth once daily.      ondansetron (ZOFRAN) 8 MG tablet Take 8 mg by mouth every 8 (eight) hours as needed for Nausea.      ondansetron (ZOFRAN-ODT) 4 MG TbDL Take 4 mg by mouth every 8 (eight) hours as needed.      PONATinib (ICLUSIG) 30 mg Tab Take 30 mg by mouth Daily.      silver sulfADIAZINE 1% (SILVADENE) 1 % cream Apply topically.      venetoclax (VENCLEXTA) 100 mg Tab Take 400 mg by mouth Only takes while taking chemo .       No current facility-administered medications for this visit.       Assessment:   1. Labial lesion        Plan:    -tender left labial lesion with minimal overlying erythema on exam. No current systemic symptoms. H/o DMII with last A1C 7.6 in 7/'23; not on SGLT2 inhibitor. Given history of frequent skin infections and recent left groin cellulitis, prescription sent for PO clindamycin 300mg TID x 10 days.  -Gynecology referral pending. Discussed with patient that their office will call to set up appointment.    -patient currently attempting to establish PCP  care with [facility] IM clinic for management of chronic conditions. Referral pending.    Orders Placed This Encounter    clindamycin (CLEOCIN) 300 MG capsule       [facility] if needed.         Ina Enriquez MD  [facility] Resident, HO-1

## 2023-08-22 NOTE — PROGRESS NOTES
I have seen the patient, reviewed the resident's history and physical, assessment, plan, and progress note. I have personally interviewed and examined the patient at bedside and: agree with the findings.     Jacinto Moreno MD  [facility] - Family Medicine

## 2023-09-01 ENCOUNTER — HOSPITAL ENCOUNTER (EMERGENCY)
Facility: HOSPITAL | Age: 58
Discharge: HOME | End: 2023-09-01
Attending: INTERNAL MEDICINE
Payer: MEDICAID

## 2023-09-01 VITALS
SYSTOLIC BLOOD PRESSURE: 147 MMHG | BODY MASS INDEX: 41.88 KG/M2 | TEMPERATURE: 98 F | RESPIRATION RATE: 18 BRPM | OXYGEN SATURATION: 97 % | WEIGHT: 244 LBS | DIASTOLIC BLOOD PRESSURE: 73 MMHG | HEART RATE: 68 BPM

## 2023-09-01 DIAGNOSIS — D61.818 PANCYTOPENIA: Primary | ICD-10-CM

## 2023-09-01 DIAGNOSIS — R23.3 ECCHYMOSES, SPONTANEOUS: ICD-10-CM

## 2023-09-01 DIAGNOSIS — R60.9 SWELLING: ICD-10-CM

## 2023-09-01 LAB
ABS NEUT CALC (OHS): 1.04 X10(3)/MCL (ref 2.1–9.2)
ALBUMIN SERPL-MCNC: 3.2 G/DL (ref 3.5–5)
ALBUMIN/GLOB SERPL: 1 RATIO (ref 1.1–2)
ALP SERPL-CCNC: 48 UNIT/L (ref 40–150)
ALT SERPL-CCNC: 16 UNIT/L (ref 0–55)
ANISOCYTOSIS BLD QL SMEAR: ABNORMAL
AST SERPL-CCNC: 13 UNIT/L (ref 5–34)
BILIRUB SERPL-MCNC: 0.4 MG/DL
BUN SERPL-MCNC: 12.6 MG/DL (ref 9.8–20.1)
CALCIUM SERPL-MCNC: 9.1 MG/DL (ref 8.4–10.2)
CHLORIDE SERPL-SCNC: 105 MMOL/L (ref 98–107)
CO2 SERPL-SCNC: 25 MMOL/L (ref 22–29)
CREAT SERPL-MCNC: 0.73 MG/DL (ref 0.55–1.02)
ERYTHROCYTE [DISTWIDTH] IN BLOOD BY AUTOMATED COUNT: 20.2 % (ref 11.5–17)
GFR SERPLBLD CREATININE-BSD FMLA CKD-EPI: >60 MLS/MIN/1.73/M2
GLOBULIN SER-MCNC: 3.2 GM/DL (ref 2.4–3.5)
GLUCOSE SERPL-MCNC: 331 MG/DL (ref 74–100)
GROUP & RH: NORMAL
HCT VFR BLD AUTO: 26.3 % (ref 37–47)
HGB BLD-MCNC: 8.1 G/DL (ref 12–16)
INDIRECT COOMBS GEL: NORMAL
LYMPHOCYTES NFR BLD MANUAL: 1.38 X10(3)/MCL
LYMPHOCYTES NFR BLD MANUAL: 57 % (ref 13–40)
MCH RBC QN AUTO: 31.3 PG (ref 27–31)
MCHC RBC AUTO-ENTMCNC: 30.8 G/DL (ref 33–36)
MCV RBC AUTO: 101.5 FL (ref 80–94)
NEUTROPHILS NFR BLD MANUAL: 42 % (ref 47–80)
NEUTS BAND NFR BLD MANUAL: 1 % (ref 0–11)
NRBC BLD AUTO-RTO: 7 %
NRBC BLD MANUAL-RTO: 7 %
PLATELET # BLD AUTO: 69 X10(3)/MCL (ref 130–400)
PLATELET # BLD EST: ABNORMAL 10*3/UL
PLATELETS.RETICULATED NFR BLD AUTO: 8.5 % (ref 0.9–11.2)
PMV BLD AUTO: ABNORMAL FL
POIKILOCYTOSIS BLD QL SMEAR: ABNORMAL
POTASSIUM SERPL-SCNC: 4.1 MMOL/L (ref 3.5–5.1)
PROT SERPL-MCNC: 6.4 GM/DL (ref 6.4–8.3)
RBC # BLD AUTO: 2.59 X10(6)/MCL (ref 4.2–5.4)
RBC MORPH BLD: ABNORMAL
SCHISTOCYTE (OLG): ABNORMAL
SODIUM SERPL-SCNC: 140 MMOL/L (ref 136–145)
SPECIMEN OUTDATE: NORMAL
WBC # SPEC AUTO: 2.42 X10(3)/MCL (ref 4.5–11.5)

## 2023-09-01 PROCEDURE — 80053 COMPREHEN METABOLIC PANEL: CPT

## 2023-09-01 PROCEDURE — 99284 EMERGENCY DEPT VISIT MOD MDM: CPT | Mod: 25

## 2023-09-01 PROCEDURE — 85027 COMPLETE CBC AUTOMATED: CPT

## 2023-09-01 PROCEDURE — 86900 BLOOD TYPING SEROLOGIC ABO: CPT

## 2023-09-01 NOTE — ED PROVIDER NOTES
Encounter Date: 2023       History     Chief Complaint   Patient presents with    Rash    Facial Swelling     PT W HX OF LYMPHOID LEUKEMIA W CO DISCOLORATION AND SLIGHT SWELLING TO JAW/NECK  AND CHEST SINCE THIS AM AFTER THROWING UP.  NO PAIN OR ITCHING. NO DIFFICULTY SWALLOWING OR BREATHING. VSS. LAST CHEMO IN .      The history is provided by the patient and the spouse.       58 y.o. female with a past medical history of CML (chronic myelocytic leukemia), pancytopenia, DM2 (diabetes mellitus, type 2), HTN (hypertension), NAFLD (nonalcoholic fatty liver disease), Neuropathy, HLD (Hyperlipidemia), former tobacco user, umbilical hernia  presenting for discoloration and swelling to the upper chest, jaw, neck onset this morning after throwing up. Reports she has had this discoloration in the past on her lower extremities that required blood transfusions and platelets  Her last chemotherapy for leukemia was in . Denies any current chest pain, shortness of breath.     Review of patient's allergies indicates:  No Known Allergies  Past Medical History:   Diagnosis Date    Cancer     CML (chronic myelocytic leukemia)     DM2 (diabetes mellitus, type 2)     HTN (hypertension)     NAFLD (nonalcoholic fatty liver disease)     Neuropathy      Past Surgical History:   Procedure Laterality Date    ABDOMINAL SURGERY       SECTION      x4    CHOLECYSTECTOMY       Family History   Problem Relation Age of Onset    Diabetes Mother     Diabetes Father     Cancer Neg Hx      Social History     Tobacco Use    Smoking status: Never    Smokeless tobacco: Never   Substance Use Topics    Alcohol use: Not Currently    Drug use: Not Currently     Review of Systems   Respiratory:  Positive for chest tightness. Negative for shortness of breath and wheezing.    Cardiovascular:  Negative for leg swelling.   Gastrointestinal:  Positive for abdominal pain and vomiting. Negative for constipation and diarrhea.   Genitourinary:   Negative for dysuria.   Musculoskeletal:  Negative for back pain.   Skin:         Discoloration of chest and neck   All other systems reviewed and are negative.      Physical Exam     Initial Vitals [09/01/23 1004]   BP Pulse Resp Temp SpO2   136/73 77 18 97.9 °F (36.6 °C) 95 %      MAP       --         Physical Exam    Nursing note and vitals reviewed.  Constitutional: She appears well-developed and well-nourished. No distress.   HENT:   Head: Normocephalic and atraumatic.   Mouth/Throat: Oropharynx is clear and moist. No oropharyngeal exudate.   Eyes: EOM are normal. Pupils are equal, round, and reactive to light.   Neck: No tracheal deviation present. No JVD present.   Normal range of motion.  Cardiovascular:  Normal rate, regular rhythm and intact distal pulses.           Pulmonary/Chest: Breath sounds normal. No stridor. No respiratory distress. She has no wheezes.   Abdominal: Abdomen is soft. Bowel sounds are normal.   Tender, firm, left sided abdominal pain s/p chemoradiation therapy scars.    Musculoskeletal:         General: Normal range of motion.      Cervical back: Normal range of motion.     Neurological: She is alert and oriented to person, place, and time. GCS score is 15. GCS eye subscore is 4. GCS verbal subscore is 5. GCS motor subscore is 6.   Skin:   Non tender, non-blanching, ecchymoses of the upper chest, neck, and jaw.    Psychiatric: She has a normal mood and affect. Thought content normal.         ED Course   Procedures  Labs Reviewed   COMPREHENSIVE METABOLIC PANEL - Abnormal; Notable for the following components:       Result Value    Glucose Level 331 (*)     Albumin Level 3.2 (*)     Albumin/Globulin Ratio 1.0 (*)     All other components within normal limits   CBC WITH DIFFERENTIAL - Abnormal; Notable for the following components:    WBC 2.42 (*)     RBC 2.59 (*)     Hgb 8.1 (*)     Hct 26.3 (*)     .5 (*)     MCH 31.3 (*)     MCHC 30.8 (*)     RDW 20.2 (*)     Platelet 69 (*)      All other components within normal limits   MANUAL DIFFERENTIAL - Abnormal; Notable for the following components:    Neutrophils % 42 (*)     Lymphs % 57 (*)     Neutrophils Abs Calc 1.0406 (*)     Platelets Decreased (*)     RBC Morph Abnormal (*)     Anisocytosis 1+ (*)     Poikilocytosis 1+ (*)     Schistocytes 1+ (*)     All other components within normal limits   CBC W/ AUTO DIFFERENTIAL    Narrative:     The following orders were created for panel order CBC auto differential.  Procedure                               Abnormality         Status                     ---------                               -----------         ------                     CBC with Differential[[id number]]        Abnormal            Final result               Manual Differential[[id number]]          Abnormal            Final result                 Please view results for these tests on the individual orders.   EXTRA TUBES    Narrative:     The following orders were created for panel order EXTRA TUBES.  Procedure                               Abnormality         Status                     ---------                               -----------         ------                     Light Blue Top Hold[[id number]]                              In process                 Gold Top Hold[[id number]]                                    In process                   Please view results for these tests on the individual orders.   LIGHT BLUE TOP HOLD   GOLD TOP HOLD   TYPE & SCREEN          Imaging Results              X-Ray Chest PA And Lateral (Final result)  Result time 09/01/23 12:14:44      Final result by Chago Chin MD (09/01/23 12:14:44)                   Impression:      Borderline cardiomegaly, no acute abnormalities are seen      Electronically signed by: Chago Chin MD  Date:    09/01/2023  Time:    12:14               Narrative:    EXAMINATION:  XR CHEST PA AND LATERAL    CLINICAL HISTORY:  Edema, unspecified    TECHNIQUE:  PA and  lateral views of the chest were performed.    COMPARISON:  05/24/2023    FINDINGS:  Filtrates are seen.  Heart is borderline in size.  Costophrenic angles are clear.                                    X-Rays:   Independently Interpreted Readings:   Chest X-Ray: No infiltrates.  No acute abnormalities.  Normal heart size.     Medications - No data to display  Medical Decision Making  58 y.o. female with a past medical history of CML (chronic myelocytic leukemia) and pancytopenia presents today for discoloration of there chest, neck, and jaw since this morning after vomiting. Denies any hematemesis, rectal bleed, or melena. Her last chemotherapy was in June, discontinued due to recurrent infections. She will be treated with immune globulins and continue chemotherapy next week. Physical exam shows diffuse nontender, nonblanching ecchymoses of the upper chest, neck, and jaw. Hgb 8.1 (baseline around 8.3), and platelet 69 (baseline around 80s). CXR shows no acute cardiopulmonary abnormality. On re-examination patient's ecchymoses improved, she denies any symptoms and is stable for discharge.     Amount and/or Complexity of Data Reviewed  External Data Reviewed: labs.     Details: Pancytopenia noticed  Labs: ordered. Decision-making details documented in ED Course.  Radiology: ordered and independent interpretation performed. Decision-making details documented in ED Course.              Attending Attestation:   Physician Attestation Statement for Resident:  As the supervising MD   Physician Attestation Statement: I have personally seen and examined this patient.   I agree with the above history.  -:   As the supervising MD I agree with the above PE.     As the supervising MD I agree with the above treatment, course, plan, and disposition.    I have reviewed and agree with the residents interpretation of the following: lab data and x-rays.  I have reviewed the following: old records at this facility.                                 Clinical Impression:   Final diagnoses:  [R60.9] Swelling  [D61.818] Pancytopenia (Primary)  [R23.3] Ecchymoses, spontaneous        ED Disposition Condition    Discharge Stable          ED Prescriptions    None       Follow-up Information    None          Rupesh Jin MD  Resident  09/01/23 [id number]       Elroy Paulino MD  09/01/23 5929

## 2023-09-16 ENCOUNTER — HOSPITAL ENCOUNTER (OUTPATIENT)
Facility: HOSPITAL | Age: 58
Discharge: HOME | End: 2023-09-18
Attending: STUDENT IN AN ORGANIZED HEALTH CARE EDUCATION/TRAINING PROGRAM | Admitting: INTERNAL MEDICINE
Payer: MEDICAID

## 2023-09-16 DIAGNOSIS — D69.6 THROMBOCYTOPENIA: Primary | ICD-10-CM

## 2023-09-16 DIAGNOSIS — C92.10 CML (CHRONIC MYELOCYTIC LEUKEMIA): ICD-10-CM

## 2023-09-16 DIAGNOSIS — D64.9 ANEMIA, UNSPECIFIED TYPE: ICD-10-CM

## 2023-09-16 LAB
ABS NEUT CALC (OHS): 0.89 X10(3)/MCL (ref 2.1–9.2)
ALBUMIN SERPL-MCNC: 3.3 G/DL (ref 3.5–5)
ALBUMIN/GLOB SERPL: 1.4 RATIO (ref 1.1–2)
ALP SERPL-CCNC: 53 UNIT/L (ref 40–150)
ALT SERPL-CCNC: 14 UNIT/L (ref 0–55)
ANISOCYTOSIS BLD QL SMEAR: ABNORMAL
APPEARANCE UR: CLEAR
AST SERPL-CCNC: 13 UNIT/L (ref 5–34)
BACTERIA #/AREA URNS AUTO: ABNORMAL /HPF
BILIRUB SERPL-MCNC: 0.6 MG/DL
BILIRUB UR QL STRIP.AUTO: NEGATIVE
BUN SERPL-MCNC: 13.3 MG/DL (ref 9.8–20.1)
CALCIUM SERPL-MCNC: 9.1 MG/DL (ref 8.4–10.2)
CHLORIDE SERPL-SCNC: 109 MMOL/L (ref 98–107)
CO2 SERPL-SCNC: 24 MMOL/L (ref 22–29)
COLOR UR: COLORLESS
CREAT SERPL-MCNC: 0.75 MG/DL (ref 0.55–1.02)
D DIMER PPP IA.FEU-MCNC: 0.32 UG/ML FEU (ref 0–0.5)
ERYTHROCYTE [DISTWIDTH] IN BLOOD BY AUTOMATED COUNT: 18.9 % (ref 11.5–17)
GFR SERPLBLD CREATININE-BSD FMLA CKD-EPI: >60 MLS/MIN/1.73/M2
GLOBULIN SER-MCNC: 2.4 GM/DL (ref 2.4–3.5)
GLUCOSE SERPL-MCNC: 327 MG/DL (ref 74–100)
GLUCOSE UR QL STRIP.AUTO: ABNORMAL
HAPTOGLOB SERPL-MCNC: 18 MG/DL (ref 35–250)
HCT VFR BLD AUTO: 26.6 % (ref 37–47)
HGB BLD-MCNC: 8.2 G/DL (ref 12–16)
HYALINE CASTS #/AREA URNS LPF: ABNORMAL /LPF
INR PPP: 1
KETONES UR QL STRIP.AUTO: NEGATIVE
LACTATE SERPL-SCNC: 2.2 MMOL/L (ref 0.5–2.2)
LACTATE SERPL-SCNC: 2.4 MMOL/L (ref 0.5–2.2)
LDH SERPL-CCNC: 388 U/L (ref 125–220)
LEUKOCYTE ESTERASE UR QL STRIP.AUTO: NEGATIVE
LYMPH ABN # BLD MANUAL: 5 %
LYMPHOCYTES NFR BLD MANUAL: 1.49 X10(3)/MCL
LYMPHOCYTES NFR BLD MANUAL: 57 % (ref 13–40)
MAGNESIUM SERPL-MCNC: 1.9 MG/DL (ref 1.6–2.6)
MCH RBC QN AUTO: 31.8 PG (ref 27–31)
MCHC RBC AUTO-ENTMCNC: 30.8 G/DL (ref 33–36)
MCV RBC AUTO: 103.1 FL (ref 80–94)
MONOCYTES NFR BLD MANUAL: 0.08 X10(3)/MCL (ref 0.1–1.3)
MONOCYTES NFR BLD MANUAL: 3 % (ref 2–11)
MUCOUS THREADS URNS QL MICRO: ABNORMAL /LPF
MYELOCYTES NFR BLD MANUAL: 1 %
NEUTROPHILS NFR BLD MANUAL: 33 % (ref 47–80)
NEUTS BAND NFR BLD MANUAL: 1 % (ref 0–11)
NITRITE UR QL STRIP.AUTO: NEGATIVE
NRBC BLD AUTO-RTO: 6.1 %
NRBC BLD MANUAL-RTO: 3 %
PH UR STRIP.AUTO: 7 [PH]
PLATELET # BLD AUTO: 15 X10(3)/MCL (ref 130–400)
PLATELET # BLD EST: ABNORMAL 10*3/UL
PLATELETS.RETICULATED NFR BLD AUTO: 9.1 % (ref 0.9–11.2)
PMV BLD AUTO: 0 FL (ref 7.4–10.4)
POLYCHROMASIA BLD QL SMEAR: ABNORMAL
POTASSIUM SERPL-SCNC: 3.7 MMOL/L (ref 3.5–5.1)
PROT SERPL-MCNC: 5.7 GM/DL (ref 6.4–8.3)
PROT UR QL STRIP.AUTO: NEGATIVE
PROTHROMBIN TIME: 12.5 SECONDS (ref 11.4–14)
RBC # BLD AUTO: 2.58 X10(6)/MCL (ref 4.2–5.4)
RBC #/AREA URNS AUTO: ABNORMAL /HPF
RBC MORPH BLD: ABNORMAL
RBC UR QL AUTO: NEGATIVE
SODIUM SERPL-SCNC: 141 MMOL/L (ref 136–145)
SP GR UR STRIP.AUTO: 1.02 (ref 1–1.03)
SQUAMOUS #/AREA URNS LPF: ABNORMAL /HPF
STIPPLED RBC (OHS): ABNORMAL
TEAR DROP CELL (OLG): ABNORMAL
UROBILINOGEN UR STRIP-ACNC: NORMAL
WBC # SPEC AUTO: 2.61 X10(3)/MCL (ref 4.5–11.5)
WBC #/AREA URNS AUTO: ABNORMAL /HPF

## 2023-09-16 PROCEDURE — 83010 ASSAY OF HAPTOGLOBIN QUANT: CPT | Performed by: STUDENT IN AN ORGANIZED HEALTH CARE EDUCATION/TRAINING PROGRAM

## 2023-09-16 PROCEDURE — G0378 HOSPITAL OBSERVATION PER HR: HCPCS

## 2023-09-16 PROCEDURE — 85027 COMPLETE CBC AUTOMATED: CPT | Performed by: STUDENT IN AN ORGANIZED HEALTH CARE EDUCATION/TRAINING PROGRAM

## 2023-09-16 PROCEDURE — 80053 COMPREHEN METABOLIC PANEL: CPT | Performed by: STUDENT IN AN ORGANIZED HEALTH CARE EDUCATION/TRAINING PROGRAM

## 2023-09-16 PROCEDURE — 83615 LACTATE (LD) (LDH) ENZYME: CPT | Performed by: STUDENT IN AN ORGANIZED HEALTH CARE EDUCATION/TRAINING PROGRAM

## 2023-09-16 PROCEDURE — 85610 PROTHROMBIN TIME: CPT | Performed by: STUDENT IN AN ORGANIZED HEALTH CARE EDUCATION/TRAINING PROGRAM

## 2023-09-16 PROCEDURE — 99285 EMERGENCY DEPT VISIT HI MDM: CPT

## 2023-09-16 PROCEDURE — 85379 FIBRIN DEGRADATION QUANT: CPT | Performed by: STUDENT IN AN ORGANIZED HEALTH CARE EDUCATION/TRAINING PROGRAM

## 2023-09-16 PROCEDURE — 83735 ASSAY OF MAGNESIUM: CPT | Performed by: STUDENT IN AN ORGANIZED HEALTH CARE EDUCATION/TRAINING PROGRAM

## 2023-09-16 PROCEDURE — 83605 ASSAY OF LACTIC ACID: CPT | Performed by: STUDENT IN AN ORGANIZED HEALTH CARE EDUCATION/TRAINING PROGRAM

## 2023-09-16 PROCEDURE — 81001 URINALYSIS AUTO W/SCOPE: CPT | Performed by: STUDENT IN AN ORGANIZED HEALTH CARE EDUCATION/TRAINING PROGRAM

## 2023-09-16 RX ORDER — IBUPROFEN 200 MG
16 TABLET ORAL
Status: DISCONTINUED | OUTPATIENT
Start: 2023-09-17 | End: 2023-09-18 | Stop reason: HOSPADM

## 2023-09-16 RX ORDER — PANTOPRAZOLE SODIUM 40 MG/1
40 TABLET, DELAYED RELEASE ORAL DAILY
Status: DISCONTINUED | OUTPATIENT
Start: 2023-09-17 | End: 2023-09-18 | Stop reason: HOSPADM

## 2023-09-16 RX ORDER — GUAIFENESIN 100 MG/5ML
200 SOLUTION ORAL EVERY 4 HOURS PRN
Status: DISCONTINUED | OUTPATIENT
Start: 2023-09-16 | End: 2023-09-18 | Stop reason: HOSPADM

## 2023-09-16 RX ORDER — ALLOPURINOL 100 MG/1
300 TABLET ORAL DAILY
Status: DISCONTINUED | OUTPATIENT
Start: 2023-09-17 | End: 2023-09-18 | Stop reason: HOSPADM

## 2023-09-16 RX ORDER — METOPROLOL TARTRATE 25 MG/1
25 TABLET, FILM COATED ORAL 2 TIMES DAILY
Status: DISCONTINUED | OUTPATIENT
Start: 2023-09-17 | End: 2023-09-18 | Stop reason: HOSPADM

## 2023-09-16 RX ORDER — GLUCAGON 1 MG
1 KIT INJECTION
Status: DISCONTINUED | OUTPATIENT
Start: 2023-09-17 | End: 2023-09-18 | Stop reason: HOSPADM

## 2023-09-16 RX ORDER — ALPRAZOLAM 0.25 MG/1
0.25 TABLET ORAL 3 TIMES DAILY PRN
Status: DISCONTINUED | OUTPATIENT
Start: 2023-09-17 | End: 2023-09-18 | Stop reason: HOSPADM

## 2023-09-16 RX ORDER — HYDROCODONE BITARTRATE AND ACETAMINOPHEN 500; 5 MG/1; MG/1
TABLET ORAL
Status: CANCELLED | OUTPATIENT
Start: 2023-09-16

## 2023-09-16 RX ORDER — ONDANSETRON 2 MG/ML
4 INJECTION INTRAMUSCULAR; INTRAVENOUS EVERY 8 HOURS PRN
Status: DISCONTINUED | OUTPATIENT
Start: 2023-09-16 | End: 2023-09-18 | Stop reason: HOSPADM

## 2023-09-16 RX ORDER — AMLODIPINE BESYLATE 10 MG/1
10 TABLET ORAL DAILY
Status: DISCONTINUED | OUTPATIENT
Start: 2023-09-17 | End: 2023-09-18 | Stop reason: HOSPADM

## 2023-09-16 RX ORDER — ALBUTEROL SULFATE 0.83 MG/ML
2.5 SOLUTION RESPIRATORY (INHALATION) EVERY 6 HOURS PRN
Status: DISCONTINUED | OUTPATIENT
Start: 2023-09-17 | End: 2023-09-18 | Stop reason: HOSPADM

## 2023-09-16 RX ORDER — SODIUM CHLORIDE 0.9 % (FLUSH) 0.9 %
10 SYRINGE (ML) INJECTION
Status: DISCONTINUED | OUTPATIENT
Start: 2023-09-16 | End: 2023-09-18 | Stop reason: HOSPADM

## 2023-09-16 RX ORDER — INSULIN ASPART 100 [IU]/ML
0-15 INJECTION, SOLUTION INTRAVENOUS; SUBCUTANEOUS
Status: DISCONTINUED | OUTPATIENT
Start: 2023-09-17 | End: 2023-09-18 | Stop reason: HOSPADM

## 2023-09-16 RX ORDER — ESCITALOPRAM OXALATE 10 MG/1
10 TABLET ORAL DAILY
Status: DISCONTINUED | OUTPATIENT
Start: 2023-09-17 | End: 2023-09-18 | Stop reason: HOSPADM

## 2023-09-16 RX ORDER — BUSPIRONE HYDROCHLORIDE 5 MG/1
5 TABLET ORAL 2 TIMES DAILY
Status: DISCONTINUED | OUTPATIENT
Start: 2023-09-17 | End: 2023-09-18 | Stop reason: HOSPADM

## 2023-09-16 RX ORDER — ACYCLOVIR 400 MG/1
400 TABLET ORAL 2 TIMES DAILY
Status: DISCONTINUED | OUTPATIENT
Start: 2023-09-17 | End: 2023-09-17

## 2023-09-16 RX ORDER — IBUPROFEN 200 MG
24 TABLET ORAL
Status: DISCONTINUED | OUTPATIENT
Start: 2023-09-17 | End: 2023-09-18 | Stop reason: HOSPADM

## 2023-09-16 RX ORDER — HYDROXYZINE PAMOATE 25 MG/1
25 CAPSULE ORAL EVERY 8 HOURS PRN
Status: DISCONTINUED | OUTPATIENT
Start: 2023-09-17 | End: 2023-09-18 | Stop reason: HOSPADM

## 2023-09-16 RX ORDER — ATORVASTATIN CALCIUM 40 MG/1
40 TABLET, FILM COATED ORAL DAILY
Status: DISCONTINUED | OUTPATIENT
Start: 2023-09-17 | End: 2023-09-18 | Stop reason: HOSPADM

## 2023-09-16 RX ORDER — TALC
6 POWDER (GRAM) TOPICAL NIGHTLY PRN
Status: DISCONTINUED | OUTPATIENT
Start: 2023-09-16 | End: 2023-09-18 | Stop reason: HOSPADM

## 2023-09-16 RX ORDER — ACETAMINOPHEN 325 MG/1
650 TABLET ORAL EVERY 8 HOURS PRN
Status: DISCONTINUED | OUTPATIENT
Start: 2023-09-16 | End: 2023-09-18 | Stop reason: HOSPADM

## 2023-09-16 RX ORDER — GABAPENTIN 300 MG/1
300 CAPSULE ORAL 3 TIMES DAILY
Status: DISCONTINUED | OUTPATIENT
Start: 2023-09-17 | End: 2023-09-18 | Stop reason: HOSPADM

## 2023-09-16 RX ORDER — HYDROCODONE BITARTRATE AND ACETAMINOPHEN 500; 5 MG/1; MG/1
TABLET ORAL
Status: DISCONTINUED | OUTPATIENT
Start: 2023-09-17 | End: 2023-09-18 | Stop reason: HOSPADM

## 2023-09-17 LAB
ABO + RH BLD: NORMAL
ABS NEUT CALC (OHS): 1.36 X10(3)/MCL (ref 2.1–9.2)
ALBUMIN SERPL-MCNC: 3.4 G/DL (ref 3.5–5)
ALBUMIN/GLOB SERPL: 1.4 RATIO (ref 1.1–2)
ALP SERPL-CCNC: 47 UNIT/L (ref 40–150)
ALT SERPL-CCNC: 15 UNIT/L (ref 0–55)
ANISOCYTOSIS BLD QL SMEAR: ABNORMAL
AST SERPL-CCNC: 15 UNIT/L (ref 5–34)
BASOPHILS # BLD AUTO: 0 X10(3)/MCL
BASOPHILS NFR BLD AUTO: 0 %
BILIRUB SERPL-MCNC: 1.5 MG/DL
BLD PROD TYP BPU: NORMAL
BLOOD UNIT EXPIRATION DATE: NORMAL
BLOOD UNIT TYPE CODE: 6200
BUN SERPL-MCNC: 9.8 MG/DL (ref 9.8–20.1)
CALCIUM SERPL-MCNC: 8.7 MG/DL (ref 8.4–10.2)
CHLORIDE SERPL-SCNC: 109 MMOL/L (ref 98–107)
CHOLEST SERPL-MCNC: 117 MG/DL
CHOLEST/HDLC SERPL: 4 {RATIO} (ref 0–5)
CO2 SERPL-SCNC: 24 MMOL/L (ref 22–29)
CREAT SERPL-MCNC: 0.65 MG/DL (ref 0.55–1.02)
CROSSMATCH INTERPRETATION: NORMAL
DISPENSE STATUS: NORMAL
ELLIPTOCYTOSIS (OHS): ABNORMAL
EOSINOPHIL # BLD AUTO: 0.01 X10(3)/MCL (ref 0–0.9)
EOSINOPHIL NFR BLD AUTO: 0.4 %
ERYTHROCYTE [DISTWIDTH] IN BLOOD BY AUTOMATED COUNT: 18.9 % (ref 11.5–17)
ERYTHROCYTE [DISTWIDTH] IN BLOOD BY AUTOMATED COUNT: 19.2 % (ref 11.5–17)
GFR SERPLBLD CREATININE-BSD FMLA CKD-EPI: >60 MLS/MIN/1.73/M2
GLOBULIN SER-MCNC: 2.5 GM/DL (ref 2.4–3.5)
GLUCOSE SERPL-MCNC: 227 MG/DL (ref 74–100)
GROUP & RH: NORMAL
HCT VFR BLD AUTO: 25.5 % (ref 37–47)
HCT VFR BLD AUTO: 26 % (ref 37–47)
HDLC SERPL-MCNC: 29 MG/DL (ref 35–60)
HGB BLD-MCNC: 8 G/DL (ref 12–16)
HGB BLD-MCNC: 8.1 G/DL (ref 12–16)
IMM GRANULOCYTES # BLD AUTO: 0.05 X10(3)/MCL (ref 0–0.04)
IMM GRANULOCYTES NFR BLD AUTO: 2 %
INDIRECT COOMBS GEL: NORMAL
LDLC SERPL CALC-MCNC: 46 MG/DL (ref 50–140)
LYMPHOCYTES # BLD AUTO: 1.35 X10(3)/MCL (ref 0.6–4.6)
LYMPHOCYTES NFR BLD AUTO: 53.4 %
LYMPHOCYTES NFR BLD MANUAL: 1.2 X10(3)/MCL
LYMPHOCYTES NFR BLD MANUAL: 45 % (ref 13–40)
MACROCYTES BLD QL SMEAR: ABNORMAL
MCH RBC QN AUTO: 32 PG (ref 27–31)
MCH RBC QN AUTO: 32 PG (ref 27–31)
MCHC RBC AUTO-ENTMCNC: 31.2 G/DL (ref 33–36)
MCHC RBC AUTO-ENTMCNC: 31.4 G/DL (ref 33–36)
MCV RBC AUTO: 102 FL (ref 80–94)
MCV RBC AUTO: 102.8 FL (ref 80–94)
MICROCYTES BLD QL SMEAR: SLIGHT
MONOCYTES # BLD AUTO: 0.23 X10(3)/MCL (ref 0.1–1.3)
MONOCYTES NFR BLD AUTO: 9.1 %
MONOCYTES NFR BLD MANUAL: 0.11 X10(3)/MCL (ref 0.1–1.3)
MONOCYTES NFR BLD MANUAL: 4 % (ref 2–11)
NEUTROPHILS # BLD AUTO: 0.89 X10(3)/MCL (ref 2.1–9.2)
NEUTROPHILS NFR BLD AUTO: 35.1 %
NEUTROPHILS NFR BLD MANUAL: 51 % (ref 47–80)
NRBC BLD AUTO-RTO: 5.2 %
NRBC BLD AUTO-RTO: 6.3 %
NRBC BLD MANUAL-RTO: 4 %
OVALOCYTES (OLG): ABNORMAL
PLATELET # BLD AUTO: 51 X10(3)/MCL (ref 130–400)
PLATELET # BLD AUTO: 55 X10(3)/MCL (ref 130–400)
PLATELET # BLD EST: ABNORMAL 10*3/UL
PLATELETS.RETICULATED NFR BLD AUTO: 3.9 % (ref 0.9–11.2)
PLATELETS.RETICULATED NFR BLD AUTO: 3.9 % (ref 0.9–11.2)
PMV BLD AUTO: ABNORMAL FL
PMV BLD AUTO: ABNORMAL FL
POCT GLUCOSE: 236 MG/DL (ref 70–110)
POCT GLUCOSE: 254 MG/DL (ref 70–110)
POCT GLUCOSE: 291 MG/DL (ref 70–110)
POCT GLUCOSE: 297 MG/DL (ref 70–110)
POCT GLUCOSE: 332 MG/DL (ref 70–110)
POIKILOCYTOSIS BLD QL SMEAR: ABNORMAL
POTASSIUM SERPL-SCNC: 3.8 MMOL/L (ref 3.5–5.1)
PROT SERPL-MCNC: 5.9 GM/DL (ref 6.4–8.3)
RBC # BLD AUTO: 2.5 X10(6)/MCL (ref 4.2–5.4)
RBC # BLD AUTO: 2.53 X10(6)/MCL (ref 4.2–5.4)
RBC MORPH BLD: ABNORMAL
SCHISTOCYTE (OLG): ABNORMAL
SODIUM SERPL-SCNC: 142 MMOL/L (ref 136–145)
SPECIMEN OUTDATE: NORMAL
TRIGL SERPL-MCNC: 209 MG/DL (ref 37–140)
UNIT NUMBER: NORMAL
VLDLC SERPL CALC-MCNC: 42 MG/DL
WBC # SPEC AUTO: 2.53 X10(3)/MCL (ref 4.5–11.5)
WBC # SPEC AUTO: 2.67 X10(3)/MCL (ref 4.5–11.5)

## 2023-09-17 PROCEDURE — 63600175 PHARM REV CODE 636 W HCPCS: Performed by: STUDENT IN AN ORGANIZED HEALTH CARE EDUCATION/TRAINING PROGRAM

## 2023-09-17 PROCEDURE — 85027 COMPLETE CBC AUTOMATED: CPT | Performed by: STUDENT IN AN ORGANIZED HEALTH CARE EDUCATION/TRAINING PROGRAM

## 2023-09-17 PROCEDURE — P9037 PLATE PHERES LEUKOREDU IRRAD: HCPCS

## 2023-09-17 PROCEDURE — 80053 COMPREHEN METABOLIC PANEL: CPT

## 2023-09-17 PROCEDURE — 96372 THER/PROPH/DIAG INJ SC/IM: CPT

## 2023-09-17 PROCEDURE — 80061 LIPID PANEL: CPT

## 2023-09-17 PROCEDURE — 94761 N-INVAS EAR/PLS OXIMETRY MLT: CPT

## 2023-09-17 PROCEDURE — 63600175 PHARM REV CODE 636 W HCPCS

## 2023-09-17 PROCEDURE — 96372 THER/PROPH/DIAG INJ SC/IM: CPT | Performed by: STUDENT IN AN ORGANIZED HEALTH CARE EDUCATION/TRAINING PROGRAM

## 2023-09-17 PROCEDURE — 85027 COMPLETE CBC AUTOMATED: CPT | Mod: 91

## 2023-09-17 PROCEDURE — 25000003 PHARM REV CODE 250

## 2023-09-17 PROCEDURE — 36430 TRANSFUSION BLD/BLD COMPNT: CPT

## 2023-09-17 PROCEDURE — 86920 COMPATIBILITY TEST SPIN: CPT | Performed by: STUDENT IN AN ORGANIZED HEALTH CARE EDUCATION/TRAINING PROGRAM

## 2023-09-17 PROCEDURE — 86850 RBC ANTIBODY SCREEN: CPT | Performed by: INTERNAL MEDICINE

## 2023-09-17 PROCEDURE — G0378 HOSPITAL OBSERVATION PER HR: HCPCS

## 2023-09-17 RX ORDER — INSULIN ASPART 100 [IU]/ML
5 INJECTION, SOLUTION INTRAVENOUS; SUBCUTANEOUS
Status: DISCONTINUED | OUTPATIENT
Start: 2023-09-17 | End: 2023-09-17

## 2023-09-17 RX ORDER — HYDROCODONE BITARTRATE AND ACETAMINOPHEN 5; 325 MG/1; MG/1
1 TABLET ORAL EVERY 6 HOURS PRN
Status: DISCONTINUED | OUTPATIENT
Start: 2023-09-17 | End: 2023-09-18 | Stop reason: HOSPADM

## 2023-09-17 RX ORDER — INSULIN ASPART 100 [IU]/ML
15 INJECTION, SOLUTION INTRAVENOUS; SUBCUTANEOUS
Status: DISCONTINUED | OUTPATIENT
Start: 2023-09-17 | End: 2023-09-18

## 2023-09-17 RX ORDER — LOSARTAN POTASSIUM 25 MG/1
25 TABLET ORAL DAILY
Status: DISCONTINUED | OUTPATIENT
Start: 2023-09-17 | End: 2023-09-18 | Stop reason: HOSPADM

## 2023-09-17 RX ORDER — HYDRALAZINE HYDROCHLORIDE 20 MG/ML
10 INJECTION INTRAMUSCULAR; INTRAVENOUS EVERY 6 HOURS PRN
Status: DISCONTINUED | OUTPATIENT
Start: 2023-09-17 | End: 2023-09-18 | Stop reason: HOSPADM

## 2023-09-17 RX ADMIN — PANTOPRAZOLE SODIUM 40 MG: 40 TABLET, DELAYED RELEASE ORAL at 08:09

## 2023-09-17 RX ADMIN — INSULIN ASPART 6 UNITS: 100 INJECTION, SOLUTION INTRAVENOUS; SUBCUTANEOUS at 12:09

## 2023-09-17 RX ADMIN — INSULIN ASPART 5 UNITS: 100 INJECTION, SOLUTION INTRAVENOUS; SUBCUTANEOUS at 08:09

## 2023-09-17 RX ADMIN — ALLOPURINOL 300 MG: 100 TABLET ORAL at 08:09

## 2023-09-17 RX ADMIN — INSULIN ASPART 9 UNITS: 100 INJECTION, SOLUTION INTRAVENOUS; SUBCUTANEOUS at 11:09

## 2023-09-17 RX ADMIN — LOSARTAN POTASSIUM 25 MG: 25 TABLET, FILM COATED ORAL at 08:09

## 2023-09-17 RX ADMIN — METOPROLOL TARTRATE 25 MG: 25 TABLET, FILM COATED ORAL at 08:09

## 2023-09-17 RX ADMIN — ESCITALOPRAM 10 MG: 10 TABLET, FILM COATED ORAL at 08:09

## 2023-09-17 RX ADMIN — INSULIN ASPART 5 UNITS: 100 INJECTION, SOLUTION INTRAVENOUS; SUBCUTANEOUS at 11:09

## 2023-09-17 RX ADMIN — ATORVASTATIN CALCIUM 40 MG: 40 TABLET, FILM COATED ORAL at 08:09

## 2023-09-17 RX ADMIN — INSULIN ASPART 8 UNITS: 100 INJECTION, SOLUTION INTRAVENOUS; SUBCUTANEOUS at 08:09

## 2023-09-17 RX ADMIN — INSULIN ASPART 9 UNITS: 100 INJECTION, SOLUTION INTRAVENOUS; SUBCUTANEOUS at 04:09

## 2023-09-17 RX ADMIN — INSULIN DETEMIR 60 UNITS: 100 INJECTION, SOLUTION SUBCUTANEOUS at 08:09

## 2023-09-17 RX ADMIN — BUSPIRONE HYDROCHLORIDE 5 MG: 5 TABLET ORAL at 08:09

## 2023-09-17 RX ADMIN — HYDROCODONE BITARTRATE AND ACETAMINOPHEN 1 TABLET: 5; 325 TABLET ORAL at 01:09

## 2023-09-17 RX ADMIN — INSULIN ASPART 15 UNITS: 100 INJECTION, SOLUTION INTRAVENOUS; SUBCUTANEOUS at 04:09

## 2023-09-17 RX ADMIN — INSULIN DETEMIR 20 UNITS: 100 INJECTION, SOLUTION SUBCUTANEOUS at 12:09

## 2023-09-17 RX ADMIN — AMLODIPINE BESYLATE 10 MG: 10 TABLET ORAL at 08:09

## 2023-09-17 RX ADMIN — GABAPENTIN 300 MG: 300 CAPSULE ORAL at 03:09

## 2023-09-17 RX ADMIN — GABAPENTIN 300 MG: 300 CAPSULE ORAL at 08:09

## 2023-09-17 NOTE — ED PROVIDER NOTES
Encounter Date: 2023       History     Chief Complaint   Patient presents with    Weakness    Eye Problem      states currently on chemo for leukemia.  Last on Wednesday.  Progressive weakness this week with hemorrhage to right eye when woke on Friday.       Patient presents to the emergency department due to red eye.  History of CML, currently on chemotherapy.  Has been states she is been feeling weak after chemotherapy which is normal for her and they are not too concerned about it but yesterday he noticed some redness to her right eye.  Patient denies any pain or changes in her vision.  She is been coughing but no vomiting.  He states today the redness has gotten worse.  The patient states she does not notice it.  She denies any chest pain abdominal pain nausea or vomiting or diarrhea.    The history is provided by the patient. The history is limited by a language barrier. A  was used.     Review of patient's allergies indicates:  No Known Allergies  Past Medical History:   Diagnosis Date    Cancer     CML (chronic myelocytic leukemia)     DM2 (diabetes mellitus, type 2)     HTN (hypertension)     NAFLD (nonalcoholic fatty liver disease)     Neuropathy      Past Surgical History:   Procedure Laterality Date    ABDOMINAL SURGERY       SECTION      x4    CHOLECYSTECTOMY       Family History   Problem Relation Age of Onset    Diabetes Mother     Diabetes Father     Cancer Neg Hx      Social History     Tobacco Use    Smoking status: Never    Smokeless tobacco: Never   Substance Use Topics    Alcohol use: Not Currently    Drug use: Not Currently     Review of Systems   Constitutional:  Negative for chills and fever.   HENT:  Negative for congestion and sore throat.    Eyes:  Positive for redness.   Respiratory:  Negative for cough and shortness of breath.    Cardiovascular:  Negative for chest pain and palpitations.   Gastrointestinal:  Negative for abdominal pain and nausea.    Genitourinary:  Negative for dysuria and hematuria.   Musculoskeletal:  Negative for arthralgias and myalgias.   Neurological:  Negative for dizziness and weakness.       Physical Exam     Initial Vitals [09/16/23 2012]   BP Pulse Resp Temp SpO2   (!) 148/72 82 18 99 °F (37.2 °C) 97 %      MAP       --         Physical Exam    Nursing note and vitals reviewed.  Constitutional: She appears well-developed and well-nourished.   HENT:   Head: Normocephalic and atraumatic.   Eyes: EOM are normal. Pupils are equal, round, and reactive to light.   Subconjunctival hemorrhage with the medial right eye, pupils are equal and reactive, extraocular movements are intact   Neck: Neck supple.   Normal range of motion.  Cardiovascular:  Normal rate and regular rhythm.           Pulmonary/Chest: Breath sounds normal. No respiratory distress.   Abdominal: Abdomen is soft. There is no abdominal tenderness.   Musculoskeletal:         General: No edema. Normal range of motion.      Cervical back: Normal range of motion and neck supple.     Neurological: She is alert and oriented to person, place, and time.   Skin: Skin is warm and dry.         ED Course   Procedures  Labs Reviewed   COMPREHENSIVE METABOLIC PANEL - Abnormal; Notable for the following components:       Result Value    Chloride 109 (*)     Glucose Level 327 (*)     Protein Total 5.7 (*)     Albumin Level 3.3 (*)     All other components within normal limits   URINALYSIS, REFLEX TO URINE CULTURE - Abnormal; Notable for the following components:    Color, UA Colorless (*)     Glucose, UA 4+ (*)     Squamous Epithelial Cells, UA Trace (*)     Mucous, UA Trace (*)     All other components within normal limits   CBC WITH DIFFERENTIAL - Abnormal; Notable for the following components:    WBC 2.61 (*)     RBC 2.58 (*)     Hgb 8.2 (*)     Hct 26.6 (*)     .1 (*)     MCH 31.8 (*)     MCHC 30.8 (*)     RDW 18.9 (*)     Platelet 15 (*)     MPV 0.0 (*)     All other components  within normal limits   MANUAL DIFFERENTIAL - Abnormal; Notable for the following components:    Neutrophils % 33 (*)     Lymphs % 57 (*)     Neutrophils Abs Calc 0.8874 (*)     Monocytes Abs 0.0783 (*)     Platelets Decreased (*)     RBC Morph Abnormal (*)     Anisocytosis 1+ (*)     Polychromasia 1+ (*)     Stippled RBCs 1+ (*)     Tear Drops 1+ (*)     All other components within normal limits   LACTATE DEHYDROGENASE - Abnormal; Notable for the following components:    Lactate Dehydrogenase 388 (*)     All other components within normal limits   LACTIC ACID, PLASMA - Abnormal; Notable for the following components:    Lactic Acid Level 2.4 (*)     All other components within normal limits   MAGNESIUM - Normal   LACTIC ACID, PLASMA - Normal   D DIMER, QUANTITATIVE - Normal   PROTIME-INR - Normal   CBC W/ AUTO DIFFERENTIAL    Narrative:     The following orders were created for panel order CBC auto differential.  Procedure                               Abnormality         Status                     ---------                               -----------         ------                     CBC with Differential[[id number]]        Abnormal            Final result               Manual Differential[[id number]]          Abnormal            Final result                 Please view results for these tests on the individual orders.   EXTRA TUBES    Narrative:     The following orders were created for panel order EXTRA TUBES.  Procedure                               Abnormality         Status                     ---------                               -----------         ------                     Light Blue Top Hold[[id number]]                                                         Red Top Hold[[id number]]                                                                Pink Top Hold[[id number]]                                                                 Please view results for these tests on the individual orders.   LIGHT BLUE TOP  HOLD   RED TOP HOLD   PINK TOP HOLD   EXTRA TUBES    Narrative:     The following orders were created for panel order EXTRA TUBES.  Procedure                               Abnormality         Status                     ---------                               -----------         ------                     Gold Top Hold[[id number]]                                                                Please view results for these tests on the individual orders.   GOLD TOP HOLD   EXTRA TUBES    Narrative:     The following orders were created for panel order EXTRA TUBES.  Procedure                               Abnormality         Status                     ---------                               -----------         ------                     Pink Top Hold[[id number]]                                   In process                   Please view results for these tests on the individual orders.   PINK TOP HOLD   PREPARE PLATELETS (DOSE) SOFT          Imaging Results    None          Medications   sodium chloride 0.9% flush 10 mL (has no administration in time range)   melatonin tablet 6 mg (has no administration in time range)   acetaminophen tablet 650 mg (has no administration in time range)   ondansetron injection 4 mg (has no administration in time range)   guaiFENesin 100 mg/5 ml syrup 200 mg (has no administration in time range)   0.9%  NaCl infusion (for blood administration) (has no administration in time range)   acyclovir tablet 400 mg (has no administration in time range)   albuterol nebulizer solution 2.5 mg (has no administration in time range)   ALPRAZolam tablet 0.25 mg (has no administration in time range)   amLODIPine tablet 10 mg (has no administration in time range)   allopurinoL tablet 300 mg (has no administration in time range)   atorvastatin tablet 40 mg (has no administration in time range)   busPIRone tablet 5 mg (has no administration in time range)   EScitalopram oxalate tablet 10 mg (has no  administration in time range)   gabapentin capsule 300 mg (has no administration in time range)   hydrOXYzine pamoate capsule 25 mg (has no administration in time range)   metoprolol tartrate (LOPRESSOR) tablet 25 mg (has no administration in time range)   pantoprazole EC tablet 40 mg (has no administration in time range)   insulin detemir U-100 injection 20 Units (has no administration in time range)   glucose chewable tablet 16 g (has no administration in time range)   glucose chewable tablet 24 g (has no administration in time range)   glucagon (human recombinant) injection 1 mg (has no administration in time range)   insulin aspart U-100 injection 0-15 Units (has no administration in time range)   dextrose 10% bolus 125 mL 125 mL (has no administration in time range)   dextrose 10% bolus 250 mL 250 mL (has no administration in time range)     Medical Decision Making  Vital signs are stable.  Lab workup shows pancytopenia that is worsening with a platelet count of 15 which is significantly worse than her recent 80, also anemia has worsened to 8.  CMP was generally unremarkable.  LDH, haptoglobin were ordered, and patient was admitted to the Internal Medicine team for further evaluation.    Amount and/or Complexity of Data Reviewed  Labs: ordered. Decision-making details documented in ED Course.  Discussion of management or test interpretation with external provider(s): Discussed with the inpatient medicine team patient history and ER course.                               Clinical Impression:   Final diagnoses:  [D69.6] Thrombocytopenia (Primary)  [D64.9] Anemia, unspecified type  [C92.10] CML (chronic myelocytic leukemia)        ED Disposition Condition    Observation                 Bobby Us MD  09/17/23 0011

## 2023-09-17 NOTE — PROGRESS NOTES
[facility]   Progress Note     Resident Team: [facility] 3  Attending Physician: Francisco Nichols MD  Resident: [place] Rick  Intern: [facility] Length of Stay: 0 days    Subjective:      Brief HPI:  Fela Ellison is a 58 y.o. female with PMH of Chronic myelocytic leukemia (2020), Blast phase CML (2022), IDDM2, Hypertension, Nonalcoholic fatty liver disease, Neuropathy who presented to [facility] ED on 9/16/2023 for weakness x 4 days and R eye hemorrhage x 1 day.   Patient states that her last chemotherapy was on Wednesday 9/13/2023. She endorses feeling weak afterwards, which is usual for her after chemo. Although she reports that it did not get any better after a couple of days.   Her  is present at bedside. He states that on Friday 9/15/23, he noticed hemorrhage to her right eye. Patient denies any pain or blurry visions.  also states states that he noticed bruising to her left chest and abdomen this morning, which was the presentation she had last time she needed a platelet transfusion.  Patient has appointment for an infusion on September 28th, next chemotherapy cycle starts October 2nd. She denies chest pain, congestion, shortness of breath, abdominal pain, nausea, vomiting, fever, chills. but endorses productive cough x 1 week. She has not tried any medication OTC.     In the ED, Platelets were noted to be low at 15 (baseline of 83), Hb 8.2, Lactic acid 2.2. U/A with unremarkable findings. LDH elevated at 388, low haptoglobin of 18, D-dimer 0.32.  CMP was essentially unremarkable except for elevated glucose at 327. Chest x-ray showed borderline cardiomegaly with no acute findings.   Patient was admitted due to thrombocytopenia and to get platelet transfusion    Interval History:   Patient was seen sitting comfortably on her bedside while eating [employer].  Her  or to rule was present and able to translate.  Patient was okay with this.  No acute events noted  overnight.  Patient has remained afebrile and vital signs essentially stable.  She mentions of mild swelling and pain 6/10 severity on the right hand.  Her last bowel movement was yesterday.   does mentioned that she receives steroids along with her chemotherapy in the last 1 was Wednesday therefore her blood sugar does tend to stay elevated during chemotherapy time period.  She does endorse some weakness. She denies any chest pain, shortness of breath, fever, chills, cough, headaches, blurry vision, nausea, vomiting, diarrhea, constipation, abdominal pain, dysuria or hematuria, melena or hematochezia.  Her repeat CBC was noted for an improvement in platelet count to 55.  CMP essentially unremarkable with improvement of glucose to 227.       Review of Systems:  As stated above in the interval history       Objective:     Vital Signs (Most Recent):  Temp: 98.4 °F (36.9 °C) (09/17/23 0545)  Pulse: 75 (09/17/23 0545)  Resp: 18 (09/17/23 0545)  BP: (!) 155/77 (09/17/23 0545)  SpO2: 95 % (09/17/23 0545) Vital Signs (24h Range):  Temp:  [98.2 °F (36.8 °C)-99 °F (37.2 °C)] 98.4 °F (36.9 °C)  Pulse:  [71-84] 75  Resp:  [18-20] 18  SpO2:  [95 %-99 %] 95 %  BP: (125-166)/(67-79) 155/77       Physical Examination:  Physical Exam  Vitals and nursing note reviewed.   Constitutional:       Appearance: She is obese.   HENT:      Head: Normocephalic and atraumatic.   Eyes:      Pupils: Pupils are equal, round, and reactive to light.      Comments: Subconjunctival hemorrhage medially on OD  Mild sub conj around the superior limbus of OS   Cardiovascular:      Rate and Rhythm: Normal rate and regular rhythm.      Pulses: Normal pulses.      Heart sounds: Normal heart sounds.   Pulmonary:      Effort: Pulmonary effort is normal.      Breath sounds: Normal breath sounds.   Abdominal:      General: Bowel sounds are normal.      Palpations: Abdomen is soft.      Tenderness: There is no abdominal tenderness. There is no guarding.    Musculoskeletal:         General: Swelling (mild on the right hand) and tenderness (mild present on palaption on right hand) present. Normal range of motion.      Cervical back: Normal range of motion.   Skin:     General: Skin is warm and dry.      Capillary Refill: Capillary refill takes less than 2 seconds.      Comments: Scattered petechiae noted under the left breast  Non pruritic skin changes noted on the anterior chest   Neurological:      General: No focal deficit present.      Mental Status: She is alert and oriented to person, place, and time.   Psychiatric:         Mood and Affect: Mood normal.         Behavior: Behavior normal.         Laboratory:  Most Recent Data:  All pertinent lab results from the last 24 hours have been reviewed.  Recent Lab Results  (Last 5 results in the past 24 hours)        09/17/23  0716   09/17/23  0712   09/17/23  0544   09/17/23  0046   09/17/23  0005        Unit Blood Type Code               Unit Expiration               Immature Platelet Fraction   3.9             Albumin/Globulin Ratio 1.4               Albumin 3.4               Alkaline Phosphatase 47               ALT 15               AST 15               Baso #   0.00             Basophil %   0.0             BILIRUBIN TOTAL 1.5               BUN 9.8               Calcium 8.7               Chloride 109               CO2 24               Creatinine 0.65               Crossmatch Interpretation               DISPENSE STATUS               eGFR >60               Eos #   0.01             Eosinophil %   0.4             Globulin, Total 2.5               Glucose 227               Group & Rh       B POS         Hematocrit   26.0             Hemoglobin   8.1             Immature Grans (Abs)   0.05             Immature Granulocytes   2.0             Indirect [place] GEL       NEG         Lymph #   1.35             LYMPH %   53.4             MCH   32.0             MCHC   31.2             MCV   102.8             Mono #   0.23              Mono %   9.1             MPV   --  Comment: N/A             Neut #   0.89             Neut %   35.1             nRBC   6.3             Platelets   55             POCT Glucose     236     254       Potassium 3.8               Product Code               PROTEIN TOTAL 5.9               RBC   2.53             RDW   19.2             Sodium 142               Specimen Outdate       09/20/2023 23:59         Unit ABO/Rh               UNIT NUMBER               WBC   2.53                                      Microbiology Data Reviewed: no  Pertinent Findings:  None    Other Results:  EKG (my interpretation): None.    Radiology:  Imaging Results    None         Current Medications:     Infusions:       Scheduled:   allopurinoL  300 mg Oral Daily    amLODIPine  10 mg Oral Daily    atorvastatin  40 mg Oral Daily    busPIRone  5 mg Oral BID    EScitalopram oxalate  10 mg Oral Daily    gabapentin  300 mg Oral TID    insulin aspart U-100  5 Units Subcutaneous TIDWM    insulin detemir U-100  30 Units Subcutaneous QHS    metoprolol tartrate  25 mg Oral BID    pantoprazole  40 mg Oral Daily        PRN:  0.9%  NaCl infusion (for blood administration), acetaminophen, albuterol, ALPRAZolam, dextrose 10%, dextrose 10%, glucagon (human recombinant), glucose, glucose, guaiFENesin 100 mg/5 ml, hydrALAZINE, HYDROcodone-acetaminophen, hydrOXYzine pamoate, insulin aspart U-100, melatonin, ondansetron, sodium chloride 0.9%    Antibiotics and Day Number of Therapy:  None      Intake/Output Summary (Last 24 hours) at 9/17/2023 0724  Last data filed at 9/17/2023 0713  Gross per 24 hour   Intake 300 ml   Output --   Net 300 ml       Lines/Drains/Airways       Peripheral Intravenous Line  Duration                  Peripheral IV - Single Lumen 09/16/23 2205 20 G Anterior;Distal;Right Forearm <1 day                      Assessment & Plan:     CML  Blast phase CML  Pancytopenia   Symptomatic Thrombocytopenia  - diagnosed with CML in 2020 and blast  phase CML in 2022.  Follows up with Heme-Onc for chemotherapy.  Last chemotherapy was on Wednesday 09/13/2023  - low platelet count of 15 noted in the ED  - 1 dose of platelet has been transfused  - repeat CBC noted for improvement of platelet count to 55  - will closely monitor    Subconjunctival hemorrhage  - subconjunctival hemorrhage noted on the medial right eye and mild on the superior limbus of the left eye  - no complaints of blurred vision, pain, or itching  - no intervention needed, we will closely monitor for now    IDDM2  Diabetic neuropathy  - Patient is on 60 units Lantus qhs, Lispro 22 units tid, NPH 50 units bid at home, per chart review   - elevated glucose noted in the ED at 327  - A1C from a month ago 7.6  - patient does get steroids with the chemotherapy and the last one was on Wednesday.  - Will start her on insulin detemir 30 units nightly and insulin aspart 5 units TID with meals  - continue high-dose sliding scale insulin as needed  - we will closely monitor  - Continue home gabapentin 300 mg TID      HTN  HLD  - stable BP  - Home Norvasc 10mg daily and Metoprolol 25 mg were started  - Will also continue Losartan 25 mg from her home meds, will hold Lasix for now  - Lipid panel ordered  - Continue Atorvastatin 40mg    Anxiety  - Continue home Escitalopram, Buspirone and Alprazolam PRN       CODE STATUS: Full   Access: PIV  Antibiotics: None  Diet: Heart healthy diabetic diet  DVT Prophylaxis: SCD  GI Prophylaxis: Protonix  Fluids: none      Disposition: Patient admitted for observation secondary to thrombocytopenia.  Received platelet transfusion with improvement of platelet count.  Anticipate discharge to home if no active bleeding or other concerns     Jeffry Ba  Internal Medicine - PGY-1

## 2023-09-17 NOTE — H&P
[facility] Internal Medicine History and Physical       Chief Complaint:      Weakness and Eye Problem ( states currently on chemo for leukemia.  Last on Wednesday.  Progressive weakness this week with hemorrhage to right eye when woke on Friday.  )    Subjective:     History of Present Illness:  Fela Ellison is a 58 y.o. female with PMH of Chronic myelocytic leukemia, Diabetes mellitus type 2, Hypertension, Nonalcoholic fatty liver disease, Neuropathy who presented to [facility] ED on 9/16/2023 for weakness x 4 days and R eye hemorrhage x 1 day.   Patient states that her last chemotherapy was on Wednesday 9/13/2023. She endorses feeling weak afterwards, which is usual for her after chemo. Although she reports that it did not get any better after a couple of days.   Her  is present at bedside. He states that on Friday 9/15/23, he noticed hemorrhage to her right eye. Patient denies any pain or blurry visions.  also states states that he noticed bruising to her left chest and abdomen this morning, which was the presentation she had last time she needed a platelet transfusion.  Patient has appointment for an infusion on September 28th, next chemotherapy cycle starts October 2nd. She denies chest pain, congestion, shortness of breath, abdominal pain, nausea, vomiting, fever, chills. but endorses productive cough x 1 week. She has not tried any medication OTC.    At the ED, Platelets were noted to be low at 15 (baseline of 83), Hb 8.2, Lactic acid 2.2. U/A with unremarkable findings. LDH elevated at 388, D-dimer 0.32  Patient was admitted due to thrombocytopenia and to get platelet transfusion.      Review of Systems:  As per HPI.    Physical Exam  Vitals reviewed.   Constitutional:       General: She is not in acute distress.     Appearance: She is obese. She is not ill-appearing.   HENT:      Mouth/Throat:      Mouth: Mucous membranes are moist.   Eyes:      Extraocular Movements: Extraocular movements  intact.      Conjunctiva/sclera:      Right eye: Hemorrhage present.      Pupils: Pupils are equal, round, and reactive to light.      Comments: Subconjunctival hemorrhage present in medial right eye   Cardiovascular:      Rate and Rhythm: Normal rate and regular rhythm.      Pulses: Normal pulses.      Heart sounds: Normal heart sounds.   Pulmonary:      Effort: Pulmonary effort is normal. No respiratory distress.      Breath sounds: Normal breath sounds.   Abdominal:      General: Bowel sounds are normal.      Palpations: Abdomen is soft.   Skin:     General: Skin is warm.      Comments: Mild bruising noted to right chest and abdomen   Neurological:      General: No focal deficit present.      Mental Status: She is alert and oriented to person, place, and time.   Psychiatric:         Mood and Affect: Mood normal.          Past Medical History:   ----------------------------  Cancer  CML (chronic myelocytic leukemia)  DM2 (diabetes mellitus, type 2)  HTN (hypertension)  NAFLD (nonalcoholic fatty liver disease)  Neuropathy  Past Surgical History:  Past Surgical History:   Procedure Laterality Date    ABDOMINAL SURGERY       SECTION      x4    CHOLECYSTECTOMY       Allergies:  Review of patient's allergies indicates:  No Known Allergies  Home Medications:  Prior to Admission medications    Medication Sig Start Date End Date Taking? Authorizing Provider   acyclovir (ZOVIRAX) 400 MG tablet Take 1 tablet (400 mg total) by mouth 2 (two) times daily. 23  Natalya Ojeda MD   albuterol (PROVENTIL/VENTOLIN HFA) 90 mcg/actuation inhaler Inhale 2 puffs into the lungs every 6 (six) hours as needed for Wheezing. Rescue    Provider, Historical   allopurinoL (ZYLOPRIM) 300 MG tablet Take 300 mg by mouth once daily. 23   Provider, Historical   ALPRAZolam (XANAX) 0.25 MG tablet Take 0.25 mg by mouth 3 (three) times daily as needed for Anxiety. 23   Provider, Historical   amLODIPine (NORVASC) 10 MG  tablet Take 10 mg by mouth once daily.    Provider, Historical   ammonium lactate 12 % Crea Apply topically daily as needed.    Provider, Historical   atorvastatin (LIPITOR) 40 MG tablet Take 40 mg by mouth once daily.    Provider, Historical   busPIRone (BUSPAR) 5 MG Tab Take 1 tablet (5 mg total) by mouth 2 (two) times daily. 6/4/23 6/3/24  Julio Cesar Rodriguez DO   EScitalopram oxalate (LEXAPRO) 10 MG tablet Take 1 tablet (10 mg total) by mouth once daily. 6/4/23 6/3/24  Julio Cesar Rodriguez DO   furosemide (LASIX) 20 MG tablet Take 20 mg by mouth once daily.    Provider, Historical   gabapentin (NEURONTIN) 300 MG capsule Take 1 capsule (300 mg total) by mouth 3 (three) times daily. 12/12/22 12/12/23  Rupesh Thomas MD   HYDROcodone-acetaminophen (NORCO) 5-325 mg per tablet Take 1 tablet by mouth every 6 (six) hours as needed for Pain.    Provider, Historical   hydrocortisone 2.5 % cream Apply topically 2 (two) times daily. 5/22/23 5/21/24  Provider, Historical   hydrOXYzine pamoate (VISTARIL) 25 MG Cap Take 1 capsule (25 mg total) by mouth every 8 (eight) hours as needed. 6/4/23   Julio Cesar Rodriguez DO   ibuprofen (ADVIL,MOTRIN) 600 MG tablet TAKE 1 TABLET WITH FOOD OR MILK AS NEEDED THREE TIMES A DAY ORALLY 30 3/29/23   Provider, Historical   insulin glargine (LANTUS) 100 unit/mL injection Inject 60 Units into the skin nightly. 6/4/23   Julio Cesar Rodriguez DO   insulin lispro 100 unit/mL injection Inject 22 Units into the skin 3 (three) times daily before meals. Takes 22 units TID AC while on chemo - (Gives between 12 to 15 units TID AC when not on chemo) 8/7/23 9/6/23  Ayaan Whitley MD   insulin NPH (NOVOLIN N NPH U-100 INSULIN) 100 unit/mL injection 50 Units 2 (two) times daily before meals. No longer 20 in AM 3/22/23   Provider, Historical   ketoconazole (NIZORAL) 2 % cream Apply topically. 5/22/23 5/21/24  Provider, Historical   loratadine (CLARITIN) 10 mg tablet Take 1 tablet (10 mg total) by mouth once daily. 6/4/23    "Julio Cesar Rodriguez,    losartan (COZAAR) 25 MG tablet Take 1 tablet (25 mg total) by mouth once daily. 12/12/22 12/12/23  Rupesh Thomas MD   metFORMIN (GLUCOPHAGE) 1000 MG tablet Take 1,000 mg by mouth 2 (two) times daily with meals.    Provider, Historical   metoprolol tartrate (LOPRESSOR) 25 MG tablet Take 25 mg by mouth 2 (two) times daily.    Provider, Historical   montelukast (SINGULAIR) 10 mg tablet Take 10 mg by mouth once daily.    Provider, Historical   omeprazole (PRILOSEC) 40 MG capsule Take 1 capsule by mouth once daily. 5/22/23   Provider, Historical   ondansetron (ZOFRAN) 8 MG tablet Take 8 mg by mouth every 8 (eight) hours as needed for Nausea.    Provider, Historical   ondansetron (ZOFRAN-ODT) 4 MG TbDL Take 4 mg by mouth every 8 (eight) hours as needed. 5/16/23   Provider, Historical   PONATinib (ICLUSIG) 30 mg Tab Take 30 mg by mouth Daily.    Provider, Historical   silver sulfADIAZINE 1% (SILVADENE) 1 % cream Apply topically. 1/20/23 1/20/24  Provider, Historical   venetoclax (VENCLEXTA) 100 mg Tab Take 400 mg by mouth Only takes while taking chemo .    Provider, Historical     Family History:  Family History   Problem Relation Age of Onset    Diabetes Mother     Diabetes Father     Cancer Neg Hx      Social History:  Social History     Tobacco Use    Smoking status: Never    Smokeless tobacco: Never   Substance Use Topics    Alcohol use: Not Currently    Drug use: Not Currently     Objective:   Vitals  Vitals  BP: 125/67  Temp: 99 °F (37.2 °C)  Temp Source: Oral  Pulse: 84  Resp: 18  SpO2: 99 %  Height: 5' 3" (160 cm)  Weight: 113.2 kg (249 lb 7.2 oz)        Radiology:  No results found in the last 24 hours.     Imaging Results    None         Assessment & Plan:   CML with acute myeloid blast crisis  Pancytopenia with recent drop in platelet levels  - Platelets 15 (baseline 83),  Hb 8.2 (baseline 8.2), WBC 2.61 (baseline 2.12)  - Transfused 1 dose of platelets   - Repeat CBC in am post " transfusion      Productive cough  - Robitussin cough syrup q4 PRN      Diabetes mellitus type 2 (on Insulin)  HLD  - Patient is on 60 units Lantus qhs, Lispro 22 units tid, NPH 50 units bid at home, per chart review   - HbA1C a month ago was 7.6 ; Glucose today- 327  - Started 20 units levemir qhs  - High sliding scale insulin  - Titrate insulin as needed   - Continue Atorvastatin 40mg    Hypertension  - Continue home Norvasc 10mg daily and Metoprolol 25mg  - Hydralazine PRN    Neuropathy  Anxiety  - Continue home gabapentin  - Continue home Escitalopram, Buspirone and Alprazolam PRN       CODE STATUS: Full  Access: PIV  Antibiotics: None   Diet: Heart healthy Diabetic diet  DVT ppx: SCD  GI ppx: None   Fluids: None    Disposition: Patient admitted for observation due to thrombocytopenia and platelet transfusion      Amanda Mcclain M.D  [facility] Family Medicine Resident, [facility]

## 2023-09-17 NOTE — PLAN OF CARE
Problem: Adult Inpatient Plan of Care  Goal: Optimal Comfort and Wellbeing  Outcome: Ongoing, Progressing     Problem: Adult Inpatient Plan of Care  Goal: Absence of Hospital-Acquired Illness or Injury  Outcome: Ongoing, Progressing     Problem: Adult Inpatient Plan of Care  Goal: Readiness for Transition of Care  Outcome: Ongoing, Progressing     Problem: Bariatric Environmental Safety  Goal: Safety Maintained with Care  Outcome: Ongoing, Progressing     Problem: Diabetes Comorbidity  Goal: Blood Glucose Level Within Targeted Range  Outcome: Ongoing, Progressing

## 2023-09-18 VITALS
TEMPERATURE: 98 F | HEART RATE: 67 BPM | OXYGEN SATURATION: 97 % | RESPIRATION RATE: 18 BRPM | WEIGHT: 249.44 LBS | DIASTOLIC BLOOD PRESSURE: 78 MMHG | HEIGHT: 63 IN | SYSTOLIC BLOOD PRESSURE: 146 MMHG | BODY MASS INDEX: 44.2 KG/M2

## 2023-09-18 PROBLEM — N76.0 LABIAL INFECTION: Status: RESOLVED | Noted: 2023-07-20 | Resolved: 2023-09-18

## 2023-09-18 PROBLEM — D70.9 NEUTROPENIC FEVER: Status: RESOLVED | Noted: 2022-12-12 | Resolved: 2023-09-18

## 2023-09-18 PROBLEM — N90.89 LABIAL LESION: Status: RESOLVED | Noted: 2023-08-21 | Resolved: 2023-09-18

## 2023-09-18 PROBLEM — R05.9 COUGH: Status: RESOLVED | Noted: 2020-10-29 | Resolved: 2023-09-18

## 2023-09-18 PROBLEM — R11.2 NAUSEA & VOMITING: Status: RESOLVED | Noted: 2020-10-27 | Resolved: 2023-09-18

## 2023-09-18 PROBLEM — A41.9 SEVERE SEPSIS: Status: RESOLVED | Noted: 2023-07-19 | Resolved: 2023-09-18

## 2023-09-18 PROBLEM — R50.81 NEUTROPENIC FEVER: Status: RESOLVED | Noted: 2022-12-12 | Resolved: 2023-09-18

## 2023-09-18 PROBLEM — R65.20 SEVERE SEPSIS: Status: RESOLVED | Noted: 2023-07-19 | Resolved: 2023-09-18

## 2023-09-18 PROBLEM — N90.7 LABIAL CYST: Status: RESOLVED | Noted: 2023-07-19 | Resolved: 2023-09-18

## 2023-09-18 PROBLEM — J10.1 INFLUENZA A: Status: RESOLVED | Noted: 2022-12-12 | Resolved: 2023-09-18

## 2023-09-18 PROBLEM — L03.314 CELLULITIS OF LEFT GROIN: Status: RESOLVED | Noted: 2023-08-07 | Resolved: 2023-09-18

## 2023-09-18 PROBLEM — D72.829 HYPERLEUKOCYTOSIS: Status: RESOLVED | Noted: 2020-10-29 | Resolved: 2023-09-18

## 2023-09-18 PROBLEM — R50.9 FEVER: Status: RESOLVED | Noted: 2020-10-29 | Resolved: 2023-09-18

## 2023-09-18 LAB
ABO + RH BLD: NORMAL
ALBUMIN SERPL-MCNC: 3 G/DL (ref 3.5–5)
ALBUMIN/GLOB SERPL: 1.3 RATIO (ref 1.1–2)
ALP SERPL-CCNC: 49 UNIT/L (ref 40–150)
ALT SERPL-CCNC: 14 UNIT/L (ref 0–55)
AST SERPL-CCNC: 13 UNIT/L (ref 5–34)
BASOPHILS # BLD AUTO: 0 X10(3)/MCL
BASOPHILS NFR BLD AUTO: 0 %
BILIRUB SERPL-MCNC: 0.6 MG/DL
BLD PROD TYP BPU: NORMAL
BLOOD UNIT EXPIRATION DATE: NORMAL
BLOOD UNIT TYPE CODE: 5100
BUN SERPL-MCNC: 9.7 MG/DL (ref 9.8–20.1)
CALCIUM SERPL-MCNC: 8.1 MG/DL (ref 8.4–10.2)
CHLORIDE SERPL-SCNC: 110 MMOL/L (ref 98–107)
CO2 SERPL-SCNC: 24 MMOL/L (ref 22–29)
CREAT SERPL-MCNC: 0.64 MG/DL (ref 0.55–1.02)
CROSSMATCH INTERPRETATION: NORMAL
DISPENSE STATUS: NORMAL
EOSINOPHIL # BLD AUTO: 0 X10(3)/MCL (ref 0–0.9)
EOSINOPHIL NFR BLD AUTO: 0 %
ERYTHROCYTE [DISTWIDTH] IN BLOOD BY AUTOMATED COUNT: 19.5 % (ref 11.5–17)
GFR SERPLBLD CREATININE-BSD FMLA CKD-EPI: >60 MLS/MIN/1.73/M2
GLOBULIN SER-MCNC: 2.4 GM/DL (ref 2.4–3.5)
GLUCOSE SERPL-MCNC: 309 MG/DL (ref 74–100)
HCT VFR BLD AUTO: 23.4 % (ref 37–47)
HCT VFR BLD AUTO: 26.1 % (ref 37–47)
HGB BLD-MCNC: 7.2 G/DL (ref 12–16)
HGB BLD-MCNC: 8.4 G/DL (ref 12–16)
IMM GRANULOCYTES # BLD AUTO: 0.04 X10(3)/MCL (ref 0–0.04)
IMM GRANULOCYTES NFR BLD AUTO: 1.7 %
LYMPHOCYTES # BLD AUTO: 1.28 X10(3)/MCL (ref 0.6–4.6)
LYMPHOCYTES NFR BLD AUTO: 55.4 %
MCH RBC QN AUTO: 32.1 PG (ref 27–31)
MCHC RBC AUTO-ENTMCNC: 30.8 G/DL (ref 33–36)
MCV RBC AUTO: 104.5 FL (ref 80–94)
MONOCYTES # BLD AUTO: 0.24 X10(3)/MCL (ref 0.1–1.3)
MONOCYTES NFR BLD AUTO: 10.4 %
NEUTROPHILS # BLD AUTO: 0.75 X10(3)/MCL (ref 2.1–9.2)
NEUTROPHILS NFR BLD AUTO: 32.5 %
NRBC BLD AUTO-RTO: 5.2 %
PLATELET # BLD AUTO: 41 X10(3)/MCL (ref 130–400)
PLATELETS.RETICULATED NFR BLD AUTO: 3.6 % (ref 0.9–11.2)
PMV BLD AUTO: ABNORMAL FL
POCT GLUCOSE: 300 MG/DL (ref 70–110)
POCT GLUCOSE: 325 MG/DL (ref 70–110)
POTASSIUM SERPL-SCNC: 3.6 MMOL/L (ref 3.5–5.1)
PROT SERPL-MCNC: 5.4 GM/DL (ref 6.4–8.3)
RBC # BLD AUTO: 2.24 X10(6)/MCL (ref 4.2–5.4)
SODIUM SERPL-SCNC: 141 MMOL/L (ref 136–145)
UNIT NUMBER: NORMAL
WBC # SPEC AUTO: 2.31 X10(3)/MCL (ref 4.5–11.5)

## 2023-09-18 PROCEDURE — 94761 N-INVAS EAR/PLS OXIMETRY MLT: CPT

## 2023-09-18 PROCEDURE — P9040 RBC LEUKOREDUCED IRRADIATED: HCPCS | Performed by: STUDENT IN AN ORGANIZED HEALTH CARE EDUCATION/TRAINING PROGRAM

## 2023-09-18 PROCEDURE — 85014 HEMATOCRIT: CPT | Performed by: STUDENT IN AN ORGANIZED HEALTH CARE EDUCATION/TRAINING PROGRAM

## 2023-09-18 PROCEDURE — 25000003 PHARM REV CODE 250

## 2023-09-18 PROCEDURE — 99900035 HC TECH TIME PER 15 MIN (STAT)

## 2023-09-18 PROCEDURE — G0378 HOSPITAL OBSERVATION PER HR: HCPCS

## 2023-09-18 PROCEDURE — 85025 COMPLETE CBC W/AUTO DIFF WBC: CPT

## 2023-09-18 PROCEDURE — 96372 THER/PROPH/DIAG INJ SC/IM: CPT

## 2023-09-18 PROCEDURE — 80053 COMPREHEN METABOLIC PANEL: CPT

## 2023-09-18 PROCEDURE — 63600175 PHARM REV CODE 636 W HCPCS

## 2023-09-18 PROCEDURE — 63600175 PHARM REV CODE 636 W HCPCS: Performed by: STUDENT IN AN ORGANIZED HEALTH CARE EDUCATION/TRAINING PROGRAM

## 2023-09-18 PROCEDURE — 36430 TRANSFUSION BLD/BLD COMPNT: CPT

## 2023-09-18 PROCEDURE — 96372 THER/PROPH/DIAG INJ SC/IM: CPT | Performed by: STUDENT IN AN ORGANIZED HEALTH CARE EDUCATION/TRAINING PROGRAM

## 2023-09-18 RX ORDER — HYDROCODONE BITARTRATE AND ACETAMINOPHEN 500; 5 MG/1; MG/1
TABLET ORAL
Status: DISCONTINUED | OUTPATIENT
Start: 2023-09-18 | End: 2023-09-18 | Stop reason: HOSPADM

## 2023-09-18 RX ORDER — INSULIN ASPART 100 [IU]/ML
22 INJECTION, SOLUTION INTRAVENOUS; SUBCUTANEOUS
Status: DISCONTINUED | OUTPATIENT
Start: 2023-09-18 | End: 2023-09-18 | Stop reason: HOSPADM

## 2023-09-18 RX ADMIN — INSULIN ASPART 12 UNITS: 100 INJECTION, SOLUTION INTRAVENOUS; SUBCUTANEOUS at 08:09

## 2023-09-18 RX ADMIN — HYDROCODONE BITARTRATE AND ACETAMINOPHEN 1 TABLET: 5; 325 TABLET ORAL at 10:09

## 2023-09-18 RX ADMIN — PANTOPRAZOLE SODIUM 40 MG: 40 TABLET, DELAYED RELEASE ORAL at 08:09

## 2023-09-18 RX ADMIN — INSULIN ASPART 9 UNITS: 100 INJECTION, SOLUTION INTRAVENOUS; SUBCUTANEOUS at 12:09

## 2023-09-18 RX ADMIN — INSULIN ASPART 22 UNITS: 100 INJECTION, SOLUTION INTRAVENOUS; SUBCUTANEOUS at 12:09

## 2023-09-18 RX ADMIN — ESCITALOPRAM 10 MG: 10 TABLET, FILM COATED ORAL at 08:09

## 2023-09-18 RX ADMIN — LOSARTAN POTASSIUM 25 MG: 25 TABLET, FILM COATED ORAL at 08:09

## 2023-09-18 RX ADMIN — INSULIN ASPART 22 UNITS: 100 INJECTION, SOLUTION INTRAVENOUS; SUBCUTANEOUS at 08:09

## 2023-09-18 RX ADMIN — ATORVASTATIN CALCIUM 40 MG: 40 TABLET, FILM COATED ORAL at 08:09

## 2023-09-18 RX ADMIN — ALLOPURINOL 300 MG: 100 TABLET ORAL at 08:09

## 2023-09-18 RX ADMIN — BUSPIRONE HYDROCHLORIDE 5 MG: 5 TABLET ORAL at 08:09

## 2023-09-18 RX ADMIN — GABAPENTIN 300 MG: 300 CAPSULE ORAL at 03:09

## 2023-09-18 RX ADMIN — GABAPENTIN 300 MG: 300 CAPSULE ORAL at 08:09

## 2023-09-18 RX ADMIN — AMLODIPINE BESYLATE 10 MG: 10 TABLET ORAL at 08:09

## 2023-09-18 RX ADMIN — METOPROLOL TARTRATE 25 MG: 25 TABLET, FILM COATED ORAL at 08:09

## 2023-09-18 NOTE — DISCHARGE SUMMARY
[facility] Internal Medicine Discharge Summary    Admitting Physician: Francisco Nichols MD  Attending Physician: Karen Ramires MD  Date of Admit: 9/16/2023  Date of Discharge: 9/18/2023    Condition: Good  Outcome: Condition has improved and patient is now ready for discharge.  DISPOSITION: Home or Self Care    Discharge Diagnoses     Patient Active Problem List   Diagnosis    CML (chronic myelocytic leukemia)    Diabetes mellitus    Severe obesity (BMI >= 40)    NAFLD (nonalcoholic fatty liver disease)    Hypertension    Tobacco user    Hypercholesterolemia    Hyperuricemia    Hypokalemia    Hepatosplenomegaly    Other headache syndrome    Anemia    Thrombocytopenia    Pancytopenia       Principal Problem:  Thrombocytopenia    Consultants and Procedures     Consultants:  Consults (From admission, onward)          Status Ordering Provider     Inpatient consult to Internal Medicine  Once        Provider:  Carmen Harris MD    Acknowledged VENKAT OLMOS             Procedures:   * No surgery found *     Brief History of Present Illness      Fela Ellison is a 58 y.o. female with PMH of Chronic myelocytic leukemia (2020), Blast phase CML (2022), IDDM2, Hypertension, Nonalcoholic fatty liver disease, Neuropathy who presented to [facility] ED on 9/16/2023 for weakness x 4 days and R eye hemorrhage x 1 day.   Patient states that her last chemotherapy was on Wednesday 9/13/2023. She endorses feeling weak afterwards, which is usual for her after chemo. Although she reports that it did not get any better after a couple of days.   Her  is present at bedside. He states that on Friday 9/15/23, he noticed hemorrhage to her right eye. Patient denies any pain or blurry visions.  also states states that he noticed bruising to her left chest and abdomen this morning, which was the presentation she had last time she needed a platelet transfusion.  Patient has appointment for an infusion on September 28th, next chemotherapy  "cycle starts October 2nd. She denies chest pain, congestion, shortness of breath, abdominal pain, nausea, vomiting, fever, chills. but endorses productive cough x 1 week. She has not tried any medication OTC.     In the ED, Platelets were noted to be low at 15 (baseline of 83), Hb 8.2, Lactic acid 2.2. U/A with unremarkable findings. LDH elevated at 388, low haptoglobin of 18, D-dimer 0.32.  CMP was essentially unremarkable except for elevated glucose at 327. Chest x-ray showed borderline cardiomegaly with no acute findings.       Hospital Course with Pertinent Findings     Patient was admitted for observation for severe thrombocytopenia and platelet transfusion.  She was transfused 1 dose of platelets but corrected the platelet count to 55 from 15 on admit. She remained afebrile.  She had some episodes of intermittent hypertension but overall vital signs stable.  She had some subconjunctival hemorrhage on the right eye and very mild scattered petechiae under her left breast which improved the day after her admission.  CBC from 09/18/2023 was noted for downtrend in hemoglobin from 8.2 on admit to 7.2 and there was also gradual downtrend of platelets to 41.  Of note during her hospital stay, patient also had elevated blood glucose ranging from 227 to 332.  Her insulin was adjusted and she was also on a high dose sliding scale insulin regimen.  Patient was found to be eating heavy/starchy food from outside in addition to the hospital food.  She was instructed to cut down on her intake to help with the blood sugar.  Given the downtrend in her hemoglobin, she was also transfused 1 unit of packed red blood cells.  Repeat H and H showed improved H&H of 8.4/26.1.    Discharge physical exam:  Vitals  BP: (!) 146/78  Temp: 98.4 °F (36.9 °C)  Temp Source: Oral  Pulse: 67  Resp: 18  SpO2: 97 %  Height: 5' 3" (160 cm)  Weight: 113.2 kg (249 lb 7.2 oz)    Physical Exam  Vitals and nursing note reviewed.   Constitutional:       " Appearance: She is obese.   HENT:      Head: Normocephalic and atraumatic.   Eyes:      Pupils: Pupils are equal, round, and reactive to light.      Comments: Subconjunctival hemorrhage medially on OD - improved  Mild sub conj around the superior limbus of OS - still present   Cardiovascular:      Rate and Rhythm: Normal rate and regular rhythm.      Pulses: Normal pulses.      Heart sounds: Normal heart sounds.   Pulmonary:      Effort: Pulmonary effort is normal.      Breath sounds: Normal breath sounds.   Abdominal:      General: Bowel sounds are normal.      Palpations: Abdomen is soft.      Tenderness: There is no abdominal tenderness. There is no guarding.   Musculoskeletal:         General: No swelling or tenderness. Normal range of motion.      Cervical back: Normal range of motion.   Skin:     General: Skin is warm and dry.      Capillary Refill: Capillary refill takes less than 2 seconds.      Comments: Scattered petechiae noted under the left breast - decreased since yesterday  Non pruritic skin changes noted on the anterior chest   Neurological:      General: No focal deficit present.      Mental Status: She is alert and oriented to person, place, and time.   Psychiatric:         Mood and Affect: Mood normal.         Behavior: Behavior normal.       TIME SPENT ON DISCHARGE: 60 minutes    Discharge Medications        Medication List        CONTINUE taking these medications      acyclovir 400 MG tablet  Commonly known as: ZOVIRAX  Take 1 tablet (400 mg total) by mouth 2 (two) times daily.     albuterol 90 mcg/actuation inhaler  Commonly known as: PROVENTIL/VENTOLIN HFA     allopurinoL 300 MG tablet  Commonly known as: ZYLOPRIM     ALPRAZolam 0.25 MG tablet  Commonly known as: XANAX     amLODIPine 10 MG tablet  Commonly known as: NORVASC     ammonium lactate 12 % Crea     atorvastatin 40 MG tablet  Commonly known as: LIPITOR     busPIRone 5 MG Tab  Commonly known as: BUSPAR  Take 1 tablet (5 mg total) by  mouth 2 (two) times daily.     EScitalopram oxalate 10 MG tablet  Commonly known as: LEXAPRO  Take 1 tablet (10 mg total) by mouth once daily.     furosemide 20 MG tablet  Commonly known as: LASIX     gabapentin 300 MG capsule  Commonly known as: NEURONTIN  Take 1 capsule (300 mg total) by mouth 3 (three) times daily.     HYDROcodone-acetaminophen 5-325 mg per tablet  Commonly known as: NORCO     hydrocortisone 2.5 % cream     hydrOXYzine pamoate 25 MG Cap  Commonly known as: VISTARIL  Take 1 capsule (25 mg total) by mouth every 8 (eight) hours as needed.     ibuprofen 600 MG tablet  Commonly known as: ADVIL,MOTRIN     ICLUSIG 30 mg Tab  Generic drug: PONATinib     insulin glargine 100 unit/mL injection  Commonly known as: Lantus  Inject 60 Units into the skin nightly.     insulin lispro 100 unit/mL injection  Inject 22 Units into the skin 3 (three) times daily before meals. Takes 22 units TID AC while on chemo - (Gives between 12 to 15 units TID AC when not on chemo)     ketoconazole 2 % cream  Commonly known as: NIZORAL     loratadine 10 mg tablet  Commonly known as: CLARITIN  Take 1 tablet (10 mg total) by mouth once daily.     losartan 25 MG tablet  Commonly known as: COZAAR  Take 1 tablet (25 mg total) by mouth once daily.     metFORMIN 1000 MG tablet  Commonly known as: GLUCOPHAGE     metoprolol tartrate 25 MG tablet  Commonly known as: LOPRESSOR     montelukast 10 mg tablet  Commonly known as: SINGULAIR     NovoLIN N NPH U-100 Insulin 100 unit/mL injection  Generic drug: insulin NPH     omeprazole 40 MG capsule  Commonly known as: PRILOSEC     ondansetron 4 MG Tbdl  Commonly known as: ZOFRAN-ODT     silver sulfADIAZINE 1% 1 % cream  Commonly known as: SILVADENE     venetoclax 100 mg Tab  Commonly known as: VENCLEXTA            STOP taking these medications      ondansetron 8 MG tablet  Commonly known as: ZOFRAN              Discharge Information:   Mr. Fela Ellison is being discharged Home or Self Care.   She was instructed to cut down on her starchy food intake to help with her blood sugar.  Patient expressed understanding.    No discharge procedures on file.     Follow-Up Appointments:   Follow-up Information       [facility] - Internal Medicine Follow up in 1 week(s).    Specialty: Internal Medicine  Why: [facility] with CBC  Contact information:  [facility] [facility] [id number] [phone]                             To address at follow-up:  -The following labs are to be drawn at the [facility] visit: CBC, CMP     The above information was discussed with the patient in clear terms.  Her  was present during the encounter was able to translate. She was able to repeat the instructions in her own words. All questions answered. ED precautions provided.    Jeffry Ba  Internal Medicine - PGY-1

## 2023-09-18 NOTE — PROGRESS NOTES
[facility] Medicine Wards   Progress Note     Resident Team: [facility] Medicine List 3  Attending Physician: Karen Ramires MD  Resident: [place] Rick  Intern: [facility] Length of Stay: 2 days    Subjective:      Brief HPI:  Fela Ellison is a 58 y.o. female with PMH of Chronic myelocytic leukemia (2020), Blast phase CML (2022), IDDM2, Hypertension, Nonalcoholic fatty liver disease, Neuropathy who presented to [facility] ED on 9/16/2023 for weakness x 4 days and R eye hemorrhage x 1 day.   Patient states that her last chemotherapy was on Wednesday 9/13/2023. She endorses feeling weak afterwards, which is usual for her after chemo. Although she reports that it did not get any better after a couple of days.   Her  is present at bedside. He states that on Friday 9/15/23, he noticed hemorrhage to her right eye. Patient denies any pain or blurry visions.  also states states that he noticed bruising to her left chest and abdomen this morning, which was the presentation she had last time she needed a platelet transfusion.  Patient has appointment for an infusion on September 28th, next chemotherapy cycle starts October 2nd. She denies chest pain, congestion, shortness of breath, abdominal pain, nausea, vomiting, fever, chills. but endorses productive cough x 1 week. She has not tried any medication OTC.     In the ED, Platelets were noted to be low at 15 (baseline of 83), Hb 8.2, Lactic acid 2.2. U/A with unremarkable findings. LDH elevated at 388, low haptoglobin of 18, D-dimer 0.32.  CMP was essentially unremarkable except for elevated glucose at 327. Chest x-ray showed borderline cardiomegaly with no acute findings.   Patient was admitted due to thrombocytopenia and to get platelet transfusion    Interval History:   Patient was seen sleeping comfortably in her bed.  Her  was present for the encounter.  No acute events overnight.  Patient has remained afebrile and vital signs stable.  Patient  mentions of a mild intermittent cough with minimal yellowish sputum production.  She mentions the swelling and pain on the right hand has resolved as well. Her CBC is notable for a downtrend on hemoglobin from 8 to 7.2 and also gradual downtrend of platelets from 55 --> 51 --> 41.  CMP remarkable for elevated glucose at 309.  Patient did have a late (after 11 pm) heavy and starchy dinner from outside.  She denies any chest pain, shortness of breath, fever, chills, hematuria, dysuria, melena, hematochezia, nausea, vomiting, abdominal pain, and new onset of weakness.        Review of Systems:  As stated above in the interval history       Objective:     Vital Signs (Most Recent):  Temp: 98.9 °F (37.2 °C) (09/18/23 0757)  Pulse: 75 (09/18/23 0757)  Resp: 18 (09/18/23 0711)  BP: (!) 145/75 (09/18/23 0757)  SpO2: 97 % (09/18/23 0757) Vital Signs (24h Range):  Temp:  [98.2 °F (36.8 °C)-98.9 °F (37.2 °C)] 98.9 °F (37.2 °C)  Pulse:  [63-77] 75  Resp:  [18] 18  SpO2:  [94 %-97 %] 97 %  BP: (129-156)/(75-88) 145/75       Physical Examination:  Physical Exam  Vitals and nursing note reviewed.   Constitutional:       Appearance: She is obese.   HENT:      Head: Normocephalic and atraumatic.   Eyes:      Pupils: Pupils are equal, round, and reactive to light.      Comments: Subconjunctival hemorrhage medially on OD - improved  Mild sub conj around the superior limbus of OS - still present   Cardiovascular:      Rate and Rhythm: Normal rate and regular rhythm.      Pulses: Normal pulses.      Heart sounds: Normal heart sounds.   Pulmonary:      Effort: Pulmonary effort is normal.      Breath sounds: Normal breath sounds.   Abdominal:      General: Bowel sounds are normal.      Palpations: Abdomen is soft.      Tenderness: There is no abdominal tenderness. There is no guarding.   Musculoskeletal:         General: No swelling or tenderness. Normal range of motion.      Cervical back: Normal range of motion.   Skin:     General:  Skin is warm and dry.      Capillary Refill: Capillary refill takes less than 2 seconds.      Comments: Scattered petechiae noted under the left breast - decreased since yesterday  Non pruritic skin changes noted on the anterior chest   Neurological:      General: No focal deficit present.      Mental Status: She is alert and oriented to person, place, and time.   Psychiatric:         Mood and Affect: Mood normal.         Behavior: Behavior normal.         Laboratory:  Most Recent Data:  All pertinent lab results from the last 24 hours have been reviewed.  Recent Lab Results  (Last 5 results in the past 24 hours)        09/18/23  0746   09/18/23  0307   09/17/23  2020   09/17/23  1602   09/17/23  1550        Immature Platelet Fraction   3.6       3.9       Albumin/Globulin Ratio   1.3             Neutrophils Abs Calc         1.3617       Albumin   3.0             Alkaline Phosphatase   49             ALT   14             Aniso         1+       AST   13             Baso #   0.00             Basophil %   0.0             BILIRUBIN TOTAL   0.6             BUN   9.7             Calcium   8.1             Chloride   110             CO2   24             Creatinine   0.64             eGFR   >60             Elliptocytosis         1+       Eos #   0.00             Eosinophil %   0.0             Globulin, Total   2.4             Glucose   309             Hematocrit   23.4       25.5       Hemoglobin   7.2       8.0       Immature Grans (Abs)   0.04             Immature Granulocytes   1.7             Lymph #   1.28       1.2015       LYMPH %   55.4             Lymphs %         45       Macrocytosis         2+       MCH   32.1       32.0       MCHC   30.8       31.4       MCV   104.5       102.0       Microcytosis         Slight       Mono #   0.24       0.1068       Mono %   10.4       4       MPV   --  Comment: N/A       --  Comment: na       Neut #   0.75             Neut %   32.5             Neutrophils Relative         51        nRBC   5.2       5.2       nRBC %         4       Ovalocytes         1+       Platelet Estimate         Decreased       Platelets   41       51       POCT Glucose 325     332   291         Poikilocytosis         2+       Potassium   3.6             PROTEIN TOTAL   5.4             RBC   2.24       2.50       RBC Morph         Abnormal       RDW   19.5       18.9       Schistocytes         1+       Sodium   141             WBC   2.31       2.67                                Microbiology Data Reviewed: no  Pertinent Findings:  None    Other Results:  EKG (my interpretation): None.    Radiology:  Imaging Results    None         Current Medications:     Infusions:       Scheduled:   allopurinoL  300 mg Oral Daily    amLODIPine  10 mg Oral Daily    atorvastatin  40 mg Oral Daily    busPIRone  5 mg Oral BID    EScitalopram oxalate  10 mg Oral Daily    gabapentin  300 mg Oral TID    insulin aspart U-100  22 Units Subcutaneous TIDWM    insulin detemir U-100  70 Units Subcutaneous QHS    losartan  25 mg Oral Daily    metoprolol tartrate  25 mg Oral BID    pantoprazole  40 mg Oral Daily        PRN:  0.9%  NaCl infusion (for blood administration), 0.9%  NaCl infusion (for blood administration), acetaminophen, albuterol, ALPRAZolam, dextrose 10%, dextrose 10%, glucagon (human recombinant), glucose, glucose, guaiFENesin 100 mg/5 ml, hydrALAZINE, HYDROcodone-acetaminophen, hydrOXYzine pamoate, insulin aspart U-100, melatonin, ondansetron, sodium chloride 0.9%    Antibiotics and Day Number of Therapy:  None      Intake/Output Summary (Last 24 hours) at 9/18/2023 0820  Last data filed at 9/18/2023 0502  Gross per 24 hour   Intake 1270 ml   Output --   Net 1270 ml       Lines/Drains/Airways       Peripheral Intravenous Line  Duration                  Peripheral IV - Single Lumen 09/16/23 2205 20 G Anterior;Distal;Right Forearm 1 day                      Assessment & Plan:     CML  Blast phase CML  Pancytopenia   Severe  Thrombocytopenia  - diagnosed with CML in 2020 and blast phase CML in 2022.  Follows up with Heme-Onc for chemotherapy.  Last chemotherapy was on Wednesday 09/13/2023  - low platelet count of 15 noted in the ED  - 1 dose of platelet has been transfused  - improved platelet count post transfusion to 55 however a gradual downtrend noted to 41 today  - thrombocytopenia most likely from malignancy and also noted to be an adverse reaction of the recent chemotherapy drug (Vidaza)  - no need for repeat platelet transfusion per guidelines  - will closely monitor for now    Macrocytic anemia  - MCV >100 with 104.5 today  - normal folate and low normal B12 about 3 months ago   - downtrend of hemoglobin also noted from 8.2 to 7.2 since admit  - will transfuse 1 unit of pRBC today and check H and H post transfusion    Subconjunctival hemorrhage  - subconjunctival hemorrhage noted on the medial right eye and mild on the superior limbus of the left eye - significantly improved on the right eye today  - no complaints of blurred vision, pain, or itching  - no intervention needed, we will closely monitor for now    IDDM2  Diabetic neuropathy  - Patient is on 60 units Lantus qhs, Lispro 22 units tid, NPH 50 units bid at home, per chart review   - elevated glucose noted in the ED at 327  - A1C from a month ago 7.6  - patient does get steroids with the chemotherapy and the last one was on Wednesday.  - episodes of elevated blood sugar, CMP showed fasting blood glucose of 309 today  - current insulin dose modified to short-acting 22 units t.i.d. with meals and long-acting 70 units nightly  - continue high-dose sliding scale insulin as needed  - we will closely monitor  - Continue home gabapentin 300 mg TID    HTN  HLD  - stable BP  - continue Norvasc 10 mg daily metoprolol 25 mg twice daily and losartan 25 mg daily  - Lipid panel showed total cholesterol stable at 117, low LDL of 46 and mildly elevated triglycerides at 209  - Continue  Atorvastatin 40mg    Anxiety  - Continue home Escitalopram, Buspirone   - Continue Alprazolam PRN       CODE STATUS: Full   Access: PIV  Antibiotics: None  Diet: Heart healthy diabetic diet  DVT Prophylaxis: SCD  GI Prophylaxis: Protonix  Fluids: none      Disposition: Patient admitted for observation secondary to thrombocytopenia.  Platelet count corrected.  Pending 1 unit pRBC transfusion for downtrend of hemoglobin.  Anticipate discharge to home today after appropriate H&H post transfusion.     Jeffry Ba  Internal Medicine - PGY-1

## 2023-09-18 NOTE — PLAN OF CARE
09/18/23 1134   Discharge Assessment   Assessment Type Discharge Planning Assessment   Confirmed/corrected address, phone number and insurance Yes   Confirmed Demographics Correct on Facesheet   Source of Information family   Reason For Admission CML (chronic myelocytic leukemia)  D69.6 Thrombocytopenia  D64.9 Anemia, unspecified type   People in Home spouse;child(adryan), adult   Facility Arrived From: Home   Do you expect to return to your current living situation? Yes   Do you have help at home or someone to help you manage your care at home? Yes   Who are your caregiver(s) and their phone number(s)? Zachary Ellison (Spouse)   [phone]   Prior to hospitilization cognitive status: Alert/Oriented   Current cognitive status: Alert/Oriented   Walking or Climbing Stairs ambulation difficulty, requires equipment   Mobility Management W/C   Home Accessibility wheelchair accessible   Equipment Currently Used at Home wheelchair   Readmission within 30 days? No   Patient currently being followed by outpatient case management? No   Do you currently have service(s) that help you manage your care at home? No   Do you take prescription medications? Yes  ([facility])   Do you have prescription coverage? No   Do you have any problems affording any of your prescribed medications? TBD   Is the patient taking medications as prescribed? yes   Who is going to help you get home at discharge? Spouse   How do you get to doctors appointments? family or friend will provide   Are you on dialysis? No   DME Needed Upon Discharge  none   Discharge Plan discussed with: Spouse/sig other   Name(s) and Number(s) Zachary Ellison (Spouse)   [phone]   Transition of Care Barriers Unisured   Discharge Plan A Home with family   Physical Activity   On average, how many days per week do you engage in moderate to strenuous exercise (like a brisk walk)? 0 days   On average, how many minutes do you engage in exercise at this level? 0 min    Financial Resource Strain   How hard is it for you to pay for the very basics like food, housing, medical care, and heating? Hard   Housing Stability   In the last 12 months, was there a time when you were not able to pay the mortgage or rent on time? N   In the last 12 months, how many places have you lived? 1   In the last 12 months, was there a time when you did not have a steady place to sleep or slept in a shelter (including now)? N   Transportation Needs   In the past 12 months, has lack of transportation kept you from medical appointments or from getting medications? no   In the past 12 months, has lack of transportation kept you from meetings, work, or from getting things needed for daily living? No   Food Insecurity   Within the past 12 months, you worried that your food would run out before you got the money to buy more. Never true   Within the past 12 months, the food you bought just didn't last and you didn't have money to get more. Never true   Social Connections   In a typical week, how many times do you talk on the phone with family, friends, or neighbors? More than 3   How often do you get together with friends or relatives? More than 3   How often do you attend meetings of the clubs or organizations you belong to? Never   Are you , , , , never , or living with a partner?    Alcohol Use   Q1: How often do you have a drink containing alcohol? Never   Q2: How many drinks containing alcohol do you have on a typical day when you are drinking? None   Q3: How often do you have six or more drinks on one occasion? Never   OTHER   Name(s) of People in Home Zachary Ellison (Spouse)   [phone]; 19 yr old son     Pt resides with spouse, Zachary, & their 19 yr old son who is a [employer] student; Pt & spouse each have 2 biological children; Pt unemployed with no income (Undocumented immigrant ineligible for insurance benefits); Spouse reported financial difficulty due to  his inability to work regularly in order to care for pt; [place] referral submitted for resource assistance; CM to follow for d/c planning needs.

## 2023-09-20 ENCOUNTER — OFFICE VISIT (OUTPATIENT)
Dept: HEMATOLOGY/ONCOLOGY | Facility: CLINIC | Age: 58
End: 2023-09-20

## 2023-09-20 ENCOUNTER — TELEPHONE (OUTPATIENT)
Dept: HEMATOLOGY/ONCOLOGY | Facility: CLINIC | Age: 58
End: 2023-09-20

## 2023-09-20 ENCOUNTER — CLINICAL SUPPORT (OUTPATIENT)
Dept: HEMATOLOGY/ONCOLOGY | Facility: CLINIC | Age: 58
End: 2023-09-20

## 2023-09-20 VITALS
BODY MASS INDEX: 43.38 KG/M2 | HEIGHT: 63 IN | TEMPERATURE: 98 F | WEIGHT: 244.81 LBS | SYSTOLIC BLOOD PRESSURE: 131 MMHG | RESPIRATION RATE: 20 BRPM | DIASTOLIC BLOOD PRESSURE: 78 MMHG | HEART RATE: 61 BPM | OXYGEN SATURATION: 96 %

## 2023-09-20 DIAGNOSIS — R60.9 EDEMA, UNSPECIFIED TYPE: Primary | ICD-10-CM

## 2023-09-20 DIAGNOSIS — C92.10 BLAST CRISIS PHASE OF CHRONIC MYELOID LEUKEMIA: ICD-10-CM

## 2023-09-20 DIAGNOSIS — G62.9 NEUROPATHY: ICD-10-CM

## 2023-09-20 DIAGNOSIS — D72.829 HYPERLEUKOCYTOSIS: ICD-10-CM

## 2023-09-20 DIAGNOSIS — D64.9 ANEMIA, UNSPECIFIED TYPE: ICD-10-CM

## 2023-09-20 LAB
ABS NEUT CALC (OHS): 0.94 X10(3)/MCL (ref 2.1–9.2)
ALBUMIN SERPL-MCNC: 3.3 G/DL (ref 3.5–5)
ALBUMIN/GLOB SERPL: 1.3 RATIO (ref 1.1–2)
ALP SERPL-CCNC: 51 UNIT/L (ref 40–150)
ALT SERPL-CCNC: 25 UNIT/L (ref 0–55)
ANISOCYTOSIS BLD QL SMEAR: ABNORMAL
AST SERPL-CCNC: 13 UNIT/L (ref 5–34)
BILIRUB SERPL-MCNC: 0.9 MG/DL
BUN SERPL-MCNC: 10.7 MG/DL (ref 9.8–20.1)
CALCIUM SERPL-MCNC: 9 MG/DL (ref 8.4–10.2)
CHLORIDE SERPL-SCNC: 110 MMOL/L (ref 98–107)
CO2 SERPL-SCNC: 26 MMOL/L (ref 22–29)
CREAT SERPL-MCNC: 0.71 MG/DL (ref 0.55–1.02)
ERYTHROCYTE [DISTWIDTH] IN BLOOD BY AUTOMATED COUNT: 20.3 % (ref 11.5–17)
GFR SERPLBLD CREATININE-BSD FMLA CKD-EPI: >60 MLS/MIN/1.73/M2
GLOBULIN SER-MCNC: 2.5 GM/DL (ref 2.4–3.5)
GLUCOSE SERPL-MCNC: 360 MG/DL (ref 74–100)
HCT VFR BLD AUTO: 27.5 % (ref 37–47)
HGB BLD-MCNC: 8.7 G/DL (ref 12–16)
LYMPHOCYTES NFR BLD MANUAL: 1.23 X10(3)/MCL
LYMPHOCYTES NFR BLD MANUAL: 55 % (ref 13–40)
MACROCYTES BLD QL SMEAR: ABNORMAL
MCH RBC QN AUTO: 32.2 PG (ref 27–31)
MCHC RBC AUTO-ENTMCNC: 31.6 G/DL (ref 33–36)
MCV RBC AUTO: 101.9 FL (ref 80–94)
MONOCYTES NFR BLD MANUAL: 0.07 X10(3)/MCL (ref 0.1–1.3)
MONOCYTES NFR BLD MANUAL: 3 % (ref 2–11)
NEUTROPHILS NFR BLD MANUAL: 42 % (ref 47–80)
NRBC BLD AUTO-RTO: 11.7 %
NRBC BLD MANUAL-RTO: 13 %
PLATELET # BLD AUTO: 19 X10(3)/MCL (ref 130–400)
PLATELET # BLD EST: ABNORMAL 10*3/UL
PLATELETS.RETICULATED NFR BLD AUTO: 4.4 % (ref 0.9–11.2)
PMV BLD AUTO: ABNORMAL FL
POLYCHROMASIA BLD QL SMEAR: SLIGHT
POTASSIUM SERPL-SCNC: 4.2 MMOL/L (ref 3.5–5.1)
PROT SERPL-MCNC: 5.8 GM/DL (ref 6.4–8.3)
RBC # BLD AUTO: 2.7 X10(6)/MCL (ref 4.2–5.4)
RBC MORPH BLD: ABNORMAL
SCHISTOCYTE (OLG): SLIGHT
SODIUM SERPL-SCNC: 143 MMOL/L (ref 136–145)
TEAR DROP CELL (OLG): SLIGHT
WBC # SPEC AUTO: 2.23 X10(3)/MCL (ref 4.5–11.5)

## 2023-09-20 PROCEDURE — 85027 COMPLETE CBC AUTOMATED: CPT

## 2023-09-20 PROCEDURE — 36415 COLL VENOUS BLD VENIPUNCTURE: CPT

## 2023-09-20 PROCEDURE — 99215 OFFICE O/P EST HI 40 MIN: CPT | Mod: PBBFAC | Performed by: NURSE PRACTITIONER

## 2023-09-20 PROCEDURE — 99215 PR OFFICE/OUTPT VISIT, EST, LEVL V, 40-54 MIN: ICD-10-PCS | Mod: S$PBB,,, | Performed by: NURSE PRACTITIONER

## 2023-09-20 PROCEDURE — 99215 OFFICE O/P EST HI 40 MIN: CPT | Mod: S$PBB,,, | Performed by: NURSE PRACTITIONER

## 2023-09-20 PROCEDURE — 80053 COMPREHEN METABOLIC PANEL: CPT

## 2023-09-20 RX ORDER — GABAPENTIN 300 MG/1
CAPSULE ORAL
Qty: 42 CAPSULE | Refills: 0 | Status: ON HOLD | OUTPATIENT
Start: 2023-09-20 | End: 2024-04-01 | Stop reason: HOSPADM

## 2023-09-20 RX ORDER — FUROSEMIDE 20 MG/1
20 TABLET ORAL DAILY
Qty: 7 TABLET | Refills: 0 | Status: ON HOLD | OUTPATIENT
Start: 2023-09-20 | End: 2024-04-01 | Stop reason: HOSPADM

## 2023-09-20 NOTE — Clinical Note
RTC 4 weeks with deepak COTTON Office visit, labs done prior  Needs to return to clinic on9/22 for labs only. Will wait in waiting area after labs drawn for results
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
## 2023-09-20 NOTE — TELEPHONE ENCOUNTER
1056 Louie Ackerman, with lab called with a critical platelet count of 19. Informed DALLAS Haines NP.

## 2023-09-21 LAB — HEMATOLOGIST REVIEW: NORMAL

## 2023-09-22 ENCOUNTER — INFUSION (OUTPATIENT)
Dept: INFUSION THERAPY | Facility: HOSPITAL | Age: 58
End: 2023-09-22
Attending: INTERNAL MEDICINE

## 2023-09-22 ENCOUNTER — TELEPHONE (OUTPATIENT)
Dept: HEMATOLOGY/ONCOLOGY | Facility: CLINIC | Age: 58
End: 2023-09-22

## 2023-09-22 ENCOUNTER — CLINICAL SUPPORT (OUTPATIENT)
Dept: HEMATOLOGY/ONCOLOGY | Facility: CLINIC | Age: 58
End: 2023-09-22

## 2023-09-22 VITALS
RESPIRATION RATE: 18 BRPM | HEART RATE: 69 BPM | DIASTOLIC BLOOD PRESSURE: 70 MMHG | WEIGHT: 244 LBS | HEIGHT: 63 IN | TEMPERATURE: 98 F | BODY MASS INDEX: 43.23 KG/M2 | OXYGEN SATURATION: 95 % | SYSTOLIC BLOOD PRESSURE: 126 MMHG

## 2023-09-22 DIAGNOSIS — C92.10 BLAST CRISIS PHASE OF CHRONIC MYELOID LEUKEMIA: ICD-10-CM

## 2023-09-22 DIAGNOSIS — D64.9 SYMPTOMATIC ANEMIA: ICD-10-CM

## 2023-09-22 DIAGNOSIS — D69.6 THROMBOCYTOPENIA: ICD-10-CM

## 2023-09-22 DIAGNOSIS — D69.6 THROMBOCYTOPENIA: Primary | ICD-10-CM

## 2023-09-22 LAB
ABO + RH BLD: NORMAL
BASOPHILS # BLD AUTO: 0 X10(3)/MCL
BASOPHILS NFR BLD AUTO: 0 %
BLD PROD TYP BPU: NORMAL
BLOOD UNIT EXPIRATION DATE: NORMAL
BLOOD UNIT TYPE CODE: 7300
CROSSMATCH INTERPRETATION: NORMAL
DISPENSE STATUS: NORMAL
EOSINOPHIL # BLD AUTO: 0 X10(3)/MCL (ref 0–0.9)
EOSINOPHIL NFR BLD AUTO: 0 %
ERYTHROCYTE [DISTWIDTH] IN BLOOD BY AUTOMATED COUNT: 19.5 % (ref 11.5–17)
GROUP & RH: NORMAL
HCT VFR BLD AUTO: 27.2 % (ref 37–47)
HGB BLD-MCNC: 8.5 G/DL (ref 12–16)
IMM GRANULOCYTES # BLD AUTO: 0.09 X10(3)/MCL (ref 0–0.04)
IMM GRANULOCYTES NFR BLD AUTO: 3.7 %
INDIRECT COOMBS GEL: NORMAL
LYMPHOCYTES # BLD AUTO: 1.05 X10(3)/MCL (ref 0.6–4.6)
LYMPHOCYTES NFR BLD AUTO: 43 %
MCH RBC QN AUTO: 31.4 PG (ref 27–31)
MCHC RBC AUTO-ENTMCNC: 31.3 G/DL (ref 33–36)
MCV RBC AUTO: 100.4 FL (ref 80–94)
MONOCYTES # BLD AUTO: 0.4 X10(3)/MCL (ref 0.1–1.3)
MONOCYTES NFR BLD AUTO: 16.4 %
NEUTROPHILS # BLD AUTO: 0.9 X10(3)/MCL (ref 2.1–9.2)
NEUTROPHILS NFR BLD AUTO: 36.9 %
NRBC BLD AUTO-RTO: 12.7 %
PLATELET # BLD AUTO: 10 X10(3)/MCL (ref 130–400)
PLATELETS.RETICULATED NFR BLD AUTO: 7.8 % (ref 0.9–11.2)
PMV BLD AUTO: ABNORMAL FL
RBC # BLD AUTO: 2.71 X10(6)/MCL (ref 4.2–5.4)
SPECIMEN OUTDATE: NORMAL
UNIT NUMBER: NORMAL
WBC # SPEC AUTO: 2.44 X10(3)/MCL (ref 4.5–11.5)

## 2023-09-22 PROCEDURE — 36415 COLL VENOUS BLD VENIPUNCTURE: CPT

## 2023-09-22 PROCEDURE — 36430 TRANSFUSION BLD/BLD COMPNT: CPT

## 2023-09-22 PROCEDURE — 25000003 PHARM REV CODE 250: Performed by: NURSE PRACTITIONER

## 2023-09-22 PROCEDURE — 85025 COMPLETE CBC W/AUTO DIFF WBC: CPT

## 2023-09-22 PROCEDURE — P9037 PLATE PHERES LEUKOREDU IRRAD: HCPCS | Performed by: NURSE PRACTITIONER

## 2023-09-22 PROCEDURE — 86850 RBC ANTIBODY SCREEN: CPT | Performed by: NURSE PRACTITIONER

## 2023-09-22 PROCEDURE — 99212 OFFICE O/P EST SF 10 MIN: CPT | Mod: PBBFAC

## 2023-09-22 RX ORDER — ACETAMINOPHEN 325 MG/1
650 TABLET ORAL
Status: COMPLETED | OUTPATIENT
Start: 2023-09-22 | End: 2023-09-22

## 2023-09-22 RX ORDER — ACETAMINOPHEN 325 MG/1
650 TABLET ORAL
Status: CANCELLED | OUTPATIENT
Start: 2023-09-22

## 2023-09-22 RX ORDER — HYDROCODONE BITARTRATE AND ACETAMINOPHEN 500; 5 MG/1; MG/1
TABLET ORAL ONCE
Status: CANCELLED | OUTPATIENT
Start: 2023-09-22 | End: 2023-09-22

## 2023-09-22 RX ORDER — HYDROCODONE BITARTRATE AND ACETAMINOPHEN 500; 5 MG/1; MG/1
TABLET ORAL ONCE
Status: COMPLETED | OUTPATIENT
Start: 2023-09-22 | End: 2023-09-22

## 2023-09-22 RX ORDER — DIPHENHYDRAMINE HCL 25 MG
25 CAPSULE ORAL
Status: CANCELLED | OUTPATIENT
Start: 2023-09-22

## 2023-09-22 RX ORDER — DIPHENHYDRAMINE HCL 25 MG
25 CAPSULE ORAL
Status: COMPLETED | OUTPATIENT
Start: 2023-09-22 | End: 2023-09-22

## 2023-09-22 RX ADMIN — SODIUM CHLORIDE: 9 INJECTION, SOLUTION INTRAVENOUS at 12:09

## 2023-09-22 RX ADMIN — DIPHENHYDRAMINE HYDROCHLORIDE 25 MG: 25 CAPSULE ORAL at 01:09

## 2023-09-22 RX ADMIN — ACETAMINOPHEN 650 MG: 325 TABLET, FILM COATED ORAL at 01:09

## 2023-09-22 NOTE — NURSING
Patient here for platelets. Patient from clinic. PLTS 10. Orders from Dr. Nation. Patient has CML. She receives treatment monthly at [facility] in [place].

## 2023-09-22 NOTE — PROGRESS NOTES
Please check with patient if she is bleeding or bruising   Transfuse 1 unit of single donor platelets, leukopoor, irradiated.

## 2023-09-26 ENCOUNTER — CLINICAL SUPPORT (OUTPATIENT)
Dept: HEMATOLOGY/ONCOLOGY | Facility: CLINIC | Age: 58
End: 2023-09-26

## 2023-09-26 ENCOUNTER — TELEPHONE (OUTPATIENT)
Dept: HEMATOLOGY/ONCOLOGY | Facility: CLINIC | Age: 58
End: 2023-09-26

## 2023-09-26 ENCOUNTER — INFUSION (OUTPATIENT)
Dept: INFUSION THERAPY | Facility: HOSPITAL | Age: 58
End: 2023-09-26
Attending: INTERNAL MEDICINE

## 2023-09-26 VITALS
HEART RATE: 75 BPM | RESPIRATION RATE: 18 BRPM | SYSTOLIC BLOOD PRESSURE: 130 MMHG | DIASTOLIC BLOOD PRESSURE: 68 MMHG | TEMPERATURE: 98 F | OXYGEN SATURATION: 96 % | WEIGHT: 251.13 LBS | HEIGHT: 63 IN | BODY MASS INDEX: 44.5 KG/M2

## 2023-09-26 DIAGNOSIS — C92.10 BLAST CRISIS PHASE OF CHRONIC MYELOID LEUKEMIA: ICD-10-CM

## 2023-09-26 DIAGNOSIS — D70.9 NEUTROPENIA, UNSPECIFIED TYPE: Primary | ICD-10-CM

## 2023-09-26 DIAGNOSIS — D69.6 THROMBOCYTOPENIA: ICD-10-CM

## 2023-09-26 DIAGNOSIS — D64.9 SYMPTOMATIC ANEMIA: ICD-10-CM

## 2023-09-26 LAB
ABS NEUT CALC (OHS): 0.7 X10(3)/MCL (ref 2.1–9.2)
ALBUMIN SERPL-MCNC: 3.2 G/DL (ref 3.5–5)
ALBUMIN/GLOB SERPL: 1.1 RATIO (ref 1.1–2)
ALP SERPL-CCNC: 58 UNIT/L (ref 40–150)
ALT SERPL-CCNC: 14 UNIT/L (ref 0–55)
ANISOCYTOSIS BLD QL SMEAR: ABNORMAL
AST SERPL-CCNC: 15 UNIT/L (ref 5–34)
BASOPHILS NFR BLD MANUAL: 0.03 X10(3)/MCL (ref 0–0.2)
BASOPHILS NFR BLD MANUAL: 1 % (ref 0–2)
BILIRUB SERPL-MCNC: 0.7 MG/DL
BUN SERPL-MCNC: 10.6 MG/DL (ref 9.8–20.1)
CALCIUM SERPL-MCNC: 9.2 MG/DL (ref 8.4–10.2)
CHLORIDE SERPL-SCNC: 106 MMOL/L (ref 98–107)
CO2 SERPL-SCNC: 23 MMOL/L (ref 22–29)
CREAT SERPL-MCNC: 0.7 MG/DL (ref 0.55–1.02)
ERYTHROCYTE [DISTWIDTH] IN BLOOD BY AUTOMATED COUNT: 20 % (ref 11.5–17)
GFR SERPLBLD CREATININE-BSD FMLA CKD-EPI: >60 MLS/MIN/1.73/M2
GLOBULIN SER-MCNC: 2.8 GM/DL (ref 2.4–3.5)
GLUCOSE SERPL-MCNC: 269 MG/DL (ref 74–100)
HCT VFR BLD AUTO: 27.1 % (ref 37–47)
HGB BLD-MCNC: 8.8 G/DL (ref 12–16)
LYMPHOCYTES NFR BLD MANUAL: 1.67 X10(3)/MCL
LYMPHOCYTES NFR BLD MANUAL: 62 % (ref 13–40)
MACROCYTES BLD QL SMEAR: ABNORMAL
MCH RBC QN AUTO: 33.3 PG (ref 27–31)
MCHC RBC AUTO-ENTMCNC: 32.5 G/DL (ref 33–36)
MCV RBC AUTO: 102.7 FL (ref 80–94)
MICROCYTES BLD QL SMEAR: ABNORMAL
MONOCYTES NFR BLD MANUAL: 0.3 X10(3)/MCL (ref 0.1–1.3)
MONOCYTES NFR BLD MANUAL: 11 % (ref 2–11)
NEUTROPHILS NFR BLD MANUAL: 22 % (ref 47–80)
NEUTS BAND NFR BLD MANUAL: 4 % (ref 0–11)
NRBC BLD AUTO-RTO: 11.2 %
NRBC BLD MANUAL-RTO: 9 %
PLATELET # BLD AUTO: 33 X10(3)/MCL (ref 130–400)
PLATELET # BLD EST: ABNORMAL 10*3/UL
PLATELETS.RETICULATED NFR BLD AUTO: ABNORMAL %
PMV BLD AUTO: ABNORMAL FL
POLYCHROMASIA BLD QL SMEAR: SLIGHT
POTASSIUM SERPL-SCNC: 4 MMOL/L (ref 3.5–5.1)
PROT SERPL-MCNC: 6 GM/DL (ref 6.4–8.3)
RBC # BLD AUTO: 2.64 X10(6)/MCL (ref 4.2–5.4)
RBC MORPH BLD: ABNORMAL
SCHISTOCYTE (OLG): ABNORMAL
SODIUM SERPL-SCNC: 139 MMOL/L (ref 136–145)
WBC # SPEC AUTO: 2.69 X10(3)/MCL (ref 4.5–11.5)

## 2023-09-26 PROCEDURE — 80053 COMPREHEN METABOLIC PANEL: CPT

## 2023-09-26 PROCEDURE — 36415 COLL VENOUS BLD VENIPUNCTURE: CPT

## 2023-09-26 PROCEDURE — 85027 COMPLETE CBC AUTOMATED: CPT

## 2023-09-26 RX ORDER — CIPROFLOXACIN 500 MG/1
500 TABLET ORAL EVERY 12 HOURS
Qty: 20 TABLET | Refills: 0 | Status: SHIPPED | OUTPATIENT
Start: 2023-09-26 | End: 2023-10-06

## 2023-09-26 NOTE — NURSING
Patient into infusion for possible blood transufion-labs drawn. Reviewed by Huma Wilkerson NP-Huma spoke with patient and , no infusions needed today. Patient to return on 9/29/2023 for CBC. Patient was prescribed Cipro to start today because of recent decreased white count.  picking up from pharmacy. [id number] Patient was stable when infusion. Denies needs.

## 2023-09-29 ENCOUNTER — HOSPITAL ENCOUNTER (EMERGENCY)
Facility: HOSPITAL | Age: 58
Discharge: HOME | End: 2023-09-29
Attending: INTERNAL MEDICINE
Payer: MEDICAID

## 2023-09-29 ENCOUNTER — TELEPHONE (OUTPATIENT)
Dept: HEMATOLOGY/ONCOLOGY | Facility: CLINIC | Age: 58
End: 2023-09-29

## 2023-09-29 ENCOUNTER — APPOINTMENT (OUTPATIENT)
Dept: HEMATOLOGY/ONCOLOGY | Facility: CLINIC | Age: 58
End: 2023-09-29

## 2023-09-29 VITALS
DIASTOLIC BLOOD PRESSURE: 88 MMHG | BODY MASS INDEX: 44.5 KG/M2 | HEART RATE: 94 BPM | OXYGEN SATURATION: 97 % | SYSTOLIC BLOOD PRESSURE: 136 MMHG | RESPIRATION RATE: 20 BRPM | WEIGHT: 251.13 LBS | HEIGHT: 63 IN | TEMPERATURE: 98 F

## 2023-09-29 DIAGNOSIS — R73.9 HYPERGLYCEMIA: Primary | ICD-10-CM

## 2023-09-29 DIAGNOSIS — C92.10 BLAST CRISIS PHASE OF CHRONIC MYELOID LEUKEMIA: ICD-10-CM

## 2023-09-29 DIAGNOSIS — D72.829 HYPERLEUKOCYTOSIS: ICD-10-CM

## 2023-09-29 LAB
ABS NEUT CALC (OHS): 1.88 X10(3)/MCL (ref 2.1–9.2)
ALBUMIN SERPL-MCNC: 3.1 G/DL (ref 3.5–5)
ALBUMIN/GLOB SERPL: 0.9 RATIO (ref 1.1–2)
ALP SERPL-CCNC: 59 UNIT/L (ref 40–150)
ALT SERPL-CCNC: 17 UNIT/L (ref 0–55)
ANISOCYTOSIS BLD QL SMEAR: ABNORMAL
AST SERPL-CCNC: 12 UNIT/L (ref 5–34)
BILIRUB SERPL-MCNC: 0.8 MG/DL
BUN SERPL-MCNC: 15.3 MG/DL (ref 9.8–20.1)
CALCIUM SERPL-MCNC: 9.9 MG/DL (ref 8.4–10.2)
CHLORIDE SERPL-SCNC: 105 MMOL/L (ref 98–107)
CO2 SERPL-SCNC: 25 MMOL/L (ref 22–29)
CREAT SERPL-MCNC: 0.88 MG/DL (ref 0.55–1.02)
ERYTHROCYTE [DISTWIDTH] IN BLOOD BY AUTOMATED COUNT: 19.1 % (ref 11.5–17)
GFR SERPLBLD CREATININE-BSD FMLA CKD-EPI: >60 MLS/MIN/1.73/M2
GLOBULIN SER-MCNC: 3.6 GM/DL (ref 2.4–3.5)
GLUCOSE SERPL-MCNC: 473 MG/DL (ref 74–100)
HCT VFR BLD AUTO: 26.9 % (ref 37–47)
HGB BLD-MCNC: 8.3 G/DL (ref 12–16)
LYMPHOCYTES NFR BLD MANUAL: 0.97 X10(3)/MCL
LYMPHOCYTES NFR BLD MANUAL: 34 % (ref 13–40)
MCH RBC QN AUTO: 31.1 PG (ref 27–31)
MCHC RBC AUTO-ENTMCNC: 30.9 G/DL (ref 33–36)
MCV RBC AUTO: 100.7 FL (ref 80–94)
NEUTROPHILS NFR BLD MANUAL: 66 % (ref 47–80)
NRBC BLD AUTO-RTO: 7 %
NRBC BLD MANUAL-RTO: 7 %
PLATELET # BLD AUTO: 24 X10(3)/MCL (ref 130–400)
PLATELET # BLD EST: ABNORMAL 10*3/UL
PLATELETS.RETICULATED NFR BLD AUTO: 9.9 % (ref 0.9–11.2)
PMV BLD AUTO: ABNORMAL FL
POCT GLUCOSE: 357 MG/DL (ref 70–110)
POCT GLUCOSE: 456 MG/DL (ref 70–110)
POTASSIUM SERPL-SCNC: 4.3 MMOL/L (ref 3.5–5.1)
PROT SERPL-MCNC: 6.7 GM/DL (ref 6.4–8.3)
RBC # BLD AUTO: 2.67 X10(6)/MCL (ref 4.2–5.4)
RBC MORPH BLD: ABNORMAL
SODIUM SERPL-SCNC: 139 MMOL/L (ref 136–145)
WBC # SPEC AUTO: 2.85 X10(3)/MCL (ref 4.5–11.5)

## 2023-09-29 PROCEDURE — 80053 COMPREHEN METABOLIC PANEL: CPT

## 2023-09-29 PROCEDURE — 36415 COLL VENOUS BLD VENIPUNCTURE: CPT

## 2023-09-29 PROCEDURE — 85027 COMPLETE CBC AUTOMATED: CPT

## 2023-09-29 PROCEDURE — 96360 HYDRATION IV INFUSION INIT: CPT

## 2023-09-29 PROCEDURE — 82962 GLUCOSE BLOOD TEST: CPT

## 2023-09-29 PROCEDURE — 96361 HYDRATE IV INFUSION ADD-ON: CPT

## 2023-09-29 PROCEDURE — 96372 THER/PROPH/DIAG INJ SC/IM: CPT | Mod: 59

## 2023-09-29 PROCEDURE — 63600175 PHARM REV CODE 636 W HCPCS

## 2023-09-29 PROCEDURE — 99284 EMERGENCY DEPT VISIT MOD MDM: CPT | Mod: 25

## 2023-09-29 PROCEDURE — 25000003 PHARM REV CODE 250

## 2023-09-29 RX ORDER — INSULIN LISPRO 100 [IU]/ML
22 INJECTION, SOLUTION INTRAVENOUS; SUBCUTANEOUS
Qty: 10 ML | Refills: 2 | Status: ON HOLD | OUTPATIENT
Start: 2023-09-29 | End: 2024-04-01 | Stop reason: HOSPADM

## 2023-09-29 RX ORDER — INSULIN GLARGINE 100 [IU]/ML
45 INJECTION, SOLUTION SUBCUTANEOUS EVERY MORNING
Qty: 10 ML | Refills: 0 | Status: SHIPPED | OUTPATIENT
Start: 2023-09-29 | End: 2023-09-29

## 2023-09-29 RX ORDER — INSULIN ASPART 100 [IU]/ML
8 INJECTION, SOLUTION INTRAVENOUS; SUBCUTANEOUS
Status: COMPLETED | OUTPATIENT
Start: 2023-09-29 | End: 2023-09-29

## 2023-09-29 RX ORDER — INSULIN GLARGINE 100 [IU]/ML
60 INJECTION, SOLUTION SUBCUTANEOUS NIGHTLY
Qty: 30 EACH | Refills: 6 | Status: SHIPPED | OUTPATIENT
Start: 2023-09-29 | End: 2023-09-29

## 2023-09-29 RX ORDER — ONDANSETRON 4 MG/1
4 TABLET, ORALLY DISINTEGRATING ORAL ONCE
Status: COMPLETED | OUTPATIENT
Start: 2023-09-29 | End: 2023-09-29

## 2023-09-29 RX ORDER — SODIUM CHLORIDE, SODIUM LACTATE, POTASSIUM CHLORIDE, CALCIUM CHLORIDE 600; 310; 30; 20 MG/100ML; MG/100ML; MG/100ML; MG/100ML
INJECTION, SOLUTION INTRAVENOUS ONCE
Status: COMPLETED | OUTPATIENT
Start: 2023-09-29 | End: 2023-09-29

## 2023-09-29 RX ADMIN — SODIUM CHLORIDE, POTASSIUM CHLORIDE, SODIUM LACTATE AND CALCIUM CHLORIDE: 600; 310; 30; 20 INJECTION, SOLUTION INTRAVENOUS at 12:09

## 2023-09-29 RX ADMIN — INSULIN ASPART 8 UNITS: 100 INJECTION, SOLUTION INTRAVENOUS; SUBCUTANEOUS at 12:09

## 2023-09-29 RX ADMIN — ONDANSETRON 4 MG: 4 TABLET, ORALLY DISINTEGRATING ORAL at 02:09

## 2023-09-29 NOTE — ED PROVIDER NOTES
Encounter Date: 2023       History     Chief Complaint   Patient presents with    Hyperglycemia     States went for chemo yesterday in [place], received steroids and today elevated glucose of 473 on lab work.      The patient is a 59 yo F with significant past medical hx of CML, HTN, DM that presents to the ED from the Heme/Onc clinic for hyperglycemia. The patient reports that she last received chemotherapy yesterday, and at that time they also gave her steroids. She denies any abdominal pain, chest pain, nausea, vomiting, or polydipsia. She reports some polyruria. The patient also denies any increased weakness or recent falls or trauma.     The history is provided by the patient and the spouse. A  was used.     Review of patient's allergies indicates:  No Known Allergies  Past Medical History:   Diagnosis Date    Cancer     CML (chronic myelocytic leukemia)     DM2 (diabetes mellitus, type 2)     HTN (hypertension)     NAFLD (nonalcoholic fatty liver disease)     Neuropathy      Past Surgical History:   Procedure Laterality Date    ABDOMINAL SURGERY       SECTION      x4    CHOLECYSTECTOMY       Family History   Problem Relation Age of Onset    Diabetes Mother     Diabetes Father     Cancer Neg Hx      Social History     Tobacco Use    Smoking status: Never    Smokeless tobacco: Never   Substance Use Topics    Alcohol use: Not Currently    Drug use: Not Currently     Review of Systems   Constitutional:  Negative for chills and fever.   Respiratory:  Negative for shortness of breath.    Cardiovascular:  Negative for chest pain.   Gastrointestinal:  Negative for abdominal pain, nausea and vomiting.   Endocrine: Positive for polyuria. Negative for polydipsia.   Skin:  Positive for rash.   Neurological:  Negative for dizziness, weakness and light-headedness.   All other systems reviewed and are negative.      Physical Exam     Initial Vitals [23 1129]   BP Pulse Resp Temp  SpO2   (!) 156/92 79 20 97.7 °F (36.5 °C) 98 %      MAP       --         Physical Exam    Nursing note and vitals reviewed.  Constitutional: Vital signs are normal. She appears well-developed and well-nourished. She is not diaphoretic. She does not appear ill. No distress.   HENT:   Head: Normocephalic and atraumatic.   Mouth/Throat: Oropharynx is clear and moist.   Eyes: Pupils are equal, round, and reactive to light.   Neck:   Normal range of motion.  Cardiovascular:  Normal rate and regular rhythm.     Exam reveals no gallop and no friction rub.       No murmur heard.  Pulmonary/Chest: Effort normal and breath sounds normal. No respiratory distress.   Abdominal: Abdomen is soft. Bowel sounds are normal. She exhibits no distension. There is no abdominal tenderness.   Fibrous scar tissue present bilaterally in previous sites of chemo therapy injection.  Non tender, non erythematous     There is no rebound and no guarding.   Musculoskeletal:         General: Normal range of motion.      Cervical back: Normal range of motion.     Neurological: She is alert and oriented to person, place, and time. She has normal strength. GCS score is 15. GCS eye subscore is 4. GCS verbal subscore is 5. GCS motor subscore is 6.   Skin: Skin is warm.   Diffuse dry, peeling skin. Reports improving with cream   Psychiatric: Thought content normal.         ED Course   Procedures  Labs Reviewed   POCT GLUCOSE - Abnormal; Notable for the following components:       Result Value    POCT Glucose 456 (*)     All other components within normal limits   POCT GLUCOSE - Abnormal; Notable for the following components:    POCT Glucose 357 (*)     All other components within normal limits          Imaging Results              X-Ray Chest PA And Lateral (Final result)  Result time 09/29/23 15:05:07      Final result by Benjamín Haider MD (09/29/23 15:05:07)                   Impression:      No acute cardiopulmonary process  identified.      Electronically signed by: Benjamín Haider  Date:    09/29/2023  Time:    15:05               Narrative:    EXAMINATION:  XR CHEST PA AND LATERAL    CLINICAL HISTORY:  shortness of breath;    TECHNIQUE:  Two-view    COMPARISON:  September 1, 2023.    FINDINGS:  Cardiopericardial silhouette is within normal limits. Lungs are without dense focal or segmental consolidation, congestion, pleural effusion or pneumothorax.                                       Medications   lactated ringers infusion (0 mL/hr Intravenous Stopped 9/29/23 1528)   insulin aspart U-100 injection 8 Units (8 Units Subcutaneous Given 9/29/23 1205)   ondansetron disintegrating tablet 4 mg (4 mg Oral Given 9/29/23 1411)     Medical Decision Making  The patient presented with hyperglycemia from Heme/Onc clinic. She is asymptomatic at this time, only complains of some minor polyuria. She does not have an anion gap at this time. She reports that her sugars have been elevated post chemotherapy secondary to receiving steroid injections with chemo. The patient was given 1L of LR while in the ED, 8U of Novolog, and her sugar decreased. She also reports some diffuse skin dryness, but states that it has been improved with cream she was prescribed by her PCP. The patient was discharged stable, and instructed to increase her morning insulin and sliding scale with meals.    Amount and/or Complexity of Data Reviewed  Independent Historian: spouse     Details:  states not using the Lantus, using Humulin N.  External Data Reviewed: labs.     Details: Labs from clinic today reviewed with hyperglycemia, no acidosis or anion gap  Labs: ordered. Decision-making details documented in ED Course.     Details: POCT glucose on arrival 456  POCT glucose repeat 357 (post prandial)  Radiology: ordered and independent interpretation performed. Decision-making details documented in ED Course.     Details: CXR: No acute cardiopulmonary process  identified    Risk  Prescription drug management.      Additional MDM:   Differential Diagnosis:   DKA  HHS  Steroid induced hyperglycemia            Attending Attestation:   Physician Attestation Statement for Resident:  As the supervising MD   Physician Attestation Statement: I have personally seen and examined this patient.   I agree with the above history.  -:   As the supervising MD I agree with the above PE.     As the supervising MD I agree with the above treatment, course, plan, and disposition.    I have reviewed and agree with the residents interpretation of the following: lab data.  I have reviewed the following: old records at this facility.                                Clinical Impression:   Final diagnoses:  [R73.9] Hyperglycemia (Primary)        ED Disposition Condition    Discharge Stable          ED Prescriptions       Medication Sig Dispense Start Date End Date Auth. Provider    insulin lispro 100 unit/mL injection Inject 22 Units into the skin 3 (three) times daily before meals. Takes 22 units TID AC while on chemo - (Gives between 12 to 15 units TID AC when not on chemo) 10 mL 9/29/2023 10/29/2023 Ivan Del Rio MD    insulin glargine (LANTUS) 100 unit/mL injection  (Status: Discontinued) Inject 60 Units into the skin nightly. 30 each 9/29/2023 9/29/2023 Ivan Del Rio MD    insulin (LANTUS SOLOSTAR U-100 INSULIN) glargine 100 units/mL SubQ pen  (Status: Discontinued) Inject 45 Units into the skin every morning. for 22 days 10 mL 9/29/2023 9/29/2023 Ivan Del Rio MD          Follow-up Information       Follow up With Specialties Details Why Contact Info    Bar Trevino, DO Internal Medicine Schedule an appointment as soon as possible for a visit in 1 week  [address] [facility] [address] [phone]      [facility] Emergency Medicine Go to  If symptoms worsen [address] [facility] [address] [phone]             Eliane  MD Ivan  Resident  09/29/23 1603       Elroy Paulino MD  09/29/23 8382

## 2023-10-01 LAB — POCT GLUCOSE: 387 MG/DL (ref 70–110)

## 2023-10-06 ENCOUNTER — APPOINTMENT (OUTPATIENT)
Dept: HEMATOLOGY/ONCOLOGY | Facility: CLINIC | Age: 58
End: 2023-10-06

## 2023-10-06 DIAGNOSIS — D64.9 SYMPTOMATIC ANEMIA: ICD-10-CM

## 2023-10-06 DIAGNOSIS — C92.10 BLAST CRISIS PHASE OF CHRONIC MYELOID LEUKEMIA: ICD-10-CM

## 2023-10-06 DIAGNOSIS — D69.6 THROMBOCYTOPENIA: ICD-10-CM

## 2023-10-06 DIAGNOSIS — D72.829 HYPERLEUKOCYTOSIS: ICD-10-CM

## 2023-10-06 LAB
ADDITIONAL FINDINGS (OHS): ABNORMAL
ALBUMIN SERPL-MCNC: 3.2 G/DL (ref 3.5–5)
ALBUMIN/GLOB SERPL: 1 RATIO (ref 1.1–2)
ALP SERPL-CCNC: 83 UNIT/L (ref 40–150)
ALT SERPL-CCNC: 35 UNIT/L (ref 0–55)
ANISOCYTOSIS BLD QL SMEAR: ABNORMAL
AST SERPL-CCNC: 52 UNIT/L (ref 5–34)
BASOPHILS # BLD AUTO: 0 X10(3)/MCL
BASOPHILS NFR BLD AUTO: 0 %
BILIRUB SERPL-MCNC: 0.7 MG/DL
BUN SERPL-MCNC: 14.3 MG/DL (ref 9.8–20.1)
CALCIUM SERPL-MCNC: 9.1 MG/DL (ref 8.4–10.2)
CHLORIDE SERPL-SCNC: 105 MMOL/L (ref 98–107)
CO2 SERPL-SCNC: 25 MMOL/L (ref 22–29)
CREAT SERPL-MCNC: 0.77 MG/DL (ref 0.55–1.02)
EOSINOPHIL # BLD AUTO: 0.01 X10(3)/MCL (ref 0–0.9)
EOSINOPHIL NFR BLD AUTO: 0.3 %
ERYTHROCYTE [DISTWIDTH] IN BLOOD BY AUTOMATED COUNT: 18.7 % (ref 11.5–17)
GFR SERPLBLD CREATININE-BSD FMLA CKD-EPI: >60 MLS/MIN/1.73/M2
GLOBULIN SER-MCNC: 3.3 GM/DL (ref 2.4–3.5)
GLUCOSE SERPL-MCNC: 254 MG/DL (ref 74–100)
HCT VFR BLD AUTO: 26.6 % (ref 37–47)
HGB BLD-MCNC: 8.5 G/DL (ref 12–16)
IMM GRANULOCYTES # BLD AUTO: 0.06 X10(3)/MCL (ref 0–0.04)
IMM GRANULOCYTES NFR BLD AUTO: 2 %
LYMPHOCYTES # BLD AUTO: 1.43 X10(3)/MCL (ref 0.6–4.6)
LYMPHOCYTES NFR BLD AUTO: 47 %
MCH RBC QN AUTO: 32.6 PG (ref 27–31)
MCHC RBC AUTO-ENTMCNC: 32 G/DL (ref 33–36)
MCV RBC AUTO: 101.9 FL (ref 80–94)
MONOCYTES # BLD AUTO: 0.31 X10(3)/MCL (ref 0.1–1.3)
MONOCYTES NFR BLD AUTO: 10.2 %
NEUTROPHILS # BLD AUTO: 1.23 X10(3)/MCL (ref 2.1–9.2)
NEUTROPHILS NFR BLD AUTO: 40.5 %
NRBC BLD AUTO-RTO: 5.3 %
PLATELET # BLD AUTO: 42 X10(3)/MCL (ref 130–400)
PLATELET # BLD EST: ABNORMAL 10*3/UL
PLATELETS.RETICULATED NFR BLD AUTO: 9 % (ref 0.9–11.2)
PMV BLD AUTO: ABNORMAL FL
POLYCHROMASIA BLD QL SMEAR: ABNORMAL
POTASSIUM SERPL-SCNC: 4.3 MMOL/L (ref 3.5–5.1)
PROT SERPL-MCNC: 6.5 GM/DL (ref 6.4–8.3)
RBC # BLD AUTO: 2.61 X10(6)/MCL (ref 4.2–5.4)
RBC MORPH BLD: ABNORMAL
SODIUM SERPL-SCNC: 140 MMOL/L (ref 136–145)
SPHEROCYTES BLD QL SMEAR: ABNORMAL
WBC # SPEC AUTO: 3.04 X10(3)/MCL (ref 4.5–11.5)

## 2023-10-06 PROCEDURE — 85025 COMPLETE CBC W/AUTO DIFF WBC: CPT

## 2023-10-06 PROCEDURE — 36415 COLL VENOUS BLD VENIPUNCTURE: CPT

## 2023-10-06 PROCEDURE — 80053 COMPREHEN METABOLIC PANEL: CPT

## 2023-10-12 ENCOUNTER — HOSPITAL ENCOUNTER (OUTPATIENT)
Facility: HOSPITAL | Age: 58
Discharge: HOME | End: 2023-10-15
Attending: FAMILY MEDICINE | Admitting: INTERNAL MEDICINE
Payer: MEDICAID

## 2023-10-12 ENCOUNTER — DOCUMENTATION ONLY (OUTPATIENT)
Dept: HEMATOLOGY/ONCOLOGY | Facility: CLINIC | Age: 58
End: 2023-10-12

## 2023-10-12 DIAGNOSIS — R50.9 FEVER OF UNKNOWN ORIGIN: ICD-10-CM

## 2023-10-12 DIAGNOSIS — R07.9 CHEST PAIN: ICD-10-CM

## 2023-10-12 DIAGNOSIS — R00.0 TACHYCARDIA: ICD-10-CM

## 2023-10-12 DIAGNOSIS — A41.9 SEPSIS: ICD-10-CM

## 2023-10-12 LAB
ABS NEUT CALC (OHS): 2.46 X10(3)/MCL (ref 2.1–9.2)
ALBUMIN SERPL-MCNC: 3.1 G/DL (ref 3.5–5)
ALBUMIN/GLOB SERPL: 1 RATIO (ref 1.1–2)
ALP SERPL-CCNC: 77 UNIT/L (ref 40–150)
ALT SERPL-CCNC: 20 UNIT/L (ref 0–55)
ANISOCYTOSIS BLD QL SMEAR: ABNORMAL
AST SERPL-CCNC: 15 UNIT/L (ref 5–34)
BILIRUB SERPL-MCNC: 0.7 MG/DL
BUN SERPL-MCNC: 9.9 MG/DL (ref 9.8–20.1)
CALCIUM SERPL-MCNC: 8.8 MG/DL (ref 8.4–10.2)
CHLORIDE SERPL-SCNC: 103 MMOL/L (ref 98–107)
CO2 SERPL-SCNC: 25 MMOL/L (ref 22–29)
CREAT SERPL-MCNC: 0.69 MG/DL (ref 0.55–1.02)
D DIMER PPP IA.FEU-MCNC: 0.5 UG/ML FEU (ref 0–0.5)
ERYTHROCYTE [DISTWIDTH] IN BLOOD BY AUTOMATED COUNT: 18.9 % (ref 11.5–17)
FLUAV AG UPPER RESP QL IA.RAPID: NOT DETECTED
FLUBV AG UPPER RESP QL IA.RAPID: NOT DETECTED
GFR SERPLBLD CREATININE-BSD FMLA CKD-EPI: >60 MLS/MIN/1.73/M2
GLOBULIN SER-MCNC: 3.1 GM/DL (ref 2.4–3.5)
GLUCOSE SERPL-MCNC: 213 MG/DL (ref 74–100)
HCT VFR BLD AUTO: 24.9 % (ref 37–47)
HGB BLD-MCNC: 7.9 G/DL (ref 12–16)
LACTATE SERPL-SCNC: 1.3 MMOL/L (ref 0.5–2.2)
LYMPH ABN # BLD MANUAL: 2 %
LYMPHOCYTES NFR BLD MANUAL: 1.65 X10(3)/MCL
LYMPHOCYTES NFR BLD MANUAL: 39 % (ref 13–40)
MCH RBC QN AUTO: 31.6 PG (ref 27–31)
MCHC RBC AUTO-ENTMCNC: 31.7 G/DL (ref 33–36)
MCV RBC AUTO: 99.6 FL (ref 80–94)
MONOCYTES NFR BLD MANUAL: 0.04 X10(3)/MCL (ref 0.1–1.3)
MONOCYTES NFR BLD MANUAL: 1 % (ref 2–11)
NEUTROPHILS NFR BLD MANUAL: 56 % (ref 47–80)
NEUTS BAND NFR BLD MANUAL: 2 % (ref 0–11)
NRBC BLD AUTO-RTO: 2.8 %
NRBC BLD MANUAL-RTO: 5 %
PLATELET # BLD AUTO: 36 X10(3)/MCL (ref 130–400)
PLATELET # BLD EST: ABNORMAL 10*3/UL
PLATELETS.RETICULATED NFR BLD AUTO: 8.7 % (ref 0.9–11.2)
PMV BLD AUTO: 0 FL (ref 7.4–10.4)
POLYCHROMASIA BLD QL SMEAR: ABNORMAL
POTASSIUM SERPL-SCNC: 3.8 MMOL/L (ref 3.5–5.1)
PROT SERPL-MCNC: 6.2 GM/DL (ref 6.4–8.3)
RBC # BLD AUTO: 2.5 X10(6)/MCL (ref 4.2–5.4)
RBC MORPH BLD: ABNORMAL
SARS-COV-2 RNA RESP QL NAA+PROBE: NOT DETECTED
SODIUM SERPL-SCNC: 137 MMOL/L (ref 136–145)
TEAR DROP CELL (OLG): SLIGHT
WBC # SPEC AUTO: 4.24 X10(3)/MCL (ref 4.5–11.5)

## 2023-10-12 PROCEDURE — 83605 ASSAY OF LACTIC ACID: CPT | Performed by: FAMILY MEDICINE

## 2023-10-12 PROCEDURE — 85027 COMPLETE CBC AUTOMATED: CPT | Performed by: FAMILY MEDICINE

## 2023-10-12 PROCEDURE — 85379 FIBRIN DEGRADATION QUANT: CPT | Performed by: FAMILY MEDICINE

## 2023-10-12 PROCEDURE — 93005 ELECTROCARDIOGRAM TRACING: CPT

## 2023-10-12 PROCEDURE — 80053 COMPREHEN METABOLIC PANEL: CPT | Performed by: FAMILY MEDICINE

## 2023-10-12 PROCEDURE — 81001 URINALYSIS AUTO W/SCOPE: CPT | Performed by: FAMILY MEDICINE

## 2023-10-12 PROCEDURE — 82962 GLUCOSE BLOOD TEST: CPT

## 2023-10-12 PROCEDURE — 0240U COVID/FLU A&B PCR: CPT | Performed by: FAMILY MEDICINE

## 2023-10-12 PROCEDURE — 87040 BLOOD CULTURE FOR BACTERIA: CPT | Mod: 91 | Performed by: FAMILY MEDICINE

## 2023-10-12 PROCEDURE — 99285 EMERGENCY DEPT VISIT HI MDM: CPT | Mod: 25

## 2023-10-12 PROCEDURE — 96361 HYDRATE IV INFUSION ADD-ON: CPT

## 2023-10-12 PROCEDURE — 25000003 PHARM REV CODE 250: Performed by: FAMILY MEDICINE

## 2023-10-12 RX ADMIN — SODIUM CHLORIDE 1000 ML: 9 INJECTION, SOLUTION INTRAVENOUS at 10:10

## 2023-10-12 NOTE — PROGRESS NOTES
Patient's  presented to  requesting labs be drawn as he is concerned regarding platelet count. Per my discussion with LULA Pina, patient is to have weekly labs (CBC,CMP) drawn. Patient scheduled for lab draw in the morning per NP request. Informed patient's  and provided him with print out of appointments.

## 2023-10-13 LAB
ABO + RH BLD: NORMAL
ABO + RH BLD: NORMAL
ABS NEUT CALC (OHS): 1.78 X10(3)/MCL (ref 2.1–9.2)
ALBUMIN SERPL-MCNC: 2.7 G/DL (ref 3.5–5)
ALBUMIN/GLOB SERPL: 1 RATIO (ref 1.1–2)
ALP SERPL-CCNC: 62 UNIT/L (ref 40–150)
ALT SERPL-CCNC: 15 UNIT/L (ref 0–55)
ANISOCYTOSIS BLD QL SMEAR: ABNORMAL
APPEARANCE UR: CLEAR
AST SERPL-CCNC: 12 UNIT/L (ref 5–34)
BACTERIA #/AREA URNS AUTO: ABNORMAL /HPF
BASOPHILS # BLD AUTO: 0 X10(3)/MCL
BASOPHILS NFR BLD AUTO: 0 %
BILIRUB SERPL-MCNC: 0.8 MG/DL
BILIRUB UR QL STRIP.AUTO: NEGATIVE
BLD PROD TYP BPU: NORMAL
BLD PROD TYP BPU: NORMAL
BLOOD UNIT EXPIRATION DATE: NORMAL
BLOOD UNIT EXPIRATION DATE: NORMAL
BLOOD UNIT TYPE CODE: 5100
BLOOD UNIT TYPE CODE: 7300
BUN SERPL-MCNC: 8.1 MG/DL (ref 9.8–20.1)
CALCIUM SERPL-MCNC: 8.3 MG/DL (ref 8.4–10.2)
CHLORIDE SERPL-SCNC: 104 MMOL/L (ref 98–107)
CO2 SERPL-SCNC: 25 MMOL/L (ref 22–29)
COLOR UR AUTO: ABNORMAL
CREAT SERPL-MCNC: 0.64 MG/DL (ref 0.55–1.02)
CROSSMATCH INTERPRETATION: NORMAL
CROSSMATCH INTERPRETATION: NORMAL
DISPENSE STATUS: NORMAL
DISPENSE STATUS: NORMAL
EOSINOPHIL # BLD AUTO: 0.01 X10(3)/MCL (ref 0–0.9)
EOSINOPHIL NFR BLD AUTO: 0.3 %
ERYTHROCYTE [DISTWIDTH] IN BLOOD BY AUTOMATED COUNT: 18.7 % (ref 11.5–17)
ERYTHROCYTE [DISTWIDTH] IN BLOOD BY AUTOMATED COUNT: 19.4 % (ref 11.5–17)
GFR SERPLBLD CREATININE-BSD FMLA CKD-EPI: >60 MLS/MIN/1.73/M2
GLOBULIN SER-MCNC: 2.7 GM/DL (ref 2.4–3.5)
GLUCOSE SERPL-MCNC: 233 MG/DL (ref 74–100)
GLUCOSE UR QL STRIP.AUTO: NORMAL
GROUP & RH: NORMAL
HCT VFR BLD AUTO: 22.3 % (ref 37–47)
HCT VFR BLD AUTO: 24.7 % (ref 37–47)
HGB BLD-MCNC: 6.9 G/DL (ref 12–16)
HGB BLD-MCNC: 7.9 G/DL (ref 12–16)
HYALINE CASTS #/AREA URNS LPF: ABNORMAL /LPF
IMM GRANULOCYTES # BLD AUTO: 0.03 X10(3)/MCL (ref 0–0.04)
IMM GRANULOCYTES NFR BLD AUTO: 1 %
INDIRECT COOMBS GEL: NORMAL
KETONES UR QL STRIP.AUTO: NEGATIVE
LEUKOCYTE ESTERASE UR QL STRIP.AUTO: 25
LYMPHOCYTES # BLD AUTO: 1.47 X10(3)/MCL (ref 0.6–4.6)
LYMPHOCYTES NFR BLD AUTO: 51 %
LYMPHOCYTES NFR BLD MANUAL: 1.58 X10(3)/MCL
LYMPHOCYTES NFR BLD MANUAL: 47 % (ref 13–40)
MAGNESIUM SERPL-MCNC: 1.8 MG/DL (ref 1.6–2.6)
MCH RBC QN AUTO: 31.1 PG (ref 27–31)
MCH RBC QN AUTO: 31.5 PG (ref 27–31)
MCHC RBC AUTO-ENTMCNC: 30.9 G/DL (ref 33–36)
MCHC RBC AUTO-ENTMCNC: 32 G/DL (ref 33–36)
MCV RBC AUTO: 101.8 FL (ref 80–94)
MCV RBC AUTO: 97.2 FL (ref 80–94)
MONOCYTES # BLD AUTO: 0.26 X10(3)/MCL (ref 0.1–1.3)
MONOCYTES NFR BLD AUTO: 9 %
MUCOUS THREADS URNS QL MICRO: ABNORMAL /LPF
NEUTROPHILS # BLD AUTO: 1.11 X10(3)/MCL (ref 2.1–9.2)
NEUTROPHILS NFR BLD AUTO: 38.7 %
NEUTROPHILS NFR BLD MANUAL: 52 % (ref 47–80)
NEUTS BAND NFR BLD MANUAL: 1 % (ref 0–11)
NITRITE UR QL STRIP.AUTO: NEGATIVE
NRBC BLD AUTO-RTO: 2.4 %
NRBC BLD AUTO-RTO: 3 %
PH UR STRIP.AUTO: 6 [PH]
PHOSPHATE SERPL-MCNC: 3.4 MG/DL (ref 2.3–4.7)
PLATELET # BLD AUTO: 30 X10(3)/MCL (ref 130–400)
PLATELET # BLD AUTO: 30 X10(3)/MCL (ref 130–400)
PLATELET # BLD EST: ABNORMAL 10*3/UL
PLATELETS.RETICULATED NFR BLD AUTO: 10.4 % (ref 0.9–11.2)
PLATELETS.RETICULATED NFR BLD AUTO: 8.5 % (ref 0.9–11.2)
PMV BLD AUTO: ABNORMAL FL
PMV BLD AUTO: ABNORMAL FL
POCT GLUCOSE: 242 MG/DL (ref 70–110)
POCT GLUCOSE: 260 MG/DL (ref 70–110)
POCT GLUCOSE: 278 MG/DL (ref 70–110)
POCT GLUCOSE: 283 MG/DL (ref 70–110)
POCT GLUCOSE: 340 MG/DL (ref 70–110)
POIKILOCYTOSIS BLD QL SMEAR: ABNORMAL
POLYCHROMASIA BLD QL SMEAR: ABNORMAL
POTASSIUM SERPL-SCNC: 3.5 MMOL/L (ref 3.5–5.1)
PROT SERPL-MCNC: 5.4 GM/DL (ref 6.4–8.3)
PROT UR QL STRIP.AUTO: ABNORMAL
RBC # BLD AUTO: 2.19 X10(6)/MCL (ref 4.2–5.4)
RBC # BLD AUTO: 2.54 X10(6)/MCL (ref 4.2–5.4)
RBC #/AREA URNS AUTO: ABNORMAL /HPF
RBC MORPH BLD: ABNORMAL
RBC UR QL AUTO: NEGATIVE
SCHISTOCYTE (OLG): ABNORMAL
SODIUM SERPL-SCNC: 137 MMOL/L (ref 136–145)
SP GR UR STRIP.AUTO: 1.01 (ref 1–1.03)
SPECIMEN OUTDATE: NORMAL
SQUAMOUS #/AREA URNS LPF: ABNORMAL /HPF
TSH SERPL-ACNC: 1.5 UIU/ML (ref 0.35–4.94)
UNIT NUMBER: NORMAL
UNIT NUMBER: NORMAL
UROBILINOGEN UR STRIP-ACNC: NORMAL
WBC # SPEC AUTO: 2.88 X10(3)/MCL (ref 4.5–11.5)
WBC # SPEC AUTO: 3.36 X10(3)/MCL (ref 4.5–11.5)
WBC #/AREA URNS AUTO: ABNORMAL /HPF

## 2023-10-13 PROCEDURE — 86920 COMPATIBILITY TEST SPIN: CPT

## 2023-10-13 PROCEDURE — 96367 TX/PROPH/DG ADDL SEQ IV INF: CPT

## 2023-10-13 PROCEDURE — 96375 TX/PRO/DX INJ NEW DRUG ADDON: CPT

## 2023-10-13 PROCEDURE — 96365 THER/PROPH/DIAG IV INF INIT: CPT

## 2023-10-13 PROCEDURE — 80053 COMPREHEN METABOLIC PANEL: CPT

## 2023-10-13 PROCEDURE — 25500020 PHARM REV CODE 255

## 2023-10-13 PROCEDURE — 96361 HYDRATE IV INFUSION ADD-ON: CPT

## 2023-10-13 PROCEDURE — 86900 BLOOD TYPING SEROLOGIC ABO: CPT

## 2023-10-13 PROCEDURE — 96366 THER/PROPH/DIAG IV INF ADDON: CPT

## 2023-10-13 PROCEDURE — 97165 OT EVAL LOW COMPLEX 30 MIN: CPT

## 2023-10-13 PROCEDURE — G0378 HOSPITAL OBSERVATION PER HR: HCPCS

## 2023-10-13 PROCEDURE — P9040 RBC LEUKOREDUCED IRRADIATED: HCPCS

## 2023-10-13 PROCEDURE — 85027 COMPLETE CBC AUTOMATED: CPT

## 2023-10-13 PROCEDURE — 83735 ASSAY OF MAGNESIUM: CPT

## 2023-10-13 PROCEDURE — 84443 ASSAY THYROID STIM HORMONE: CPT

## 2023-10-13 PROCEDURE — 94760 N-INVAS EAR/PLS OXIMETRY 1: CPT

## 2023-10-13 PROCEDURE — 63600175 PHARM REV CODE 636 W HCPCS

## 2023-10-13 PROCEDURE — 85025 COMPLETE CBC W/AUTO DIFF WBC: CPT

## 2023-10-13 PROCEDURE — 96372 THER/PROPH/DIAG INJ SC/IM: CPT

## 2023-10-13 PROCEDURE — 94761 N-INVAS EAR/PLS OXIMETRY MLT: CPT

## 2023-10-13 PROCEDURE — 25000003 PHARM REV CODE 250

## 2023-10-13 PROCEDURE — 97161 PT EVAL LOW COMPLEX 20 MIN: CPT

## 2023-10-13 PROCEDURE — 25500020 PHARM REV CODE 255: Performed by: INTERNAL MEDICINE

## 2023-10-13 PROCEDURE — 84100 ASSAY OF PHOSPHORUS: CPT

## 2023-10-13 PROCEDURE — 36430 TRANSFUSION BLD/BLD COMPNT: CPT

## 2023-10-13 RX ORDER — SODIUM CHLORIDE 0.9 % (FLUSH) 0.9 %
10 SYRINGE (ML) INJECTION EVERY 12 HOURS PRN
Status: DISCONTINUED | OUTPATIENT
Start: 2023-10-13 | End: 2023-10-15 | Stop reason: HOSPADM

## 2023-10-13 RX ORDER — GLUCAGON 1 MG
1 KIT INJECTION
Status: DISCONTINUED | OUTPATIENT
Start: 2023-10-13 | End: 2023-10-15 | Stop reason: HOSPADM

## 2023-10-13 RX ORDER — MORPHINE SULFATE 2 MG/ML
2 INJECTION, SOLUTION INTRAMUSCULAR; INTRAVENOUS EVERY 6 HOURS PRN
Status: DISCONTINUED | OUTPATIENT
Start: 2023-10-13 | End: 2023-10-15 | Stop reason: HOSPADM

## 2023-10-13 RX ORDER — LOSARTAN POTASSIUM 25 MG/1
25 TABLET ORAL DAILY
Status: DISCONTINUED | OUTPATIENT
Start: 2023-10-13 | End: 2023-10-14

## 2023-10-13 RX ORDER — IBUPROFEN 200 MG
16 TABLET ORAL
Status: DISCONTINUED | OUTPATIENT
Start: 2023-10-13 | End: 2023-10-15 | Stop reason: HOSPADM

## 2023-10-13 RX ORDER — INSULIN ASPART 100 [IU]/ML
12 INJECTION, SOLUTION INTRAVENOUS; SUBCUTANEOUS
Status: DISCONTINUED | OUTPATIENT
Start: 2023-10-13 | End: 2023-10-14

## 2023-10-13 RX ORDER — ENOXAPARIN SODIUM 100 MG/ML
40 INJECTION SUBCUTANEOUS EVERY 12 HOURS
Status: DISCONTINUED | OUTPATIENT
Start: 2023-10-13 | End: 2023-10-13

## 2023-10-13 RX ORDER — MORPHINE SULFATE 15 MG/1
15 TABLET, FILM COATED, EXTENDED RELEASE ORAL EVERY 12 HOURS
Status: DISCONTINUED | OUTPATIENT
Start: 2023-10-13 | End: 2023-10-15 | Stop reason: HOSPADM

## 2023-10-13 RX ORDER — METOPROLOL TARTRATE 25 MG/1
25 TABLET, FILM COATED ORAL 2 TIMES DAILY
Status: DISCONTINUED | OUTPATIENT
Start: 2023-10-13 | End: 2023-10-15 | Stop reason: HOSPADM

## 2023-10-13 RX ORDER — PANTOPRAZOLE SODIUM 40 MG/1
40 TABLET, DELAYED RELEASE ORAL DAILY
Status: DISCONTINUED | OUTPATIENT
Start: 2023-10-13 | End: 2023-10-15 | Stop reason: HOSPADM

## 2023-10-13 RX ORDER — HYDROCORTISONE 25 MG/G
CREAM TOPICAL 2 TIMES DAILY
Status: DISCONTINUED | OUTPATIENT
Start: 2023-10-13 | End: 2023-10-15 | Stop reason: HOSPADM

## 2023-10-13 RX ORDER — LABETALOL HCL 20 MG/4 ML
10 SYRINGE (ML) INTRAVENOUS EVERY 6 HOURS PRN
Status: DISCONTINUED | OUTPATIENT
Start: 2023-10-13 | End: 2023-10-15 | Stop reason: HOSPADM

## 2023-10-13 RX ORDER — ATORVASTATIN CALCIUM 40 MG/1
40 TABLET, FILM COATED ORAL DAILY
Status: DISCONTINUED | OUTPATIENT
Start: 2023-10-13 | End: 2023-10-15 | Stop reason: HOSPADM

## 2023-10-13 RX ORDER — HYDROCODONE BITARTRATE AND ACETAMINOPHEN 500; 5 MG/1; MG/1
TABLET ORAL
Status: DISCONTINUED | OUTPATIENT
Start: 2023-10-13 | End: 2023-10-15 | Stop reason: HOSPADM

## 2023-10-13 RX ORDER — TALC
9 POWDER (GRAM) TOPICAL NIGHTLY PRN
Status: DISCONTINUED | OUTPATIENT
Start: 2023-10-13 | End: 2023-10-15 | Stop reason: HOSPADM

## 2023-10-13 RX ORDER — ACETAMINOPHEN 325 MG/1
650 TABLET ORAL EVERY 6 HOURS PRN
Status: DISCONTINUED | OUTPATIENT
Start: 2023-10-13 | End: 2023-10-15 | Stop reason: HOSPADM

## 2023-10-13 RX ORDER — AMLODIPINE BESYLATE 10 MG/1
10 TABLET ORAL DAILY
Status: DISCONTINUED | OUTPATIENT
Start: 2023-10-13 | End: 2023-10-15 | Stop reason: HOSPADM

## 2023-10-13 RX ORDER — ALLOPURINOL 100 MG/1
300 TABLET ORAL DAILY
Status: DISCONTINUED | OUTPATIENT
Start: 2023-10-13 | End: 2023-10-15 | Stop reason: HOSPADM

## 2023-10-13 RX ORDER — ONDANSETRON 4 MG/1
8 TABLET, ORALLY DISINTEGRATING ORAL EVERY 8 HOURS PRN
Status: DISCONTINUED | OUTPATIENT
Start: 2023-10-13 | End: 2023-10-15 | Stop reason: HOSPADM

## 2023-10-13 RX ORDER — HYDROCODONE BITARTRATE AND ACETAMINOPHEN 5; 325 MG/1; MG/1
1 TABLET ORAL EVERY 6 HOURS PRN
Status: DISCONTINUED | OUTPATIENT
Start: 2023-10-13 | End: 2023-10-13

## 2023-10-13 RX ORDER — BUSPIRONE HYDROCHLORIDE 5 MG/1
5 TABLET ORAL 2 TIMES DAILY
Status: DISCONTINUED | OUTPATIENT
Start: 2023-10-13 | End: 2023-10-15 | Stop reason: HOSPADM

## 2023-10-13 RX ORDER — ACYCLOVIR 400 MG/1
400 TABLET ORAL 2 TIMES DAILY
Status: DISCONTINUED | OUTPATIENT
Start: 2023-10-13 | End: 2023-10-15 | Stop reason: HOSPADM

## 2023-10-13 RX ORDER — ACETAMINOPHEN 325 MG/1
650 TABLET ORAL EVERY 4 HOURS PRN
Status: DISCONTINUED | OUTPATIENT
Start: 2023-10-13 | End: 2023-10-13

## 2023-10-13 RX ORDER — INSULIN ASPART 100 [IU]/ML
0-5 INJECTION, SOLUTION INTRAVENOUS; SUBCUTANEOUS
Status: DISCONTINUED | OUTPATIENT
Start: 2023-10-13 | End: 2023-10-14

## 2023-10-13 RX ORDER — ESCITALOPRAM OXALATE 10 MG/1
10 TABLET ORAL DAILY
Status: DISCONTINUED | OUTPATIENT
Start: 2023-10-13 | End: 2023-10-15 | Stop reason: HOSPADM

## 2023-10-13 RX ORDER — NALOXONE HCL 0.4 MG/ML
0.02 VIAL (ML) INJECTION
Status: DISCONTINUED | OUTPATIENT
Start: 2023-10-13 | End: 2023-10-15 | Stop reason: HOSPADM

## 2023-10-13 RX ORDER — IBUPROFEN 200 MG
24 TABLET ORAL
Status: DISCONTINUED | OUTPATIENT
Start: 2023-10-13 | End: 2023-10-15 | Stop reason: HOSPADM

## 2023-10-13 RX ORDER — GABAPENTIN 300 MG/1
300 CAPSULE ORAL 2 TIMES DAILY
Status: DISCONTINUED | OUTPATIENT
Start: 2023-10-13 | End: 2023-10-14

## 2023-10-13 RX ORDER — IBUPROFEN 400 MG/1
400 TABLET ORAL EVERY 6 HOURS PRN
Status: DISCONTINUED | OUTPATIENT
Start: 2023-10-13 | End: 2023-10-15 | Stop reason: HOSPADM

## 2023-10-13 RX ADMIN — MORPHINE SULFATE 15 MG: 15 TABLET, EXTENDED RELEASE ORAL at 11:10

## 2023-10-13 RX ADMIN — INSULIN ASPART 3 UNITS: 100 INJECTION, SOLUTION INTRAVENOUS; SUBCUTANEOUS at 04:10

## 2023-10-13 RX ADMIN — MORPHINE SULFATE 2 MG: 2 INJECTION, SOLUTION INTRAMUSCULAR; INTRAVENOUS at 06:10

## 2023-10-13 RX ADMIN — PIPERACILLIN AND TAZOBACTAM 4.5 G: 4; .5 INJECTION, POWDER, LYOPHILIZED, FOR SOLUTION INTRAVENOUS; PARENTERAL at 02:10

## 2023-10-13 RX ADMIN — HYDROCODONE BITARTRATE AND ACETAMINOPHEN 1 TABLET: 5; 325 TABLET ORAL at 04:10

## 2023-10-13 RX ADMIN — ATORVASTATIN CALCIUM 40 MG: 40 TABLET, FILM COATED ORAL at 09:10

## 2023-10-13 RX ADMIN — HYDROCORTISONE: 25 CREAM TOPICAL at 08:10

## 2023-10-13 RX ADMIN — LOSARTAN POTASSIUM 25 MG: 25 TABLET, FILM COATED ORAL at 09:10

## 2023-10-13 RX ADMIN — METOPROLOL TARTRATE 25 MG: 25 TABLET, FILM COATED ORAL at 08:10

## 2023-10-13 RX ADMIN — GABAPENTIN 300 MG: 300 CAPSULE ORAL at 08:10

## 2023-10-13 RX ADMIN — PIPERACILLIN AND TAZOBACTAM 4.5 G: 4; .5 INJECTION, POWDER, LYOPHILIZED, FOR SOLUTION INTRAVENOUS; PARENTERAL at 12:10

## 2023-10-13 RX ADMIN — IOHEXOL 100 ML: 350 INJECTION, SOLUTION INTRAVENOUS at 03:10

## 2023-10-13 RX ADMIN — ACYCLOVIR 400 MG: 400 TABLET ORAL at 09:10

## 2023-10-13 RX ADMIN — INSULIN ASPART 12 UNITS: 100 INJECTION, SOLUTION INTRAVENOUS; SUBCUTANEOUS at 04:10

## 2023-10-13 RX ADMIN — ACYCLOVIR 400 MG: 400 TABLET ORAL at 08:10

## 2023-10-13 RX ADMIN — GABAPENTIN 300 MG: 300 CAPSULE ORAL at 09:10

## 2023-10-13 RX ADMIN — DIATRIZOATE MEGLUMINE AND DIATRIZOATE SODIUM 30 ML: 660; 100 LIQUID ORAL; RECTAL at 01:10

## 2023-10-13 RX ADMIN — INSULIN ASPART 12 UNITS: 100 INJECTION, SOLUTION INTRAVENOUS; SUBCUTANEOUS at 09:10

## 2023-10-13 RX ADMIN — PIPERACILLIN AND TAZOBACTAM 4.5 G: 4; .5 INJECTION, POWDER, LYOPHILIZED, FOR SOLUTION INTRAVENOUS; PARENTERAL at 08:10

## 2023-10-13 RX ADMIN — METOPROLOL TARTRATE 25 MG: 25 TABLET, FILM COATED ORAL at 09:10

## 2023-10-13 RX ADMIN — ESCITALOPRAM OXALATE 10 MG: 10 TABLET ORAL at 09:10

## 2023-10-13 RX ADMIN — INSULIN ASPART 12 UNITS: 100 INJECTION, SOLUTION INTRAVENOUS; SUBCUTANEOUS at 11:10

## 2023-10-13 RX ADMIN — HYDROCODONE BITARTRATE AND ACETAMINOPHEN 1 TABLET: 5; 325 TABLET ORAL at 10:10

## 2023-10-13 RX ADMIN — ALLOPURINOL 300 MG: 100 TABLET ORAL at 09:10

## 2023-10-13 RX ADMIN — HYDROCORTISONE: 25 CREAM TOPICAL at 09:10

## 2023-10-13 RX ADMIN — AMLODIPINE BESYLATE 10 MG: 10 TABLET ORAL at 09:10

## 2023-10-13 RX ADMIN — BUSPIRONE HYDROCHLORIDE 5 MG: 5 TABLET ORAL at 08:10

## 2023-10-13 RX ADMIN — VANCOMYCIN HYDROCHLORIDE 1500 MG: 1.5 INJECTION, POWDER, LYOPHILIZED, FOR SOLUTION INTRAVENOUS at 03:10

## 2023-10-13 RX ADMIN — PANTOPRAZOLE SODIUM 40 MG: 40 TABLET, DELAYED RELEASE ORAL at 09:10

## 2023-10-13 RX ADMIN — INSULIN ASPART 4 UNITS: 100 INJECTION, SOLUTION INTRAVENOUS; SUBCUTANEOUS at 11:10

## 2023-10-13 RX ADMIN — INSULIN ASPART 1 UNITS: 100 INJECTION, SOLUTION INTRAVENOUS; SUBCUTANEOUS at 10:10

## 2023-10-13 RX ADMIN — MORPHINE SULFATE 15 MG: 15 TABLET, EXTENDED RELEASE ORAL at 08:10

## 2023-10-13 RX ADMIN — BUSPIRONE HYDROCHLORIDE 5 MG: 5 TABLET ORAL at 09:10

## 2023-10-13 RX ADMIN — INSULIN ASPART 3 UNITS: 100 INJECTION, SOLUTION INTRAVENOUS; SUBCUTANEOUS at 09:10

## 2023-10-13 NOTE — ED PROVIDER NOTES
Encounter Date: 10/12/2023       History     Chief Complaint   Patient presents with    Fever     Reports fever, N&V, cough     Patient is a 58-year-old female with a history of CML, diabetes mellitus type 2, hypertension, presents emergency room with complaints of fever that began today.  Patient currently receiving chemotherapy.   reports multiple fevers at home, with highest of 101.9.  Patient denies abdominal pain.  Denies nausea or vomiting.  Patient does have soft tissue masses of the abdominal region which she is currently being evaluated for which caused her abdominal pain, but denies nausea vomiting.  Patient was recently placed on morphine, reports that she has been having normal bowel movements with the exception of today.  Denies dysuria or hematuria.  Due to the fever, patient presents emergency room for evaluation.  Patient does report having a cough for the last week, nonproductive, dry.  Denies body aches.    The history is provided by the patient and the spouse. The history is limited by a language barrier.  used:  requested to be the .     Review of patient's allergies indicates:  No Known Allergies  Past Medical History:   Diagnosis Date    Cancer     CML (chronic myelocytic leukemia)     DM2 (diabetes mellitus, type 2)     HTN (hypertension)     NAFLD (nonalcoholic fatty liver disease)     Neuropathy      Past Surgical History:   Procedure Laterality Date    ABDOMINAL SURGERY       SECTION      x4    CHOLECYSTECTOMY       Family History   Problem Relation Age of Onset    Diabetes Mother     Diabetes Father     Cancer Neg Hx      Social History     Tobacco Use    Smoking status: Never    Smokeless tobacco: Never   Substance Use Topics    Alcohol use: Not Currently    Drug use: Not Currently     Review of Systems   Constitutional:  Positive for fever. Negative for chills and fatigue.   HENT:  Negative for ear pain, rhinorrhea and sore throat.     Eyes:  Negative for photophobia and pain.   Respiratory:  Positive for cough. Negative for shortness of breath and wheezing.    Cardiovascular:  Negative for chest pain.   Gastrointestinal:  Negative for abdominal pain, diarrhea, nausea and vomiting.   Genitourinary:  Negative for dysuria.   Neurological:  Negative for dizziness, weakness and headaches.   All other systems reviewed and are negative.      Physical Exam     Initial Vitals [10/12/23 2144]   BP Pulse Resp Temp SpO2   (!) 151/84 (!) 112 20 99.1 °F (37.3 °C) 98 %      MAP       --         Physical Exam    Nursing note and vitals reviewed.  Constitutional: She appears well-developed and well-nourished. No distress.   HENT:   Head: Normocephalic and atraumatic.   Eyes: Conjunctivae and EOM are normal. Pupils are equal, round, and reactive to light.   Neck: Neck supple.   Normal range of motion.  Cardiovascular:  Normal rate, regular rhythm, normal heart sounds and intact distal pulses.           Tachycardia, regular rhythm   Pulmonary/Chest: Breath sounds normal. No respiratory distress. She has no wheezes. She has no rhonchi. She has no rales.   Abdominal: Abdomen is soft. Bowel sounds are normal. She exhibits no distension. There is no abdominal tenderness.   Soft tissue masses left lateral abdominal wall.  No erythema. There is no rebound and no guarding.   Musculoskeletal:         General: Normal range of motion.      Cervical back: Normal range of motion and neck supple.     Neurological: She is alert and oriented to person, place, and time.   Skin: Skin is warm and dry. Capillary refill takes less than 2 seconds. No erythema.   Psychiatric: She has a normal mood and affect. Her behavior is normal. Judgment and thought content normal.         ED Course   Procedures  Labs Reviewed   COMPREHENSIVE METABOLIC PANEL - Abnormal; Notable for the following components:       Result Value    Glucose Level 213 (*)     Protein Total 6.2 (*)     Albumin Level 3.1  (*)     Albumin/Globulin Ratio 1.0 (*)     All other components within normal limits   URINALYSIS, REFLEX TO URINE CULTURE - Abnormal; Notable for the following components:    Protein, UA Trace (*)     Leukocyte Esterase, UA 25 (*)     WBC, UA 6-10 (*)     Squamous Epithelial Cells, UA Occ (*)     Mucous, UA Trace (*)     All other components within normal limits   CBC WITH DIFFERENTIAL - Abnormal; Notable for the following components:    WBC 4.24 (*)     RBC 2.50 (*)     Hgb 7.9 (*)     Hct 24.9 (*)     MCV 99.6 (*)     MCH 31.6 (*)     MCHC 31.7 (*)     RDW 18.9 (*)     Platelet 36 (*)     MPV 0.0 (*)     All other components within normal limits   MANUAL DIFFERENTIAL - Abnormal; Notable for the following components:    Monocytes % 1 (*)     Monocytes Abs 0.0424 (*)     Platelets Decreased (*)     RBC Morph Abnormal (*)     Anisocytosis 1+ (*)     Polychromasia 1+ (*)     Tear Drops Slight (*)     All other components within normal limits   COVID/FLU A&B PCR - Normal    Narrative:     The Xpert Xpress SARS-CoV-2/FLU/RSV plus is a rapid, multiplexed real-time PCR test intended for the simultaneous qualitative detection and differentiation of SARS-CoV-2, Influenza A, Influenza B, and respiratory syncytial virus (RSV) viral RNA in either nasopharyngeal swab or nasal swab specimens.         D DIMER, QUANTITATIVE - Normal   LACTIC ACID, PLASMA - Normal   CBC W/ AUTO DIFFERENTIAL    Narrative:     The following orders were created for panel order CBC Auto Differential.  Procedure                               Abnormality         Status                     ---------                               -----------         ------                     CBC with Differential[[id number]]       Abnormal            Final result               Manual Differential[[id number]]         Abnormal            Final result                 Please view results for these tests on the individual orders.   EXTRA TUBES    Narrative:     The following  orders were created for panel order EXTRA TUBES.  Procedure                               Abnormality         Status                     ---------                               -----------         ------                     Light Green Top Hold[[id number]]                            In process                 Lavender Top Hold[[id number]]                               In process                 Gold Top Hold[[id number]]                                   In process                   Please view results for these tests on the individual orders.   LIGHT GREEN TOP HOLD   LAVENDER TOP HOLD   GOLD TOP HOLD     EKG Readings: (Independently Interpreted)   My Independent EKG Interpretation  10/12/2023 10:24 PM  Rate: 108 bpm  Rhythm: Sinus  Axis: Normal  Intervals: Normal  ST Changes: Nonspecific  Impression: Normal sinus rhythm with nonspecific ST changes         ECG Results              EKG 12-lead (Final result)  Result time 10/13/23 14:01:24      Final result by Interface, Lab In [facility] (10/13/23 14:01:24)                   Narrative:    Test Reason : R00.0,    Vent. Rate : 108 BPM     Atrial Rate : 108 BPM     P-R Int : 148 ms          QRS Dur : 100 ms      QT Int : 350 ms       P-R-T Axes : 058 -01 038 degrees     QTc Int : 469 ms    Sinus tachycardia  Poor R wave progression across the precordial leads  Abnormal ECG    Nonspecific T wave abnormality now evident in Inferior leads  Confirmed by Chance Bernardo MD ([id number]) on 10/13/2023 2:01:13 PM    Referred By: [email or web address]   SELF           Confirmed By:Chance Bernardo MD                                     EKG 12-LEAD (Final result)  Result time 10/20/23 12:37:55      Final result by Unknown User (10/20/23 12:37:55)                                      Imaging Results              X-Ray Chest 1 View (Final result)  Result time 10/13/23 07:43:04      Final result by Maged Connolly MD (10/13/23 07:43:04)                   Impression:      Suspect some pulmonary vascular  congestion.      Electronically signed by: Maged Connolly  Date:    10/13/2023  Time:    07:43               Narrative:    EXAMINATION:  XR CHEST 1 VIEW    CLINICAL HISTORY:  tachycardia;    COMPARISON:  29 September 2023    FINDINGS:  Portable frontal view of the chest was obtained. Heart size upper limit normal.  There is mild interstitial prominence.  No dense consolidation or pneumothorax.                        Wet Read by Freddy Ford MD (10/13/23 00:50:45, [facility], Emergency Medicine)    No acute abnormality appreciated.                                     Medications   iohexoL (OMNIPAQUE 350) 350 mg iodine/mL injection (has no administration in time range)   sodium chloride 0.9% bolus 1,000 mL 1,000 mL (0 mLs Intravenous Stopped 10/13/23 0100)   diatrizoate meglumineand-diatrizoate sodium (GASTROVIEW) 66-10 % solution (30 mLs Oral Given 10/13/23 1300)   iohexoL (OMNIPAQUE 350) injection 100 mL (100 mLs Intravenous Given 10/13/23 1537)     Medical Decision Making  Patient is a 58-year-old female currently receiving chemotherapy history of CML presents emergency room today with complaints of fever currently afebrile here in the emergency room.  Did not take any medications at home for antipyretic (no Tylenol or ibuprofen).  Patient is noted to be tachycardic.  Will obtain laboratory evaluation including CBC CMP as well as chest x-ray.  Due to patient being tachycardic with low-grade fever and history of CML, increased risk for pulmonary embolism, therefore will obtain a D-dimer for evaluation.  Will continue to monitor     Differential diagnosis:  Fever, viral syndrome, sepsis, community-acquired pneumonia, pulmonary embolism    Amount and/or Complexity of Data Reviewed  Independent Historian: parent and spouse  External Data Reviewed: notes.     Details: Reviewed Ultrasound results obtained yesterday for abdominal soft tissue mass:  US Soft Tissue Abdomen 10/11/23  Order:  [id number]  Impression    Diffuse skin thickening without evidence of discrete fluid collection or mass. Cellulitis not entirely excluded, please correlate.     Preliminary Report Dictated By: Barb Anders DO   Electronically Signed By: Brenda Cook MD 10/11/2023 2:58 PM CDT    Labs: ordered.  Radiology: ordered and independent interpretation performed.               ED Course as of 10/27/23 2326   Thu Oct 12, 2023   2302 D-dimer is below age adjusted threshold of 0.56. [MW]   2305 ANC of 2459 (not neutropenic). [MW]   Fri Oct 13, 2023   0047 Patient currently reports feeling improved, though still reports feeling fatigued.  Reports the chills has resolved.  Patient is still does remain tachycardic in the 100s.  On laboratory evaluation, patient does have a white blood cell count of 4.24, with 2% bands.  Patient has a lactic acid of only 1.3.  Potential source is the abdominal wall if this is cellulitis, but could be reaction from the chemotherapy.  Patient is supposed to receive another treatment infusion on Monday (3 days).  Will discuss with Internal Medicine about possible admission for further evaluation and to wait for negative blood cultures and start on antibiotics.  No other source of infection appreciated. [MW]      ED Course User Index  [MW] Freddy Ford MD                    Clinical Impression:   Final diagnoses:  [R00.0] Tachycardia  [R50.9] Fever of unknown origin        ED Disposition Condition    Observation Stable                Freddy Ford MD  10/27/23 232

## 2023-10-13 NOTE — PLAN OF CARE
Problem: Adult Inpatient Plan of Care  Goal: Plan of Care Review  Outcome: Ongoing, Progressing  Goal: Patient-Specific Goal (Individualized)  Outcome: Ongoing, Progressing  Goal: Absence of Hospital-Acquired Illness or Injury  Outcome: Ongoing, Progressing  Goal: Optimal Comfort and Wellbeing  Outcome: Ongoing, Progressing  Goal: Readiness for Transition of Care  Outcome: Ongoing, Progressing     Problem: Bariatric Environmental Safety  Goal: Safety Maintained with Care  Outcome: Ongoing, Progressing     Problem: Impaired Wound Healing  Goal: Optimal Wound Healing  Outcome: Ongoing, Progressing     Problem: Diabetes Comorbidity  Goal: Blood Glucose Level Within Targeted Range  Outcome: Ongoing, Progressing

## 2023-10-13 NOTE — CONSULTS
Discharge assessment complete; discussed the possibility of OPAT; patient is currently getting her chemotherapy at [facility] in [place] ([phone] or [phone]), which is infused Mon thru Fri and they return to [facility] on Sat/Sun; her next scheduled infusion is this Monday, but it has been cancelled the last 2 times due to fever; Dr. ROCIO Burns is her physician in [place].    The name she has listed at [facility] is Fela Martinez (MRN: [id number]).    If the patient would need OPAT, they asked if it could be done at [facility] at there same place where they get the chemo; will follow up with LISA if OPAT needed.

## 2023-10-13 NOTE — PT/OT/SLP EVAL
"Occupational Therapy   Evaluation and Discharge Note    Name: Fela Ellison  MRN: [id number]  ED due to : fever and progressive skin lesions (started 5 months ago)  Admitting Diagnosis: fever unknown origin, CLL s/p chemo, pancytopenia  MED hx: HTN , DM with neuropathy  Recent Surgery: * No surgery found *      Recommendations:     Discharge Recommendations: home  Discharge Equipment Recommendations:  (has RW uses "sometimes")  Barriers to discharge:       Assessment:     Fela Ellison is a 58 y.o. female with a medical diagnosis of fever unknown origin, CLL s/p chemo, pancytopenia. At this time, patient is functioning at their prior level of function and does not require further acute OT services.     Plan:     During this hospitalization, patient does not require further acute OT services.  Please re-consult if situation changes.    Plan of Care Reviewed with: patient, spouse    Subjective     Chief Complaint: c/o 10/10 pain in L lower abdomen, nursing notified  Patient/Family Comments/goals: no goals stated  Utilized  V[place]an speaking to obtain information [id number]    Occupational Profile:  Living Environment: resides at home with spouse, 4 steps (close to entrance) or access to ramp (further ambulation distance to entrance of residence), tub shower combo  Previous level of function: I with ADL tasks except showering and LE dressing, spouse assists, ambulatory mostly without AD, uses RW "sometimes when weak from chemo"  Roles and Routines:   Equipment Used at home:    Assistance upon Discharge: spouse    Pain/Comfort:  Pain Rating 1: 10/10  Location - Side 1: Left  Location 1: abdomen    Patients cultural, spiritual, Advent conflicts given the current situation:      Objective:     Communicated with: nurse prior to session.  Patient found sitting edge of bed with   spouse present upon OT entry to room.    General Precautions: Standard,    Orthopedic Precautions:    Braces:  "   Respiratory Status: Room air     Occupational Performance:    Bed Mobility:    NT pt sitting EOB and left sitting EOB    Functional Mobility/Transfers:  Patient completed Sit <> Stand Transfer with modified independence  with  no assistive device   Patient completed Toilet Transfer Step Transfer technique with modified independence with  no AD  Functional Mobility: pt able to ambulate in room without AD mod I, ambulated 130 ft without AD, I'ly    Activities of Daily Living:  Grooming: modified independence standing  Toileting: modified independence +void on toilet, mod I pericleaning and clothing management    Cognitive/Visual Perceptual:  Cognitive/Psychosocial Skills:     -       Follows Commands/attention:Follows two-step commands  -       Mood/Affect/Coping skills/emotional control: Cooperative and Pleasant    Physical Exam:  Balance: -       standing balance mod I  Upper Extremity Strength:    -       Right Upper Extremity: WNL  -       Left Upper Extremity: WNL    AMPAC 6 Click ADL:  AMPAC Total Score:      Treatment & Education:  Education to ambulation multiple times per day while in hospital     Patient left sitting edge of bed with  spouse present    GOALS:   Multidisciplinary Problems       Occupational Therapy Goals       Not on file                    History:     Past Medical History:   Diagnosis Date    Cancer     CML (chronic myelocytic leukemia)     DM2 (diabetes mellitus, type 2)     HTN (hypertension)     NAFLD (nonalcoholic fatty liver disease)     Neuropathy          Past Surgical History:   Procedure Laterality Date    ABDOMINAL SURGERY       SECTION      x4    CHOLECYSTECTOMY         Time Tracking:     OT Date of Treatment:    OT Start Time: 1033  OT Stop Time: 1051  OT Total Time (min): 18 min    Billable Minutes:Evaluation low    10/13/2023

## 2023-10-13 NOTE — PT/OT/SLP EVAL
Physical Therapy Evaluation & Discharge Note    Patient Name:  Fela Ellison   MRN:  [id number]    Recommendations     Discharge Recommendations:  home   Discharge Equipment Recommendations: none   Barriers to discharge: none    Assessment     Fela Ellison is a 58 y.o. female admitted with a medical diagnosis of   1. Tachycardia    2. Fever of unknown origin    3. Chest pain          She presents with the following impairments/functional limitations:  pain.    Patient was seen by therapy for evaluation only. Patient's current level of function is at baseline (PLOF). Patient does not require further acute PT services.  PT Services not warranted at this time.    Recent Surgery: * No surgery found *      Plan     Patient discharged from acute PT Services on 10/13/23.    Based on current functional levels observed, patient does not require further acute PT services.  Re-consult PT Services if patient's status changes and therapy services warranted.    Subjective     Communicated with patient's nurse (Leonor) prior to session.    Patient agreeable to participate in evaluation.     Chief Complaint: abdominal pain  Patient/Family Comments/goals: Feel better.  Pain/Comfort:  Pain Rating 1: 10/10  Location 1: abdomen  Pain Addressed 1: Nurse notified, Distraction  Pain Rating Post-Intervention 1: 10/10    Patients cultural, spiritual, Zoroastrianism conflicts given the current situation: no    Social History  Living Environment: Patient lives with their spouse in a single level home, with 4 steps steps outside, with left handrail, ramp, with tub-shower combo.  Functional Level: Prior to admission patient was unemployed, was a passenger, required assistance with ADLs including dressing and showering, and ambulated with a RW when legs feel weak .  Equipment at Home: walker, rolling  Assistance Upon Discharge: spouse.    Objective     Patient found sitting edge of bed upon PT entry to room.    General  Precautions: Standard,     Orthopedic Precautions:N/A   Braces: N/A  Respiratory Status: room air    Exams:  Orientation: Patient is oriented to Person, Place, Time, Situation  Commands: Patient follows commands consistently  Sensation:    -     Intact: light/touch bilat lower extremity  Skin Integrity/Edema:      -       Skin integrity: Visible skin intact  -       Edema: None noted bilateral lower extremities  BILAT UE ROM/strength - defer to OT - see OT note for details  RLE ROM: WFL  RLE Strength: WFL  LLE ROM: WFL  LLE Strength: WFL    Functional Mobility:    Bed Mobility:  Seated in edge of bed at start of session and returned to edge if bed.    Transfers:  Sit to Stand: independence with no assistive device  Toilet Transfer: independence with no assistive device using Step Transfer    Gait:  Patient ambulated 130ft with no assistive device and independence.  Patient demonstrates steady gait, symmetrical step length, upright posture, no loss of balance, and no mis-steps.    Other Mobility:  Steps: Pt. Verbalized independence.    Balance:  Sit  Static: NORMAL: No deviations seen in posture held statically  Dynamic: NORMAL: No deviations seen in posture held dynamically  Stand  Static: NORMAL: No deviations seen in posture held statically  Dynamic: NORMAL: No deviations seen in posture held dynamically    Patient left sitting edge of bed with all lines intact, call button in reach, nurse notified, and  present.    Education     White board updated regarding patient's safest level of mobility with staff assistance.    Goals - No STG's or LTG's established secondary to patient was seen for evaluation and discharge     Multidisciplinary Problems       Physical Therapy Goals       Not on file                    History     Past Medical History:   Diagnosis Date    Cancer     CML (chronic myelocytic leukemia)     DM2 (diabetes mellitus, type 2)     HTN (hypertension)     NAFLD (nonalcoholic fatty liver  disease)     Neuropathy      Past Surgical History:   Procedure Laterality Date    ABDOMINAL SURGERY       SECTION      x4    CHOLECYSTECTOMY       Time Tracking     PT Received On: 10/13/23  PT Start Time: 1028     PT Stop Time: 1050  PT Total Time (min): 22 min     Billable Minutes: Evaluation 22 minutes    10/13/2023

## 2023-10-13 NOTE — PLAN OF CARE
10/13/23 1327   Discharge Assessment   Assessment Type Discharge Planning Assessment   Confirmed/corrected address, phone number and insurance Yes   Confirmed Demographics Correct on Facesheet   Source of Information patient;family   Does patient/caregiver understand observation status Yes   Communicated JOB with patient/caregiver Yes   Reason For Admission fever and skin lesions; patient on chemo   People in Home child(adryan), adult;spouse  (Patient lives with  and son in a trailer)   Do you expect to return to your current living situation? Yes   Do you have help at home or someone to help you manage your care at home? Yes   Who are your caregiver(s) and their phone number(s)? Zachary, [phone]   Prior to hospitilization cognitive status: Alert/Oriented   Current cognitive status: Alert/Oriented   Home Layout Able to live on 1st floor   Equipment Currently Used at Home glucometer   Readmission within 30 days? No   Do you currently have service(s) that help you manage your care at home? No   Do you take prescription medications? Yes  (Currently getting chemo in [facility])   Do you have prescription coverage? No   Do you have any problems affording any of your prescribed medications? No   Who is going to help you get home at discharge? , Zachary Ellison   How do you get to doctors appointments? family or friend will provide   Are you on dialysis? No   Discharge Plan discussed with: Patient;Spouse/sig other   Name(s) and Number(s) Zachary Ellison [phone]   Transition of Care Barriers Unisured   Discharge Plan A Home with family;Community Services   Discharge Plan B Home with family   Alcohol Use   Q1: How often do you have a drink containing alcohol? Never     Patient gets her chemo at [facility] ([phone], or [phone]), Dr. ROCIO Burns; they applied for assistance and have to reapply every 3 months; currently scheduled for chemo on Monday and it infuses M-F, then  return home Sat/Sun.

## 2023-10-13 NOTE — H&P
[facility] Hospital Medicine  History & Physical Note      Patient Name: Fela Ellison  : 1965  MRN: [id number]  Patient Class: OP- Observation   Admission Date: 10/12/2023   Length of Stay: 0  Admitting Service: Hospital Medicine  Attending Physician: Blake Ortiz MD    Medicine List: 3  PCP: Bar Trevino DO  Source of history: Patient, patient's family, and EMR.     Chief Complaint   Fever    History of Present Illness   Fela Ellison is a 59 yo F w Pmhx of CML s/p chemo, pancytopenia, DM2, HTN, HLD and NAFLD who presents to [facility] ED on 10/13/23 with complaints of fevers and progressive skin lesions. Patient is accompanied her  who translates for her as she is a Sami speaker. Patient reports fevers with Tmax of 101.9 started yesterday and 1 x vomiting. Denies recent sick contacts. She also reports large, painful, dark skin lesions throughout her abdomen that started roughly 5 months ago. Lesions started on her right side with new lesions on the left side of her abdomen. U/S of these lesions earlier this week consistent with scar tissue 2/2 chemo.  also reports intermittent diaphoresis and HA for several months which the patient attributes to hyperglycemia. States she is compliant with medications. Patient is currently receiving chemo treatments, most recent session about 3 weeks ago.     ED Course:   Given 1 x 1L NS bolus in ED. Internal Medicine consulted for fever of unknown origin.     ROS   Review of Systems   Constitutional:  Positive for chills, diaphoresis and fever. Negative for malaise/fatigue and weight loss.   HENT:  Negative for congestion, ear pain, sinus pain and sore throat.    Eyes:  Negative for blurred vision, double vision, photophobia, pain, discharge and redness.   Respiratory:  Negative for cough, hemoptysis, sputum production, shortness of breath and wheezing.    Cardiovascular:  Negative for chest pain, palpitations,  orthopnea and leg swelling.   Gastrointestinal:  Positive for abdominal pain, nausea and vomiting. Negative for blood in stool, constipation, diarrhea, heartburn and melena.   Genitourinary:  Negative for dysuria, flank pain, frequency, hematuria and urgency.   Musculoskeletal:  Negative for back pain, falls, myalgias and neck pain.   Skin:  Positive for rash. Negative for itching.   Neurological:  Positive for headaches. Negative for dizziness, tingling, tremors, sensory change, focal weakness and loss of consciousness.   Endo/Heme/Allergies:  Negative for polydipsia.     Past Medical History     Past Medical History:   Diagnosis Date    Cancer     CML (chronic myelocytic leukemia)     DM2 (diabetes mellitus, type 2)     HTN (hypertension)     NAFLD (nonalcoholic fatty liver disease)     Neuropathy        Past Surgical History     Past Surgical History:   Procedure Laterality Date    ABDOMINAL SURGERY       SECTION      x4    CHOLECYSTECTOMY         Social History     Social History     Tobacco Use    Smoking status: Never    Smokeless tobacco: Never   Substance Use Topics    Alcohol use: Not Currently        Family History     Family History   Problem Relation Age of Onset    Diabetes Mother     Diabetes Father     Cancer Neg Hx         Allergies   Patient has no known allergies.    Home Medications     Prior to Admission medications    Medication Sig Start Date End Date Taking? Authorizing Provider   acyclovir (ZOVIRAX) 400 MG tablet Take 1 tablet (400 mg total) by mouth 2 (two) times daily. 23  Natalya Ojeda MD   albuterol (PROVENTIL/VENTOLIN HFA) 90 mcg/actuation inhaler Inhale 2 puffs into the lungs every 6 (six) hours as needed for Wheezing. Rescue    Provider, Historical   allopurinoL (ZYLOPRIM) 300 MG tablet Take 300 mg by mouth once daily. 23   Provider, Historical   ALPRAZolam (XANAX) 0.25 MG tablet Take 0.25 mg by mouth 3 (three) times daily as needed for Anxiety. 23    Provider, Historical   amLODIPine (NORVASC) 10 MG tablet Take 10 mg by mouth once daily.    Provider, Historical   ammonium lactate 12 % Crea Apply topically daily as needed.    Provider, Historical   atorvastatin (LIPITOR) 40 MG tablet Take 40 mg by mouth once daily.    Provider, Historical   busPIRone (BUSPAR) 5 MG Tab Take 1 tablet (5 mg total) by mouth 2 (two) times daily. 6/4/23 6/3/24  Julio Cesar Rodriguez DO   EScitalopram oxalate (LEXAPRO) 10 MG tablet Take 1 tablet (10 mg total) by mouth once daily. 6/4/23 6/3/24  Julio Cesar Rodriguez DO   furosemide (LASIX) 20 MG tablet Take 1 tablet (20 mg total) by mouth once daily. 9/20/23   Huma Haines NP   gabapentin (NEURONTIN) 300 MG capsule Take 2 capsules TID. 9/20/23   Huma Haines NP   HYDROcodone-acetaminophen (NORCO) 5-325 mg per tablet Take 1 tablet by mouth every 6 (six) hours as needed for Pain.    Provider, Historical   hydrocortisone 2.5 % cream Apply topically 2 (two) times daily. 5/22/23 5/21/24  Provider, Historical   hydrOXYzine pamoate (VISTARIL) 25 MG Cap Take 1 capsule (25 mg total) by mouth every 8 (eight) hours as needed. 6/4/23   Julio Cesar Rodriguez DO   ibuprofen (ADVIL,MOTRIN) 600 MG tablet TAKE 1 TABLET WITH FOOD OR MILK AS NEEDED THREE TIMES A DAY ORALLY 30 3/29/23   Provider, Historical   insulin lispro 100 unit/mL injection Inject 22 Units into the skin 3 (three) times daily before meals. Takes 22 units TID AC while on chemo - (Gives between 12 to 15 units TID AC when not on chemo) 9/29/23 10/29/23  Ivan Del Rio MD   insulin NPH (NOVOLIN N NPH U-100 INSULIN) 100 unit/mL injection 50 Units 2 (two) times daily before meals. No longer 20 in AM 3/22/23   Provider, Historical   ketoconazole (NIZORAL) 2 % cream Apply topically. 5/22/23 5/21/24  Provider, Historical   loratadine (CLARITIN) 10 mg tablet Take 1 tablet (10 mg total) by mouth once daily. 6/4/23   Julio Cesar Rodriguez DO   losartan (COZAAR) 25 MG tablet Take 1 tablet (25 mg total) by mouth  once daily. 12/12/22 12/12/23  Rupesh Thomas MD   metFORMIN (GLUCOPHAGE) 1000 MG tablet Take 1,000 mg by mouth 2 (two) times daily with meals.    Provider, Historical   metoprolol tartrate (LOPRESSOR) 25 MG tablet Take 25 mg by mouth 2 (two) times daily.    Provider, Historical   montelukast (SINGULAIR) 10 mg tablet Take 10 mg by mouth once daily.    Provider, Historical   omeprazole (PRILOSEC) 40 MG capsule Take 1 capsule by mouth once daily. 5/22/23   Provider, Historical   ondansetron (ZOFRAN-ODT) 4 MG TbDL Take 4 mg by mouth every 8 (eight) hours as needed. 5/16/23   Provider, Historical   PONATinib (ICLUSIG) 30 mg Tab Take 30 mg by mouth Daily.    Provider, Historical   silver sulfADIAZINE 1% (SILVADENE) 1 % cream Apply topically. 1/20/23 1/20/24  Provider, Historical   venetoclax (VENCLEXTA) 100 mg Tab Take 400 mg by mouth Only takes while taking chemo .    Provider, Historical        Inpatient Medications   Scheduled Meds   acyclovir  400 mg Oral BID    allopurinoL  300 mg Oral Daily    amLODIPine  10 mg Oral Daily    atorvastatin  40 mg Oral Daily    busPIRone  5 mg Oral BID    enoxparin  40 mg Subcutaneous Q12H    EScitalopram oxalate  10 mg Oral Daily    gabapentin  300 mg Oral BID    hydrocortisone   Topical (Top) BID    insulin aspart U-100  12 Units Subcutaneous TIDWM    losartan  25 mg Oral Daily    metoprolol tartrate  25 mg Oral BID    pantoprazole  40 mg Oral Daily    piperacillin-tazobactam (Zosyn) IV (PEDS and ADULTS) (extended infusion is not appropriate)  4.5 g Intravenous Q8H      Continuous Infusions    PRN Meds   dextrose 10%    dextrose 10%    glucagon (human recombinant)    glucose    glucose    insulin aspart U-100    melatonin    naloxone    ondansetron    sodium chloride 0.9%    vancomycin - pharmacy to dose       Physical Exam   Vital Signs  Temp:  [99.1 °F (37.3 °C)]   Pulse:  [100-112]   Resp:  [20]   BP: (151)/(84)   SpO2:  [93 %-98 %]       Physical Exam  Vitals and nursing  note reviewed.   Constitutional:       General: She is awake. She is not in acute distress.     Appearance: She is morbidly obese. She is not toxic-appearing or diaphoretic.   HENT:      Head: Normocephalic and atraumatic.      Nose: No congestion or rhinorrhea.      Mouth/Throat:      Mouth: Mucous membranes are moist.      Pharynx: Oropharynx is clear. No oropharyngeal exudate or posterior oropharyngeal erythema.   Eyes:      Extraocular Movements: Extraocular movements intact.      Conjunctiva/sclera: Conjunctivae normal.      Pupils: Pupils are equal, round, and reactive to light.   Cardiovascular:      Rate and Rhythm: Regular rhythm. Tachycardia present.      Heart sounds: Murmur heard.   Pulmonary:      Effort: Pulmonary effort is normal. No respiratory distress.      Breath sounds: No wheezing, rhonchi or rales.   Abdominal:      General: Abdomen is protuberant. Bowel sounds are normal. There is distension.      Palpations: Abdomen is soft. There is no fluid wave or mass.      Tenderness: There is abdominal tenderness in the right lower quadrant, periumbilical area and left lower quadrant. There is no right CVA tenderness, left CVA tenderness or guarding.      Hernia: No hernia is present.   Musculoskeletal:      Cervical back: No tenderness.      Right lower leg: No edema.      Left lower leg: No edema.   Lymphadenopathy:      Cervical: No cervical adenopathy.   Skin:     General: Skin is warm and dry.      Capillary Refill: Capillary refill takes less than 2 seconds.      Coloration: Skin is not jaundiced.      Findings: Bruising and petechiae present. No rash or wound.      Comments: Petechiae throughout abdomen and BLE  Scattered erythematous lesions, tender to palpation   Neurological:      General: No focal deficit present.      Mental Status: She is alert and oriented to person, place, and time.   Psychiatric:         Behavior: Behavior is cooperative.            Labs     Recent Labs      "10/12/23  2200   WBC 4.24*   RBC 2.50*   HGB 7.9*   HCT 24.9*   MCV 99.6*   MCH 31.6*   MCHC 31.7*   RDW 18.9*   PLT 36*     Recent Labs     10/12/23  2322   LACTIC 1.3     Recent Labs     10/12/23  2204   D-DIMER 0.50     No results for input(s): "HGBA1C", "CHOL", "TRIG", "LDL", "VLDL", "HDL" in the last 72 hours.  No results for input(s): "PH", "PCO2", "PO2", "HCO3", "POCSATURATED", "BE" in the last 72 hours.    Recent Labs     10/12/23  2200      K 3.8   CHLORIDE 103   CO2 25   BUN 9.9   CREATININE 0.69   GLUCOSE 213*   CALCIUM 8.8   ALBUMIN 3.1*   GLOBULIN 3.1   ALKPHOS 77   ALT 20   AST 15   BILITOT 0.7     No results for input(s): "BNP", "CPK", "TROPONINI" in the last 72 hours.       Microbiology Results (last 7 days)       Procedure Component Value Units Date/Time    Urine Culture High Risk [[id number]]     Order Status: Sent Specimen: Urine, Clean Catch     Blood Culture #1 **CANNOT BE ORDERED STAT** [[id number]] Collected: 10/12/23 2322    Order Status: Sent Specimen: Blood from Arm, Right Updated: 10/12/23 2329    Blood Culture #2 **CANNOT BE ORDERED STAT** [[id number]] Collected: 10/12/23 2322    Order Status: Sent Specimen: Blood from Arm, Left Updated: 10/12/23 2329           Imaging     X-Ray Chest 1 View   ED Interpretation   No acute abnormality appreciated.        U/S Soft Tissue Abdomen 10/11/23  At the site of clinical concern, along the left lateral abdomen, diffuse skin thickening is seen along with normal-appearing fat and underlying musculature. No discrete fluid collection, mass or significant hyperemia is identified.    CT abd/pelvis w contrast done on 8/6/23   Inflammatory changes seen in the soft tissues in the left inguinal region.  Findings are consistent with cellulitis.  No fluid collection or abscess is seen.    Assessment & Plan   PLAN:  Fever of Unknown Origin   CLL, s/p chemo   Pancytopenia 2/2 above   -Does not meet requirements for blood product transfusion at this time "   -Does not meet SIRS criteria (1/4) upon admission   -BC x 2 from ED pending   -CXR, Covid/Flu PCR, LA and D-Dimer negative   -Started on Vancomycin and Zosyn for broad spectrum coverage   -Continue home Acyclovir   -PRN antiemetic in place   -Ordered GI panel and UC given symptoms and UA results from ED   -Following fever curve, did not place PRN Tylenol as home Tmax higher than initial temperature documented in ED prior to admission   -Refer to CT abd/pelvis dated 8/6/23, possible cellulitis involving L inguinal area however not impressive on physical exam, day team to consider repeat study   -PT/OT/Nutrition/CM consulted   -Currently thrombocytopenic with petechiae on physical exam but still receiving chemo for CML and considered a hypercoagulable risk. Placed patient on SCDs, day team to consider escalating anticoagulation    HTN   -Continue home Amlodipine, Losartan, and Lopressor   -PRN antihypertensive in place     DM2 w Neuropathy   -Last A1c 7.6 on 7/20/23   -CBG AC+HS, CBG goal 140-180   -Continue home insulin regimen 12u TIDWM + LSSI  -Continue home Gabapentin   -Hold metformin     New onset T wave Inversions, inferior leads   -Echo done on 12/22/22 significant for mild to moderate MR, moderate right ventricular and atrial enlargement, and mild left atrial enlargement with EF of 70%  -Troponin negative   -Day team to consider further cardiac work-up in AM     HLD   -Continue home Lipitor    Anxiety   -Continue home Buspar and Lexapro  -Hold home Xanax and Vistaril           Access: Peripheral   Antibiotics: Vancomycin + Zosyn started 10/13/23  Diet: Heart Healthy, Low Na, Diabetic   DVT Prophylaxis: SCDs  GI Prophylaxis: Home Protonix   Fluids: None   Code Status: Full     Dispo: 57 yo F w Pmhx of CML s/p chemo, pancytopenia, DM2, HTN, HLD and NAFLD admitted to med/surg for fever of unknown origin, started on abx on admission.     Jessica Miller MD  [employer] HO-1

## 2023-10-13 NOTE — CONSULTS
"Inpatient Nutrition Evaluation    Admit Date: 10/12/2023   Total duration of encounter: 1 day    Nutrition Recommendation/Prescription     Diabetic, Heart healthy diet  Monitor Weight Weekly   Consider FE supplementation    Nutrition Assessment     Chart Review    Reason Seen: physician consult for Diabetes    Malnutrition Screening Tool Results   Have you recently lost weight without trying?: No  Have you been eating poorly because of a decreased appetite?: No   MST Score: 0     Diagnosis:  Fever of unknown origin, CLL s/p chemo, Pancytopenia, HTN, DM, HLD, Anxiety    Relevant Medical History: CML, DM, Pancytopenia, HTN, HLD    Nutrition-Related Medications: Amlodpine, Atorvastatin, Novolog, Vanc, Zosyn, ISS    Nutrition-Related Labs:  7/20/23 -- Hgb A1C 7.6 H  10/13/23 -- H/H 6.9/22.3 L, Glu 233 H, K 3.5, BUN 8 H, Cr 0.64 H    Diet Order: Diet diabetic Cardiac, Low Sodium  Oral Supplement Order: none  Appetite/Oral Intake: fair/50-75% of meals  Factors Affecting Nutritional Intake: decreased appetite and food preferences  Food/Caodaism/Cultural Preferences: none reported  Food Allergies: none reported    Skin Integrity: bruised (ecchymotic)  Wound(s):   skin intact    Comments    10/13/23 --  [id number] used during visit to communicate to pt & ; Pt reports fair po intake related to dislike of food choices, meal options provided & pt ok with fish for lunch today; denies nausea, reports abdominal pain, regular Bms, LBM documented on 10/12; no indication of wt loss per EMR wt hx; Glu/Hgb A1C (H) - h/o Dm, continue diabetic diet, diet education provided with written Croatian information provided    Anthropometrics    Height: 5' 5" (165.1 cm) Height Method: Estimated  Last Weight: 113.9 kg (251 lb) (10/13/23 0258) Weight Method: Bed Scale  BMI (Calculated): 41.8  BMI Classification: obese grade III (BMI >/=40)     Ideal Body Weight (IBW), Female: 125 lb     % Ideal Body Weight, Female (lb): 200.8 %      "               Usual Body Weight (UBW), k kg  % Usual Body Weight: 105.64     Usual Weight Provided By: EMR weight history    Wt Readings from Last 5 Encounters:   10/13/23 113.9 kg (251 lb)   23 113.9 kg (251 lb 1.7 oz)   23 113.9 kg (251 lb 1.7 oz)   23 110.7 kg (244 lb)   23 111 kg (244 lb 12.8 oz)   23 107.5 kg  23 107.7 kg    Weight Change(s) Since Admission:  Admit Weight: 113.9 kg (251 lb) (10/12/23 2144)      Patient Education    Education Provided: diabetic diet  Teaching Method: explanation and printed materials  Comprehension: verbalizes understanding  Barriers to Learning: cultural barrier  Expected Compliance: fair  Comments: All questions were answered and dietitian's contact information was provided.     Monitoring & Evaluation     Dietitian will monitor food and beverage intake, weight change, glucose/endocrine profile, and gastrointestinal profile.  Nutrition Risk/Follow-Up: low (follow-up in 5-7 days)  Patients assigned 'low nutrition risk' status do not qualify for a full nutritional assessment but will be monitored and re-evaluated in a 5-7 day time period. Please consult if re-evaluation needed sooner.

## 2023-10-13 NOTE — PROGRESS NOTES
"Pharmacokinetic Initial Assessment: IV Vancomycin    Assessment/Plan:    Initiate intravenous vancomycin with loading dose of 1750 mg once followed by a maintenance dose of vancomycin 1500mg IV every 12 hours  Desired empiric serum trough concentration is 10 to 20 mcg/mL  Draw vancomycin trough level 60 min prior to fourth dose on 10/14 at approximately 1400  Pharmacy will continue to follow and monitor vancomycin.      Please contact pharmacy at extension [phone] with any questions regarding this assessment.     Thank you for the consult,   Adrienne Aranda       Patient brief summary:  Fela Ellison is a 58 y.o. female initiated on antimicrobial therapy with IV Vancomycin for treatment of suspected skin & soft tissue infection    Drug Allergies:   Review of patient's allergies indicates:  No Known Allergies    Actual Body Weight:   113.9 kg    Renal Function:   Estimated Creatinine Clearance: 114.7 mL/min (based on SCr of 0.69 mg/dL).,     Dialysis Method (if applicable):  N/A    CBC (last 72 hours):  Recent Labs   Lab Result Units 10/12/23  2200   WBC x10(3)/mcL 4.24*   Hgb g/dL 7.9*   Hct % 24.9*   Platelet x10(3)/mcL 36*   Monocytes % % 1*       Metabolic Panel (last 72 hours):  Recent Labs   Lab Result Units 10/12/23  2200 10/12/23  2320   Sodium Level mmol/L 137  --    Potassium Level mmol/L 3.8  --    Chloride mmol/L 103  --    Carbon Dioxide mmol/L 25  --    Glucose Level mg/dL 213*  --    Glucose, UA   --  Normal   Blood Urea Nitrogen mg/dL 9.9  --    Creatinine mg/dL 0.69  --    Albumin Level g/dL 3.1*  --    Bilirubin Total mg/dL 0.7  --    Alkaline Phosphatase unit/L 77  --    Aspartate Aminotransferase unit/L 15  --    Alanine Aminotransferase unit/L 20  --        Drug levels (last 3 results):  No results for input(s): "VANCOMYCINRA", "VANCORANDOM", "VANCOMYCINPE", "VANCOPEAK", "VANCOMYCINTR", "VANCOTROUGH" in the last 72 hours.    Microbiologic Results:  Microbiology Results (last 7 days)       " Procedure Component Value Units Date/Time    Urine Culture High Risk [[id number]]     Order Status: Sent Specimen: Urine, Clean Catch     Blood Culture #1 **CANNOT BE ORDERED STAT** [[id number]] Collected: 10/12/23 2322    Order Status: Sent Specimen: Blood from Arm, Right Updated: 10/12/23 2329    Blood Culture #2 **CANNOT BE ORDERED STAT** [[id number]] Collected: 10/12/23 2322    Order Status: Sent Specimen: Blood from Arm, Left Updated: 10/12/23 2329

## 2023-10-14 LAB
ADV 40+41 DNA STL QL NAA+NON-PROBE: NOT DETECTED
ALBUMIN SERPL-MCNC: 2.9 G/DL (ref 3.5–5)
ALBUMIN/GLOB SERPL: 0.9 RATIO (ref 1.1–2)
ALP SERPL-CCNC: 63 UNIT/L (ref 40–150)
ALT SERPL-CCNC: 15 UNIT/L (ref 0–55)
AST SERPL-CCNC: 12 UNIT/L (ref 5–34)
ASTRO TYP 1-8 RNA STL QL NAA+NON-PROBE: NOT DETECTED
BASOPHILS # BLD AUTO: 0 X10(3)/MCL
BASOPHILS NFR BLD AUTO: 0 %
BILIRUB SERPL-MCNC: 0.8 MG/DL
BUN SERPL-MCNC: 7.3 MG/DL (ref 9.8–20.1)
C CAYETANENSIS DNA STL QL NAA+NON-PROBE: NOT DETECTED
C COLI+JEJ+UPSA DNA STL QL NAA+NON-PROBE: NOT DETECTED
CALCIUM SERPL-MCNC: 8.7 MG/DL (ref 8.4–10.2)
CHLORIDE SERPL-SCNC: 103 MMOL/L (ref 98–107)
CO2 SERPL-SCNC: 26 MMOL/L (ref 22–29)
CREAT SERPL-MCNC: 0.74 MG/DL (ref 0.55–1.02)
CRYPTOSP DNA STL QL NAA+NON-PROBE: NOT DETECTED
E HISTOLYT DNA STL QL NAA+NON-PROBE: NOT DETECTED
EAEC PAA PLAS AGGR+AATA ST NAA+NON-PRB: NOT DETECTED
EC STX1+STX2 GENES STL QL NAA+NON-PROBE: NOT DETECTED
EOSINOPHIL # BLD AUTO: 0.01 X10(3)/MCL (ref 0–0.9)
EOSINOPHIL NFR BLD AUTO: 0.3 %
EPEC EAE GENE STL QL NAA+NON-PROBE: NOT DETECTED
ERYTHROCYTE [DISTWIDTH] IN BLOOD BY AUTOMATED COUNT: 19.9 % (ref 11.5–17)
ETEC LTA+ST1A+ST1B TOX ST NAA+NON-PROBE: NOT DETECTED
G LAMBLIA DNA STL QL NAA+NON-PROBE: NOT DETECTED
GFR SERPLBLD CREATININE-BSD FMLA CKD-EPI: >60 MLS/MIN/1.73/M2
GLOBULIN SER-MCNC: 3.1 GM/DL (ref 2.4–3.5)
GLUCOSE SERPL-MCNC: 352 MG/DL (ref 74–100)
HCT VFR BLD AUTO: 27.7 % (ref 37–47)
HGB BLD-MCNC: 9 G/DL (ref 12–16)
IMM GRANULOCYTES # BLD AUTO: 0.04 X10(3)/MCL (ref 0–0.04)
IMM GRANULOCYTES NFR BLD AUTO: 1.3 %
LYMPHOCYTES # BLD AUTO: 1.37 X10(3)/MCL (ref 0.6–4.6)
LYMPHOCYTES NFR BLD AUTO: 43.6 %
MAGNESIUM SERPL-MCNC: 2.1 MG/DL (ref 1.6–2.6)
MCH RBC QN AUTO: 30.7 PG (ref 27–31)
MCHC RBC AUTO-ENTMCNC: 32.5 G/DL (ref 33–36)
MCV RBC AUTO: 94.5 FL (ref 80–94)
MONOCYTES # BLD AUTO: 0.33 X10(3)/MCL (ref 0.1–1.3)
MONOCYTES NFR BLD AUTO: 10.5 %
NEUTROPHILS # BLD AUTO: 1.39 X10(3)/MCL (ref 2.1–9.2)
NEUTROPHILS NFR BLD AUTO: 44.3 %
NOROVIRUS GI+II RNA STL QL NAA+NON-PROBE: NOT DETECTED
NRBC BLD AUTO-RTO: 2.5 %
P SHIGELLOIDES DNA STL QL NAA+NON-PROBE: NOT DETECTED
PHOSPHATE SERPL-MCNC: 3.9 MG/DL (ref 2.3–4.7)
PLATELET # BLD AUTO: 29 X10(3)/MCL (ref 130–400)
PLATELETS.RETICULATED NFR BLD AUTO: 9.5 % (ref 0.9–11.2)
PMV BLD AUTO: ABNORMAL FL
POCT GLUCOSE: 268 MG/DL (ref 70–110)
POCT GLUCOSE: 296 MG/DL (ref 70–110)
POCT GLUCOSE: 334 MG/DL (ref 70–110)
POCT GLUCOSE: 358 MG/DL (ref 70–110)
POTASSIUM SERPL-SCNC: 3.9 MMOL/L (ref 3.5–5.1)
PROT SERPL-MCNC: 6 GM/DL (ref 6.4–8.3)
RBC # BLD AUTO: 2.93 X10(6)/MCL (ref 4.2–5.4)
RVA RNA STL QL NAA+NON-PROBE: NOT DETECTED
S ENT+BONG DNA STL QL NAA+NON-PROBE: NOT DETECTED
SAPO I+II+IV+V RNA STL QL NAA+NON-PROBE: NOT DETECTED
SHIGELLA SP+EIEC IPAH ST NAA+NON-PROBE: NOT DETECTED
SODIUM SERPL-SCNC: 138 MMOL/L (ref 136–145)
V CHOL+PARA+VUL DNA STL QL NAA+NON-PROBE: NOT DETECTED
V CHOLERAE DNA STL QL NAA+NON-PROBE: NOT DETECTED
VANCOMYCIN TROUGH SERPL-MCNC: 9 UG/ML (ref 15–20)
WBC # SPEC AUTO: 3.14 X10(3)/MCL (ref 4.5–11.5)
Y ENTEROCOL DNA STL QL NAA+NON-PROBE: NOT DETECTED

## 2023-10-14 PROCEDURE — 87507 IADNA-DNA/RNA PROBE TQ 12-25: CPT

## 2023-10-14 PROCEDURE — 84100 ASSAY OF PHOSPHORUS: CPT

## 2023-10-14 PROCEDURE — G0378 HOSPITAL OBSERVATION PER HR: HCPCS

## 2023-10-14 PROCEDURE — 94760 N-INVAS EAR/PLS OXIMETRY 1: CPT

## 2023-10-14 PROCEDURE — 80053 COMPREHEN METABOLIC PANEL: CPT

## 2023-10-14 PROCEDURE — 96372 THER/PROPH/DIAG INJ SC/IM: CPT

## 2023-10-14 PROCEDURE — 63600175 PHARM REV CODE 636 W HCPCS

## 2023-10-14 PROCEDURE — 94761 N-INVAS EAR/PLS OXIMETRY MLT: CPT

## 2023-10-14 PROCEDURE — 87088 URINE BACTERIA CULTURE: CPT

## 2023-10-14 PROCEDURE — 25000003 PHARM REV CODE 250

## 2023-10-14 PROCEDURE — 96376 TX/PRO/DX INJ SAME DRUG ADON: CPT

## 2023-10-14 PROCEDURE — 83735 ASSAY OF MAGNESIUM: CPT

## 2023-10-14 PROCEDURE — 96366 THER/PROPH/DIAG IV INF ADDON: CPT

## 2023-10-14 PROCEDURE — 85025 COMPLETE CBC W/AUTO DIFF WBC: CPT

## 2023-10-14 PROCEDURE — 80202 ASSAY OF VANCOMYCIN: CPT

## 2023-10-14 RX ORDER — LOSARTAN POTASSIUM 25 MG/1
100 TABLET ORAL DAILY
Status: DISCONTINUED | OUTPATIENT
Start: 2023-10-15 | End: 2023-10-15 | Stop reason: HOSPADM

## 2023-10-14 RX ORDER — LOSARTAN POTASSIUM 25 MG/1
50 TABLET ORAL DAILY
Status: DISCONTINUED | OUTPATIENT
Start: 2023-10-15 | End: 2023-10-14

## 2023-10-14 RX ORDER — INSULIN ASPART 100 [IU]/ML
0-10 INJECTION, SOLUTION INTRAVENOUS; SUBCUTANEOUS
Status: DISCONTINUED | OUTPATIENT
Start: 2023-10-14 | End: 2023-10-15 | Stop reason: HOSPADM

## 2023-10-14 RX ORDER — GABAPENTIN 300 MG/1
300 CAPSULE ORAL 3 TIMES DAILY
Status: DISCONTINUED | OUTPATIENT
Start: 2023-10-14 | End: 2023-10-15 | Stop reason: HOSPADM

## 2023-10-14 RX ORDER — INSULIN ASPART 100 [IU]/ML
16 INJECTION, SOLUTION INTRAVENOUS; SUBCUTANEOUS
Status: DISCONTINUED | OUTPATIENT
Start: 2023-10-14 | End: 2023-10-14

## 2023-10-14 RX ORDER — INSULIN ASPART 100 [IU]/ML
15 INJECTION, SOLUTION INTRAVENOUS; SUBCUTANEOUS
Status: DISCONTINUED | OUTPATIENT
Start: 2023-10-14 | End: 2023-10-15 | Stop reason: HOSPADM

## 2023-10-14 RX ADMIN — METOPROLOL TARTRATE 25 MG: 25 TABLET, FILM COATED ORAL at 08:10

## 2023-10-14 RX ADMIN — PIPERACILLIN AND TAZOBACTAM 4.5 G: 4; .5 INJECTION, POWDER, LYOPHILIZED, FOR SOLUTION INTRAVENOUS; PARENTERAL at 05:10

## 2023-10-14 RX ADMIN — MORPHINE SULFATE 2 MG: 2 INJECTION, SOLUTION INTRAMUSCULAR; INTRAVENOUS at 03:10

## 2023-10-14 RX ADMIN — MORPHINE SULFATE 2 MG: 2 INJECTION, SOLUTION INTRAMUSCULAR; INTRAVENOUS at 12:10

## 2023-10-14 RX ADMIN — GABAPENTIN 300 MG: 300 CAPSULE ORAL at 08:10

## 2023-10-14 RX ADMIN — ACYCLOVIR 400 MG: 400 TABLET ORAL at 08:10

## 2023-10-14 RX ADMIN — PIPERACILLIN AND TAZOBACTAM 4.5 G: 4; .5 INJECTION, POWDER, LYOPHILIZED, FOR SOLUTION INTRAVENOUS; PARENTERAL at 08:10

## 2023-10-14 RX ADMIN — INSULIN ASPART 12 UNITS: 100 INJECTION, SOLUTION INTRAVENOUS; SUBCUTANEOUS at 09:10

## 2023-10-14 RX ADMIN — AMLODIPINE BESYLATE 10 MG: 10 TABLET ORAL at 09:10

## 2023-10-14 RX ADMIN — VANCOMYCIN HYDROCHLORIDE 1500 MG: 1.5 INJECTION, POWDER, LYOPHILIZED, FOR SOLUTION INTRAVENOUS at 03:10

## 2023-10-14 RX ADMIN — HYDROCORTISONE: 25 CREAM TOPICAL at 08:10

## 2023-10-14 RX ADMIN — INSULIN ASPART 3 UNITS: 100 INJECTION, SOLUTION INTRAVENOUS; SUBCUTANEOUS at 08:10

## 2023-10-14 RX ADMIN — GABAPENTIN 300 MG: 300 CAPSULE ORAL at 09:10

## 2023-10-14 RX ADMIN — PANTOPRAZOLE SODIUM 40 MG: 40 TABLET, DELAYED RELEASE ORAL at 09:10

## 2023-10-14 RX ADMIN — INSULIN ASPART 15 UNITS: 100 INJECTION, SOLUTION INTRAVENOUS; SUBCUTANEOUS at 12:10

## 2023-10-14 RX ADMIN — MORPHINE SULFATE 15 MG: 15 TABLET, EXTENDED RELEASE ORAL at 08:10

## 2023-10-14 RX ADMIN — GABAPENTIN 300 MG: 300 CAPSULE ORAL at 03:10

## 2023-10-14 RX ADMIN — BUSPIRONE HYDROCHLORIDE 5 MG: 5 TABLET ORAL at 09:10

## 2023-10-14 RX ADMIN — VANCOMYCIN HYDROCHLORIDE 1500 MG: 1.5 INJECTION, POWDER, LYOPHILIZED, FOR SOLUTION INTRAVENOUS at 04:10

## 2023-10-14 RX ADMIN — HYDROCORTISONE: 25 CREAM TOPICAL at 09:10

## 2023-10-14 RX ADMIN — ESCITALOPRAM OXALATE 10 MG: 10 TABLET ORAL at 09:10

## 2023-10-14 RX ADMIN — ONDANSETRON 8 MG: 4 TABLET, ORALLY DISINTEGRATING ORAL at 11:10

## 2023-10-14 RX ADMIN — METOPROLOL TARTRATE 25 MG: 25 TABLET, FILM COATED ORAL at 09:10

## 2023-10-14 RX ADMIN — ATORVASTATIN CALCIUM 40 MG: 40 TABLET, FILM COATED ORAL at 09:10

## 2023-10-14 RX ADMIN — ALLOPURINOL 300 MG: 100 TABLET ORAL at 09:10

## 2023-10-14 RX ADMIN — ACYCLOVIR 400 MG: 400 TABLET ORAL at 09:10

## 2023-10-14 RX ADMIN — MORPHINE SULFATE 15 MG: 15 TABLET, EXTENDED RELEASE ORAL at 09:10

## 2023-10-14 RX ADMIN — INSULIN DETEMIR 15 UNITS: 100 INJECTION, SOLUTION SUBCUTANEOUS at 08:10

## 2023-10-14 RX ADMIN — BUSPIRONE HYDROCHLORIDE 5 MG: 5 TABLET ORAL at 08:10

## 2023-10-14 RX ADMIN — MORPHINE SULFATE 2 MG: 2 INJECTION, SOLUTION INTRAMUSCULAR; INTRAVENOUS at 11:10

## 2023-10-14 RX ADMIN — INSULIN ASPART 15 UNITS: 100 INJECTION, SOLUTION INTRAVENOUS; SUBCUTANEOUS at 04:10

## 2023-10-14 RX ADMIN — INSULIN ASPART 10 UNITS: 100 INJECTION, SOLUTION INTRAVENOUS; SUBCUTANEOUS at 04:10

## 2023-10-14 RX ADMIN — INSULIN ASPART 3 UNITS: 100 INJECTION, SOLUTION INTRAVENOUS; SUBCUTANEOUS at 09:10

## 2023-10-14 RX ADMIN — PIPERACILLIN AND TAZOBACTAM 4.5 G: 4; .5 INJECTION, POWDER, LYOPHILIZED, FOR SOLUTION INTRAVENOUS; PARENTERAL at 12:10

## 2023-10-14 RX ADMIN — INSULIN ASPART 8 UNITS: 100 INJECTION, SOLUTION INTRAVENOUS; SUBCUTANEOUS at 12:10

## 2023-10-14 NOTE — PROGRESS NOTES
[facility] Telemetry  Pulmonary Critical Care Note    Patient Name: Fela Ellison  MRN: [id number]  Admission Date: 10/12/2023  Hospital Length of Stay: 0 days  Code Status: Full Code  Attending Provider: Blake Ortiz MD  Primary Care Provider: Bar Trevino DO     Subjective:     HPI:   Fela Ellison is a 57 yo F w Pmhx of CML s/p chemo, pancytopenia, DM2, HTN, HLD and NAFLD who presents to [facility] ED on 10/13/23 with complaints of fevers and progressive skin lesions. Patient is accompanied her  who translates for her as she is a Uz[place] speaker. Patient reports fevers with Tmax of 101.9 started yesterday and 1 x vomiting. Denies recent sick contacts. She also reports large, painful, dark skin lesions throughout her abdomen that started roughly 5 months ago. Lesions started on her right side with new lesions on the left side of her abdomen. U/S of these lesions earlier this week consistent with scar tissue 2/2 chemo.  also reports intermittent diaphoresis and HA for several months which the patient attributes to hyperglycemia. States she is compliant with medications. Patient is currently receiving chemo treatments, most recent session about 3 weeks ag    Hospital Course/Significant events:  10/13 admitted to telemetry    24 Hour Interval History:  Seen this morning with  in room.  She complained of pain in her abdomen, described as deep and as if muscles are being pulled.  She denies any nausea or vomiting, diarrhea, constipation.  She denies any chest pain, shortness a breath, cough, afebrile overnight.  Patient mildly hypertensive overnight could be secondary to pain.  White count improving.  Hemoglobin was at 6.9, status post transfusion increased to 9.  Platelets are stable from 02/30/2029.  Needs better glycemic control.      Past Medical History:   Diagnosis Date    Cancer     CML (chronic myelocytic leukemia)     DM2 (diabetes mellitus,  type 2)     HTN (hypertension)     NAFLD (nonalcoholic fatty liver disease)     Neuropathy        Past Surgical History:   Procedure Laterality Date    ABDOMINAL SURGERY       SECTION      x4    CHOLECYSTECTOMY         Social History     Socioeconomic History    Marital status:    Tobacco Use    Smoking status: Never    Smokeless tobacco: Never   Substance and Sexual Activity    Alcohol use: Not Currently    Drug use: Not Currently   Social History Narrative    ** Merged History Encounter **          Social Determinants of Health     Financial Resource Strain: High Risk (2023)    Overall Financial Resource Strain (CARDIA)     Difficulty of Paying Living Expenses: Hard   Food Insecurity: No Food Insecurity (2023)    Hunger Vital Sign     Worried About Running Out of Food in the Last Year: Never true     Ran Out of Food in the Last Year: Never true   Transportation Needs: No Transportation Needs (2023)    PRAPARE - Transportation     Lack of Transportation (Medical): No     Lack of Transportation (Non-Medical): No   Physical Activity: Inactive (2023)    Exercise Vital Sign     Days of Exercise per Week: 0 days     Minutes of Exercise per Session: 0 min   Social Connections: Unknown (2023)    Social Connection and Isolation Panel [NHANES]     Frequency of Communication with Friends and Family: More than three times a week     Frequency of Social Gatherings with Friends and Family: More than three times a week     Attends Club or Organization Meetings: Never     Marital Status:    Housing Stability: Low Risk  (2023)    Housing Stability Vital Sign     Unable to Pay for Housing in the Last Year: No     Number of Places Lived in the Last Year: 1     Unstable Housing in the Last Year: No           Current Outpatient Medications   Medication Instructions    acyclovir (ZOVIRAX) 400 mg, Oral, 2 times daily    albuterol (PROVENTIL/VENTOLIN HFA) 90 mcg/actuation inhaler 2  puffs, Inhalation, Every 6 hours PRN, Rescue     allopurinoL (ZYLOPRIM) 300 mg, Oral, Daily    ALPRAZolam (XANAX) 0.25 mg, Oral, 3 times daily PRN    amLODIPine (NORVASC) 10 mg, Oral, Daily    ammonium lactate 12 % Crea Topical, Daily PRN    atorvastatin (LIPITOR) 40 mg, Oral, Daily    busPIRone (BUSPAR) 5 mg, Oral, 2 times daily    EScitalopram oxalate (LEXAPRO) 10 mg, Oral, Daily    furosemide (LASIX) 20 mg, Oral, Daily    gabapentin (NEURONTIN) 300 MG capsule Take 2 capsules TID.    HYDROcodone-acetaminophen (NORCO) 5-325 mg per tablet 1 tablet, Oral, Every 6 hours PRN    hydrocortisone 2.5 % cream Topical, 2 times daily    hydrOXYzine pamoate (VISTARIL) 25 mg, Oral, Every 8 hours PRN    ibuprofen (ADVIL,MOTRIN) 600 MG tablet TAKE 1 TABLET WITH FOOD OR MILK AS NEEDED THREE TIMES A DAY ORALLY 30    ICLUSIG 30 mg, Oral, Daily    insulin lispro 22 Units, Subcutaneous, 3 times daily before meals, Takes 22 units TID AC while on chemo - (Gives between 12 to 15 units TID AC when not on chemo)    ketoconazole (NIZORAL) 2 % cream Topical    loratadine (CLARITIN) 10 mg, Oral, Daily    losartan (COZAAR) 25 mg, Oral, Daily    metFORMIN (GLUCOPHAGE) 1,000 mg, Oral, 2 times daily with meals    metoprolol tartrate (LOPRESSOR) 25 mg, Oral, 2 times daily    montelukast (SINGULAIR) 10 mg, Oral, Daily    NovoLIN N NPH U-100 Insulin 50 Units, 2 times daily before meals, No longer 20 in AM    omeprazole (PRILOSEC) 40 MG capsule 1 capsule, Oral, Daily    ondansetron (ZOFRAN-ODT) 4 mg, Oral, Every 8 hours PRN    silver sulfADIAZINE 1% (SILVADENE) 1 % cream Topical    venetoclax (VENCLEXTA) 400 mg, Oral, Only takes while taking chemo        Current Inpatient Medications   acyclovir  400 mg Oral BID    allopurinoL  300 mg Oral Daily    amLODIPine  10 mg Oral Daily    atorvastatin  40 mg Oral Daily    busPIRone  5 mg Oral BID    EScitalopram oxalate  10 mg Oral Daily    gabapentin  300 mg Oral TID    hydrocortisone   Topical (Top) BID     insulin aspart U-100  12 Units Subcutaneous TIDWM    [START ON 10/15/2023] losartan  50 mg Oral Daily    metoprolol tartrate  25 mg Oral BID    morphine  15 mg Oral Q12H    pantoprazole  40 mg Oral Daily    piperacillin-tazobactam (Zosyn) IV (PEDS and ADULTS) (extended infusion is not appropriate)  4.5 g Intravenous Q8H    vancomycin (VANCOCIN) IV (PEDS and ADULTS)  1,500 mg Intravenous Q12H       Current Intravenous Infusions        Review of Systems   Constitutional:  Negative for chills and fever.   Respiratory:  Negative for sputum production and shortness of breath.    Cardiovascular:  Negative for chest pain and palpitations.   Gastrointestinal:  Positive for abdominal pain. Negative for constipation, diarrhea, nausea and vomiting.   Skin:  Positive for rash (Left quadrant of abdomen).          Objective:       Intake/Output Summary (Last 24 hours) at 10/14/2023 1013  Last data filed at 10/14/2023 0626  Gross per 24 hour   Intake 1203.38 ml   Output --   Net 1203.38 ml         Vital Signs (Most Recent):  Temp: 98.3 °F (36.8 °C) (10/14/23 0812)  Pulse: 83 (10/14/23 0910)  Resp: 18 (10/14/23 0910)  BP: (!) 145/89 (10/14/23 0910)  SpO2: (!) 93 % (10/14/23 0831)  Body mass index is 42.12 kg/m².  Weight: 114.8 kg (253 lb 1.6 oz) Vital Signs (24h Range):  Temp:  [98.3 °F (36.8 °C)-99.4 °F (37.4 °C)] 98.3 °F (36.8 °C)  Pulse:  [77-89] 83  Resp:  [17-20] 18  SpO2:  [93 %-97 %] 93 %  BP: (109-146)/(67-89) 145/89     Physical Exam  Vitals and nursing note reviewed.   Constitutional:       Appearance: She is obese. She is ill-appearing.   HENT:      Head: Normocephalic and atraumatic.   Cardiovascular:      Rate and Rhythm: Normal rate and regular rhythm.      Pulses: Normal pulses.      Heart sounds: Normal heart sounds.   Pulmonary:      Effort: Pulmonary effort is normal.      Breath sounds: Normal breath sounds.   Abdominal:      General: Bowel sounds are normal.      Palpations: Abdomen is soft. There is no mass.     "  Tenderness: There is abdominal tenderness (Med side left abdomen with erythema appreciated and tender to palpation.).   Musculoskeletal:         General: Normal range of motion.   Skin:     General: Skin is warm and dry.   Neurological:      General: No focal deficit present.      Mental Status: She is alert and oriented to person, place, and time. Mental status is at baseline.           Lines/Drains/Airways       Peripheral Intravenous Line  Duration                  Peripheral IV - Single Lumen 10/12/23 2231 20 G Anterior;Proximal;Right Forearm 1 day         Peripheral IV - Single Lumen 10/13/23 1500 20 G Anterior;Left;Proximal Forearm <1 day                    Significant Labs:    Lab Results   Component Value Date    WBC 3.14 (L) 10/14/2023    HGB 9.0 (L) 10/14/2023    HCT 27.7 (L) 10/14/2023    MCV 94.5 (H) 10/14/2023    PLT 29 (LL) 10/14/2023           BMP  Lab Results   Component Value Date     10/14/2023    K 3.9 10/14/2023    CHLORIDE 103 10/14/2023    CO2 26 10/14/2023    BUN 7.3 (L) 10/14/2023    CREATININE 0.74 10/14/2023    CALCIUM 8.7 10/14/2023    AGAP 9.0 07/20/2023    ESTGFRAFRICA 100 06/21/2023    EGFRNONAA >105 04/08/2022         ABG  No results for input(s): "PH", "PO2", "PCO2", "HCO3", "POCBASEDEF" in the last 168 hours.    Mechanical Ventilation Support:         Significant Imaging:  I have reviewed the pertinent imaging within the past 24 hours.        Assessment/Plan:     Fever of Unknown Origin   CLL, s/p chemo   Pancytopenia 2/2 above   -Does not meet requirements for blood product transfusion at this time   -Does not meet SIRS criteria (1/4) upon admission   -BC x 2 from ED pending   -CXR, Covid/Flu PCR, LA and D-Dimer negative   -Started on Vancomycin and Zosyn for broad spectrum coverage   -Continue home Acyclovir   -PRN antiemetic in place   -Ordered GI panel and UC given symptoms and UA results from ED   -CT abdomen and pelvis without acute abnormalities, hepatic " steatosis    HTN   -Continue home Amlodipine, Losartan, and Lopressor   -PRN antihypertensive in place      DM2 w Neuropathy   -Last A1c 7.6 on 7/20/23   -CBG AC+HS, CBG goal 140-180   - glucose levels persistently above 200  -basal insulin Levemir 15 units at night  - 15 units NovoLog with meals  - high sliding scale  -Continue home Gabapentin   -Hold metformin      New onset T wave Inversions, inferior leads?  -Echo done on 12/22/22 significant for mild to moderate MR, moderate right ventricular and atrial enlargement, and mild left atrial enlargement with EF of 70%  -Troponin negative   -echo with EF 67%, right atrium mildly dilated, PASP of 24 mm Hg.  -will repeat EKG given initial EKG done with poor baseline.  Patient denies any chest pain or shortness a breath     HLD   -Continue home Lipitor     Anxiety   -Continue home Buspar and Lexapro  -Hold home Xanax and Vistaril           Access: Peripheral   Antibiotics: Vancomycin + Zosyn started 10/13/23  Diet: Heart Healthy, Low Na, Diabetic   DVT Prophylaxis: SCDs given thrombocytopenia  GI Prophylaxis: Home Protonix   Fluids: None   Code Status: Full      Dispo: 57 yo F w Pmhx of CML s/p chemo, pancytopenia, DM2, HTN, HLD and NAFLD admitted to inpatient medicine for fever at home.  Blood cultures pending.  Continue IV antibiotics.      Reza Brito DO  [facility] PGY-II  DOS: 10/14/2023

## 2023-10-15 VITALS
DIASTOLIC BLOOD PRESSURE: 82 MMHG | BODY MASS INDEX: 42.17 KG/M2 | HEIGHT: 65 IN | RESPIRATION RATE: 18 BRPM | HEART RATE: 83 BPM | WEIGHT: 253.13 LBS | TEMPERATURE: 99 F | OXYGEN SATURATION: 93 % | SYSTOLIC BLOOD PRESSURE: 152 MMHG

## 2023-10-15 LAB
ABS NEUT CALC (OHS): 1.86 X10(3)/MCL (ref 2.1–9.2)
ALBUMIN SERPL-MCNC: 2.9 G/DL (ref 3.5–5)
ALBUMIN/GLOB SERPL: 0.9 RATIO (ref 1.1–2)
ALP SERPL-CCNC: 59 UNIT/L (ref 40–150)
ALT SERPL-CCNC: 15 UNIT/L (ref 0–55)
ANISOCYTOSIS BLD QL SMEAR: ABNORMAL
AST SERPL-CCNC: 14 UNIT/L (ref 5–34)
AV INDEX (PROSTH): 0.67
AV MEAN GRADIENT: 7 MMHG
AV PEAK GRADIENT: 14 MMHG
AV VALVE AREA BY VELOCITY RATIO: 2.03 CM²
AV VALVE AREA: 2.14 CM²
AV VELOCITY RATIO: 0.63
BILIRUB SERPL-MCNC: 0.9 MG/DL
BSA FOR ECHO PROCEDURE: 2.28 M2
BUN SERPL-MCNC: 8.1 MG/DL (ref 9.8–20.1)
CALCIUM SERPL-MCNC: 8.8 MG/DL (ref 8.4–10.2)
CHLORIDE SERPL-SCNC: 104 MMOL/L (ref 98–107)
CO2 SERPL-SCNC: 26 MMOL/L (ref 22–29)
CREAT SERPL-MCNC: 0.82 MG/DL (ref 0.55–1.02)
CV ECHO LV RWT: 0.59 CM
DOP CALC AO PEAK VEL: 1.89 M/S
DOP CALC AO VTI: 38.4 CM
DOP CALC LVOT AREA: 3.2 CM2
DOP CALC LVOT DIAMETER: 2.02 CM
DOP CALC LVOT PEAK VEL: 1.2 M/S
DOP CALC LVOT STROKE VOLUME: 82.32 CM3
DOP CALC MV VTI: 36.5 CM
DOP CALCLVOT PEAK VEL VTI: 25.7 CM
E WAVE DECELERATION TIME: 138.65 MSEC
E/A RATIO: 0.83
E/E' RATIO: 12 M/S
ECHO LV POSTERIOR WALL: 1.31 CM (ref 0.6–1.1)
ERYTHROCYTE [DISTWIDTH] IN BLOOD BY AUTOMATED COUNT: 19.5 % (ref 11.5–17)
FRACTIONAL SHORTENING: 35 % (ref 28–44)
GFR SERPLBLD CREATININE-BSD FMLA CKD-EPI: >60 MLS/MIN/1.73/M2
GLOBULIN SER-MCNC: 3.2 GM/DL (ref 2.4–3.5)
GLUCOSE SERPL-MCNC: 245 MG/DL (ref 74–100)
HCT VFR BLD AUTO: 26.7 % (ref 37–47)
HGB BLD-MCNC: 8.8 G/DL (ref 12–16)
INTERVENTRICULAR SEPTUM: 1.42 CM (ref 0.6–1.1)
LEFT ATRIUM SIZE: 3.79 CM
LEFT INTERNAL DIMENSION IN SYSTOLE: 2.88 CM (ref 2.1–4)
LEFT VENTRICLE DIASTOLIC VOLUME INDEX: 40.33 ML/M2
LEFT VENTRICLE DIASTOLIC VOLUME: 87.93 ML
LEFT VENTRICLE MASS INDEX: 106 G/M2
LEFT VENTRICLE SYSTOLIC VOLUME INDEX: 14.5 ML/M2
LEFT VENTRICLE SYSTOLIC VOLUME: 31.69 ML
LEFT VENTRICULAR INTERNAL DIMENSION IN DIASTOLE: 4.41 CM (ref 3.5–6)
LEFT VENTRICULAR MASS: 232.09 G
LV LATERAL E/E' RATIO: 12 M/S
LV SEPTAL E/E' RATIO: 12 M/S
LVOT MG: 4.03 MMHG
LVOT MV: 0.96 CM/S
LYMPH ABN # BLD MANUAL: 1 %
LYMPHOCYTES NFR BLD MANUAL: 1.31 X10(3)/MCL
LYMPHOCYTES NFR BLD MANUAL: 40 % (ref 13–40)
MAGNESIUM SERPL-MCNC: 2.2 MG/DL (ref 1.6–2.6)
MCH RBC QN AUTO: 31.4 PG (ref 27–31)
MCHC RBC AUTO-ENTMCNC: 33 G/DL (ref 33–36)
MCV RBC AUTO: 95.4 FL (ref 80–94)
MONOCYTES NFR BLD MANUAL: 0.07 X10(3)/MCL (ref 0.1–1.3)
MONOCYTES NFR BLD MANUAL: 2 % (ref 2–11)
MV MEAN GRADIENT: 3 MMHG
MV PEAK A VEL: 1.3 M/S
MV PEAK E VEL: 1.08 M/S
MV PEAK GRADIENT: 6 MMHG
MV STENOSIS PRESSURE HALF TIME: 40.21 MS
MV VALVE AREA BY CONTINUITY EQUATION: 2.26 CM2
MV VALVE AREA P 1/2 METHOD: 5.47 CM2
NEUTROPHILS NFR BLD MANUAL: 57 % (ref 47–80)
NRBC BLD AUTO-RTO: 2.8 %
NRBC BLD MANUAL-RTO: 3 %
OHS LV EJECTION FRACTION SIMPSONS BIPLANE MOD: 67 %
PHOSPHATE SERPL-MCNC: 4.7 MG/DL (ref 2.3–4.7)
PISA TR MAX VEL: 1.97 M/S
PLATELET # BLD AUTO: 33 X10(3)/MCL (ref 130–400)
PLATELET # BLD EST: ABNORMAL 10*3/UL
PLATELETS.RETICULATED NFR BLD AUTO: 9.7 % (ref 0.9–11.2)
PMV BLD AUTO: ABNORMAL FL
POCT GLUCOSE: 228 MG/DL (ref 70–110)
POIKILOCYTOSIS BLD QL SMEAR: ABNORMAL
POLYCHROMASIA BLD QL SMEAR: ABNORMAL
POTASSIUM SERPL-SCNC: 3.9 MMOL/L (ref 3.5–5.1)
PROT SERPL-MCNC: 6.1 GM/DL (ref 6.4–8.3)
RA PRESSURE ESTIMATED: 8 MMHG
RBC # BLD AUTO: 2.8 X10(6)/MCL (ref 4.2–5.4)
RBC MORPH BLD: ABNORMAL
RIGHT VENTRICULAR END-DIASTOLIC DIMENSION: 3.27 CM
RV TB RVSP: 10 MMHG
SCHISTOCYTE (OLG): ABNORMAL
SODIUM SERPL-SCNC: 137 MMOL/L (ref 136–145)
TDI LATERAL: 0.09 M/S
TDI SEPTAL: 0.09 M/S
TDI: 0.09 M/S
TR MAX PG: 16 MMHG
TV REST PULMONARY ARTERY PRESSURE: 24 MMHG
WBC # SPEC AUTO: 3.27 X10(3)/MCL (ref 4.5–11.5)
Z-SCORE OF LEFT VENTRICULAR DIMENSION IN END DIASTOLE: -5.02
Z-SCORE OF LEFT VENTRICULAR DIMENSION IN END SYSTOLE: -3.41

## 2023-10-15 PROCEDURE — 96366 THER/PROPH/DIAG IV INF ADDON: CPT

## 2023-10-15 PROCEDURE — 96372 THER/PROPH/DIAG INJ SC/IM: CPT

## 2023-10-15 PROCEDURE — 80053 COMPREHEN METABOLIC PANEL: CPT

## 2023-10-15 PROCEDURE — 94761 N-INVAS EAR/PLS OXIMETRY MLT: CPT

## 2023-10-15 PROCEDURE — 25000003 PHARM REV CODE 250

## 2023-10-15 PROCEDURE — G0378 HOSPITAL OBSERVATION PER HR: HCPCS

## 2023-10-15 PROCEDURE — 96376 TX/PRO/DX INJ SAME DRUG ADON: CPT

## 2023-10-15 PROCEDURE — 63600175 PHARM REV CODE 636 W HCPCS

## 2023-10-15 PROCEDURE — 84100 ASSAY OF PHOSPHORUS: CPT

## 2023-10-15 PROCEDURE — 83735 ASSAY OF MAGNESIUM: CPT

## 2023-10-15 PROCEDURE — 85027 COMPLETE CBC AUTOMATED: CPT

## 2023-10-15 PROCEDURE — 96365 THER/PROPH/DIAG IV INF INIT: CPT | Mod: 59

## 2023-10-15 RX ADMIN — MORPHINE SULFATE 2 MG: 2 INJECTION, SOLUTION INTRAMUSCULAR; INTRAVENOUS at 10:10

## 2023-10-15 RX ADMIN — LOSARTAN POTASSIUM 100 MG: 25 TABLET, FILM COATED ORAL at 08:10

## 2023-10-15 RX ADMIN — PIPERACILLIN AND TAZOBACTAM 4.5 G: 4; .5 INJECTION, POWDER, LYOPHILIZED, FOR SOLUTION INTRAVENOUS; PARENTERAL at 04:10

## 2023-10-15 RX ADMIN — INSULIN ASPART 4 UNITS: 100 INJECTION, SOLUTION INTRAVENOUS; SUBCUTANEOUS at 08:10

## 2023-10-15 RX ADMIN — ALLOPURINOL 300 MG: 100 TABLET ORAL at 08:10

## 2023-10-15 RX ADMIN — VANCOMYCIN HYDROCHLORIDE 1500 MG: 1.5 INJECTION, POWDER, LYOPHILIZED, FOR SOLUTION INTRAVENOUS at 03:10

## 2023-10-15 RX ADMIN — MORPHINE SULFATE 15 MG: 15 TABLET, EXTENDED RELEASE ORAL at 08:10

## 2023-10-15 RX ADMIN — ACYCLOVIR 400 MG: 400 TABLET ORAL at 08:10

## 2023-10-15 RX ADMIN — HYDROCORTISONE: 25 CREAM TOPICAL at 08:10

## 2023-10-15 RX ADMIN — BUSPIRONE HYDROCHLORIDE 5 MG: 5 TABLET ORAL at 08:10

## 2023-10-15 RX ADMIN — GABAPENTIN 300 MG: 300 CAPSULE ORAL at 08:10

## 2023-10-15 RX ADMIN — PANTOPRAZOLE SODIUM 40 MG: 40 TABLET, DELAYED RELEASE ORAL at 08:10

## 2023-10-15 RX ADMIN — AMLODIPINE BESYLATE 10 MG: 10 TABLET ORAL at 08:10

## 2023-10-15 RX ADMIN — ATORVASTATIN CALCIUM 40 MG: 40 TABLET, FILM COATED ORAL at 08:10

## 2023-10-15 RX ADMIN — ESCITALOPRAM OXALATE 10 MG: 10 TABLET ORAL at 08:10

## 2023-10-15 RX ADMIN — INSULIN ASPART 15 UNITS: 100 INJECTION, SOLUTION INTRAVENOUS; SUBCUTANEOUS at 08:10

## 2023-10-15 RX ADMIN — METOPROLOL TARTRATE 25 MG: 25 TABLET, FILM COATED ORAL at 08:10

## 2023-10-15 NOTE — DISCHARGE SUMMARY
[facility] Discharge Summary    Admitting Physician: Blake Ortiz MD  Attending Physician: Kim Snowden MD  Date of Admit: 10/12/2023  Date of Discharge: 10/15/2023    Condition: Stable  Outcome: Condition has improved and patient is now ready for discharge.  DISPOSITION: Home or Self Care        Discharge Diagnoses:     Patient Active Problem List   Diagnosis    CML (chronic myelocytic leukemia)    Diabetes mellitus    Severe obesity (BMI >= 40)    NAFLD (nonalcoholic fatty liver disease)    Hypertension    Tobacco user    Hypercholesterolemia    Hyperuricemia    Hypokalemia    Hepatosplenomegaly    Other headache syndrome    Fever    Anemia    Thrombocytopenia    Pancytopenia       Principal Problem:  Fever    Consultants and Procedures:     Consultants:  IP CONSULT TO HOSPITAL MEDICINE  PHARMACY TO DOSE VANCOMYCIN CONSULT  IP CONSULT TO SOCIAL WORK/CASE MANAGEMENT  IP CONSULT TO REGISTERED DIETITIAN/NUTRITIONIST    Procedures:   * No surgery found *      Brief Admission History:      Fela Ellison is a 57 yo F w Pmhx of CML s/p chemo, pancytopenia, DM2, HTN, HLD and NAFLD who presents to [facility] ED on 10/13/23 with complaints of fevers and progressive skin lesions. Patient is accompanied her  who translates for her as she is a Afghan speaker. Patient reports fevers with Tmax of 101.9 started yesterday and 1 x vomiting. Denies recent sick contacts. She also reports large, painful, dark skin lesions throughout her abdomen that started roughly 5 months ago. Lesions started on her right side with new lesions on the left side of her abdomen. U/S of these lesions earlier this week consistent with scar tissue 2/2 chemo.  also reports intermittent diaphoresis and HA for several months which the patient attributes to hyperglycemia. States she is compliant with medications. Patient is currently receiving chemo treatments, most recent session about 3 weeks ag    Hospital Course  "with Pertinent Findings:      Was worked up for fever of unknown origin.  She had blood cultures done on admission, urine culture that did not reflex to culture but was culture given high-risk.  Blood cultures and urine cultures were negative to date.  She was transfused 2 units of packed red blood cells given her drop in hemoglobin to 6.9.  She was started on her home pain management regimen.  CT abdomen pelvis showed hepatic steatosis with no acute abnormalities.  She is to follow up outpatient with her PCP, Hematology/Oncology as scheduled.  She was given ED precautions.      At this time, Fela Ellison is determined to have maximized benefits of IP hospitalization. she is discharged in stable condition with OP f/u recommendations and instructions. All questions answered, and patient and family verbalized agreement with the POC. They were given return precautions prior to d/c including symptoms that should prompt return to ED or to call PCP. Total time spent of DC of 35 minutes.     Discharge physical exam:  Vitals  BP: (!) 152/82  Temp: 99.1 °F (37.3 °C)  Temp Source: Oral  Pulse: 83  Resp: 18  SpO2: (!) 93 %  Height: 5' 5" (165.1 cm)  Weight: 114.8 kg (253 lb 1.6 oz)    General: Well nourished w/o distress, obese  Neck: Full ROM; no lymphadenopathy  Pulm: CTA bilaterally, normal work of breathing  CV: S1, S2 w/ systolic murmur; no edema noted  GI: Soft with normal bowel sounds in all quadrants, no masses on palpation  MSK: Full ROM of all extremities and spine w/o limitation or discomfort  Derm:  Erythema on left-sided abdomen with underlying induration.  Multiple sites of erythema on abdomen with induration given history of prior chemotherapy injection sites  Neuro: AAOx4; CN II-XII intact; motor/sensory function intact      Discharge Medications:         Medication List        CONTINUE taking these medications      acyclovir 400 MG tablet  Commonly known as: ZOVIRAX  Take 1 tablet (400 mg total) " by mouth 2 (two) times daily.     albuterol 90 mcg/actuation inhaler  Commonly known as: PROVENTIL/VENTOLIN HFA     allopurinoL 300 MG tablet  Commonly known as: ZYLOPRIM     ALPRAZolam 0.25 MG tablet  Commonly known as: XANAX     amLODIPine 10 MG tablet  Commonly known as: NORVASC     ammonium lactate 12 % Crea     atorvastatin 40 MG tablet  Commonly known as: LIPITOR     busPIRone 5 MG Tab  Commonly known as: BUSPAR  Take 1 tablet (5 mg total) by mouth 2 (two) times daily.     EScitalopram oxalate 10 MG tablet  Commonly known as: LEXAPRO  Take 1 tablet (10 mg total) by mouth once daily.     furosemide 20 MG tablet  Commonly known as: LASIX  Take 1 tablet (20 mg total) by mouth once daily.     gabapentin 300 MG capsule  Commonly known as: NEURONTIN  Take 2 capsules TID.     HYDROcodone-acetaminophen 5-325 mg per tablet  Commonly known as: NORCO     hydrocortisone 2.5 % cream     hydrOXYzine pamoate 25 MG Cap  Commonly known as: VISTARIL  Take 1 capsule (25 mg total) by mouth every 8 (eight) hours as needed.     ibuprofen 600 MG tablet  Commonly known as: ADVIL,MOTRIN     ICLUSIG 30 mg Tab  Generic drug: PONATinib     insulin lispro 100 unit/mL injection  Inject 22 Units into the skin 3 (three) times daily before meals. Takes 22 units TID AC while on chemo - (Gives between 12 to 15 units TID AC when not on chemo)     ketoconazole 2 % cream  Commonly known as: NIZORAL     loratadine 10 mg tablet  Commonly known as: CLARITIN  Take 1 tablet (10 mg total) by mouth once daily.     losartan 25 MG tablet  Commonly known as: COZAAR  Take 1 tablet (25 mg total) by mouth once daily.     metFORMIN 1000 MG tablet  Commonly known as: GLUCOPHAGE     metoprolol tartrate 25 MG tablet  Commonly known as: LOPRESSOR     montelukast 10 mg tablet  Commonly known as: SINGULAIR     NovoLIN N NPH U-100 Insulin 100 unit/mL injection  Generic drug: insulin NPH     omeprazole 40 MG capsule  Commonly known as: PRILOSEC     ondansetron 4 MG  Tbdl  Commonly known as: ZOFRAN-ODT     silver sulfADIAZINE 1% 1 % cream  Commonly known as: SILVADENE     venetoclax 100 mg Tab  Commonly known as: VENCLEXTA              Discharge Instructions:         Fela Ellison is being discharged Home or Self Care.    No discharge procedures on file.     Follow-Up Appointments:   Follow-up Information       Bar Trevino DO. Schedule an appointment as soon as possible for a visit in 2 week(s).    Specialty: Internal Medicine  Contact information:  [address] [facility] [address] [phone]               [facility] - Emergency Dept. Go to.    Specialty: Emergency Medicine  Why: If symptoms worsen  Contact information:  [address] [facility] [address] [phone]             Hermelinda Burns DO. Go in 2 week(s).    Specialty: Internal Medicine  Contact information:  [address] [facility] [address]  [phone]               Jason Nation MD. Go in 2 week(s).    Specialty: Hematology and Oncology  Contact information:  [address] [facility] [address]  [phone]                               TIME SPENT ON DISCHARGE: 35 minutes      Reza Brito DO  [facility] PGY-II

## 2023-10-16 LAB — BACTERIA UR CULT: NO GROWTH

## 2023-10-18 LAB
BACTERIA BLD CULT: NORMAL
BACTERIA BLD CULT: NORMAL

## 2023-10-20 ENCOUNTER — LAB VISIT (OUTPATIENT)
Dept: HEMATOLOGY/ONCOLOGY | Facility: CLINIC | Age: 58
End: 2023-10-20

## 2023-10-20 ENCOUNTER — TELEPHONE (OUTPATIENT)
Dept: HEMATOLOGY/ONCOLOGY | Facility: CLINIC | Age: 58
End: 2023-10-20

## 2023-10-20 ENCOUNTER — INFUSION (OUTPATIENT)
Dept: INFUSION THERAPY | Facility: HOSPITAL | Age: 58
End: 2023-10-20
Attending: INTERNAL MEDICINE

## 2023-10-20 VITALS
DIASTOLIC BLOOD PRESSURE: 72 MMHG | TEMPERATURE: 99 F | WEIGHT: 254 LBS | SYSTOLIC BLOOD PRESSURE: 163 MMHG | RESPIRATION RATE: 20 BRPM | BODY MASS INDEX: 42.26 KG/M2 | HEART RATE: 83 BPM | OXYGEN SATURATION: 95 %

## 2023-10-20 DIAGNOSIS — C92.10 BLAST CRISIS PHASE OF CHRONIC MYELOID LEUKEMIA: ICD-10-CM

## 2023-10-20 DIAGNOSIS — D64.9 SYMPTOMATIC ANEMIA: ICD-10-CM

## 2023-10-20 DIAGNOSIS — D69.6 THROMBOCYTOPENIA: ICD-10-CM

## 2023-10-20 DIAGNOSIS — E83.52 HYPERCALCEMIA: Primary | ICD-10-CM

## 2023-10-20 DIAGNOSIS — C92.10 CHRONIC MYELOID LEUKEMIA: Primary | ICD-10-CM

## 2023-10-20 LAB
ABS NEUT CALC (OHS): 2.01 X10(3)/MCL (ref 2.1–9.2)
ALBUMIN SERPL-MCNC: 2.9 G/DL (ref 3.5–5)
ALBUMIN/GLOB SERPL: 0.8 RATIO (ref 1.1–2)
ALP SERPL-CCNC: 69 UNIT/L (ref 40–150)
ALT SERPL-CCNC: 16 UNIT/L (ref 0–55)
ANISOCYTOSIS BLD QL SMEAR: ABNORMAL
AST SERPL-CCNC: 18 UNIT/L (ref 5–34)
BILIRUB SERPL-MCNC: 0.6 MG/DL
BUN SERPL-MCNC: 15 MG/DL (ref 9.8–20.1)
CALCIUM SERPL-MCNC: 10.9 MG/DL (ref 8.4–10.2)
CHLORIDE SERPL-SCNC: 100 MMOL/L (ref 98–107)
CO2 SERPL-SCNC: 30 MMOL/L (ref 22–29)
CREAT SERPL-MCNC: 0.86 MG/DL (ref 0.55–1.02)
ERYTHROCYTE [DISTWIDTH] IN BLOOD BY AUTOMATED COUNT: 17.9 % (ref 11.5–17)
GFR SERPLBLD CREATININE-BSD FMLA CKD-EPI: >60 MLS/MIN/1.73/M2
GLOBULIN SER-MCNC: 3.7 GM/DL (ref 2.4–3.5)
GLUCOSE SERPL-MCNC: 220 MG/DL (ref 74–100)
HCT VFR BLD AUTO: 27.2 % (ref 37–47)
HGB BLD-MCNC: 8.4 G/DL (ref 12–16)
HYPOCHROMIA BLD QL SMEAR: ABNORMAL
LYMPHOCYTES NFR BLD MANUAL: 1.31 X10(3)/MCL
LYMPHOCYTES NFR BLD MANUAL: 36 % (ref 13–40)
MCH RBC QN AUTO: 30.2 PG (ref 27–31)
MCHC RBC AUTO-ENTMCNC: 30.9 G/DL (ref 33–36)
MCV RBC AUTO: 97.8 FL (ref 80–94)
MONOCYTES NFR BLD MANUAL: 0.33 X10(3)/MCL (ref 0.1–1.3)
MONOCYTES NFR BLD MANUAL: 9 % (ref 2–11)
NEUTROPHILS NFR BLD MANUAL: 55 % (ref 47–80)
NRBC BLD AUTO-RTO: 1.9 %
PLATELET # BLD AUTO: 36 X10(3)/MCL (ref 130–400)
PLATELET # BLD EST: ABNORMAL 10*3/UL
PLATELETS.RETICULATED NFR BLD AUTO: 11 % (ref 0.9–11.2)
PMV BLD AUTO: ABNORMAL FL
POIKILOCYTOSIS BLD QL SMEAR: ABNORMAL
POTASSIUM SERPL-SCNC: 4.1 MMOL/L (ref 3.5–5.1)
PROT SERPL-MCNC: 6.6 GM/DL (ref 6.4–8.3)
RBC # BLD AUTO: 2.78 X10(6)/MCL (ref 4.2–5.4)
SODIUM SERPL-SCNC: 142 MMOL/L (ref 136–145)
WBC # SPEC AUTO: 3.65 X10(3)/MCL (ref 4.5–11.5)

## 2023-10-20 PROCEDURE — 25000003 PHARM REV CODE 250: Performed by: INTERNAL MEDICINE

## 2023-10-20 PROCEDURE — 80053 COMPREHEN METABOLIC PANEL: CPT

## 2023-10-20 PROCEDURE — 36415 COLL VENOUS BLD VENIPUNCTURE: CPT

## 2023-10-20 PROCEDURE — 96360 HYDRATION IV INFUSION INIT: CPT

## 2023-10-20 PROCEDURE — 85027 COMPLETE CBC AUTOMATED: CPT

## 2023-10-20 RX ADMIN — SODIUM CHLORIDE 1000 ML: 9 INJECTION, SOLUTION INTRAVENOUS at 10:10

## 2023-10-20 NOTE — NURSING
1000 Patient is here via wheelchair, but ambulated to room, accompanied by her spouse. Here for labs, IV fluids for elevated calcium .

## 2023-10-23 ENCOUNTER — APPOINTMENT (OUTPATIENT)
Dept: HEMATOLOGY/ONCOLOGY | Facility: CLINIC | Age: 58
End: 2023-10-23

## 2023-10-23 DIAGNOSIS — C92.10 CHRONIC MYELOID LEUKEMIA: ICD-10-CM

## 2023-10-23 LAB
ABS NEUT CALC (OHS): 2.26 X10(3)/MCL (ref 2.1–9.2)
ALBUMIN SERPL-MCNC: 2.8 G/DL (ref 3.5–5)
ALBUMIN/GLOB SERPL: 0.7 RATIO (ref 1.1–2)
ALP SERPL-CCNC: 66 UNIT/L (ref 40–150)
ALT SERPL-CCNC: 25 UNIT/L (ref 0–55)
ANISOCYTOSIS BLD QL SMEAR: ABNORMAL
AST SERPL-CCNC: 19 UNIT/L (ref 5–34)
BILIRUB SERPL-MCNC: 0.5 MG/DL
BUN SERPL-MCNC: 12.6 MG/DL (ref 9.8–20.1)
CALCIUM SERPL-MCNC: 10.3 MG/DL (ref 8.4–10.2)
CHLORIDE SERPL-SCNC: 101 MMOL/L (ref 98–107)
CO2 SERPL-SCNC: 29 MMOL/L (ref 22–29)
CREAT SERPL-MCNC: 0.8 MG/DL (ref 0.55–1.02)
ERYTHROCYTE [DISTWIDTH] IN BLOOD BY AUTOMATED COUNT: 18.1 % (ref 11.5–17)
GFR SERPLBLD CREATININE-BSD FMLA CKD-EPI: >60 MLS/MIN/1.73/M2
GLOBULIN SER-MCNC: 3.9 GM/DL (ref 2.4–3.5)
GLUCOSE SERPL-MCNC: 161 MG/DL (ref 74–100)
HCT VFR BLD AUTO: 26.2 % (ref 37–47)
HGB BLD-MCNC: 8.4 G/DL (ref 12–16)
LYMPHOCYTES NFR BLD MANUAL: 1.41 X10(3)/MCL
LYMPHOCYTES NFR BLD MANUAL: 35 % (ref 13–40)
MACROCYTES BLD QL SMEAR: ABNORMAL
MCH RBC QN AUTO: 31.8 PG (ref 27–31)
MCHC RBC AUTO-ENTMCNC: 32.1 G/DL (ref 33–36)
MCV RBC AUTO: 99.2 FL (ref 80–94)
METAMYELOCYTES NFR BLD MANUAL: 1 %
MONOCYTES NFR BLD MANUAL: 0.32 X10(3)/MCL (ref 0.1–1.3)
MONOCYTES NFR BLD MANUAL: 8 % (ref 2–11)
NEUTROPHILS NFR BLD MANUAL: 56 % (ref 47–80)
NRBC BLD AUTO-RTO: 2 %
NRBC BLD MANUAL-RTO: 1 %
PLATELET # BLD AUTO: 38 X10(3)/MCL (ref 130–400)
PLATELET # BLD EST: ABNORMAL 10*3/UL
PLATELETS.RETICULATED NFR BLD AUTO: 10.8 % (ref 0.9–11.2)
PMV BLD AUTO: ABNORMAL FL
POLYCHROMASIA BLD QL SMEAR: SLIGHT
POTASSIUM SERPL-SCNC: 4 MMOL/L (ref 3.5–5.1)
PROT SERPL-MCNC: 6.7 GM/DL (ref 6.4–8.3)
RBC # BLD AUTO: 2.64 X10(6)/MCL (ref 4.2–5.4)
RBC MORPH BLD: ABNORMAL
SODIUM SERPL-SCNC: 141 MMOL/L (ref 136–145)
STIPPLED RBC (OHS): SLIGHT
WBC # SPEC AUTO: 4.04 X10(3)/MCL (ref 4.5–11.5)

## 2023-10-23 PROCEDURE — 85027 COMPLETE CBC AUTOMATED: CPT

## 2023-10-23 PROCEDURE — 36415 COLL VENOUS BLD VENIPUNCTURE: CPT

## 2023-10-23 PROCEDURE — 80053 COMPREHEN METABOLIC PANEL: CPT

## 2023-11-01 DIAGNOSIS — C92.10 CHRONIC MYELOID LEUKEMIA: Primary | ICD-10-CM

## 2023-11-02 ENCOUNTER — OFFICE VISIT (OUTPATIENT)
Dept: HEMATOLOGY/ONCOLOGY | Facility: CLINIC | Age: 58
End: 2023-11-02

## 2023-11-02 VITALS
OXYGEN SATURATION: 95 % | WEIGHT: 242.63 LBS | SYSTOLIC BLOOD PRESSURE: 126 MMHG | HEIGHT: 65 IN | BODY MASS INDEX: 40.43 KG/M2 | DIASTOLIC BLOOD PRESSURE: 67 MMHG | HEART RATE: 74 BPM | RESPIRATION RATE: 20 BRPM | TEMPERATURE: 99 F

## 2023-11-02 DIAGNOSIS — L72.9 SKIN CYSTS, GENERALIZED: ICD-10-CM

## 2023-11-02 DIAGNOSIS — D64.9 ANEMIA, UNSPECIFIED TYPE: ICD-10-CM

## 2023-11-02 DIAGNOSIS — D69.6 THROMBOCYTOPENIA: ICD-10-CM

## 2023-11-02 DIAGNOSIS — C92.00 ACUTE MYELOID LEUKEMIA NOT HAVING ACHIEVED REMISSION: Primary | ICD-10-CM

## 2023-11-02 DIAGNOSIS — C92.10 BLAST CRISIS PHASE OF CHRONIC MYELOID LEUKEMIA: ICD-10-CM

## 2023-11-02 PROCEDURE — 99214 PR OFFICE/OUTPT VISIT, EST, LEVL IV, 30-39 MIN: ICD-10-PCS | Mod: S$PBB,,, | Performed by: NURSE PRACTITIONER

## 2023-11-02 PROCEDURE — 99214 OFFICE O/P EST MOD 30 MIN: CPT | Mod: S$PBB,,, | Performed by: NURSE PRACTITIONER

## 2023-11-02 PROCEDURE — 99214 OFFICE O/P EST MOD 30 MIN: CPT | Mod: PBBFAC | Performed by: NURSE PRACTITIONER

## 2023-11-02 NOTE — Clinical Note
RTC in 4 weeks with NP, cbc, cmp and magnesium done prior  Continue with weekly labs, next due 11/9/2023.

## 2023-11-05 ENCOUNTER — HOSPITAL ENCOUNTER (INPATIENT)
Facility: HOSPITAL | Age: 58
LOS: 3 days | Discharge: HOME | DRG: 864 | End: 2023-11-08
Attending: EMERGENCY MEDICINE | Admitting: INTERNAL MEDICINE
Payer: MEDICAID

## 2023-11-05 DIAGNOSIS — D69.6 THROMBOCYTOPENIA: ICD-10-CM

## 2023-11-05 DIAGNOSIS — Z92.25 PERSONAL HISTORY OF IMMUNOSUPRESSION THERAPY: ICD-10-CM

## 2023-11-05 DIAGNOSIS — L03.90 CELLULITIS, UNSPECIFIED CELLULITIS SITE: ICD-10-CM

## 2023-11-05 DIAGNOSIS — R10.9 ABDOMINAL PAIN, UNSPECIFIED ABDOMINAL LOCATION: Primary | ICD-10-CM

## 2023-11-05 DIAGNOSIS — D61.818 PANCYTOPENIA: ICD-10-CM

## 2023-11-05 DIAGNOSIS — R50.9 FEVER: ICD-10-CM

## 2023-11-05 LAB
ABS NEUT CALC (OHS): 2.83 X10(3)/MCL (ref 2.1–9.2)
ALBUMIN SERPL-MCNC: 2.4 G/DL (ref 3.5–5)
ALBUMIN/GLOB SERPL: 0.7 RATIO (ref 1.1–2)
ALP SERPL-CCNC: 71 UNIT/L (ref 40–150)
ALT SERPL-CCNC: 32 UNIT/L (ref 0–55)
ANISOCYTOSIS BLD QL SMEAR: SLIGHT
APPEARANCE UR: CLEAR
AST SERPL-CCNC: 17 UNIT/L (ref 5–34)
B-HCG SERPL QL: 4 %
BACTERIA #/AREA URNS AUTO: ABNORMAL /HPF
BASOPHILS # BLD AUTO: 0.01 X10(3)/MCL
BASOPHILS NFR BLD AUTO: 0.2 %
BILIRUB SERPL-MCNC: 0.4 MG/DL
BILIRUB UR QL STRIP.AUTO: NEGATIVE
BUN SERPL-MCNC: 10.1 MG/DL (ref 9.8–20.1)
CALCIUM SERPL-MCNC: 8 MG/DL (ref 8.4–10.2)
CHLORIDE SERPL-SCNC: 102 MMOL/L (ref 98–107)
CO2 SERPL-SCNC: 23 MMOL/L (ref 22–29)
COLOR UR AUTO: ABNORMAL
CREAT SERPL-MCNC: 0.82 MG/DL (ref 0.55–1.02)
CRP SERPL-MCNC: 121 MG/L
EOSINOPHIL # BLD AUTO: 0 X10(3)/MCL (ref 0–0.9)
EOSINOPHIL NFR BLD AUTO: 0 %
ERYTHROCYTE [DISTWIDTH] IN BLOOD BY AUTOMATED COUNT: 18.1 % (ref 11.5–17)
FLUAV AG UPPER RESP QL IA.RAPID: NOT DETECTED
FLUBV AG UPPER RESP QL IA.RAPID: NOT DETECTED
GFR SERPLBLD CREATININE-BSD FMLA CKD-EPI: >60 MLS/MIN/1.73/M2
GLOBULIN SER-MCNC: 3.5 GM/DL (ref 2.4–3.5)
GLUCOSE SERPL-MCNC: 324 MG/DL (ref 74–100)
GLUCOSE UR QL STRIP.AUTO: ABNORMAL
HCT VFR BLD AUTO: 20.5 % (ref 37–47)
HGB BLD-MCNC: 6.6 G/DL (ref 12–16)
HOLD SPECIMEN: NORMAL
HYALINE CASTS #/AREA URNS LPF: ABNORMAL /LPF
HYPOCHROMIA BLD QL SMEAR: ABNORMAL
IMM GRANULOCYTES # BLD AUTO: 0.12 X10(3)/MCL (ref 0–0.04)
IMM GRANULOCYTES NFR BLD AUTO: 2.2 %
KETONES UR QL STRIP.AUTO: NEGATIVE
LACTATE SERPL-SCNC: 1.9 MMOL/L (ref 0.5–2.2)
LEUKOCYTE ESTERASE UR QL STRIP.AUTO: NEGATIVE
LIPASE SERPL-CCNC: 23 U/L
LYMPHOCYTES # BLD AUTO: 1.7 X10(3)/MCL (ref 0.6–4.6)
LYMPHOCYTES NFR BLD AUTO: 31.3 %
LYMPHOCYTES NFR BLD MANUAL: 1.96 X10(3)/MCL
LYMPHOCYTES NFR BLD MANUAL: 36 % (ref 13–40)
MAGNESIUM SERPL-MCNC: 1.6 MG/DL (ref 1.6–2.6)
MCH RBC QN AUTO: 30.6 PG (ref 27–31)
MCHC RBC AUTO-ENTMCNC: 32.2 G/DL (ref 33–36)
MCV RBC AUTO: 94.9 FL (ref 80–94)
METAMYELOCYTES NFR BLD MANUAL: 2 %
MONOCYTES # BLD AUTO: 0.74 X10(3)/MCL (ref 0.1–1.3)
MONOCYTES NFR BLD AUTO: 13.6 %
MONOCYTES NFR BLD MANUAL: 0.33 X10(3)/MCL (ref 0.1–1.3)
MONOCYTES NFR BLD MANUAL: 6 % (ref 2–11)
MUCOUS THREADS URNS QL MICRO: ABNORMAL /LPF
NEUTROPHILS # BLD AUTO: 2.87 X10(3)/MCL (ref 2.1–9.2)
NEUTROPHILS NFR BLD AUTO: 52.7 %
NEUTROPHILS NFR BLD MANUAL: 52 % (ref 47–80)
NITRITE UR QL STRIP.AUTO: NEGATIVE
NRBC BLD AUTO-RTO: 2.8 %
OVALOCYTES (OLG): ABNORMAL
PH UR STRIP.AUTO: 6 [PH]
PLATELET # BLD AUTO: 27 X10(3)/MCL (ref 130–400)
PLATELET # BLD EST: ABNORMAL 10*3/UL
PLATELETS.RETICULATED NFR BLD AUTO: 11.5 % (ref 0.9–11.2)
PMV BLD AUTO: ABNORMAL FL
POTASSIUM SERPL-SCNC: 3.8 MMOL/L (ref 3.5–5.1)
PROT SERPL-MCNC: 5.9 GM/DL (ref 6.4–8.3)
PROT UR QL STRIP.AUTO: NEGATIVE
RBC # BLD AUTO: 2.16 X10(6)/MCL (ref 4.2–5.4)
RBC #/AREA URNS AUTO: ABNORMAL /HPF
RBC MORPH BLD: ABNORMAL
RBC UR QL AUTO: NEGATIVE
SARS-COV-2 RNA RESP QL NAA+PROBE: NOT DETECTED
SCHISTOCYTE (OLG): SLIGHT
SODIUM SERPL-SCNC: 135 MMOL/L (ref 136–145)
SP GR UR STRIP.AUTO: 1.01 (ref 1–1.03)
SQUAMOUS #/AREA URNS LPF: ABNORMAL /HPF
TEAR DROP CELL (OLG): SLIGHT
UROBILINOGEN UR STRIP-ACNC: NORMAL
WBC # SPEC AUTO: 5.44 X10(3)/MCL (ref 4.5–11.5)
WBC #/AREA URNS AUTO: ABNORMAL /HPF

## 2023-11-05 PROCEDURE — 81001 URINALYSIS AUTO W/SCOPE: CPT | Performed by: EMERGENCY MEDICINE

## 2023-11-05 PROCEDURE — 25000003 PHARM REV CODE 250: Performed by: EMERGENCY MEDICINE

## 2023-11-05 PROCEDURE — 86140 C-REACTIVE PROTEIN: CPT | Performed by: EMERGENCY MEDICINE

## 2023-11-05 PROCEDURE — 99285 EMERGENCY DEPT VISIT HI MDM: CPT | Mod: 25

## 2023-11-05 PROCEDURE — 85027 COMPLETE CBC AUTOMATED: CPT | Performed by: EMERGENCY MEDICINE

## 2023-11-05 PROCEDURE — 25500020 PHARM REV CODE 255

## 2023-11-05 PROCEDURE — 83690 ASSAY OF LIPASE: CPT | Performed by: EMERGENCY MEDICINE

## 2023-11-05 PROCEDURE — 83735 ASSAY OF MAGNESIUM: CPT | Performed by: EMERGENCY MEDICINE

## 2023-11-05 PROCEDURE — 96361 HYDRATE IV INFUSION ADD-ON: CPT

## 2023-11-05 PROCEDURE — 96365 THER/PROPH/DIAG IV INF INIT: CPT

## 2023-11-05 PROCEDURE — 83605 ASSAY OF LACTIC ACID: CPT | Performed by: EMERGENCY MEDICINE

## 2023-11-05 PROCEDURE — 63600175 PHARM REV CODE 636 W HCPCS: Performed by: EMERGENCY MEDICINE

## 2023-11-05 PROCEDURE — 80053 COMPREHEN METABOLIC PANEL: CPT | Performed by: EMERGENCY MEDICINE

## 2023-11-05 PROCEDURE — 87040 BLOOD CULTURE FOR BACTERIA: CPT | Performed by: EMERGENCY MEDICINE

## 2023-11-05 PROCEDURE — 11000001 HC ACUTE MED/SURG PRIVATE ROOM

## 2023-11-05 PROCEDURE — 0240U COVID/FLU A&B PCR: CPT | Performed by: EMERGENCY MEDICINE

## 2023-11-05 RX ADMIN — IOHEXOL 100 ML: 350 INJECTION, SOLUTION INTRAVENOUS at 09:11

## 2023-11-05 RX ADMIN — PIPERACILLIN AND TAZOBACTAM 4.5 G: 4; .5 INJECTION, POWDER, LYOPHILIZED, FOR SOLUTION INTRAVENOUS; PARENTERAL at 11:11

## 2023-11-05 RX ADMIN — VANCOMYCIN HYDROCHLORIDE 2000 MG: 1 INJECTION, POWDER, LYOPHILIZED, FOR SOLUTION INTRAVENOUS at 11:11

## 2023-11-05 RX ADMIN — SODIUM CHLORIDE 1000 ML: 9 INJECTION, SOLUTION INTRAVENOUS at 09:11

## 2023-11-06 LAB
ABO + RH BLD: NORMAL
ABO + RH BLD: NORMAL
BLD PROD TYP BPU: NORMAL
BLD PROD TYP BPU: NORMAL
BLOOD UNIT EXPIRATION DATE: NORMAL
BLOOD UNIT EXPIRATION DATE: NORMAL
BLOOD UNIT TYPE CODE: 7300
BLOOD UNIT TYPE CODE: 7300
CROSSMATCH INTERPRETATION: NORMAL
CROSSMATCH INTERPRETATION: NORMAL
DISPENSE STATUS: NORMAL
DISPENSE STATUS: NORMAL
EST. AVERAGE GLUCOSE BLD GHB EST-MCNC: 174.3 MG/DL
GROUP & RH: NORMAL
HBA1C MFR BLD: 7.7 %
INDIRECT COOMBS GEL: NORMAL
POCT GLUCOSE: 266 MG/DL (ref 70–110)
POCT GLUCOSE: 284 MG/DL (ref 70–110)
POCT GLUCOSE: 307 MG/DL (ref 70–110)
SPECIMEN OUTDATE: NORMAL
UNIT NUMBER: NORMAL
UNIT NUMBER: NORMAL

## 2023-11-06 PROCEDURE — 86920 COMPATIBILITY TEST SPIN: CPT

## 2023-11-06 PROCEDURE — 82962 GLUCOSE BLOOD TEST: CPT

## 2023-11-06 PROCEDURE — 25000003 PHARM REV CODE 250

## 2023-11-06 PROCEDURE — 63600175 PHARM REV CODE 636 W HCPCS

## 2023-11-06 PROCEDURE — P9040 RBC LEUKOREDUCED IRRADIATED: HCPCS

## 2023-11-06 PROCEDURE — 83036 HEMOGLOBIN GLYCOSYLATED A1C: CPT

## 2023-11-06 PROCEDURE — 96372 THER/PROPH/DIAG INJ SC/IM: CPT

## 2023-11-06 PROCEDURE — 96367 TX/PROPH/DG ADDL SEQ IV INF: CPT

## 2023-11-06 PROCEDURE — G0378 HOSPITAL OBSERVATION PER HR: HCPCS

## 2023-11-06 PROCEDURE — 36430 TRANSFUSION BLD/BLD COMPNT: CPT

## 2023-11-06 PROCEDURE — 11000001 HC ACUTE MED/SURG PRIVATE ROOM

## 2023-11-06 PROCEDURE — 86850 RBC ANTIBODY SCREEN: CPT

## 2023-11-06 PROCEDURE — 99900035 HC TECH TIME PER 15 MIN (STAT)

## 2023-11-06 PROCEDURE — 96366 THER/PROPH/DIAG IV INF ADDON: CPT

## 2023-11-06 RX ORDER — GLUCAGON 1 MG
1 KIT INJECTION
Status: DISCONTINUED | OUTPATIENT
Start: 2023-11-06 | End: 2023-11-08 | Stop reason: HOSPADM

## 2023-11-06 RX ORDER — ALPRAZOLAM 0.25 MG/1
0.25 TABLET ORAL 3 TIMES DAILY PRN
Status: DISCONTINUED | OUTPATIENT
Start: 2023-11-06 | End: 2023-11-08 | Stop reason: HOSPADM

## 2023-11-06 RX ORDER — PANTOPRAZOLE SODIUM 40 MG/1
40 TABLET, DELAYED RELEASE ORAL DAILY
Status: DISCONTINUED | OUTPATIENT
Start: 2023-11-06 | End: 2023-11-08 | Stop reason: HOSPADM

## 2023-11-06 RX ORDER — IBUPROFEN 200 MG
16 TABLET ORAL
Status: DISCONTINUED | OUTPATIENT
Start: 2023-11-06 | End: 2023-11-08 | Stop reason: HOSPADM

## 2023-11-06 RX ORDER — ACYCLOVIR 400 MG/1
400 TABLET ORAL 2 TIMES DAILY
Status: DISCONTINUED | OUTPATIENT
Start: 2023-11-06 | End: 2023-11-08 | Stop reason: HOSPADM

## 2023-11-06 RX ORDER — ONDANSETRON 4 MG/1
8 TABLET, ORALLY DISINTEGRATING ORAL EVERY 8 HOURS PRN
Status: DISCONTINUED | OUTPATIENT
Start: 2023-11-06 | End: 2023-11-08 | Stop reason: HOSPADM

## 2023-11-06 RX ORDER — ACETAMINOPHEN 325 MG/1
650 TABLET ORAL EVERY 4 HOURS PRN
Status: DISCONTINUED | OUTPATIENT
Start: 2023-11-06 | End: 2023-11-08 | Stop reason: HOSPADM

## 2023-11-06 RX ORDER — FUROSEMIDE 20 MG/1
20 TABLET ORAL DAILY
Status: DISCONTINUED | OUTPATIENT
Start: 2023-11-06 | End: 2023-11-08 | Stop reason: HOSPADM

## 2023-11-06 RX ORDER — METOPROLOL TARTRATE 25 MG/1
25 TABLET, FILM COATED ORAL 2 TIMES DAILY
Status: DISCONTINUED | OUTPATIENT
Start: 2023-11-06 | End: 2023-11-08 | Stop reason: HOSPADM

## 2023-11-06 RX ORDER — GABAPENTIN 300 MG/1
300 CAPSULE ORAL 3 TIMES DAILY
Status: DISCONTINUED | OUTPATIENT
Start: 2023-11-06 | End: 2023-11-08 | Stop reason: HOSPADM

## 2023-11-06 RX ORDER — INSULIN ASPART 100 [IU]/ML
0-15 INJECTION, SOLUTION INTRAVENOUS; SUBCUTANEOUS
Status: DISCONTINUED | OUTPATIENT
Start: 2023-11-06 | End: 2023-11-08 | Stop reason: HOSPADM

## 2023-11-06 RX ORDER — LOSARTAN POTASSIUM 25 MG/1
25 TABLET ORAL DAILY
Status: DISCONTINUED | OUTPATIENT
Start: 2023-11-06 | End: 2023-11-08 | Stop reason: HOSPADM

## 2023-11-06 RX ORDER — ALLOPURINOL 100 MG/1
300 TABLET ORAL DAILY
Status: DISCONTINUED | OUTPATIENT
Start: 2023-11-06 | End: 2023-11-08 | Stop reason: HOSPADM

## 2023-11-06 RX ORDER — AMLODIPINE BESYLATE 10 MG/1
10 TABLET ORAL DAILY
Status: DISCONTINUED | OUTPATIENT
Start: 2023-11-06 | End: 2023-11-08 | Stop reason: HOSPADM

## 2023-11-06 RX ORDER — HYDROCODONE BITARTRATE AND ACETAMINOPHEN 500; 5 MG/1; MG/1
TABLET ORAL
Status: DISCONTINUED | OUTPATIENT
Start: 2023-11-06 | End: 2023-11-08 | Stop reason: HOSPADM

## 2023-11-06 RX ORDER — MONTELUKAST SODIUM 5 MG/1
10 TABLET, CHEWABLE ORAL DAILY
Status: DISCONTINUED | OUTPATIENT
Start: 2023-11-06 | End: 2023-11-08 | Stop reason: HOSPADM

## 2023-11-06 RX ORDER — HYDROCODONE BITARTRATE AND ACETAMINOPHEN 5; 325 MG/1; MG/1
1 TABLET ORAL EVERY 6 HOURS PRN
Status: DISCONTINUED | OUTPATIENT
Start: 2023-11-06 | End: 2023-11-08 | Stop reason: HOSPADM

## 2023-11-06 RX ORDER — BUSPIRONE HYDROCHLORIDE 5 MG/1
5 TABLET ORAL 2 TIMES DAILY
Status: DISCONTINUED | OUTPATIENT
Start: 2023-11-06 | End: 2023-11-08 | Stop reason: HOSPADM

## 2023-11-06 RX ORDER — ALBUTEROL SULFATE 90 UG/1
2 AEROSOL, METERED RESPIRATORY (INHALATION) EVERY 6 HOURS PRN
Status: DISCONTINUED | OUTPATIENT
Start: 2023-11-06 | End: 2023-11-08 | Stop reason: HOSPADM

## 2023-11-06 RX ORDER — ENOXAPARIN SODIUM 100 MG/ML
40 INJECTION SUBCUTANEOUS EVERY 12 HOURS
Status: DISCONTINUED | OUTPATIENT
Start: 2023-11-06 | End: 2023-11-06

## 2023-11-06 RX ORDER — NALOXONE HCL 0.4 MG/ML
0.02 VIAL (ML) INJECTION
Status: DISCONTINUED | OUTPATIENT
Start: 2023-11-06 | End: 2023-11-08 | Stop reason: HOSPADM

## 2023-11-06 RX ORDER — ESCITALOPRAM OXALATE 10 MG/1
10 TABLET ORAL DAILY
Status: DISCONTINUED | OUTPATIENT
Start: 2023-11-06 | End: 2023-11-08 | Stop reason: HOSPADM

## 2023-11-06 RX ORDER — ATORVASTATIN CALCIUM 40 MG/1
40 TABLET, FILM COATED ORAL DAILY
Status: DISCONTINUED | OUTPATIENT
Start: 2023-11-06 | End: 2023-11-08 | Stop reason: HOSPADM

## 2023-11-06 RX ORDER — IBUPROFEN 200 MG
24 TABLET ORAL
Status: DISCONTINUED | OUTPATIENT
Start: 2023-11-06 | End: 2023-11-08 | Stop reason: HOSPADM

## 2023-11-06 RX ORDER — TALC
6 POWDER (GRAM) TOPICAL NIGHTLY PRN
Status: DISCONTINUED | OUTPATIENT
Start: 2023-11-06 | End: 2023-11-08 | Stop reason: HOSPADM

## 2023-11-06 RX ORDER — SODIUM CHLORIDE 0.9 % (FLUSH) 0.9 %
10 SYRINGE (ML) INJECTION EVERY 12 HOURS PRN
Status: DISCONTINUED | OUTPATIENT
Start: 2023-11-06 | End: 2023-11-08 | Stop reason: HOSPADM

## 2023-11-06 RX ADMIN — GABAPENTIN 300 MG: 300 CAPSULE ORAL at 09:11

## 2023-11-06 RX ADMIN — ACETAMINOPHEN 650 MG: 325 TABLET, FILM COATED ORAL at 04:11

## 2023-11-06 RX ADMIN — MONTELUKAST SODIUM 10 MG: 5 TABLET, CHEWABLE ORAL at 09:11

## 2023-11-06 RX ADMIN — ACYCLOVIR 400 MG: 400 TABLET ORAL at 09:11

## 2023-11-06 RX ADMIN — BUSPIRONE HYDROCHLORIDE 5 MG: 5 TABLET ORAL at 09:11

## 2023-11-06 RX ADMIN — ONDANSETRON 8 MG: 4 TABLET, ORALLY DISINTEGRATING ORAL at 07:11

## 2023-11-06 RX ADMIN — PANTOPRAZOLE SODIUM 40 MG: 40 TABLET, DELAYED RELEASE ORAL at 09:11

## 2023-11-06 RX ADMIN — PIPERACILLIN AND TAZOBACTAM 4.5 G: 4; .5 INJECTION, POWDER, LYOPHILIZED, FOR SOLUTION INTRAVENOUS; PARENTERAL at 06:11

## 2023-11-06 RX ADMIN — HYDROCODONE BITARTRATE AND ACETAMINOPHEN 1 TABLET: 5; 325 TABLET ORAL at 09:11

## 2023-11-06 RX ADMIN — VANCOMYCIN HYDROCHLORIDE 1000 MG: 1 INJECTION, POWDER, LYOPHILIZED, FOR SOLUTION INTRAVENOUS at 02:11

## 2023-11-06 RX ADMIN — INSULIN DETEMIR 25 UNITS: 100 INJECTION, SOLUTION SUBCUTANEOUS at 02:11

## 2023-11-06 RX ADMIN — HYDROCODONE BITARTRATE AND ACETAMINOPHEN 1 TABLET: 5; 325 TABLET ORAL at 03:11

## 2023-11-06 RX ADMIN — ATORVASTATIN CALCIUM 40 MG: 40 TABLET, FILM COATED ORAL at 09:11

## 2023-11-06 RX ADMIN — GABAPENTIN 300 MG: 300 CAPSULE ORAL at 03:11

## 2023-11-06 RX ADMIN — LOSARTAN POTASSIUM 25 MG: 25 TABLET, FILM COATED ORAL at 09:11

## 2023-11-06 RX ADMIN — ESCITALOPRAM OXALATE 10 MG: 10 TABLET ORAL at 09:11

## 2023-11-06 RX ADMIN — METOPROLOL TARTRATE 25 MG: 25 TABLET, FILM COATED ORAL at 09:11

## 2023-11-06 RX ADMIN — FUROSEMIDE 20 MG: 20 TABLET ORAL at 09:11

## 2023-11-06 RX ADMIN — PIPERACILLIN AND TAZOBACTAM 4.5 G: 4; .5 INJECTION, POWDER, LYOPHILIZED, FOR SOLUTION INTRAVENOUS; PARENTERAL at 11:11

## 2023-11-06 RX ADMIN — ALLOPURINOL 300 MG: 300 TABLET ORAL at 09:11

## 2023-11-06 RX ADMIN — PIPERACILLIN AND TAZOBACTAM 4.5 G: 4; .5 INJECTION, POWDER, LYOPHILIZED, FOR SOLUTION INTRAVENOUS; PARENTERAL at 04:11

## 2023-11-06 RX ADMIN — INSULIN DETEMIR 25 UNITS: 100 INJECTION, SOLUTION SUBCUTANEOUS at 09:11

## 2023-11-06 RX ADMIN — AMLODIPINE BESYLATE 10 MG: 10 TABLET ORAL at 09:11

## 2023-11-06 RX ADMIN — HYDROCODONE BITARTRATE AND ACETAMINOPHEN 1 TABLET: 5; 325 TABLET ORAL at 07:11

## 2023-11-06 RX ADMIN — INSULIN ASPART 9 UNITS: 100 INJECTION, SOLUTION INTRAVENOUS; SUBCUTANEOUS at 07:11

## 2023-11-06 RX ADMIN — INSULIN ASPART 9 UNITS: 100 INJECTION, SOLUTION INTRAVENOUS; SUBCUTANEOUS at 09:11

## 2023-11-06 RX ADMIN — INSULIN ASPART 9 UNITS: 100 INJECTION, SOLUTION INTRAVENOUS; SUBCUTANEOUS at 03:11

## 2023-11-06 NOTE — H&P
[facility]ards History & Physical Note     Resident Team: [facility] Medicine List 3  Attending Physician: Regis Cervantes, *    Date of Admit: 11/5/2023    Chief Complaint     Abdominal Pain (Pt presents to ed with reports of LUQ abd pain with fever x2 days. Pt states Hx of Leukemia with last chemo round from 9/5 - 9/12. Pt has been unable to do chemo since due to platelets being too low. Pt currently has fever of 101.8 oral at present. )      Subjective:      History of Present Illness:  Fela Ellison is a 58 y.o.  female who with a history of CML, DM, obesity, Nonalcoholic fatty liver disease, HTN, tobacco user, HLD, Hyperuricemia, pancytopenia who presented to [facility] ED on 11/5/2023  with a primary complaint of Abdominal Pain (Pt presents to ed with reports of LUQ abd pain with fever x2 days. Pt states Hx of Leukemia with last chemo round from 9/5 - 9/12. Pt has been unable to do chemo since due to platelets being too low. Pt currently has fever of 101.8 oral at present. )  . Patient presented due to 3 day history of fevers at home. Her highest recorded temperature was 102 at home and 101.8 in the ED. She is also complaining of abdominal pain localized throughout her abdomen where there are localized areas of induration and erythema. Patient states that the indurated areas is where it hurts on her abdomen and that it has been that way for approximately 6 months. The pain is worse this time than it has been in the past. She is also complaining of body aches throughout her body which is what made her come into the ED. She has had associated chills, but denies any n/v, CP, SOB, or cough. She received Vanc/Zosyn in the ED for suspected cellulitis of the abdomen. Her abdominal lesions are consistent with scar tissue 2/2 to her chemo which she last had in September. Denies any recent sick contact      Past Medical History:  Past Medical History:   Diagnosis Date    Cancer     CML (chronic myelocytic  leukemia)     DM2 (diabetes mellitus, type 2)     HTN (hypertension)     NAFLD (nonalcoholic fatty liver disease)     Neuropathy        Past Surgical History:  Past Surgical History:   Procedure Laterality Date    ABDOMINAL SURGERY       SECTION      x4    CHOLECYSTECTOMY         Family History:  Family History   Problem Relation Age of Onset    Diabetes Mother     Diabetes Father     Cancer Neg Hx        Social History:  Social History     Tobacco Use    Smoking status: Never    Smokeless tobacco: Never   Substance Use Topics    Alcohol use: Not Currently    Drug use: Not Currently       Allergies:  Review of patient's allergies indicates:  No Known Allergies    Home Medications:  Prior to Admission medications    Medication Sig Start Date End Date Taking? Authorizing Provider   acyclovir (ZOVIRAX) 400 MG tablet Take 1 tablet (400 mg total) by mouth 2 (two) times daily. 23  Natalya Ojeda MD   albuterol (PROVENTIL/VENTOLIN HFA) 90 mcg/actuation inhaler Inhale 2 puffs into the lungs every 6 (six) hours as needed for Wheezing. Rescue    Provider, Historical   allopurinoL (ZYLOPRIM) 300 MG tablet Take 300 mg by mouth once daily. 23   Provider, Historical   ALPRAZolam (XANAX) 0.25 MG tablet Take 0.25 mg by mouth 3 (three) times daily as needed for Anxiety. 23   Provider, Historical   amLODIPine (NORVASC) 10 MG tablet Take 10 mg by mouth once daily.    Provider, Historical   ammonium lactate 12 % Crea Apply topically daily as needed.    Provider, Historical   atorvastatin (LIPITOR) 40 MG tablet Take 40 mg by mouth once daily.    Provider, Historical   busPIRone (BUSPAR) 5 MG Tab Take 1 tablet (5 mg total) by mouth 2 (two) times daily.  Patient not taking: Reported on 2023 6/4/23 6/3/24  Julio Cesar Rodriguez DO   EScitalopram oxalate (LEXAPRO) 10 MG tablet Take 1 tablet (10 mg total) by mouth once daily.  Patient not taking: Reported on 2023 6/4/23 6/3/24  Julio Cesar Rodriguez DO    furosemide (LASIX) 20 MG tablet Take 1 tablet (20 mg total) by mouth once daily. 9/20/23   Huma Haines NP   gabapentin (NEURONTIN) 300 MG capsule Take 2 capsules TID. 9/20/23   Huma Haines NP   HYDROcodone-acetaminophen (NORCO) 5-325 mg per tablet Take 1 tablet by mouth every 6 (six) hours as needed for Pain.    Provider, Historical   hydrocortisone 2.5 % cream Apply topically 2 (two) times daily. 5/22/23 5/21/24  Provider, Historical   hydrOXYzine pamoate (VISTARIL) 25 MG Cap Take 1 capsule (25 mg total) by mouth every 8 (eight) hours as needed.  Patient not taking: Reported on 11/2/2023 6/4/23   Julio Cesar Rodriguez DO   ibuprofen (ADVIL,MOTRIN) 600 MG tablet TAKE 1 TABLET WITH FOOD OR MILK AS NEEDED THREE TIMES A DAY ORALLY 30 3/29/23   Provider, Historical   insulin lispro 100 unit/mL injection Inject 22 Units into the skin 3 (three) times daily before meals. Takes 22 units TID AC while on chemo - (Gives between 12 to 15 units TID AC when not on chemo) 9/29/23 10/29/23  Ivan Del Rio MD   insulin NPH (NOVOLIN N NPH U-100 INSULIN) 100 unit/mL injection 50 Units 2 (two) times daily before meals. No longer 20 in AM 3/22/23   Provider, Historical   ketoconazole (NIZORAL) 2 % cream Apply topically. 5/22/23 5/21/24  Provider, Historical   loratadine (CLARITIN) 10 mg tablet Take 1 tablet (10 mg total) by mouth once daily.  Patient not taking: Reported on 11/2/2023 6/4/23   Julio Cesar Rodriguez DO   losartan (COZAAR) 25 MG tablet Take 1 tablet (25 mg total) by mouth once daily. 12/12/22 12/12/23  Rupesh Thomas MD   metFORMIN (GLUCOPHAGE) 1000 MG tablet Take 1,000 mg by mouth 2 (two) times daily with meals.    Provider, Historical   metoprolol tartrate (LOPRESSOR) 25 MG tablet Take 25 mg by mouth 2 (two) times daily.    Provider, Historical   montelukast (SINGULAIR) 10 mg tablet Take 10 mg by mouth once daily.    Provider, Historical   omeprazole (PRILOSEC) 40 MG capsule Take 1 capsule by mouth once  "daily. 23   Provider, Historical   ondansetron (ZOFRAN-ODT) 4 MG TbDL Take 4 mg by mouth every 8 (eight) hours as needed. 23   Provider, Historical   PONATinib (ICLUSIG) 30 mg Tab Take 30 mg by mouth Daily.    Provider, Historical   silver sulfADIAZINE 1% (SILVADENE) 1 % cream Apply topically. 23  Provider, Historical   venetoclax (VENCLEXTA) 100 mg Tab Take 400 mg by mouth Only takes while taking chemo .    Provider, Historical         Review of Systems:  Review of Systems   Constitutional:  Positive for chills and fever.   Respiratory:  Negative for shortness of breath.    Cardiovascular:  Negative for chest pain.   Gastrointestinal:  Negative for constipation, diarrhea, nausea and vomiting.   Musculoskeletal:  Positive for myalgias.             Objective:   Last 24 Hour Vital Signs:  BP  Min: 127/79  Max: 160/86  Temp  Av.5 °F (38.1 °C)  Min: 99.1 °F (37.3 °C)  Max: 101.8 °F (38.8 °C)  Pulse  Av.5  Min: 90  Max: 102  Resp  Av  Min: 21  Max: 21  SpO2  Av.3 %  Min: 95 %  Max: 99 %  Height  Av' 4" (162.6 cm)  Min: 5' 4" (162.6 cm)  Max: 5' 4" (162.6 cm)  Weight  Av.8 kg (246 lb 7.6 oz)  Min: 111.8 kg (246 lb 7.6 oz)  Max: 111.8 kg (246 lb 7.6 oz)  Body mass index is 42.31 kg/m².  No intake/output data recorded.    Physical Examination:  Physical Exam  Constitutional:       General: She is not in acute distress.     Appearance: Normal appearance. She is not ill-appearing.   HENT:      Head: Normocephalic and atraumatic.      Mouth/Throat:      Mouth: Mucous membranes are moist.      Pharynx: Oropharynx is clear.   Eyes:      General: No scleral icterus.     Extraocular Movements: Extraocular movements intact.      Pupils: Pupils are equal, round, and reactive to light.   Cardiovascular:      Rate and Rhythm: Normal rate and regular rhythm.      Pulses: Normal pulses.      Heart sounds: Normal heart sounds.   Pulmonary:      Effort: Pulmonary effort is normal.      " Breath sounds: Normal breath sounds. No stridor. No wheezing.   Abdominal:      General: Abdomen is flat. Bowel sounds are normal. There is no distension.      Palpations: Abdomen is soft.      Tenderness: There is abdominal tenderness (TTP localized to indurated and erythematous skin). There is no guarding.      Comments: Induration and erythema localized to radiation bead injection site     Musculoskeletal:         General: Normal range of motion.      Cervical back: Normal range of motion and neck supple.   Skin:     General: Skin is warm.      Coloration: Skin is not jaundiced.   Neurological:      General: No focal deficit present.      Mental Status: She is alert and oriented to person, place, and time.          Laboratory:  Most Recent Data:  CBC:   Lab Results   Component Value Date    WBC 5.44 11/05/2023    HGB 6.6 (L) 11/05/2023    HCT 20.5 (L) 11/05/2023    PLT 27 (LL) 11/05/2023    MCV 94.9 (H) 11/05/2023    RDW 18.1 (H) 11/05/2023     WBC Differential:   Recent Labs   Lab 11/02/23  1147 11/05/23 2124   WBC 5.88 5.44   HGB 8.0* 6.6*   HCT 25.5* 20.5*   PLT 45* 27*   MCV 97.3* 94.9*     BMP:   Lab Results   Component Value Date     (L) 11/05/2023    K 3.8 11/05/2023     10/29/2020    CO2 23 11/05/2023    BUN 10.1 11/05/2023    CREATININE 0.82 11/05/2023     (H) 10/29/2020    CALCIUM 8.0 (L) 11/05/2023    MG 1.60 11/05/2023    PHOS 4.7 10/15/2023     LFTs:   Lab Results   Component Value Date    PROT 6.3 10/29/2020    ALBUMIN 2.4 (L) 11/05/2023    BILITOT 0.4 11/05/2023    AST 17 11/05/2023    ALKPHOS 71 11/05/2023    ALT 32 11/05/2023     Coags:   Lab Results   Component Value Date    INR 1.0 09/16/2023    PROTIME 12.5 09/16/2023    PTT 22.4 07/02/2023     FLP:   Lab Results   Component Value Date    CHOL 117 09/17/2023    HDL 29 (L) 09/17/2023    TRIG 209 (H) 09/17/2023     DM:   Lab Results   Component Value Date    HGBA1C 7.6 (H) 07/20/2023    HGBA1C 9.0 (H) 03/17/2023    HGBA1C 8.2  "(H) 01/09/2023    CREATININE 0.82 11/05/2023     Thyroid:   Lab Results   Component Value Date    TSH 1.502 10/13/2023      Anemia:   Lab Results   Component Value Date    IRON 85 05/28/2023    TIBC 293 05/28/2023    FERRITIN 298.15 (H) 05/28/2023       Lab Results   Component Value Date    CWNIJQWZ69 217 05/28/2023       Lab Results   Component Value Date    FOLATE 10.8 05/28/2023        Cardiac:   Lab Results   Component Value Date    TROPONINI <0.010 12/10/2022     Urinalysis:   Lab Results   Component Value Date    LABURIN No Growth 10/14/2023    COLORU Colorless 02/04/2022    PHUA 6.0 11/05/2023    SPECGRAV 1.015 10/28/2020    NITRITE Negative 02/04/2022    KETONESU Negative 02/04/2022    UROBILINOGEN Normal 11/05/2023    WBCUA 0-5 11/05/2023       Trended Lab Data:  Recent Labs   Lab 11/02/23  1147 11/05/23 2124   WBC 5.88 5.44   HGB 8.0* 6.6*   HCT 25.5* 20.5*   PLT 45* 27*   MCV 97.3* 94.9*   RDW 18.1* 18.1*    135*   K 4.3 3.8   CO2 25 23   BUN 15.6 10.1   CREATININE 0.86 0.82   ALBUMIN 2.8* 2.4*   BILITOT 0.5 0.4   AST 41* 17   ALKPHOS 73 71   ALT 51 32       Trended Cardiac Data:  No results for input(s): "TROPONINI", "CKTOTAL", "CKMB", "BNP" in the last 168 hours.    Microbiology Data:  Microbiology Results (last 7 days)       Procedure Component Value Units Date/Time    Blood Culture #2 **CANNOT BE ORDERED STAT** [[id number]] Collected: 11/05/23 2124    Order Status: Sent Specimen: Blood from Arm, Right Updated: 11/05/23 2136    Blood Culture #1 **CANNOT BE ORDERED STAT** [[id number]] Collected: 11/05/23 2124    Order Status: Sent Specimen: Blood from Arm, Left Updated: 11/05/23 2136             Radiology:  Imaging Results              CT Chest Abdomen Pelvis With IV Contrast (XPD) (Preliminary result)  Result time 11/05/23 23:56:49   Procedure changed from CT Abdomen Pelvis With IV Contrast     Preliminary result by Giles Hodges MD (11/05/23 23:56:49)                   Narrative:    START OF " REPORT:  Technique: CT Scan of the chest abdomen and pelvis was performed with intravenous contrast with axial as well as sagittal and, coronal images.    Dosage Information: Automated Exposure Control was utilized 1998.43 mGy.cm.    Comparison: Comparison is with CT abdomen and pelvis study dated 2023-10-13 14:35:14.    Clinical History: Abdominal Pain (Pt presents to ed with reports of LUQ abd pain with fever x2 days. Pt states Hx of Leukemia with last chemo round from 9/5 - 9/12. Pt has been unable to do chemo since due to platelets being too low. Pt currently has fever of 101.8 oral at present. ). Image count mismatched, please fix - Abdominal Pain (Pt presents to ed with reports of LUQ abd pain with fever x2 days. Pt states Hx of Leukemia with last chemo round from 9/5 - 9/12. Pt has been unable to do chemo since due to platelets being too low. Pt currently has fever of 101.8 oral at present. ).    Findings:  Soft Tissues: Unremarkable.  Neck: The thyroid gland appear unremarkable.  Mediastinum: The mediastinal structures are within normal limits.  Heart: The heart size is within normal limits.  Aorta: No aortic dissection or aneurysm is seen.  Pulmonary Arteries: Unremarkable.  Lungs: There is non specific dependent change at the lung bases. Otherwise clear lungs with no focal infiltrate or consolidation.  Pleura: No effusions or pneumothorax are identified.  Bony Structures:  Spine: Mild spondylolytic changes are seen in the thoracic spine.  Ribs: The ribs appear unremarkable.  Abdomen:  Abdominal Wall: Small focal areas of subcutaneous fat stranding is again seen throughout the ventral abdominal wall, which may be related to intradermal injections.  Liver: The liver appears unremarkable.  Biliary System: No intrahepatic or extrahepatic biliary duct dilatation is seen.  Gallbladder: Surgical clips are seen in the gallbladder fossa consistent with prior cholecystectomy.  Pancreas: The pancreas appears  unremarkable.  Spleen: The spleen appears unremarkable.  Adrenals: The adrenal glands appear unremarkable.  Kidneys: The kidneys appear unremarkable with no stones cysts masses or hydronephrosis.  Aorta: The abdominal aorta appears unremarkable.  IVC: Unremarkable.  Bowel:  Esophagus: The visualized esophagus appears unremarkable.  Stomach: The stomach appears unremarkable.  Duodenum: Unremarkable appearing duodenum.  Small Bowel: The small bowel appears unremarkable.  Colon: Nondistended.  Appendix: No appendix is identified.  Peritoneum: No intraperitoneal free air or ascites is seen.    Pelvis:  Bladder: The bladder appears unremarkable.  Female:  Uterus: There is a subserosal fibroid arising from the left lower uterine segment and measures approximately 3.7 x 3.2 x 2.8 cm (AP x T x CC) (series 3, image 172). Similar findings are also noted on the prior examination.  Ovaries: No adnexal masses are seen. Again noted is a 2.5 cm right ovarian cyst.    Bony structures:  Dorsal Spine: There is mild spondylosis of the visualized dorsal spine.  Bony Pelvis: The visualized bony structures of the pelvis appear unremarkable.      Impression:  1. No acute intrathoracic pathology identified. No acute intraabdominal or pelvic solid organ or bowel pathology identified. Details and other findings as discussed above.                                         CT Head Without Contrast (Preliminary result)  Result time 11/05/23 23:54:45      Preliminary result by Giles Hodges MD (11/05/23 23:54:45)                   Narrative:    START OF REPORT:  Technique: CT of the head was performed without intravenous contrast with axial as well as coronal and sagittal images.    Comparison: None.    Dosage Information: Automated Exposure Control was utilized 1998.43 mGy.cm.    Clinical history: Abdominal Pain (Pt presents to ed with reports of LUQ abd pain with fever x2 days. Pt states Hx of Leukemia with last chemo round from 9/5 - 9/12.  Pt has been unable to do chemo since due to platelets being too low. Pt currently has fever of 101.8 oral at present. ).    Findings:  Hemorrhage: No acute intracranial hemorrhage is seen.  CSF spaces: The ventricles sulci and basal cisterns are within normal limits.  Brain parenchyma: Unremarkable with preservation of the grey white junction throughout.  Cerebellum: Unremarkable.  Sella and skull base: The sella appears to be within normal limits for age.  Herniation: None.  Intracranial calcifications: Incidental note is made of bilateral choroid plexus calcification. Incidental note is made of some pineal region calcification.  Calvarium: No acute linear or depressed skull fracture is seen.    Maxillofacial Structures:  Paranasal sinuses: There is some mucoperiosteal thickening in the bilateral maxillary sinuses. This is consistent with chronic sinusitis. The rest of the paranasal sinuses appear clear.  Orbits: The orbits appear unremarkable.  Zygomatic arches: The zygomatic arches are intact and unremarkable.  Temporal bones and mastoids: The temporal bones and mastoids appear unremarkable.  TMJ: The mandibular condyles appear normally placed with respect to the mandibular fossa.      Impression:  1. No acute intracranial process identified. Details and other findings as noted above.                                           Assessment & Plan:     Fever of unknown origin  CML, s/p chemo  Pancytopenia 2/2 above  Anemia        - Patient received Zosyn and Vancomycin in ED, will be continued on Vanc/Zosyn broad spectrum abx pending cxs       - Localized erythema around abdomen tender to palpation, monitor for spread of erythema or worsening abdominal pain, chronic in nature for past 6 months not Impressive for cellulitis       - Pancytopenia, H&H 6.6/20.5, will transfuse 2u pRBC and recheck H&H with AM labs; possible relative leukocytosis of 5.44 due to baseline leukopenia around 3; Pancytopenia 27, monitor for any  development of petechiae, will hold off chemical DVT ppx and place SCD at this time, primary team to reevaluate need for chemical DVT ppx if thrombocytopenia recovers       - Blood cxs drawn in ED pending completions       - flu/covid negative, negative UA    DM  HTN  HLD  Anxiety       - Patient taking 25 units of short acting insulin w/ meals and 50 units long acting BID, started on high dose SSI along with 25 units qhs, primary team to increase as appropriate if dosing is not enough for sugar control       - Holding home metformin       - Will continue home amlodipine, losartan, and lopressor with prn antihypertensives hydralazine if needed       - Continue home Lipitor       - Continue home Buspar and Lexapro scheduled, continuing home Xanax prn for anxiety    CODE STATUS: Full Code  Access: pIV  Antibiotics: Vanc/Zosyn  Diet: Diabetic diet  DVT Prophylaxis: Holding lovenox due to platelet count 27  GI Prophylaxis: PPI  Fluids: None    Disposition: Pending fever workup    Bar Zayas MD  [facility] [id number]

## 2023-11-06 NOTE — PROGRESS NOTES
I discussed the case with the resident. The chart was reviewed. I agree with the assessment and plan. Care provided was reasonable and necessary.
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
## 2023-11-06 NOTE — ED PROVIDER NOTES
Encounter Date: 11/5/2023       History     Chief Complaint   Patient presents with    Abdominal Pain     Pt presents to ed with reports of LUQ abd pain with fever x2 days. Pt states Hx of Leukemia with last chemo round from 9/5 - 9/12. Pt has been unable to do chemo since due to platelets being too low. Pt currently has fever of 101.8 oral at present.      Patient endorsing history of active neoplastic disease on chemotherapy, has been unable to receive chemotherapy recently secondary to thrombocytopenia.  She presents today with 3 days of feeling ill, fevers, chills, myalgia, she is reporting these symptoms seemed to be flu-like.  She is also reporting facial congestion, abdominal pain, nausea.      Abdominal Pain  The current episode started two days ago. The onset of the illness was gradual. The problem has been gradually worsening. The abdominal pain is located in the LUQ. The abdominal pain does not radiate. The severity of the abdominal pain is 3/10. The abdominal pain is relieved by nothing. The other symptoms of the illness include fever and nausea. The other symptoms of the illness do not include jaundice, melena, vomiting or diarrhea. The fever began yesterday. The fever has been gradually worsening since its onset. The fever has been present for 1 to 2 days. The maximum temperature recorded prior to her arrival was 101 to 101.9 F.   Nausea began yesterday. The nausea is exacerbated by food.   Additional symptoms associated with the illness include chills and anorexia. Symptoms associated with the illness do not include diaphoresis, heartburn, constipation, urgency, hematuria or frequency. Significant associated medical issues do not include GERD, inflammatory bowel disease or gallstones.     Review of patient's allergies indicates:  No Known Allergies  Past Medical History:   Diagnosis Date    Cancer     CML (chronic myelocytic leukemia)     DM2 (diabetes mellitus, type 2)     HTN (hypertension)      NAFLD (nonalcoholic fatty liver disease)     Neuropathy      Past Surgical History:   Procedure Laterality Date    ABDOMINAL SURGERY       SECTION      x4    CHOLECYSTECTOMY       Family History   Problem Relation Age of Onset    Diabetes Mother     Diabetes Father     Cancer Neg Hx      Social History     Tobacco Use    Smoking status: Never    Smokeless tobacco: Never   Substance Use Topics    Alcohol use: Not Currently    Drug use: Not Currently     Review of Systems   Constitutional:  Positive for chills and fever. Negative for diaphoresis.   Gastrointestinal:  Positive for abdominal pain, anorexia and nausea. Negative for constipation, diarrhea, heartburn, jaundice, melena and vomiting.   Genitourinary:  Negative for frequency, hematuria and urgency.   All other systems reviewed and are negative.      Physical Exam     Initial Vitals [23]   BP Pulse Resp Temp SpO2   (!) 160/86 102 (!) 21 (!) 101.8 °F (38.8 °C) 99 %      MAP       --         Physical Exam    Nursing note and vitals reviewed.  Constitutional: She appears well-developed and well-nourished. She is not diaphoretic. No distress.   HENT:   Head: Normocephalic and atraumatic.   Right Ear: External ear normal.   Left Ear: External ear normal.   Eyes: EOM are normal. Pupils are equal, round, and reactive to light. Right eye exhibits no discharge. Left eye exhibits no discharge.   Neck: Neck supple. No thyromegaly present. No tracheal deviation present. No JVD present.   Normal range of motion.  Cardiovascular:  Normal rate, regular rhythm, normal heart sounds and intact distal pulses.     Exam reveals no gallop and no friction rub.       No murmur heard.  Pulmonary/Chest: Breath sounds normal. No stridor. No respiratory distress. She has no wheezes. She has no rhonchi. She has no rales.   Abdominal: Abdomen is soft. Bowel sounds are normal. She exhibits no distension. There is no abdominal tenderness. There is no rebound and no  guarding.   Musculoskeletal:         General: No tenderness or edema. Normal range of motion.      Cervical back: Normal range of motion and neck supple.     Neurological: She is alert and oriented to person, place, and time. She has normal strength. No cranial nerve deficit or sensory deficit. GCS score is 15. GCS eye subscore is 4. GCS verbal subscore is 5. GCS motor subscore is 6.   Skin: Skin is warm and dry. Capillary refill takes less than 2 seconds. No rash and no abscess noted. There is erythema. No pallor.   Superficial cellulitis left upper abdominal wall skin, mild induration; no fluctuance, no cutaneous discontinuity, no drainage;   Psychiatric: She has a normal mood and affect. Her behavior is normal. Judgment and thought content normal.         ED Course   Procedures  Labs Reviewed   COMPREHENSIVE METABOLIC PANEL - Abnormal; Notable for the following components:       Result Value    Sodium Level 135 (*)     Glucose Level 324 (*)     Calcium Level Total 8.0 (*)     Protein Total 5.9 (*)     Albumin Level 2.4 (*)     Albumin/Globulin Ratio 0.7 (*)     All other components within normal limits   C-REACTIVE PROTEIN - Abnormal; Notable for the following components:    C-Reactive Protein 121.00 (*)     All other components within normal limits   URINALYSIS, REFLEX TO URINE CULTURE - Abnormal; Notable for the following components:    Glucose, UA 2+ (*)     Squamous Epithelial Cells, UA Trace (*)     Mucous, UA Trace (*)     All other components within normal limits   CBC WITH DIFFERENTIAL - Abnormal; Notable for the following components:    RBC 2.16 (*)     Hgb 6.6 (*)     Hct 20.5 (*)     MCV 94.9 (*)     MCHC 32.2 (*)     RDW 18.1 (*)     Platelet 27 (*)     IPF 11.5 (*)     IG# 0.12 (*)     All other components within normal limits   MANUAL DIFFERENTIAL - Abnormal; Notable for the following components:    Platelets Decreased (*)     RBC Morph Abnormal (*)     Anisocytosis Slight (*)     Hypochromasia 1+  (*)     Schistocytes Slight (*)     Tear Drops Slight (*)     Ovalocytes 1+ (*)     All other components within normal limits   MAGNESIUM - Normal   LIPASE - Normal   LACTIC ACID, PLASMA - Normal   COVID/FLU A&B PCR - Normal    Narrative:     The Xpert Xpress SARS-CoV-2/FLU/RSV plus is a rapid, multiplexed real-time PCR test intended for the simultaneous qualitative detection and differentiation of SARS-CoV-2, Influenza A, Influenza B, and respiratory syncytial virus (RSV) viral RNA in either nasopharyngeal swab or nasal swab specimens.         BLOOD CULTURE [facility]   BLOOD CULTURE [facility]   CBC W/ AUTO DIFFERENTIAL    Narrative:     The following orders were created for panel order CBC auto differential.  Procedure                               Abnormality         Status                     ---------                               -----------         ------                     CBC with Differential[[id number]]       Abnormal            Final result               Manual Differential[[id number]]         Abnormal            Final result                 Please view results for these tests on the individual orders.   EXTRA TUBES    Narrative:     The following orders were created for panel order EXTRA TUBES.  Procedure                               Abnormality         Status                     ---------                               -----------         ------                     Light Blue Top Hold[[id number]]                             Final result               Gold Top Hold[[id number]]                                   Final result               Pink Top Hold[[id number]]                                   Final result                 Please view results for these tests on the individual orders.   LIGHT BLUE TOP HOLD   GOLD TOP HOLD   PINK TOP HOLD   TYPE & SCREEN   PREPARE RBC SOFT          Imaging Results              CT Chest Abdomen Pelvis With IV Contrast (XPD) (Preliminary result)  Result time 11/05/23 23:56:49    Procedure changed from CT Abdomen Pelvis With IV Contrast     Preliminary result by Giles Hodges MD (11/05/23 23:56:49)                   Narrative:    START OF REPORT:  Technique: CT Scan of the chest abdomen and pelvis was performed with intravenous contrast with axial as well as sagittal and, coronal images.    Dosage Information: Automated Exposure Control was utilized 1998.43 mGy.cm.    Comparison: Comparison is with CT abdomen and pelvis study dated 2023-10-13 14:35:14.    Clinical History: Abdominal Pain (Pt presents to ed with reports of LUQ abd pain with fever x2 days. Pt states Hx of Leukemia with last chemo round from 9/5 - 9/12. Pt has been unable to do chemo since due to platelets being too low. Pt currently has fever of 101.8 oral at present. ). Image count mismatched, please fix - Abdominal Pain (Pt presents to ed with reports of LUQ abd pain with fever x2 days. Pt states Hx of Leukemia with last chemo round from 9/5 - 9/12. Pt has been unable to do chemo since due to platelets being too low. Pt currently has fever of 101.8 oral at present. ).    Findings:  Soft Tissues: Unremarkable.  Neck: The thyroid gland appear unremarkable.  Mediastinum: The mediastinal structures are within normal limits.  Heart: The heart size is within normal limits.  Aorta: No aortic dissection or aneurysm is seen.  Pulmonary Arteries: Unremarkable.  Lungs: There is non specific dependent change at the lung bases. Otherwise clear lungs with no focal infiltrate or consolidation.  Pleura: No effusions or pneumothorax are identified.  Bony Structures:  Spine: Mild spondylolytic changes are seen in the thoracic spine.  Ribs: The ribs appear unremarkable.  Abdomen:  Abdominal Wall: Small focal areas of subcutaneous fat stranding is again seen throughout the ventral abdominal wall, which may be related to intradermal injections.  Liver: The liver appears unremarkable.  Biliary System: No intrahepatic or extrahepatic biliary  duct dilatation is seen.  Gallbladder: Surgical clips are seen in the gallbladder fossa consistent with prior cholecystectomy.  Pancreas: The pancreas appears unremarkable.  Spleen: The spleen appears unremarkable.  Adrenals: The adrenal glands appear unremarkable.  Kidneys: The kidneys appear unremarkable with no stones cysts masses or hydronephrosis.  Aorta: The abdominal aorta appears unremarkable.  IVC: Unremarkable.  Bowel:  Esophagus: The visualized esophagus appears unremarkable.  Stomach: The stomach appears unremarkable.  Duodenum: Unremarkable appearing duodenum.  Small Bowel: The small bowel appears unremarkable.  Colon: Nondistended.  Appendix: No appendix is identified.  Peritoneum: No intraperitoneal free air or ascites is seen.    Pelvis:  Bladder: The bladder appears unremarkable.  Female:  Uterus: There is a subserosal fibroid arising from the left lower uterine segment and measures approximately 3.7 x 3.2 x 2.8 cm (AP x T x CC) (series 3, image 172). Similar findings are also noted on the prior examination.  Ovaries: No adnexal masses are seen. Again noted is a 2.5 cm right ovarian cyst.    Bony structures:  Dorsal Spine: There is mild spondylosis of the visualized dorsal spine.  Bony Pelvis: The visualized bony structures of the pelvis appear unremarkable.      Impression:  1. No acute intrathoracic pathology identified. No acute intraabdominal or pelvic solid organ or bowel pathology identified. Details and other findings as discussed above.                                         CT Head Without Contrast (Preliminary result)  Result time 11/05/23 23:54:45      Preliminary result by Giles Hodges MD (11/05/23 23:54:45)                   Narrative:    START OF REPORT:  Technique: CT of the head was performed without intravenous contrast with axial as well as coronal and sagittal images.    Comparison: None.    Dosage Information: Automated Exposure Control was utilized 1998.43  mGy.cm.    Clinical history: Abdominal Pain (Pt presents to ed with reports of LUQ abd pain with fever x2 days. Pt states Hx of Leukemia with last chemo round from 9/5 - 9/12. Pt has been unable to do chemo since due to platelets being too low. Pt currently has fever of 101.8 oral at present. ).    Findings:  Hemorrhage: No acute intracranial hemorrhage is seen.  CSF spaces: The ventricles sulci and basal cisterns are within normal limits.  Brain parenchyma: Unremarkable with preservation of the grey white junction throughout.  Cerebellum: Unremarkable.  Sella and skull base: The sella appears to be within normal limits for age.  Herniation: None.  Intracranial calcifications: Incidental note is made of bilateral choroid plexus calcification. Incidental note is made of some pineal region calcification.  Calvarium: No acute linear or depressed skull fracture is seen.    Maxillofacial Structures:  Paranasal sinuses: There is some mucoperiosteal thickening in the bilateral maxillary sinuses. This is consistent with chronic sinusitis. The rest of the paranasal sinuses appear clear.  Orbits: The orbits appear unremarkable.  Zygomatic arches: The zygomatic arches are intact and unremarkable.  Temporal bones and mastoids: The temporal bones and mastoids appear unremarkable.  TMJ: The mandibular condyles appear normally placed with respect to the mandibular fossa.      Impression:  1. No acute intracranial process identified. Details and other findings as noted above.                                      X-Rays:   Independently Interpreted Readings:   Other Readings:  - CT chest abdomen pelvis without acute abnormal findings;    Medications   vancomycin (VANCOCIN) 2,000 mg in dextrose 5 % (D5W) 500 mL IVPB (2,000 mg Intravenous New Bag 11/5/23 3278)   acyclovir tablet 400 mg (has no administration in time range)   albuterol inhaler 2 puff (has no administration in time range)   allopurinoL tablet 300 mg (has no  administration in time range)   ALPRAZolam tablet 0.25 mg (has no administration in time range)   amLODIPine tablet 10 mg (has no administration in time range)   atorvastatin tablet 40 mg (has no administration in time range)   busPIRone tablet 5 mg (has no administration in time range)   EScitalopram oxalate tablet 10 mg (has no administration in time range)   furosemide tablet 20 mg (has no administration in time range)   gabapentin capsule 300 mg (has no administration in time range)   HYDROcodone-acetaminophen 5-325 mg per tablet 1 tablet (has no administration in time range)   losartan tablet 25 mg (has no administration in time range)   metoprolol tartrate (LOPRESSOR) tablet 25 mg (has no administration in time range)   montelukast chewable tablet 10 mg (has no administration in time range)   pantoprazole EC tablet 40 mg (has no administration in time range)   sodium chloride 0.9% flush 10 mL (has no administration in time range)   naloxone 0.4 mg/mL injection 0.02 mg (has no administration in time range)   glucose chewable tablet 16 g (has no administration in time range)   glucose chewable tablet 24 g (has no administration in time range)   glucagon (human recombinant) injection 1 mg (has no administration in time range)   acetaminophen tablet 650 mg (has no administration in time range)   ondansetron disintegrating tablet 8 mg (has no administration in time range)   insulin aspart U-100 injection 0-15 Units (has no administration in time range)   melatonin tablet 6 mg (has no administration in time range)   dextrose 10% bolus 125 mL 125 mL (has no administration in time range)   dextrose 10% bolus 250 mL 250 mL (has no administration in time range)   insulin detemir U-100 injection 25 Units (has no administration in time range)   piperacillin-tazobactam (ZOSYN) 4.5 g in dextrose 5 % in water (D5W) 100 mL IVPB (MB+) (has no administration in time range)   0.9%  NaCl infusion (for blood administration) (has  no administration in time range)   sodium chloride 0.9% bolus 1,000 mL 1,000 mL (0 mLs Intravenous Stopped 11/5/23 2318)   iohexoL (OMNIPAQUE 350) 350 mg iodine/mL injection (100 mLs Intravenous Given 11/5/23 2115)   piperacillin-tazobactam (ZOSYN) 4.5 g in dextrose 5 % in water (D5W) 100 mL IVPB (MB+) (0 g Intravenous Stopped 11/5/23 7993)     Medical Decision Making  This is an older woman with CML, in this case understood as a cause for hypo immune state.  Who presents today pancytopenic, febrile, with cellulitis evident on physical exam.  Risk is found sufficient to warrant an expanded evaluation with objective data in order to diminish probability occult foci of infection remain occult.    Amount and/or Complexity of Data Reviewed  External Data Reviewed: notes.     Details: Notes confirm a recent admission with similar clinical signs.  At that time patient was transfused.  She did also receive antibiotics which were discontinued after cultures were negative.  Patient was discharged home in improved condition after had remained afebrile off antibiotics.  Labs: ordered. Decision-making details documented in ED Course.     Details: Considerable anemia, similar to previous admission values (pancytopenia); the CRP is considerably elevated today as well;  Radiology: ordered and independent interpretation performed. Decision-making details documented in ED Course.     Details: - CT chest abdomen pelvis without acute abnormal findings;  Discussion of management or test interpretation with external provider(s): Inpatient team aware of case, plan admit;    Risk  Decision regarding hospitalization.  Risk Details: Risk found sufficient to warrant admission for further evaluation, clinical supervision of case;               ED Course as of 11/06/23 0133   Sun Nov 05, 2023   2238 Hemogram demonstrates pancytopenia; [CT]   2238 No respiratory pathogens identified by PCR; [CT]   2238 CRP elevated to 121; [CT]   2238 Generally  reassuring chemistries, though depressed albumin; [CT]   2238 Negative lactate; [CT]   2340 Negative UA; [CT]      ED Course User Index  [CT] Regis Cervantes MD                    Clinical Impression:   Final diagnoses:  [R50.9] Fever  [R10.9] Abdominal pain, unspecified abdominal location (Primary)  [L03.90] Cellulitis, unspecified cellulitis site  [Z92.25] Personal history of immunosupression therapy        ED Disposition Condition    Observation Stable                Regis Cervantes MD  11/06/23 0133

## 2023-11-06 NOTE — CARE UPDATE
Attending: Kim Snowden MD   Resident: Venu  Intern: Angela  Team: 3      Fela Ellison is a 58 y.o.  female who with a history of CML, DM, obesity, Nonalcoholic fatty liver disease, HTN, tobacco user, HLD, Hyperuricemia, pancytopenia admitted to telemetry for fever of unknown origin. Internal Medicine Team 3 assumed care at 0700 today. Patient was found to be pancytopenic requiring 2u pRBC transfusion upon admission as well as septic with fever of 101.9. Last round of chemo was 9/5/23-9/12/23 and was receiving on a monthly basis however this was discontinued due to severe thrombocytopenia. Currently on broad spectrum IV abx of Vancomycin and Zosyn for fevers. BC x 2 pending. Continue to monitor.             Jessica Miller MD   [facility]

## 2023-11-07 LAB
ALBUMIN SERPL-MCNC: 2.3 G/DL (ref 3.5–5)
ALBUMIN/GLOB SERPL: 0.6 RATIO (ref 1.1–2)
ALP SERPL-CCNC: 68 UNIT/L (ref 40–150)
ALT SERPL-CCNC: 25 UNIT/L (ref 0–55)
AST SERPL-CCNC: 11 UNIT/L (ref 5–34)
BASOPHILS # BLD AUTO: 0 X10(3)/MCL
BASOPHILS NFR BLD AUTO: 0 %
BILIRUB SERPL-MCNC: 0.5 MG/DL
BUN SERPL-MCNC: 9.1 MG/DL (ref 9.8–20.1)
CALCIUM SERPL-MCNC: 8.7 MG/DL (ref 8.4–10.2)
CHLORIDE SERPL-SCNC: 105 MMOL/L (ref 98–107)
CO2 SERPL-SCNC: 24 MMOL/L (ref 22–29)
CREAT SERPL-MCNC: 0.7 MG/DL (ref 0.55–1.02)
CRP SERPL-MCNC: 154.5 MG/L
EOSINOPHIL # BLD AUTO: 0 X10(3)/MCL (ref 0–0.9)
EOSINOPHIL NFR BLD AUTO: 0 %
ERYTHROCYTE [DISTWIDTH] IN BLOOD BY AUTOMATED COUNT: 18.5 % (ref 11.5–17)
GFR SERPLBLD CREATININE-BSD FMLA CKD-EPI: >60 MLS/MIN/1.73/M2
GLOBULIN SER-MCNC: 3.7 GM/DL (ref 2.4–3.5)
GLUCOSE SERPL-MCNC: 320 MG/DL (ref 74–100)
HCT VFR BLD AUTO: 26.1 % (ref 37–47)
HGB BLD-MCNC: 8.5 G/DL (ref 12–16)
IMM GRANULOCYTES # BLD AUTO: 0.12 X10(3)/MCL (ref 0–0.04)
IMM GRANULOCYTES NFR BLD AUTO: 2.1 %
LYMPHOCYTES # BLD AUTO: 1.69 X10(3)/MCL (ref 0.6–4.6)
LYMPHOCYTES NFR BLD AUTO: 29.3 %
MAGNESIUM SERPL-MCNC: 1.9 MG/DL (ref 1.6–2.6)
MCH RBC QN AUTO: 29.9 PG (ref 27–31)
MCHC RBC AUTO-ENTMCNC: 32.6 G/DL (ref 33–36)
MCV RBC AUTO: 91.9 FL (ref 80–94)
MONOCYTES # BLD AUTO: 0.79 X10(3)/MCL (ref 0.1–1.3)
MONOCYTES NFR BLD AUTO: 13.7 %
NEUTROPHILS # BLD AUTO: 3.16 X10(3)/MCL (ref 2.1–9.2)
NEUTROPHILS NFR BLD AUTO: 54.9 %
NRBC BLD AUTO-RTO: 2.3 %
PLATELET # BLD AUTO: 21 X10(3)/MCL (ref 130–400)
PLATELETS.RETICULATED NFR BLD AUTO: 12.3 % (ref 0.9–11.2)
PMV BLD AUTO: ABNORMAL FL
POCT GLUCOSE: 256 MG/DL (ref 70–110)
POCT GLUCOSE: 271 MG/DL (ref 70–110)
POCT GLUCOSE: 291 MG/DL (ref 70–110)
POCT GLUCOSE: 315 MG/DL (ref 70–110)
POTASSIUM SERPL-SCNC: 3.6 MMOL/L (ref 3.5–5.1)
PROT SERPL-MCNC: 6 GM/DL (ref 6.4–8.3)
RBC # BLD AUTO: 2.84 X10(6)/MCL (ref 4.2–5.4)
SODIUM SERPL-SCNC: 139 MMOL/L (ref 136–145)
VANCOMYCIN TROUGH SERPL-MCNC: 6.2 UG/ML (ref 15–20)
WBC # SPEC AUTO: 5.76 X10(3)/MCL (ref 4.5–11.5)

## 2023-11-07 PROCEDURE — 83735 ASSAY OF MAGNESIUM: CPT

## 2023-11-07 PROCEDURE — 25000003 PHARM REV CODE 250

## 2023-11-07 PROCEDURE — 85025 COMPLETE CBC W/AUTO DIFF WBC: CPT

## 2023-11-07 PROCEDURE — 94761 N-INVAS EAR/PLS OXIMETRY MLT: CPT

## 2023-11-07 PROCEDURE — 63600175 PHARM REV CODE 636 W HCPCS

## 2023-11-07 PROCEDURE — 80202 ASSAY OF VANCOMYCIN: CPT

## 2023-11-07 PROCEDURE — 63600175 PHARM REV CODE 636 W HCPCS: Performed by: INTERNAL MEDICINE

## 2023-11-07 PROCEDURE — 96372 THER/PROPH/DIAG INJ SC/IM: CPT

## 2023-11-07 PROCEDURE — 11000001 HC ACUTE MED/SURG PRIVATE ROOM

## 2023-11-07 PROCEDURE — 86140 C-REACTIVE PROTEIN: CPT

## 2023-11-07 PROCEDURE — 80053 COMPREHEN METABOLIC PANEL: CPT

## 2023-11-07 PROCEDURE — 25000003 PHARM REV CODE 250: Performed by: INTERNAL MEDICINE

## 2023-11-07 PROCEDURE — 99900035 HC TECH TIME PER 15 MIN (STAT)

## 2023-11-07 RX ORDER — POLYETHYLENE GLYCOL 3350 17 G/17G
17 POWDER, FOR SOLUTION ORAL DAILY
Status: DISCONTINUED | OUTPATIENT
Start: 2023-11-07 | End: 2023-11-08 | Stop reason: HOSPADM

## 2023-11-07 RX ORDER — POTASSIUM CHLORIDE 20 MEQ/1
40 TABLET, EXTENDED RELEASE ORAL ONCE
Status: COMPLETED | OUTPATIENT
Start: 2023-11-07 | End: 2023-11-07

## 2023-11-07 RX ADMIN — ALPRAZOLAM 0.25 MG: 0.25 TABLET ORAL at 08:11

## 2023-11-07 RX ADMIN — VANCOMYCIN HYDROCHLORIDE 1000 MG: 1 INJECTION, POWDER, LYOPHILIZED, FOR SOLUTION INTRAVENOUS at 12:11

## 2023-11-07 RX ADMIN — INSULIN ASPART 9 UNITS: 100 INJECTION, SOLUTION INTRAVENOUS; SUBCUTANEOUS at 08:11

## 2023-11-07 RX ADMIN — BUSPIRONE HYDROCHLORIDE 5 MG: 5 TABLET ORAL at 09:11

## 2023-11-07 RX ADMIN — INSULIN DETEMIR 35 UNITS: 100 INJECTION, SOLUTION SUBCUTANEOUS at 09:11

## 2023-11-07 RX ADMIN — HYDROCODONE BITARTRATE AND ACETAMINOPHEN 1 TABLET: 5; 325 TABLET ORAL at 08:11

## 2023-11-07 RX ADMIN — HYDROCODONE BITARTRATE AND ACETAMINOPHEN 1 TABLET: 5; 325 TABLET ORAL at 09:11

## 2023-11-07 RX ADMIN — ALLOPURINOL 300 MG: 300 TABLET ORAL at 08:11

## 2023-11-07 RX ADMIN — POLYETHYLENE GLYCOL 3350 17 G: 17 POWDER, FOR SOLUTION ORAL at 06:11

## 2023-11-07 RX ADMIN — ACYCLOVIR 400 MG: 400 TABLET ORAL at 08:11

## 2023-11-07 RX ADMIN — ACYCLOVIR 400 MG: 400 TABLET ORAL at 09:11

## 2023-11-07 RX ADMIN — INSULIN ASPART 9 UNITS: 100 INJECTION, SOLUTION INTRAVENOUS; SUBCUTANEOUS at 12:11

## 2023-11-07 RX ADMIN — BUSPIRONE HYDROCHLORIDE 5 MG: 5 TABLET ORAL at 08:11

## 2023-11-07 RX ADMIN — PIPERACILLIN AND TAZOBACTAM 4.5 G: 4; .5 INJECTION, POWDER, LYOPHILIZED, FOR SOLUTION INTRAVENOUS; PARENTERAL at 06:11

## 2023-11-07 RX ADMIN — VANCOMYCIN HYDROCHLORIDE 1250 MG: 1.25 INJECTION, POWDER, LYOPHILIZED, FOR SOLUTION INTRAVENOUS at 03:11

## 2023-11-07 RX ADMIN — INSULIN ASPART 12 UNITS: 100 INJECTION, SOLUTION INTRAVENOUS; SUBCUTANEOUS at 05:11

## 2023-11-07 RX ADMIN — FUROSEMIDE 20 MG: 20 TABLET ORAL at 08:11

## 2023-11-07 RX ADMIN — PANTOPRAZOLE SODIUM 40 MG: 40 TABLET, DELAYED RELEASE ORAL at 08:11

## 2023-11-07 RX ADMIN — GABAPENTIN 300 MG: 300 CAPSULE ORAL at 09:11

## 2023-11-07 RX ADMIN — HYDROCODONE BITARTRATE AND ACETAMINOPHEN 1 TABLET: 5; 325 TABLET ORAL at 02:11

## 2023-11-07 RX ADMIN — ONDANSETRON 8 MG: 4 TABLET, ORALLY DISINTEGRATING ORAL at 08:11

## 2023-11-07 RX ADMIN — GABAPENTIN 300 MG: 300 CAPSULE ORAL at 08:11

## 2023-11-07 RX ADMIN — GABAPENTIN 300 MG: 300 CAPSULE ORAL at 02:11

## 2023-11-07 RX ADMIN — POTASSIUM CHLORIDE 40 MEQ: 1500 TABLET, EXTENDED RELEASE ORAL at 08:11

## 2023-11-07 RX ADMIN — METOPROLOL TARTRATE 25 MG: 25 TABLET, FILM COATED ORAL at 08:11

## 2023-11-07 RX ADMIN — MONTELUKAST SODIUM 10 MG: 5 TABLET, CHEWABLE ORAL at 08:11

## 2023-11-07 RX ADMIN — ATORVASTATIN CALCIUM 40 MG: 40 TABLET, FILM COATED ORAL at 08:11

## 2023-11-07 RX ADMIN — AMLODIPINE BESYLATE 10 MG: 10 TABLET ORAL at 08:11

## 2023-11-07 RX ADMIN — ESCITALOPRAM OXALATE 10 MG: 10 TABLET ORAL at 08:11

## 2023-11-07 RX ADMIN — INSULIN ASPART 8 UNITS: 100 INJECTION, SOLUTION INTRAVENOUS; SUBCUTANEOUS at 09:11

## 2023-11-07 RX ADMIN — LOSARTAN POTASSIUM 25 MG: 25 TABLET, FILM COATED ORAL at 08:11

## 2023-11-07 RX ADMIN — METOPROLOL TARTRATE 25 MG: 25 TABLET, FILM COATED ORAL at 09:11

## 2023-11-07 RX ADMIN — PIPERACILLIN AND TAZOBACTAM 4.5 G: 4; .5 INJECTION, POWDER, LYOPHILIZED, FOR SOLUTION INTRAVENOUS; PARENTERAL at 03:11

## 2023-11-07 NOTE — PROGRESS NOTES
Pharmacokinetic Assessment Follow Up: IV Vancomycin    Vancomycin serum concentration assessment(s):    The trough level was drawn correctly and can be used to guide therapy at this time. The measurement is below the desired definitive target range of 10 to 15 mcg/mL.    Vancomycin Regimen Plan:    Change regimen to Vancomycin 1250 mg IV every 12 hours with next serum trough concentration measured at 1300 prior to 1400 dose on 11/8    Drug levels (last 3 results):  Recent Labs   Lab Result Units 11/07/23  1109   Vancomycin Trough ug/ml 6.2*       Pharmacy will continue to follow and monitor vancomycin.    Please contact pharmacy at extension [phone] for questions regarding this assessment.    Thank you for the consult,   Nusrat Borja       Patient brief summary:  Fela Ellison is a 58 y.o. female initiated on antimicrobial therapy with IV Vancomycin for treatment of skin & soft tissue infection    The patient's current regimen is vancomycin 1250 q 12 h     Drug Allergies:   Review of patient's allergies indicates:  No Known Allergies    Actual Body Weight:   111.8 kg    Renal Function:   Estimated Creatinine Clearance: 109.8 mL/min (based on SCr of 0.7 mg/dL).,     Dialysis Method (if applicable):  N/A    CBC (last 72 hours):  Recent Labs   Lab Result Units 11/05/23 2124 11/06/23  0708 11/07/23  0307   WBC x10(3)/mcL 5.44  --  5.76   Hgb g/dL 6.6*  --  8.5*   Hemoglobin A1c %  --  7.7*  --    Hct % 20.5*  --  26.1*   Platelet x10(3)/mcL 27*  --  21*   Mono % % 13.6  --  13.7   Monocytes % % 6  --   --    Eos % % 0.0  --  0.0   Basophil % % 0.2  --  0.0       Metabolic Panel (last 72 hours):  Recent Labs   Lab Result Units 11/05/23 2124 11/05/23  2306 11/07/23  0307   Sodium Level mmol/L 135*  --  139   Potassium Level mmol/L 3.8  --  3.6   Chloride mmol/L 102  --  105   Carbon Dioxide mmol/L 23  --  24   Glucose Level mg/dL 324*  --  320*   Glucose, UA   --  2+*  --    Blood Urea Nitrogen mg/dL 10.1  --   9.1*   Creatinine mg/dL 0.82  --  0.70   Albumin Level g/dL 2.4*  --  2.3*   Bilirubin Total mg/dL 0.4  --  0.5   Alkaline Phosphatase unit/L 71  --  68   Aspartate Aminotransferase unit/L 17  --  11   Alanine Aminotransferase unit/L 32  --  25   Magnesium Level mg/dL 1.60  --  1.90       Vancomycin Administrations:  vancomycin given in the last 96 hours                     vancomycin (VANCOCIN) 1,000 mg in dextrose 5 % (D5W) 250 mL IVPB (mg) 1,000 mg New Bag 11/07/23 0050     1,000 mg New Bag 11/06/23 1400    vancomycin (VANCOCIN) 2,000 mg in dextrose 5 % (D5W) 500 mL IVPB (mg) 2,000 mg New Bag 11/05/23 6887                    Microbiologic Results:  Microbiology Results (last 7 days)       Procedure Component Value Units Date/Time    Blood Culture #2 **CANNOT BE ORDERED STAT** [[id number]]  (Normal) Collected: 11/05/23 2124    Order Status: Completed Specimen: Blood from Arm, Right Updated: 11/07/23 1101     CULTURE, BLOOD ([facility]) No Growth At 24 Hours    Blood Culture #1 **CANNOT BE ORDERED STAT** [[id number]]  (Normal) Collected: 11/05/23 2124    Order Status: Completed Specimen: Blood from Arm, Left Updated: 11/07/23 1101     CULTURE, BLOOD ([facility]) No Growth At 24 Hours

## 2023-11-07 NOTE — PROGRESS NOTES
[facility] Medicine  Progress Note      Patient Name: Fela Ellison  : 1965  MRN: [id number]  Patient Class: OP- Observation   Admission Date: 2023   Length of Stay: 0  Admitting Service: Hospital Medicine  Attending Physician: Kim Snowden MD  Resident: Venu   Intern: Angela  PCP: Bar Trevino DO    CHIEF COMPLAINT     Chief Complaint   Patient presents with    Abdominal Pain     Pt presents to ed with reports of LUQ abd pain with fever x2 days. Pt states Hx of Leukemia with last chemo round from  - . Pt has been unable to do chemo since due to platelets being too low. Pt currently has fever of 101.8 oral at present.      HOSPITAL COURSE     Brief HPI:  Fela Ellison is a 58 y.o.  female who with a history of CML, DM, obesity, Nonalcoholic fatty liver disease, HTN, tobacco user, HLD, Hyperuricemia, pancytopenia who presented to [facility] ED on 2023  with a primary complaint of Abdominal Pain (Pt presents to ed with reports of LUQ abd pain with fever x2 days. Pt states Hx of Leukemia with last chemo round from  - . Pt has been unable to do chemo since due to platelets being too low. Pt currently has fever of 101.8 oral at present. )  . Patient presented due to 3 day history of fevers at home. Her highest recorded temperature was 102 at home and 101.8 in the ED. She is also complaining of abdominal pain localized throughout her abdomen where there are localized areas of induration and erythema. Patient states that the indurated areas is where it hurts on her abdomen and that it has been that way for approximately 6 months. The pain is worse this time than it has been in the past. She is also complaining of body aches throughout her body which is what made her come into the ED. She has had associated chills, but denies any n/v, CP, SOB, or cough. She received Vanc/Zosyn in the ED for suspected cellulitis of the abdomen. Her  abdominal lesions are consistent with scar tissue 2/2 to her chemo which she last had in September. Denies any recent sick contact.     Interval History:  Mild elevation in temperature with tmax of 100 yesterday. VSS otherwise, NAEO documented. Feels fatigued and complains of unchanged abdominal pain. H/H stable following transfusion yesterday. Platelets trended down to 21. ANC today was 3.2 per phone call with [facility] laboratory as this was not visualized on provider end. Does not require neutropenic precautions. BC NG at 24 hours.     OBJECTIVE/PHYSICAL EXAM     VITAL SIGNS (MOST RECENT):  Temp: 98.3 °F (36.8 °C) (11/07/23 0754)  Pulse: (!) 115 (11/07/23 0754)  Resp: 18 (11/07/23 0754)  BP: (!) 142/75 (11/07/23 0754)  SpO2: 96 % (11/07/23 0754) VITAL SIGNS (24 HOUR RANGE):  Temp:  [98.3 °F (36.8 °C)-99.8 °F (37.7 °C)]   Pulse:  []   Resp:  [13-20]   BP: (110-142)/(68-80)   SpO2:  [95 %-97 %]      Physical Exam  Constitutional:       General: She is not in acute distress.     Appearance: Normal appearance. She is not ill-appearing.   HENT:      Head: Normocephalic and atraumatic.      Mouth/Throat:      Mouth: Mucous membranes are moist.      Pharynx: Oropharynx is clear.   Eyes:      General: No scleral icterus.     Extraocular Movements: Extraocular movements intact.      Pupils: Pupils are equal, round, and reactive to light.   Cardiovascular:      Rate and Rhythm: Normal rate and regular rhythm.      Pulses: Normal pulses.      Heart sounds: Normal heart sounds.   Pulmonary:      Effort: Pulmonary effort is normal.      Breath sounds: Normal breath sounds. No stridor. No wheezing.   Abdominal:      General: Abdomen is flat. Bowel sounds are normal. There is no distension.      Palpations: Abdomen is soft.      Tenderness: There is abdominal tenderness (TTP localized to indurated and erythematous skin). There is no guarding.      Comments: Induration and erythema localized to radiation bead injection site    "  Musculoskeletal:         General: Normal range of motion.      Cervical back: Normal range of motion and neck supple.   Skin:     General: Skin is warm.      Coloration: Skin is not jaundiced.   Neurological:      General: No focal deficit present.      Mental Status: She is alert and oriented to person, place, and time.     LABS/MICRO/MEDS/DIAGNOSTICS     LABS  CBC  Recent Labs     11/05/23 2124 11/07/23 0307   WBC 5.44 5.76   RBC 2.16* 2.84*   HGB 6.6* 8.5*   HCT 20.5* 26.1*   MCV 94.9* 91.9   MCH 30.6 29.9   MCHC 32.2* 32.6*   RDW 18.1* 18.5*   PLT 27* 21*     Anemia  No results for input(s): "HAPTOGLOBIN", "FERRITIN", "IRON", "TIBC" in the last 72 hours.  Coags  No results for input(s): "INR", "APTT", "D-DIMER" in the last 72 hours.  Cardiac  No results for input(s): "BNP", "CPK", "TROPONINI" in the last 72 hours.  ABG/Lactate  Recent Labs     11/05/23 2124   LACTIC 1.9       BMP  Recent Labs     11/05/23 2124 11/07/23  0307   * 139   K 3.8 3.6   CHLORIDE 102 105   CO2 23 24   BUN 10.1 9.1*   CREATININE 0.82 0.70   GLUCOSE 324* 320*   CALCIUM 8.0* 8.7   MG 1.60 1.90     LFTs  Recent Labs     11/05/23 2124 11/07/23  0307   ALBUMIN 2.4* 2.3*   GLOBULIN 3.5 3.7*   ALKPHOS 71 68   ALT 32 25   AST 17 11   BILITOT 0.4 0.5   LIPASE 23  --      Inflammatory Markers  Recent Labs     11/07/23  0307   .50*     Lipid  No results for input(s): "CHOL", "TRIG", "LDL", "VLDL", "HDL" in the last 72 hours.  Diabetes  Recent Labs     11/05/23 2124 11/06/23  0708 11/07/23  0307   HGBA1C  --  7.7*  --    GLUCOSE 324*  --  320*     Thyroid  No results for input(s): "TSH", "FREET4" in the last 72 hours.       MICROBIOLOGY  Microbiology Results (last 7 days)       Procedure Component Value Units Date/Time    Blood Culture #2 **CANNOT BE ORDERED STAT** [[id number]] Collected: 11/05/23 2124    Order Status: Resulted Specimen: Blood from Arm, Right Updated: 11/05/23 2136    Blood Culture #1 **CANNOT BE ORDERED STAT** " [[id number]] Collected: 11/05/23 2124    Order Status: Resulted Specimen: Blood from Arm, Left Updated: 11/05/23 2136               MEDICATIONS   acyclovir  400 mg Oral BID    allopurinoL  300 mg Oral Daily    amLODIPine  10 mg Oral Daily    atorvastatin  40 mg Oral Daily    busPIRone  5 mg Oral BID    EScitalopram oxalate  10 mg Oral Daily    furosemide  20 mg Oral Daily    gabapentin  300 mg Oral TID    insulin detemir U-100  25 Units Subcutaneous QHS    losartan  25 mg Oral Daily    metoprolol tartrate  25 mg Oral BID    montelukast  10 mg Oral Daily    pantoprazole  40 mg Oral Daily    piperacillin-tazobactam (Zosyn) IV (PEDS and ADULTS) (extended infusion is not appropriate)  4.5 g Intravenous Q8H    potassium chloride  40 mEq Oral Once    vancomycin (VANCOCIN) IV (PEDS and ADULTS)  1,000 mg Intravenous Q12H         INFUSIONS         DIAGNOSTIC TESTS  CT Chest Abdomen Pelvis With IV Contrast (XPD)   Final Result      No acute abnormality seen      I concur with the preliminary report         Electronically signed by: Jonna Castaneda   Date:    11/06/2023   Time:    13:10      CT Head Without Contrast   Final Result      No acute intracranial abnormality identified.  Findings of mild microvascular ischemic disease.         Electronically signed by: Jadon Dunn   Date:    11/06/2023   Time:    06:34           EF   Date Value Ref Range Status   12/12/2022 70 % Final          ASSESSMENT/PLAN   Fever of unknown origin  CML, s/p chemo  Pancytopenia 2/2 above  Anemia   - CT head and CT chest/abd/pelvis with contrast negative for infectious process   - Neutropenic fevers are a possibility however ANC has been wnl since admission        - Continue Vanc/Zosyn for broad spectrum coverage  - H/H stable but progressive thrombocytopenia 21 this AM. ANC confirmed to be wnl by [facility] laboratory.   - BC x 2 NG at 24 hours     DM  HTN  HLD  Anxiety  - Patient taking 25 units of short acting insulin w/ meals and 50 units long  acting BID at home   - Increased detemir to 35u qhs with MSSI   - Holding home metformin  - Will continue home amlodipine, losartan, and lopressor with prn antihypertensives hydralazine if needed  - Continue home Lipitor  - Continue home Buspar and Lexapro scheduled, continuing home Xanax prn for anxiety      Code Status: Full  Access: Peripheral   Antibiotics: Vancomycin + Zosyn (started 11/5/23)   Diet: Diabetic   DVT Prophylaxis: SCDs  GI Prophylaxis: None   Fluids: None     Dispo: 58 y.o.  female who with a history of CML, DM, obesity, Nonalcoholic fatty liver disease, HTN, tobacco user, HLD, Hyperuricemia, pancytopenia admitted to telemetry for fever of unknown origin. BC x 2 NG at 24 hours.       Jessica Miller MD  [facility] -I

## 2023-11-08 VITALS
TEMPERATURE: 98 F | HEIGHT: 64 IN | RESPIRATION RATE: 18 BRPM | OXYGEN SATURATION: 95 % | DIASTOLIC BLOOD PRESSURE: 95 MMHG | SYSTOLIC BLOOD PRESSURE: 179 MMHG | BODY MASS INDEX: 42.08 KG/M2 | HEART RATE: 81 BPM | WEIGHT: 246.5 LBS

## 2023-11-08 LAB
ALBUMIN SERPL-MCNC: 2.4 G/DL (ref 3.5–5)
ALBUMIN/GLOB SERPL: 0.6 RATIO (ref 1.1–2)
ALP SERPL-CCNC: 68 UNIT/L (ref 40–150)
ALT SERPL-CCNC: 33 UNIT/L (ref 0–55)
AST SERPL-CCNC: 32 UNIT/L (ref 5–34)
BASOPHILS # BLD AUTO: 0 X10(3)/MCL
BASOPHILS NFR BLD AUTO: 0 %
BILIRUB SERPL-MCNC: 0.5 MG/DL
BUN SERPL-MCNC: 7.6 MG/DL (ref 9.8–20.1)
CALCIUM SERPL-MCNC: 9 MG/DL (ref 8.4–10.2)
CHLORIDE SERPL-SCNC: 105 MMOL/L (ref 98–107)
CO2 SERPL-SCNC: 24 MMOL/L (ref 22–29)
CREAT SERPL-MCNC: 0.7 MG/DL (ref 0.55–1.02)
EOSINOPHIL # BLD AUTO: 0 X10(3)/MCL (ref 0–0.9)
EOSINOPHIL NFR BLD AUTO: 0 %
ERYTHROCYTE [DISTWIDTH] IN BLOOD BY AUTOMATED COUNT: 18.1 % (ref 11.5–17)
GFR SERPLBLD CREATININE-BSD FMLA CKD-EPI: >60 MLS/MIN/1.73/M2
GLOBULIN SER-MCNC: 3.8 GM/DL (ref 2.4–3.5)
GLUCOSE SERPL-MCNC: 189 MG/DL (ref 74–100)
HCT VFR BLD AUTO: 25 % (ref 37–47)
HGB BLD-MCNC: 8.1 G/DL (ref 12–16)
IMM GRANULOCYTES # BLD AUTO: 0.15 X10(3)/MCL (ref 0–0.04)
IMM GRANULOCYTES NFR BLD AUTO: 2.9 %
LYMPHOCYTES # BLD AUTO: 1.66 X10(3)/MCL (ref 0.6–4.6)
LYMPHOCYTES NFR BLD AUTO: 32.3 %
MAGNESIUM SERPL-MCNC: 1.9 MG/DL (ref 1.6–2.6)
MCH RBC QN AUTO: 30.7 PG (ref 27–31)
MCHC RBC AUTO-ENTMCNC: 32.4 G/DL (ref 33–36)
MCV RBC AUTO: 94.7 FL (ref 80–94)
MONOCYTES # BLD AUTO: 0.6 X10(3)/MCL (ref 0.1–1.3)
MONOCYTES NFR BLD AUTO: 11.7 %
NEUTROPHILS # BLD AUTO: 2.73 X10(3)/MCL (ref 2.1–9.2)
NEUTROPHILS NFR BLD AUTO: 53.1 %
NRBC BLD AUTO-RTO: 2.1 %
PLATELET # BLD AUTO: 19 X10(3)/MCL (ref 130–400)
PLATELETS.RETICULATED NFR BLD AUTO: 14.1 % (ref 0.9–11.2)
PMV BLD AUTO: ABNORMAL FL
POCT GLUCOSE: 195 MG/DL (ref 70–110)
POCT GLUCOSE: 197 MG/DL (ref 70–110)
POCT GLUCOSE: 272 MG/DL (ref 70–110)
POCT GLUCOSE: 286 MG/DL (ref 70–110)
POTASSIUM SERPL-SCNC: 3.8 MMOL/L (ref 3.5–5.1)
PROT SERPL-MCNC: 6.2 GM/DL (ref 6.4–8.3)
RBC # BLD AUTO: 2.64 X10(6)/MCL (ref 4.2–5.4)
SODIUM SERPL-SCNC: 140 MMOL/L (ref 136–145)
VANCOMYCIN TROUGH SERPL-MCNC: 10.4 UG/ML (ref 15–20)
WBC # SPEC AUTO: 5.14 X10(3)/MCL (ref 4.5–11.5)

## 2023-11-08 PROCEDURE — 80202 ASSAY OF VANCOMYCIN: CPT | Performed by: INTERNAL MEDICINE

## 2023-11-08 PROCEDURE — 85025 COMPLETE CBC W/AUTO DIFF WBC: CPT

## 2023-11-08 PROCEDURE — 83735 ASSAY OF MAGNESIUM: CPT

## 2023-11-08 PROCEDURE — 25000003 PHARM REV CODE 250: Performed by: INTERNAL MEDICINE

## 2023-11-08 PROCEDURE — 63600175 PHARM REV CODE 636 W HCPCS: Performed by: INTERNAL MEDICINE

## 2023-11-08 PROCEDURE — 63600175 PHARM REV CODE 636 W HCPCS

## 2023-11-08 PROCEDURE — 80053 COMPREHEN METABOLIC PANEL: CPT

## 2023-11-08 PROCEDURE — 99900035 HC TECH TIME PER 15 MIN (STAT)

## 2023-11-08 PROCEDURE — 25000003 PHARM REV CODE 250

## 2023-11-08 PROCEDURE — 94761 N-INVAS EAR/PLS OXIMETRY MLT: CPT

## 2023-11-08 RX ADMIN — ALLOPURINOL 300 MG: 300 TABLET ORAL at 09:11

## 2023-11-08 RX ADMIN — FUROSEMIDE 20 MG: 20 TABLET ORAL at 09:11

## 2023-11-08 RX ADMIN — VANCOMYCIN HYDROCHLORIDE 1250 MG: 1.25 INJECTION, POWDER, LYOPHILIZED, FOR SOLUTION INTRAVENOUS at 02:11

## 2023-11-08 RX ADMIN — LOSARTAN POTASSIUM 25 MG: 25 TABLET, FILM COATED ORAL at 09:11

## 2023-11-08 RX ADMIN — MONTELUKAST SODIUM 10 MG: 5 TABLET, CHEWABLE ORAL at 09:11

## 2023-11-08 RX ADMIN — ATORVASTATIN CALCIUM 40 MG: 40 TABLET, FILM COATED ORAL at 09:11

## 2023-11-08 RX ADMIN — BUSPIRONE HYDROCHLORIDE 5 MG: 5 TABLET ORAL at 09:11

## 2023-11-08 RX ADMIN — HYDROCODONE BITARTRATE AND ACETAMINOPHEN 1 TABLET: 5; 325 TABLET ORAL at 09:11

## 2023-11-08 RX ADMIN — GABAPENTIN 300 MG: 300 CAPSULE ORAL at 03:11

## 2023-11-08 RX ADMIN — ESCITALOPRAM OXALATE 10 MG: 10 TABLET ORAL at 09:11

## 2023-11-08 RX ADMIN — ACETAMINOPHEN 650 MG: 325 TABLET, FILM COATED ORAL at 12:11

## 2023-11-08 RX ADMIN — PANTOPRAZOLE SODIUM 40 MG: 40 TABLET, DELAYED RELEASE ORAL at 09:11

## 2023-11-08 RX ADMIN — INSULIN ASPART 3 UNITS: 100 INJECTION, SOLUTION INTRAVENOUS; SUBCUTANEOUS at 09:11

## 2023-11-08 RX ADMIN — PIPERACILLIN AND TAZOBACTAM 4.5 G: 4; .5 INJECTION, POWDER, LYOPHILIZED, FOR SOLUTION INTRAVENOUS; PARENTERAL at 12:11

## 2023-11-08 RX ADMIN — ONDANSETRON 8 MG: 4 TABLET, ORALLY DISINTEGRATING ORAL at 09:11

## 2023-11-08 RX ADMIN — METOPROLOL TARTRATE 25 MG: 25 TABLET, FILM COATED ORAL at 09:11

## 2023-11-08 RX ADMIN — GABAPENTIN 300 MG: 300 CAPSULE ORAL at 09:11

## 2023-11-08 RX ADMIN — ACYCLOVIR 400 MG: 400 TABLET ORAL at 09:11

## 2023-11-08 RX ADMIN — AMLODIPINE BESYLATE 10 MG: 10 TABLET ORAL at 09:11

## 2023-11-08 RX ADMIN — INSULIN ASPART 9 UNITS: 100 INJECTION, SOLUTION INTRAVENOUS; SUBCUTANEOUS at 11:11

## 2023-11-08 RX ADMIN — PIPERACILLIN AND TAZOBACTAM 4.5 G: 4; .5 INJECTION, POWDER, LYOPHILIZED, FOR SOLUTION INTRAVENOUS; PARENTERAL at 06:11

## 2023-11-08 NOTE — DISCHARGE SUMMARY
[facility] Discharge Summary    Admitting Physician: Kim Snowden MD  Attending Physician: Kim Snowden MD  Resident: Venu  Intern: Angela   Date of Admit: 11/5/2023  Date of Discharge: 11/8/2023    Condition: Stable  Outcome: Condition has improved and patient is now ready for discharge.  DISPOSITION: Home or Self Care    Discharge Diagnoses     Patient Active Problem List   Diagnosis    CML (chronic myelocytic leukemia)    Diabetes mellitus    Severe obesity (BMI >= 40)    NAFLD (nonalcoholic fatty liver disease)    Hypertension    Tobacco user    Hypercholesterolemia    Hyperuricemia    Hypokalemia    Hepatosplenomegaly    Other headache syndrome    Fever    Anemia    Thrombocytopenia    Pancytopenia       Principal Problem:  <principal problem not specified>    Consultants and Procedures     Consultants:  IP CONSULT TO HOSPITAL MEDICINE  PHARMACY TO DOSE VANCOMYCIN CONSULT    Procedures:   * No surgery found *     Brief Admission History      Fela Ellison is a 58 y.o.  female who with a history of CML, DM, obesity, Nonalcoholic fatty liver disease, HTN, tobacco user, HLD, Hyperuricemia, pancytopenia who presented to [facility] ED on 11/5/2023  with a primary complaint of Abdominal Pain (Pt presents to ed with reports of LUQ abd pain with fever x2 days. Pt states Hx of Leukemia with last chemo round from 9/5 - 9/12. Pt has been unable to do chemo since due to platelets being too low. Pt currently has fever of 101.8 oral at present. )  . Patient presented due to 3 day history of fevers at home. Her highest recorded temperature was 102 at home and 101.8 in the ED. She is also complaining of abdominal pain localized throughout her abdomen where there are localized areas of induration and erythema. Patient states that the indurated areas is where it hurts on her abdomen and that it has been that way for approximately 6 months. The pain is worse this time than it has been in the past. She is  "also complaining of body aches throughout her body which is what made her come into the ED. She has had associated chills, but denies any n/v, CP, SOB, or cough. She received Vanc/Zosyn in the ED for suspected cellulitis of the abdomen. Her abdominal lesions are consistent with scar tissue 2/2 to her chemo which she last had in September. Denies any recent sick contact.     Hospital Course with Pertinent Findings     Patient was hospitalized for a total of 3 days for fevers of unknown origin and severe anemia requiring transfusion. Patient received 2u pRBC and subsequent H/H on AM lab draws were stable. Blood cultures obtained in [facility] were negative for growth at 48 hours. Following admission, fever subsided and did not recur with tylenol treatment. Patient has severe thrombocytopenia on AM labs however did not meet criteria for platelet transfusion.     Microbiology Results (last 7 days)       Procedure Component Value Units Date/Time    Blood Culture #2 **CANNOT BE ORDERED STAT** [[id number]]  (Normal) Collected: 11/05/23 2124    Order Status: Completed Specimen: Blood from Arm, Right Updated: 11/08/23 1102     CULTURE, BLOOD ([facility]) No Growth At 48 Hours    Blood Culture #1 **CANNOT BE ORDERED STAT** [[id number]]  (Normal) Collected: 11/05/23 2124    Order Status: Completed Specimen: Blood from Arm, Left Updated: 11/08/23 1102     CULTURE, BLOOD ([facility]) No Growth At 48 Hours             Discharge physical exam:  Vitals  BP: (!) 179/95  Temp: 98.2 °F (36.8 °C)  Temp Source: Oral  Pulse: 81  Resp: 18  SpO2: 95 %  Height: 5' 4" (162.6 cm)  Weight: 111.8 kg (246 lb 7.6 oz)    Physical Exam  Constitutional:       General: She is not in acute distress.     Appearance: Normal appearance. She is not ill-appearing.   HENT:      Head: Normocephalic and atraumatic.      Mouth/Throat:      Mouth: Mucous membranes are moist.      Pharynx: Oropharynx is clear.   Eyes:      General: No scleral icterus.     Extraocular Movements: " Extraocular movements intact.      Pupils: Pupils are equal, round, and reactive to light.   Cardiovascular:      Rate and Rhythm: Normal rate and regular rhythm.      Pulses: Normal pulses.      Heart sounds: Normal heart sounds.   Pulmonary:      Effort: Pulmonary effort is normal.      Breath sounds: Normal breath sounds. No stridor. No wheezing.   Abdominal:      General: Abdomen is flat. Bowel sounds are normal. There is no distension.      Palpations: Abdomen is soft.      Tenderness: There is abdominal tenderness (TTP localized to indurated and erythematous skin). There is no guarding.      Comments: Induration and erythema localized to radiation bead injection site.   Skin:       General: Mild scattered petechiae throughout hands and mucosa of lower lip.     TIME SPENT ON DISCHARGE: 60 minutes    Discharge Medications        Medication List        ASK your doctor about these medications      acyclovir 400 MG tablet  Commonly known as: ZOVIRAX  Take 1 tablet (400 mg total) by mouth 2 (two) times daily.     albuterol 90 mcg/actuation inhaler  Commonly known as: PROVENTIL/VENTOLIN HFA     allopurinoL 300 MG tablet  Commonly known as: ZYLOPRIM     ALPRAZolam 0.25 MG tablet  Commonly known as: XANAX     amLODIPine 10 MG tablet  Commonly known as: NORVASC     ammonium lactate 12 % Crea     atorvastatin 40 MG tablet  Commonly known as: LIPITOR     busPIRone 5 MG Tab  Commonly known as: BUSPAR  Take 1 tablet (5 mg total) by mouth 2 (two) times daily.     EScitalopram oxalate 10 MG tablet  Commonly known as: LEXAPRO  Take 1 tablet (10 mg total) by mouth once daily.     furosemide 20 MG tablet  Commonly known as: LASIX  Take 1 tablet (20 mg total) by mouth once daily.     gabapentin 300 MG capsule  Commonly known as: NEURONTIN  Take 2 capsules TID.     HYDROcodone-acetaminophen 5-325 mg per tablet  Commonly known as: NORCO     hydrocortisone 2.5 % cream     hydrOXYzine pamoate 25 MG Cap  Commonly known as:  VISTARIL  Take 1 capsule (25 mg total) by mouth every 8 (eight) hours as needed.     ibuprofen 600 MG tablet  Commonly known as: ADVIL,MOTRIN     ICLUSIG 30 mg Tab  Generic drug: PONATinib     insulin lispro 100 unit/mL injection  Inject 22 Units into the skin 3 (three) times daily before meals. Takes 22 units TID AC while on chemo - (Gives between 12 to 15 units TID AC when not on chemo)     ketoconazole 2 % cream  Commonly known as: NIZORAL     loratadine 10 mg tablet  Commonly known as: CLARITIN  Take 1 tablet (10 mg total) by mouth once daily.     losartan 25 MG tablet  Commonly known as: COZAAR  Take 1 tablet (25 mg total) by mouth once daily.     metFORMIN 1000 MG tablet  Commonly known as: GLUCOPHAGE     metoprolol tartrate 25 MG tablet  Commonly known as: LOPRESSOR     montelukast 10 mg tablet  Commonly known as: SINGULAIR     NovoLIN N NPH U-100 Insulin 100 unit/mL injection  Generic drug: insulin NPH     omeprazole 40 MG capsule  Commonly known as: PRILOSEC     ondansetron 4 MG Tbdl  Commonly known as: ZOFRAN-ODT     silver sulfADIAZINE 1% 1 % cream  Commonly known as: SILVADENE     venetoclax 100 mg Tab  Commonly known as: VENCLEXTA              Discharge Information:      Follow-up Information       [facility] - Emergency Dept. Go to.    Specialty: Emergency Medicine  Why: If symptoms worsen  Contact information:  [address] [facility] [address] [phone]             Eric Vigil MD. Go in 6 day(s).    Specialty: Internal Medicine  Why: Appointment already scheduled for 9:30AM on 11/14/23 to establish care with new PCP  Contact information:  [address] [facility] [address]  [phone]               [facility] On[facility]. Go in 22 day(s).    Specialty: Hematology and Oncology  Why: Appointment already made for 10:30AM on 11/30/23.   Has pre-appointment lab-work scheduled for 9:30AM on 11/30/23.  Contact information:  [address]  [facility] [address] [phone]                            Has outpatient CBC w differential scheduled for this Friday, 11/10/2023, patient can go directly to [facility] to draw lab work. Appointments in place as stated above. ED precautions given.     Jessica Miller MD  [facility] Internal Medicine HO-1

## 2023-11-08 NOTE — PROGRESS NOTES
Pharmacokinetic Assessment Follow Up: IV Vancomycin    Vancomycin serum concentration assessment(s):    The trough level was drawn correctly and can be used to guide therapy at this time. The measurement is within the desired definitive target range of 10 to 15 mcg/mL.    Vancomycin Regimen Plan:    Continue regimen to Vancomycin 1250 mg IV every 12 hours with next serum trough concentration measured at 1300 prior to 1400 dose on 11/9    Drug levels (last 3 results):  Recent Labs   Lab Result Units 11/07/23  1109 11/08/23  1305   Vancomycin Trough ug/ml 6.2* 10.4*       Pharmacy will continue to follow and monitor vancomycin.    Please contact pharmacy at extension [phone] for questions regarding this assessment.    Thank you for the consult,   Nusrat Borja       Patient brief summary:  Fela Ellison is a 58 y.o. female initiated on antimicrobial therapy with IV Vancomycin for treatment of skin & soft tissue infection    The patient's current regimen is vancomycin 1250mg q 12 h     Drug Allergies:   Review of patient's allergies indicates:  No Known Allergies    Actual Body Weight:   111.8kg    Renal Function:   Estimated Creatinine Clearance: 109.8 mL/min (based on SCr of 0.7 mg/dL).,     Dialysis Method (if applicable):  N/A    CBC (last 72 hours):  Recent Labs   Lab Result Units 11/05/23 2124 11/06/23  0708 11/07/23 0307 11/08/23  0325   WBC x10(3)/mcL 5.44  --  5.76 5.14   Hgb g/dL 6.6*  --  8.5* 8.1*   Hemoglobin A1c %  --  7.7*  --   --    Hct % 20.5*  --  26.1* 25.0*   Platelet x10(3)/mcL 27*  --  21* 19*   Mono % % 13.6  --  13.7 11.7   Monocytes % % 6  --   --   --    Eos % % 0.0  --  0.0 0.0   Basophil % % 0.2  --  0.0 0.0       Metabolic Panel (last 72 hours):  Recent Labs   Lab Result Units 11/05/23 2124 11/05/23  2306 11/07/23  0307 11/08/23  0325   Sodium Level mmol/L 135*  --  139 140   Potassium Level mmol/L 3.8  --  3.6 3.8   Chloride mmol/L 102  --  105 105   Carbon Dioxide mmol/L 23   --  24 24   Glucose Level mg/dL 324*  --  320* 189*   Glucose, UA   --  2+*  --   --    Blood Urea Nitrogen mg/dL 10.1  --  9.1* 7.6*   Creatinine mg/dL 0.82  --  0.70 0.70   Albumin Level g/dL 2.4*  --  2.3* 2.4*   Bilirubin Total mg/dL 0.4  --  0.5 0.5   Alkaline Phosphatase unit/L 71  --  68 68   Aspartate Aminotransferase unit/L 17  --  11 32   Alanine Aminotransferase unit/L 32  --  25 33   Magnesium Level mg/dL 1.60  --  1.90 1.90       Vancomycin Administrations:  vancomycin given in the last 96 hours                     vancomycin (VANCOCIN) 1,250 mg in dextrose 5 % (D5W) 250 mL IVPB (mg) 1,250 mg New Bag 11/08/23 0250     1,250 mg New Bag 11/07/23 1532    vancomycin (VANCOCIN) 1,000 mg in dextrose 5 % (D5W) 250 mL IVPB (mg) 1,000 mg New Bag 11/07/23 0050     1,000 mg New Bag 11/06/23 1400    vancomycin (VANCOCIN) 2,000 mg in dextrose 5 % (D5W) 500 mL IVPB (mg) 2,000 mg New Bag 11/05/23 2357                    Microbiologic Results:  Microbiology Results (last 7 days)       Procedure Component Value Units Date/Time    Blood Culture #2 **CANNOT BE ORDERED STAT** [[id number]]  (Normal) Collected: 11/05/23 2124    Order Status: Completed Specimen: Blood from Arm, Right Updated: 11/08/23 1102     CULTURE, BLOOD ([facility]) No Growth At 48 Hours    Blood Culture #1 **CANNOT BE ORDERED STAT** [[id number]]  (Normal) Collected: 11/05/23 2124    Order Status: Completed Specimen: Blood from Arm, Left Updated: 11/08/23 1102     CULTURE, BLOOD ([facility]) No Growth At 48 Hours

## 2023-11-11 LAB
BACTERIA BLD CULT: NORMAL
BACTERIA BLD CULT: NORMAL

## 2023-11-16 ENCOUNTER — TELEPHONE (OUTPATIENT)
Dept: HEMATOLOGY/ONCOLOGY | Facility: CLINIC | Age: 58
End: 2023-11-16

## 2023-11-16 ENCOUNTER — LAB VISIT (OUTPATIENT)
Dept: HEMATOLOGY/ONCOLOGY | Facility: CLINIC | Age: 58
End: 2023-11-16

## 2023-11-16 ENCOUNTER — INFUSION (OUTPATIENT)
Dept: INFUSION THERAPY | Facility: HOSPITAL | Age: 58
End: 2023-11-16
Attending: INTERNAL MEDICINE

## 2023-11-16 VITALS
DIASTOLIC BLOOD PRESSURE: 67 MMHG | SYSTOLIC BLOOD PRESSURE: 127 MMHG | TEMPERATURE: 98 F | HEIGHT: 64 IN | WEIGHT: 250 LBS | RESPIRATION RATE: 20 BRPM | BODY MASS INDEX: 42.68 KG/M2 | HEART RATE: 66 BPM | OXYGEN SATURATION: 95 %

## 2023-11-16 DIAGNOSIS — D64.9 ANEMIA, UNSPECIFIED TYPE: ICD-10-CM

## 2023-11-16 DIAGNOSIS — C92.10 BLAST CRISIS PHASE OF CHRONIC MYELOID LEUKEMIA: ICD-10-CM

## 2023-11-16 DIAGNOSIS — D64.9 ANEMIA, UNSPECIFIED TYPE: Primary | ICD-10-CM

## 2023-11-16 DIAGNOSIS — D64.9 SYMPTOMATIC ANEMIA: ICD-10-CM

## 2023-11-16 DIAGNOSIS — D69.6 THROMBOCYTOPENIA: ICD-10-CM

## 2023-11-16 LAB
ALBUMIN SERPL-MCNC: 2.5 G/DL (ref 3.5–5)
ALBUMIN/GLOB SERPL: 0.6 RATIO (ref 1.1–2)
ALP SERPL-CCNC: 79 UNIT/L (ref 40–150)
ALT SERPL-CCNC: 27 UNIT/L (ref 0–55)
ANISOCYTOSIS BLD QL SMEAR: ABNORMAL
AST SERPL-CCNC: 22 UNIT/L (ref 5–34)
BASOPHILS # BLD AUTO: 0 X10(3)/MCL
BASOPHILS NFR BLD AUTO: 0 %
BILIRUB SERPL-MCNC: 0.6 MG/DL
BUN SERPL-MCNC: 12.8 MG/DL (ref 9.8–20.1)
CALCIUM SERPL-MCNC: 9.6 MG/DL (ref 8.4–10.2)
CHLORIDE SERPL-SCNC: 100 MMOL/L (ref 98–107)
CO2 SERPL-SCNC: 28 MMOL/L (ref 22–29)
CREAT SERPL-MCNC: 0.77 MG/DL (ref 0.55–1.02)
EOSINOPHIL # BLD AUTO: 0 X10(3)/MCL (ref 0–0.9)
EOSINOPHIL NFR BLD AUTO: 0 %
ERYTHROCYTE [DISTWIDTH] IN BLOOD BY AUTOMATED COUNT: 17 % (ref 11.5–17)
GFR SERPLBLD CREATININE-BSD FMLA CKD-EPI: >60 MLS/MIN/1.73/M2
GLOBULIN SER-MCNC: 4.2 GM/DL (ref 2.4–3.5)
GLUCOSE SERPL-MCNC: 188 MG/DL (ref 74–100)
GROUP & RH: NORMAL
HCT VFR BLD AUTO: 25.4 % (ref 37–47)
HGB BLD-MCNC: 7.9 G/DL (ref 12–16)
HYPOCHROMIA BLD QL SMEAR: SLIGHT
IMM GRANULOCYTES # BLD AUTO: 0.28 X10(3)/MCL (ref 0–0.04)
IMM GRANULOCYTES NFR BLD AUTO: 4.3 %
INDIRECT COOMBS GEL: NORMAL
LYMPHOCYTES # BLD AUTO: 1.69 X10(3)/MCL (ref 0.6–4.6)
LYMPHOCYTES NFR BLD AUTO: 26 %
MCH RBC QN AUTO: 30.2 PG (ref 27–31)
MCHC RBC AUTO-ENTMCNC: 31.1 G/DL (ref 33–36)
MCV RBC AUTO: 96.9 FL (ref 80–94)
MONOCYTES # BLD AUTO: 1.16 X10(3)/MCL (ref 0.1–1.3)
MONOCYTES NFR BLD AUTO: 17.8 %
NEUTROPHILS # BLD AUTO: 3.37 X10(3)/MCL (ref 2.1–9.2)
NEUTROPHILS NFR BLD AUTO: 51.9 %
NRBC BLD AUTO-RTO: 4.9 %
PLATELET # BLD AUTO: 28 X10(3)/MCL (ref 130–400)
PLATELET # BLD EST: ABNORMAL 10*3/UL
PLATELETS.RETICULATED NFR BLD AUTO: 15.7 % (ref 0.9–11.2)
PMV BLD AUTO: ABNORMAL FL
POTASSIUM SERPL-SCNC: 4.2 MMOL/L (ref 3.5–5.1)
PROT SERPL-MCNC: 6.7 GM/DL (ref 6.4–8.3)
RBC # BLD AUTO: 2.62 X10(6)/MCL (ref 4.2–5.4)
SODIUM SERPL-SCNC: 139 MMOL/L (ref 136–145)
SPECIMEN OUTDATE: NORMAL
WBC # SPEC AUTO: 6.5 X10(3)/MCL (ref 4.5–11.5)

## 2023-11-16 PROCEDURE — 36430 TRANSFUSION BLD/BLD COMPNT: CPT

## 2023-11-16 PROCEDURE — P9040 RBC LEUKOREDUCED IRRADIATED: HCPCS | Performed by: NURSE PRACTITIONER

## 2023-11-16 PROCEDURE — 36415 COLL VENOUS BLD VENIPUNCTURE: CPT

## 2023-11-16 PROCEDURE — 80053 COMPREHEN METABOLIC PANEL: CPT

## 2023-11-16 PROCEDURE — 25000003 PHARM REV CODE 250: Performed by: NURSE PRACTITIONER

## 2023-11-16 PROCEDURE — 86920 COMPATIBILITY TEST SPIN: CPT | Performed by: NURSE PRACTITIONER

## 2023-11-16 PROCEDURE — 85025 COMPLETE CBC W/AUTO DIFF WBC: CPT

## 2023-11-16 PROCEDURE — 86850 RBC ANTIBODY SCREEN: CPT | Performed by: NURSE PRACTITIONER

## 2023-11-16 RX ORDER — HYDROCODONE BITARTRATE AND ACETAMINOPHEN 500; 5 MG/1; MG/1
TABLET ORAL ONCE
Status: COMPLETED | OUTPATIENT
Start: 2023-11-16 | End: 2023-11-16

## 2023-11-16 RX ORDER — ACETAMINOPHEN 325 MG/1
650 TABLET ORAL ONCE
Status: CANCELLED | OUTPATIENT
Start: 2023-11-16

## 2023-11-16 RX ORDER — ACETAMINOPHEN 325 MG/1
650 TABLET ORAL ONCE
Status: COMPLETED | OUTPATIENT
Start: 2023-11-16 | End: 2023-11-16

## 2023-11-16 RX ORDER — DIPHENHYDRAMINE HCL 25 MG
25 CAPSULE ORAL ONCE
Status: CANCELLED | OUTPATIENT
Start: 2023-11-16

## 2023-11-16 RX ORDER — DIPHENHYDRAMINE HCL 25 MG
25 CAPSULE ORAL ONCE
Status: COMPLETED | OUTPATIENT
Start: 2023-11-16 | End: 2023-11-16

## 2023-11-16 RX ORDER — HYDROCODONE BITARTRATE AND ACETAMINOPHEN 500; 5 MG/1; MG/1
TABLET ORAL ONCE
Status: CANCELLED | OUTPATIENT
Start: 2023-11-16 | End: 2023-11-16

## 2023-11-16 RX ADMIN — DIPHENHYDRAMINE HYDROCHLORIDE 25 MG: 25 CAPSULE ORAL at 10:11

## 2023-11-16 RX ADMIN — SODIUM CHLORIDE: 9 INJECTION, SOLUTION INTRAVENOUS at 10:11

## 2023-11-16 RX ADMIN — ACETAMINOPHEN 650 MG: 325 TABLET, FILM COATED ORAL at 10:11

## 2023-11-16 NOTE — NURSING
0944  Pt did labs today.  Hgb 7.9 and pt reports weakness and fatigue and SOB when walks a distance.  Pt accomp by her  as pt is in a WC.  Rates LOP to abd 8/10 with some red spots noted.  Pt states she feels like her abd is hot and inflamed.   states she had a fever last night 102.  1015  L Priti NP was able to eval pt after being notified of pt's assessment.  She states that this is nothing new with pt and to proceed with transfusing 1 unit of blood.

## 2023-11-16 NOTE — PROGRESS NOTES
Alphonso Llamas LPN notifed this NP of critical platelet level of 28,000. CBC reviewed.   Asked her to call patient and notify her platelets are coming up from before. Hgb is stable at 7.9 from last lab test.   Asked her to inquire if patient is having any bleeding or bruising, SOB, fatigue, dizziness, irregular heart beat, chest pain or headaches   Alphonso reports patient is reporting some dizziness, feels like she might fall and some bruising.   Recommend cross and match and transfuse 1 unit of packed red blood cells today. Patient and spouse is in agreement. Spoke with Chacha in infusion center, and they are able to accommodate patient today. Patient to be notified by Alphonso. Patient is here in lobby.

## 2023-11-19 ENCOUNTER — HOSPITAL ENCOUNTER (INPATIENT)
Facility: HOSPITAL | Age: 58
LOS: 1 days | Discharge: HOME | DRG: 841 | End: 2023-11-21
Attending: FAMILY MEDICINE | Admitting: INTERNAL MEDICINE

## 2023-11-19 DIAGNOSIS — R50.9 FEVER, UNSPECIFIED FEVER CAUSE: ICD-10-CM

## 2023-11-19 DIAGNOSIS — D69.6 THROMBOCYTOPENIA: ICD-10-CM

## 2023-11-19 DIAGNOSIS — D64.9 ANEMIA, UNSPECIFIED TYPE: ICD-10-CM

## 2023-11-19 DIAGNOSIS — C92.10 CML (CHRONIC MYELOCYTIC LEUKEMIA): ICD-10-CM

## 2023-11-19 DIAGNOSIS — R10.9 ABDOMINAL PAIN, UNSPECIFIED ABDOMINAL LOCATION: Primary | ICD-10-CM

## 2023-11-19 DIAGNOSIS — R07.9 CHEST PAIN: ICD-10-CM

## 2023-11-19 PROCEDURE — 80053 COMPREHEN METABOLIC PANEL: CPT | Performed by: FAMILY MEDICINE

## 2023-11-19 PROCEDURE — 85610 PROTHROMBIN TIME: CPT | Performed by: FAMILY MEDICINE

## 2023-11-19 PROCEDURE — 82728 ASSAY OF FERRITIN: CPT

## 2023-11-19 PROCEDURE — 86140 C-REACTIVE PROTEIN: CPT

## 2023-11-19 PROCEDURE — 84100 ASSAY OF PHOSPHORUS: CPT

## 2023-11-19 PROCEDURE — 83540 ASSAY OF IRON: CPT

## 2023-11-19 PROCEDURE — 82607 VITAMIN B-12: CPT

## 2023-11-19 PROCEDURE — 87040 BLOOD CULTURE FOR BACTERIA: CPT | Performed by: FAMILY MEDICINE

## 2023-11-19 PROCEDURE — 99285 EMERGENCY DEPT VISIT HI MDM: CPT | Mod: 25

## 2023-11-19 PROCEDURE — 83605 ASSAY OF LACTIC ACID: CPT | Performed by: FAMILY MEDICINE

## 2023-11-19 PROCEDURE — 85045 AUTOMATED RETICULOCYTE COUNT: CPT | Performed by: INTERNAL MEDICINE

## 2023-11-19 PROCEDURE — 94761 N-INVAS EAR/PLS OXIMETRY MLT: CPT

## 2023-11-19 PROCEDURE — 82962 GLUCOSE BLOOD TEST: CPT

## 2023-11-19 PROCEDURE — 0240U COVID/FLU A&B PCR: CPT | Performed by: FAMILY MEDICINE

## 2023-11-19 PROCEDURE — 83735 ASSAY OF MAGNESIUM: CPT

## 2023-11-19 PROCEDURE — 84550 ASSAY OF BLOOD/URIC ACID: CPT

## 2023-11-19 PROCEDURE — 85027 COMPLETE CBC AUTOMATED: CPT | Performed by: FAMILY MEDICINE

## 2023-11-19 PROCEDURE — 85730 THROMBOPLASTIN TIME PARTIAL: CPT | Performed by: FAMILY MEDICINE

## 2023-11-19 RX ORDER — HYDROXYZINE PAMOATE 25 MG/1
25 CAPSULE ORAL NIGHTLY PRN
COMMUNITY
Start: 2023-11-13 | End: 2023-12-30 | Stop reason: CLARIF

## 2023-11-19 RX ORDER — MORPHINE SULFATE 15 MG/1
30 TABLET ORAL 2 TIMES DAILY
Status: ON HOLD | COMMUNITY
End: 2024-04-01 | Stop reason: HOSPADM

## 2023-11-19 RX ORDER — INSULIN GLARGINE 100 [IU]/ML
45 INJECTION, SOLUTION SUBCUTANEOUS DAILY
COMMUNITY
Start: 2023-09-29 | End: 2023-12-30 | Stop reason: CLARIF

## 2023-11-20 LAB
ABO + RH BLD: NORMAL
ABO + RH BLD: NORMAL
ABS NEUT CALC (OHS): 3.32 X10(3)/MCL (ref 2.1–9.2)
ALBUMIN SERPL-MCNC: 2.2 G/DL (ref 3.5–5)
ALBUMIN SERPL-MCNC: 2.3 G/DL (ref 3.5–5)
ALBUMIN/GLOB SERPL: 0.5 RATIO (ref 1.1–2)
ALBUMIN/GLOB SERPL: 0.6 RATIO (ref 1.1–2)
ALP SERPL-CCNC: 75 UNIT/L (ref 40–150)
ALP SERPL-CCNC: 81 UNIT/L (ref 40–150)
ALT SERPL-CCNC: 26 UNIT/L (ref 0–55)
ALT SERPL-CCNC: 27 UNIT/L (ref 0–55)
APPEARANCE UR: CLEAR
APTT PPP: 36.8 SECONDS (ref 23.2–33.7)
AST SERPL-CCNC: 21 UNIT/L (ref 5–34)
AST SERPL-CCNC: 24 UNIT/L (ref 5–34)
BACTERIA #/AREA URNS AUTO: ABNORMAL /HPF
BASOPHILS # BLD AUTO: 0 X10(3)/MCL
BASOPHILS NFR BLD AUTO: 0 %
BILIRUB SERPL-MCNC: 0.4 MG/DL
BILIRUB SERPL-MCNC: 0.5 MG/DL
BILIRUB UR QL STRIP.AUTO: NEGATIVE
BLD PROD TYP BPU: NORMAL
BLD PROD TYP BPU: NORMAL
BLOOD UNIT EXPIRATION DATE: NORMAL
BLOOD UNIT EXPIRATION DATE: NORMAL
BLOOD UNIT TYPE CODE: 5100
BLOOD UNIT TYPE CODE: 5100
BUN SERPL-MCNC: 11 MG/DL (ref 9.8–20.1)
BUN SERPL-MCNC: 12.5 MG/DL (ref 9.8–20.1)
CALCIUM SERPL-MCNC: 9.5 MG/DL (ref 8.4–10.2)
CALCIUM SERPL-MCNC: 9.7 MG/DL (ref 8.4–10.2)
CHLORIDE SERPL-SCNC: 100 MMOL/L (ref 98–107)
CHLORIDE SERPL-SCNC: 102 MMOL/L (ref 98–107)
CO2 SERPL-SCNC: 26 MMOL/L (ref 22–29)
CO2 SERPL-SCNC: 26 MMOL/L (ref 22–29)
COLOR UR AUTO: ABNORMAL
CREAT SERPL-MCNC: 0.68 MG/DL (ref 0.55–1.02)
CREAT SERPL-MCNC: 0.78 MG/DL (ref 0.55–1.02)
CROSSMATCH INTERPRETATION: NORMAL
CROSSMATCH INTERPRETATION: NORMAL
CRP SERPL-MCNC: 238 MG/L
DISPENSE STATUS: NORMAL
DISPENSE STATUS: NORMAL
EOSINOPHIL # BLD AUTO: 0 X10(3)/MCL (ref 0–0.9)
EOSINOPHIL NFR BLD AUTO: 0 %
ERYTHROCYTE [DISTWIDTH] IN BLOOD BY AUTOMATED COUNT: 16.4 % (ref 11.5–17)
ERYTHROCYTE [DISTWIDTH] IN BLOOD BY AUTOMATED COUNT: 16.8 % (ref 11.5–17)
ERYTHROCYTE [SEDIMENTATION RATE] IN BLOOD: 35 MM/HR (ref 0–20)
FERRITIN SERPL-MCNC: 2928.64 NG/ML (ref 4.63–204)
FLUAV AG UPPER RESP QL IA.RAPID: NOT DETECTED
FLUBV AG UPPER RESP QL IA.RAPID: NOT DETECTED
FOLATE SERPL-MCNC: 15 NG/ML (ref 7–31.4)
GFR SERPLBLD CREATININE-BSD FMLA CKD-EPI: >60 MLS/MIN/1.73/M2
GFR SERPLBLD CREATININE-BSD FMLA CKD-EPI: >60 MLS/MIN/1.73/M2
GLOBULIN SER-MCNC: 4 GM/DL (ref 2.4–3.5)
GLOBULIN SER-MCNC: 4.3 GM/DL (ref 2.4–3.5)
GLUCOSE SERPL-MCNC: 187 MG/DL (ref 74–100)
GLUCOSE SERPL-MCNC: 254 MG/DL (ref 74–100)
GLUCOSE UR QL STRIP.AUTO: NORMAL
HCT VFR BLD AUTO: 23.9 % (ref 37–47)
HCT VFR BLD AUTO: 25.4 % (ref 37–47)
HGB BLD-MCNC: 7.4 G/DL (ref 12–16)
HGB BLD-MCNC: 8 G/DL (ref 12–16)
HOLD SPECIMEN: NORMAL
HYALINE CASTS #/AREA URNS LPF: ABNORMAL /LPF
IMM GRANULOCYTES # BLD AUTO: 0.18 X10(3)/MCL (ref 0–0.04)
IMM GRANULOCYTES NFR BLD AUTO: 3.2 %
INR PPP: 1.1
IRON SATN MFR SERPL: 16 % (ref 20–50)
IRON SERPL-MCNC: 34 UG/DL (ref 50–170)
KETONES UR QL STRIP.AUTO: NEGATIVE
LACTATE SERPL-SCNC: 1.9 MMOL/L (ref 0.5–2.2)
LEUKOCYTE ESTERASE UR QL STRIP.AUTO: 250
LYMPH ABN # BLD MANUAL: 4 %
LYMPHOCYTES # BLD AUTO: 2.05 X10(3)/MCL (ref 0.6–4.6)
LYMPHOCYTES NFR BLD AUTO: 36.6 %
LYMPHOCYTES NFR BLD MANUAL: 2.15 X10(3)/MCL
LYMPHOCYTES NFR BLD MANUAL: 37 % (ref 13–40)
MAGNESIUM SERPL-MCNC: 1.8 MG/DL (ref 1.6–2.6)
MAGNESIUM SERPL-MCNC: 1.8 MG/DL (ref 1.6–2.6)
MCH RBC QN AUTO: 29.8 PG (ref 27–31)
MCH RBC QN AUTO: 30.3 PG (ref 27–31)
MCHC RBC AUTO-ENTMCNC: 31 G/DL (ref 33–36)
MCHC RBC AUTO-ENTMCNC: 31.5 G/DL (ref 33–36)
MCV RBC AUTO: 96.2 FL (ref 80–94)
MCV RBC AUTO: 96.4 FL (ref 80–94)
MONOCYTES # BLD AUTO: 0.94 X10(3)/MCL (ref 0.1–1.3)
MONOCYTES NFR BLD AUTO: 16.8 %
MONOCYTES NFR BLD MANUAL: 0.12 X10(3)/MCL (ref 0.1–1.3)
MONOCYTES NFR BLD MANUAL: 2 % (ref 2–11)
MRSA PCR SCRN (OHS): NOT DETECTED
NEUTROPHILS # BLD AUTO: 2.43 X10(3)/MCL (ref 2.1–9.2)
NEUTROPHILS NFR BLD AUTO: 43.4 %
NEUTROPHILS NFR BLD MANUAL: 57 % (ref 47–80)
NITRITE UR QL STRIP.AUTO: NEGATIVE
NRBC BLD AUTO-RTO: 2.9 %
NRBC BLD AUTO-RTO: 3.8 %
NRBC BLD MANUAL-RTO: 5 %
PH UR STRIP.AUTO: 6 [PH]
PHOSPHATE SERPL-MCNC: 3.4 MG/DL (ref 2.3–4.7)
PHOSPHATE SERPL-MCNC: 4.3 MG/DL (ref 2.3–4.7)
PLATELET # BLD AUTO: 33 X10(3)/MCL (ref 130–400)
PLATELET # BLD AUTO: 34 X10(3)/MCL (ref 130–400)
PLATELET # BLD EST: ABNORMAL 10*3/UL
PLATELETS.RETICULATED NFR BLD AUTO: 15.1 % (ref 0.9–11.2)
PMV BLD AUTO: 13.2 FL (ref 7.4–10.4)
PMV BLD AUTO: ABNORMAL FL
POCT GLUCOSE: 199 MG/DL (ref 70–110)
POCT GLUCOSE: 207 MG/DL (ref 70–110)
POCT GLUCOSE: 229 MG/DL (ref 70–110)
POCT GLUCOSE: 231 MG/DL (ref 70–110)
POTASSIUM SERPL-SCNC: 3.8 MMOL/L (ref 3.5–5.1)
POTASSIUM SERPL-SCNC: 3.9 MMOL/L (ref 3.5–5.1)
PROT SERPL-MCNC: 6.2 GM/DL (ref 6.4–8.3)
PROT SERPL-MCNC: 6.6 GM/DL (ref 6.4–8.3)
PROT UR QL STRIP.AUTO: ABNORMAL
PROTHROMBIN TIME: 14.2 SECONDS (ref 11.4–14)
RBC # BLD AUTO: 2.48 X10(6)/MCL (ref 4.2–5.4)
RBC # BLD AUTO: 2.64 X10(6)/MCL (ref 4.2–5.4)
RBC #/AREA URNS AUTO: ABNORMAL /HPF
RBC MORPH BLD: NORMAL
RBC UR QL AUTO: NEGATIVE
RET# (OHS): 0.07 X10E6/UL (ref 0.02–0.08)
RETICULOCYTE COUNT AUTOMATED (OLG): 2.45 % (ref 1.1–2.1)
SARS-COV-2 RNA RESP QL NAA+PROBE: NOT DETECTED
SODIUM SERPL-SCNC: 139 MMOL/L (ref 136–145)
SODIUM SERPL-SCNC: 139 MMOL/L (ref 136–145)
SP GR UR STRIP.AUTO: 1.01 (ref 1–1.03)
SQUAMOUS #/AREA URNS LPF: ABNORMAL /HPF
TIBC SERPL-MCNC: 182 UG/DL (ref 70–310)
TIBC SERPL-MCNC: 216 UG/DL (ref 250–450)
TRANSFERRIN SERPL-MCNC: 185 MG/DL (ref 180–382)
UNIT NUMBER: NORMAL
UNIT NUMBER: NORMAL
URATE SERPL-MCNC: 3.4 MG/DL (ref 2.6–6)
UROBILINOGEN UR STRIP-ACNC: ABNORMAL
VIT B12 SERPL-MCNC: 401 PG/ML (ref 213–816)
WBC # SPEC AUTO: 5.6 X10(3)/MCL (ref 4.5–11.5)
WBC # SPEC AUTO: 5.82 X10(3)/MCL (ref 4.5–11.5)
WBC #/AREA URNS AUTO: ABNORMAL /HPF

## 2023-11-20 PROCEDURE — 84100 ASSAY OF PHOSPHORUS: CPT

## 2023-11-20 PROCEDURE — 96375 TX/PRO/DX INJ NEW DRUG ADDON: CPT

## 2023-11-20 PROCEDURE — 25000003 PHARM REV CODE 250: Performed by: INTERNAL MEDICINE

## 2023-11-20 PROCEDURE — 83735 ASSAY OF MAGNESIUM: CPT

## 2023-11-20 PROCEDURE — 81001 URINALYSIS AUTO W/SCOPE: CPT | Performed by: FAMILY MEDICINE

## 2023-11-20 PROCEDURE — 63600175 PHARM REV CODE 636 W HCPCS

## 2023-11-20 PROCEDURE — 25000003 PHARM REV CODE 250

## 2023-11-20 PROCEDURE — 80053 COMPREHEN METABOLIC PANEL: CPT

## 2023-11-20 PROCEDURE — 99900035 HC TECH TIME PER 15 MIN (STAT)

## 2023-11-20 PROCEDURE — 85652 RBC SED RATE AUTOMATED: CPT

## 2023-11-20 PROCEDURE — 94799 UNLISTED PULMONARY SVC/PX: CPT | Mod: XB

## 2023-11-20 PROCEDURE — 96367 TX/PROPH/DG ADDL SEQ IV INF: CPT

## 2023-11-20 PROCEDURE — 93005 ELECTROCARDIOGRAM TRACING: CPT

## 2023-11-20 PROCEDURE — 21400001 HC TELEMETRY ROOM

## 2023-11-20 PROCEDURE — 96366 THER/PROPH/DIAG IV INF ADDON: CPT

## 2023-11-20 PROCEDURE — 87641 MR-STAPH DNA AMP PROBE: CPT

## 2023-11-20 PROCEDURE — 63600175 PHARM REV CODE 636 W HCPCS: Performed by: INTERNAL MEDICINE

## 2023-11-20 PROCEDURE — 96368 THER/DIAG CONCURRENT INF: CPT

## 2023-11-20 PROCEDURE — 87077 CULTURE AEROBIC IDENTIFY: CPT | Performed by: FAMILY MEDICINE

## 2023-11-20 PROCEDURE — 25500020 PHARM REV CODE 255

## 2023-11-20 PROCEDURE — 96361 HYDRATE IV INFUSION ADD-ON: CPT

## 2023-11-20 PROCEDURE — 82746 ASSAY OF FOLIC ACID SERUM: CPT | Performed by: INTERNAL MEDICINE

## 2023-11-20 PROCEDURE — 96365 THER/PROPH/DIAG IV INF INIT: CPT

## 2023-11-20 PROCEDURE — 85025 COMPLETE CBC W/AUTO DIFF WBC: CPT

## 2023-11-20 RX ORDER — GLUCAGON 1 MG
1 KIT INJECTION
Status: DISCONTINUED | OUTPATIENT
Start: 2023-11-20 | End: 2023-11-21 | Stop reason: HOSPADM

## 2023-11-20 RX ORDER — FUROSEMIDE 20 MG/1
20 TABLET ORAL DAILY
Status: DISCONTINUED | OUTPATIENT
Start: 2023-11-20 | End: 2023-11-21 | Stop reason: HOSPADM

## 2023-11-20 RX ORDER — ONDANSETRON 4 MG/1
8 TABLET, ORALLY DISINTEGRATING ORAL EVERY 8 HOURS PRN
Status: DISCONTINUED | OUTPATIENT
Start: 2023-11-20 | End: 2023-11-21 | Stop reason: HOSPADM

## 2023-11-20 RX ORDER — MONTELUKAST SODIUM 5 MG/1
10 TABLET, CHEWABLE ORAL NIGHTLY
Status: DISCONTINUED | OUTPATIENT
Start: 2023-11-21 | End: 2023-11-21 | Stop reason: HOSPADM

## 2023-11-20 RX ORDER — MORPHINE SULFATE 2 MG/ML
3 INJECTION, SOLUTION INTRAMUSCULAR; INTRAVENOUS ONCE
Status: COMPLETED | OUTPATIENT
Start: 2023-11-20 | End: 2023-11-20

## 2023-11-20 RX ORDER — INSULIN ASPART 100 [IU]/ML
0-10 INJECTION, SOLUTION INTRAVENOUS; SUBCUTANEOUS
Status: DISCONTINUED | OUTPATIENT
Start: 2023-11-20 | End: 2023-11-21 | Stop reason: HOSPADM

## 2023-11-20 RX ORDER — HYDROXYZINE PAMOATE 25 MG/1
25 CAPSULE ORAL NIGHTLY PRN
Status: DISCONTINUED | OUTPATIENT
Start: 2023-11-20 | End: 2023-11-21 | Stop reason: HOSPADM

## 2023-11-20 RX ORDER — SILVER SULFADIAZINE 10 G/1000G
CREAM TOPICAL 2 TIMES DAILY
Status: DISCONTINUED | OUTPATIENT
Start: 2023-11-20 | End: 2023-11-21 | Stop reason: HOSPADM

## 2023-11-20 RX ORDER — ALPRAZOLAM 0.25 MG/1
0.25 TABLET ORAL 3 TIMES DAILY PRN
Status: DISCONTINUED | OUTPATIENT
Start: 2023-11-20 | End: 2023-11-21 | Stop reason: HOSPADM

## 2023-11-20 RX ORDER — AMOXICILLIN 250 MG
1 CAPSULE ORAL 2 TIMES DAILY
Status: DISCONTINUED | OUTPATIENT
Start: 2023-11-20 | End: 2023-11-21 | Stop reason: HOSPADM

## 2023-11-20 RX ORDER — PANTOPRAZOLE SODIUM 40 MG/1
40 TABLET, DELAYED RELEASE ORAL DAILY
Status: DISCONTINUED | OUTPATIENT
Start: 2023-11-20 | End: 2023-11-21 | Stop reason: HOSPADM

## 2023-11-20 RX ORDER — ACYCLOVIR 400 MG/1
400 TABLET ORAL 2 TIMES DAILY
Status: DISCONTINUED | OUTPATIENT
Start: 2023-11-20 | End: 2023-11-21 | Stop reason: HOSPADM

## 2023-11-20 RX ORDER — INSULIN ASPART 100 [IU]/ML
0-10 INJECTION, SOLUTION INTRAVENOUS; SUBCUTANEOUS
Status: DISCONTINUED | OUTPATIENT
Start: 2023-11-20 | End: 2023-11-20

## 2023-11-20 RX ORDER — INSULIN ASPART 100 [IU]/ML
0-5 INJECTION, SOLUTION INTRAVENOUS; SUBCUTANEOUS
Status: DISCONTINUED | OUTPATIENT
Start: 2023-11-20 | End: 2023-11-20

## 2023-11-20 RX ORDER — GABAPENTIN 300 MG/1
600 CAPSULE ORAL 3 TIMES DAILY
Status: DISCONTINUED | OUTPATIENT
Start: 2023-11-20 | End: 2023-11-21 | Stop reason: HOSPADM

## 2023-11-20 RX ORDER — LOSARTAN POTASSIUM 25 MG/1
25 TABLET ORAL DAILY
Status: DISCONTINUED | OUTPATIENT
Start: 2023-11-20 | End: 2023-11-21 | Stop reason: HOSPADM

## 2023-11-20 RX ORDER — NALOXONE HCL 0.4 MG/ML
0.02 VIAL (ML) INJECTION
Status: DISCONTINUED | OUTPATIENT
Start: 2023-11-20 | End: 2023-11-21 | Stop reason: HOSPADM

## 2023-11-20 RX ORDER — IBUPROFEN 200 MG
16 TABLET ORAL
Status: DISCONTINUED | OUTPATIENT
Start: 2023-11-20 | End: 2023-11-21 | Stop reason: HOSPADM

## 2023-11-20 RX ORDER — SODIUM CHLORIDE 9 MG/ML
INJECTION, SOLUTION INTRAVENOUS
Status: DISCONTINUED | OUTPATIENT
Start: 2023-11-20 | End: 2023-11-21 | Stop reason: HOSPADM

## 2023-11-20 RX ORDER — IBUPROFEN 200 MG
16 TABLET ORAL
Status: DISCONTINUED | OUTPATIENT
Start: 2023-11-20 | End: 2023-11-20

## 2023-11-20 RX ORDER — BISACODYL 10 MG
10 SUPPOSITORY, RECTAL RECTAL DAILY PRN
Status: DISCONTINUED | OUTPATIENT
Start: 2023-11-20 | End: 2023-11-21 | Stop reason: HOSPADM

## 2023-11-20 RX ORDER — ATORVASTATIN CALCIUM 40 MG/1
40 TABLET, FILM COATED ORAL DAILY
Status: DISCONTINUED | OUTPATIENT
Start: 2023-11-20 | End: 2023-11-21 | Stop reason: HOSPADM

## 2023-11-20 RX ORDER — IBUPROFEN 200 MG
24 TABLET ORAL
Status: DISCONTINUED | OUTPATIENT
Start: 2023-11-20 | End: 2023-11-20

## 2023-11-20 RX ORDER — METOPROLOL TARTRATE 25 MG/1
25 TABLET, FILM COATED ORAL 2 TIMES DAILY
Status: DISCONTINUED | OUTPATIENT
Start: 2023-11-20 | End: 2023-11-21 | Stop reason: HOSPADM

## 2023-11-20 RX ORDER — DEXTROMETHORPHAN POLISTIREX 30 MG/5 ML
1 SUSPENSION, EXTENDED RELEASE 12 HR ORAL DAILY PRN
Status: DISCONTINUED | OUTPATIENT
Start: 2023-11-20 | End: 2023-11-21 | Stop reason: HOSPADM

## 2023-11-20 RX ORDER — ACETAMINOPHEN 325 MG/1
650 TABLET ORAL EVERY 8 HOURS PRN
Status: DISCONTINUED | OUTPATIENT
Start: 2023-11-20 | End: 2023-11-21 | Stop reason: HOSPADM

## 2023-11-20 RX ORDER — IBUPROFEN 600 MG/1
600 TABLET ORAL EVERY 8 HOURS PRN
Status: DISCONTINUED | OUTPATIENT
Start: 2023-11-20 | End: 2023-11-21 | Stop reason: HOSPADM

## 2023-11-20 RX ORDER — ALLOPURINOL 100 MG/1
300 TABLET ORAL DAILY
Status: DISCONTINUED | OUTPATIENT
Start: 2023-11-20 | End: 2023-11-21 | Stop reason: HOSPADM

## 2023-11-20 RX ORDER — SODIUM CHLORIDE 0.9 % (FLUSH) 0.9 %
10 SYRINGE (ML) INJECTION EVERY 12 HOURS PRN
Status: DISCONTINUED | OUTPATIENT
Start: 2023-11-20 | End: 2023-11-21 | Stop reason: HOSPADM

## 2023-11-20 RX ORDER — METRONIDAZOLE 500 MG/100ML
500 INJECTION, SOLUTION INTRAVENOUS
Status: DISCONTINUED | OUTPATIENT
Start: 2023-11-20 | End: 2023-11-21

## 2023-11-20 RX ORDER — MORPHINE SULFATE 15 MG/1
30 TABLET ORAL 2 TIMES DAILY
Status: DISCONTINUED | OUTPATIENT
Start: 2023-11-20 | End: 2023-11-21 | Stop reason: HOSPADM

## 2023-11-20 RX ORDER — DOXYLAMINE SUCCINATE 25 MG
TABLET ORAL 2 TIMES DAILY
Status: DISCONTINUED | OUTPATIENT
Start: 2023-11-20 | End: 2023-11-21 | Stop reason: HOSPADM

## 2023-11-20 RX ORDER — HYDROMORPHONE HYDROCHLORIDE 1 MG/ML
0.5 INJECTION, SOLUTION INTRAMUSCULAR; INTRAVENOUS; SUBCUTANEOUS EVERY 6 HOURS PRN
Status: DISCONTINUED | OUTPATIENT
Start: 2023-11-20 | End: 2023-11-21

## 2023-11-20 RX ORDER — MAG HYDROX/ALUMINUM HYD/SIMETH 200-200-20
30 SUSPENSION, ORAL (FINAL DOSE FORM) ORAL 4 TIMES DAILY PRN
Status: DISCONTINUED | OUTPATIENT
Start: 2023-11-20 | End: 2023-11-21 | Stop reason: HOSPADM

## 2023-11-20 RX ORDER — AMLODIPINE BESYLATE 10 MG/1
10 TABLET ORAL DAILY
Status: DISCONTINUED | OUTPATIENT
Start: 2023-11-20 | End: 2023-11-21 | Stop reason: HOSPADM

## 2023-11-20 RX ORDER — IBUPROFEN 200 MG
24 TABLET ORAL
Status: DISCONTINUED | OUTPATIENT
Start: 2023-11-20 | End: 2023-11-21 | Stop reason: HOSPADM

## 2023-11-20 RX ORDER — ENOXAPARIN SODIUM 100 MG/ML
40 INJECTION SUBCUTANEOUS EVERY 24 HOURS
Status: DISCONTINUED | OUTPATIENT
Start: 2023-11-20 | End: 2023-11-20

## 2023-11-20 RX ORDER — HYDROCORTISONE 25 MG/G
CREAM TOPICAL 2 TIMES DAILY
Status: DISCONTINUED | OUTPATIENT
Start: 2023-11-20 | End: 2023-11-21 | Stop reason: HOSPADM

## 2023-11-20 RX ORDER — GLUCAGON 1 MG
1 KIT INJECTION
Status: DISCONTINUED | OUTPATIENT
Start: 2023-11-20 | End: 2023-11-20

## 2023-11-20 RX ADMIN — SODIUM CHLORIDE, POTASSIUM CHLORIDE, SODIUM LACTATE AND CALCIUM CHLORIDE 250 ML: 600; 310; 30; 20 INJECTION, SOLUTION INTRAVENOUS at 10:11

## 2023-11-20 RX ADMIN — ACYCLOVIR 400 MG: 400 TABLET ORAL at 08:11

## 2023-11-20 RX ADMIN — ONDANSETRON 8 MG: 4 TABLET, ORALLY DISINTEGRATING ORAL at 01:11

## 2023-11-20 RX ADMIN — ACYCLOVIR 400 MG: 400 TABLET ORAL at 07:11

## 2023-11-20 RX ADMIN — SENNOSIDES AND DOCUSATE SODIUM 1 TABLET: 50; 8.6 TABLET ORAL at 08:11

## 2023-11-20 RX ADMIN — IOHEXOL 100 ML: 350 INJECTION, SOLUTION INTRAVENOUS at 10:11

## 2023-11-20 RX ADMIN — METOPROLOL TARTRATE 25 MG: 25 TABLET, FILM COATED ORAL at 08:11

## 2023-11-20 RX ADMIN — LOSARTAN POTASSIUM 25 MG: 25 TABLET, FILM COATED ORAL at 07:11

## 2023-11-20 RX ADMIN — ATORVASTATIN CALCIUM 40 MG: 40 TABLET, FILM COATED ORAL at 08:11

## 2023-11-20 RX ADMIN — AMLODIPINE BESYLATE 10 MG: 10 TABLET ORAL at 08:11

## 2023-11-20 RX ADMIN — GABAPENTIN 600 MG: 300 CAPSULE ORAL at 07:11

## 2023-11-20 RX ADMIN — HYDROCORTISONE: 25 CREAM TOPICAL at 08:11

## 2023-11-20 RX ADMIN — MORPHINE SULFATE 3 MG: 2 INJECTION, SOLUTION INTRAMUSCULAR; INTRAVENOUS at 02:11

## 2023-11-20 RX ADMIN — FUROSEMIDE 20 MG: 20 TABLET ORAL at 08:11

## 2023-11-20 RX ADMIN — METRONIDAZOLE 500 MG: 5 INJECTION, SOLUTION INTRAVENOUS at 01:11

## 2023-11-20 RX ADMIN — MORPHINE SULFATE 30 MG: 15 TABLET ORAL at 07:11

## 2023-11-20 RX ADMIN — INSULIN ASPART 4 UNITS: 100 INJECTION, SOLUTION INTRAVENOUS; SUBCUTANEOUS at 05:11

## 2023-11-20 RX ADMIN — MICONAZOLE NITRATE: 20 CREAM TOPICAL at 08:11

## 2023-11-20 RX ADMIN — ACETAMINOPHEN 650 MG: 325 TABLET, FILM COATED ORAL at 01:11

## 2023-11-20 RX ADMIN — CEFEPIME 2 G: 2 INJECTION, POWDER, FOR SOLUTION INTRAVENOUS at 05:11

## 2023-11-20 RX ADMIN — METRONIDAZOLE 500 MG: 5 INJECTION, SOLUTION INTRAVENOUS at 05:11

## 2023-11-20 RX ADMIN — ONDANSETRON 8 MG: 4 TABLET, ORALLY DISINTEGRATING ORAL at 04:11

## 2023-11-20 RX ADMIN — INSULIN ASPART 4 UNITS: 100 INJECTION, SOLUTION INTRAVENOUS; SUBCUTANEOUS at 12:11

## 2023-11-20 RX ADMIN — HYDROMORPHONE HYDROCHLORIDE 0.5 MG: 0.5 INJECTION, SOLUTION INTRAMUSCULAR; INTRAVENOUS; SUBCUTANEOUS at 10:11

## 2023-11-20 RX ADMIN — HYDROMORPHONE HYDROCHLORIDE 0.5 MG: 0.5 INJECTION, SOLUTION INTRAMUSCULAR; INTRAVENOUS; SUBCUTANEOUS at 11:11

## 2023-11-20 RX ADMIN — GABAPENTIN 600 MG: 300 CAPSULE ORAL at 08:11

## 2023-11-20 RX ADMIN — LOSARTAN POTASSIUM 25 MG: 25 TABLET, FILM COATED ORAL at 08:11

## 2023-11-20 RX ADMIN — INSULIN ASPART 2 UNITS: 100 INJECTION, SOLUTION INTRAVENOUS; SUBCUTANEOUS at 08:11

## 2023-11-20 RX ADMIN — MORPHINE SULFATE 30 MG: 15 TABLET ORAL at 08:11

## 2023-11-20 RX ADMIN — CEFEPIME 2 G: 2 INJECTION, POWDER, FOR SOLUTION INTRAVENOUS at 06:11

## 2023-11-20 RX ADMIN — GABAPENTIN 600 MG: 300 CAPSULE ORAL at 03:11

## 2023-11-20 RX ADMIN — ALLOPURINOL 300 MG: 100 TABLET ORAL at 08:11

## 2023-11-20 RX ADMIN — SODIUM CHLORIDE: 9 INJECTION, SOLUTION INTRAVENOUS at 06:11

## 2023-11-20 RX ADMIN — SILVER SULFADIAZINE: 10 CREAM TOPICAL at 08:11

## 2023-11-20 RX ADMIN — PANTOPRAZOLE SODIUM 40 MG: 40 TABLET, DELAYED RELEASE ORAL at 08:11

## 2023-11-20 RX ADMIN — METRONIDAZOLE 500 MG: 5 INJECTION, SOLUTION INTRAVENOUS at 09:11

## 2023-11-20 RX ADMIN — VANCOMYCIN HYDROCHLORIDE 1000 MG: 1 INJECTION, POWDER, LYOPHILIZED, FOR SOLUTION INTRAVENOUS at 03:11

## 2023-11-20 RX ADMIN — HYDROMORPHONE HYDROCHLORIDE 0.5 MG: 0.5 INJECTION, SOLUTION INTRAMUSCULAR; INTRAVENOUS; SUBCUTANEOUS at 05:11

## 2023-11-20 RX ADMIN — VANCOMYCIN HYDROCHLORIDE 2000 MG: 1 INJECTION, POWDER, LYOPHILIZED, FOR SOLUTION INTRAVENOUS at 04:11

## 2023-11-20 RX ADMIN — MICONAZOLE NITRATE: 20 CREAM TOPICAL at 09:11

## 2023-11-20 NOTE — PROGRESS NOTES
Pt's next appointment with [facility] moved up to 11/28/2023 11:00. Jammie with [facility] stated all narcotics need to be prescribed through their agency - any recommendations for change in med/dose will need to be relayed to them at [phone].

## 2023-11-20 NOTE — H&P
[facility] Wards   History & Physical Note     Resident Team: [place] List 4  Attending Physician: Abebe Masters MD  Resident: Ishan Jacinto DO   Intern: Gerson Mancia DO    Date of Admit: 11/19/2023    Chief Complaint:     Fever (Fever at home; 102.6 F, took Tylenol. Now 100.9; hx of Leukemia.)   for 1 week    Subjective:      History of Present Illness:  Fela Ellison is a 58 y.o. female with a history of CML, chronic thrombocytopenia, HTN, HLD, IDDM-2, NAFLD, neuropathy who presented to [facility] ED on 11/19/2023  with complaint of fever of 102.6F at home and worsening abdominal pain. Patient non-english speaking; translation provided by  at bedside. Of note, patient was previously admitted for similar complaint on 11/07. Today, patient states that chronic abdominal pain has worsened over the past week, characterizes it as stabbing and tension-like. She states that it is worsened with truncal movement and cold temperatures. Associated worsening abdominal swelling. Fever is also chronic in nature, typically running around 100.1-100.6F at home. Today, patient measured a 102.6F fever at home, prompting her to take an OTC tylenol and come to the ED. Patient notes that fevers typically arise around 4-6 pm. She denies any night sweats, chills, or rigors. Otherwise, patient does experience nausea but denies any emesis, diarrhea, constipation, chest pain, SOB., hematochezia, hematuria, dysuria, decreased UO, anorexia or headache.     Patient last saw her oncologist the Tuesday prior; next appointment is 11/27. On chart review, patient was forced to d/c chemo in September 2/2 thrombocytopenia; since then she has been on Iclusig in lieu. Pt also sees palliative care who prescribes a regimen of MS Cotin 30 mg PO BID, Norco 5 mg q6hr, and xanax 0.25mg TID for her cancer-related symptoms; her next appointment is 12/7.     ED Course: Pt arrives uncomfortable, nontoxic. Febrile at 100.9F, mildly HTN  with Max 151/69; HR RR and O2 sat wnl. CBC shows normocytic anemia - H&H 8/25.4 and MCV 96.2 (all near baseline); thrombocytopenia - PLT 34 (above baseline). INR 1.1. CMP notable for hyperglycemia of 254. Lac 1.9. Flu/COVID neg. CXR neg. UA shows possible sterile pyuria with Leuk 250, WBC 21-50. UCX and BCX drawn in ED.       Past Medical History:  Past Medical History:   Diagnosis Date    Cancer     CML (chronic myelocytic leukemia)     DM2 (diabetes mellitus, type 2)     HTN (hypertension)     NAFLD (nonalcoholic fatty liver disease)     Neuropathy         Past Surgical History:  Past Surgical History:   Procedure Laterality Date    ABDOMINAL SURGERY       SECTION      x4    CHOLECYSTECTOMY          Family History:  Family History   Problem Relation Age of Onset    Diabetes Mother     Diabetes Father     Cancer Neg Hx         Social History:  Social History     Socioeconomic History    Marital status:    Tobacco Use    Smoking status: Never    Smokeless tobacco: Never   Substance and Sexual Activity    Alcohol use: Not Currently    Drug use: Not Currently   Social History Narrative    ** Merged History Encounter **          Social Determinants of Health     Financial Resource Strain: High Risk (2023)    Overall Financial Resource Strain (CARDIA)     Difficulty of Paying Living Expenses: Hard   Food Insecurity: No Food Insecurity (2023)    Hunger Vital Sign     Worried About Running Out of Food in the Last Year: Never true     Ran Out of Food in the Last Year: Never true   Transportation Needs: No Transportation Needs (2023)    PRAPARE - Transportation     Lack of Transportation (Medical): No     Lack of Transportation (Non-Medical): No   Physical Activity: Inactive (2023)    Exercise Vital Sign     Days of Exercise per Week: 0 days     Minutes of Exercise per Session: 0 min   Social Connections: Unknown (2023)    Social Connection and Isolation Panel [NHANES]     Frequency  of Communication with Friends and Family: More than three times a week     Frequency of Social Gatherings with Friends and Family: More than three times a week     Attends Club or Organization Meetings: Never     Marital Status:    Housing Stability: Low Risk  (9/18/2023)    Housing Stability Vital Sign     Unable to Pay for Housing in the Last Year: No     Number of Places Lived in the Last Year: 1     Unstable Housing in the Last Year: No        Allergies:  has No Known Allergies.     Home Medications:  Prior to Admission medications    Medication Sig Start Date End Date Taking? Authorizing Provider   hydrOXYzine pamoate (VISTARIL) 25 MG Cap Take 25 mg by mouth nightly as needed. 11/13/23  Yes Provider, Historical   LANTUS U-100 INSULIN 100 unit/mL injection Inject 45 Units into the skin once daily. 9/29/23  Yes Provider, Historical   acyclovir (ZOVIRAX) 400 MG tablet Take 1 tablet (400 mg total) by mouth 2 (two) times daily. 5/29/23 5/28/24  Natalya Ojeda MD   albuterol (PROVENTIL/VENTOLIN HFA) 90 mcg/actuation inhaler Inhale 2 puffs into the lungs every 6 (six) hours as needed for Wheezing. Rescue    Provider, Historical   allopurinoL (ZYLOPRIM) 300 MG tablet Take 300 mg by mouth once daily. 2/27/23   Provider, Historical   ALPRAZolam (XANAX) 0.25 MG tablet Take 0.25 mg by mouth 3 (three) times daily as needed for Anxiety. 4/4/23   Provider, Historical   amLODIPine (NORVASC) 10 MG tablet Take 10 mg by mouth once daily.    Provider, Historical   ammonium lactate 12 % Crea Apply topically daily as needed.    Provider, Historical   atorvastatin (LIPITOR) 40 MG tablet Take 40 mg by mouth once daily.    Provider, Historical   busPIRone (BUSPAR) 5 MG Tab Take 1 tablet (5 mg total) by mouth 2 (two) times daily.  Patient not taking: Reported on 11/2/2023 6/4/23 6/3/24  Julio Cesar Rodriguez DO   EScitalopram oxalate (LEXAPRO) 10 MG tablet Take 1 tablet (10 mg total) by mouth once daily.  Patient not taking: Reported  on 11/2/2023 6/4/23 6/3/24  Julio Cesar Rodriguez DO   furosemide (LASIX) 20 MG tablet Take 1 tablet (20 mg total) by mouth once daily. 9/20/23   Huma Haines NP   gabapentin (NEURONTIN) 300 MG capsule Take 2 capsules TID. 9/20/23   Huma Haines NP   HYDROcodone-acetaminophen (NORCO) 5-325 mg per tablet Take 1 tablet by mouth every 6 (six) hours as needed for Pain.    Provider, Historical   hydrocortisone 2.5 % cream Apply topically 2 (two) times daily. 5/22/23 5/21/24  Provider, Historical   ibuprofen (ADVIL,MOTRIN) 600 MG tablet TAKE 1 TABLET WITH FOOD OR MILK AS NEEDED THREE TIMES A DAY ORALLY 30 3/29/23   Provider, Historical   insulin lispro 100 unit/mL injection Inject 22 Units into the skin 3 (three) times daily before meals. Takes 22 units TID AC while on chemo - (Gives between 12 to 15 units TID AC when not on chemo) 9/29/23 10/29/23  Ivan Del Rio MD   insulin NPH (NOVOLIN N NPH U-100 INSULIN) 100 unit/mL injection 50 Units 2 (two) times daily before meals. No longer 20 in AM 3/22/23   Provider, Historical   ketoconazole (NIZORAL) 2 % cream Apply topically. 5/22/23 5/21/24  Provider, Historical   losartan (COZAAR) 25 MG tablet Take 1 tablet (25 mg total) by mouth once daily. 12/12/22 12/12/23  Rupesh Thomas MD   metFORMIN (GLUCOPHAGE) 1000 MG tablet Take 1,000 mg by mouth 2 (two) times daily with meals.    Provider, Historical   metoprolol tartrate (LOPRESSOR) 25 MG tablet Take 25 mg by mouth 2 (two) times daily.    Provider, Historical   montelukast (SINGULAIR) 10 mg tablet Take 10 mg by mouth once daily.    Provider, Historical   morphine (MSIR) 15 MG tablet Take 30 mg by mouth 2 (two) times a day.    Provider, Historical   omeprazole (PRILOSEC) 40 MG capsule Take 1 capsule by mouth once daily. 5/22/23   Provider, Historical   ondansetron (ZOFRAN-ODT) 4 MG TbDL Take 4 mg by mouth every 8 (eight) hours as needed. 5/16/23   Provider, Historical   PONATinib (ICLUSIG) 30 mg Tab Take 30 mg  by mouth Daily.    Provider, Historical   silver sulfADIAZINE 1% (SILVADENE) 1 % cream Apply topically. 1/20/23 1/20/24  Provider, Historical   venetoclax (VENCLEXTA) 100 mg Tab Take 400 mg by mouth Only takes while taking chemo .    Provider, Historical       Review of Systems:  Review of Systems   Constitutional:  Positive for fever and malaise/fatigue. Negative for chills.   HENT: Negative.     Eyes: Negative.    Respiratory:  Negative for cough, hemoptysis, sputum production and shortness of breath.    Cardiovascular:  Negative for chest pain, palpitations and leg swelling.   Gastrointestinal:  Positive for abdominal pain and nausea. Negative for blood in stool, constipation, diarrhea, melena and vomiting.   Genitourinary:  Negative for dysuria and hematuria.   Musculoskeletal:  Negative for back pain, joint pain, myalgias and neck pain.   Skin:         Worsening pain over abdominal lesions    Neurological: Negative.    Endo/Heme/Allergies:  Does not bruise/bleed easily.   Psychiatric/Behavioral: Negative.            Objective:     Vital Signs (Most Recent):  Temp: 99.2 °F (37.3 °C) (11/20/23 0030)  Pulse: 79 (11/20/23 0252)  Resp: 20 (11/20/23 0252)  BP: 138/82 (11/20/23 0232)  SpO2: 96 % (11/20/23 0252) Vital Signs (24h Range):  Temp:  [99.2 °F (37.3 °C)-100.9 °F (38.3 °C)] 99.2 °F (37.3 °C)  Pulse:  [76-97] 79  Resp:  [14-25] 20  SpO2:  [91 %-97 %] 96 %  BP: (126-151)/(68-88) 138/82       Physical Examination:  Physical Exam  Vitals and nursing note reviewed.   Constitutional:       General: She is not in acute distress.     Appearance: Normal appearance. She is obese.   HENT:      Head: Normocephalic and atraumatic.      Right Ear: External ear normal.      Left Ear: External ear normal.      Nose: Nose normal.      Mouth/Throat:      Mouth: Mucous membranes are moist.      Pharynx: Oropharynx is clear.   Eyes:      General: No scleral icterus.     Extraocular Movements: Extraocular movements intact.       Conjunctiva/sclera: Conjunctivae normal.      Pupils: Pupils are equal, round, and reactive to light.   Cardiovascular:      Rate and Rhythm: Normal rate and regular rhythm.      Pulses: Normal pulses.      Heart sounds: Normal heart sounds. No murmur heard.     No gallop.   Pulmonary:      Effort: Pulmonary effort is normal.      Breath sounds: Normal breath sounds. No wheezing, rhonchi or rales.   Abdominal:      General: Bowel sounds are normal. There is no distension.      Palpations: Abdomen is soft.      Tenderness: There is abdominal tenderness (Tenderness over lesions).   Musculoskeletal:         General: No swelling. Normal range of motion.      Cervical back: Normal range of motion and neck supple.      Right lower leg: No edema.      Left lower leg: No edema.   Skin:     General: Skin is warm and dry.      Capillary Refill: Capillary refill takes less than 2 seconds.      Coloration: Skin is not jaundiced.      Findings: Lesion (Numerous indurated erythematous lesions of the lower abdomen bilaterally) present. No bruising, erythema or rash.   Neurological:      General: No focal deficit present.      Mental Status: She is alert and oriented to person, place, and time.   Psychiatric:         Mood and Affect: Mood normal.         Behavior: Behavior normal.         Lab Results   Component Value Date     11/19/2023    K 3.8 11/19/2023     10/29/2020    CO2 26 11/19/2023     (H) 10/29/2020    BUN 12.5 11/19/2023    CREATININE 0.78 11/19/2023    CALCIUM 9.7 11/19/2023    PROT 6.3 10/29/2020    BILIDIR 0.3 04/08/2022    IBILI 0.20 04/08/2022    ALKPHOS 81 11/19/2023    AST 21 11/19/2023    ALT 27 11/19/2023    MG 1.80 11/19/2023    PHOS 3.4 11/19/2023        Lab Results   Component Value Date    WBC 5.82 11/19/2023    RBC 2.64 (L) 11/19/2023    HGB 8.0 (L) 11/19/2023    HCT 25.4 (L) 11/19/2023    MCV 96.2 (H) 11/19/2023    MCH 30.3 11/19/2023    MCHC 31.5 (L) 11/19/2023    RDW 16.8 11/19/2023     PLT 34 (LL) 11/19/2023    MPV  11/19/2023      Comment:      NA    GRAN 68.0 10/29/2020    LYMPH Test Not Performed 10/29/2020    LYMPH 5.0 (L) 10/29/2020    MONO Test Not Performed 10/29/2020    MONO 0.0 (L) 10/29/2020    EOS Test Not Performed 10/29/2020    BASO Test Not Performed 10/29/2020    EOSINOPHIL 4.0 10/29/2020    BASOPHIL 6.0 (H) 10/29/2020         Microbiology Data:  Microbiology Results (last 7 days)       Procedure Component Value Units Date/Time    Urine culture [[id number]] Collected: 11/20/23 0053    Order Status: Sent Specimen: Urine Updated: 11/20/23 0211    Blood Culture #1 **CANNOT BE ORDERED STAT** [[id number]] Collected: 11/19/23 2255    Order Status: Sent Specimen: Blood from Arm, Right Updated: 11/19/23 2323    Blood Culture #2 **CANNOT BE ORDERED STAT** [[id number]] Collected: 11/19/23 2306    Order Status: Sent Specimen: Blood from Hand, Left Updated: 11/19/23 2323            Cardiac Data:  No echocardiogram results found for the past 14 days.    Results for orders placed or performed during the hospital encounter of 10/12/23   EKG 12-lead    Collection Time: 10/12/23 10:24 PM    Narrative    Test Reason : R00.0,    Vent. Rate : 108 BPM     Atrial Rate : 108 BPM     P-R Int : 148 ms          QRS Dur : 100 ms      QT Int : 350 ms       P-R-T Axes : 058 -01 038 degrees     QTc Int : 469 ms    Sinus tachycardia  Poor R wave progression across the precordial leads  Abnormal ECG    Nonspecific T wave abnormality now evident in Inferior leads  Confirmed by Chance Bernardo MD ([id number]) on 10/13/2023 2:01:13 PM    Referred By: VIDA   SELF           Confirmed By:Chance Bernardo MD        Radiology:  Imaging Results              X-Ray Chest 1 View (Preliminary result)  Result time 11/19/23 23:54:34      Wet Read by Freddy Ford MD (11/19/23 23:54:34, [facility], Emergency Medicine)    No acute abnormality appreciated.                                        Lines/Drains/Airways       Peripheral Intravenous Line  Duration                  Peripheral IV - Single Lumen 11/19/23 2254 20 G Right Antecubital <1 day                     Assessment & Plan:     Chronic Myelogenous Leukemia   Fever likely 2/2 Malignancy   Normocytic Anemia   Chronic Thrombocytopenia   - Patient with h/o CML, presents with fever of 102.6F at home. Not currently on chemo or radiation 2/2 thrombocytopenia, last dose in sept. Currently taking Inclusig. Patient seen here numerous times for similar complaint.   - Pt took tylenol at home PTA; temperature on arrival 100.9F   - H/H 8/25.4, near baseline on previous admissions  - PLT 34, above baseline   - F/u iron panel, folate, b12, reticulocyte   - CTM with daily CBC   - Transfuse if Hg <7; Platelet transfusion for PLT < 10k   - LVX held 2/2 thrombocytopenia; defer to day team   - Continuing home Acyclovir, allopurinol, zofran 4 q8hr       Abdominal Wall Pain  Inflammatory Nodules on Anterior Abdominal Wall  - Pt with numerous painful, erythematous nodules on the anterior abdominal wall 2/2 previous chemo injections per patient's . Pain worsening   - CTAP with contrast on 11/05 has no mention of abdominal lesion; however, based on my read, these appear to be solid, noncystic lesions c/w inflammatory scar tissue    - Home Pain Regimen: 30 mg MS Cotin BID + Norco 5 q6hr prn (MME/d 80)  - Holding Norco; will continue MS Cotin 30 mg PO BID + Dilaudid 0.5 mg q6hr IV PRN (MME/d 100) for increased pain control - defer pain management to day team   - Continuous pulse ox in place; Incentive spirometry ordered   - Narcan on floor   - APAP 650 q8 PRN for Temp > 101   - Started on Vanc, Cefepime, Flagyl (over zosyn 2/2 thrombocytopenic effects); de-escalation per primary team.   - CM consulted to request advancement of patient's upcoming palliative care appointment     H/o Insulin-Dependent Insulin Mellitus   - A1c 7.7% two weeks ago  - Gluc 254 on  arrival   - Home regimen: Metformin 1000 BID, Lantus 45 qD, Insulin NPH 50 BID, Lispro 12-15U TID AC  - Holding home regimen; Started on HD SSI   - POC Gluc QID AC     Non-Alcoholic Fatty Liver Disease   - Continuing Lipitor     H/o Hypertension  - Continuing home amlodipine 10 qD, lasix 20 qD, losartan 25 qD, lopressor 25 BID   - CTM with q4hr vital checks     H/o Anxiety   - Continuing home Xanex 0.25 TID and vistaril 25 qHS         CODE STATUS:  Full  Access:  PIV  Antibiotics:  Vanc Cefepime Flagyl - day 1  Diet:  Diabetic   DVT Prophylaxis:  SCD   GI Prophylaxis:  Protonix   Fluids:  None     Dispo: 57 yo F with h/o CML, chronic thrombocytopenia, and IDDM-2 presents with worsening abdominal pain, fever, thrombocytopenia. Admitted for pain management and to control her fever.     Gerson Mancia DO   [facility] PGY-1

## 2023-11-20 NOTE — PROGRESS NOTES
Pharmacokinetic Initial Assessment: IV Vancomycin    Assessment/Plan:    Initiate intravenous vancomycin with loading dose of 2000 mg once followed by a maintenance dose of vancomycin 1000mg IV every 12 hours  Desired empiric serum trough concentration is 10 to 15 mcg/mL  Draw vancomycin trough level 60 min prior to fourth dose on 11/21/23 at approximately 1500  Pharmacy will continue to follow and monitor vancomycin.      Please contact pharmacy at extension [phone] with any questions regarding this assessment.     Thank you for the consult,   Maria Del Carmen Burnett       Patient brief summary:  Fela Ellison is a 58 y.o. female initiated on antimicrobial therapy with IV Vancomycin for treatment of suspected skin & soft tissue infection    Drug Allergies:   Review of patient's allergies indicates:  No Known Allergies    Actual Body Weight:   112 kg    Renal Function:   Estimated Creatinine Clearance: 98.7 mL/min (based on SCr of 0.78 mg/dL).,     Dialysis Method (if applicable):  N/A    CBC (last 72 hours):  Recent Labs   Lab Result Units 11/19/23  2306   WBC x10(3)/mcL 5.82   Hgb g/dL 8.0*   Hct % 25.4*   Platelet x10(3)/mcL 34*   Monocytes % % 2       Metabolic Panel (last 72 hours):  Recent Labs   Lab Result Units 11/19/23  2254 11/19/23  2329 11/20/23  0053   Sodium Level mmol/L 139  --   --    Potassium Level mmol/L 3.8  --   --    Chloride mmol/L 100  --   --    Carbon Dioxide mmol/L 26  --   --    Glucose Level mg/dL 254*  --   --    Glucose, UA   --   --  Normal   Blood Urea Nitrogen mg/dL 12.5  --   --    Creatinine mg/dL 0.78  --   --    Albumin Level g/dL 2.3*  --   --    Bilirubin Total mg/dL 0.5  --   --    Alkaline Phosphatase unit/L 81  --   --    Aspartate Aminotransferase unit/L 21  --   --    Alanine Aminotransferase unit/L 27  --   --    Magnesium Level mg/dL  --  1.80  --    Phosphorus Level mg/dL  --  3.4  --        Drug levels (last 3 results):  No results for input(s):  ""VANCOMYCINRA", "VANCORANDOM", "VANCOMYCINPE", "VANCOPEAK", "VANCOMYCINTR", "VANCOTROUGH" in the last 72 hours.    Microbiologic Results:  Microbiology Results (last 7 days)       Procedure Component Value Units Date/Time    Urine culture [[id number]] Collected: 11/20/23 0053    Order Status: Sent Specimen: Urine Updated: 11/20/23 0211    Blood Culture #1 **CANNOT BE ORDERED STAT** [[id number]] Collected: 11/19/23 2255    Order Status: Sent Specimen: Blood from Arm, Right Updated: 11/19/23 2323    Blood Culture #2 **CANNOT BE ORDERED STAT** [[id number]] Collected: 11/19/23 2306    Order Status: Sent Specimen: Blood from Hand, Left Updated: 11/19/23 2323            "

## 2023-11-20 NOTE — PROGRESS NOTES
"Inpatient Nutrition Evaluation    Admit Date: 11/19/2023   Total duration of encounter: 1 day    Nutrition Recommendation/Prescription     Continue diabetic diet  Will order boost glucose control tid ; Boost Glucose Control (provides 190 kcal, 16 g protein per serving)   Pt education on diet/ complete  MVI/fe  Biweekly wt  Will monitor nutrition status     Nutrition Assessment     Chart Review    Reason Seen: continuous nutrition monitoring    Malnutrition Screening Tool Results   Have you recently lost weight without trying?: No  Have you been eating poorly because of a decreased appetite?: No   MST Score: 0     Diagnosis:  Myelogenous leukemia, fever, anemia, thrombocytopenia, abdominal wall pain, inflammatory nodules, IDDM, non ETOH fatty liver dz     Relevant Medical History: CML, thrombocytopenia, HTN, HLD, DM     Nutrition-Related Medications: acyclovir, allopurinol, atorvastatin, maxipime, furosemide, losartan, lopressor, pantoprazole, vancomycin     Nutrition-Related Labs:  (11-20) H/H 7.4/23.9(L) Gluc 187(H) Bun 11 Cr 0.6 K 3.9 Alb 2.2(L)     Diet Order: Diet diabetic  Oral Supplement Order: none  Appetite/Oral Intake: good/% of meals  Factors Affecting Nutritional Intake: abdominal pain and nausea  Food/[employer]/Cultural Preferences: none reported  Food Allergies: none reported    Skin Integrity: bruised (ecchymotic) (noted on abdomen)  Wound(s):   as above    Comments    (11/20) Pt laying in bed;  at bedside; reported eating ok; some nausea at times/abdominal pain; no wt loss. Pt is obese--BMI 42. Willing to try oral supplement; reviewed diabetic diet. Labs acknowledged. Gluc(H)    Anthropometrics    Height: 5' 4.02" (162.6 cm)    Last Weight: 112 kg (247 lb) (11/20/23 0609) Weight Method: Standard Scale  BMI (Calculated): 42.4  BMI Classification: obese grade III (BMI >/=40)     Ideal Body Weight (IBW), Female: 120.1 lb     % Ideal Body Weight, Female (lb): 205.66 %                    Usual " Body Weight (UBW), k kg  % Usual Body Weight: 100.24     Usual Weight Provided By: patient, family/caregiver, and EMR weight history    Wt Readings from Last 5 Encounters:   23 112 kg (247 lb)   23 113.4 kg (250 lb)   23 111.8 kg (246 lb 7.6 oz)   23 110 kg (242 lb 9.6 oz)   10/20/23 115.2 kg (253 lb 15.5 oz)     Weight Change(s) Since Admission:  Admit Weight: 112 kg (247 lb) (23 2223)  No wt loss     Patient Education    Education Provided: diabetic diet  Teaching Method: explanation and printed materials  Comprehension: verbalizes understanding  Barriers to Learning: acuity of illness  Expected Compliance: fair  Comments: All questions were answered and dietitian's contact information was provided.     Monitoring & Evaluation     Dietitian will monitor food and beverage intake, weight, and food/nutrition knowledge skill.  Nutrition Risk/Follow-Up: low (follow-up in 5-7 days)  Patients assigned 'low nutrition risk' status do not qualify for a full nutritional assessment but will be monitored and re-evaluated in a 5-7 day time period. Please consult if re-evaluation needed sooner.

## 2023-11-20 NOTE — H&P
Attending physician Addendum  Pt seen and discussed with medicine residents on morning rounds  Admitted with c/o fever and abd pain  Platelet count is in the 30s  Afeb since morning  Agree with checking CT Abdomen- reviewed previous imaging  Agree with above assessment and plan

## 2023-11-20 NOTE — PLAN OF CARE
11/20/23 0916   Discharge Assessment   Assessment Type Discharge Planning Assessment   Confirmed/corrected address, phone number and insurance Yes   Confirmed Demographics Correct on Facesheet   Source of Information family   When was your last doctors appointment?   ([facility])   Reason For Admission Thrombocytopenia  R07.9 Chest pain  R10.9 Abdominal pain, unspecified abdominal location  R50.9 Fever, unspecified fever cause  D64.9 Anemia, unspecified type   People in Home child(adryan), adult;spouse   Facility Arrived From: Home   Do you expect to return to your current living situation? Yes   Do you have help at home or someone to help you manage your care at home? Yes   Who are your caregiver(s) and their phone number(s)? Scot Sharma (Relative) [phone]   Prior to hospitilization cognitive status: Alert/Oriented   Current cognitive status: Alert/Oriented   Walking or Climbing Stairs ambulation difficulty, requires equipment   Mobility Management W/C; RW   Home Accessibility wheelchair accessible   Equipment Currently Used at Home glucometer;walker, rolling;wheelchair;bedside commode   Readmission within 30 days? No   Patient currently being followed by outpatient case management? No   Do you currently have service(s) that help you manage your care at home? No   Name and Contact number of agency OP services at [facility]; Chemo at [facility] NO   Is the pt/caregiver preference to resume services with current agency Yes   Do you take prescription medications? Yes  (RENATO; [employer])   Do you have prescription coverage? No   Do you have any problems affording any of your prescribed medications? TBD   Is the patient taking medications as prescribed? yes   Who is going to help you get home at discharge? Spouse   How do you get to doctors appointments? family or friend will provide   Are you on dialysis? No   DME Needed Upon Discharge  none   Discharge Plan discussed with: Spouse/sig other    Name(s) and Number(s) Scot Sharma (Relative) [phone]   Transition of Care Barriers Unisured   Discharge Plan A Home with family     Pt  with 4 children; Resides with spouse & 19 yr old son who is [employer]; Pt under the care of [facility]-Contacted Jammie to request 12/7/2023 appt be moved up, Jammie agreed to check with Shawn Key NP and get back to me; Pt undocumented immigrant with no insurance benefits; Receives Chemo at [facility] ([phone]) under the care of Dr. ROCIO Burns; One of pt's sons assists fly financially; Application pending for Free Care program at [facility]; CM to follow for d/c planning needs.

## 2023-11-20 NOTE — PROGRESS NOTES
I have reviewed and concur with the resident's history, physical, assessment, and plan.  I have discussed with him all issues related to the diagnosis, workup and treatment plan. Care provided as reasonable and necessary.     Abebe Masters MD  [facility]

## 2023-11-20 NOTE — ED PROVIDER NOTES
Encounter Date: 2023       History     Chief Complaint   Patient presents with    Fever     Fever at home; 102.6 F, took Tylenol. Now 100.9; hx of Leukemia.     Patient is a pleasant 58-year-old female presents emergency room complaints of a fever.  Patient reports having a fever this evening of 102.6 at home, taking Tylenol prior to arrival in the emergency room.  Here, patient's temperature is 100.9°.  Patient reports having worsening abdominal pain secondary to skin lesions, which have grown more painful.    The history is provided by the patient. The history is limited by a language barrier. No  was used ( is at bedside, and desires to translate).     Review of patient's allergies indicates:  No Known Allergies  Past Medical History:   Diagnosis Date    Cancer     CML (chronic myelocytic leukemia)     DM2 (diabetes mellitus, type 2)     HTN (hypertension)     NAFLD (nonalcoholic fatty liver disease)     Neuropathy      Past Surgical History:   Procedure Laterality Date    ABDOMINAL SURGERY       SECTION      x4    CHOLECYSTECTOMY       Family History   Problem Relation Age of Onset    Diabetes Mother     Diabetes Father     Cancer Neg Hx      Social History     Tobacco Use    Smoking status: Never    Smokeless tobacco: Never   Substance Use Topics    Alcohol use: Not Currently    Drug use: Not Currently     Review of Systems   Constitutional:  Negative for chills, fatigue and fever.   HENT:  Negative for ear pain, rhinorrhea and sore throat.    Eyes:  Negative for photophobia and pain.   Respiratory:  Negative for cough, shortness of breath and wheezing.    Cardiovascular:  Negative for chest pain.   Gastrointestinal:  Positive for abdominal pain. Negative for diarrhea, nausea and vomiting.   Genitourinary:  Negative for dysuria.   Neurological:  Negative for dizziness, weakness and headaches.   All other systems reviewed and are negative.      Physical Exam     Initial  Vitals [11/19/23 2223]   BP Pulse Resp Temp SpO2   135/73 85 18 (!) 100.9 °F (38.3 °C) (!) 94 %      MAP       --         Physical Exam    Nursing note and vitals reviewed.  Constitutional: She appears well-developed and well-nourished. No distress.   Patient appears uncomfortable secondary to pain, nontoxic appearing   HENT:   Head: Normocephalic and atraumatic.   Eyes: Conjunctivae and EOM are normal. Pupils are equal, round, and reactive to light.   Neck: Neck supple.   Normal range of motion.  Cardiovascular:  Normal rate, regular rhythm and normal heart sounds.           Pulmonary/Chest: Breath sounds normal. No respiratory distress. She has no wheezes. She has no rhonchi. She has no rales.   Abdominal: Abdomen is soft. Bowel sounds are normal. She exhibits no distension. There is abdominal tenderness.   Multiple subcutaneous abdominal wall masses with erythema.  No skin breakdown. There is no rebound and no guarding.   Musculoskeletal:         General: Normal range of motion.      Cervical back: Normal range of motion and neck supple.     Neurological: She is alert and oriented to person, place, and time.   Skin: Skin is warm and dry. Capillary refill takes less than 2 seconds. No erythema.   Psychiatric: She has a normal mood and affect. Her behavior is normal. Judgment and thought content normal.               ED Course   Procedures  Labs Reviewed   COMPREHENSIVE METABOLIC PANEL - Abnormal; Notable for the following components:       Result Value    Glucose Level 254 (*)     Albumin Level 2.3 (*)     Globulin 4.3 (*)     Albumin/Globulin Ratio 0.5 (*)     All other components within normal limits   URINALYSIS, REFLEX TO URINE CULTURE - Abnormal; Notable for the following components:    Protein, UA Trace (*)     Urobilinogen, UA 1+ (*)     Leukocyte Esterase,  (*)     WBC, UA 21-50 (*)     Squamous Epithelial Cells, UA Trace (*)     All other components within normal limits   APTT - Abnormal; Notable  for the following components:    PTT 36.8 (*)     All other components within normal limits   PROTIME-INR - Abnormal; Notable for the following components:    PT 14.2 (*)     All other components within normal limits   CBC WITH DIFFERENTIAL - Abnormal; Notable for the following components:    RBC 2.64 (*)     Hgb 8.0 (*)     Hct 25.4 (*)     MCV 96.2 (*)     MCHC 31.5 (*)     Platelet 34 (*)     IPF 15.1 (*)     All other components within normal limits   MANUAL DIFFERENTIAL - Abnormal; Notable for the following components:    Platelets Decreased (*)     All other components within normal limits   C-REACTIVE PROTEIN - Abnormal; Notable for the following components:    C-Reactive Protein 238.00 (*)     All other components within normal limits   IRON AND TIBC - Abnormal; Notable for the following components:    Iron Level 34 (*)     Iron Binding Capacity Total 216 (*)     Iron Saturation 16 (*)     All other components within normal limits   POCT GLUCOSE - Abnormal; Notable for the following components:    POCT Glucose 199 (*)     All other components within normal limits   LACTIC ACID, PLASMA - Normal   COVID/FLU A&B PCR - Normal    Narrative:     The Xpert Xpress SARS-CoV-2/FLU/RSV plus is a rapid, multiplexed real-time PCR test intended for the simultaneous qualitative detection and differentiation of SARS-CoV-2, Influenza A, Influenza B, and respiratory syncytial virus (RSV) viral RNA in either nasopharyngeal swab or nasal swab specimens.         MAGNESIUM - Normal   PHOSPHORUS - Normal   URIC ACID - Normal   CBC W/ AUTO DIFFERENTIAL    Narrative:     The following orders were created for panel order CBC Auto Differential.  Procedure                               Abnormality         Status                     ---------                               -----------         ------                     CBC with Differential[[id number]]       Abnormal            Final result               Manual Differential[[id number]]          Abnormal            Final result                 Please view results for these tests on the individual orders.   EXTRA TUBES    Narrative:     The following orders were created for panel order EXTRA TUBES.  Procedure                               Abnormality         Status                     ---------                               -----------         ------                     Red Top Hold[[id number]]                                    Final result               Light Green Top Hold[[id number]]                            Final result               Light Green Top Hold[[id number]]                            Final result               Gold Top Hold[[id number]]                                   Final result               Pink Top Hold[[id number]]                                   Final result                 Please view results for these tests on the individual orders.   RED TOP HOLD   LIGHT GREEN TOP HOLD   LIGHT GREEN TOP HOLD   GOLD TOP HOLD   PINK TOP HOLD        ECG Results              EKG 12-lead (In process)  Result time 11/20/23 06:43:30      In process by Interface, Lab In [facility] (11/20/23 06:43:30)                   Narrative:    Test Reason : R07.9,    Vent. Rate : 088 BPM     Atrial Rate : 088 BPM     P-R Int : 130 ms          QRS Dur : 100 ms      QT Int : 366 ms       P-R-T Axes : 049 025 020 degrees     QTc Int : 442 ms    Normal sinus rhythm  Inferior infarct (cited on or before 20-NOV-2023)  Abnormal ECG  When compared with ECG of 12-OCT-2023 22:24,  No significant change was found    Referred By: [email or web address]   SELF           Confirmed By:                                   Imaging Results              X-Ray Chest 1 View (Final result)  Result time 11/20/23 09:16:49      Final result by Benjamín Haider MD (11/20/23 09:16:49)                   Impression:      NO ACUTE CARDIOPULMONARY PROCESS IDENTIFIED.      Electronically signed by: Benjamín Haider  Date:    11/20/2023  Time:    09:16                Narrative:    EXAMINATION:  XR CHEST 1 VIEW    CLINICAL HISTORY:  fever;    TECHNIQUE:  One view    COMPARISON:  October 12, 2023.    FINDINGS:  Cardiopericardial silhouette appearance is similar.  No acute dense focal or segmental consolidation, congestive process, pleural effusions or pneumothorax.                        Wet Read by Freddy Ford MD (11/19/23 23:54:34, [facility], Emergency Medicine)    No acute abnormality appreciated.                                     Medications   diatrizoate meglumineand-diatrizoate sodium (GASTROVIEW) 66-10 % solution (has no administration in time range)   morphine injection 3 mg (3 mg Intravenous Given 11/20/23 0249)   vancomycin (VANCOCIN) 2,000 mg in dextrose 5 % (D5W) 500 mL IVPB (0 mg Intravenous Stopped 11/20/23 0625)   lactated ringers bolus 250 mL (0 mLs Intravenous Stopped 11/20/23 1150)   iohexoL (OMNIPAQUE 350) 350 mg iodine/mL injection (100 mLs  Given 11/20/23 1030)     Medical Decision Making  Patient is a 58-year-old female history of leukemia presenting emergency room with complaints of fevers, currently not receiving chemotherapy (has not received some September) since being unable to tolerate leukemia secondary to thrombocytopenia.  Patient complained of worsening abdominal pain, with abdominal wall masses.  These today appear to be causing the patient discomfort, and patient reports they have enlarged.  Patient began running fever today, but denies dysuria or hematuria.  Denies cough shortness of breath or chest pain.  Will obtain laboratory evaluation including lactic acid and septic workup due to fever.  Will continue to monitor.          Amount and/or Complexity of Data Reviewed  Labs: ordered.  Radiology: ordered and independent interpretation performed.               ED Course as of 11/21/23 1835   Mon Nov 20, 2023   0127 Patient currently nontoxic appearing, does have abdominal masses which appear to be  causing the patient much discomfort.  Patient presented febrile, lactic acid and blood cultures obtained.  Current lactic acid 1.9.  Patient's hemoglobin is at 8.0, around patient's last baseline, platelet count of 34 consistent with patient's prior history of thrombocytopenia.  On electrolytes, no acute kidney abnormalities appreciated, and electrolytes within normal limits.  Due to recurrent high fever, internal Medicine has been consulted for evaluation.  Have not yet given antibiotics at this point, will defer to Internal Medicine for choice of antibiotics and determination inpatient or outpatient treatment. [MW]      ED Course User Index  [MW] Freddy Ford MD                        Clinical Impression:  Final diagnoses:  [R10.9] Abdominal pain, unspecified abdominal location (Primary)  [D69.6] Thrombocytopenia  [D64.9] Anemia, unspecified type  [R50.9] Fever, unspecified fever cause          ED Disposition Condition    Observation Stable                Freddy Ford MD  11/21/23 8397

## 2023-11-21 VITALS
HEART RATE: 93 BPM | RESPIRATION RATE: 20 BRPM | OXYGEN SATURATION: 94 % | WEIGHT: 247 LBS | HEIGHT: 64 IN | DIASTOLIC BLOOD PRESSURE: 83 MMHG | SYSTOLIC BLOOD PRESSURE: 128 MMHG | TEMPERATURE: 98 F | BODY MASS INDEX: 42.17 KG/M2

## 2023-11-21 LAB
ALBUMIN SERPL-MCNC: 2.3 G/DL (ref 3.5–5)
ALBUMIN/GLOB SERPL: 0.6 RATIO (ref 1.1–2)
ALP SERPL-CCNC: 78 UNIT/L (ref 40–150)
ALT SERPL-CCNC: 31 UNIT/L (ref 0–55)
AST SERPL-CCNC: 22 UNIT/L (ref 5–34)
BASOPHILS # BLD AUTO: 0.01 X10(3)/MCL
BASOPHILS NFR BLD AUTO: 0.2 %
BILIRUB SERPL-MCNC: 0.6 MG/DL
BUN SERPL-MCNC: 10.3 MG/DL (ref 9.8–20.1)
CALCIUM SERPL-MCNC: 10 MG/DL (ref 8.4–10.2)
CHLORIDE SERPL-SCNC: 99 MMOL/L (ref 98–107)
CO2 SERPL-SCNC: 28 MMOL/L (ref 22–29)
CREAT SERPL-MCNC: 0.69 MG/DL (ref 0.55–1.02)
EOSINOPHIL # BLD AUTO: 0 X10(3)/MCL (ref 0–0.9)
EOSINOPHIL NFR BLD AUTO: 0 %
ERYTHROCYTE [DISTWIDTH] IN BLOOD BY AUTOMATED COUNT: 16.8 % (ref 11.5–17)
GFR SERPLBLD CREATININE-BSD FMLA CKD-EPI: >60 MLS/MIN/1.73/M2
GLOBULIN SER-MCNC: 4.1 GM/DL (ref 2.4–3.5)
GLUCOSE SERPL-MCNC: 208 MG/DL (ref 74–100)
HCT VFR BLD AUTO: 24.3 % (ref 37–47)
HGB BLD-MCNC: 7.6 G/DL (ref 12–16)
IMM GRANULOCYTES # BLD AUTO: 0.16 X10(3)/MCL (ref 0–0.04)
IMM GRANULOCYTES NFR BLD AUTO: 2.9 %
LYMPHOCYTES # BLD AUTO: 1.98 X10(3)/MCL (ref 0.6–4.6)
LYMPHOCYTES NFR BLD AUTO: 35.5 %
MAGNESIUM SERPL-MCNC: 1.9 MG/DL (ref 1.6–2.6)
MCH RBC QN AUTO: 29.8 PG (ref 27–31)
MCHC RBC AUTO-ENTMCNC: 31.3 G/DL (ref 33–36)
MCV RBC AUTO: 95.3 FL (ref 80–94)
MONOCYTES # BLD AUTO: 0.93 X10(3)/MCL (ref 0.1–1.3)
MONOCYTES NFR BLD AUTO: 16.7 %
NEUTROPHILS # BLD AUTO: 2.49 X10(3)/MCL (ref 2.1–9.2)
NEUTROPHILS NFR BLD AUTO: 44.7 %
NRBC BLD AUTO-RTO: 4.1 %
PHOSPHATE SERPL-MCNC: 4.4 MG/DL (ref 2.3–4.7)
PLATELET # BLD AUTO: 38 X10(3)/MCL (ref 130–400)
PLATELETS.RETICULATED NFR BLD AUTO: 14.3 % (ref 0.9–11.2)
PMV BLD AUTO: ABNORMAL FL
POCT GLUCOSE: 210 MG/DL (ref 70–110)
POTASSIUM SERPL-SCNC: 4.1 MMOL/L (ref 3.5–5.1)
PROT SERPL-MCNC: 6.4 GM/DL (ref 6.4–8.3)
RBC # BLD AUTO: 2.55 X10(6)/MCL (ref 4.2–5.4)
SODIUM SERPL-SCNC: 138 MMOL/L (ref 136–145)
WBC # SPEC AUTO: 5.57 X10(3)/MCL (ref 4.5–11.5)

## 2023-11-21 PROCEDURE — 63600175 PHARM REV CODE 636 W HCPCS

## 2023-11-21 PROCEDURE — 94761 N-INVAS EAR/PLS OXIMETRY MLT: CPT

## 2023-11-21 PROCEDURE — 94799 UNLISTED PULMONARY SVC/PX: CPT | Mod: XB

## 2023-11-21 PROCEDURE — 85025 COMPLETE CBC W/AUTO DIFF WBC: CPT

## 2023-11-21 PROCEDURE — 83735 ASSAY OF MAGNESIUM: CPT

## 2023-11-21 PROCEDURE — 25000003 PHARM REV CODE 250

## 2023-11-21 PROCEDURE — 80053 COMPREHEN METABOLIC PANEL: CPT

## 2023-11-21 PROCEDURE — 84100 ASSAY OF PHOSPHORUS: CPT

## 2023-11-21 RX ORDER — HYDROCODONE BITARTRATE AND ACETAMINOPHEN 5; 325 MG/1; MG/1
2 TABLET ORAL EVERY 8 HOURS PRN
Qty: 21 TABLET | Refills: 0 | OUTPATIENT
Start: 2023-11-21 | End: 2023-11-28

## 2023-11-21 RX ORDER — HYDROCODONE BITARTRATE AND ACETAMINOPHEN 5; 325 MG/1; MG/1
2 TABLET ORAL EVERY 8 HOURS PRN
Qty: 14 TABLET | Refills: 0 | Status: SHIPPED | OUTPATIENT
Start: 2023-11-21 | End: 2023-11-21 | Stop reason: SDUPTHER

## 2023-11-21 RX ORDER — HYDROCODONE BITARTRATE AND ACETAMINOPHEN 5; 325 MG/1; MG/1
2 TABLET ORAL EVERY 8 HOURS PRN
Qty: 21 TABLET | Refills: 0 | Status: SHIPPED | OUTPATIENT
Start: 2023-11-21 | End: 2023-11-28

## 2023-11-21 RX ORDER — HYDROCODONE BITARTRATE AND ACETAMINOPHEN 10; 325 MG/1; MG/1
1 TABLET ORAL EVERY 6 HOURS PRN
Status: DISCONTINUED | OUTPATIENT
Start: 2023-11-21 | End: 2023-11-21 | Stop reason: HOSPADM

## 2023-11-21 RX ADMIN — ALLOPURINOL 300 MG: 100 TABLET ORAL at 09:11

## 2023-11-21 RX ADMIN — SENNOSIDES AND DOCUSATE SODIUM 1 TABLET: 50; 8.6 TABLET ORAL at 09:11

## 2023-11-21 RX ADMIN — PANTOPRAZOLE SODIUM 40 MG: 40 TABLET, DELAYED RELEASE ORAL at 09:11

## 2023-11-21 RX ADMIN — AMLODIPINE BESYLATE 10 MG: 10 TABLET ORAL at 09:11

## 2023-11-21 RX ADMIN — INSULIN ASPART 4 UNITS: 100 INJECTION, SOLUTION INTRAVENOUS; SUBCUTANEOUS at 06:11

## 2023-11-21 RX ADMIN — GABAPENTIN 600 MG: 300 CAPSULE ORAL at 09:11

## 2023-11-21 RX ADMIN — FUROSEMIDE 20 MG: 20 TABLET ORAL at 09:11

## 2023-11-21 RX ADMIN — ALPRAZOLAM 0.25 MG: 0.25 TABLET ORAL at 11:11

## 2023-11-21 RX ADMIN — ATORVASTATIN CALCIUM 40 MG: 40 TABLET, FILM COATED ORAL at 09:11

## 2023-11-21 RX ADMIN — LOSARTAN POTASSIUM 25 MG: 25 TABLET, FILM COATED ORAL at 09:11

## 2023-11-21 RX ADMIN — SILVER SULFADIAZINE: 10 CREAM TOPICAL at 09:11

## 2023-11-21 RX ADMIN — ALUMINUM HYDROXIDE, MAGNESIUM HYDROXIDE, AND SIMETHICONE 30 ML: 200; 200; 20 SUSPENSION ORAL at 09:11

## 2023-11-21 RX ADMIN — HYDROCORTISONE: 25 CREAM TOPICAL at 09:11

## 2023-11-21 RX ADMIN — HYDROCODONE BITARTRATE AND ACETAMINOPHEN 1 TABLET: 10; 325 TABLET ORAL at 11:11

## 2023-11-21 RX ADMIN — ACYCLOVIR 400 MG: 400 TABLET ORAL at 09:11

## 2023-11-21 RX ADMIN — MORPHINE SULFATE 30 MG: 15 TABLET ORAL at 08:11

## 2023-11-21 RX ADMIN — METRONIDAZOLE 500 MG: 5 INJECTION, SOLUTION INTRAVENOUS at 04:11

## 2023-11-21 RX ADMIN — METOPROLOL TARTRATE 25 MG: 25 TABLET, FILM COATED ORAL at 09:11

## 2023-11-21 RX ADMIN — MICONAZOLE NITRATE: 20 CREAM TOPICAL at 09:11

## 2023-11-21 RX ADMIN — CEFEPIME 2 G: 2 INJECTION, POWDER, FOR SOLUTION INTRAVENOUS at 04:11

## 2023-11-21 RX ADMIN — HYDROMORPHONE HYDROCHLORIDE 0.5 MG: 0.5 INJECTION, SOLUTION INTRAMUSCULAR; INTRAVENOUS; SUBCUTANEOUS at 05:11

## 2023-11-22 LAB — BACTERIA UR CULT: ABNORMAL

## 2023-11-25 LAB
BACTERIA BLD CULT: NORMAL
BACTERIA BLD CULT: NORMAL

## 2023-11-27 LAB
ABO + RH BLD: NORMAL
BLD PROD TYP BPU: NORMAL
BLOOD UNIT EXPIRATION DATE: NORMAL
BLOOD UNIT TYPE CODE: 5100
CROSSMATCH INTERPRETATION: NORMAL
DISPENSE STATUS: NORMAL
UNIT NUMBER: NORMAL

## 2023-11-28 LAB
ABO + RH BLD: NORMAL
ABO + RH BLD: NORMAL
BLD PROD TYP BPU: NORMAL
BLD PROD TYP BPU: NORMAL
BLOOD UNIT EXPIRATION DATE: NORMAL
BLOOD UNIT EXPIRATION DATE: NORMAL
BLOOD UNIT TYPE CODE: 5100
BLOOD UNIT TYPE CODE: 5100
CROSSMATCH INTERPRETATION: NORMAL
CROSSMATCH INTERPRETATION: NORMAL
DISPENSE STATUS: NORMAL
DISPENSE STATUS: NORMAL
RBCS: NORMAL
UNIT NUMBER: NORMAL
UNIT NUMBER: NORMAL

## 2023-11-29 DIAGNOSIS — C92.10 CHRONIC MYELOID LEUKEMIA: Primary | ICD-10-CM

## 2023-11-30 ENCOUNTER — LAB VISIT (OUTPATIENT)
Dept: HEMATOLOGY/ONCOLOGY | Facility: CLINIC | Age: 58
End: 2023-11-30

## 2023-11-30 ENCOUNTER — OFFICE VISIT (OUTPATIENT)
Dept: HEMATOLOGY/ONCOLOGY | Facility: CLINIC | Age: 58
End: 2023-11-30

## 2023-11-30 ENCOUNTER — TELEPHONE (OUTPATIENT)
Dept: HEMATOLOGY/ONCOLOGY | Facility: CLINIC | Age: 58
End: 2023-11-30

## 2023-11-30 ENCOUNTER — INFUSION (OUTPATIENT)
Dept: INFUSION THERAPY | Facility: HOSPITAL | Age: 58
End: 2023-11-30
Attending: INTERNAL MEDICINE

## 2023-11-30 VITALS
BODY MASS INDEX: 40.09 KG/M2 | SYSTOLIC BLOOD PRESSURE: 126 MMHG | DIASTOLIC BLOOD PRESSURE: 67 MMHG | OXYGEN SATURATION: 95 % | WEIGHT: 234.81 LBS | HEIGHT: 64 IN | HEART RATE: 74 BPM | RESPIRATION RATE: 20 BRPM | TEMPERATURE: 98 F

## 2023-11-30 VITALS
OXYGEN SATURATION: 94 % | RESPIRATION RATE: 20 BRPM | HEART RATE: 73 BPM | TEMPERATURE: 99 F | DIASTOLIC BLOOD PRESSURE: 72 MMHG | SYSTOLIC BLOOD PRESSURE: 134 MMHG

## 2023-11-30 DIAGNOSIS — C92.10 BLAST CRISIS PHASE OF CHRONIC MYELOID LEUKEMIA: ICD-10-CM

## 2023-11-30 DIAGNOSIS — D64.9 ANEMIA, UNSPECIFIED TYPE: ICD-10-CM

## 2023-11-30 DIAGNOSIS — D64.9 ANEMIA, UNSPECIFIED TYPE: Primary | ICD-10-CM

## 2023-11-30 DIAGNOSIS — C92.10 CHRONIC MYELOID LEUKEMIA: ICD-10-CM

## 2023-11-30 DIAGNOSIS — C92.10 CHRONIC MYELOID LEUKEMIA: Primary | ICD-10-CM

## 2023-11-30 LAB
ABO + RH BLD: NORMAL
ABO + RH BLD: NORMAL
ABS NEUT CALC (OHS): 2.89 X10(3)/MCL (ref 2.1–9.2)
ALBUMIN SERPL-MCNC: 2.4 G/DL (ref 3.5–5)
ALBUMIN/GLOB SERPL: 0.5 RATIO (ref 1.1–2)
ALP SERPL-CCNC: 89 UNIT/L (ref 40–150)
ALT SERPL-CCNC: 42 UNIT/L (ref 0–55)
ANISOCYTOSIS BLD QL SMEAR: ABNORMAL
AST SERPL-CCNC: 40 UNIT/L (ref 5–34)
B-HCG SERPL QL: 8 %
BILIRUB SERPL-MCNC: 0.5 MG/DL
BLD PROD TYP BPU: NORMAL
BLD PROD TYP BPU: NORMAL
BLOOD UNIT EXPIRATION DATE: NORMAL
BLOOD UNIT EXPIRATION DATE: NORMAL
BLOOD UNIT TYPE CODE: 5100
BLOOD UNIT TYPE CODE: 5100
BUN SERPL-MCNC: 11.1 MG/DL (ref 9.8–20.1)
CALCIUM SERPL-MCNC: 9.7 MG/DL (ref 8.4–10.2)
CHLORIDE SERPL-SCNC: 102 MMOL/L (ref 98–107)
CO2 SERPL-SCNC: 27 MMOL/L (ref 22–29)
CREAT SERPL-MCNC: 0.76 MG/DL (ref 0.55–1.02)
CROSSMATCH INTERPRETATION: NORMAL
CROSSMATCH INTERPRETATION: NORMAL
DISPENSE STATUS: NORMAL
DISPENSE STATUS: NORMAL
ERYTHROCYTE [DISTWIDTH] IN BLOOD BY AUTOMATED COUNT: 18.3 % (ref 11.5–17)
GFR SERPLBLD CREATININE-BSD FMLA CKD-EPI: >60 MLS/MIN/1.73/M2
GLOBULIN SER-MCNC: 4.5 GM/DL (ref 2.4–3.5)
GLUCOSE SERPL-MCNC: 209 MG/DL (ref 74–100)
GROUP & RH: NORMAL
HCT VFR BLD AUTO: 24.4 % (ref 37–47)
HGB BLD-MCNC: 7.5 G/DL (ref 12–16)
INDIRECT COOMBS GEL: NORMAL
LYMPHOCYTES NFR BLD MANUAL: 2.36 X10(3)/MCL
LYMPHOCYTES NFR BLD MANUAL: 36 % (ref 13–40)
MAGNESIUM SERPL-MCNC: 1.7 MG/DL (ref 1.6–2.6)
MCH RBC QN AUTO: 30.4 PG (ref 27–31)
MCHC RBC AUTO-ENTMCNC: 30.7 G/DL (ref 33–36)
MCV RBC AUTO: 98.8 FL (ref 80–94)
MONOCYTES NFR BLD MANUAL: 0.72 X10(3)/MCL (ref 0.1–1.3)
MONOCYTES NFR BLD MANUAL: 11 % (ref 2–11)
MYELOCYTES NFR BLD MANUAL: 1 %
NEUTROPHILS NFR BLD MANUAL: 44 % (ref 47–80)
NRBC BLD AUTO-RTO: 15.1 %
NRBC BLD MANUAL-RTO: 18 %
PLATELET # BLD AUTO: 39 X10(3)/MCL (ref 130–400)
PLATELET # BLD EST: ABNORMAL 10*3/UL
PLATELETS.RETICULATED NFR BLD AUTO: 16.8 % (ref 0.9–11.2)
PMV BLD AUTO: 11.4 FL (ref 7.4–10.4)
POLYCHROMASIA BLD QL SMEAR: ABNORMAL
POTASSIUM SERPL-SCNC: 4.2 MMOL/L (ref 3.5–5.1)
PROT SERPL-MCNC: 6.9 GM/DL (ref 6.4–8.3)
RBC # BLD AUTO: 2.47 X10(6)/MCL (ref 4.2–5.4)
SODIUM SERPL-SCNC: 140 MMOL/L (ref 136–145)
SPECIMEN OUTDATE: NORMAL
UNIT NUMBER: NORMAL
UNIT NUMBER: NORMAL
WBC # SPEC AUTO: 6.56 X10(3)/MCL (ref 4.5–11.5)

## 2023-11-30 PROCEDURE — 36415 COLL VENOUS BLD VENIPUNCTURE: CPT

## 2023-11-30 PROCEDURE — P9040 RBC LEUKOREDUCED IRRADIATED: HCPCS | Performed by: NURSE PRACTITIONER

## 2023-11-30 PROCEDURE — 86850 RBC ANTIBODY SCREEN: CPT | Performed by: NURSE PRACTITIONER

## 2023-11-30 PROCEDURE — 99214 PR OFFICE/OUTPT VISIT, EST, LEVL IV, 30-39 MIN: ICD-10-PCS | Mod: S$PBB,,, | Performed by: NURSE PRACTITIONER

## 2023-11-30 PROCEDURE — 86920 COMPATIBILITY TEST SPIN: CPT | Performed by: NURSE PRACTITIONER

## 2023-11-30 PROCEDURE — 83735 ASSAY OF MAGNESIUM: CPT

## 2023-11-30 PROCEDURE — 36430 TRANSFUSION BLD/BLD COMPNT: CPT

## 2023-11-30 PROCEDURE — 80053 COMPREHEN METABOLIC PANEL: CPT

## 2023-11-30 PROCEDURE — 25000003 PHARM REV CODE 250: Performed by: NURSE PRACTITIONER

## 2023-11-30 PROCEDURE — 99214 OFFICE O/P EST MOD 30 MIN: CPT | Mod: S$PBB,,, | Performed by: NURSE PRACTITIONER

## 2023-11-30 PROCEDURE — 85027 COMPLETE CBC AUTOMATED: CPT

## 2023-11-30 PROCEDURE — 99213 OFFICE O/P EST LOW 20 MIN: CPT | Mod: PBBFAC,25 | Performed by: NURSE PRACTITIONER

## 2023-11-30 RX ORDER — ACETAMINOPHEN 325 MG/1
650 TABLET ORAL
Status: CANCELLED | OUTPATIENT
Start: 2023-11-30

## 2023-11-30 RX ORDER — DIPHENHYDRAMINE HCL 25 MG
25 CAPSULE ORAL
Status: COMPLETED | OUTPATIENT
Start: 2023-11-30 | End: 2023-11-30

## 2023-11-30 RX ORDER — ACETAMINOPHEN 325 MG/1
650 TABLET ORAL
Status: COMPLETED | OUTPATIENT
Start: 2023-11-30 | End: 2023-11-30

## 2023-11-30 RX ORDER — DIPHENHYDRAMINE HCL 25 MG
25 CAPSULE ORAL
Status: CANCELLED | OUTPATIENT
Start: 2023-11-30

## 2023-11-30 RX ADMIN — ACETAMINOPHEN 650 MG: 325 TABLET, FILM COATED ORAL at 12:11

## 2023-11-30 RX ADMIN — DIPHENHYDRAMINE HYDROCHLORIDE 25 MG: 25 CAPSULE ORAL at 12:11

## 2023-11-30 NOTE — NURSING
1152 Patient was here for labs & NP visit. H/H 7.5/24.4. Patient was added on for 2 U IRR LR PRBC transfusion.   1436 Patient tolerated transfusion without incident. Discharge to home via wheelchair with .
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
## 2023-12-12 LAB
ABO + RH BLD: NORMAL
BLD PROD TYP BPU: NORMAL
BLOOD UNIT EXPIRATION DATE: NORMAL
BLOOD UNIT TYPE CODE: 5100
CROSSMATCH INTERPRETATION: NORMAL
DISPENSE STATUS: NORMAL
RBCS: NORMAL
UNIT NUMBER: NORMAL

## 2023-12-27 DIAGNOSIS — C92.10 CHRONIC MYELOID LEUKEMIA: Primary | ICD-10-CM

## 2023-12-28 ENCOUNTER — APPOINTMENT (OUTPATIENT)
Dept: HEMATOLOGY/ONCOLOGY | Facility: CLINIC | Age: 58
End: 2023-12-28

## 2023-12-28 ENCOUNTER — OFFICE VISIT (OUTPATIENT)
Dept: HEMATOLOGY/ONCOLOGY | Facility: CLINIC | Age: 58
End: 2023-12-28

## 2023-12-28 VITALS
HEIGHT: 64 IN | TEMPERATURE: 98 F | OXYGEN SATURATION: 95 % | DIASTOLIC BLOOD PRESSURE: 72 MMHG | RESPIRATION RATE: 20 BRPM | HEART RATE: 85 BPM | WEIGHT: 229 LBS | SYSTOLIC BLOOD PRESSURE: 121 MMHG | BODY MASS INDEX: 39.09 KG/M2

## 2023-12-28 DIAGNOSIS — R11.0 NAUSEA: ICD-10-CM

## 2023-12-28 DIAGNOSIS — C92.10 CHRONIC MYELOID LEUKEMIA: ICD-10-CM

## 2023-12-28 DIAGNOSIS — R52 PAIN MANAGEMENT: ICD-10-CM

## 2023-12-28 DIAGNOSIS — C92.10 CHRONIC MYELOID LEUKEMIA: Primary | ICD-10-CM

## 2023-12-28 DIAGNOSIS — R21 RASH: ICD-10-CM

## 2023-12-28 LAB
ABS NEUT CALC (OHS): 10.57 X10(3)/MCL (ref 2.1–9.2)
ALBUMIN SERPL-MCNC: 2.4 G/DL (ref 3.5–5)
ALBUMIN/GLOB SERPL: 0.6 RATIO (ref 1.1–2)
ALP SERPL-CCNC: 86 UNIT/L (ref 40–150)
ALT SERPL-CCNC: 19 UNIT/L (ref 0–55)
ANISOCYTOSIS BLD QL SMEAR: ABNORMAL
AST SERPL-CCNC: 27 UNIT/L (ref 5–34)
B-HCG SERPL QL: 18 %
BILIRUB SERPL-MCNC: 0.5 MG/DL
BUN SERPL-MCNC: 12.8 MG/DL (ref 9.8–20.1)
CALCIUM SERPL-MCNC: 9.5 MG/DL (ref 8.4–10.2)
CHLORIDE SERPL-SCNC: 102 MMOL/L (ref 98–107)
CO2 SERPL-SCNC: 24 MMOL/L (ref 22–29)
CREAT SERPL-MCNC: 0.71 MG/DL (ref 0.55–1.02)
ERYTHROCYTE [DISTWIDTH] IN BLOOD BY AUTOMATED COUNT: 18.4 % (ref 11.5–17)
GFR SERPLBLD CREATININE-BSD FMLA CKD-EPI: >60 MLS/MIN/1.73/M2
GLOBULIN SER-MCNC: 4.1 GM/DL (ref 2.4–3.5)
GLUCOSE SERPL-MCNC: 293 MG/DL (ref 74–100)
HCT VFR BLD AUTO: 30.4 % (ref 37–47)
HGB BLD-MCNC: 8.8 G/DL (ref 12–16)
LYMPHOCYTES NFR BLD MANUAL: 31 % (ref 13–40)
LYMPHOCYTES NFR BLD MANUAL: 9.36 X10(3)/MCL
MAGNESIUM SERPL-MCNC: 1.6 MG/DL (ref 1.6–2.6)
MCH RBC QN AUTO: 26.5 PG (ref 27–31)
MCHC RBC AUTO-ENTMCNC: 28.9 G/DL (ref 33–36)
MCV RBC AUTO: 91.6 FL (ref 80–94)
METAMYELOCYTES NFR BLD MANUAL: 5 %
MONOCYTES NFR BLD MANUAL: 0.6 X10(3)/MCL (ref 0.1–1.3)
MONOCYTES NFR BLD MANUAL: 2 % (ref 2–11)
MYELOCYTES NFR BLD MANUAL: 6 %
NEUTROPHILS NFR BLD MANUAL: 34 % (ref 47–80)
NEUTS BAND NFR BLD MANUAL: 1 % (ref 0–11)
NRBC BLD AUTO-RTO: 6.3 %
NRBC BLD MANUAL-RTO: 8 %
PLATELET # BLD AUTO: 103 X10(3)/MCL (ref 130–400)
PLATELET # BLD EST: ABNORMAL 10*3/UL
PLATELETS.RETICULATED NFR BLD AUTO: 19.4 % (ref 0.9–11.2)
PMV BLD AUTO: ABNORMAL FL
POLYCHROMASIA BLD QL SMEAR: ABNORMAL
POTASSIUM SERPL-SCNC: 4.1 MMOL/L (ref 3.5–5.1)
PROMYELOCYTES # BLD MANUAL: 3 %
PROT SERPL-MCNC: 6.5 GM/DL (ref 6.4–8.3)
RBC # BLD AUTO: 3.32 X10(6)/MCL (ref 4.2–5.4)
RBC MORPH BLD: ABNORMAL
SODIUM SERPL-SCNC: 138 MMOL/L (ref 136–145)
WBC # SPEC AUTO: 30.19 X10(3)/MCL (ref 4.5–11.5)

## 2023-12-28 PROCEDURE — 83735 ASSAY OF MAGNESIUM: CPT

## 2023-12-28 PROCEDURE — 99213 OFFICE O/P EST LOW 20 MIN: CPT | Mod: PBBFAC

## 2023-12-28 PROCEDURE — 80053 COMPREHEN METABOLIC PANEL: CPT

## 2023-12-28 PROCEDURE — 99214 PR OFFICE/OUTPT VISIT, EST, LEVL IV, 30-39 MIN: ICD-10-PCS | Mod: S$PBB,,,

## 2023-12-28 PROCEDURE — 85027 COMPLETE CBC AUTOMATED: CPT

## 2023-12-28 PROCEDURE — 36415 COLL VENOUS BLD VENIPUNCTURE: CPT

## 2023-12-28 PROCEDURE — 99214 OFFICE O/P EST MOD 30 MIN: CPT | Mod: S$PBB,,,

## 2023-12-28 RX ORDER — ONDANSETRON 4 MG/1
8 TABLET, FILM COATED ORAL EVERY 6 HOURS PRN
Qty: 30 TABLET | Refills: 1 | Status: SHIPPED | OUTPATIENT
Start: 2023-12-28 | End: 2024-01-29 | Stop reason: SDUPTHER
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
## 2023-12-30 ENCOUNTER — HOSPITAL ENCOUNTER (EMERGENCY)
Facility: HOSPITAL | Age: 58
Discharge: HOME | End: 2024-01-03
Attending: FAMILY MEDICINE

## 2023-12-30 DIAGNOSIS — C92.10 BLAST CRISIS PHASE OF CHRONIC MYELOID LEUKEMIA: ICD-10-CM

## 2023-12-30 DIAGNOSIS — C92.10 CML (CHRONIC MYELOCYTIC LEUKEMIA): ICD-10-CM

## 2023-12-30 DIAGNOSIS — R09.02 HYPOXEMIA: Primary | ICD-10-CM

## 2023-12-30 DIAGNOSIS — R06.02 SHORTNESS OF BREATH: ICD-10-CM

## 2023-12-30 LAB
ABS NEUT CALC (OHS): 13.87 X10(3)/MCL (ref 2.1–9.2)
ALBUMIN SERPL-MCNC: 2.3 G/DL (ref 3.5–5)
ALBUMIN/GLOB SERPL: 0.6 RATIO (ref 1.1–2)
ALLENS TEST BLOOD GAS (OHS): YES
ALP SERPL-CCNC: 84 UNIT/L (ref 40–150)
ALT SERPL-CCNC: 22 UNIT/L (ref 0–55)
ANISOCYTOSIS BLD QL SMEAR: ABNORMAL
APPEARANCE UR: CLEAR
AST SERPL-CCNC: 34 UNIT/L (ref 5–34)
B-HCG SERPL QL: 14 %
BACTERIA #/AREA URNS AUTO: ABNORMAL /HPF
BASE EXCESS BLD CALC-SCNC: 4.6 MMOL/L
BILIRUB SERPL-MCNC: 0.5 MG/DL
BILIRUB UR QL STRIP.AUTO: NEGATIVE
BLOOD GAS SAMPLE TYPE (OHS): ABNORMAL
BNP BLD-MCNC: 22.1 PG/ML
BUN SERPL-MCNC: 11.9 MG/DL (ref 9.8–20.1)
CALCIUM SERPL-MCNC: 9.3 MG/DL (ref 8.4–10.2)
CHLORIDE SERPL-SCNC: 101 MMOL/L (ref 98–107)
CO2 BLDA-SCNC: 30.5 MMOL/L
CO2 SERPL-SCNC: 23 MMOL/L (ref 22–29)
COHGB MFR BLDA: 2 %
COLOR UR AUTO: ABNORMAL
CREAT SERPL-MCNC: 0.7 MG/DL (ref 0.55–1.02)
DRAWN BY BLOOD GAS (OHS): ABNORMAL
ERYTHROCYTE [DISTWIDTH] IN BLOOD BY AUTOMATED COUNT: 18.6 % (ref 11.5–17)
FLUAV AG UPPER RESP QL IA.RAPID: NOT DETECTED
FLUBV AG UPPER RESP QL IA.RAPID: NOT DETECTED
GFR SERPLBLD CREATININE-BSD FMLA CKD-EPI: >60 MLS/MIN/1.73/M2
GLOBULIN SER-MCNC: 4.1 GM/DL (ref 2.4–3.5)
GLUCOSE SERPL-MCNC: 215 MG/DL (ref 74–100)
GLUCOSE UR QL STRIP.AUTO: NORMAL
HCO3 BLDA-SCNC: 29.2 MMOL/L
HCT VFR BLD AUTO: 28.2 % (ref 37–47)
HGB BLD-MCNC: 8.1 G/DL (ref 12–16)
HOLD SPECIMEN: NORMAL
HYALINE CASTS #/AREA URNS LPF: ABNORMAL /LPF
INHALED O2 CONCENTRATION: 28 %
KETONES UR QL STRIP.AUTO: NEGATIVE
LACTATE SERPL-SCNC: 2 MMOL/L (ref 0.5–2.2)
LDH SERPL-CCNC: 1435 U/L (ref 125–220)
LEUKOCYTE ESTERASE UR QL STRIP.AUTO: 250
LPM (OHS): 2
LYMPHOCYTES NFR BLD MANUAL: 12.37 X10(3)/MCL
LYMPHOCYTES NFR BLD MANUAL: 33 % (ref 13–40)
MCH RBC QN AUTO: 26.2 PG (ref 27–31)
MCHC RBC AUTO-ENTMCNC: 28.7 G/DL (ref 33–36)
MCV RBC AUTO: 91.3 FL (ref 80–94)
METAMYELOCYTES NFR BLD MANUAL: 1 %
METHGB MFR BLDA: 0 %
MONOCYTES NFR BLD MANUAL: 10 % (ref 2–11)
MONOCYTES NFR BLD MANUAL: 3.75 X10(3)/MCL (ref 0.1–1.3)
MYELOCYTES NFR BLD MANUAL: 5 %
NEUTROPHILS NFR BLD MANUAL: 36 % (ref 47–80)
NEUTS BAND NFR BLD MANUAL: 1 % (ref 0–11)
NITRITE UR QL STRIP.AUTO: NEGATIVE
NRBC BLD AUTO-RTO: 8.2 %
NRBC BLD MANUAL-RTO: 9 %
O2 HB BLOOD GAS (OHS): 95.8 %
OXYHGB MFR BLDA: 7.4 G/DL
PCO2 BLDA: 43 MMHG (ref 40–50)
PH BLDA: 7.44 [PH] (ref 7.3–7.4)
PH UR STRIP.AUTO: 5.5 [PH]
PHOSPHATE SERPL-MCNC: 4.1 MG/DL (ref 2.3–4.7)
PLATELET # BLD AUTO: 95 X10(3)/MCL (ref 130–400)
PLATELET # BLD EST: ABNORMAL 10*3/UL
PLATELETS.RETICULATED NFR BLD AUTO: 20.6 % (ref 0.9–11.2)
PMV BLD AUTO: ABNORMAL FL
PO2 BLDA: 94 MMHG (ref 30–40)
POLYCHROMASIA BLD QL SMEAR: ABNORMAL
POTASSIUM SERPL-SCNC: 4.4 MMOL/L (ref 3.5–5.1)
PROT SERPL-MCNC: 6.4 GM/DL (ref 6.4–8.3)
PROT UR QL STRIP.AUTO: NEGATIVE
RBC # BLD AUTO: 3.09 X10(6)/MCL (ref 4.2–5.4)
RBC #/AREA URNS AUTO: ABNORMAL /HPF
RBC MORPH BLD: ABNORMAL
RBC UR QL AUTO: NEGATIVE
RSV A 5' UTR RNA NPH QL NAA+PROBE: NOT DETECTED
SAMPLE SITE BLOOD GAS (OHS): ABNORMAL
SAO2 % BLDA: 97.9 %
SARS-COV-2 RNA RESP QL NAA+PROBE: NOT DETECTED
SODIUM SERPL-SCNC: 136 MMOL/L (ref 136–145)
SP GR UR STRIP.AUTO: 1.01 (ref 1–1.03)
SPHEROCYTES BLD QL SMEAR: SLIGHT
SQUAMOUS #/AREA URNS LPF: ABNORMAL /HPF
URATE SERPL-MCNC: 5.7 MG/DL (ref 2.6–6)
UROBILINOGEN UR STRIP-ACNC: NORMAL
WBC # SPEC AUTO: 37.49 X10(3)/MCL (ref 4.5–11.5)
WBC #/AREA URNS AUTO: ABNORMAL /HPF

## 2023-12-30 PROCEDURE — 87086 URINE CULTURE/COLONY COUNT: CPT | Performed by: FAMILY MEDICINE

## 2023-12-30 PROCEDURE — 80053 COMPREHEN METABOLIC PANEL: CPT | Performed by: FAMILY MEDICINE

## 2023-12-30 PROCEDURE — 84550 ASSAY OF BLOOD/URIC ACID: CPT | Performed by: INTERNAL MEDICINE

## 2023-12-30 PROCEDURE — 84100 ASSAY OF PHOSPHORUS: CPT | Performed by: INTERNAL MEDICINE

## 2023-12-30 PROCEDURE — 87040 BLOOD CULTURE FOR BACTERIA: CPT | Performed by: INTERNAL MEDICINE

## 2023-12-30 PROCEDURE — 83605 ASSAY OF LACTIC ACID: CPT | Performed by: INTERNAL MEDICINE

## 2023-12-30 PROCEDURE — 82962 GLUCOSE BLOOD TEST: CPT

## 2023-12-30 PROCEDURE — 0241U COVID/RSV/FLU A&B PCR: CPT | Performed by: FAMILY MEDICINE

## 2023-12-30 PROCEDURE — 83615 LACTATE (LD) (LDH) ENZYME: CPT | Performed by: INTERNAL MEDICINE

## 2023-12-30 PROCEDURE — 36430 TRANSFUSION BLD/BLD COMPNT: CPT

## 2023-12-30 PROCEDURE — 85027 COMPLETE CBC AUTOMATED: CPT | Performed by: FAMILY MEDICINE

## 2023-12-30 PROCEDURE — 96365 THER/PROPH/DIAG IV INF INIT: CPT

## 2023-12-30 PROCEDURE — 25000003 PHARM REV CODE 250: Performed by: FAMILY MEDICINE

## 2023-12-30 PROCEDURE — 96366 THER/PROPH/DIAG IV INF ADDON: CPT

## 2023-12-30 PROCEDURE — 94640 AIRWAY INHALATION TREATMENT: CPT

## 2023-12-30 PROCEDURE — 99285 EMERGENCY DEPT VISIT HI MDM: CPT | Mod: 25

## 2023-12-30 PROCEDURE — 96361 HYDRATE IV INFUSION ADD-ON: CPT

## 2023-12-30 PROCEDURE — 63600175 PHARM REV CODE 636 W HCPCS: Performed by: INTERNAL MEDICINE

## 2023-12-30 PROCEDURE — 81001 URINALYSIS AUTO W/SCOPE: CPT | Performed by: FAMILY MEDICINE

## 2023-12-30 PROCEDURE — 25500020 PHARM REV CODE 255

## 2023-12-30 PROCEDURE — 63600175 PHARM REV CODE 636 W HCPCS: Performed by: FAMILY MEDICINE

## 2023-12-30 PROCEDURE — 83880 ASSAY OF NATRIURETIC PEPTIDE: CPT | Performed by: INTERNAL MEDICINE

## 2023-12-30 PROCEDURE — 25000242 PHARM REV CODE 250 ALT 637 W/ HCPCS: Performed by: INTERNAL MEDICINE

## 2023-12-30 PROCEDURE — 25000003 PHARM REV CODE 250: Performed by: INTERNAL MEDICINE

## 2023-12-30 RX ORDER — GLUCAGON 1 MG
1 KIT INJECTION
Status: DISCONTINUED | OUTPATIENT
Start: 2023-12-30 | End: 2024-01-03 | Stop reason: HOSPADM

## 2023-12-30 RX ORDER — ONDANSETRON HYDROCHLORIDE 2 MG/ML
4 INJECTION, SOLUTION INTRAVENOUS
Status: COMPLETED | OUTPATIENT
Start: 2023-12-30 | End: 2023-12-30

## 2023-12-30 RX ORDER — AMLODIPINE BESYLATE 10 MG/1
10 TABLET ORAL DAILY
Status: DISCONTINUED | OUTPATIENT
Start: 2023-12-31 | End: 2024-01-03 | Stop reason: HOSPADM

## 2023-12-30 RX ORDER — HYDROCODONE BITARTRATE AND ACETAMINOPHEN 5; 325 MG/1; MG/1
1 TABLET ORAL EVERY 6 HOURS PRN
Status: DISCONTINUED | OUTPATIENT
Start: 2023-12-30 | End: 2024-01-02

## 2023-12-30 RX ORDER — MORPHINE SULFATE 15 MG/1
15 TABLET, FILM COATED, EXTENDED RELEASE ORAL ONCE
Status: COMPLETED | OUTPATIENT
Start: 2023-12-30 | End: 2023-12-30

## 2023-12-30 RX ORDER — IBUPROFEN 200 MG
16 TABLET ORAL
Status: DISCONTINUED | OUTPATIENT
Start: 2023-12-30 | End: 2024-01-03 | Stop reason: HOSPADM

## 2023-12-30 RX ORDER — HYDROCODONE BITARTRATE AND ACETAMINOPHEN 5; 325 MG/1; MG/1
1 TABLET ORAL EVERY 6 HOURS PRN
Status: ON HOLD | COMMUNITY
End: 2024-04-01 | Stop reason: HOSPADM

## 2023-12-30 RX ORDER — MORPHINE SULFATE 30 MG/1
30 TABLET, FILM COATED, EXTENDED RELEASE ORAL EVERY 12 HOURS
Status: DISCONTINUED | OUTPATIENT
Start: 2023-12-30 | End: 2024-01-03 | Stop reason: HOSPADM

## 2023-12-30 RX ORDER — FLUCONAZOLE 200 MG/1
400 TABLET ORAL DAILY
Status: ON HOLD | COMMUNITY
End: 2024-04-01 | Stop reason: HOSPADM

## 2023-12-30 RX ORDER — SODIUM CHLORIDE 9 MG/ML
INJECTION, SOLUTION INTRAVENOUS CONTINUOUS
Status: DISCONTINUED | OUTPATIENT
Start: 2023-12-30 | End: 2024-01-03 | Stop reason: HOSPADM

## 2023-12-30 RX ORDER — IBUPROFEN 200 MG
24 TABLET ORAL
Status: DISCONTINUED | OUTPATIENT
Start: 2023-12-30 | End: 2024-01-03 | Stop reason: HOSPADM

## 2023-12-30 RX ORDER — LEVOFLOXACIN 500 MG/1
500 TABLET, FILM COATED ORAL
Status: ON HOLD | COMMUNITY
End: 2024-04-01 | Stop reason: HOSPADM

## 2023-12-30 RX ORDER — INSULIN ASPART 100 [IU]/ML
0-5 INJECTION, SOLUTION INTRAVENOUS; SUBCUTANEOUS
Status: DISCONTINUED | OUTPATIENT
Start: 2023-12-30 | End: 2024-01-03 | Stop reason: HOSPADM

## 2023-12-30 RX ORDER — METOPROLOL TARTRATE 25 MG/1
25 TABLET, FILM COATED ORAL 2 TIMES DAILY
Status: DISCONTINUED | OUTPATIENT
Start: 2023-12-30 | End: 2024-01-03 | Stop reason: HOSPADM

## 2023-12-30 RX ORDER — ALBUTEROL SULFATE 0.83 MG/ML
2.5 SOLUTION RESPIRATORY (INHALATION)
Status: COMPLETED | OUTPATIENT
Start: 2023-12-30 | End: 2023-12-30

## 2023-12-30 RX ORDER — ACETAMINOPHEN 325 MG/1
650 TABLET ORAL
Status: COMPLETED | OUTPATIENT
Start: 2023-12-30 | End: 2023-12-30

## 2023-12-30 RX ADMIN — SODIUM CHLORIDE: 9 INJECTION, SOLUTION INTRAVENOUS at 11:12

## 2023-12-30 RX ADMIN — MORPHINE SULFATE 15 MG: 15 TABLET, FILM COATED, EXTENDED RELEASE ORAL at 08:12

## 2023-12-30 RX ADMIN — CEFTRIAXONE SODIUM 1 G: 1 INJECTION, POWDER, FOR SOLUTION INTRAMUSCULAR; INTRAVENOUS at 10:12

## 2023-12-30 RX ADMIN — ALBUTEROL SULFATE 2.5 MG: 2.5 SOLUTION RESPIRATORY (INHALATION) at 08:12

## 2023-12-30 RX ADMIN — ACETAMINOPHEN 650 MG: 325 TABLET, FILM COATED ORAL at 02:12

## 2023-12-30 RX ADMIN — IOHEXOL 100 ML: 350 INJECTION, SOLUTION INTRAVENOUS at 06:12

## 2023-12-30 RX ADMIN — ONDANSETRON 4 MG: 2 INJECTION INTRAMUSCULAR; INTRAVENOUS at 04:12

## 2023-12-30 RX ADMIN — SODIUM CHLORIDE 1000 ML: 9 INJECTION, SOLUTION INTRAVENOUS at 04:12

## 2023-12-30 NOTE — ED PROVIDER NOTES
Encounter Date: 2023       History     Chief Complaint   Patient presents with    Fever     Pt reports to the ed c/o subjective fever since 7 p.m. last night. Received last dose of tylenol at 12 a.m. current temp=99.8     Patient is a 58-year-old female with a history of CML presents emergency room complaints of nightly fevers, fatigue, nausea.  Patient currently being followed closely oncology.  Has follow-up oncology clinic in 3 days.  Patient reports worsening fevers therefore came with has been to the emergency room for evaluation.    The history is provided by the patient.     Review of patient's allergies indicates:  No Known Allergies  Past Medical History:   Diagnosis Date    Cancer     CML (chronic myelocytic leukemia)     DM2 (diabetes mellitus, type 2)     HTN (hypertension)     NAFLD (nonalcoholic fatty liver disease)     Neuropathy      Past Surgical History:   Procedure Laterality Date    ABDOMINAL SURGERY       SECTION      x4    CHOLECYSTECTOMY       Family History   Problem Relation Age of Onset    Diabetes Mother     Diabetes Father     Cancer Neg Hx      Social History     Tobacco Use    Smoking status: Never    Smokeless tobacco: Never   Substance Use Topics    Alcohol use: Not Currently    Drug use: Not Currently     Review of Systems   Constitutional:  Positive for fatigue and fever. Negative for chills.   HENT:  Negative for ear pain, rhinorrhea and sore throat.    Eyes:  Negative for photophobia and pain.   Respiratory:  Negative for cough, shortness of breath and wheezing.    Cardiovascular:  Negative for chest pain.   Gastrointestinal:  Positive for nausea and vomiting. Negative for abdominal pain and diarrhea.   Genitourinary:  Negative for dysuria.   Neurological:  Positive for weakness. Negative for dizziness and headaches.   All other systems reviewed and are negative.      Physical Exam     Initial Vitals [23 0326]   BP Pulse Resp Temp SpO2   114/68 84 20 99.8 °F  (37.7 °C) 95 %      MAP       --         Physical Exam    Nursing note and vitals reviewed.  Constitutional: She appears well-developed and well-nourished. No distress.   Patient appears fatigued, uncomfortable, nontoxic appearing   HENT:   Head: Normocephalic and atraumatic.   Mouth/Throat: Oropharynx is clear and moist.   Eyes: Conjunctivae and EOM are normal. Pupils are equal, round, and reactive to light.   Neck: Neck supple.   Normal range of motion.  Cardiovascular:  Normal rate, regular rhythm, normal heart sounds and intact distal pulses.           Pulmonary/Chest: Breath sounds normal. No respiratory distress. She has no wheezes. She has no rhonchi. She has no rales.   Abdominal: Abdomen is soft. Bowel sounds are normal. She exhibits no distension. There is abdominal tenderness.   Mild generalized abdominal tenderness without rebound or guarding.  Multiple subcutaneous painful nodules on the lower abdomen without erythema. There is no rebound and no guarding.   Musculoskeletal:         General: Normal range of motion.      Cervical back: Normal range of motion and neck supple.     Neurological: She is alert and oriented to person, place, and time.   Skin: Skin is warm and dry. Capillary refill takes less than 2 seconds. No erythema.   Psychiatric: She has a normal mood and affect. Her behavior is normal. Judgment and thought content normal.         ED Course   Procedures  Labs Reviewed   COMPREHENSIVE METABOLIC PANEL - Abnormal; Notable for the following components:       Result Value    Glucose Level 215 (*)     Albumin Level 2.3 (*)     Globulin 4.1 (*)     Albumin/Globulin Ratio 0.6 (*)     All other components within normal limits   URINALYSIS, REFLEX TO URINE CULTURE - Abnormal; Notable for the following components:    Leukocyte Esterase,  (*)     WBC, UA 21-50 (*)     Squamous Epithelial Cells, UA Occ (*)     All other components within normal limits   CBC WITH DIFFERENTIAL - Abnormal; Notable  for the following components:    WBC 37.49 (*)     RBC 3.09 (*)     Hgb 8.1 (*)     Hct 28.2 (*)     MCH 26.2 (*)     MCHC 28.7 (*)     RDW 18.6 (*)     Platelet 95 (*)     IPF 20.6 (*)     All other components within normal limits   MANUAL DIFFERENTIAL - Abnormal; Notable for the following components:    Neutrophils % 36 (*)     Neutrophils Abs Calc 13.8713 (*)     Lymphs Abs 12.3717 (*)     Monocytes Abs 3.749 (*)     Platelets Decreased (*)     RBC Morph Abnormal (*)     Anisocytosis 1+ (*)     Polychromasia 1+ (*)     Spherocytes Slight (*)     All other components within normal limits   BLOOD GAS - Abnormal; Notable for the following components:    pH, Blood gas 7.440 (*)     pO2, Blood gas 94.0 (*)     All other components within normal limits   LACTATE DEHYDROGENASE - Abnormal; Notable for the following components:    Lactate Dehydrogenase 1,435 (*)     All other components within normal limits   COVID/RSV/FLU A&B PCR - Normal    Narrative:     The Xpert Xpress SARS-CoV-2/FLU/RSV plus is a rapid, multiplexed real-time PCR test intended for the simultaneous qualitative detection and differentiation of SARS-CoV-2, Influenza A, Influenza B, and respiratory syncytial virus (RSV) viral RNA in either nasopharyngeal swab or nasal swab specimens.         B-TYPE NATRIURETIC PEPTIDE - Normal   URIC ACID - Normal   CULTURE, URINE   BLOOD CULTURE [facility]   BLOOD CULTURE [facility]   CULTURE, URINE   CBC W/ AUTO DIFFERENTIAL    Narrative:     The following orders were created for panel order CBC Auto Differential.  Procedure                               Abnormality         Status                     ---------                               -----------         ------                     CBC with Differential[[id number]]       Abnormal            Final result               Manual Differential[[id number]]         Abnormal            Final result                 Please view results for these tests on the individual orders.   EXTRA TUBES     Narrative:     The following orders were created for panel order EXTRA TUBES.  Procedure                               Abnormality         Status                     ---------                               -----------         ------                     Light Blue Top Hold[[id number]]                             Final result               Lavender Top Hold[[id number]]                               Final result               Gold Top Hold[[id number]]                                   Final result                 Please view results for these tests on the individual orders.   LIGHT BLUE TOP HOLD   LAVENDER TOP HOLD   GOLD TOP HOLD   LACTIC ACID, PLASMA          Imaging Results              CTA Chest Non-Coronary (PE Studies) (Final result)  Result time 12/30/23 07:48:58      Final result by Laila Frost MD (12/30/23 07:48:58)                   Impression:      1. No evidence of pulmonary embolus.  2. The preliminary and final reports are concordant.      Electronically signed by: Laila Frost  Date:    12/30/2023  Time:    07:48               Narrative:    EXAMINATION:  CTA CHEST NON CORONARY (PE STUDIES)    CLINICAL HISTORY:  Pulmonary embolism (PE) suspected, unknown D-dimer;    TECHNIQUE:  Helically acquired images with axial, sagittal and coronal reformations were obtained from the thoracic inlet to the lung bases followingthe IV administration of contrast.  CTA timed for evaluation of the pulmonary arteries.  MIP images were performed.    Automated tube current modulation, weight-based exposure dosing, and/or iterative reconstruction technique utilized to reach lowest reasonably achievable exposure rate.    DLP: 345 mGy*cm    COMPARISON:  CT chest 11/20/2023    FINDINGS:  BASE OF NECK: No significant abnormality.    AORTA: Left-sided aortic arch.  No aneurysm and no significant atherosclerosis    PULMONARY VASCULATURE: Pulmonary arteries enhance normally.  No evidence of pulmonary  embolus.    HEART: Normal size. No effusion.    ELEONORA/MEDIASTINUM: No enlarged lymph nodes by size criteria.    AIRWAYS: Patent.    LUNGS/PLEURA: Mild atelectasis at the right middle lobe versus trace fluid at the fissure..    UPPER ABDOMEN: Splenomegaly.    THORACIC SOFT TISSUES: Unremarkable.    BONES: No acute fracture. No suspicious lytic or sclerotic lesions.                        Preliminary result by Giles Hodges MD (12/30/23 07:11:10)                   Impression:    1. No aortic dissection or aneurysm is seen.  2. No acute focal infiltrate or consolidation is seen.  3. No filling defects are seen in the pulmonary arteries to suggest pulmonary embolus.  4. Details and other findings as discussed above.               Narrative:    START OF REPORT:  Technique: CT Scan of the chest was performed with intravenous contrast with direct axial images as well as sagittal and coronal reconstruction images pulmonary embolus protocol.    Dosage Information: Automated Exposure Control was utilized 344.69 mGy.cm.    Comparison: Comparison is with study dated 2023-11-20 13:12:36.    Clinical History: PEsuspected.    Findings:  Soft Tissues: Unremarkable.  Lines and Tubes: None.  Neck: The visualized soft tissues of the neck appear unremarkable.  Mediastinum: The mediastinal structures are within normal limits.  Heart: The heart appears unremarkable.  Aorta: No aortic dissection or aneurysm is seen.  Pulmonary Arteries: No filling defects are seen in the pulmonary arteries to suggest pulmonary embolus.  Lungs: Mild streaky linear opacity is seen predominantly in the dependent portions at the lung bases consistent with nonspecific dependent changes scarring and subsegmental. No acute focal infiltrate or consolidation is seen. There appears to be thickening of the right oblique fissure. This could reflect minimal pleural effusion.  Pleura: No effusions or pneumothorax are identified.  Bony Structures:  Spine: Moderate  spondylolytic changes are seen in the thoracic spine.  Ribs: The ribs appear unremarkable.  Abdomen: There is stable mild fatty change in the liver. Surgical clips are again seen in the gallbladder fossa consistent with prior cholecystecomy. The rest of visualized upper abdominal organs appear unremarkable.                                         X-Ray Chest 1 View (Final result)  Result time 12/30/23 07:53:57      Final result by Laila Frost MD (12/30/23 07:53:57)                   Impression:      No acute cardiopulmonary abnormality.      Electronically signed by: Laila Frost  Date:    12/30/2023  Time:    07:53               Narrative:    EXAMINATION:  XR CHEST 1 VIEW    CLINICAL HISTORY:  Chest Pain;    TECHNIQUE:  Single frontal view of the chest was performed.    COMPARISON:  11/19/2023    FINDINGS:  LINES AND TUBES: None    MEDIASTINUM AND ELEONORA: Cardiac silhouette is enlarged.    LUNGS: No lobar consolidation. No edema.    PLEURA:No pleural effusion. No pneumothorax.    OTHER: No acute osseous abnormality.                                       Medications   cefTRIAXone (ROCEPHIN) 1 g in dextrose 5 % in water (D5W) 100 mL IVPB (MB+) (has no administration in time range)   0.9%  NaCl infusion (has no administration in time range)   sodium chloride 0.9% bolus 1,000 mL 1,000 mL (0 mLs Intravenous Stopped 12/30/23 0608)   ondansetron injection 4 mg (4 mg Intravenous Given 12/30/23 0438)   iohexoL (OMNIPAQUE 350) 350 mg iodine/mL injection (100 mLs Intravenous Given 12/30/23 0615)   morphine 12 hr tablet 15 mg (15 mg Oral Given 12/30/23 0827)   albuterol nebulizer solution 2.5 mg (2.5 mg Nebulization Given 12/30/23 0845)     Medical Decision Making  Patient is a 58-year-old female history of CML presents emergency room complaints of fatigue, body aches, nausea.  Will obtain laboratory evaluation including CBC CMP as well as a COVID flu PCR.  Will provide IV fluids, and Zofran for nausea.  Will  continue to monitor.    Amount and/or Complexity of Data Reviewed  External Data Reviewed: notes.     Details: Reviewed Oncology Note from 12/28/23:  Plan:   CML:   · [place] [facility] [place] Continue to hold Azacytidine 75mg/m2 IV D1-7 (was restarted on 9/5)- Not SubQ due to granulomatous formation  · Continue to hold Venetoclax as previously documented.   · Keep FU appointments with Dr. Burns next appt is 1/2 to review BMB      Dermatologic Rash:   · Continue to follow- up with dermatology for management of rash.      Pain Management:   · Continue pain medication and gabapentin.   · Managed by palliative care and PCP Dr. Trevino.       Nausea:   · Take zofran 8mg tablets as needed for nausea    Above discussed with the patient and spouse All questions answered.    Labs discussed .Told them that AML will continue to be managed by her hematologist/oncologist in [place], and that we will continue to provide her with supportive care/transfusion care as and when needed. They understand and agree with this plan.    Labs: ordered.  Radiology: ordered.    Risk  Prescription drug management.               ED Course as of 12/30/23 1005   Sat Dec 30, 2023   0548 Patient continues to have episodes of desaturating - will obtain a CTA chest for further evaluation.  Still awaiting on COVID/FLU PCR to result. [MW]      ED Course User Index  [MW] Freddy Ford MD                8:50 AM    ABG unremarkable but when O2 supplementation is discontinued oxymetry dropped to 88%. Albuterol neb and BNP ordered, will F/U for final dispo       9:57 AM    After neb O2 was D/C and patient saturation dropped to the mid 80's. Pt in no apparent distress.  Chart reveal increasing in her WBC consistent with her CML but probable an acute on chronic phase. Noticed also increase in the percentage of blast from 4% to 18%. Uric acid and LDH ordered. Pt may benefit from Oncology evaluation due to concern  for acute exacerbation of CML with blast crisis. Will request transfer to her primary oncology center at [place]. Pt stable for transfer at this time        Clinical Impression:  Final diagnoses:  [R09.02] Hypoxemia (Primary)  [C92.10] CML (chronic myelocytic leukemia)  [C92.10] Blast crisis phase of chronic myeloid leukemia          ED Disposition Condition    Transfer to Another Facility Elroy Marcelino MD  12/30/23 5608

## 2023-12-31 LAB
ABS NEUT CALC (OHS): 8.93 X10(3)/MCL (ref 2.1–9.2)
ALBUMIN SERPL-MCNC: 2.2 G/DL (ref 3.5–5)
ALBUMIN/GLOB SERPL: 0.6 RATIO (ref 1.1–2)
ALP SERPL-CCNC: 93 UNIT/L (ref 40–150)
ALT SERPL-CCNC: 21 UNIT/L (ref 0–55)
ANISOCYTOSIS BLD QL SMEAR: ABNORMAL
AST SERPL-CCNC: 29 UNIT/L (ref 5–34)
B-HCG SERPL QL: 15 %
BILIRUB SERPL-MCNC: 0.6 MG/DL
BUN SERPL-MCNC: 5.7 MG/DL (ref 9.8–20.1)
CALCIUM SERPL-MCNC: 8.9 MG/DL (ref 8.4–10.2)
CHLORIDE SERPL-SCNC: 106 MMOL/L (ref 98–107)
CO2 SERPL-SCNC: 24 MMOL/L (ref 22–29)
CREAT SERPL-MCNC: 0.61 MG/DL (ref 0.55–1.02)
ERYTHROCYTE [DISTWIDTH] IN BLOOD BY AUTOMATED COUNT: 18.3 % (ref 11.5–17)
GFR SERPLBLD CREATININE-BSD FMLA CKD-EPI: >60 MLS/MIN/1.73/M2
GLOBULIN SER-MCNC: 3.9 GM/DL (ref 2.4–3.5)
GLUCOSE SERPL-MCNC: 236 MG/DL (ref 74–100)
HCT VFR BLD AUTO: 28.8 % (ref 37–47)
HGB BLD-MCNC: 8.5 G/DL (ref 12–16)
LYMPH ABN # BLD MANUAL: 9 %
LYMPHOCYTES NFR BLD MANUAL: 16 % (ref 13–40)
LYMPHOCYTES NFR BLD MANUAL: 6.5 X10(3)/MCL
MCH RBC QN AUTO: 26.6 PG (ref 27–31)
MCHC RBC AUTO-ENTMCNC: 29.5 G/DL (ref 33–36)
MCV RBC AUTO: 90.3 FL (ref 80–94)
METAMYELOCYTES NFR BLD MANUAL: 2 %
MICROCYTES BLD QL SMEAR: ABNORMAL
MONOCYTES NFR BLD MANUAL: 14.21 X10(3)/MCL (ref 0.1–1.3)
MONOCYTES NFR BLD MANUAL: 35 % (ref 2–11)
MYELOCYTES NFR BLD MANUAL: 1 %
NEUTROPHILS NFR BLD MANUAL: 20 % (ref 47–80)
NEUTS BAND NFR BLD MANUAL: 2 % (ref 0–11)
NRBC BLD AUTO-RTO: 8.8 %
OVALOCYTES (OLG): ABNORMAL
PLATELET # BLD AUTO: 89 X10(3)/MCL (ref 130–400)
PLATELET # BLD EST: ABNORMAL 10*3/UL
PLATELETS.RETICULATED NFR BLD AUTO: 19.2 % (ref 0.9–11.2)
PMV BLD AUTO: ABNORMAL FL
POCT GLUCOSE: 152 MG/DL (ref 70–110)
POCT GLUCOSE: 204 MG/DL (ref 70–110)
POCT GLUCOSE: 216 MG/DL (ref 70–110)
POCT GLUCOSE: 217 MG/DL (ref 70–110)
POIKILOCYTOSIS BLD QL SMEAR: SLIGHT
POLYCHROMASIA BLD QL SMEAR: SLIGHT
POTASSIUM SERPL-SCNC: 4.1 MMOL/L (ref 3.5–5.1)
PROT SERPL-MCNC: 6.1 GM/DL (ref 6.4–8.3)
RBC # BLD AUTO: 3.19 X10(6)/MCL (ref 4.2–5.4)
RBC MORPH BLD: ABNORMAL
SCHISTOCYTE (OLG): ABNORMAL
SODIUM SERPL-SCNC: 140 MMOL/L (ref 136–145)
WBC # SPEC AUTO: 40.6 X10(3)/MCL (ref 4.5–11.5)

## 2023-12-31 PROCEDURE — 63600175 PHARM REV CODE 636 W HCPCS: Performed by: FAMILY MEDICINE

## 2023-12-31 PROCEDURE — 25000003 PHARM REV CODE 250: Performed by: INTERNAL MEDICINE

## 2023-12-31 PROCEDURE — 99900035 HC TECH TIME PER 15 MIN (STAT)

## 2023-12-31 PROCEDURE — 25000003 PHARM REV CODE 250: Performed by: FAMILY MEDICINE

## 2023-12-31 PROCEDURE — 85027 COMPLETE CBC AUTOMATED: CPT | Performed by: FAMILY MEDICINE

## 2023-12-31 PROCEDURE — 63600175 PHARM REV CODE 636 W HCPCS: Performed by: INTERNAL MEDICINE

## 2023-12-31 PROCEDURE — 94799 UNLISTED PULMONARY SVC/PX: CPT

## 2023-12-31 PROCEDURE — 80053 COMPREHEN METABOLIC PANEL: CPT | Performed by: FAMILY MEDICINE

## 2023-12-31 PROCEDURE — 96372 THER/PROPH/DIAG INJ SC/IM: CPT | Performed by: FAMILY MEDICINE

## 2023-12-31 RX ADMIN — MORPHINE SULFATE 30 MG: 30 TABLET, FILM COATED, EXTENDED RELEASE ORAL at 09:12

## 2023-12-31 RX ADMIN — MORPHINE SULFATE 30 MG: 30 TABLET, FILM COATED, EXTENDED RELEASE ORAL at 08:12

## 2023-12-31 RX ADMIN — INSULIN DETEMIR 10 UNITS: 100 INJECTION, SOLUTION SUBCUTANEOUS at 09:12

## 2023-12-31 RX ADMIN — AMLODIPINE BESYLATE 10 MG: 10 TABLET ORAL at 08:12

## 2023-12-31 RX ADMIN — SODIUM CHLORIDE: 9 INJECTION, SOLUTION INTRAVENOUS at 12:12

## 2023-12-31 RX ADMIN — METOPROLOL TARTRATE 25 MG: 25 TABLET, FILM COATED ORAL at 08:12

## 2023-12-31 RX ADMIN — HYDROCODONE BITARTRATE AND ACETAMINOPHEN 1 TABLET: 5; 325 TABLET ORAL at 03:12

## 2023-12-31 RX ADMIN — INSULIN ASPART 2 UNITS: 100 INJECTION, SOLUTION INTRAVENOUS; SUBCUTANEOUS at 07:12

## 2023-12-31 RX ADMIN — INSULIN ASPART 2 UNITS: 100 INJECTION, SOLUTION INTRAVENOUS; SUBCUTANEOUS at 11:12

## 2023-12-31 RX ADMIN — SODIUM CHLORIDE: 9 INJECTION, SOLUTION INTRAVENOUS at 11:12

## 2023-12-31 RX ADMIN — SODIUM CHLORIDE: 9 INJECTION, SOLUTION INTRAVENOUS at 07:12

## 2023-12-31 RX ADMIN — CEFTRIAXONE SODIUM 1 G: 1 INJECTION, POWDER, FOR SOLUTION INTRAMUSCULAR; INTRAVENOUS at 10:12

## 2023-12-31 RX ADMIN — METOPROLOL TARTRATE 25 MG: 25 TABLET, FILM COATED ORAL at 09:12

## 2024-01-01 LAB
ABS NEUT CALC (OHS): 15.2 X10(3)/MCL (ref 2.1–9.2)
ALBUMIN SERPL-MCNC: 2.1 G/DL (ref 3.5–5)
ALBUMIN/GLOB SERPL: 0.6 RATIO (ref 1.1–2)
ALP SERPL-CCNC: 77 UNIT/L (ref 40–150)
ALT SERPL-CCNC: 16 UNIT/L (ref 0–55)
ANISOCYTOSIS BLD QL SMEAR: ABNORMAL
AST SERPL-CCNC: 21 UNIT/L (ref 5–34)
B-HCG SERPL QL: 21 %
BACTERIA UR CULT: NORMAL
BILIRUB SERPL-MCNC: 0.5 MG/DL
BUN SERPL-MCNC: 5.4 MG/DL (ref 9.8–20.1)
CALCIUM SERPL-MCNC: 8.5 MG/DL (ref 8.4–10.2)
CHLORIDE SERPL-SCNC: 107 MMOL/L (ref 98–107)
CO2 SERPL-SCNC: 24 MMOL/L (ref 22–29)
CREAT SERPL-MCNC: 0.57 MG/DL (ref 0.55–1.02)
ERYTHROCYTE [DISTWIDTH] IN BLOOD BY AUTOMATED COUNT: 19.1 % (ref 11.5–17)
GFR SERPLBLD CREATININE-BSD FMLA CKD-EPI: >60 MLS/MIN/1.73/M2
GLOBULIN SER-MCNC: 3.6 GM/DL (ref 2.4–3.5)
GLUCOSE SERPL-MCNC: 149 MG/DL (ref 74–100)
HCT VFR BLD AUTO: 25.8 % (ref 37–47)
HGB BLD-MCNC: 7.3 G/DL (ref 12–16)
HOLD SPECIMEN: NORMAL
LYMPHOCYTES NFR BLD MANUAL: 10.02 X10(3)/MCL
LYMPHOCYTES NFR BLD MANUAL: 29 % (ref 13–40)
MCH RBC QN AUTO: 26.1 PG (ref 27–31)
MCHC RBC AUTO-ENTMCNC: 28.3 G/DL (ref 33–36)
MCV RBC AUTO: 92.1 FL (ref 80–94)
MICROCYTES BLD QL SMEAR: SLIGHT
MONOCYTES NFR BLD MANUAL: 2.07 X10(3)/MCL (ref 0.1–1.3)
MONOCYTES NFR BLD MANUAL: 6 % (ref 2–11)
NEUTROPHILS NFR BLD MANUAL: 41 % (ref 47–80)
NEUTS BAND NFR BLD MANUAL: 3 % (ref 0–11)
NRBC BLD AUTO-RTO: 7.7 %
NRBC BLD MANUAL-RTO: 7 %
OVALOCYTES (OLG): SLIGHT
PLATELET # BLD AUTO: 75 X10(3)/MCL (ref 130–400)
PLATELET # BLD EST: ABNORMAL 10*3/UL
PLATELETS.RETICULATED NFR BLD AUTO: 19.8 % (ref 0.9–11.2)
PMV BLD AUTO: ABNORMAL FL
POCT GLUCOSE: 169 MG/DL (ref 70–110)
POCT GLUCOSE: 178 MG/DL (ref 70–110)
POCT GLUCOSE: 178 MG/DL (ref 70–110)
POCT GLUCOSE: 181 MG/DL (ref 70–110)
POCT GLUCOSE: 184 MG/DL (ref 70–110)
POLYCHROMASIA BLD QL SMEAR: ABNORMAL
POTASSIUM SERPL-SCNC: 3.7 MMOL/L (ref 3.5–5.1)
PROT SERPL-MCNC: 5.7 GM/DL (ref 6.4–8.3)
RBC # BLD AUTO: 2.8 X10(6)/MCL (ref 4.2–5.4)
RBC MORPH BLD: ABNORMAL
SODIUM SERPL-SCNC: 140 MMOL/L (ref 136–145)
WBC # SPEC AUTO: 34.54 X10(3)/MCL (ref 4.5–11.5)

## 2024-01-01 PROCEDURE — 87086 URINE CULTURE/COLONY COUNT: CPT | Performed by: INTERNAL MEDICINE

## 2024-01-01 PROCEDURE — 63600175 PHARM REV CODE 636 W HCPCS: Performed by: INTERNAL MEDICINE

## 2024-01-01 PROCEDURE — 80053 COMPREHEN METABOLIC PANEL: CPT | Performed by: EMERGENCY MEDICINE

## 2024-01-01 PROCEDURE — 25000003 PHARM REV CODE 250: Performed by: INTERNAL MEDICINE

## 2024-01-01 PROCEDURE — 63600175 PHARM REV CODE 636 W HCPCS: Performed by: FAMILY MEDICINE

## 2024-01-01 PROCEDURE — 96372 THER/PROPH/DIAG INJ SC/IM: CPT | Performed by: FAMILY MEDICINE

## 2024-01-01 PROCEDURE — 25000003 PHARM REV CODE 250: Performed by: FAMILY MEDICINE

## 2024-01-01 PROCEDURE — 85027 COMPLETE CBC AUTOMATED: CPT | Performed by: EMERGENCY MEDICINE

## 2024-01-01 PROCEDURE — 94761 N-INVAS EAR/PLS OXIMETRY MLT: CPT

## 2024-01-01 RX ORDER — ACETAMINOPHEN 325 MG/1
650 TABLET ORAL
Status: COMPLETED | OUTPATIENT
Start: 2024-01-01 | End: 2024-01-01

## 2024-01-01 RX ADMIN — ACETAMINOPHEN 650 MG: 325 TABLET, FILM COATED ORAL at 05:01

## 2024-01-01 RX ADMIN — METOPROLOL TARTRATE 25 MG: 25 TABLET, FILM COATED ORAL at 09:01

## 2024-01-01 RX ADMIN — SODIUM CHLORIDE: 9 INJECTION, SOLUTION INTRAVENOUS at 04:01

## 2024-01-01 RX ADMIN — MORPHINE SULFATE 30 MG: 30 TABLET, FILM COATED, EXTENDED RELEASE ORAL at 09:01

## 2024-01-01 RX ADMIN — CEFTRIAXONE SODIUM 1 G: 1 INJECTION, POWDER, FOR SOLUTION INTRAMUSCULAR; INTRAVENOUS at 09:01

## 2024-01-01 RX ADMIN — HYDROCODONE BITARTRATE AND ACETAMINOPHEN 1 TABLET: 5; 325 TABLET ORAL at 02:01

## 2024-01-01 RX ADMIN — SODIUM CHLORIDE: 9 INJECTION, SOLUTION INTRAVENOUS at 07:01

## 2024-01-01 RX ADMIN — HYDROCODONE BITARTRATE AND ACETAMINOPHEN 1 TABLET: 5; 325 TABLET ORAL at 04:01

## 2024-01-01 RX ADMIN — INSULIN DETEMIR 10 UNITS: 100 INJECTION, SOLUTION SUBCUTANEOUS at 09:01

## 2024-01-01 RX ADMIN — CEFTRIAXONE SODIUM 1 G: 1 INJECTION, POWDER, FOR SOLUTION INTRAMUSCULAR; INTRAVENOUS at 08:01

## 2024-01-01 NOTE — PROVIDER PROGRESS NOTES - EMERGENCY DEPT.
Encounter Date: 12/30/2023    ED Physician Progress Notes            1/1/24 7:09 AM     I assumed care, case discussed with Dr. Moses in detail, patient and  interviewed, patient examined, data reviewed.      She has remained in the emergency department pending bed availability for transfer for oncology services as outlined.  Presently sleeping comfortably, febrile again within the last 2 hours, no bacterial infectious focused yet discovered, normal oxygen saturation on supplemental O2.      No other new complaints, laboratory trends are concerning for acute blast crisis in the setting of CML.  No overt signs of bleeding, thrombocytopenia is stable.  Continuing to monitor and pursue appropriate transfer.     Follow up surveillance labs this AM. Adequate glycemic control; eating well.      Maged French MD    9:11 AM   Surveillance CBC and chemistries are roughly stable.  Slight improvement in white count, slight fall in hemoglobin and platelet count, no meaningful change in chemistries.  Continuing to pursue transfer.    Maged French MD    6:26 PM   Still no available facility with oncology services.  No clinical events of significance during the day.  Continuing to monitor and work on placement, recheck labs again tomorrow morning if still here as she may well need red blood cell transfusion.    Maged French MD

## 2024-01-01 NOTE — PROVIDER PROGRESS NOTES - EMERGENCY DEPT.
Encounter Date: 12/30/2023    ED Physician Progress Notes        12/31/23  7:42 PM    At this time pt stable, standing from bed and using a bedside commode. No fever today, no major changes on her condition, still using a nasal canula at 2 L/min.    VS: BP: 133/80 mmHg; HR 86 bpm, RR 18 rpm, Pulse Ox: 99 % with 2 L/min nasal canula    WBC today increasing to 40 with 15% of Blast. Hemoglobin and Platelets stable. Chem unremarkable with mild hyperglycemia.    Pt in ED Hold pending availavle bed at [facility] to complete transfer for Oncology Services.    Will continue current therapy with supplemental O2, antibiotic therapy, hydration, insulin sliding scale and pain control.    Will F/U      1/3/2023    2:31 PM    At this time pt stable. 95% oxymetry at .     VS: /74 mmHg; HR 70 bpm; RR 16 rpm    Pt afebrile for the last 24 hrs and no growth on blood or urine cultures.    Her CBC reveal significant decrease in Blast from 21 to 5 and her WBC decreasing from 40 to 36.    Attempts to contact her Oncologist Dr. Burns at [facility] ([phone]) with no success.    Hospital Medicine discussed the case with Dr. Bo (Clinic Oncologist) who recommends discharge home due to patient stability with F/U at [place] with her Oncologist.

## 2024-01-02 LAB
ABS NEUT CALC (OHS): 18.57 X10(3)/MCL (ref 2.1–9.2)
ALBUMIN SERPL-MCNC: 1.9 G/DL (ref 3.5–5)
ALBUMIN/GLOB SERPL: 0.5 RATIO (ref 1.1–2)
ALP SERPL-CCNC: 77 UNIT/L (ref 40–150)
ALT SERPL-CCNC: 18 UNIT/L (ref 0–55)
ANISOCYTOSIS BLD QL SMEAR: ABNORMAL
AST SERPL-CCNC: 27 UNIT/L (ref 5–34)
B-HCG SERPL QL: 9 %
BILIRUB SERPL-MCNC: 0.3 MG/DL
BUN SERPL-MCNC: 4.5 MG/DL (ref 9.8–20.1)
CALCIUM SERPL-MCNC: 8.1 MG/DL (ref 8.4–10.2)
CHLORIDE SERPL-SCNC: 108 MMOL/L (ref 98–107)
CO2 SERPL-SCNC: 24 MMOL/L (ref 22–29)
CREAT SERPL-MCNC: 0.58 MG/DL (ref 0.55–1.02)
ERYTHROCYTE [DISTWIDTH] IN BLOOD BY AUTOMATED COUNT: 19 % (ref 11.5–17)
GFR SERPLBLD CREATININE-BSD FMLA CKD-EPI: >60 MLS/MIN/1.73/M2
GLOBULIN SER-MCNC: 3.5 GM/DL (ref 2.4–3.5)
GLUCOSE SERPL-MCNC: 130 MG/DL (ref 74–100)
GROUP & RH: NORMAL
HCT VFR BLD AUTO: 24 % (ref 37–47)
HCT VFR BLD AUTO: 24.8 % (ref 37–47)
HEMATOLOGIST REVIEW: NORMAL
HEMATOLOGIST REVIEW: NORMAL
HGB BLD-MCNC: 6.9 G/DL (ref 12–16)
HGB BLD-MCNC: 7.1 G/DL (ref 12–16)
HOLD SPECIMEN: NORMAL
INDIRECT COOMBS: NORMAL
LYMPH ABN # BLD MANUAL: 3 %
LYMPHOCYTES NFR BLD MANUAL: 10.86 X10(3)/MCL
LYMPHOCYTES NFR BLD MANUAL: 31 % (ref 13–40)
MCH RBC QN AUTO: 26.6 PG (ref 27–31)
MCHC RBC AUTO-ENTMCNC: 28.8 G/DL (ref 33–36)
MCV RBC AUTO: 92.7 FL (ref 80–94)
MONOCYTES NFR BLD MANUAL: 1.4 X10(3)/MCL (ref 0.1–1.3)
MONOCYTES NFR BLD MANUAL: 4 % (ref 2–11)
NEUTROPHILS NFR BLD MANUAL: 53 % (ref 47–80)
NRBC BLD AUTO-RTO: 6.6 %
NRBC BLD MANUAL-RTO: 14 %
PLATELET # BLD AUTO: 71 X10(3)/MCL (ref 130–400)
PLATELET # BLD EST: ABNORMAL 10*3/UL
PLATELETS.RETICULATED NFR BLD AUTO: 20 % (ref 0.9–11.2)
PMV BLD AUTO: ABNORMAL FL
POCT GLUCOSE: 147 MG/DL (ref 70–110)
POCT GLUCOSE: 182 MG/DL (ref 70–110)
POIKILOCYTOSIS BLD QL SMEAR: ABNORMAL
POLYCHROMASIA BLD QL SMEAR: ABNORMAL
POTASSIUM SERPL-SCNC: 3.5 MMOL/L (ref 3.5–5.1)
PROT SERPL-MCNC: 5.4 GM/DL (ref 6.4–8.3)
RBC # BLD AUTO: 2.59 X10(6)/MCL (ref 4.2–5.4)
RBC MORPH BLD: ABNORMAL
SODIUM SERPL-SCNC: 141 MMOL/L (ref 136–145)
SPECIMEN OUTDATE: NORMAL
WBC # SPEC AUTO: 35.03 X10(3)/MCL (ref 4.5–11.5)

## 2024-01-02 PROCEDURE — 85027 COMPLETE CBC AUTOMATED: CPT | Performed by: EMERGENCY MEDICINE

## 2024-01-02 PROCEDURE — 86923 COMPATIBILITY TEST ELECTRIC: CPT | Performed by: STUDENT IN AN ORGANIZED HEALTH CARE EDUCATION/TRAINING PROGRAM

## 2024-01-02 PROCEDURE — 63600175 PHARM REV CODE 636 W HCPCS: Performed by: FAMILY MEDICINE

## 2024-01-02 PROCEDURE — 83615 LACTATE (LD) (LDH) ENZYME: CPT | Performed by: FAMILY MEDICINE

## 2024-01-02 PROCEDURE — 63600175 PHARM REV CODE 636 W HCPCS: Performed by: INTERNAL MEDICINE

## 2024-01-02 PROCEDURE — 25000003 PHARM REV CODE 250: Performed by: FAMILY MEDICINE

## 2024-01-02 PROCEDURE — 25000003 PHARM REV CODE 250: Performed by: STUDENT IN AN ORGANIZED HEALTH CARE EDUCATION/TRAINING PROGRAM

## 2024-01-02 PROCEDURE — 25000003 PHARM REV CODE 250: Performed by: INTERNAL MEDICINE

## 2024-01-02 PROCEDURE — 25000003 PHARM REV CODE 250

## 2024-01-02 PROCEDURE — 27000221 HC OXYGEN, UP TO 24 HOURS

## 2024-01-02 PROCEDURE — 80053 COMPREHEN METABOLIC PANEL: CPT | Performed by: EMERGENCY MEDICINE

## 2024-01-02 PROCEDURE — 96372 THER/PROPH/DIAG INJ SC/IM: CPT | Performed by: FAMILY MEDICINE

## 2024-01-02 PROCEDURE — 85018 HEMOGLOBIN: CPT | Performed by: STUDENT IN AN ORGANIZED HEALTH CARE EDUCATION/TRAINING PROGRAM

## 2024-01-02 PROCEDURE — 93005 ELECTROCARDIOGRAM TRACING: CPT

## 2024-01-02 PROCEDURE — 86901 BLOOD TYPING SEROLOGIC RH(D): CPT | Performed by: STUDENT IN AN ORGANIZED HEALTH CARE EDUCATION/TRAINING PROGRAM

## 2024-01-02 PROCEDURE — 94761 N-INVAS EAR/PLS OXIMETRY MLT: CPT

## 2024-01-02 RX ORDER — HYDROCODONE BITARTRATE AND ACETAMINOPHEN 10; 325 MG/1; MG/1
1 TABLET ORAL EVERY 6 HOURS PRN
Status: DISCONTINUED | OUTPATIENT
Start: 2024-01-02 | End: 2024-01-03 | Stop reason: HOSPADM

## 2024-01-02 RX ORDER — ATORVASTATIN CALCIUM 40 MG/1
40 TABLET, FILM COATED ORAL NIGHTLY
Status: DISCONTINUED | OUTPATIENT
Start: 2024-01-02 | End: 2024-01-03 | Stop reason: HOSPADM

## 2024-01-02 RX ORDER — HYDROCODONE BITARTRATE AND ACETAMINOPHEN 500; 5 MG/1; MG/1
TABLET ORAL
Status: DISCONTINUED | OUTPATIENT
Start: 2024-01-02 | End: 2024-01-03 | Stop reason: HOSPADM

## 2024-01-02 RX ORDER — ONDANSETRON 4 MG/1
4 TABLET, FILM COATED ORAL EVERY 6 HOURS PRN
Status: DISCONTINUED | OUTPATIENT
Start: 2024-01-02 | End: 2024-01-03 | Stop reason: HOSPADM

## 2024-01-02 RX ORDER — ALBUTEROL SULFATE 90 UG/1
2 AEROSOL, METERED RESPIRATORY (INHALATION) EVERY 4 HOURS PRN
Status: DISCONTINUED | OUTPATIENT
Start: 2024-01-02 | End: 2024-01-03 | Stop reason: HOSPADM

## 2024-01-02 RX ORDER — HYDROCODONE BITARTRATE AND ACETAMINOPHEN 500; 5 MG/1; MG/1
TABLET ORAL
Status: DISCONTINUED | OUTPATIENT
Start: 2024-01-03 | End: 2024-01-03 | Stop reason: HOSPADM

## 2024-01-02 RX ORDER — HYDROXYZINE PAMOATE 25 MG/1
25 CAPSULE ORAL EVERY 8 HOURS PRN
Status: DISCONTINUED | OUTPATIENT
Start: 2024-01-02 | End: 2024-01-03 | Stop reason: HOSPADM

## 2024-01-02 RX ADMIN — SODIUM CHLORIDE: 9 INJECTION, SOLUTION INTRAVENOUS at 07:01

## 2024-01-02 RX ADMIN — SODIUM CHLORIDE: 9 INJECTION, SOLUTION INTRAVENOUS at 05:01

## 2024-01-02 RX ADMIN — CEFTRIAXONE SODIUM 1 G: 1 INJECTION, POWDER, FOR SOLUTION INTRAMUSCULAR; INTRAVENOUS at 08:01

## 2024-01-02 RX ADMIN — ATORVASTATIN CALCIUM 40 MG: 40 TABLET, FILM COATED ORAL at 09:01

## 2024-01-02 RX ADMIN — HYDROCODONE BITARTRATE AND ACETAMINOPHEN 1 TABLET: 5; 325 TABLET ORAL at 03:01

## 2024-01-02 RX ADMIN — INSULIN DETEMIR 10 UNITS: 100 INJECTION, SOLUTION SUBCUTANEOUS at 08:01

## 2024-01-02 RX ADMIN — SODIUM CHLORIDE: 9 INJECTION, SOLUTION INTRAVENOUS at 12:01

## 2024-01-02 RX ADMIN — MORPHINE SULFATE 30 MG: 30 TABLET, FILM COATED, EXTENDED RELEASE ORAL at 08:01

## 2024-01-02 RX ADMIN — METOPROLOL TARTRATE 25 MG: 25 TABLET, FILM COATED ORAL at 08:01

## 2024-01-02 RX ADMIN — HYDROCODONE BITARTRATE AND ACETAMINOPHEN 1 TABLET: 10; 325 TABLET ORAL at 11:01

## 2024-01-03 VITALS
TEMPERATURE: 99 F | SYSTOLIC BLOOD PRESSURE: 133 MMHG | DIASTOLIC BLOOD PRESSURE: 69 MMHG | BODY MASS INDEX: 39.27 KG/M2 | WEIGHT: 230 LBS | HEIGHT: 64 IN | OXYGEN SATURATION: 95 % | HEART RATE: 73 BPM | RESPIRATION RATE: 20 BRPM

## 2024-01-03 LAB
ABO + RH BLD: NORMAL
ABO + RH BLD: NORMAL
ABS NEUT CALC (OHS): 15.49 X10(3)/MCL (ref 2.1–9.2)
ALBUMIN SERPL-MCNC: 2 G/DL (ref 3.5–5)
ALBUMIN/GLOB SERPL: 0.5 RATIO (ref 1.1–2)
ALP SERPL-CCNC: 79 UNIT/L (ref 40–150)
ALT SERPL-CCNC: 16 UNIT/L (ref 0–55)
ANISOCYTOSIS BLD QL SMEAR: ABNORMAL
AST SERPL-CCNC: 19 UNIT/L (ref 5–34)
B-HCG SERPL QL: 5 %
BACTERIA UR CULT: NO GROWTH
BILIRUB SERPL-MCNC: 0.4 MG/DL
BLD PROD TYP BPU: NORMAL
BLD PROD TYP BPU: NORMAL
BLOOD UNIT EXPIRATION DATE: NORMAL
BLOOD UNIT EXPIRATION DATE: NORMAL
BLOOD UNIT TYPE CODE: 7300
BLOOD UNIT TYPE CODE: 7300
BUN SERPL-MCNC: 6 MG/DL (ref 9.8–20.1)
CALCIUM SERPL-MCNC: 8.4 MG/DL (ref 8.4–10.2)
CHLORIDE SERPL-SCNC: 109 MMOL/L (ref 98–107)
CO2 SERPL-SCNC: 23 MMOL/L (ref 22–29)
CREAT SERPL-MCNC: 0.57 MG/DL (ref 0.55–1.02)
CROSSMATCH INTERPRETATION: NORMAL
CROSSMATCH INTERPRETATION: NORMAL
DISPENSE STATUS: NORMAL
DISPENSE STATUS: NORMAL
ERYTHROCYTE [DISTWIDTH] IN BLOOD BY AUTOMATED COUNT: 18.6 % (ref 11.5–17)
GFR SERPLBLD CREATININE-BSD FMLA CKD-EPI: >60 MLS/MIN/1.73/M2
GLOBULIN SER-MCNC: 3.7 GM/DL (ref 2.4–3.5)
GLUCOSE SERPL-MCNC: 164 MG/DL (ref 74–100)
HCT VFR BLD AUTO: 26.6 % (ref 37–47)
HGB BLD-MCNC: 8 G/DL (ref 12–16)
LDH SERPL-CCNC: 1397 U/L (ref 125–220)
LYMPH ABN # BLD MANUAL: 1 %
LYMPHOCYTES NFR BLD MANUAL: 14.41 X10(3)/MCL
LYMPHOCYTES NFR BLD MANUAL: 40 % (ref 13–40)
MCH RBC QN AUTO: 27.5 PG (ref 27–31)
MCHC RBC AUTO-ENTMCNC: 30.1 G/DL (ref 33–36)
MCV RBC AUTO: 91.4 FL (ref 80–94)
METAMYELOCYTES NFR BLD MANUAL: 2 %
MONOCYTES NFR BLD MANUAL: 3.24 X10(3)/MCL (ref 0.1–1.3)
MONOCYTES NFR BLD MANUAL: 9 % (ref 2–11)
NEUTROPHILS NFR BLD MANUAL: 42 % (ref 47–80)
NEUTS BAND NFR BLD MANUAL: 1 % (ref 0–11)
NRBC BLD AUTO-RTO: 5.8 %
NRBC BLD MANUAL-RTO: 12 %
PLATELET # BLD AUTO: 65 X10(3)/MCL (ref 130–400)
PLATELET # BLD EST: ABNORMAL 10*3/UL
PLATELETS.RETICULATED NFR BLD AUTO: 19.9 % (ref 0.9–11.2)
PMV BLD AUTO: ABNORMAL FL
POCT GLUCOSE: 166 MG/DL (ref 70–110)
POCT GLUCOSE: 176 MG/DL (ref 70–110)
POCT GLUCOSE: 219 MG/DL (ref 70–110)
POIKILOCYTOSIS BLD QL SMEAR: ABNORMAL
POLYCHROMASIA BLD QL SMEAR: ABNORMAL
POTASSIUM SERPL-SCNC: 3.8 MMOL/L (ref 3.5–5.1)
PROT SERPL-MCNC: 5.7 GM/DL (ref 6.4–8.3)
RBC # BLD AUTO: 2.91 X10(6)/MCL (ref 4.2–5.4)
RBC MORPH BLD: ABNORMAL
SODIUM SERPL-SCNC: 140 MMOL/L (ref 136–145)
SPHEROCYTES BLD QL SMEAR: ABNORMAL
UNIT NUMBER: NORMAL
UNIT NUMBER: NORMAL
WBC # SPEC AUTO: 36.02 X10(3)/MCL (ref 4.5–11.5)

## 2024-01-03 PROCEDURE — 25000003 PHARM REV CODE 250

## 2024-01-03 PROCEDURE — 25000003 PHARM REV CODE 250: Performed by: FAMILY MEDICINE

## 2024-01-03 PROCEDURE — 85027 COMPLETE CBC AUTOMATED: CPT

## 2024-01-03 PROCEDURE — P9040 RBC LEUKOREDUCED IRRADIATED: HCPCS | Performed by: STUDENT IN AN ORGANIZED HEALTH CARE EDUCATION/TRAINING PROGRAM

## 2024-01-03 PROCEDURE — 80053 COMPREHEN METABOLIC PANEL: CPT

## 2024-01-03 PROCEDURE — 63600175 PHARM REV CODE 636 W HCPCS: Performed by: INTERNAL MEDICINE

## 2024-01-03 PROCEDURE — 63600175 PHARM REV CODE 636 W HCPCS: Performed by: FAMILY MEDICINE

## 2024-01-03 PROCEDURE — 25000003 PHARM REV CODE 250: Performed by: STUDENT IN AN ORGANIZED HEALTH CARE EDUCATION/TRAINING PROGRAM

## 2024-01-03 PROCEDURE — 96372 THER/PROPH/DIAG INJ SC/IM: CPT | Performed by: FAMILY MEDICINE

## 2024-01-03 PROCEDURE — 25000003 PHARM REV CODE 250: Performed by: INTERNAL MEDICINE

## 2024-01-03 RX ORDER — AMOXICILLIN 250 MG
1 CAPSULE ORAL DAILY
Status: DISCONTINUED | OUTPATIENT
Start: 2024-01-03 | End: 2024-01-03 | Stop reason: HOSPADM

## 2024-01-03 RX ORDER — POLYETHYLENE GLYCOL 3350 17 G/17G
17 POWDER, FOR SOLUTION ORAL DAILY
Status: DISCONTINUED | OUTPATIENT
Start: 2024-01-03 | End: 2024-01-03 | Stop reason: HOSPADM

## 2024-01-03 RX ORDER — AMOXICILLIN 250 MG
1 CAPSULE ORAL DAILY
Status: DISCONTINUED | OUTPATIENT
Start: 2024-01-03 | End: 2024-01-03

## 2024-01-03 RX ADMIN — SODIUM CHLORIDE: 9 INJECTION, SOLUTION INTRAVENOUS at 03:01

## 2024-01-03 RX ADMIN — GABAPENTIN 800 MG: 300 CAPSULE ORAL at 09:01

## 2024-01-03 RX ADMIN — INSULIN ASPART 2 UNITS: 100 INJECTION, SOLUTION INTRAVENOUS; SUBCUTANEOUS at 11:01

## 2024-01-03 RX ADMIN — POLYETHYLENE GLYCOL 3350 17 G: 17 POWDER, FOR SOLUTION ORAL at 10:01

## 2024-01-03 RX ADMIN — GABAPENTIN 800 MG: 300 CAPSULE ORAL at 02:01

## 2024-01-03 RX ADMIN — SODIUM CHLORIDE: 9 INJECTION, SOLUTION INTRAVENOUS at 12:01

## 2024-01-03 RX ADMIN — AMLODIPINE BESYLATE 10 MG: 10 TABLET ORAL at 09:01

## 2024-01-03 RX ADMIN — HYDROXYZINE PAMOATE 25 MG: 25 CAPSULE ORAL at 05:01

## 2024-01-03 RX ADMIN — MORPHINE SULFATE 30 MG: 30 TABLET, FILM COATED, EXTENDED RELEASE ORAL at 09:01

## 2024-01-03 RX ADMIN — CEFTRIAXONE SODIUM 1 G: 1 INJECTION, POWDER, FOR SOLUTION INTRAMUSCULAR; INTRAVENOUS at 09:01

## 2024-01-03 RX ADMIN — SENNOSIDES AND DOCUSATE SODIUM 1 TABLET: 50; 8.6 TABLET ORAL at 10:01

## 2024-01-03 RX ADMIN — ONDANSETRON 4 MG: 4 TABLET ORAL at 05:01

## 2024-01-03 RX ADMIN — METOPROLOL TARTRATE 25 MG: 25 TABLET, FILM COATED ORAL at 09:01

## 2024-01-03 NOTE — PROGRESS NOTES
[facility] Internal Medicine Wards Progress Note     Resident Team: [facility] Medicine List 3  Attending Physician: Ingrid Paulino*  Resident: Lena Virk MD  Intern: Ignacio Parsons MD     Subjective:      Brief HPI:    Fela Ellison is a 58 y.o. female with a history of AML, hypertension, hyperlipidemia, IDDM-2, NAFLD, neuropathy who presented to ED on 12/30/2023  with a primary complaint of fever.  Patient admitted 11/20/2023  with similar presentation.  Patient had fever at home with temperature of 102.7°, was advised by outpatient oncologist to go to the ED if temperature > 102.  Patient also endorses generalized fatigue, nausea and vomiting, night sweats, 30 lb weight loss over the past 4-5 months.  She was diagnosed with CML November 2020, follows closely with hematology oncology outpatient and is currently taking Ponatinib 30 mg daily.  Additionally, patient endorses generalized abdominal pain which is a chronic issue, this has been better controlled at home with current pain medication regimen.  CTAP at last admission revealed no acute findings.  Currently denies any headaches, dizziness, chest pain, shortness on breath, diarrhea.     Patient presented to the ED on 12/30/2023.  Pulse 78, blood pressure 135/76, respirations 20, oxygen saturation 95% on 1 L nasal cannula, temperature 98.1° F. Chest x-ray CTA chest revealed no acute findings.  Labs today WBC 35.03, hemoglobin 7.1, hematocrit 24.8, platelet 71, blasts 9.   Supportive care being provided in the ED, patient pending available bed at [facility].  Internal medicine consulted for assistance in medical management of chronic issues until transfer.    Interval History:     Vitals stable. Oxygenation improved. No longer requiring oxygen supplementation. Patient tearful on examination due to combination of pain and anxiety. Continues to endorse severe abdominal pain although apparently pain is not new as it has been  present for over a year and has not changed in severity nor character. Now reporting constipation which is also apparently chronic as patient takes miralax at home prn. No other acute complaints. Lab work significantly improved compared to presentation. Patient appears to have returned to baseline. Will discuss further care with heme/onc. Will defer decision regarding transfer versus discharge to ED at this time.    Review of Systems:  Review of Systems   Constitutional:  Negative for chills, diaphoresis, fatigue and fever.   HENT:  Negative for ear pain, hearing loss, rhinorrhea, sore throat, tinnitus and trouble swallowing.    Eyes:  Negative for pain, redness and visual disturbance.   Respiratory:  Negative for cough, chest tightness and shortness of breath.    Cardiovascular:  Negative for chest pain and palpitations.   Gastrointestinal:  Positive for abdominal pain and constipation. Negative for diarrhea, nausea and vomiting.   Genitourinary:  Negative for difficulty urinating, dysuria, frequency, hematuria and urgency.   Musculoskeletal:  Negative for arthralgias and myalgias.   Skin:  Negative for rash and wound.   Neurological:  Negative for dizziness, seizures, syncope, weakness, light-headedness, numbness and headaches.   Psychiatric/Behavioral:  Negative for confusion.         Objective:     Last 24 Hour Vital Signs:  BP  Min: 112/56  Max: 155/98  Temp  Av.6 °F (37 °C)  Min: 97.6 °F (36.4 °C)  Max: 99.7 °F (37.6 °C)  Pulse  Av.8  Min: 66  Max: 94  Resp  Av.9  Min: 12  Max: 22  SpO2  Av.8 %  Min: 92 %  Max: 99 %  I/O last 3 completed shifts:  In: 7788.4 [P.O.:650; I.V.:6527.4; Blood:317; IV Piggyback:294]  Out: -     Physical Examination:  Physical Exam  Vitals reviewed.   Constitutional:       General: She is in acute distress (tearful).      Appearance: Normal appearance. She is obese. She is not ill-appearing, toxic-appearing or diaphoretic.   HENT:      Head: Normocephalic and  atraumatic.      Right Ear: External ear normal.      Left Ear: External ear normal.   Eyes:      General: No scleral icterus.        Right eye: No discharge.         Left eye: No discharge.      Extraocular Movements: Extraocular movements intact.      Conjunctiva/sclera: Conjunctivae normal.      Pupils: Pupils are equal, round, and reactive to light.   Cardiovascular:      Rate and Rhythm: Normal rate and regular rhythm.      Pulses: Normal pulses.      Heart sounds: Normal heart sounds. No murmur heard.     No friction rub. No gallop.   Pulmonary:      Effort: Pulmonary effort is normal. No respiratory distress.      Breath sounds: Normal breath sounds. No wheezing, rhonchi or rales.   Abdominal:      General: There is distension.      Palpations: Abdomen is soft.      Tenderness: There is abdominal tenderness. There is no guarding.      Comments: Soft but distended, severe tenderness to mild palpation, single incision with suture to right lower abdomen intact no erythema, edema, or warmth - appears well healed; hypoactive bowel sounds   Musculoskeletal:         General: No swelling, tenderness, deformity or signs of injury. Normal range of motion.      Right lower leg: No edema.      Left lower leg: No edema.   Skin:     General: Skin is warm and dry.      Capillary Refill: Capillary refill takes less than 2 seconds.      Coloration: Skin is not jaundiced.      Findings: No bruising, erythema or rash.   Neurological:      Mental Status: She is alert.      Comments: AAO to person, place, time, and situation; CN II-XII grossly intact         Laboratory:  Most Recent Data:  CBC:   Lab Results   Component Value Date    WBC 36.02 (H) 01/03/2024    HGB 8.0 (L) 01/03/2024    HCT 26.6 (L) 01/03/2024    PLT 65 (L) 01/03/2024    MCV 91.4 01/03/2024    RDW 18.6 (H) 01/03/2024     WBC Differential:   Recent Labs   Lab 12/30/23  0434 12/31/23  0502 01/01/24  0748 01/02/24  0410 01/02/24  1825 01/03/24  0429   WBC 37.49*  40.60* 34.54* 35.03*  --  36.02*   HGB 8.1* 8.5* 7.3* 6.9* 7.1* 8.0*   HCT 28.2* 28.8* 25.8* 24.0* 24.8* 26.6*   PLT 95* 89* 75* 71*  --  65*   MCV 91.3 90.3 92.1 92.7  --  91.4     BMP:   Lab Results   Component Value Date     01/03/2024    K 3.8 01/03/2024     10/29/2020    CO2 23 01/03/2024    BUN 6.0 (L) 01/03/2024    CREATININE 0.57 01/03/2024     (H) 10/29/2020    CALCIUM 8.4 01/03/2024    MG 1.60 12/28/2023    PHOS 4.1 12/30/2023     LFTs:   Lab Results   Component Value Date    PROT 6.3 10/29/2020    ALBUMIN 2.0 (L) 01/03/2024    BILITOT 0.4 01/03/2024    AST 19 01/03/2024    ALKPHOS 79 01/03/2024    ALT 16 01/03/2024     Coags:   Lab Results   Component Value Date    INR 1.1 11/19/2023    PROTIME 14.2 (H) 11/19/2023    PTT 36.8 (H) 11/19/2023     FLP:   Lab Results   Component Value Date    CHOL 117 09/17/2023    HDL 29 (L) 09/17/2023    TRIG 209 (H) 09/17/2023     DM:   Lab Results   Component Value Date    HGBA1C 7.7 (H) 11/06/2023    HGBA1C 7.6 (H) 07/20/2023    HGBA1C 9.0 (H) 03/17/2023    CREATININE 0.57 01/03/2024     Thyroid:   Lab Results   Component Value Date    TSH 1.502 10/13/2023     Anemia:   Lab Results   Component Value Date    IRON 34 (L) 11/19/2023    TIBC 216 (L) 11/19/2023    FERRITIN 2,928.64 (H) 11/19/2023    RGELPYGI65 401 11/19/2023    FOLATE 15.0 11/20/2023     Cardiac:   Lab Results   Component Value Date    TROPONINI <0.010 12/10/2022    BNP 22.1 12/30/2023     Urinalysis:   Lab Results   Component Value Date    LABURIN No Growth 01/01/2024    COLORU Colorless 02/04/2022    PHUA 5.5 12/30/2023    SPECGRAV 1.015 10/28/2020    NITRITE Negative 02/04/2022    KETONESU Negative 02/04/2022    UROBILINOGEN Normal 12/30/2023    WBCUA 21-50 (A) 12/30/2023       Radiology:  Imaging Results              CTA Chest Non-Coronary (PE Studies) (Final result)  Result time 12/30/23 07:48:58      Final result by Laila Frost MD (12/30/23 07:48:58)                    Impression:      1. No evidence of pulmonary embolus.  2. The preliminary and final reports are concordant.      Electronically signed by: Laila Frost  Date:    12/30/2023  Time:    07:48               Narrative:    EXAMINATION:  CTA CHEST NON CORONARY (PE STUDIES)    CLINICAL HISTORY:  Pulmonary embolism (PE) suspected, unknown D-dimer;    TECHNIQUE:  Helically acquired images with axial, sagittal and coronal reformations were obtained from the thoracic inlet to the lung bases followingthe IV administration of contrast.  CTA timed for evaluation of the pulmonary arteries.  MIP images were performed.    Automated tube current modulation, weight-based exposure dosing, and/or iterative reconstruction technique utilized to reach lowest reasonably achievable exposure rate.    DLP: 345 mGy*cm    COMPARISON:  CT chest 11/20/2023    FINDINGS:  BASE OF NECK: No significant abnormality.    AORTA: Left-sided aortic arch.  No aneurysm and no significant atherosclerosis    PULMONARY VASCULATURE: Pulmonary arteries enhance normally.  No evidence of pulmonary embolus.    HEART: Normal size. No effusion.    ELEONORA/MEDIASTINUM: No enlarged lymph nodes by size criteria.    AIRWAYS: Patent.    LUNGS/PLEURA: Mild atelectasis at the right middle lobe versus trace fluid at the fissure..    UPPER ABDOMEN: Splenomegaly.    THORACIC SOFT TISSUES: Unremarkable.    BONES: No acute fracture. No suspicious lytic or sclerotic lesions.                        Preliminary result by Giles Hodges MD (12/30/23 07:11:10)                   Impression:    1. No aortic dissection or aneurysm is seen.  2. No acute focal infiltrate or consolidation is seen.  3. No filling defects are seen in the pulmonary arteries to suggest pulmonary embolus.  4. Details and other findings as discussed above.               Narrative:    START OF REPORT:  Technique: CT Scan of the chest was performed with intravenous contrast with direct axial images as well as  sagittal and coronal reconstruction images pulmonary embolus protocol.    Dosage Information: Automated Exposure Control was utilized 344.69 mGy.cm.    Comparison: Comparison is with study dated 2023-11-20 13:12:36.    Clinical History: PEsuspected.    Findings:  Soft Tissues: Unremarkable.  Lines and Tubes: None.  Neck: The visualized soft tissues of the neck appear unremarkable.  Mediastinum: The mediastinal structures are within normal limits.  Heart: The heart appears unremarkable.  Aorta: No aortic dissection or aneurysm is seen.  Pulmonary Arteries: No filling defects are seen in the pulmonary arteries to suggest pulmonary embolus.  Lungs: Mild streaky linear opacity is seen predominantly in the dependent portions at the lung bases consistent with nonspecific dependent changes scarring and subsegmental. No acute focal infiltrate or consolidation is seen. There appears to be thickening of the right oblique fissure. This could reflect minimal pleural effusion.  Pleura: No effusions or pneumothorax are identified.  Bony Structures:  Spine: Moderate spondylolytic changes are seen in the thoracic spine.  Ribs: The ribs appear unremarkable.  Abdomen: There is stable mild fatty change in the liver. Surgical clips are again seen in the gallbladder fossa consistent with prior cholecystecomy. The rest of visualized upper abdominal organs appear unremarkable.                                         X-Ray Chest 1 View (Final result)  Result time 12/30/23 07:53:57      Final result by Laila Frost MD (12/30/23 07:53:57)                   Impression:      No acute cardiopulmonary abnormality.      Electronically signed by: Laila Frost  Date:    12/30/2023  Time:    07:53               Narrative:    EXAMINATION:  XR CHEST 1 VIEW    CLINICAL HISTORY:  Chest Pain;    TECHNIQUE:  Single frontal view of the chest was performed.    COMPARISON:  11/19/2023    FINDINGS:  LINES AND TUBES: None    MEDIASTINUM AND ELEONORA:  Cardiac silhouette is enlarged.    LUNGS: No lobar consolidation. No edema.    PLEURA:No pleural effusion. No pneumothorax.    OTHER: No acute osseous abnormality.                                    Current Medications:     Infusions:   sodium chloride 0.9% 150 mL/hr at 01/03/24 0603        Scheduled:   amLODIPine  10 mg Oral Daily    atorvastatin  40 mg Oral QHS    cefTRIAXone (ROCEPHIN) IVPB  1 g Intravenous Q12H    gabapentin  800 mg Oral TID    insulin detemir U-100  10 Units Subcutaneous QHS    metoprolol tartrate  25 mg Oral BID    morphine  30 mg Oral Q12H    polyethylene glycol  17 g Oral Daily    PONATinib  30 mg Oral Daily    senna-docusate 8.6-50 mg  1 tablet Oral Daily        PRN:  0.9%  NaCl infusion (for blood administration), 0.9%  NaCl infusion (for blood administration), albuterol, dextrose 10%, dextrose 10%, glucagon (human recombinant), glucose, glucose, HYDROcodone-acetaminophen, hydrOXYzine pamoate, insulin aspart U-100, ondansetron, sodium chloride 0.9%  Assessment & Plan:     Chronic myelogenous leukemia  Fever likely 2/2 malignancy  Chronic thrombocytopenia  Normocytic anemia  -Has been in ED since 12/30/2023  -patient diagnosed with CML November 2020.  -labs on admission: WBC 30.19, blast 18%, hgb 8.8, mcv 91.6  -lab work today shows continued leukocytosis with blasts 5% and normocytic anemia  -s/p transfusion 1 unit pRBCs  -patient was pending transfer to [facility].  -patient on Diflucan, acyclovir, Levaquin at home for prophylaxis, held upon arrival to ED  -patient has been on Rocephin in the ED, we will continue for now  -urine cultures negative to date  -blood cultures negative to date  -COVID/RSV/flu negative  -Continue Ponatinib, patient provided     Chronic abdominal pain  Scar tissue on abdominal wall  -per patient's , scar tissue secondary to previous chemotherapy injections  -CTAP on 11/20/2023:  Mildly enlarged liver, no acute findings.  -continue home  pain medication regimen:  Morphine 30 mg b.i.d., Norco  q.6 H p.r.n..  -Zofran p.r.n. for nausea.    Hypertension  -bp stable  -continue home amlodipine 10 mg daily, metoprolol 25 mg b.i.d.  -patient was not started on home losartan in the ED and blood pressure has been well-controlled, continue to hold for now     Diabetes mellitus type 2  -A1c 11/06/2023: 7.7  -home medication regimen:  Insulin lispro 22 units TIDWM, insulin NPH 20 units before breakfast, insulin NPH 50 units evening, metformin 1000 mg b.i.d..  -hold home diabetic medication regimen.  -Continue with insulin detemir 10 units nightly plus sliding scale insulin.     Neuropathy  -continue home gabapentin 800 mg t.i.d..     Hyperlipidemia  -continue home atorvastatin 40 mg nightly.     Anxiety  -continue home hydroxyzine 25 mg p.r.n..     Code Status: Full code  GI Access: PO  Feeding: Diabetic diet  IV Access: PIV  Fluids: NS @ 150 cc/hr  Analgesia: Morphine 30 mg b.i.d., Norco  q.6 H p.r.n..  Thromboprophylaxis: Contraindicated due to platelets < 100,000    Disposition: Patient assessed. Blast crisis appears to have resolved. Patient no longer requiring oxygen supplementation. Abdominal pain although severe is chronic and unchanged per patient report. Will discuss further care with heme/onc. Will defer decision for transport to ED for now.    Ignacio Parsons MD  [facility] Internal Medicine, PGY-1

## 2024-01-03 NOTE — CONSULTS
[facility] Medicine Consult Note     Resident Team: [facility] Medicine List 3  Attending Physician: Yahaira att. providers found    Date of Admit: 12/30/2023    Chief Complaint     Fever (Pt reports to the ed c/o subjective fever since 7 p.m. last night. Received last dose of tylenol at 12 a.m. current temp=99.8)      Subjective:      History of Present Illness:  Fela Ellison is a 58 y.o. female with a history of AML, hypertension, hyperlipidemia, IDDM-2, NAFLD, neuropathy who presented to ED on 12/30/2023  with a primary complaint of fever.  Patient admitted 11/20/2023  with similar presentation.  Patient had fever at home with temperature of 102.7°, was advised by outpatient oncologist to go to the ED if temperature > 102.  Patient also endorses generalized fatigue, nausea and vomiting, night sweats, 30 lb weight loss over the past 4-5 months.  She was diagnosed with CML November 2020, follows closely with hematology oncology outpatient and is currently taking Ponatinib 30 mg daily.  Additionally, patient endorses generalized abdominal pain which is a chronic issue, this has been better controlled at home with current pain medication regimen.  CTAP at last admission revealed no acute findings.  Currently denies any headaches, dizziness, chest pain, shortness on breath, diarrhea.    Patient presented to the ED on 12/30/2023.  Pulse 78, blood pressure 135/76, respirations 20, oxygen saturation 95% on 1 L nasal cannula, temperature 98.1° F. Chest x-ray CTA chest revealed no acute findings.  Labs today WBC 35.03, hemoglobin 7.1, hematocrit 24.8, platelet 71, blasts 9.   Supportive care being provided in the ED, patient pending available bed at [facility].  Internal medicine consulted for assistance in medical management of chronic issues until transfer.      Past Medical History:  Past Medical History:   Diagnosis Date    Cancer     CML (chronic myelocytic leukemia)     DM2 (diabetes mellitus, type 2)      HTN (hypertension)     NAFLD (nonalcoholic fatty liver disease)     Neuropathy        Past Surgical History:  Past Surgical History:   Procedure Laterality Date    ABDOMINAL SURGERY       SECTION      x4    CHOLECYSTECTOMY         Family History:  Family History   Problem Relation Age of Onset    Diabetes Mother     Diabetes Father     Cancer Neg Hx        Social History:  Social History     Tobacco Use    Smoking status: Never    Smokeless tobacco: Never   Substance Use Topics    Alcohol use: Not Currently    Drug use: Not Currently       Allergies:  Review of patient's allergies indicates:  No Known Allergies    Home Medications:  Prior to Admission medications    Medication Sig Start Date End Date Taking? Authorizing Provider   acyclovir (ZOVIRAX) 400 MG tablet Take 1 tablet (400 mg total) by mouth 2 (two) times daily. 23 Yes Natalya Ojeda MD   albuterol (PROVENTIL/VENTOLIN HFA) 90 mcg/actuation inhaler Inhale 2 puffs into the lungs every 4 (four) hours as needed for Wheezing. Rescue   Yes Provider, Historical   allopurinoL (ZYLOPRIM) 300 MG tablet Take 300 mg by mouth once daily. 23  Yes Provider, Historical   amLODIPine (NORVASC) 10 MG tablet Take 10 mg by mouth once daily.   Yes Provider, Historical   atorvastatin (LIPITOR) 40 MG tablet Take 40 mg by mouth once daily.   Yes Provider, Historical   fluconazole (DIFLUCAN) 200 MG Tab Take 400 mg by mouth once daily.   Yes Provider, Historical   furosemide (LASIX) 20 MG tablet Take 1 tablet (20 mg total) by mouth once daily. 23  Yes Huma Haines NP   gabapentin (NEURONTIN) 300 MG capsule Take 2 capsules TID.  Patient taking differently: Take 800 capsules by mouth 3 (three) times daily. Take 1 capsules TID 23  Yes Huma Haines NP   HYDROcodone-acetaminophen (NORCO) 5-325 mg per tablet Take 1 tablet by mouth every 6 (six) hours as needed for Pain.   Yes Provider, Historical   insulin lispro 100 unit/mL  injection Inject 22 Units into the skin 3 (three) times daily before meals. Takes 22 units TID AC while on chemo - (Gives between 12 to 15 units TID AC when not on chemo)  Patient taking differently: Inject 20 Units into the skin 3 (three) times daily before meals. Takes 22 units TID AC while on chemo - (Gives between 12 to 15 units TID AC when not on chemo) 9/29/23 12/30/23 Yes Ivan Del Rio MD   insulin NPH (NOVOLIN N NPH U-100 INSULIN) 100 unit/mL injection 20 Units before breakfast. No longer 20 in AM 3/22/23  Yes Provider, Historical   insulin  unit/mL injection Inject 50 Units into the skin every evening.   Yes Provider, Historical   levoFLOXacin (LEVAQUIN) 500 MG tablet Take 500 mg by mouth every 24 hours.   Yes Provider, Historical   losartan (COZAAR) 25 MG tablet Take 1 tablet (25 mg total) by mouth once daily. 12/12/22 12/30/23 Yes Rupesh Thomas MD   metFORMIN (GLUCOPHAGE) 1000 MG tablet Take 1,000 mg by mouth 2 (two) times daily with meals.   Yes Provider, Historical   metoprolol tartrate (LOPRESSOR) 25 MG tablet Take 25 mg by mouth 2 (two) times daily.   Yes Provider, Historical   montelukast (SINGULAIR) 10 mg tablet Take 10 mg by mouth once daily.   Yes Provider, Historical   morphine (MSIR) 15 MG tablet Take 30 mg by mouth 2 (two) times a day.   Yes Provider, Historical   omeprazole (PRILOSEC) 40 MG capsule Take 1 capsule by mouth once daily. 5/22/23  Yes Provider, Historical   ondansetron (ZOFRAN) 4 MG tablet Take 2 tablets (8 mg total) by mouth every 6 (six) hours as needed for Nausea.  Patient taking differently: Take 4 mg by mouth every 6 (six) hours as needed for Nausea. 12/28/23 1/27/24 Yes Kareem Goddard FNP   PONATinib (ICLUSIG) 30 mg Tab Take 30 mg by mouth Daily.   Yes Provider, Historical         Review of Systems:  Constitutional:  Positive for fever and generalized fatigue  EENT: no sore throat, ear pain, sinus pain/congestion, nasal congestion/drainage  Respiratory: no  cough, no wheezing, no shortness of breath  Cardiovascular: no chest pain, no palpitations, no edema  Gastrointestinal:  Positive for abdominal pain, nausea, and vomiting.  Genitourinary: no dysuria, no urinary frequency or urgency, no hematuria  Integumentary:  Positive for bruising over abdomen, states this is chronic.  Neurologic: no headache, no dizziness.           Objective:   Last 24 Hour Vital Signs:  BP  Min: 112/56  Max: 138/73  Temp  Av.5 °F (36.9 °C)  Min: 97.6 °F (36.4 °C)  Max: 99.2 °F (37.3 °C)  Pulse  Av.6  Min: 70  Max: 109  Resp  Av.6  Min: 16  Max: 20  SpO2  Av.1 %  Min: 93 %  Max: 99 %  Body mass index is 39.48 kg/m².  I/O last 3 completed shifts:  In: 3326.6 [I.V.:3132.6; IV Piggyback:194]  Out: -     Physical Examination:  General:  Patient appears uncomfortable  Eye: PERRLA, EOMI  Head: normocephalic and atraumatic  Neck: full range of motion, supple  Respiratory: clear to auscultation bilaterally without wheezes, rales, rhonchi  Cardiovascular: regular rate and rhythm without murmurs. No JVD. Capillary refill within normal limits.  Gastrointestinal:  Abdominal distention present, multiple ecchymoses and evidence of scar tissue, diffusely tender to palpation.  Normal bowel sounds in all 4 quadrants.  Musculoskeletal: full range of motion of all extremities without limitation or discomfort  Integumentary:  Multiple ecchymoses present over abdomen.  Neurologic: AAO x 3, no dysarthria.  Psychiatric: cooperative with exam, good eye contact.      Laboratory:  Most Recent Data:  CBC:   Lab Results   Component Value Date    WBC 35.03 (H) 2024    HGB 7.1 (L) 2024    HCT 24.8 (L) 2024    PLT 71 (L) 2024    MCV 92.7 2024    RDW 19.0 (H) 2024     WBC Differential:   Recent Labs   Lab 23  0823 23  0434 23  0502 24  0748 24  0410 24  1825   WBC 30.19* 37.49* 40.60* 34.54* 35.03*  --    HGB 8.8* 8.1* 8.5* 7.3* 6.9*  7.1*   HCT 30.4* 28.2* 28.8* 25.8* 24.0* 24.8*   * 95* 89* 75* 71*  --    MCV 91.6 91.3 90.3 92.1 92.7  --      BMP:   Lab Results   Component Value Date     01/02/2024    K 3.5 01/02/2024     10/29/2020    CO2 24 01/02/2024    BUN 4.5 (L) 01/02/2024    CREATININE 0.58 01/02/2024     (H) 10/29/2020    CALCIUM 8.1 (L) 01/02/2024    MG 1.60 12/28/2023    PHOS 4.1 12/30/2023     LFTs:   Lab Results   Component Value Date    PROT 6.3 10/29/2020    ALBUMIN 1.9 (L) 01/02/2024    BILITOT 0.3 01/02/2024    AST 27 01/02/2024    ALKPHOS 77 01/02/2024    ALT 18 01/02/2024     Coags:   Lab Results   Component Value Date    INR 1.1 11/19/2023    PROTIME 14.2 (H) 11/19/2023    PTT 36.8 (H) 11/19/2023     FLP:   Lab Results   Component Value Date    CHOL 117 09/17/2023    HDL 29 (L) 09/17/2023    TRIG 209 (H) 09/17/2023     DM:   Lab Results   Component Value Date    HGBA1C 7.7 (H) 11/06/2023    HGBA1C 7.6 (H) 07/20/2023    HGBA1C 9.0 (H) 03/17/2023    CREATININE 0.58 01/02/2024     Thyroid:   Lab Results   Component Value Date    TSH 1.502 10/13/2023      Anemia:   Lab Results   Component Value Date    IRON 34 (L) 11/19/2023    TIBC 216 (L) 11/19/2023    FERRITIN 2,928.64 (H) 11/19/2023       Lab Results   Component Value Date    [id number] 401 11/19/2023       Lab Results   Component Value Date    FOLATE 15.0 11/20/2023        Cardiac:   Lab Results   Component Value Date    TROPONINI <0.010 12/10/2022    BNP 22.1 12/30/2023     Urinalysis:   Lab Results   Component Value Date    LABURIN No Growth At 24 Hours 01/01/2024    COLORU Colorless 02/04/2022    PHUA 5.5 12/30/2023    SPECGRAV 1.015 10/28/2020    NITRITE Negative 02/04/2022    KETONESU Negative 02/04/2022    UROBILINOGEN Normal 12/30/2023    WBCUA 21-50 (A) 12/30/2023       Trended Lab Data:  Recent Labs   Lab 12/31/23  0502 01/01/24  0748 01/02/24  0410 01/02/24  1825   WBC 40.60* 34.54* 35.03*  --    HGB 8.5* 7.3* 6.9* 7.1*   HCT 28.8* 25.8*  24.0* 24.8*   PLT 89* 75* 71*  --    MCV 90.3 92.1 92.7  --    RDW 18.3* 19.1* 19.0*  --     140 141  --    K 4.1 3.7 3.5  --    CO2 24 24 24  --    BUN 5.7* 5.4* 4.5*  --    CREATININE 0.61 0.57 0.58  --    ALBUMIN 2.2* 2.1* 1.9*  --    BILITOT 0.6 0.5 0.3  --    AST 29 21 27  --    ALKPHOS 93 77 77  --    ALT 21 16 18  --        Trended Cardiac Data:  Recent Labs   Lab 12/30/23  0842   BNP 22.1       Microbiology Data:  Microbiology Results (last 7 days)       Procedure Component Value Units Date/Time    Blood Culture #1 **CANNOT BE ORDERED STAT** [[id number]]  (Normal) Collected: 12/30/23 1006    Order Status: Completed Specimen: Blood from Antecubital, Left Updated: 01/02/24 1500     CULTURE, BLOOD ([facility]) No Growth At 72 Hours    Blood Culture #2 **CANNOT BE ORDERED STAT** [[id number]]  (Normal) Collected: 12/30/23 1014    Order Status: Completed Specimen: Blood from Hand, Right Updated: 01/02/24 1500     CULTURE, BLOOD ([facility]) No Growth At 72 Hours    Urine culture High Risk **CANNOT BE ORDERED STAT** [[id number]] Collected: 01/01/24 1759    Order Status: Completed Specimen: Urine, Clean Catch Updated: 01/02/24 0618     Urine Culture No Growth At 24 Hours    Urine culture [[id number]] Collected: 12/30/23 0637    Order Status: Completed Specimen: Urine Updated: 01/01/24 0742     Urine Culture No Significant Growth             Other Results:    Radiology:  Imaging Results              CTA Chest Non-Coronary (PE Studies) (Final result)  Result time 12/30/23 07:48:58      Final result by Laila Frost MD (12/30/23 07:48:58)                   Impression:      1. No evidence of pulmonary embolus.  2. The preliminary and final reports are concordant.      Electronically signed by: Laila Frost  Date:    12/30/2023  Time:    07:48               Narrative:    EXAMINATION:  CTA CHEST NON CORONARY (PE STUDIES)    CLINICAL HISTORY:  Pulmonary embolism (PE) suspected, unknown D-dimer;    TECHNIQUE:  Helically  acquired images with axial, sagittal and coronal reformations were obtained from the thoracic inlet to the lung bases followingthe IV administration of contrast.  CTA timed for evaluation of the pulmonary arteries.  MIP images were performed.    Automated tube current modulation, weight-based exposure dosing, and/or iterative reconstruction technique utilized to reach lowest reasonably achievable exposure rate.    DLP: 345 mGy*cm    COMPARISON:  CT chest 11/20/2023    FINDINGS:  BASE OF NECK: No significant abnormality.    AORTA: Left-sided aortic arch.  No aneurysm and no significant atherosclerosis    PULMONARY VASCULATURE: Pulmonary arteries enhance normally.  No evidence of pulmonary embolus.    HEART: Normal size. No effusion.    ELEONORA/MEDIASTINUM: No enlarged lymph nodes by size criteria.    AIRWAYS: Patent.    LUNGS/PLEURA: Mild atelectasis at the right middle lobe versus trace fluid at the fissure..    UPPER ABDOMEN: Splenomegaly.    THORACIC SOFT TISSUES: Unremarkable.    BONES: No acute fracture. No suspicious lytic or sclerotic lesions.                        Preliminary result by Giles Hodges MD (12/30/23 07:11:10)                   Impression:    1. No aortic dissection or aneurysm is seen.  2. No acute focal infiltrate or consolidation is seen.  3. No filling defects are seen in the pulmonary arteries to suggest pulmonary embolus.  4. Details and other findings as discussed above.               Narrative:    START OF REPORT:  Technique: CT Scan of the chest was performed with intravenous contrast with direct axial images as well as sagittal and coronal reconstruction images pulmonary embolus protocol.    Dosage Information: Automated Exposure Control was utilized 344.69 mGy.cm.    Comparison: Comparison is with study dated 2023-11-20 13:12:36.    Clinical History: PEsuspected.    Findings:  Soft Tissues: Unremarkable.  Lines and Tubes: None.  Neck: The visualized soft tissues of the neck appear  unremarkable.  Mediastinum: The mediastinal structures are within normal limits.  Heart: The heart appears unremarkable.  Aorta: No aortic dissection or aneurysm is seen.  Pulmonary Arteries: No filling defects are seen in the pulmonary arteries to suggest pulmonary embolus.  Lungs: Mild streaky linear opacity is seen predominantly in the dependent portions at the lung bases consistent with nonspecific dependent changes scarring and subsegmental. No acute focal infiltrate or consolidation is seen. There appears to be thickening of the right oblique fissure. This could reflect minimal pleural effusion.  Pleura: No effusions or pneumothorax are identified.  Bony Structures:  Spine: Moderate spondylolytic changes are seen in the thoracic spine.  Ribs: The ribs appear unremarkable.  Abdomen: There is stable mild fatty change in the liver. Surgical clips are again seen in the gallbladder fossa consistent with prior cholecystecomy. The rest of visualized upper abdominal organs appear unremarkable.                                         X-Ray Chest 1 View (Final result)  Result time 12/30/23 07:53:57      Final result by Laila Frost MD (12/30/23 07:53:57)                   Impression:      No acute cardiopulmonary abnormality.      Electronically signed by: Laila Frost  Date:    12/30/2023  Time:    07:53               Narrative:    EXAMINATION:  XR CHEST 1 VIEW    CLINICAL HISTORY:  Chest Pain;    TECHNIQUE:  Single frontal view of the chest was performed.    COMPARISON:  11/19/2023    FINDINGS:  LINES AND TUBES: None    MEDIASTINUM AND ELEONORA: Cardiac silhouette is enlarged.    LUNGS: No lobar consolidation. No edema.    PLEURA:No pleural effusion. No pneumothorax.    OTHER: No acute osseous abnormality.                                         Assessment & Plan:     Chronic myelogenous leukemia  Fever likely 2/2 malignancy  Chronic thrombocytopenia  Normocytic anemia  -Has been in ED since  12/30/2023  -patient diagnosed with CML November 2020.  -labs today:  WBC 35.03, hemoglobin 7.1, hematocrit 24.8, MCV 92.7, blast 9 (blast cells 21 on 1/1/2024).  -1 unit PRBC's ordered per ED.  -patient pending transfer to [facility].  -patient on Diflucan, acyclovir, Levaquin at home for prophylaxis, held upon arrival to ED.  -blood cultures no growth at 72 hours.  -COVID/RSV/flu negative.  -patient has been on Levaquin long-term, we will obtain EKG to assess QTc.  -patient has been on Rocephin in the ED, we will continue for now.   -Continue Ponatinib, patient provided.     Abdominal Pain- chronic  Scar tissue on abdominal wall  -per patient's , scar tissue secondary to previous chemotherapy injections.  -CTAP on 11/20/2023:  Mildly enlarged liver, no acute findings.  -continue home pain medication regimen:  Morphine 30 mg b.i.d., Norco  q.6 H p.r.n..  -Zofran p.r.n. for nausea.    Hypertension  -continue home amlodipine 10 mg daily, metoprolol 25 mg b.i.d.  -patient was not started on home losartan in the ED and blood pressure has been well-controlled, continue to hold for now.    Diabetes mellitus type 2  -A1c 11/06/2023:  7.7  -home medication regimen:  Insulin lispro 22 units TIDWM, insulin NPH 20 units before breakfast, insulin NPH 50 units evening, metformin 1000 mg b.i.d..  -hold home diabetic medication regimen.  -Continue with insulin detemir 10 units nightly, sliding scale insulin.    Neuropathy  -continue home gabapentin 800 mg t.i.d..    Hyperlipidemia  -continue home atorvastatin 40 mg nightly.    Anxiety  -continue home hydroxyzine 25 mg p.r.n..        CODE STATUS:  Full  Access:  PIV  Antibiotics:  Rocephin  Diet:  Diabetic diet  DVT Prophylaxis: SCDs  GI Prophylaxis: None  Fluids:   mL/hr      Disposition:  Patient currently in the ED pending transfer to [facility].  Internal medicine consulted for medical management pending  transfer.          Dimitrios Jesus MD  \[facility] [place] [facility]\"" Internal Medicine PGY-1

## 2024-01-03 NOTE — ED NOTES
"Spoke with lab in regards to unit of blood ordered for patient.   Lab states due to patient needing radiated blood, it will be up to 4 hours "but could be longer than that" until unit is ready.  "

## 2024-01-03 NOTE — DISCHARGE SUMMARY
[facility] Internal Medicine Discharge Summary    Admitting physician: No admitting provider for patient encounter.  Attending physician: Ingrid Paulino*  Date of admit: 12/30/2023  Date of discharge: 1/3/2024    Condition: Stable  Outcome: Condition has improved and patient is now ready for discharge.  Disposition: Home or Self Care    Discharge Diagnoses     Principal Problem:  <principal problem not specified>    Patient Active Problem List   Diagnosis    CML (chronic myelocytic leukemia)    Diabetes mellitus    Severe obesity (BMI >= 40)    NAFLD (nonalcoholic fatty liver disease)    Hypertension    Tobacco user    Hypercholesterolemia    Hyperuricemia    Hypokalemia    Hepatosplenomegaly    Other headache syndrome    Fever    Anemia    Thrombocytopenia    Pancytopenia     Consultants and Procedures     Consultants:  Consults (From admission, onward)          Status Ordering Provider     Inpatient consult to Internal Medicine  Once        Provider:  Ivan Del Rio MD    Completed VENKAT OLMOS           Procedures:  * No surgery found *    Brief History of Present Illness      Fela Ellison is a 58 y.o. female with a history of AML, hypertension, hyperlipidemia, IDDM-2, NAFLD, neuropathy who presented to ED on 12/30/2023  with a primary complaint of fever.  Patient admitted 11/20/2023  with similar presentation.  Patient had fever at home with temperature of 102.7°, was advised by outpatient oncologist to go to the ED if temperature > 102.  Patient also endorses generalized fatigue, nausea and vomiting, night sweats, 30 lb weight loss over the past 4-5 months.  She was diagnosed with CML November 2020, follows closely with hematology oncology outpatient and is currently taking Ponatinib 30 mg daily.  Additionally, patient endorses generalized abdominal pain which is a chronic issue, this has been better controlled at home with current pain medication regimen.  CTAP at last admission revealed no  "acute findings.  Currently denies any headaches, dizziness, chest pain, shortness on breath, diarrhea.     Hospital Course with Pertinent Findings     Patient presented to the ED on 12/30/2023.  Pulse 78, blood pressure 135/76, respirations 20, oxygen saturation 95% on 1 L nasal cannula, temperature 98.1° F. Chest x-ray negative for acute cardiopulmonary process. CTA chest revealed no acute findings but left sided aortic arch. Supportive care provided in the ED while attempting transfer to [facility]. Internal medicine consulted for assistance in medical management of chronic issues until transfer. Restarted home meds. Initiated IV ceftriaxone for treatment of UTI. Blast percentage self resolved - decreased from peak 21% to 5% with conservative management. Patient was able to weaned off oxygen. Saturating well on room air. Discussed patient care plan with heme/onc who follows patient for monitoring of lab work while patient receives palliative treatment at [facility] who indicated patient okay to be discharged with close follow up at [facility]. Discussed patient care with patient and family who were agreeable. Recommending discharge to home with abx therapy for treatment of UTI.    Objective     Vital Signs:  Vitals  BP: 134/74  Temp: 99 °F (37.2 °C)  Temp Source: Oral  Pulse: 72  Resp: 19  SpO2: (!) 93 %  Height: 5' 4" (162.6 cm)  Weight: 104.3 kg (230 lb)    Physical Exam  Vitals reviewed.   Constitutional:       General: She is not in acute distress.     Appearance: Normal appearance. She is obese. She is not ill-appearing, toxic-appearing or diaphoretic.   HENT:      Head: Normocephalic and atraumatic.      Right Ear: External ear normal.      Left Ear: External ear normal.   Eyes:      General: No scleral icterus.        Right eye: No discharge.         Left eye: No discharge.      Extraocular Movements: Extraocular movements intact.      Conjunctiva/sclera: Conjunctivae " normal.      Pupils: Pupils are equal, round, and reactive to light.   Cardiovascular:      Rate and Rhythm: Normal rate and regular rhythm.      Pulses: Normal pulses.      Heart sounds: Normal heart sounds. No murmur heard.     No friction rub. No gallop.   Pulmonary:      Effort: Pulmonary effort is normal. No respiratory distress.      Breath sounds: Normal breath sounds. No wheezing, rhonchi or rales.   Abdominal:      General: There is distension.      Palpations: Abdomen is soft.      Tenderness: There is abdominal tenderness. There is no guarding.      Comments: Soft but distended, severe tenderness to mild palpation, single incision with suture to right lower abdomen intact no erythema, edema, or warmth - appears well healed; hypoactive bowel sounds   Musculoskeletal:         General: No swelling, tenderness, deformity or signs of injury. Normal range of motion.      Right lower leg: No edema.      Left lower leg: No edema.   Skin:     General: Skin is warm and dry.      Capillary Refill: Capillary refill takes less than 2 seconds.      Coloration: Skin is not jaundiced.      Findings: No bruising, erythema or rash.   Neurological:      Mental Status: She is alert.      Comments: AAO to person, place, time, and situation; CN II-XII grossly intact     Discharge Medications        Medication List        ASK your doctor about these medications      acyclovir 400 MG tablet  Commonly known as: ZOVIRAX  Take 1 tablet (400 mg total) by mouth 2 (two) times daily.     albuterol 90 mcg/actuation inhaler  Commonly known as: PROVENTIL/VENTOLIN HFA     allopurinoL 300 MG tablet  Commonly known as: ZYLOPRIM     amLODIPine 10 MG tablet  Commonly known as: NORVASC     atorvastatin 40 MG tablet  Commonly known as: LIPITOR     fluconazole 200 MG Tab  Commonly known as: DIFLUCAN     furosemide 20 MG tablet  Commonly known as: LASIX  Take 1 tablet (20 mg total) by mouth once daily.     gabapentin 300 MG capsule  Commonly known  as: NEURONTIN  Take 2 capsules TID.     HYDROcodone-acetaminophen 5-325 mg per tablet  Commonly known as: NORCO     ICLUSIG 30 mg Tab  Generic drug: PONATinib     insulin lispro 100 unit/mL injection  Inject 22 Units into the skin 3 (three) times daily before meals. Takes 22 units TID AC while on chemo - (Gives between 12 to 15 units TID AC when not on chemo)     levoFLOXacin 500 MG tablet  Commonly known as: LEVAQUIN     losartan 25 MG tablet  Commonly known as: COZAAR  Take 1 tablet (25 mg total) by mouth once daily.     metFORMIN 1000 MG tablet  Commonly known as: GLUCOPHAGE     metoprolol tartrate 25 MG tablet  Commonly known as: LOPRESSOR     montelukast 10 mg tablet  Commonly known as: SINGULAIR     morphine 15 MG tablet  Commonly known as: MSIR     * insulin  unit/mL injection     * NovoLIN N NPH U-100 Insulin 100 unit/mL injection  Generic drug: insulin NPH     omeprazole 40 MG capsule  Commonly known as: PRILOSEC     ondansetron 4 MG tablet  Commonly known as: ZOFRAN  Take 2 tablets (8 mg total) by mouth every 6 (six) hours as needed for Nausea.           * This list has 2 medication(s) that are the same as other medications prescribed for you. Read the directions carefully, and ask your doctor or other care provider to review them with you.                Discharge Information:     Mr. Fela Ellison is being discharged .    No discharge procedures on file.     Follow-Up Appointments:   Follow-up Information       Bar Trevino DO In 1 week.    Specialty: Internal Medicine  Contact information:  [address] [facility] [address] [phone]               Hermelinda Burns DO. Schedule an appointment as soon as possible for a visit in 1 week.    Specialty: Internal Medicine  Contact information:  [address] [facility] [address] [phone]                             To address at follow-up:  -Recommend the following labs be drawn at follow up visit:  CBC  -Recommend the following imaging studies be ordered at follow up visit: None    At this time, Fela Ellison is determined to have maximized benefits of inpatient hospitalization. Hospital course and discharge care plan has been discussed with patient at length, including prescriptions that were started this admission and prescriptions to be continued post discharge. All questions answered, and patient and family verbalized agreement with the plan of care. They were given return precautions prior to discharge including alarm symptoms that should prompt return to ED or call to PCP.    TIME SPENT ON DISCHARGE: 60 minutes    Ignacio Parsons MD  [facility] Internal Medicine, PGY-1

## 2024-01-04 LAB
BACTERIA BLD CULT: NORMAL
BACTERIA BLD CULT: NORMAL

## 2024-01-11 ENCOUNTER — TELEPHONE (OUTPATIENT)
Dept: HEMATOLOGY/ONCOLOGY | Facility: CLINIC | Age: 59
End: 2024-01-11

## 2024-01-11 NOTE — TELEPHONE ENCOUNTER
Called Patient to confirm appointment for 1/12/24. Labs at 7:15 am and NP visit at 8:30 am. Spoke with patient . Patient is in [facility] [place] in [place] until 1/16/24. Patient  will call the clinic to reschedule appointment.

## 2024-01-17 ENCOUNTER — LAB VISIT (OUTPATIENT)
Dept: HEMATOLOGY/ONCOLOGY | Facility: CLINIC | Age: 59
End: 2024-01-17

## 2024-01-17 ENCOUNTER — TELEPHONE (OUTPATIENT)
Dept: HEMATOLOGY/ONCOLOGY | Facility: CLINIC | Age: 59
End: 2024-01-17

## 2024-01-17 ENCOUNTER — INFUSION (OUTPATIENT)
Dept: INFUSION THERAPY | Facility: HOSPITAL | Age: 59
End: 2024-01-17
Attending: INTERNAL MEDICINE

## 2024-01-17 VITALS
BODY MASS INDEX: 37.23 KG/M2 | RESPIRATION RATE: 20 BRPM | WEIGHT: 218.06 LBS | HEART RATE: 76 BPM | HEIGHT: 64 IN | DIASTOLIC BLOOD PRESSURE: 73 MMHG | TEMPERATURE: 98 F | SYSTOLIC BLOOD PRESSURE: 125 MMHG | OXYGEN SATURATION: 94 %

## 2024-01-17 DIAGNOSIS — C92.10 BLAST CRISIS PHASE OF CHRONIC MYELOID LEUKEMIA: ICD-10-CM

## 2024-01-17 DIAGNOSIS — D64.9 SYMPTOMATIC ANEMIA: Primary | ICD-10-CM

## 2024-01-17 DIAGNOSIS — D69.6 THROMBOCYTOPENIA: ICD-10-CM

## 2024-01-17 DIAGNOSIS — D64.9 SYMPTOMATIC ANEMIA: ICD-10-CM

## 2024-01-17 DIAGNOSIS — D64.9 ANEMIA, UNSPECIFIED TYPE: Primary | ICD-10-CM

## 2024-01-17 LAB
ABS NEUT CALC (OHS): 22.52 X10(3)/MCL (ref 2.1–9.2)
ACANTHOCYTES (OLG): SLIGHT
ALBUMIN SERPL-MCNC: 2.4 G/DL (ref 3.5–5)
ALBUMIN/GLOB SERPL: 0.7 RATIO (ref 1.1–2)
ALP SERPL-CCNC: 67 UNIT/L (ref 40–150)
ALT SERPL-CCNC: 8 UNIT/L (ref 0–55)
ANISOCYTOSIS BLD QL SMEAR: ABNORMAL
AST SERPL-CCNC: 11 UNIT/L (ref 5–34)
BILIRUB SERPL-MCNC: 0.6 MG/DL
BUN SERPL-MCNC: 11.9 MG/DL (ref 9.8–20.1)
CALCIUM SERPL-MCNC: 9.4 MG/DL (ref 8.4–10.2)
CHLORIDE SERPL-SCNC: 101 MMOL/L (ref 98–107)
CO2 SERPL-SCNC: 26 MMOL/L (ref 22–29)
CREAT SERPL-MCNC: 0.73 MG/DL (ref 0.55–1.02)
ERYTHROCYTE [DISTWIDTH] IN BLOOD BY AUTOMATED COUNT: 18.9 % (ref 11.5–17)
GFR SERPLBLD CREATININE-BSD FMLA CKD-EPI: >60 MLS/MIN/1.73/M2
GLOBULIN SER-MCNC: 3.4 GM/DL (ref 2.4–3.5)
GLUCOSE SERPL-MCNC: 220 MG/DL (ref 74–100)
GROUP & RH: NORMAL
HCT VFR BLD AUTO: 24.1 % (ref 37–47)
HGB BLD-MCNC: 7.1 G/DL (ref 12–16)
HYPOCHROMIA BLD QL SMEAR: ABNORMAL
INDIRECT COOMBS: NORMAL
LYMPHOCYTES NFR BLD MANUAL: 19 % (ref 13–40)
LYMPHOCYTES NFR BLD MANUAL: 6.79 X10(3)/MCL
MACROCYTES BLD QL SMEAR: ABNORMAL
MCH RBC QN AUTO: 26.4 PG (ref 27–31)
MCHC RBC AUTO-ENTMCNC: 29.5 G/DL (ref 33–36)
MCV RBC AUTO: 89.6 FL (ref 80–94)
MONOCYTES NFR BLD MANUAL: 0.36 X10(3)/MCL (ref 0.1–1.3)
MONOCYTES NFR BLD MANUAL: 1 % (ref 2–11)
NEUTROPHILS NFR BLD MANUAL: 63 % (ref 47–80)
NRBC BLD AUTO-RTO: 6.6 %
OTHER CELLS MANUAL: 5 %
PLATELET # BLD AUTO: 14 X10(3)/MCL (ref 130–400)
PLATELET # BLD EST: ABNORMAL 10*3/UL
PLATELETS.RETICULATED NFR BLD AUTO: 28.6 % (ref 0.9–11.2)
PMV BLD AUTO: 0 FL (ref 7.4–10.4)
POIKILOCYTOSIS BLD QL SMEAR: ABNORMAL
POTASSIUM SERPL-SCNC: 4.1 MMOL/L (ref 3.5–5.1)
PROMYELOCYTES # BLD MANUAL: 12 %
PROT SERPL-MCNC: 5.8 GM/DL (ref 6.4–8.3)
RBC # BLD AUTO: 2.69 X10(6)/MCL (ref 4.2–5.4)
RBC MORPH BLD: ABNORMAL
SODIUM SERPL-SCNC: 139 MMOL/L (ref 136–145)
SPECIMEN OUTDATE: NORMAL
WBC # SPEC AUTO: 35.74 X10(3)/MCL (ref 4.5–11.5)

## 2024-01-17 PROCEDURE — 86923 COMPATIBILITY TEST ELECTRIC: CPT

## 2024-01-17 PROCEDURE — P9037 PLATE PHERES LEUKOREDU IRRAD: HCPCS

## 2024-01-17 PROCEDURE — 36415 COLL VENOUS BLD VENIPUNCTURE: CPT

## 2024-01-17 PROCEDURE — 25000003 PHARM REV CODE 250

## 2024-01-17 PROCEDURE — 80053 COMPREHEN METABOLIC PANEL: CPT

## 2024-01-17 PROCEDURE — P9040 RBC LEUKOREDUCED IRRADIATED: HCPCS

## 2024-01-17 PROCEDURE — 85027 COMPLETE CBC AUTOMATED: CPT

## 2024-01-17 PROCEDURE — 86901 BLOOD TYPING SEROLOGIC RH(D): CPT

## 2024-01-17 RX ORDER — HYDROCODONE BITARTRATE AND ACETAMINOPHEN 500; 5 MG/1; MG/1
TABLET ORAL ONCE
Status: CANCELLED | OUTPATIENT
Start: 2024-01-17 | End: 2024-01-17

## 2024-01-17 RX ORDER — ACETAMINOPHEN 325 MG/1
650 TABLET ORAL
Status: CANCELLED | OUTPATIENT
Start: 2024-01-17

## 2024-01-17 RX ORDER — DIPHENHYDRAMINE HYDROCHLORIDE 50 MG/ML
25 INJECTION INTRAMUSCULAR; INTRAVENOUS
Status: CANCELLED | OUTPATIENT
Start: 2024-01-17

## 2024-01-17 RX ADMIN — SODIUM CHLORIDE 500 ML: 9 INJECTION, SOLUTION INTRAVENOUS at 02:01

## 2024-01-17 NOTE — PROGRESS NOTES
Patient presents to the clinic today for lab only.  She was found to have a critical platelet count of 14 K today.  She has no active bleeding per patient and .  She was also found to have an H and H of 7.1/24.1 today.  We will transfuse 2 units of blood today and infusion.  Orders are in.

## 2024-01-17 NOTE — PROGRESS NOTES
Please check with the patient if she is bleeding or bruising.      Transfuse 1 unit of single donor platelets, leuko reduced, irradiated.

## 2024-01-17 NOTE — NURSING
Patient here for 1 liter NS. Patient HGB 7.1, PLT 14. ANC ok. Chacha Matta RN clarified with SHARI Cortes, ordered. The order is to get fluids today for by of 106/56 give 500ml, if BP improves only give 500 not 1000.   2 units of blood tomorrow. 1 unit of PLT.

## 2024-01-18 ENCOUNTER — INFUSION (OUTPATIENT)
Dept: INFUSION THERAPY | Facility: HOSPITAL | Age: 59
End: 2024-01-18
Attending: INTERNAL MEDICINE

## 2024-01-18 VITALS
RESPIRATION RATE: 20 BRPM | HEART RATE: 72 BPM | WEIGHT: 218.06 LBS | BODY MASS INDEX: 37.23 KG/M2 | TEMPERATURE: 98 F | DIASTOLIC BLOOD PRESSURE: 72 MMHG | HEIGHT: 64 IN | SYSTOLIC BLOOD PRESSURE: 101 MMHG | OXYGEN SATURATION: 92 %

## 2024-01-18 DIAGNOSIS — D64.9 SYMPTOMATIC ANEMIA: ICD-10-CM

## 2024-01-18 LAB
ABO + RH BLD: NORMAL
BLD PROD TYP BPU: NORMAL
BLOOD UNIT EXPIRATION DATE: NORMAL
BLOOD UNIT TYPE CODE: 7300
CROSSMATCH INTERPRETATION: NORMAL
DISPENSE STATUS: NORMAL
UNIT NUMBER: NORMAL

## 2024-01-18 PROCEDURE — 36430 TRANSFUSION BLD/BLD COMPNT: CPT

## 2024-01-18 PROCEDURE — 86999 UNLISTED TRANSFUSION MED PX: CPT

## 2024-01-18 PROCEDURE — 96374 THER/PROPH/DIAG INJ IV PUSH: CPT

## 2024-01-18 PROCEDURE — 25000003 PHARM REV CODE 250

## 2024-01-18 PROCEDURE — 63600175 PHARM REV CODE 636 W HCPCS

## 2024-01-18 RX ORDER — HYDROCODONE BITARTRATE AND ACETAMINOPHEN 500; 5 MG/1; MG/1
TABLET ORAL ONCE
Status: COMPLETED | OUTPATIENT
Start: 2024-01-18 | End: 2024-01-18

## 2024-01-18 RX ORDER — HYDROCODONE BITARTRATE AND ACETAMINOPHEN 500; 5 MG/1; MG/1
TABLET ORAL ONCE
Status: DISCONTINUED | OUTPATIENT
Start: 2024-01-18 | End: 2024-04-01 | Stop reason: HOSPADM

## 2024-01-18 RX ORDER — DIPHENHYDRAMINE HYDROCHLORIDE 50 MG/ML
25 INJECTION INTRAMUSCULAR; INTRAVENOUS
Status: COMPLETED | OUTPATIENT
Start: 2024-01-18 | End: 2024-01-18

## 2024-01-18 RX ORDER — ACETAMINOPHEN 325 MG/1
650 TABLET ORAL
Status: COMPLETED | OUTPATIENT
Start: 2024-01-18 | End: 2024-01-18

## 2024-01-18 RX ADMIN — SODIUM CHLORIDE: 9 INJECTION, SOLUTION INTRAVENOUS at 08:01

## 2024-01-18 RX ADMIN — DIPHENHYDRAMINE HYDROCHLORIDE 25 MG: 50 INJECTION INTRAMUSCULAR; INTRAVENOUS at 08:01

## 2024-01-18 RX ADMIN — ACETAMINOPHEN 650 MG: 325 TABLET, FILM COATED ORAL at 08:01

## 2024-01-18 NOTE — NURSING
747 Arrive for 2 Units of PRBC, 1 Unit of Platelet transfusion c/o of abdominal pain, nausea takes home meds for relief of discomfort.

## 2024-01-22 ENCOUNTER — INFUSION (OUTPATIENT)
Dept: INFUSION THERAPY | Facility: HOSPITAL | Age: 59
End: 2024-01-22
Attending: INTERNAL MEDICINE

## 2024-01-22 ENCOUNTER — TELEPHONE (OUTPATIENT)
Dept: HEMATOLOGY/ONCOLOGY | Facility: CLINIC | Age: 59
End: 2024-01-22

## 2024-01-22 ENCOUNTER — LAB VISIT (OUTPATIENT)
Dept: HEMATOLOGY/ONCOLOGY | Facility: CLINIC | Age: 59
End: 2024-01-22

## 2024-01-22 ENCOUNTER — OFFICE VISIT (OUTPATIENT)
Dept: HEMATOLOGY/ONCOLOGY | Facility: CLINIC | Age: 59
End: 2024-01-22

## 2024-01-22 VITALS
HEIGHT: 64 IN | OXYGEN SATURATION: 97 % | RESPIRATION RATE: 20 BRPM | SYSTOLIC BLOOD PRESSURE: 128 MMHG | BODY MASS INDEX: 37.68 KG/M2 | HEART RATE: 75 BPM | DIASTOLIC BLOOD PRESSURE: 79 MMHG | TEMPERATURE: 99 F

## 2024-01-22 VITALS
TEMPERATURE: 98 F | HEART RATE: 74 BPM | OXYGEN SATURATION: 97 % | RESPIRATION RATE: 18 BRPM | SYSTOLIC BLOOD PRESSURE: 119 MMHG | DIASTOLIC BLOOD PRESSURE: 75 MMHG

## 2024-01-22 DIAGNOSIS — D69.6 THROMBOCYTOPENIA: ICD-10-CM

## 2024-01-22 DIAGNOSIS — D64.9 SYMPTOMATIC ANEMIA: ICD-10-CM

## 2024-01-22 DIAGNOSIS — R21 RASH: ICD-10-CM

## 2024-01-22 DIAGNOSIS — D69.6 THROMBOCYTOPENIA: Primary | ICD-10-CM

## 2024-01-22 DIAGNOSIS — C92.10 BLAST CRISIS PHASE OF CHRONIC MYELOID LEUKEMIA: ICD-10-CM

## 2024-01-22 LAB
ABO + RH BLD: NORMAL
ABS NEUT CALC (OHS): 6.4 X10(3)/MCL (ref 2.1–9.2)
ALBUMIN SERPL-MCNC: 2.6 G/DL (ref 3.5–5)
ALBUMIN/GLOB SERPL: 0.7 RATIO (ref 1.1–2)
ALP SERPL-CCNC: 100 UNIT/L (ref 40–150)
ALT SERPL-CCNC: 16 UNIT/L (ref 0–55)
AST SERPL-CCNC: 21 UNIT/L (ref 5–34)
BILIRUB SERPL-MCNC: 0.6 MG/DL
BLD PROD TYP BPU: NORMAL
BLOOD UNIT EXPIRATION DATE: NORMAL
BLOOD UNIT TYPE CODE: 6200
BUN SERPL-MCNC: 14.4 MG/DL (ref 9.8–20.1)
CALCIUM SERPL-MCNC: 10.1 MG/DL (ref 8.4–10.2)
CHLORIDE SERPL-SCNC: 105 MMOL/L (ref 98–107)
CO2 SERPL-SCNC: 24 MMOL/L (ref 22–29)
CREAT SERPL-MCNC: 0.69 MG/DL (ref 0.55–1.02)
CROSSMATCH INTERPRETATION: NORMAL
DISPENSE STATUS: NORMAL
ERYTHROCYTE [DISTWIDTH] IN BLOOD BY AUTOMATED COUNT: 18.3 % (ref 11.5–17)
GFR SERPLBLD CREATININE-BSD FMLA CKD-EPI: >60 MLS/MIN/1.73/M2
GLOBULIN SER-MCNC: 3.7 GM/DL (ref 2.4–3.5)
GLUCOSE SERPL-MCNC: 213 MG/DL (ref 74–100)
GROUP & RH: NORMAL
HCT VFR BLD AUTO: 34.1 % (ref 37–47)
HGB BLD-MCNC: 10.1 G/DL (ref 12–16)
INDIRECT COOMBS: NORMAL
LYMPHOCYTES NFR BLD MANUAL: 4.26 X10(3)/MCL
LYMPHOCYTES NFR BLD MANUAL: 40 % (ref 13–40)
MCH RBC QN AUTO: 26.6 PG (ref 27–31)
MCHC RBC AUTO-ENTMCNC: 29.6 G/DL (ref 33–36)
MCV RBC AUTO: 90 FL (ref 80–94)
NEUTROPHILS NFR BLD MANUAL: 60 % (ref 47–80)
NRBC BLD AUTO-RTO: 24.2 %
NRBC BLD MANUAL-RTO: 21 %
PLATELET # BLD AUTO: 15 X10(3)/MCL (ref 130–400)
PLATELET # BLD EST: ABNORMAL 10*3/UL
PLATELETS.RETICULATED NFR BLD AUTO: 17 % (ref 0.9–11.2)
PLT APH: NORMAL
PMV BLD AUTO: ABNORMAL FL
POTASSIUM SERPL-SCNC: 4.6 MMOL/L (ref 3.5–5.1)
PROT SERPL-MCNC: 6.3 GM/DL (ref 6.4–8.3)
RBC # BLD AUTO: 3.79 X10(6)/MCL (ref 4.2–5.4)
RBC MORPH BLD: NORMAL
RBCS: NORMAL
SODIUM SERPL-SCNC: 139 MMOL/L (ref 136–145)
SPECIMEN OUTDATE: NORMAL
UNIT NUMBER: NORMAL
WBC # SPEC AUTO: 10.66 X10(3)/MCL (ref 4.5–11.5)

## 2024-01-22 PROCEDURE — P9037 PLATE PHERES LEUKOREDU IRRAD: HCPCS

## 2024-01-22 PROCEDURE — 36415 COLL VENOUS BLD VENIPUNCTURE: CPT

## 2024-01-22 PROCEDURE — 25000003 PHARM REV CODE 250

## 2024-01-22 PROCEDURE — 85027 COMPLETE CBC AUTOMATED: CPT

## 2024-01-22 PROCEDURE — 99215 OFFICE O/P EST HI 40 MIN: CPT | Mod: S$PBB,,,

## 2024-01-22 PROCEDURE — 63600175 PHARM REV CODE 636 W HCPCS

## 2024-01-22 PROCEDURE — 36430 TRANSFUSION BLD/BLD COMPNT: CPT

## 2024-01-22 PROCEDURE — 80053 COMPREHEN METABOLIC PANEL: CPT

## 2024-01-22 PROCEDURE — 96374 THER/PROPH/DIAG INJ IV PUSH: CPT

## 2024-01-22 PROCEDURE — 99215 OFFICE O/P EST HI 40 MIN: CPT | Mod: PBBFAC,25

## 2024-01-22 PROCEDURE — 86901 BLOOD TYPING SEROLOGIC RH(D): CPT

## 2024-01-22 PROCEDURE — 96376 TX/PRO/DX INJ SAME DRUG ADON: CPT

## 2024-01-22 RX ORDER — DIPHENHYDRAMINE HYDROCHLORIDE 50 MG/ML
25 INJECTION INTRAMUSCULAR; INTRAVENOUS
Status: COMPLETED | OUTPATIENT
Start: 2024-01-22 | End: 2024-01-22

## 2024-01-22 RX ORDER — ACETAMINOPHEN 325 MG/1
650 TABLET ORAL
Status: CANCELLED | OUTPATIENT
Start: 2024-01-22

## 2024-01-22 RX ORDER — HYDROCODONE BITARTRATE AND ACETAMINOPHEN 500; 5 MG/1; MG/1
TABLET ORAL ONCE
Status: DISCONTINUED | OUTPATIENT
Start: 2024-01-22 | End: 2024-04-01 | Stop reason: HOSPADM

## 2024-01-22 RX ORDER — TRIAMCINOLONE ACETONIDE 1 MG/G
CREAM TOPICAL 2 TIMES DAILY
Qty: 45 G | Refills: 0 | Status: SHIPPED | OUTPATIENT
Start: 2024-01-22 | End: 2024-03-12 | Stop reason: SDUPTHER

## 2024-01-22 RX ORDER — DIPHENHYDRAMINE HYDROCHLORIDE 50 MG/ML
25 INJECTION INTRAMUSCULAR; INTRAVENOUS
Status: CANCELLED | OUTPATIENT
Start: 2024-01-22

## 2024-01-22 RX ORDER — ACETAMINOPHEN 325 MG/1
650 TABLET ORAL
Status: COMPLETED | OUTPATIENT
Start: 2024-01-22 | End: 2024-01-22

## 2024-01-22 RX ORDER — HYDROCODONE BITARTRATE AND ACETAMINOPHEN 500; 5 MG/1; MG/1
TABLET ORAL ONCE
Status: CANCELLED | OUTPATIENT
Start: 2024-01-22 | End: 2024-01-22

## 2024-01-22 RX ADMIN — DIPHENHYDRAMINE HYDROCHLORIDE 25 MG: 50 INJECTION INTRAMUSCULAR; INTRAVENOUS at 02:01

## 2024-01-22 RX ADMIN — ACETAMINOPHEN 650 MG: 325 TABLET, FILM COATED ORAL at 02:01

## 2024-01-22 NOTE — Clinical Note
Patient needs to follow-up with infusion 2 times a week (Monday and Thursday) for possible transfusion lab work prior (CBC/CMP) Patient will need to follow up with me in 3 weeks with labs (CBC/CMP) followed by infusion for possible transfusion

## 2024-01-22 NOTE — NURSING
Patient arrived in wheelchair from provider appt accompanied by her spouse. Pt is alert and oriented with complaints of ongoing nausea, fatigue, constipation and bruising. Pt is here for a platelet transfusion. Pt received 1 unit of platelets and tolerated them well.    Shannan Aviles RN
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
## 2024-01-29 ENCOUNTER — APPOINTMENT (OUTPATIENT)
Dept: HEMATOLOGY/ONCOLOGY | Facility: CLINIC | Age: 59
End: 2024-01-29

## 2024-01-29 ENCOUNTER — INFUSION (OUTPATIENT)
Dept: INFUSION THERAPY | Facility: HOSPITAL | Age: 59
End: 2024-01-29
Attending: INTERNAL MEDICINE

## 2024-01-29 VITALS
OXYGEN SATURATION: 97 % | SYSTOLIC BLOOD PRESSURE: 120 MMHG | DIASTOLIC BLOOD PRESSURE: 64 MMHG | RESPIRATION RATE: 20 BRPM | TEMPERATURE: 99 F | HEART RATE: 78 BPM

## 2024-01-29 DIAGNOSIS — R11.0 NAUSEA: ICD-10-CM

## 2024-01-29 PROCEDURE — 36415 COLL VENOUS BLD VENIPUNCTURE: CPT

## 2024-01-29 RX ORDER — ONDANSETRON 4 MG/1
8 TABLET, FILM COATED ORAL EVERY 6 HOURS PRN
Qty: 360 TABLET | Refills: 0 | Status: SHIPPED | OUTPATIENT
Start: 2024-01-29 | End: 2024-03-12 | Stop reason: SDUPTHER

## 2024-01-29 NOTE — NURSING
8:45 Arrive for possible transfusion c/o of nausea and severe abdominal and bilateral leg pain level 10/10 has home medication for relief of nausea and pain. 10:00 H&H 9.1,30.1 Plts 59 results routed and secure message sent to A Rupesh COTTON.  1100 Did not require any infusions.

## 2024-02-01 ENCOUNTER — INFUSION (OUTPATIENT)
Dept: INFUSION THERAPY | Facility: HOSPITAL | Age: 59
End: 2024-02-01
Attending: INTERNAL MEDICINE

## 2024-02-01 ENCOUNTER — LAB VISIT (OUTPATIENT)
Dept: HEMATOLOGY/ONCOLOGY | Facility: CLINIC | Age: 59
End: 2024-02-01

## 2024-02-01 VITALS
SYSTOLIC BLOOD PRESSURE: 114 MMHG | DIASTOLIC BLOOD PRESSURE: 68 MMHG | TEMPERATURE: 98 F | RESPIRATION RATE: 20 BRPM | HEART RATE: 95 BPM | OXYGEN SATURATION: 97 %

## 2024-02-01 DIAGNOSIS — C92.10 BLAST CRISIS PHASE OF CHRONIC MYELOID LEUKEMIA: ICD-10-CM

## 2024-02-01 DIAGNOSIS — D69.6 THROMBOCYTOPENIA: ICD-10-CM

## 2024-02-01 DIAGNOSIS — D64.9 SYMPTOMATIC ANEMIA: ICD-10-CM

## 2024-02-01 LAB
ABS NEUT CALC (OHS): 4.83 X10(3)/MCL (ref 2.1–9.2)
ALBUMIN SERPL-MCNC: 2.8 G/DL (ref 3.5–5)
ALBUMIN/GLOB SERPL: 0.9 RATIO (ref 1.1–2)
ALP SERPL-CCNC: 63 UNIT/L (ref 40–150)
ALT SERPL-CCNC: 14 UNIT/L (ref 0–55)
ANISOCYTOSIS BLD QL SMEAR: ABNORMAL
AST SERPL-CCNC: 9 UNIT/L (ref 5–34)
B-HCG SERPL QL: 9 %
BILIRUB SERPL-MCNC: 0.6 MG/DL
BUN SERPL-MCNC: 10.6 MG/DL (ref 9.8–20.1)
CALCIUM SERPL-MCNC: 9.5 MG/DL (ref 8.4–10.2)
CHLORIDE SERPL-SCNC: 102 MMOL/L (ref 98–107)
CO2 SERPL-SCNC: 27 MMOL/L (ref 22–29)
CREAT SERPL-MCNC: 0.71 MG/DL (ref 0.55–1.02)
ERYTHROCYTE [DISTWIDTH] IN BLOOD BY AUTOMATED COUNT: 20.4 % (ref 11.5–17)
GFR SERPLBLD CREATININE-BSD FMLA CKD-EPI: >60 MLS/MIN/1.73/M2
GLOBULIN SER-MCNC: 3.1 GM/DL (ref 2.4–3.5)
GLUCOSE SERPL-MCNC: 303 MG/DL (ref 74–100)
HCT VFR BLD AUTO: 27 % (ref 37–47)
HGB BLD-MCNC: 8 G/DL (ref 12–16)
HYPOCHROMIA BLD QL SMEAR: ABNORMAL
LYMPHOCYTES NFR BLD MANUAL: 3.7 X10(3)/MCL
LYMPHOCYTES NFR BLD MANUAL: 36 % (ref 13–40)
MCH RBC QN AUTO: 26.3 PG (ref 27–31)
MCHC RBC AUTO-ENTMCNC: 29.6 G/DL (ref 33–36)
MCV RBC AUTO: 88.8 FL (ref 80–94)
MONOCYTES NFR BLD MANUAL: 0.82 X10(3)/MCL (ref 0.1–1.3)
MONOCYTES NFR BLD MANUAL: 8 % (ref 2–11)
NEUTROPHILS NFR BLD MANUAL: 47 % (ref 47–80)
NRBC BLD AUTO-RTO: 12.3 %
NRBC BLD MANUAL-RTO: 16 %
PLATELET # BLD AUTO: 50 X10(3)/MCL (ref 130–400)
PLATELET # BLD EST: ABNORMAL 10*3/UL
PLATELETS.RETICULATED NFR BLD AUTO: 15.1 % (ref 0.9–11.2)
PMV BLD AUTO: ABNORMAL FL
POLYCHROMASIA BLD QL SMEAR: ABNORMAL
POTASSIUM SERPL-SCNC: 4.2 MMOL/L (ref 3.5–5.1)
PROT SERPL-MCNC: 5.9 GM/DL (ref 6.4–8.3)
RBC # BLD AUTO: 3.04 X10(6)/MCL (ref 4.2–5.4)
RBC MORPH BLD: ABNORMAL
SODIUM SERPL-SCNC: 136 MMOL/L (ref 136–145)
SPHEROCYTES BLD QL SMEAR: ABNORMAL
WBC # SPEC AUTO: 10.28 X10(3)/MCL (ref 4.5–11.5)

## 2024-02-01 PROCEDURE — 80053 COMPREHEN METABOLIC PANEL: CPT

## 2024-02-01 PROCEDURE — 85027 COMPLETE CBC AUTOMATED: CPT

## 2024-02-01 PROCEDURE — 36415 COLL VENOUS BLD VENIPUNCTURE: CPT

## 2024-02-01 NOTE — NURSING
0745 - Called pt's home & spoke with Mr Addison as pt was late for 7 am lab/8am possible transfusion; Mr Addison stated his wife was still sleeping and asked if they could skip today; explained providers ordered labs twice a week to check to see if pt needs blood and/or platelets & did not recommend skipping today; he stated he needs to wake up his wife, dress & feed her; explained 9 or 9:30 would be ok to come.    0913 pt arrived for lab work    1027 - Hbg 8.0 and platelets 50; sent secure chat message to A LULA Goddard, to review & advise as pt does not have transfusion parameters in provider notes.    1117 - A LULA Goddard, called to say patient does not need a transfusion unless she is short of breath, which she is not.    1120 - informed pt & spouse of above - no transfusions needed today & gave them AVS;  stated wife is being admitted to hospital in [place], [place] on Monday 2/5/24 for a new chemo and she will be there all week; will inform NIKHIL Goddard NP & NIKHIL Garcia MA of same.

## 2024-02-12 ENCOUNTER — APPOINTMENT (OUTPATIENT)
Dept: HEMATOLOGY/ONCOLOGY | Facility: CLINIC | Age: 59
End: 2024-02-12

## 2024-02-12 ENCOUNTER — INFUSION (OUTPATIENT)
Dept: INFUSION THERAPY | Facility: HOSPITAL | Age: 59
End: 2024-02-12
Attending: INTERNAL MEDICINE

## 2024-02-12 ENCOUNTER — OFFICE VISIT (OUTPATIENT)
Dept: HEMATOLOGY/ONCOLOGY | Facility: CLINIC | Age: 59
End: 2024-02-12

## 2024-02-12 VITALS
SYSTOLIC BLOOD PRESSURE: 124 MMHG | DIASTOLIC BLOOD PRESSURE: 75 MMHG | RESPIRATION RATE: 20 BRPM | BODY MASS INDEX: 37.68 KG/M2 | HEART RATE: 89 BPM | OXYGEN SATURATION: 98 % | HEIGHT: 64 IN | TEMPERATURE: 99 F

## 2024-02-12 DIAGNOSIS — C92.10 BLAST CRISIS PHASE OF CHRONIC MYELOID LEUKEMIA: ICD-10-CM

## 2024-02-12 DIAGNOSIS — R11.2 NAUSEA AND VOMITING, UNSPECIFIED VOMITING TYPE: ICD-10-CM

## 2024-02-12 DIAGNOSIS — C92.10 BLAST CRISIS PHASE OF CHRONIC MYELOID LEUKEMIA: Primary | ICD-10-CM

## 2024-02-12 DIAGNOSIS — D69.6 THROMBOCYTOPENIA: ICD-10-CM

## 2024-02-12 DIAGNOSIS — D64.9 SYMPTOMATIC ANEMIA: ICD-10-CM

## 2024-02-12 DIAGNOSIS — L72.9 SKIN CYSTS, GENERALIZED: ICD-10-CM

## 2024-02-12 LAB
ABS NEUT CALC (OHS): 4.58 X10(3)/MCL (ref 2.1–9.2)
ALBUMIN SERPL-MCNC: 3.2 G/DL (ref 3.5–5)
ALBUMIN/GLOB SERPL: 1.1 RATIO (ref 1.1–2)
ALP SERPL-CCNC: 58 UNIT/L (ref 40–150)
ALT SERPL-CCNC: 8 UNIT/L (ref 0–55)
AST SERPL-CCNC: 10 UNIT/L (ref 5–34)
BILIRUB SERPL-MCNC: 0.9 MG/DL
BUN SERPL-MCNC: 14.5 MG/DL (ref 9.8–20.1)
CALCIUM SERPL-MCNC: 9.3 MG/DL (ref 8.4–10.2)
CHLORIDE SERPL-SCNC: 101 MMOL/L (ref 98–107)
CO2 SERPL-SCNC: 24 MMOL/L (ref 22–29)
CREAT SERPL-MCNC: 0.77 MG/DL (ref 0.55–1.02)
ERYTHROCYTE [DISTWIDTH] IN BLOOD BY AUTOMATED COUNT: 21.8 % (ref 11.5–17)
GFR SERPLBLD CREATININE-BSD FMLA CKD-EPI: >60 MLS/MIN/1.73/M2
GLOBULIN SER-MCNC: 3 GM/DL (ref 2.4–3.5)
GLUCOSE SERPL-MCNC: 330 MG/DL (ref 74–100)
HCT VFR BLD AUTO: 30.1 % (ref 37–47)
HGB BLD-MCNC: 8.8 G/DL (ref 12–16)
LYMPHOCYTES NFR BLD MANUAL: 1.72 X10(3)/MCL
LYMPHOCYTES NFR BLD MANUAL: 15 % (ref 13–40)
MCH RBC QN AUTO: 24.8 PG (ref 27–31)
MCHC RBC AUTO-ENTMCNC: 29.2 G/DL (ref 33–36)
MCV RBC AUTO: 84.8 FL (ref 80–94)
MONOCYTES NFR BLD MANUAL: 35 % (ref 2–11)
MONOCYTES NFR BLD MANUAL: 4.01 X10(3)/MCL (ref 0.1–1.3)
NEUTROPHILS NFR BLD MANUAL: 40 % (ref 47–80)
NRBC BLD AUTO-RTO: 8.6 %
PLATELET # BLD AUTO: 55 X10(3)/MCL (ref 130–400)
PLATELET # BLD EST: ABNORMAL 10*3/UL
PLATELETS.RETICULATED NFR BLD AUTO: 14.8 % (ref 0.9–11.2)
PMV BLD AUTO: 1 FL (ref 7.4–10.4)
POIKILOCYTOSIS BLD QL SMEAR: SLIGHT
POLYCHROMASIA BLD QL SMEAR: SLIGHT
POTASSIUM SERPL-SCNC: 4.2 MMOL/L (ref 3.5–5.1)
PROMYELOCYTES # BLD MANUAL: 10 %
PROT SERPL-MCNC: 6.2 GM/DL (ref 6.4–8.3)
RBC # BLD AUTO: 3.55 X10(6)/MCL (ref 4.2–5.4)
RBC MORPH BLD: ABNORMAL
SCHISTOCYTE (OLG): SLIGHT
SODIUM SERPL-SCNC: 135 MMOL/L (ref 136–145)
STIPPLED RBC (OHS): SLIGHT
WBC # SPEC AUTO: 11.45 X10(3)/MCL (ref 4.5–11.5)

## 2024-02-12 PROCEDURE — 36415 COLL VENOUS BLD VENIPUNCTURE: CPT

## 2024-02-12 PROCEDURE — 80053 COMPREHEN METABOLIC PANEL: CPT

## 2024-02-12 PROCEDURE — 99215 OFFICE O/P EST HI 40 MIN: CPT | Mod: S$PBB,,,

## 2024-02-12 PROCEDURE — 85027 COMPLETE CBC AUTOMATED: CPT

## 2024-02-12 PROCEDURE — 99215 OFFICE O/P EST HI 40 MIN: CPT | Mod: PBBFAC

## 2024-02-12 RX ORDER — PROCHLORPERAZINE MALEATE 10 MG
10 TABLET ORAL 4 TIMES DAILY
Qty: 120 TABLET | Refills: 1 | Status: ON HOLD | OUTPATIENT
Start: 2024-02-12 | End: 2024-04-01 | Stop reason: HOSPADM

## 2024-02-12 NOTE — NURSING
No transfusions needed today per Kareem Goddard NP. Hgb 8.8, Hct 30.1 and platelets are 55.    Shannan Aviles RN
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
## 2024-02-12 NOTE — Clinical Note
No transfusion needed today Resume twice weekly labs and possible transfusion Monday and Thursday (Except 2/22 & 2/26- she will be in [place])  RTC in 2 weeks with me labs (CBC/CMP/Mag level)  prior followed by possible transfusion

## 2024-02-15 ENCOUNTER — LAB VISIT (OUTPATIENT)
Dept: HEMATOLOGY/ONCOLOGY | Facility: CLINIC | Age: 59
End: 2024-02-15

## 2024-02-15 DIAGNOSIS — D64.9 SYMPTOMATIC ANEMIA: ICD-10-CM

## 2024-02-15 DIAGNOSIS — C92.10 BLAST CRISIS PHASE OF CHRONIC MYELOID LEUKEMIA: ICD-10-CM

## 2024-02-15 DIAGNOSIS — D69.6 THROMBOCYTOPENIA: ICD-10-CM

## 2024-02-15 LAB
ABS NEUT CALC (OHS): 5.14 X10(3)/MCL (ref 2.1–9.2)
ALBUMIN SERPL-MCNC: 3.1 G/DL (ref 3.5–5)
ALBUMIN/GLOB SERPL: 1.2 RATIO (ref 1.1–2)
ALP SERPL-CCNC: 53 UNIT/L (ref 40–150)
ALT SERPL-CCNC: 8 UNIT/L (ref 0–55)
ANISOCYTOSIS BLD QL SMEAR: ABNORMAL
AST SERPL-CCNC: 9 UNIT/L (ref 5–34)
B-HCG SERPL QL: 2 %
BILIRUB SERPL-MCNC: 0.5 MG/DL
BUN SERPL-MCNC: 11.8 MG/DL (ref 9.8–20.1)
CALCIUM SERPL-MCNC: 8.7 MG/DL (ref 8.4–10.2)
CHLORIDE SERPL-SCNC: 106 MMOL/L (ref 98–107)
CO2 SERPL-SCNC: 24 MMOL/L (ref 22–29)
CREAT SERPL-MCNC: 0.68 MG/DL (ref 0.55–1.02)
ERYTHROCYTE [DISTWIDTH] IN BLOOD BY AUTOMATED COUNT: 20.4 % (ref 11.5–17)
GFR SERPLBLD CREATININE-BSD FMLA CKD-EPI: >60 MLS/MIN/1.73/M2
GLOBULIN SER-MCNC: 2.6 GM/DL (ref 2.4–3.5)
GLUCOSE SERPL-MCNC: 191 MG/DL (ref 74–100)
HCT VFR BLD AUTO: 26.6 % (ref 37–47)
HEMATOLOGIST REVIEW: NORMAL
HGB BLD-MCNC: 7.9 G/DL (ref 12–16)
HYPOCHROMIA BLD QL SMEAR: SLIGHT
LYMPHOCYTES NFR BLD MANUAL: 2.32 X10(3)/MCL
LYMPHOCYTES NFR BLD MANUAL: 23 % (ref 13–40)
MCH RBC QN AUTO: 25.3 PG (ref 27–31)
MCHC RBC AUTO-ENTMCNC: 29.7 G/DL (ref 33–36)
MCV RBC AUTO: 85.3 FL (ref 80–94)
MONOCYTES NFR BLD MANUAL: 2.42 X10(3)/MCL (ref 0.1–1.3)
MONOCYTES NFR BLD MANUAL: 24 % (ref 2–11)
NEUTROPHILS NFR BLD MANUAL: 51 % (ref 47–80)
NRBC BLD AUTO-RTO: 7.1 %
NRBC BLD MANUAL-RTO: 3 %
PLATELET # BLD AUTO: 41 X10(3)/MCL (ref 130–400)
PLATELET # BLD EST: ABNORMAL 10*3/UL
PLATELETS.RETICULATED NFR BLD AUTO: 13.9 % (ref 0.9–11.2)
PMV BLD AUTO: ABNORMAL FL
POIKILOCYTOSIS BLD QL SMEAR: ABNORMAL
POTASSIUM SERPL-SCNC: 4.1 MMOL/L (ref 3.5–5.1)
PROT SERPL-MCNC: 5.7 GM/DL (ref 6.4–8.3)
RBC # BLD AUTO: 3.12 X10(6)/MCL (ref 4.2–5.4)
SODIUM SERPL-SCNC: 139 MMOL/L (ref 136–145)
STIPPLED RBC (OHS): ABNORMAL
TEAR DROP CELL (OLG): SLIGHT
WBC # SPEC AUTO: 10.08 X10(3)/MCL (ref 4.5–11.5)

## 2024-02-15 PROCEDURE — 85027 COMPLETE CBC AUTOMATED: CPT

## 2024-02-15 PROCEDURE — 80053 COMPREHEN METABOLIC PANEL: CPT

## 2024-02-15 PROCEDURE — 36415 COLL VENOUS BLD VENIPUNCTURE: CPT

## 2024-02-19 ENCOUNTER — TELEPHONE (OUTPATIENT)
Dept: HEMATOLOGY/ONCOLOGY | Facility: CLINIC | Age: 59
End: 2024-02-19

## 2024-02-19 ENCOUNTER — INFUSION (OUTPATIENT)
Dept: INFUSION THERAPY | Facility: HOSPITAL | Age: 59
End: 2024-02-19
Attending: INTERNAL MEDICINE

## 2024-02-19 ENCOUNTER — LAB VISIT (OUTPATIENT)
Dept: HEMATOLOGY/ONCOLOGY | Facility: CLINIC | Age: 59
End: 2024-02-19

## 2024-02-19 VITALS
HEIGHT: 63 IN | DIASTOLIC BLOOD PRESSURE: 64 MMHG | RESPIRATION RATE: 20 BRPM | TEMPERATURE: 98 F | SYSTOLIC BLOOD PRESSURE: 119 MMHG | BODY MASS INDEX: 38.62 KG/M2 | OXYGEN SATURATION: 95 % | HEART RATE: 70 BPM

## 2024-02-19 DIAGNOSIS — D64.9 SYMPTOMATIC ANEMIA: Primary | ICD-10-CM

## 2024-02-19 DIAGNOSIS — D69.6 THROMBOCYTOPENIA: ICD-10-CM

## 2024-02-19 DIAGNOSIS — D64.9 SYMPTOMATIC ANEMIA: ICD-10-CM

## 2024-02-19 DIAGNOSIS — C92.10 BLAST CRISIS PHASE OF CHRONIC MYELOID LEUKEMIA: ICD-10-CM

## 2024-02-19 LAB
ABO + RH BLD: NORMAL
ABO + RH BLD: NORMAL
ABS NEUT CALC (OHS): 3.59 X10(3)/MCL (ref 2.1–9.2)
ALBUMIN SERPL-MCNC: 3.2 G/DL (ref 3.5–5)
ALBUMIN/GLOB SERPL: 1.2 RATIO (ref 1.1–2)
ALP SERPL-CCNC: 56 UNIT/L (ref 40–150)
ALT SERPL-CCNC: 7 UNIT/L (ref 0–55)
ANISOCYTOSIS BLD QL SMEAR: ABNORMAL
AST SERPL-CCNC: 7 UNIT/L (ref 5–34)
B-HCG SERPL QL: 6 %
BILIRUB SERPL-MCNC: 0.7 MG/DL
BLD PROD TYP BPU: NORMAL
BLD PROD TYP BPU: NORMAL
BLOOD UNIT EXPIRATION DATE: NORMAL
BLOOD UNIT EXPIRATION DATE: NORMAL
BLOOD UNIT TYPE CODE: 7300
BLOOD UNIT TYPE CODE: 7300
BUN SERPL-MCNC: 7 MG/DL (ref 9.8–20.1)
CALCIUM SERPL-MCNC: 9.1 MG/DL (ref 8.4–10.2)
CHLORIDE SERPL-SCNC: 104 MMOL/L (ref 98–107)
CO2 SERPL-SCNC: 26 MMOL/L (ref 22–29)
CREAT SERPL-MCNC: 0.66 MG/DL (ref 0.55–1.02)
CROSSMATCH INTERPRETATION: NORMAL
CROSSMATCH INTERPRETATION: NORMAL
DISPENSE STATUS: NORMAL
DISPENSE STATUS: NORMAL
ERYTHROCYTE [DISTWIDTH] IN BLOOD BY AUTOMATED COUNT: 21.5 % (ref 11.5–17)
GFR SERPLBLD CREATININE-BSD FMLA CKD-EPI: >60 MLS/MIN/1.73/M2
GLOBULIN SER-MCNC: 2.6 GM/DL (ref 2.4–3.5)
GLUCOSE SERPL-MCNC: 277 MG/DL (ref 74–100)
GROUP & RH: NORMAL
HCT VFR BLD AUTO: 26.8 % (ref 37–47)
HGB BLD-MCNC: 7.7 G/DL (ref 12–16)
HYPOCHROMIA BLD QL SMEAR: ABNORMAL
INDIRECT COOMBS: NORMAL
LYMPHOCYTES NFR BLD MANUAL: 1.97 X10(3)/MCL
LYMPHOCYTES NFR BLD MANUAL: 28 % (ref 13–40)
MCH RBC QN AUTO: 24.8 PG (ref 27–31)
MCHC RBC AUTO-ENTMCNC: 28.7 G/DL (ref 33–36)
MCV RBC AUTO: 86.5 FL (ref 80–94)
MONOCYTES NFR BLD MANUAL: 1.05 X10(3)/MCL (ref 0.1–1.3)
MONOCYTES NFR BLD MANUAL: 15 % (ref 2–11)
NEUTROPHILS NFR BLD MANUAL: 49 % (ref 47–80)
NEUTS BAND NFR BLD MANUAL: 2 % (ref 0–11)
NRBC BLD AUTO-RTO: 24.3 %
NRBC BLD MANUAL-RTO: 26 %
PLATELET # BLD AUTO: 15 X10(3)/MCL (ref 130–400)
PLATELET # BLD EST: ABNORMAL 10*3/UL
PLATELETS.RETICULATED NFR BLD AUTO: 17.8 % (ref 0.9–11.2)
PMV BLD AUTO: ABNORMAL FL
POIKILOCYTOSIS BLD QL SMEAR: ABNORMAL
POLYCHROMASIA BLD QL SMEAR: ABNORMAL
POTASSIUM SERPL-SCNC: 4.1 MMOL/L (ref 3.5–5.1)
PROT SERPL-MCNC: 5.8 GM/DL (ref 6.4–8.3)
RBC # BLD AUTO: 3.1 X10(6)/MCL (ref 4.2–5.4)
RBC MORPH BLD: ABNORMAL
SODIUM SERPL-SCNC: 138 MMOL/L (ref 136–145)
SPECIMEN OUTDATE: NORMAL
STIPPLED RBC (OHS): ABNORMAL
UNIT NUMBER: NORMAL
UNIT NUMBER: NORMAL
WBC # SPEC AUTO: 7.03 X10(3)/MCL (ref 4.5–11.5)

## 2024-02-19 PROCEDURE — 96375 TX/PRO/DX INJ NEW DRUG ADDON: CPT

## 2024-02-19 PROCEDURE — 36415 COLL VENOUS BLD VENIPUNCTURE: CPT

## 2024-02-19 PROCEDURE — 86850 RBC ANTIBODY SCREEN: CPT

## 2024-02-19 PROCEDURE — 86923 COMPATIBILITY TEST ELECTRIC: CPT

## 2024-02-19 PROCEDURE — 85027 COMPLETE CBC AUTOMATED: CPT

## 2024-02-19 PROCEDURE — P9040 RBC LEUKOREDUCED IRRADIATED: HCPCS

## 2024-02-19 PROCEDURE — 96374 THER/PROPH/DIAG INJ IV PUSH: CPT

## 2024-02-19 PROCEDURE — 36430 TRANSFUSION BLD/BLD COMPNT: CPT

## 2024-02-19 PROCEDURE — 25000003 PHARM REV CODE 250

## 2024-02-19 PROCEDURE — 63600175 PHARM REV CODE 636 W HCPCS

## 2024-02-19 PROCEDURE — 80053 COMPREHEN METABOLIC PANEL: CPT

## 2024-02-19 PROCEDURE — P9037 PLATE PHERES LEUKOREDU IRRAD: HCPCS

## 2024-02-19 RX ORDER — HYDROCODONE BITARTRATE AND ACETAMINOPHEN 500; 5 MG/1; MG/1
TABLET ORAL ONCE
Status: COMPLETED | OUTPATIENT
Start: 2024-02-19 | End: 2024-02-19

## 2024-02-19 RX ORDER — ACETAMINOPHEN 325 MG/1
650 TABLET ORAL
Status: COMPLETED | OUTPATIENT
Start: 2024-02-19 | End: 2024-02-19

## 2024-02-19 RX ORDER — DIPHENHYDRAMINE HYDROCHLORIDE 50 MG/ML
25 INJECTION INTRAMUSCULAR; INTRAVENOUS
Status: CANCELLED | OUTPATIENT
Start: 2024-02-19

## 2024-02-19 RX ORDER — DIPHENHYDRAMINE HYDROCHLORIDE 50 MG/ML
25 INJECTION INTRAMUSCULAR; INTRAVENOUS
Status: COMPLETED | OUTPATIENT
Start: 2024-02-19 | End: 2024-02-19

## 2024-02-19 RX ORDER — ACETAMINOPHEN 325 MG/1
650 TABLET ORAL
Status: CANCELLED | OUTPATIENT
Start: 2024-02-19

## 2024-02-19 RX ORDER — HYDROCODONE BITARTRATE AND ACETAMINOPHEN 500; 5 MG/1; MG/1
TABLET ORAL ONCE
Status: CANCELLED | OUTPATIENT
Start: 2024-02-19 | End: 2024-02-19

## 2024-02-19 RX ADMIN — DIPHENHYDRAMINE HYDROCHLORIDE 25 MG: 50 INJECTION INTRAMUSCULAR; INTRAVENOUS at 11:02

## 2024-02-19 RX ADMIN — ACETAMINOPHEN 650 MG: 325 TABLET, FILM COATED ORAL at 11:02

## 2024-02-19 RX ADMIN — SODIUM CHLORIDE: 9 INJECTION, SOLUTION INTRAVENOUS at 10:02

## 2024-02-19 NOTE — NURSING
830 Arrive for transfusion of 1 unit PRBC's and 1 unit of Platelets c/o of constipation, nausea and abdominal pain to left lower quadrant level 9/10 take home medication for relief of discomfort.

## 2024-02-19 NOTE — PROGRESS NOTES
Patient was found to have an an H&H of 7.7/26.8 and a platelet count of 15 K today therefore we will transfuse 1 unit of packed red blood cells and 1 unit of platelets.     Statement Selected

## 2024-02-23 ENCOUNTER — TELEPHONE (OUTPATIENT)
Dept: HEMATOLOGY/ONCOLOGY | Facility: CLINIC | Age: 59
End: 2024-02-23

## 2024-02-23 ENCOUNTER — INFUSION (OUTPATIENT)
Dept: INFUSION THERAPY | Facility: HOSPITAL | Age: 59
End: 2024-02-23
Attending: INTERNAL MEDICINE

## 2024-02-23 ENCOUNTER — LAB VISIT (OUTPATIENT)
Dept: HEMATOLOGY/ONCOLOGY | Facility: CLINIC | Age: 59
End: 2024-02-23

## 2024-02-23 VITALS
BODY MASS INDEX: 35.59 KG/M2 | DIASTOLIC BLOOD PRESSURE: 60 MMHG | OXYGEN SATURATION: 97 % | TEMPERATURE: 98 F | HEART RATE: 67 BPM | SYSTOLIC BLOOD PRESSURE: 112 MMHG | HEIGHT: 64 IN | RESPIRATION RATE: 20 BRPM | WEIGHT: 208.5 LBS

## 2024-02-23 DIAGNOSIS — D64.9 SYMPTOMATIC ANEMIA: ICD-10-CM

## 2024-02-23 DIAGNOSIS — C92.10 BLAST CRISIS PHASE OF CHRONIC MYELOID LEUKEMIA: ICD-10-CM

## 2024-02-23 DIAGNOSIS — D69.6 THROMBOCYTOPENIA: ICD-10-CM

## 2024-02-23 LAB
ABS NEUT CALC (OHS): 1.54 X10(3)/MCL (ref 2.1–9.2)
ALBUMIN SERPL-MCNC: 3.2 G/DL (ref 3.5–5)
ALBUMIN/GLOB SERPL: 1.3 RATIO (ref 1.1–2)
ALP SERPL-CCNC: 60 UNIT/L (ref 40–150)
ALT SERPL-CCNC: 7 UNIT/L (ref 0–55)
ANISOCYTOSIS BLD QL SMEAR: ABNORMAL
AST SERPL-CCNC: 8 UNIT/L (ref 5–34)
BILIRUB SERPL-MCNC: 0.8 MG/DL
BUN SERPL-MCNC: 7.5 MG/DL (ref 9.8–20.1)
CALCIUM SERPL-MCNC: 8.9 MG/DL (ref 8.4–10.2)
CHLORIDE SERPL-SCNC: 106 MMOL/L (ref 98–107)
CO2 SERPL-SCNC: 24 MMOL/L (ref 22–29)
CREAT SERPL-MCNC: 0.6 MG/DL (ref 0.55–1.02)
ERYTHROCYTE [DISTWIDTH] IN BLOOD BY AUTOMATED COUNT: 20.3 % (ref 11.5–17)
GFR SERPLBLD CREATININE-BSD FMLA CKD-EPI: >60 MLS/MIN/1.73/M2
GLOBULIN SER-MCNC: 2.5 GM/DL (ref 2.4–3.5)
GLUCOSE SERPL-MCNC: 228 MG/DL (ref 74–100)
HCT VFR BLD AUTO: 30.2 % (ref 37–47)
HGB BLD-MCNC: 8.8 G/DL (ref 12–16)
HYPOCHROMIA BLD QL SMEAR: ABNORMAL
LYMPHOCYTES NFR BLD MANUAL: 1.2 X10(3)/MCL
LYMPHOCYTES NFR BLD MANUAL: 39 % (ref 13–40)
MCH RBC QN AUTO: 25 PG (ref 27–31)
MCHC RBC AUTO-ENTMCNC: 29.1 G/DL (ref 33–36)
MCV RBC AUTO: 85.8 FL (ref 80–94)
MICROCYTES BLD QL SMEAR: SLIGHT
MONOCYTES NFR BLD MANUAL: 0.34 X10(3)/MCL (ref 0.1–1.3)
MONOCYTES NFR BLD MANUAL: 11 % (ref 2–11)
NEUTROPHILS NFR BLD MANUAL: 48 % (ref 47–80)
NEUTS BAND NFR BLD MANUAL: 2 % (ref 0–11)
NRBC BLD AUTO-RTO: 33.8 %
NRBC BLD MANUAL-RTO: 13 %
PLATELET # BLD AUTO: 21 X10(3)/MCL (ref 130–400)
PLATELET # BLD EST: ABNORMAL 10*3/UL
PLATELETS.RETICULATED NFR BLD AUTO: 11.2 % (ref 0.9–11.2)
PMV BLD AUTO: ABNORMAL FL
POIKILOCYTOSIS BLD QL SMEAR: ABNORMAL
POLYCHROMASIA BLD QL SMEAR: SLIGHT
POTASSIUM SERPL-SCNC: 4.1 MMOL/L (ref 3.5–5.1)
PROT SERPL-MCNC: 5.7 GM/DL (ref 6.4–8.3)
RBC # BLD AUTO: 3.52 X10(6)/MCL (ref 4.2–5.4)
RBCS: NORMAL
SODIUM SERPL-SCNC: 138 MMOL/L (ref 136–145)
WBC # SPEC AUTO: 3.08 X10(3)/MCL (ref 4.5–11.5)

## 2024-02-23 PROCEDURE — 36415 COLL VENOUS BLD VENIPUNCTURE: CPT

## 2024-02-23 PROCEDURE — 85027 COMPLETE CBC AUTOMATED: CPT

## 2024-02-23 PROCEDURE — 80053 COMPREHEN METABOLIC PANEL: CPT

## 2024-02-23 NOTE — NURSING
Patient here for labs. HGB 8.8, PLT 21, ANC 1540. SHARI Cortes, made rounds stated patient didn't need blood products today.

## 2024-02-29 ENCOUNTER — INFUSION (OUTPATIENT)
Dept: INFUSION THERAPY | Facility: HOSPITAL | Age: 59
End: 2024-02-29
Attending: INTERNAL MEDICINE

## 2024-02-29 ENCOUNTER — LAB VISIT (OUTPATIENT)
Dept: HEMATOLOGY/ONCOLOGY | Facility: CLINIC | Age: 59
End: 2024-02-29

## 2024-02-29 VITALS
RESPIRATION RATE: 20 BRPM | HEART RATE: 77 BPM | HEIGHT: 64 IN | SYSTOLIC BLOOD PRESSURE: 124 MMHG | WEIGHT: 208.31 LBS | DIASTOLIC BLOOD PRESSURE: 57 MMHG | BODY MASS INDEX: 35.56 KG/M2 | OXYGEN SATURATION: 98 % | TEMPERATURE: 98 F

## 2024-02-29 DIAGNOSIS — D64.9 SYMPTOMATIC ANEMIA: ICD-10-CM

## 2024-02-29 DIAGNOSIS — C92.10 BLAST CRISIS PHASE OF CHRONIC MYELOID LEUKEMIA: ICD-10-CM

## 2024-02-29 DIAGNOSIS — D72.829 HYPERLEUKOCYTOSIS: ICD-10-CM

## 2024-02-29 DIAGNOSIS — D69.6 THROMBOCYTOPENIA: ICD-10-CM

## 2024-02-29 LAB
ABS NEUT CALC (OHS): 1.35 X10(3)/MCL (ref 2.1–9.2)
ALBUMIN SERPL-MCNC: 3.2 G/DL (ref 3.5–5)
ALBUMIN/GLOB SERPL: 1.3 RATIO (ref 1.1–2)
ALP SERPL-CCNC: 62 UNIT/L (ref 40–150)
ALT SERPL-CCNC: 9 UNIT/L (ref 0–55)
ANISOCYTOSIS BLD QL SMEAR: ABNORMAL
AST SERPL-CCNC: 6 UNIT/L (ref 5–34)
BILIRUB SERPL-MCNC: 0.6 MG/DL
BUN SERPL-MCNC: 11.8 MG/DL (ref 9.8–20.1)
CALCIUM SERPL-MCNC: 8.6 MG/DL (ref 8.4–10.2)
CHLORIDE SERPL-SCNC: 105 MMOL/L (ref 98–107)
CO2 SERPL-SCNC: 24 MMOL/L (ref 22–29)
CREAT SERPL-MCNC: 0.64 MG/DL (ref 0.55–1.02)
ELLIPTOCYTOSIS (OHS): ABNORMAL
ERYTHROCYTE [DISTWIDTH] IN BLOOD BY AUTOMATED COUNT: 19.9 % (ref 11.5–17)
GFR SERPLBLD CREATININE-BSD FMLA CKD-EPI: >60 MLS/MIN/1.73/M2
GLOBULIN SER-MCNC: 2.4 GM/DL (ref 2.4–3.5)
GLUCOSE SERPL-MCNC: 286 MG/DL (ref 74–100)
HCT VFR BLD AUTO: 31.5 % (ref 37–47)
HGB BLD-MCNC: 9.2 G/DL (ref 12–16)
LYMPHOCYTES NFR BLD MANUAL: 1.06 X10(3)/MCL
LYMPHOCYTES NFR BLD MANUAL: 40 % (ref 13–40)
MCH RBC QN AUTO: 24.9 PG (ref 27–31)
MCHC RBC AUTO-ENTMCNC: 29.2 G/DL (ref 33–36)
MCV RBC AUTO: 85.1 FL (ref 80–94)
MONOCYTES NFR BLD MANUAL: 0.24 X10(3)/MCL (ref 0.1–1.3)
MONOCYTES NFR BLD MANUAL: 9 % (ref 2–11)
NEUTROPHILS NFR BLD MANUAL: 50 % (ref 47–80)
NEUTS BAND NFR BLD MANUAL: 1 % (ref 0–11)
NRBC BLD AUTO-RTO: 22.3 %
NRBC BLD MANUAL-RTO: 19 %
PLATELET # BLD AUTO: 48 X10(3)/MCL (ref 130–400)
PLATELET # BLD EST: ABNORMAL 10*3/UL
PLATELETS.RETICULATED NFR BLD AUTO: 10.6 % (ref 0.9–11.2)
PMV BLD AUTO: ABNORMAL FL
POIKILOCYTOSIS BLD QL SMEAR: ABNORMAL
POLYCHROMASIA BLD QL SMEAR: ABNORMAL
POTASSIUM SERPL-SCNC: 4.1 MMOL/L (ref 3.5–5.1)
PROT SERPL-MCNC: 5.6 GM/DL (ref 6.4–8.3)
RBC # BLD AUTO: 3.7 X10(6)/MCL (ref 4.2–5.4)
SODIUM SERPL-SCNC: 138 MMOL/L (ref 136–145)
STIPPLED RBC (OHS): ABNORMAL
TEAR DROP CELL (OLG): SLIGHT
WBC # SPEC AUTO: 2.65 X10(3)/MCL (ref 4.5–11.5)

## 2024-02-29 PROCEDURE — 36415 COLL VENOUS BLD VENIPUNCTURE: CPT

## 2024-02-29 PROCEDURE — 80053 COMPREHEN METABOLIC PANEL: CPT

## 2024-02-29 PROCEDURE — 85027 COMPLETE CBC AUTOMATED: CPT

## 2024-02-29 NOTE — NURSING
910 Arrive for possible transfusion c/o of nausea takes home medication for relief of nausea. 930 H&H 9.2/31.5, WBC 2.65, ANC 1.3 A Rupesh NP informed of results responded transfusion not required scheduled to repeat labs 3/12/24.

## 2024-03-07 ENCOUNTER — INFUSION (OUTPATIENT)
Dept: INFUSION THERAPY | Facility: HOSPITAL | Age: 59
End: 2024-03-07
Attending: INTERNAL MEDICINE

## 2024-03-07 ENCOUNTER — LAB VISIT (OUTPATIENT)
Dept: HEMATOLOGY/ONCOLOGY | Facility: CLINIC | Age: 59
End: 2024-03-07

## 2024-03-07 DIAGNOSIS — D72.829 HYPERLEUKOCYTOSIS: ICD-10-CM

## 2024-03-07 DIAGNOSIS — D64.9 SYMPTOMATIC ANEMIA: ICD-10-CM

## 2024-03-07 DIAGNOSIS — C92.10 BLAST CRISIS PHASE OF CHRONIC MYELOID LEUKEMIA: ICD-10-CM

## 2024-03-07 DIAGNOSIS — D69.6 THROMBOCYTOPENIA: ICD-10-CM

## 2024-03-07 LAB
ABS NEUT CALC (OHS): 1.07 X10(3)/MCL (ref 2.1–9.2)
ALBUMIN SERPL-MCNC: 3.3 G/DL (ref 3.5–5)
ALBUMIN/GLOB SERPL: 1.3 RATIO (ref 1.1–2)
ALP SERPL-CCNC: 83 UNIT/L (ref 40–150)
ALT SERPL-CCNC: 11 UNIT/L (ref 0–55)
ANISOCYTOSIS BLD QL SMEAR: ABNORMAL
AST SERPL-CCNC: 10 UNIT/L (ref 5–34)
B-HCG SERPL QL: 3 %
BILIRUB SERPL-MCNC: 0.6 MG/DL
BUN SERPL-MCNC: 10.2 MG/DL (ref 9.8–20.1)
CALCIUM SERPL-MCNC: 9.2 MG/DL (ref 8.4–10.2)
CHLORIDE SERPL-SCNC: 104 MMOL/L (ref 98–107)
CO2 SERPL-SCNC: 23 MMOL/L (ref 22–29)
CREAT SERPL-MCNC: 0.63 MG/DL (ref 0.55–1.02)
ERYTHROCYTE [DISTWIDTH] IN BLOOD BY AUTOMATED COUNT: 19.5 % (ref 11.5–17)
GFR SERPLBLD CREATININE-BSD FMLA CKD-EPI: >60 MLS/MIN/1.73/M2
GIANT PLATELETS: ABNORMAL
GLOBULIN SER-MCNC: 2.6 GM/DL (ref 2.4–3.5)
GLUCOSE SERPL-MCNC: 263 MG/DL (ref 74–100)
HCT VFR BLD AUTO: 34 % (ref 37–47)
HGB BLD-MCNC: 10.2 G/DL (ref 12–16)
LYMPHOCYTES NFR BLD MANUAL: 0.91 X10(3)/MCL
LYMPHOCYTES NFR BLD MANUAL: 33 % (ref 13–40)
MCH RBC QN AUTO: 25.4 PG (ref 27–31)
MCHC RBC AUTO-ENTMCNC: 30 G/DL (ref 33–36)
MCV RBC AUTO: 84.6 FL (ref 80–94)
MONOCYTES NFR BLD MANUAL: 0.69 X10(3)/MCL (ref 0.1–1.3)
MONOCYTES NFR BLD MANUAL: 25 % (ref 2–11)
NEUTROPHILS NFR BLD MANUAL: 36 % (ref 47–80)
NEUTS BAND NFR BLD MANUAL: 3 % (ref 0–11)
NRBC BLD AUTO-RTO: 3.3 %
NRBC BLD MANUAL-RTO: 3 %
PLATELET # BLD AUTO: 73 X10(3)/MCL (ref 130–400)
PLATELET # BLD EST: ABNORMAL 10*3/UL
PLATELETS.RETICULATED NFR BLD AUTO: 10.2 % (ref 0.9–11.2)
PMV BLD AUTO: ABNORMAL FL
POIKILOCYTOSIS BLD QL SMEAR: SLIGHT
POLYCHROMASIA BLD QL SMEAR: SLIGHT
POTASSIUM SERPL-SCNC: 4.2 MMOL/L (ref 3.5–5.1)
PROT SERPL-MCNC: 5.9 GM/DL (ref 6.4–8.3)
RBC # BLD AUTO: 4.02 X10(6)/MCL (ref 4.2–5.4)
SODIUM SERPL-SCNC: 138 MMOL/L (ref 136–145)
STIPPLED RBC (OHS): SLIGHT
TEAR DROP CELL (OLG): SLIGHT
WBC # SPEC AUTO: 2.75 X10(3)/MCL (ref 4.5–11.5)

## 2024-03-07 PROCEDURE — 36415 COLL VENOUS BLD VENIPUNCTURE: CPT

## 2024-03-07 PROCEDURE — 80053 COMPREHEN METABOLIC PANEL: CPT

## 2024-03-07 PROCEDURE — 85007 BL SMEAR W/DIFF WBC COUNT: CPT

## 2024-03-07 NOTE — PLAN OF CARE
Labs done no transfusions needed today H/H 10.2 /34 Plt 73 ANC 1072  Labs reviewed with Kareem MCCARTHY  Pt to return 3/12/24

## 2024-03-08 LAB — HEMATOLOGIST REVIEW: NORMAL

## 2024-03-11 DIAGNOSIS — C92.10 CHRONIC MYELOID LEUKEMIA: Primary | ICD-10-CM

## 2024-03-12 ENCOUNTER — OFFICE VISIT (OUTPATIENT)
Dept: HEMATOLOGY/ONCOLOGY | Facility: CLINIC | Age: 59
End: 2024-03-12

## 2024-03-12 ENCOUNTER — INFUSION (OUTPATIENT)
Dept: INFUSION THERAPY | Facility: HOSPITAL | Age: 59
End: 2024-03-12
Attending: INTERNAL MEDICINE

## 2024-03-12 ENCOUNTER — APPOINTMENT (OUTPATIENT)
Dept: HEMATOLOGY/ONCOLOGY | Facility: CLINIC | Age: 59
End: 2024-03-12

## 2024-03-12 VITALS
RESPIRATION RATE: 20 BRPM | TEMPERATURE: 99 F | DIASTOLIC BLOOD PRESSURE: 77 MMHG | HEIGHT: 64 IN | SYSTOLIC BLOOD PRESSURE: 126 MMHG | BODY MASS INDEX: 35.1 KG/M2 | OXYGEN SATURATION: 100 % | WEIGHT: 205.63 LBS | HEART RATE: 69 BPM

## 2024-03-12 DIAGNOSIS — C92.10 CHRONIC MYELOID LEUKEMIA: Primary | ICD-10-CM

## 2024-03-12 DIAGNOSIS — C92.10 CHRONIC MYELOID LEUKEMIA: ICD-10-CM

## 2024-03-12 DIAGNOSIS — R21 RASH: ICD-10-CM

## 2024-03-12 DIAGNOSIS — R11.0 NAUSEA: ICD-10-CM

## 2024-03-12 DIAGNOSIS — D69.6 THROMBOCYTOPENIA: ICD-10-CM

## 2024-03-12 DIAGNOSIS — D64.9 SYMPTOMATIC ANEMIA: ICD-10-CM

## 2024-03-12 LAB
ABS NEUT CALC (OHS): 3.44 X10(3)/MCL (ref 2.1–9.2)
ALBUMIN SERPL-MCNC: 3.3 G/DL (ref 3.5–5)
ALBUMIN/GLOB SERPL: 1.3 RATIO (ref 1.1–2)
ALP SERPL-CCNC: 91 UNIT/L (ref 40–150)
ALT SERPL-CCNC: 11 UNIT/L (ref 0–55)
AST SERPL-CCNC: 13 UNIT/L (ref 5–34)
B-HCG SERPL QL: 5 %
BILIRUB SERPL-MCNC: 0.6 MG/DL
BUN SERPL-MCNC: 12.4 MG/DL (ref 9.8–20.1)
CALCIUM SERPL-MCNC: 9.2 MG/DL (ref 8.4–10.2)
CHLORIDE SERPL-SCNC: 107 MMOL/L (ref 98–107)
CO2 SERPL-SCNC: 23 MMOL/L (ref 22–29)
CREAT SERPL-MCNC: 0.63 MG/DL (ref 0.55–1.02)
ERYTHROCYTE [DISTWIDTH] IN BLOOD BY AUTOMATED COUNT: 19.8 % (ref 11.5–17)
GFR SERPLBLD CREATININE-BSD FMLA CKD-EPI: >60 MLS/MIN/1.73/M2
GLOBULIN SER-MCNC: 2.6 GM/DL (ref 2.4–3.5)
GLUCOSE SERPL-MCNC: 165 MG/DL (ref 74–100)
HCT VFR BLD AUTO: 36.1 % (ref 37–47)
HGB BLD-MCNC: 10.7 G/DL (ref 12–16)
LYMPH ABN # BLD MANUAL: 3 %
LYMPHOCYTES NFR BLD MANUAL: 2.95 X10(3)/MCL
LYMPHOCYTES NFR BLD MANUAL: 36 % (ref 13–40)
MCH RBC QN AUTO: 25.1 PG (ref 27–31)
MCHC RBC AUTO-ENTMCNC: 29.6 G/DL (ref 33–36)
MCV RBC AUTO: 84.5 FL (ref 80–94)
MONOCYTES NFR BLD MANUAL: 1.15 X10(3)/MCL (ref 0.1–1.3)
MONOCYTES NFR BLD MANUAL: 14 % (ref 2–11)
NEUTROPHILS NFR BLD MANUAL: 42 % (ref 47–80)
NRBC BLD AUTO-RTO: 3.1 %
PLATELET # BLD AUTO: 88 X10(3)/MCL (ref 130–400)
PLATELETS.RETICULATED NFR BLD AUTO: 14.1 % (ref 0.9–11.2)
PMV BLD AUTO: ABNORMAL FL
POTASSIUM SERPL-SCNC: 4 MMOL/L (ref 3.5–5.1)
PROT SERPL-MCNC: 5.9 GM/DL (ref 6.4–8.3)
RBC # BLD AUTO: 4.27 X10(6)/MCL (ref 4.2–5.4)
SODIUM SERPL-SCNC: 142 MMOL/L (ref 136–145)
WBC # SPEC AUTO: 8.19 X10(3)/MCL (ref 4.5–11.5)

## 2024-03-12 PROCEDURE — 85027 COMPLETE CBC AUTOMATED: CPT

## 2024-03-12 PROCEDURE — 99214 OFFICE O/P EST MOD 30 MIN: CPT | Mod: S$PBB,,,

## 2024-03-12 PROCEDURE — 99215 OFFICE O/P EST HI 40 MIN: CPT | Mod: PBBFAC

## 2024-03-12 PROCEDURE — 36415 COLL VENOUS BLD VENIPUNCTURE: CPT

## 2024-03-12 PROCEDURE — 80053 COMPREHEN METABOLIC PANEL: CPT

## 2024-03-12 RX ORDER — ONDANSETRON 4 MG/1
8 TABLET, FILM COATED ORAL EVERY 6 HOURS PRN
Qty: 360 TABLET | Refills: 0 | Status: ON HOLD | OUTPATIENT
Start: 2024-03-12 | End: 2024-04-01 | Stop reason: HOSPADM

## 2024-03-12 RX ORDER — TRIAMCINOLONE ACETONIDE 1 MG/G
CREAM TOPICAL 2 TIMES DAILY
Qty: 45 G | Refills: 0 | Status: ON HOLD | OUTPATIENT
Start: 2024-03-12 | End: 2024-04-01 | Stop reason: HOSPADM
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
## 2024-03-12 NOTE — Clinical Note
Schedule labs and possible infusion on 3/18 & 3/25 (CBC/CMP) RTC with me 4/9 with labs prior and possible infusion (CBC/CMP/Mag level)

## 2024-03-12 NOTE — NURSING
Patient is here for labs, NP visit & possible transfusion.  h/h 10.7 & 36.1 ; plts 88. No transfusions today.

## 2024-03-18 ENCOUNTER — INFUSION (OUTPATIENT)
Dept: INFUSION THERAPY | Facility: HOSPITAL | Age: 59
End: 2024-03-18
Attending: INTERNAL MEDICINE

## 2024-03-18 ENCOUNTER — LAB VISIT (OUTPATIENT)
Dept: HEMATOLOGY/ONCOLOGY | Facility: CLINIC | Age: 59
End: 2024-03-18

## 2024-03-18 DIAGNOSIS — C92.10 BLAST CRISIS PHASE OF CHRONIC MYELOID LEUKEMIA: Primary | ICD-10-CM

## 2024-03-18 DIAGNOSIS — C92.10 CML (CHRONIC MYELOCYTIC LEUKEMIA): ICD-10-CM

## 2024-03-18 LAB
ABS NEUT CALC (OHS): 7.72 X10(3)/MCL (ref 2.1–9.2)
ALBUMIN SERPL-MCNC: 3.1 G/DL (ref 3.5–5)
ALBUMIN/GLOB SERPL: 1.1 RATIO (ref 1.1–2)
ALP SERPL-CCNC: 77 UNIT/L (ref 40–150)
ALT SERPL-CCNC: 9 UNIT/L (ref 0–55)
ANISOCYTOSIS BLD QL SMEAR: ABNORMAL
AST SERPL-CCNC: 19 UNIT/L (ref 5–34)
B-HCG SERPL QL: 19 %
BILIRUB SERPL-MCNC: 0.4 MG/DL
BUN SERPL-MCNC: 10.3 MG/DL (ref 9.8–20.1)
CALCIUM SERPL-MCNC: 9.4 MG/DL (ref 8.4–10.2)
CHLORIDE SERPL-SCNC: 104 MMOL/L (ref 98–107)
CO2 SERPL-SCNC: 25 MMOL/L (ref 22–29)
CREAT SERPL-MCNC: 0.72 MG/DL (ref 0.55–1.02)
ERYTHROCYTE [DISTWIDTH] IN BLOOD BY AUTOMATED COUNT: 19.6 % (ref 11.5–17)
GFR SERPLBLD CREATININE-BSD FMLA CKD-EPI: >60 MLS/MIN/1.73/M2
GLOBULIN SER-MCNC: 2.9 GM/DL (ref 2.4–3.5)
GLUCOSE SERPL-MCNC: 217 MG/DL (ref 74–100)
HCT VFR BLD AUTO: 35.3 % (ref 37–47)
HEMATOLOGIST REVIEW: NORMAL
HGB BLD-MCNC: 10.4 G/DL (ref 12–16)
LYMPHOCYTES NFR BLD MANUAL: 38 % (ref 13–40)
LYMPHOCYTES NFR BLD MANUAL: 8.38 X10(3)/MCL
MCH RBC QN AUTO: 25 PG (ref 27–31)
MCHC RBC AUTO-ENTMCNC: 29.5 G/DL (ref 33–36)
MCV RBC AUTO: 84.9 FL (ref 80–94)
MICROCYTES BLD QL SMEAR: ABNORMAL
MONOCYTES NFR BLD MANUAL: 1.54 X10(3)/MCL (ref 0.1–1.3)
MONOCYTES NFR BLD MANUAL: 7 % (ref 2–11)
NEUTROPHILS NFR BLD MANUAL: 35 % (ref 47–80)
NRBC BLD AUTO-RTO: 3.7 %
NRBC BLD MANUAL-RTO: 5 %
OVALOCYTES (OLG): SLIGHT
PLATELET # BLD AUTO: 81 X10(3)/MCL (ref 130–400)
PLATELET # BLD EST: ABNORMAL 10*3/UL
PLATELETS.RETICULATED NFR BLD AUTO: 17.1 % (ref 0.9–11.2)
PMV BLD AUTO: ABNORMAL FL
POIKILOCYTOSIS BLD QL SMEAR: ABNORMAL
POLYCHROMASIA BLD QL SMEAR: SLIGHT
POTASSIUM SERPL-SCNC: 4 MMOL/L (ref 3.5–5.1)
PROLYMPHOCYTES # BLD MANUAL: 1 %
PROT SERPL-MCNC: 6 GM/DL (ref 6.4–8.3)
RBC # BLD AUTO: 4.16 X10(6)/MCL (ref 4.2–5.4)
RBC MORPH BLD: ABNORMAL
SODIUM SERPL-SCNC: 140 MMOL/L (ref 136–145)
STIPPLED RBC (OHS): SLIGHT
WBC # SPEC AUTO: 22.06 X10(3)/MCL (ref 4.5–11.5)

## 2024-03-18 PROCEDURE — 36415 COLL VENOUS BLD VENIPUNCTURE: CPT

## 2024-03-18 PROCEDURE — 80053 COMPREHEN METABOLIC PANEL: CPT

## 2024-03-18 PROCEDURE — 85027 COMPLETE CBC AUTOMATED: CPT

## 2024-03-18 NOTE — NURSING
Patient is accompanied by her , she states she feels ok and no active bleeding.  Hgb is 10.4, Hct is 35.3 and platelets are 81.  No transfusions needed.  Pt has an appt for IVIG in [place] on 3/21/24.  Pt will RTC as scheduled on 3/25/24.    Shannan Aviles RN

## 2024-03-23 ENCOUNTER — HOSPITAL ENCOUNTER (EMERGENCY)
Facility: HOSPITAL | Age: 59
Discharge: ANOTHER HOSPITAL | End: 2024-03-23
Attending: STUDENT IN AN ORGANIZED HEALTH CARE EDUCATION/TRAINING PROGRAM

## 2024-03-23 ENCOUNTER — HOSPITAL ENCOUNTER (INPATIENT)
Facility: HOSPITAL | Age: 59
LOS: 9 days | Discharge: HOSPICE | DRG: 871 | End: 2024-04-01
Attending: INTERNAL MEDICINE | Admitting: INTERNAL MEDICINE

## 2024-03-23 VITALS
HEIGHT: 63 IN | TEMPERATURE: 100 F | HEART RATE: 129 BPM | BODY MASS INDEX: 36.45 KG/M2 | RESPIRATION RATE: 18 BRPM | OXYGEN SATURATION: 95 % | WEIGHT: 205.69 LBS | DIASTOLIC BLOOD PRESSURE: 75 MMHG | SYSTOLIC BLOOD PRESSURE: 155 MMHG

## 2024-03-23 DIAGNOSIS — C92.10 BLAST CRISIS PHASE OF CHRONIC MYELOID LEUKEMIA: Primary | ICD-10-CM

## 2024-03-23 DIAGNOSIS — A41.9 SEPSIS, DUE TO UNSPECIFIED ORGANISM, UNSPECIFIED WHETHER ACUTE ORGAN DYSFUNCTION PRESENT: ICD-10-CM

## 2024-03-23 DIAGNOSIS — E11.40 TYPE 2 DIABETES MELLITUS WITH DIABETIC NEUROPATHY, WITH LONG-TERM CURRENT USE OF INSULIN: ICD-10-CM

## 2024-03-23 DIAGNOSIS — J96.01 ACUTE RESPIRATORY FAILURE WITH HYPOXIA: Primary | ICD-10-CM

## 2024-03-23 DIAGNOSIS — C92.10 CML (CHRONIC MYELOCYTIC LEUKEMIA): ICD-10-CM

## 2024-03-23 DIAGNOSIS — J18.9 PNEUMONIA OF RIGHT LOWER LOBE DUE TO INFECTIOUS ORGANISM: ICD-10-CM

## 2024-03-23 DIAGNOSIS — E11.9 TYPE 2 DIABETES MELLITUS WITHOUT COMPLICATION, WITHOUT LONG-TERM CURRENT USE OF INSULIN: ICD-10-CM

## 2024-03-23 DIAGNOSIS — A41.9 SEPSIS: ICD-10-CM

## 2024-03-23 DIAGNOSIS — S06.5XAA SDH (SUBDURAL HEMATOMA): ICD-10-CM

## 2024-03-23 DIAGNOSIS — S06.5XAA SUBDURAL HEMATOMA: ICD-10-CM

## 2024-03-23 DIAGNOSIS — R73.9 HYPERGLYCEMIA: ICD-10-CM

## 2024-03-23 DIAGNOSIS — R00.0 TACHYCARDIA: ICD-10-CM

## 2024-03-23 DIAGNOSIS — E66.9 OBESITY, CLASS II, BMI 35-39.9: Chronic | ICD-10-CM

## 2024-03-23 DIAGNOSIS — G93.40 ACUTE ENCEPHALOPATHY: ICD-10-CM

## 2024-03-23 DIAGNOSIS — I10 ESSENTIAL HYPERTENSION: ICD-10-CM

## 2024-03-23 DIAGNOSIS — U07.1 COVID: ICD-10-CM

## 2024-03-23 DIAGNOSIS — Z51.5 PALLIATIVE CARE ENCOUNTER: ICD-10-CM

## 2024-03-23 DIAGNOSIS — Z79.4 TYPE 2 DIABETES MELLITUS WITH DIABETIC NEUROPATHY, WITH LONG-TERM CURRENT USE OF INSULIN: ICD-10-CM

## 2024-03-23 DIAGNOSIS — C92.10 BLAST CRISIS PHASE OF CHRONIC MYELOID LEUKEMIA: ICD-10-CM

## 2024-03-23 LAB
A-ADO2 BLOOD GAS (OHS): 88 MMHG
ABS NEUT CALC (OHS): 13.55 X10(3)/MCL (ref 2.1–9.2)
ABS NEUT CALC (OHS): 20.84 X10(3)/MCL (ref 2.1–9.2)
ALBUMIN SERPL BCP-MCNC: 3.1 G/DL (ref 3.5–5.2)
ALBUMIN SERPL-MCNC: 3 G/DL (ref 3.5–5)
ALBUMIN SERPL-MCNC: 3.3 G/DL (ref 3.5–5)
ALBUMIN/GLOB SERPL: 0.9 RATIO (ref 1.1–2)
ALBUMIN/GLOB SERPL: 1.1 RATIO (ref 1.1–2)
ALLENS TEST BLOOD GAS (OHS): YES
ALP SERPL-CCNC: 65 UNIT/L (ref 40–150)
ALP SERPL-CCNC: 73 U/L (ref 55–135)
ALP SERPL-CCNC: 74 UNIT/L (ref 40–150)
ALT SERPL W/O P-5'-P-CCNC: 9 U/L (ref 10–44)
ALT SERPL-CCNC: 8 UNIT/L (ref 0–55)
ALT SERPL-CCNC: 9 UNIT/L (ref 0–55)
ANION GAP SERPL CALC-SCNC: 11 MMOL/L (ref 8–16)
ANISOCYTOSIS BLD QL SMEAR: ABNORMAL
ANISOCYTOSIS BLD QL SMEAR: ABNORMAL
ANISOCYTOSIS BLD QL SMEAR: SLIGHT
APPEARANCE UR: CLEAR
APTT PPP: 29.1 SEC (ref 21–32)
AST SERPL-CCNC: 13 UNIT/L (ref 5–34)
AST SERPL-CCNC: 17 UNIT/L (ref 5–34)
AST SERPL-CCNC: 21 U/L (ref 10–40)
B-HCG SERPL QL: 27 %
B-HCG SERPL QL: 29 %
BACTERIA #/AREA URNS AUTO: ABNORMAL /HPF
BASE EXCESS BLD CALC-SCNC: 1.9 MMOL/L (ref -2–2)
BASOPHILS NFR BLD: 0 % (ref 0–1.9)
BILIRUB SERPL-MCNC: 0.4 MG/DL
BILIRUB SERPL-MCNC: 0.5 MG/DL
BILIRUB SERPL-MCNC: 0.6 MG/DL (ref 0.1–1)
BILIRUB UR QL STRIP.AUTO: NEGATIVE
BLOOD GAS SAMPLE TYPE (OHS): ABNORMAL
BUN SERPL-MCNC: 10 MG/DL (ref 9.8–20.1)
BUN SERPL-MCNC: 10.7 MG/DL (ref 9.8–20.1)
BUN SERPL-MCNC: 9 MG/DL (ref 6–20)
CALCIUM SERPL-MCNC: 9 MG/DL (ref 8.4–10.2)
CALCIUM SERPL-MCNC: 9.3 MG/DL (ref 8.4–10.2)
CALCIUM SERPL-MCNC: 9.3 MG/DL (ref 8.7–10.5)
CHLORIDE SERPL-SCNC: 102 MMOL/L (ref 95–110)
CHLORIDE SERPL-SCNC: 103 MMOL/L (ref 98–107)
CHLORIDE SERPL-SCNC: 104 MMOL/L (ref 98–107)
CO2 BLDA-SCNC: 26.7 MMOL/L (ref 22–26)
CO2 SERPL-SCNC: 22 MMOL/L (ref 22–29)
CO2 SERPL-SCNC: 22 MMOL/L (ref 23–29)
CO2 SERPL-SCNC: 24 MMOL/L (ref 22–29)
COHGB MFR BLDA: 2.1 % (ref 0.5–1.5)
COLOR UR AUTO: YELLOW
CREAT SERPL-MCNC: 0.7 MG/DL (ref 0.55–1.02)
CREAT SERPL-MCNC: 0.7 MG/DL (ref 0.5–1.4)
CREAT SERPL-MCNC: 0.75 MG/DL (ref 0.55–1.02)
DACRYOCYTES BLD QL SMEAR: ABNORMAL
DIFFERENTIAL METHOD BLD: ABNORMAL
DRAWN BY BLOOD GAS (OHS): ABNORMAL
EOSINOPHIL NFR BLD: 0 % (ref 0–8)
ERYTHROCYTE [DISTWIDTH] IN BLOOD BY AUTOMATED COUNT: 18.9 % (ref 11.5–17)
ERYTHROCYTE [DISTWIDTH] IN BLOOD BY AUTOMATED COUNT: 19.1 % (ref 11.5–17)
ERYTHROCYTE [DISTWIDTH] IN BLOOD BY AUTOMATED COUNT: 19.4 % (ref 11.5–14.5)
EST. GFR  (NO RACE VARIABLE): >60 ML/MIN/1.73 M^2
FLUAV AG UPPER RESP QL IA.RAPID: NOT DETECTED
FLUBV AG UPPER RESP QL IA.RAPID: NOT DETECTED
GAS PNL BLD: 155 MMHG
GFR SERPLBLD CREATININE-BSD FMLA CKD-EPI: >60 MLS/MIN/1.73/M2
GFR SERPLBLD CREATININE-BSD FMLA CKD-EPI: >60 MLS/MIN/1.73/M2
GLOBULIN SER-MCNC: 3 GM/DL (ref 2.4–3.5)
GLOBULIN SER-MCNC: 3.3 GM/DL (ref 2.4–3.5)
GLUCOSE SERPL-MCNC: 228 MG/DL (ref 74–100)
GLUCOSE SERPL-MCNC: 238 MG/DL (ref 74–100)
GLUCOSE SERPL-MCNC: 291 MG/DL (ref 70–110)
GLUCOSE UR QL STRIP.AUTO: NORMAL
HCO3 BLDA-SCNC: 25.6 MMOL/L (ref 22–26)
HCT VFR BLD AUTO: 34.5 % (ref 37–47)
HCT VFR BLD AUTO: 34.6 % (ref 37–47)
HCT VFR BLD AUTO: 35.3 % (ref 37–48.5)
HEMATOLOGIST REVIEW: NORMAL
HGB BLD-MCNC: 10.1 G/DL (ref 12–16)
HGB BLD-MCNC: 10.4 G/DL (ref 12–16)
HGB BLD-MCNC: 10.5 G/DL (ref 12–16)
HOLD SPECIMEN: NORMAL
HYALINE CASTS #/AREA URNS LPF: ABNORMAL /LPF
HYPOCHROMIA BLD QL SMEAR: ABNORMAL
HYPOCHROMIA BLD QL SMEAR: ABNORMAL
IMM GRANULOCYTES # BLD AUTO: ABNORMAL K/UL (ref 0–0.04)
IMM GRANULOCYTES NFR BLD AUTO: ABNORMAL % (ref 0–0.5)
INHALED O2 CONCENTRATION: 28 %
INR PPP: 1.1 (ref 0.8–1.2)
KETONES UR QL STRIP.AUTO: NEGATIVE
LACTATE SERPL-SCNC: 1.8 MMOL/L (ref 0.5–2.2)
LACTATE SERPL-SCNC: 2.1 MMOL/L (ref 0.5–2.2)
LACTATE SERPL-SCNC: 2.7 MMOL/L (ref 0.5–2.2)
LDH SERPL-CCNC: 849 U/L (ref 125–220)
LEUKOCYTE ESTERASE UR QL STRIP.AUTO: 250
LPM (OHS): 2
LYMPHOCYTES NFR BLD MANUAL: 13 % (ref 13–40)
LYMPHOCYTES NFR BLD MANUAL: 3.59 X10(3)/MCL
LYMPHOCYTES NFR BLD MANUAL: 6.16 X10(3)/MCL
LYMPHOCYTES NFR BLD MANUAL: 9 % (ref 13–40)
LYMPHOCYTES NFR BLD: 14 % (ref 18–48)
MAGNESIUM SERPL-MCNC: 1.6 MG/DL (ref 1.6–2.6)
MCH RBC QN AUTO: 24.5 PG (ref 27–31)
MCH RBC QN AUTO: 24.7 PG (ref 27–31)
MCH RBC QN AUTO: 24.9 PG (ref 27–31)
MCHC RBC AUTO-ENTMCNC: 29.3 G/DL (ref 33–36)
MCHC RBC AUTO-ENTMCNC: 29.5 G/DL (ref 32–36)
MCHC RBC AUTO-ENTMCNC: 30.3 G/DL (ref 33–36)
MCV RBC AUTO: 82 FL (ref 80–94)
MCV RBC AUTO: 83.7 FL (ref 80–94)
MCV RBC AUTO: 84 FL (ref 82–98)
METAMYELOCYTES NFR BLD MANUAL: 1 %
METAMYELOCYTES NFR BLD MANUAL: 4 %
METHGB MFR BLDA: 0.8 % (ref 0–1.5)
MICROCYTES BLD QL SMEAR: ABNORMAL
MONOCYTES NFR BLD MANUAL: 11 % (ref 2–11)
MONOCYTES NFR BLD MANUAL: 11.16 X10(3)/MCL (ref 0.1–1.3)
MONOCYTES NFR BLD MANUAL: 28 % (ref 2–11)
MONOCYTES NFR BLD MANUAL: 5.21 X10(3)/MCL (ref 0.1–1.3)
MONOCYTES NFR BLD: 11 % (ref 4–15)
MUCOUS THREADS URNS QL MICRO: ABNORMAL /LPF
MYELOCYTES NFR BLD MANUAL: 1 %
MYELOCYTES NFR BLD MANUAL: 2 %
NEUTROPHILS NFR BLD MANUAL: 34 % (ref 47–80)
NEUTROPHILS NFR BLD MANUAL: 39 % (ref 47–80)
NEUTROPHILS NFR BLD: 50 % (ref 38–73)
NEUTS BAND NFR BLD MANUAL: 2 %
NEUTS BAND NFR BLD MANUAL: 5 % (ref 0–11)
NITRITE UR QL STRIP.AUTO: NEGATIVE
NRBC BLD AUTO-RTO: 2.2 %
NRBC BLD AUTO-RTO: 3.1 %
NRBC BLD MANUAL-RTO: 11 %
NRBC BLD MANUAL-RTO: 3 %
NRBC BLD-RTO: 3 /100 WBC
O2 HB BLOOD GAS (OHS): 93.1 % (ref 94–100)
OVALOCYTES BLD QL SMEAR: ABNORMAL
OXYGEN DEVICE BLOOD GAS (OHS): ABNORMAL
OXYHGB MFR BLDA: 11.3 G/DL (ref 12–18)
PCO2 BLDA: 36 MMHG (ref 35–45)
PH BLDA: 7.46 [PH] (ref 7.35–7.45)
PH UR STRIP.AUTO: 5.5 [PH]
PHOSPHATE SERPL-MCNC: 3.8 MG/DL (ref 2.7–4.5)
PLATELET # BLD AUTO: 159 X10(3)/MCL (ref 130–400)
PLATELET # BLD AUTO: 177 X10(3)/MCL (ref 130–400)
PLATELET # BLD AUTO: 204 K/UL (ref 150–450)
PLATELET # BLD EST: ADEQUATE 10*3/UL
PLATELET # BLD EST: ADEQUATE 10*3/UL
PLATELET BLD QL SMEAR: ABNORMAL
PLATELETS.RETICULATED NFR BLD AUTO: 17 % (ref 0.9–11.2)
PLATELETS.RETICULATED NFR BLD AUTO: 18.4 % (ref 0.9–11.2)
PMV BLD AUTO: 0 FL (ref 7.4–10.4)
PMV BLD AUTO: ABNORMAL FL
PMV BLD AUTO: ABNORMAL FL (ref 9.2–12.9)
PO2 BLDA: 67 MMHG (ref 75–100)
POCT GLUCOSE: 219 MG/DL (ref 70–110)
POIKILOCYTOSIS BLD QL SMEAR: ABNORMAL
POIKILOCYTOSIS BLD QL SMEAR: SLIGHT
POLYCHROMASIA BLD QL SMEAR: ABNORMAL
POLYCHROMASIA BLD QL SMEAR: ABNORMAL
POTASSIUM SERPL-SCNC: 3.7 MMOL/L (ref 3.5–5.1)
POTASSIUM SERPL-SCNC: 3.8 MMOL/L (ref 3.5–5.1)
POTASSIUM SERPL-SCNC: 4.1 MMOL/L (ref 3.5–5.1)
PROT SERPL-MCNC: 6.3 GM/DL (ref 6.4–8.3)
PROT SERPL-MCNC: 6.3 GM/DL (ref 6.4–8.3)
PROT SERPL-MCNC: 6.5 G/DL (ref 6–8.4)
PROT UR QL STRIP.AUTO: ABNORMAL
PROTHROMBIN TIME: 12.2 SEC (ref 9–12.5)
RBC # BLD AUTO: 4.12 X10(6)/MCL (ref 4.2–5.4)
RBC # BLD AUTO: 4.21 M/UL (ref 4–5.4)
RBC # BLD AUTO: 4.22 X10(6)/MCL (ref 4.2–5.4)
RBC #/AREA URNS AUTO: ABNORMAL /HPF
RBC MORPH BLD: ABNORMAL
RBC MORPH BLD: ABNORMAL
RBC UR QL AUTO: NEGATIVE
ROULEAUX BLD QL SMEAR: SLIGHT
SAMPLE SITE BLOOD GAS (OHS): ABNORMAL
SAO2 % BLDA: 95.9 %
SARS-COV-2 RNA RESP QL NAA+PROBE: NOT DETECTED
SCHISTOCYTE (OLG): ABNORMAL
SCHISTOCYTES BLD QL SMEAR: ABNORMAL
SMUDGE CELLS BLD QL SMEAR: PRESENT
SODIUM SERPL-SCNC: 135 MMOL/L (ref 136–145)
SODIUM SERPL-SCNC: 137 MMOL/L (ref 136–145)
SODIUM SERPL-SCNC: 138 MMOL/L (ref 136–145)
SP GR UR STRIP.AUTO: 1.03 (ref 1–1.03)
SPHEROCYTES BLD QL SMEAR: ABNORMAL
SQUAMOUS #/AREA URNS LPF: ABNORMAL /HPF
STIPPLED RBC (OHS): ABNORMAL
STOMATOCYTES (OLG): SLIGHT
STREP A PCR (OHS): NOT DETECTED
TEAR DROP CELL (OLG): SLIGHT
TROPONIN I SERPL-MCNC: <0.01 NG/ML (ref 0–0.04)
URATE SERPL-MCNC: 7.4 MG/DL (ref 2.6–6)
UROBILINOGEN UR STRIP-ACNC: NORMAL
WBC # BLD AUTO: 52.42 K/UL (ref 3.9–12.7)
WBC # SPEC AUTO: 39.86 X10(3)/MCL (ref 4.5–11.5)
WBC # SPEC AUTO: 47.37 X10(3)/MCL (ref 4.5–11.5)
WBC #/AREA URNS AUTO: ABNORMAL /HPF
WBC OTHER NFR BLD MANUAL: 20 %

## 2024-03-23 PROCEDURE — 63600175 PHARM REV CODE 636 W HCPCS: Performed by: STUDENT IN AN ORGANIZED HEALTH CARE EDUCATION/TRAINING PROGRAM

## 2024-03-23 PROCEDURE — 96365 THER/PROPH/DIAG IV INF INIT: CPT | Mod: 59

## 2024-03-23 PROCEDURE — 84484 ASSAY OF TROPONIN QUANT: CPT | Performed by: STUDENT IN AN ORGANIZED HEALTH CARE EDUCATION/TRAINING PROGRAM

## 2024-03-23 PROCEDURE — 25500020 PHARM REV CODE 255

## 2024-03-23 PROCEDURE — 85007 BL SMEAR W/DIFF WBC COUNT: CPT | Performed by: PHYSICIAN ASSISTANT

## 2024-03-23 PROCEDURE — 83735 ASSAY OF MAGNESIUM: CPT | Performed by: STUDENT IN AN ORGANIZED HEALTH CARE EDUCATION/TRAINING PROGRAM

## 2024-03-23 PROCEDURE — 83605 ASSAY OF LACTIC ACID: CPT | Performed by: PHYSICIAN ASSISTANT

## 2024-03-23 PROCEDURE — 85610 PROTHROMBIN TIME: CPT | Performed by: PHYSICIAN ASSISTANT

## 2024-03-23 PROCEDURE — 84100 ASSAY OF PHOSPHORUS: CPT | Performed by: PHYSICIAN ASSISTANT

## 2024-03-23 PROCEDURE — 87040 BLOOD CULTURE FOR BACTERIA: CPT | Mod: 59 | Performed by: PHYSICIAN ASSISTANT

## 2024-03-23 PROCEDURE — 84550 ASSAY OF BLOOD/URIC ACID: CPT | Performed by: STUDENT IN AN ORGANIZED HEALTH CARE EDUCATION/TRAINING PROGRAM

## 2024-03-23 PROCEDURE — 80053 COMPREHEN METABOLIC PANEL: CPT | Performed by: STUDENT IN AN ORGANIZED HEALTH CARE EDUCATION/TRAINING PROGRAM

## 2024-03-23 PROCEDURE — 85730 THROMBOPLASTIN TIME PARTIAL: CPT | Performed by: PHYSICIAN ASSISTANT

## 2024-03-23 PROCEDURE — 87077 CULTURE AEROBIC IDENTIFY: CPT | Performed by: STUDENT IN AN ORGANIZED HEALTH CARE EDUCATION/TRAINING PROGRAM

## 2024-03-23 PROCEDURE — 85060 BLOOD SMEAR INTERPRETATION: CPT | Mod: ,,, | Performed by: PATHOLOGY

## 2024-03-23 PROCEDURE — 99900035 HC TECH TIME PER 15 MIN (STAT)

## 2024-03-23 PROCEDURE — 0240U COVID/FLU A&B PCR: CPT | Performed by: STUDENT IN AN ORGANIZED HEALTH CARE EDUCATION/TRAINING PROGRAM

## 2024-03-23 PROCEDURE — 99291 CRITICAL CARE FIRST HOUR: CPT

## 2024-03-23 PROCEDURE — 93005 ELECTROCARDIOGRAM TRACING: CPT

## 2024-03-23 PROCEDURE — 36600 WITHDRAWAL OF ARTERIAL BLOOD: CPT

## 2024-03-23 PROCEDURE — 83735 ASSAY OF MAGNESIUM: CPT | Performed by: PHYSICIAN ASSISTANT

## 2024-03-23 PROCEDURE — 96375 TX/PRO/DX INJ NEW DRUG ADDON: CPT

## 2024-03-23 PROCEDURE — 81015 MICROSCOPIC EXAM OF URINE: CPT | Performed by: STUDENT IN AN ORGANIZED HEALTH CARE EDUCATION/TRAINING PROGRAM

## 2024-03-23 PROCEDURE — 63600175 PHARM REV CODE 636 W HCPCS: Performed by: PHYSICIAN ASSISTANT

## 2024-03-23 PROCEDURE — 85007 BL SMEAR W/DIFF WBC COUNT: CPT | Performed by: STUDENT IN AN ORGANIZED HEALTH CARE EDUCATION/TRAINING PROGRAM

## 2024-03-23 PROCEDURE — 99291 CRITICAL CARE FIRST HOUR: CPT | Mod: ,,, | Performed by: PHYSICIAN ASSISTANT

## 2024-03-23 PROCEDURE — 80053 COMPREHEN METABOLIC PANEL: CPT | Performed by: PHYSICIAN ASSISTANT

## 2024-03-23 PROCEDURE — 82803 BLOOD GASES ANY COMBINATION: CPT

## 2024-03-23 PROCEDURE — 20000000 HC ICU ROOM

## 2024-03-23 PROCEDURE — 96376 TX/PRO/DX INJ SAME DRUG ADON: CPT

## 2024-03-23 PROCEDURE — 82962 GLUCOSE BLOOD TEST: CPT

## 2024-03-23 PROCEDURE — 83615 LACTATE (LD) (LDH) ENZYME: CPT | Performed by: STUDENT IN AN ORGANIZED HEALTH CARE EDUCATION/TRAINING PROGRAM

## 2024-03-23 PROCEDURE — C9248 INJ, CLEVIDIPINE BUTYRATE: HCPCS | Performed by: STUDENT IN AN ORGANIZED HEALTH CARE EDUCATION/TRAINING PROGRAM

## 2024-03-23 PROCEDURE — 87651 STREP A DNA AMP PROBE: CPT | Performed by: STUDENT IN AN ORGANIZED HEALTH CARE EDUCATION/TRAINING PROGRAM

## 2024-03-23 PROCEDURE — 85027 COMPLETE CBC AUTOMATED: CPT | Performed by: PHYSICIAN ASSISTANT

## 2024-03-23 PROCEDURE — 83605 ASSAY OF LACTIC ACID: CPT | Performed by: STUDENT IN AN ORGANIZED HEALTH CARE EDUCATION/TRAINING PROGRAM

## 2024-03-23 PROCEDURE — 25000003 PHARM REV CODE 250: Performed by: STUDENT IN AN ORGANIZED HEALTH CARE EDUCATION/TRAINING PROGRAM

## 2024-03-23 PROCEDURE — 87040 BLOOD CULTURE FOR BACTERIA: CPT | Performed by: STUDENT IN AN ORGANIZED HEALTH CARE EDUCATION/TRAINING PROGRAM

## 2024-03-23 RX ORDER — AMLODIPINE BESYLATE 10 MG/1
10 TABLET ORAL DAILY
Status: DISCONTINUED | OUTPATIENT
Start: 2024-03-24 | End: 2024-03-25

## 2024-03-23 RX ORDER — OXYCODONE HYDROCHLORIDE 10 MG/1
10 TABLET ORAL EVERY 4 HOURS PRN
Status: DISCONTINUED | OUTPATIENT
Start: 2024-03-23 | End: 2024-03-30

## 2024-03-23 RX ORDER — SODIUM CHLORIDE 0.9 % (FLUSH) 0.9 %
3 SYRINGE (ML) INJECTION
Status: DISCONTINUED | OUTPATIENT
Start: 2024-03-23 | End: 2024-03-30

## 2024-03-23 RX ORDER — CALCIUM GLUCONATE 20 MG/ML
2 INJECTION, SOLUTION INTRAVENOUS
Status: DISCONTINUED | OUTPATIENT
Start: 2024-03-23 | End: 2024-03-30

## 2024-03-23 RX ORDER — HYDROMORPHONE HYDROCHLORIDE 1 MG/ML
1 INJECTION, SOLUTION INTRAMUSCULAR; INTRAVENOUS; SUBCUTANEOUS ONCE
Status: COMPLETED | OUTPATIENT
Start: 2024-03-23 | End: 2024-03-23

## 2024-03-23 RX ORDER — POTASSIUM CHLORIDE 7.45 MG/ML
60 INJECTION INTRAVENOUS
Status: DISCONTINUED | OUTPATIENT
Start: 2024-03-23 | End: 2024-03-27

## 2024-03-23 RX ORDER — HYDROMORPHONE HYDROCHLORIDE 1 MG/ML
0.5 INJECTION, SOLUTION INTRAMUSCULAR; INTRAVENOUS; SUBCUTANEOUS
Status: COMPLETED | OUTPATIENT
Start: 2024-03-23 | End: 2024-03-23

## 2024-03-23 RX ORDER — ONDANSETRON HYDROCHLORIDE 2 MG/ML
4 INJECTION, SOLUTION INTRAVENOUS EVERY 8 HOURS PRN
Status: DISCONTINUED | OUTPATIENT
Start: 2024-03-23 | End: 2024-03-23

## 2024-03-23 RX ORDER — LEVETIRACETAM 500 MG/5ML
500 INJECTION, SOLUTION, CONCENTRATE INTRAVENOUS EVERY 12 HOURS
Status: DISCONTINUED | OUTPATIENT
Start: 2024-03-23 | End: 2024-03-25

## 2024-03-23 RX ORDER — MONTELUKAST SODIUM 10 MG/1
10 TABLET ORAL DAILY
Status: DISCONTINUED | OUTPATIENT
Start: 2024-03-24 | End: 2024-03-25

## 2024-03-23 RX ORDER — FLUCONAZOLE 200 MG/1
400 TABLET ORAL DAILY
Status: DISCONTINUED | OUTPATIENT
Start: 2024-03-24 | End: 2024-03-24

## 2024-03-23 RX ORDER — PROCHLORPERAZINE EDISYLATE 5 MG/ML
5 INJECTION INTRAMUSCULAR; INTRAVENOUS EVERY 4 HOURS PRN
Status: DISCONTINUED | OUTPATIENT
Start: 2024-03-23 | End: 2024-03-30

## 2024-03-23 RX ORDER — FAMOTIDINE 20 MG/1
20 TABLET, FILM COATED ORAL 2 TIMES DAILY
Status: DISCONTINUED | OUTPATIENT
Start: 2024-03-23 | End: 2024-03-25

## 2024-03-23 RX ORDER — MORPHINE SULFATE 2 MG/ML
4 INJECTION, SOLUTION INTRAMUSCULAR; INTRAVENOUS
Status: COMPLETED | OUTPATIENT
Start: 2024-03-23 | End: 2024-03-23

## 2024-03-23 RX ORDER — MAGNESIUM SULFATE HEPTAHYDRATE 40 MG/ML
4 INJECTION, SOLUTION INTRAVENOUS
Status: DISCONTINUED | OUTPATIENT
Start: 2024-03-23 | End: 2024-03-30

## 2024-03-23 RX ORDER — ACETAMINOPHEN 650 MG/1
650 SUPPOSITORY RECTAL
Status: COMPLETED | OUTPATIENT
Start: 2024-03-23 | End: 2024-03-23

## 2024-03-23 RX ORDER — POTASSIUM CHLORIDE 7.45 MG/ML
40 INJECTION INTRAVENOUS
Status: DISCONTINUED | OUTPATIENT
Start: 2024-03-23 | End: 2024-03-27

## 2024-03-23 RX ORDER — LEVOFLOXACIN 500 MG/1
500 TABLET, FILM COATED ORAL
Status: DISCONTINUED | OUTPATIENT
Start: 2024-03-23 | End: 2024-03-24

## 2024-03-23 RX ORDER — GLUCAGON 1 MG
1 KIT INJECTION
Status: DISCONTINUED | OUTPATIENT
Start: 2024-03-23 | End: 2024-03-30

## 2024-03-23 RX ORDER — ALLOPURINOL 300 MG/1
300 TABLET ORAL DAILY
Status: DISCONTINUED | OUTPATIENT
Start: 2024-03-24 | End: 2024-03-25

## 2024-03-23 RX ORDER — HYDRALAZINE HYDROCHLORIDE 20 MG/ML
10 INJECTION INTRAMUSCULAR; INTRAVENOUS
Status: DISCONTINUED | OUTPATIENT
Start: 2024-03-23 | End: 2024-03-23

## 2024-03-23 RX ORDER — POTASSIUM CHLORIDE 7.45 MG/ML
80 INJECTION INTRAVENOUS
Status: DISCONTINUED | OUTPATIENT
Start: 2024-03-23 | End: 2024-03-27

## 2024-03-23 RX ORDER — ATORVASTATIN CALCIUM 40 MG/1
40 TABLET, FILM COATED ORAL DAILY
Status: DISCONTINUED | OUTPATIENT
Start: 2024-03-24 | End: 2024-03-25

## 2024-03-23 RX ORDER — ONDANSETRON HYDROCHLORIDE 2 MG/ML
4 INJECTION, SOLUTION INTRAVENOUS EVERY 8 HOURS PRN
Status: DISCONTINUED | OUTPATIENT
Start: 2024-03-23 | End: 2024-03-30

## 2024-03-23 RX ORDER — HYDROMORPHONE HYDROCHLORIDE 1 MG/ML
1 INJECTION, SOLUTION INTRAMUSCULAR; INTRAVENOUS; SUBCUTANEOUS EVERY 6 HOURS PRN
Status: DISCONTINUED | OUTPATIENT
Start: 2024-03-24 | End: 2024-03-26

## 2024-03-23 RX ORDER — MAGNESIUM SULFATE HEPTAHYDRATE 40 MG/ML
2 INJECTION, SOLUTION INTRAVENOUS
Status: DISCONTINUED | OUTPATIENT
Start: 2024-03-23 | End: 2024-03-30

## 2024-03-23 RX ORDER — HYDRALAZINE HYDROCHLORIDE 20 MG/ML
10 INJECTION INTRAMUSCULAR; INTRAVENOUS EVERY 6 HOURS PRN
Status: DISCONTINUED | OUTPATIENT
Start: 2024-03-23 | End: 2024-03-30

## 2024-03-23 RX ORDER — LABETALOL HCL 20 MG/4 ML
10 SYRINGE (ML) INTRAVENOUS EVERY 4 HOURS PRN
Status: DISCONTINUED | OUTPATIENT
Start: 2024-03-23 | End: 2024-03-30

## 2024-03-23 RX ORDER — ALBUTEROL SULFATE 90 UG/1
2 AEROSOL, METERED RESPIRATORY (INHALATION) EVERY 4 HOURS PRN
Status: DISCONTINUED | OUTPATIENT
Start: 2024-03-23 | End: 2024-03-27

## 2024-03-23 RX ORDER — INSULIN ASPART 100 [IU]/ML
0-5 INJECTION, SOLUTION INTRAVENOUS; SUBCUTANEOUS EVERY 6 HOURS PRN
Status: DISCONTINUED | OUTPATIENT
Start: 2024-03-23 | End: 2024-03-24

## 2024-03-23 RX ORDER — CALCIUM GLUCONATE 20 MG/ML
3 INJECTION, SOLUTION INTRAVENOUS
Status: DISCONTINUED | OUTPATIENT
Start: 2024-03-23 | End: 2024-03-30

## 2024-03-23 RX ORDER — METOPROLOL TARTRATE 25 MG/1
25 TABLET, FILM COATED ORAL 2 TIMES DAILY
Status: DISCONTINUED | OUTPATIENT
Start: 2024-03-23 | End: 2024-03-25

## 2024-03-23 RX ORDER — GABAPENTIN 400 MG/1
800 CAPSULE ORAL 3 TIMES DAILY
Status: DISCONTINUED | OUTPATIENT
Start: 2024-03-23 | End: 2024-03-25

## 2024-03-23 RX ORDER — CALCIUM GLUCONATE 20 MG/ML
1 INJECTION, SOLUTION INTRAVENOUS
Status: DISCONTINUED | OUTPATIENT
Start: 2024-03-23 | End: 2024-03-30

## 2024-03-23 RX ORDER — HYDROMORPHONE HYDROCHLORIDE 1 MG/ML
1 INJECTION, SOLUTION INTRAMUSCULAR; INTRAVENOUS; SUBCUTANEOUS
Status: COMPLETED | OUTPATIENT
Start: 2024-03-23 | End: 2024-03-23

## 2024-03-23 RX ORDER — ACYCLOVIR 200 MG/1
400 CAPSULE ORAL 2 TIMES DAILY
Status: DISCONTINUED | OUTPATIENT
Start: 2024-03-23 | End: 2024-03-25

## 2024-03-23 RX ADMIN — HYDROMORPHONE HYDROCHLORIDE 1 MG: 1 INJECTION, SOLUTION INTRAMUSCULAR; INTRAVENOUS; SUBCUTANEOUS at 02:03

## 2024-03-23 RX ADMIN — MORPHINE SULFATE 4 MG: 2 INJECTION, SOLUTION INTRAMUSCULAR; INTRAVENOUS at 01:03

## 2024-03-23 RX ADMIN — HYDROMORPHONE HYDROCHLORIDE 1 MG: 1 INJECTION, SOLUTION INTRAMUSCULAR; INTRAVENOUS; SUBCUTANEOUS at 09:03

## 2024-03-23 RX ADMIN — IOHEXOL 100 ML: 350 INJECTION, SOLUTION INTRAVENOUS at 04:03

## 2024-03-23 RX ADMIN — HYDROMORPHONE HYDROCHLORIDE 1 MG: 1 INJECTION, SOLUTION INTRAMUSCULAR; INTRAVENOUS; SUBCUTANEOUS at 11:03

## 2024-03-23 RX ADMIN — HYDROMORPHONE HYDROCHLORIDE 0.5 MG: 1 INJECTION, SOLUTION INTRAMUSCULAR; INTRAVENOUS; SUBCUTANEOUS at 03:03

## 2024-03-23 RX ADMIN — ACETAMINOPHEN 650 MG: 650 SUPPOSITORY RECTAL at 06:03

## 2024-03-23 RX ADMIN — CLEVIPIDINE 2 MG/HR: 0.5 EMULSION INTRAVENOUS at 06:03

## 2024-03-23 RX ADMIN — CEFEPIME 2 G: 2 INJECTION, POWDER, FOR SOLUTION INTRAVENOUS at 06:03

## 2024-03-23 RX ADMIN — SODIUM CHLORIDE 1000 ML: 9 INJECTION, SOLUTION INTRAVENOUS at 05:03

## 2024-03-23 NOTE — ED NOTES
Called to the room by the pt's , he states she is not acting right. Pt staring off at the ceiling. Pt is now tachycardic. Dr. Us notified and came to bedside to examine pt.

## 2024-03-23 NOTE — ED NOTES
Attempted to call report to [facility]. Was told that they are in report and would not accept report at this time.

## 2024-03-23 NOTE — ED PROVIDER NOTES
Encounter Date: 3/23/2024       History     Chief Complaint   Patient presents with    Leg Pain    Back Pain     C/o lower back pain radiating into bilateral hip and legs. Worsening since yesterday. States slipped yesterday when walking and made it worse. Also c/o left ear pain and throat pain    Sore Throat    Otalgia     Patient presents to the emergency department due to back pain, leg pain and a sore throat.  She has a history of CML, follows with oncology at [facility].  Last chemo was with the beginning of this month.  She states for the last 2 days she has had lower back pain and pain in her hips and has not felt well.   reports an episode of weakness where patient almost fell but was caught and did not hit her head or her back. Pain is unrelieved by her home [place].  She denies any fevers cough or congestion.  No abdominal pain nausea or vomiting.  She denies any weakness in her  legs, she  denies any numbness in her lower extremities, no loss of bowel or bladder function    The history is provided by the patient and the spouse. The history is limited by a language barrier. A  was used.     Review of patient's allergies indicates:  No Known Allergies  Past Medical History:   Diagnosis Date    Cancer     CML (chronic myelocytic leukemia)     DM2 (diabetes mellitus, type 2)     HTN (hypertension)     NAFLD (nonalcoholic fatty liver disease)     Neuropathy      Past Surgical History:   Procedure Laterality Date    ABDOMINAL SURGERY       SECTION      x4    CHOLECYSTECTOMY       Family History   Problem Relation Age of Onset    Diabetes Mother     Diabetes Father     Cancer Neg Hx      Social History     Tobacco Use    Smoking status: Never    Smokeless tobacco: Never   Substance Use Topics    Alcohol use: Not Currently    Drug use: Not Currently     Review of Systems   Constitutional:  Negative for chills and fever.   HENT:  Positive for sore throat. Negative for congestion.     Respiratory:  Negative for cough and shortness of breath.    Cardiovascular:  Negative for chest pain and palpitations.   Gastrointestinal:  Negative for abdominal pain and nausea.   Genitourinary:  Negative for dysuria and hematuria.   Musculoskeletal:  Positive for arthralgias and back pain. Negative for myalgias.   Neurological:  Negative for dizziness and weakness.       Physical Exam     Initial Vitals [03/23/24 1209]   BP Pulse Resp Temp SpO2   (!) 156/87 77 20 98.1 °F (36.7 °C) 97 %      MAP       --         Physical Exam    Nursing note and vitals reviewed.  Constitutional: She appears well-developed and well-nourished.   HENT:   Head: Normocephalic and atraumatic.   Eyes: EOM are normal. Pupils are equal, round, and reactive to light.   Neck: Neck supple.   Normal range of motion.  Cardiovascular:  Regular rhythm.           Tachycardic   Pulmonary/Chest: Breath sounds normal. No respiratory distress.   Abdominal: Abdomen is soft. She exhibits no distension. There is no abdominal tenderness.   Musculoskeletal:         General: No edema.      Cervical back: Normal range of motion and neck supple.      Comments: Pain with range of motion of the lower extremities at the hips     Neurological: She is alert and oriented to person, place, and time.   Skin: Skin is warm and dry.         ED Course   Critical Care    Date/Time: 3/23/2024 4:00 PM    Performed by: Bobby Us MD  Authorized by: Bobby Us MD  Direct patient critical care time: 15 minutes  Additional history critical care time: 7 minutes  Ordering / reviewing critical care time: 5 minutes  Documentation critical care time: 4 minutes  Consulting other physicians critical care time: 3 minutes  Consult with family critical care time: 0 minutes  Other critical care time: 0 minutes  Total critical care time (exclusive of procedural time) : 34 minutes  Critical care time was exclusive of separately billable procedures and treating other patients  and teaching time.  Critical care was necessary to treat or prevent imminent or life-threatening deterioration of the following conditions: metabolic crisis.  Critical care was time spent personally by me on the following activities: blood draw for specimens, development of treatment plan with patient or surrogate, discussions with consultants, interpretation of cardiac output measurements, evaluation of patient's response to treatment, obtaining history from patient or surrogate, examination of patient, ordering and performing treatments and interventions, ordering and review of laboratory studies, ordering and review of radiographic studies, pulse oximetry, re-evaluation of patient's condition and review of old charts.        Labs Reviewed   COMPREHENSIVE METABOLIC PANEL - Abnormal; Notable for the following components:       Result Value    Glucose Level 228 (*)     Protein Total 6.3 (*)     Albumin Level 3.0 (*)     Albumin/Globulin Ratio 0.9 (*)     All other components within normal limits   URINALYSIS, REFLEX TO URINE CULTURE - Abnormal; Notable for the following components:    Protein, UA 1+ (*)     Leukocyte Esterase,  (*)     WBC, UA 11-20 (*)     Squamous Epithelial Cells, UA Trace (*)     Mucous, UA Trace (*)     All other components within normal limits   CBC WITH DIFFERENTIAL - Abnormal; Notable for the following components:    WBC 39.86 (*)     RBC 4.12 (*)     Hgb 10.1 (*)     Hct 34.5 (*)     MCH 24.5 (*)     MCHC 29.3 (*)     RDW 19.1 (*)     IPF 18.4 (*)     All other components within normal limits   MANUAL DIFFERENTIAL - Abnormal; Notable for the following components:    Neutrophils % 34 (*)     Lymphs % 9 (*)     Monocytes % 28 (*)     Neutrophils Abs Calc 13.5524 (*)     Monocytes Abs 11.1608 (*)     RBC Morph Abnormal (*)     Anisocytosis 1+ (*)     Poikilocytosis 1+ (*)     Microcytosis 1+ (*)     Hypochromasia 1+ (*)     Rouleaux Slight (*)     Schistocytes 1+ (*)     Stomatocytes  Slight (*)     All other components within normal limits   LACTIC ACID, PLASMA - Abnormal; Notable for the following components:    Lactic Acid Level 2.7 (*)     All other components within normal limits   LACTATE DEHYDROGENASE - Abnormal; Notable for the following components:    Lactate Dehydrogenase 849 (*)     All other components within normal limits   URIC ACID - Abnormal; Notable for the following components:    Uric Acid 7.4 (*)     All other components within normal limits   BLOOD GAS - Abnormal; Notable for the following components:    pH, Blood gas 7.460 (*)     pO2, Blood gas 67.0 (*)     TOC2, Blood gas 26.7 (*)     THb, Blood gas 11.3 (*)     O2 Hb, Blood Gas 93.1 (*)     CO Hgb 2.1 (*)     All other components within normal limits   POCT GLUCOSE - Abnormal; Notable for the following components:    POCT Glucose 219 (*)     All other components within normal limits   COVID/FLU A&B PCR - Normal    Narrative:     The Xpert Xpress SARS-CoV-2/FLU/RSV plus is a rapid, multiplexed real-time PCR test intended for the simultaneous qualitative detection and differentiation of SARS-CoV-2, Influenza A, Influenza B, and respiratory syncytial virus (RSV) viral RNA in either nasopharyngeal swab or nasal swab specimens.         STREP GROUP A BY PCR - Normal    Narrative:     The Xpert Xpress Strep A test is a rapid, qualitative in vitro diagnostic test for the detection of Streptococcus pyogenes (Group A ß-hemolytic Streptococcus, Strep A) in throat swab specimens from patients with signs and symptoms of pharyngitis.     MAGNESIUM - Normal   CULTURE, URINE   BLOOD CULTURE [facility]   BLOOD CULTURE [facility]   CBC W/ AUTO DIFFERENTIAL    Narrative:     The following orders were created for panel order CBC auto differential.  Procedure                               Abnormality         Status                     ---------                               -----------         ------                     CBC with Differential[[id number]]        Abnormal            Final result               Manual Differential[[id number]]         Abnormal            Final result                 Please view results for these tests on the individual orders.   EXTRA TUBES    Narrative:     The following orders were created for panel order EXTRA TUBES.  Procedure                               Abnormality         Status                     ---------                               -----------         ------                     Light Blue Top Hold[[id number]]                             Final result               Red Top Hold[[id number]]                                    Final result               Lavender Top Hold[[id number]]                               Final result               Gold Top Hold[[id number]]                                   Final result                 Please view results for these tests on the individual orders.   LIGHT BLUE TOP HOLD   RED TOP HOLD   LAVENDER TOP HOLD   GOLD TOP HOLD   PATH REVIEW OF BLOOD SMEAR   LACTIC ACID, PLASMA   CBC W/ AUTO DIFFERENTIAL    Narrative:     The following orders were created for panel order CBC auto differential.  Procedure                               Abnormality         Status                     ---------                               -----------         ------                     CBC with Differential[[id number]]                           In process                   Please view results for these tests on the individual orders.   COMPREHENSIVE METABOLIC PANEL   MAGNESIUM   PROTIME-INR   CBC WITH DIFFERENTIAL   TROPONIN I   EXTRA TUBES    Narrative:     The following orders were created for panel order EXTRA TUBES.  Procedure                               Abnormality         Status                     ---------                               -----------         ------                     Light Blue Top Hold[[id number]]                             In process                 Pink Top Hold[[id number]]                                    In process                   Please view results for these tests on the individual orders.   LIGHT BLUE TOP HOLD   PINK TOP HOLD     EKG Readings: (Independently Interpreted)   Initial Reading: No STEMI. Rhythm: Sinus Tachycardia. Heart Rate: 121. Ectopy: No Ectopy. Conduction: Normal. Axis: Left Axis Deviation.     ECG Results              EKG 12-lead (In process)        Collection Time Result Time QRS Duration [facility] QTC Calculation    03/23/24 18:05:33 03/23/24 18:10:11 90 465                     In process by Interface, Lab In [facility] (03/23/24 18:10:20)                   Narrative:    Test Reason : R00.0,    Vent. Rate : 121 BPM     Atrial Rate : 121 BPM     P-R Int : 138 ms          QRS Dur : 090 ms      QT Int : 328 ms       P-R-T Axes : 062 -31 054 degrees     QTc Int : 465 ms    Sinus tachycardia  Possible Left atrial enlargement  Left axis deviation  Pulmonary disease pattern  Possible Inferior infarct ,age undetermined  Abnormal ECG  When compared with ECG of 02-JAN-2024 21:59,  Vent. rate has increased BY  52 BPM  The axis Shifted left  Borderline criteria for Inferior infarct are now Present    Referred By: [email or web address]   SELF           Confirmed By:                                   Imaging Results              X-Ray Chest AP Portable (In process)                      CT Head Without Contrast (Final result)  Result time 03/23/24 18:14:44      Final result by Benjamín Haider MD (03/23/24 18:14:44)                   Impression:      1.  New shallow acute subdural hematoma along the interhemispheric falx and the right tentorium.    2.  Chronic microvascular ischemia and atrophy.    3.  Progression of sinusitis changes.    Findings were notified to Dr. Us March 23, 2023 at 18:12 hours.      Electronically signed by: Benjamín Haider  Date:    03/23/2024  Time:    18:14               Narrative:    EXAMINATION:  CT HEAD WITHOUT CONTRAST    CLINICAL HISTORY:  Mental status change, unknown  cause;    TECHNIQUE:  Sequential axial images were performed of the brain without contrast.    Dose product length of 1017 mGycm. Automated exposure control was utilized to minimize radiation dose.    COMPARISON:  November 5, 2023.    FINDINGS:  There is new subtle hyperdense thickening of the interhemispheric falx suggestive of shallow subdural hematoma with maximum thickness like on 3 mm on image 28 series 2.  There is also minimal subdural hematoma extension into the right tentorium.  There is no mass effect, midline shift or hydrocephalus. There is no sulcal effacement or low attenuation changes to suggest recent large vessel territory infarction. Chronic appearing periventricular and subcortical white matter low attenuation changes are present and are consistent with chronic microangiopathic ischemia. The ventricular system and sulcal markings prominence is consistent with atrophy.  There is no acute depressed skull fracture.    There is bilateral mucoperiosteal thickening of the maxillary sinuses which show interval progression on the left.  There is also new mucoperiosteal thickening of sphenoid sinus and the right posterior aspect of the ethmoidal air cells.  Bilateral similar chronic appearing under pneumatization of the mastoid air cells with opacification.                                       CT Abdomen Pelvis With IV Contrast NO Oral Contrast (Final result)  Result time 03/23/24 17:09:14      Final result by Benjamín Haider MD (03/23/24 17:09:14)                   Impression:      1. New splenomegaly since November 20, 2023 exam.  Please correlate clinically..    2. Otherwise, no significant interval change or acute findings.      Electronically signed by: Benjamín Haider  Date:    03/23/2024  Time:    17:09               Narrative:    EXAMINATION:  CT ABDOMEN PELVIS WITH IV CONTRAST    CLINICAL HISTORY:  Abdominal pain, acute, nonlocalized;Sepsis;    TECHNIQUE:  Multidetector axial images were obtained  of the abdomen and pelvis following the administration of IV contrast. Oral contrast was not administered.    Dose length product of 701 mGycm. Automated exposure control was utilized to minimize radiation dose.    COMPARISON:  November 20, 2023.    FINDINGS:  Included portion of the lungs show dependent hypoventilatory changes without acute air space infiltrates or fluid within the pleural spaces.    Hepatic volume and attenuation is unremarkable. Gallbladder is surgically absent.  There is similar mild dilatation of the extrahepatic duct without calcified choledocholithiasis.  Pancreatic unremarkable attenuation without acute peripancreatic phlegmons. Main pancreatic duct is not dilated.  Spleen is enlarged in size with maximum diameter of 18.2 cm without focal space occupying lesion.  On the previous exam, splenic maximal diameter was within normal limits measuring 11.9 cm.    The adrenal glands appear within normal limits. The kidneys are unremarkable in size and contour. No solid or cystic renal lesion identified. There is no hydronephrosis. No perinephric fluid strandings or collections identified.    There is abdomen and pelvic subcutaneous anasarca edema which was also present on the previous exam.  Stomach is mostly decompressed.  No abnormal dilatation of loops of small bowel and there is no focal or generalized mural thickening.  Appendix is unremarkable.  Moderate colonic fecal loading without pericolonic acute strandings.  No bowel obstruction.  No free fluid.    Urinary bladder appears within normal limits.  There is similar appearance of the uterus.  Bilateral ovarian hypodense cystic structures are of lesser size compared to the previous exam.    There are degenerative changes which are more apparent at the level of L5-S1.                                       X-Ray Lumbar Spine 2 Or 3 Views (Final result)  Result time 03/23/24 13:49:22      Final result by Willow Florian MD (03/23/24 13:49:22)                    Impression:      1. No acute abnormality identified.  2. Multilevel degenerative changes of the lumbar spine.      Electronically signed by: Willow Florian  Date:    03/23/2024  Time:    13:49               Narrative:    EXAMINATION:  XR LUMBAR SPINE 2 OR 3 VIEWS    CLINICAL HISTORY:  Back pain;    COMPARISON:  None.    FINDINGS:  There are 5 non-rib-bearing lumbar type vertebral bodies.  There is grade 1 anterolisthesis of L4 over L5.  The vertebral body heights are maintained.  There are small multilevel marginal osteophytes.  There is moderate facet arthropathy in the lower lumbar spine.  The soft tissues are unremarkable.                                       X-Ray Pelvis Routine AP (Final result)  Result time 03/23/24 13:48:03      Final result by Willow Florian MD (03/23/24 13:48:03)                   Impression:      No displaced fracture identified.      Electronically signed by: Willow Florian  Date:    03/23/2024  Time:    13:48               Narrative:    EXAMINATION:  XR PELVIS ROUTINE AP    CLINICAL HISTORY:  bilateral hip pain;    COMPARISON:  None.    FINDINGS:  There is no displaced fracture identified.  The soft tissues are unremarkable.                                       Medications   sodium chloride 0.9% bolus 1,000 mL 1,000 mL (1,000 mLs Intravenous New Bag 3/23/24 1755)   vancomycin (VANCOCIN) 2,000 mg in dextrose 5 % (D5W) 500 mL IVPB (has no administration in time range)   vancomycin 1,500 mg in dextrose 5 % (D5W) 250 mL IVPB (Vial-Mate) (has no administration in time range)   ceFEPIme (MAXIPIME) 2 g in dextrose 5 % in water (D5W) 100 mL IVPB (MB+) (2 g Intravenous New Bag 3/23/24 1817)   clevidipine (CLEVIPREX) 50 mg/100 mL infusion (2 mg/hr Intravenous New Bag 3/23/24 1831)   morphine injection 4 mg (4 mg Intravenous Given 3/23/24 1314)   HYDROmorphone injection 1 mg (1 mg Intravenous Given 3/23/24 1450)   HYDROmorphone injection 0.5 mg (0.5 mg Intravenous  Given 3/23/24 1552)   iohexoL (OMNIPAQUE 350) 350 mg iodine/mL injection (100 mLs Intravenous Given 3/23/24 1615)   acetaminophen suppository 650 mg (650 mg Rectal Given 3/23/24 1815)     Medical Decision Making  Hypertensive, tachycardic otherwise vital signs are stable.  Started on IV morphine.  Lab workup shows a significantly elevated white count of 38, with 29% blasts, consistent with an acute blast crisis.  CMP unremarkable.  Plain films of the back and pelvis were negative for bony process.  No heme/onc inpatient service at our facility, will initiate transfer.  Patient requiring multiple doses of Dilaudid for pain.  Transfer center discussed with the patient was Dr. Santamaria, Heme/onc at [facility], who recommended transfer to [place] for higher level of care than [facility] could provide.  After discussion with the patients history and presentation, patient was accepted by Dr. Kev Llamas to [facility], unable to transfer the patient to [facility] due to being at capacity.     Amount and/or Complexity of Data Reviewed  Labs: ordered. Decision-making details documented in ED Course.  Radiology: ordered. Decision-making details documented in ED Course.    Risk  OTC drugs.  Prescription drug management.               ED Course as of 03/23/24 1839   Sat Mar 23, 2024   1807 After patient has been accepted, he was alerted by nursing staff that patient was had a mental status change.  Evaluated the patient at bedside, has been states for the last 30-45 minutes she was seemed to be more confused, she can say her name but otherwise unable to answer questions due to confusion and only following some commands.  Fingerstick glucose is 200.  She was now tachycardic with heart rate in the 130s, blood pressure still hypertensive.  Temperature was rechecked and she now has a fever of 101.7.  Broad-spectrum antibiotics has been ordered, IV fluids, as well as rectal Tylenol.  CT of the brain was also ordered. [AW]   1836  Radiology reports that there is a small subdural hematoma.  Patient was significantly hypertensive, will initiate Cleviprex.  Platelets are within normal limits. [AW]   1838 Transfer center was updated with findings, patient was then accepted by Dr. Sevilla to the neurocritical care ICU.  Patient is still awake able to answer some questions from  but confused, maintaining her airway. Will fly patient [AW]      ED Course User Index  [AW] Bobby Us MD                           Clinical Impression:  Final diagnoses:  [C92.10] Blast crisis phase of chronic myeloid leukemia (Primary)  [R00.0] Tachycardia  [A41.9] Sepsis, due to unspecified organism, unspecified whether acute organ dysfunction present  [S06.5XAA] Subdural hematoma  [A41.9] Sepsis          ED Disposition Condition    Transfer to Another Facility Stable                Bobby Us MD  03/23/24 1625       Bobby Us MD  03/23/24 1259       Bobby Us MD  03/23/24 1848

## 2024-03-23 NOTE — PROGRESS NOTES
"Pharmacokinetic Initial Assessment: IV Vancomycin    Assessment/Plan:    Initiate intravenous vancomycin with loading dose of 2000 mg once followed by a maintenance dose of vancomycin 1500mg IV every 12 hours  Desired empiric serum trough concentration is 15 to 20 mcg/mL  Draw vancomycin trough level 60 min prior to fourth dose on 3/25/24 at approximately 0700  Pharmacy will continue to follow and monitor vancomycin.      Please contact pharmacy at extension [phone] with any questions regarding this assessment.     Thank you for the consult,   Marely Salomon       Patient brief summary:  Fela Ellison is a 58 y.o. female initiated on antimicrobial therapy with IV Vancomycin for treatment of suspected sepsis    Drug Allergies:   Review of patient's allergies indicates:  No Known Allergies    Actual Body Weight:   93.6kg    Renal Function:   Estimated Creatinine Clearance: 91 mL/min (based on SCr of 0.75 mg/dL).,     CBC (last 72 hours):  Recent Labs   Lab Result Units 03/23/24  1244   WBC x10(3)/mcL 39.86*   Hgb g/dL 10.1*   Hct % 34.5*   Platelet x10(3)/mcL 159   Monocytes % % 28*       Metabolic Panel (last 72 hours):  Recent Labs   Lab Result Units 03/23/24  1244 03/23/24  1311   Sodium Level mmol/L 138  --    Potassium Level mmol/L 3.8  --    Chloride mmol/L 103  --    Carbon Dioxide mmol/L 24  --    Glucose Level mg/dL 228*  --    Glucose, UA   --  Normal   Blood Urea Nitrogen mg/dL 10.7  --    Creatinine mg/dL 0.75  --    Albumin Level g/dL 3.0*  --    Bilirubin Total mg/dL 0.4  --    Alkaline Phosphatase unit/L 65  --    Aspartate Aminotransferase unit/L 13  --    Alanine Aminotransferase unit/L 8  --    Magnesium Level mg/dL 1.60  --        Drug levels (last 3 results):  No results for input(s): "VANCOMYCINRA", "VANCORANDOM", "VANCOMYCINPE", "VANCOPEAK", "VANCOMYCINTR", "VANCOTROUGH" in the last 72 hours.    Microbiologic Results:  Microbiology Results (last 7 days)       Procedure Component Value " Units Date/Time    Blood culture #1 **CANNOT BE ORDERED STAT** [[id number]] Collected: 03/23/24 1531    Order Status: Resulted Specimen: Blood from Hand, Left Updated: 03/23/24 1536    Blood culture #2 **CANNOT BE ORDERED STAT** [[id number]] Collected: 03/23/24 1531    Order Status: Resulted Specimen: Blood from Arm, Right Updated: 03/23/24 1536    Urine culture [[id number]] Collected: 03/23/24 1311    Order Status: Sent Specimen: Urine Updated: 03/23/24 1327

## 2024-03-23 NOTE — ED NOTES
Spoke with [facility] transfer center. Given number for report, [phone]. Pt is going to room 9084 at [facility] in [place]. [facility] stated they are calling for the helicopter to transport pt to [place].

## 2024-03-24 PROBLEM — E78.2 MIXED HYPERLIPIDEMIA: Status: ACTIVE | Noted: 2024-03-24

## 2024-03-24 PROBLEM — E66.9 OBESITY, CLASS II, BMI 35-39.9: Chronic | Status: ACTIVE | Noted: 2020-10-26

## 2024-03-24 PROBLEM — E78.1 PURE HYPERTRIGLYCERIDEMIA: Status: ACTIVE | Noted: 2024-03-24

## 2024-03-24 PROBLEM — B35.4 TINEA CORPORIS: Status: ACTIVE | Noted: 2023-05-05

## 2024-03-24 PROBLEM — U07.1 COVID: Status: ACTIVE | Noted: 2024-03-24

## 2024-03-24 PROBLEM — I10 ESSENTIAL HYPERTENSION: Status: ACTIVE | Noted: 2020-10-25

## 2024-03-24 PROBLEM — S06.5XAA SUBDURAL HEMATOMA: Status: ACTIVE | Noted: 2024-03-24

## 2024-03-24 PROBLEM — J96.01 SEPSIS WITH ACUTE HYPOXIC RESPIRATORY FAILURE WITHOUT SEPTIC SHOCK: Status: ACTIVE | Noted: 2024-03-24

## 2024-03-24 PROBLEM — A41.9 SEPSIS WITH ACUTE HYPOXIC RESPIRATORY FAILURE WITHOUT SEPTIC SHOCK: Status: ACTIVE | Noted: 2024-03-24

## 2024-03-24 PROBLEM — G89.3 CANCER ASSOCIATED PAIN: Status: ACTIVE | Noted: 2024-02-11

## 2024-03-24 PROBLEM — C92.10 BLAST CRISIS PHASE OF CHRONIC MYELOID LEUKEMIA: Status: ACTIVE | Noted: 2022-05-09

## 2024-03-24 PROBLEM — J96.01 ACUTE RESPIRATORY FAILURE WITH HYPOXIA: Status: ACTIVE | Noted: 2020-10-25

## 2024-03-24 PROBLEM — K21.9 GERD (GASTROESOPHAGEAL REFLUX DISEASE): Status: ACTIVE | Noted: 2024-03-24

## 2024-03-24 PROBLEM — E11.40 TYPE 2 DIABETES MELLITUS WITH DIABETIC NEUROPATHY, WITH LONG-TERM CURRENT USE OF INSULIN: Status: ACTIVE | Noted: 2020-10-25

## 2024-03-24 PROBLEM — C92.00 ACUTE MYELOID LEUKEMIA NOT HAVING ACHIEVED REMISSION: Status: ACTIVE | Noted: 2022-04-05

## 2024-03-24 PROBLEM — R65.20 SEPSIS WITH ACUTE HYPOXIC RESPIRATORY FAILURE WITHOUT SEPTIC SHOCK: Status: ACTIVE | Noted: 2024-03-24

## 2024-03-24 PROBLEM — Z79.4 TYPE 2 DIABETES MELLITUS WITH DIABETIC NEUROPATHY, WITH LONG-TERM CURRENT USE OF INSULIN: Status: ACTIVE | Noted: 2020-10-25

## 2024-03-24 PROBLEM — J18.9 PNEUMONIA OF RIGHT LOWER LOBE DUE TO INFECTIOUS ORGANISM: Status: ACTIVE | Noted: 2024-03-24

## 2024-03-24 LAB
ALBUMIN SERPL BCP-MCNC: 2.9 G/DL (ref 3.5–5.2)
ALBUMIN SERPL BCP-MCNC: 3.1 G/DL (ref 3.5–5.2)
ALP SERPL-CCNC: 71 U/L (ref 55–135)
ALP SERPL-CCNC: 71 U/L (ref 55–135)
ALT SERPL W/O P-5'-P-CCNC: 10 U/L (ref 10–44)
ALT SERPL W/O P-5'-P-CCNC: 9 U/L (ref 10–44)
ANION GAP SERPL CALC-SCNC: 10 MMOL/L (ref 8–16)
ANION GAP SERPL CALC-SCNC: 11 MMOL/L (ref 8–16)
ANISOCYTOSIS BLD QL SMEAR: ABNORMAL
ANISOCYTOSIS BLD QL SMEAR: ABNORMAL
APTT PPP: 27.5 SEC (ref 21–32)
AST SERPL-CCNC: 18 U/L (ref 10–40)
AST SERPL-CCNC: 21 U/L (ref 10–40)
BACTERIA #/AREA URNS AUTO: NORMAL /HPF
BASOPHILS # BLD AUTO: ABNORMAL K/UL (ref 0–0.2)
BASOPHILS # BLD AUTO: ABNORMAL K/UL (ref 0–0.2)
BASOPHILS NFR BLD: 0 % (ref 0–1.9)
BASOPHILS NFR BLD: 0 % (ref 0–1.9)
BILIRUB SERPL-MCNC: 0.6 MG/DL (ref 0.1–1)
BILIRUB SERPL-MCNC: 0.6 MG/DL (ref 0.1–1)
BILIRUB UR QL STRIP: NEGATIVE
BUN SERPL-MCNC: 13 MG/DL (ref 6–20)
BUN SERPL-MCNC: 9 MG/DL (ref 6–20)
BURR CELLS BLD QL SMEAR: ABNORMAL
CALCIUM SERPL-MCNC: 9.2 MG/DL (ref 8.7–10.5)
CALCIUM SERPL-MCNC: 9.7 MG/DL (ref 8.7–10.5)
CHLORIDE SERPL-SCNC: 102 MMOL/L (ref 95–110)
CHLORIDE SERPL-SCNC: 107 MMOL/L (ref 95–110)
CHOLEST SERPL-MCNC: 101 MG/DL (ref 120–199)
CHOLEST/HDLC SERPL: 6.3 {RATIO} (ref 2–5)
CLARITY UR REFRACT.AUTO: CLEAR
CO2 SERPL-SCNC: 21 MMOL/L (ref 23–29)
CO2 SERPL-SCNC: 23 MMOL/L (ref 23–29)
COLOR UR AUTO: YELLOW
CREAT SERPL-MCNC: 0.8 MG/DL (ref 0.5–1.4)
CREAT SERPL-MCNC: 0.8 MG/DL (ref 0.5–1.4)
DACRYOCYTES BLD QL SMEAR: ABNORMAL
DIFFERENTIAL METHOD BLD: ABNORMAL
DIFFERENTIAL METHOD BLD: ABNORMAL
EOSINOPHIL # BLD AUTO: ABNORMAL K/UL (ref 0–0.5)
EOSINOPHIL # BLD AUTO: ABNORMAL K/UL (ref 0–0.5)
EOSINOPHIL NFR BLD: 0 % (ref 0–8)
EOSINOPHIL NFR BLD: 0 % (ref 0–8)
ERYTHROCYTE [DISTWIDTH] IN BLOOD BY AUTOMATED COUNT: 19.8 % (ref 11.5–14.5)
ERYTHROCYTE [DISTWIDTH] IN BLOOD BY AUTOMATED COUNT: 20.2 % (ref 11.5–14.5)
ERYTHROCYTE [SEDIMENTATION RATE] IN BLOOD BY PHOTOMETRIC METHOD: 52 MM/HR (ref 0–36)
EST. GFR  (NO RACE VARIABLE): >60 ML/MIN/1.73 M^2
EST. GFR  (NO RACE VARIABLE): >60 ML/MIN/1.73 M^2
ESTIMATED AVG GLUCOSE: 131 MG/DL (ref 68–131)
GIANT PLATELETS BLD QL SMEAR: PRESENT
GLUCOSE SERPL-MCNC: 302 MG/DL (ref 70–110)
GLUCOSE SERPL-MCNC: 318 MG/DL (ref 70–110)
GLUCOSE UR QL STRIP: NEGATIVE
HBA1C MFR BLD: 6.2 % (ref 4–5.6)
HCT VFR BLD AUTO: 36.3 % (ref 37–48.5)
HCT VFR BLD AUTO: 37 % (ref 37–48.5)
HDLC SERPL-MCNC: 16 MG/DL (ref 40–75)
HDLC SERPL: 15.8 % (ref 20–50)
HGB BLD-MCNC: 10.6 G/DL (ref 12–16)
HGB BLD-MCNC: 10.8 G/DL (ref 12–16)
HGB UR QL STRIP: NEGATIVE
HYALINE CASTS UR QL AUTO: 0 /LPF
HYPOCHROMIA BLD QL SMEAR: ABNORMAL
HYPOCHROMIA BLD QL SMEAR: ABNORMAL
IMM GRANULOCYTES # BLD AUTO: ABNORMAL K/UL (ref 0–0.04)
IMM GRANULOCYTES # BLD AUTO: ABNORMAL K/UL (ref 0–0.04)
IMM GRANULOCYTES NFR BLD AUTO: ABNORMAL % (ref 0–0.5)
IMM GRANULOCYTES NFR BLD AUTO: ABNORMAL % (ref 0–0.5)
INR PPP: 1.1 (ref 0.8–1.2)
KETONES UR QL STRIP: NEGATIVE
LDLC SERPL CALC-MCNC: 23.4 MG/DL (ref 63–159)
LEUKOCYTE ESTERASE UR QL STRIP: ABNORMAL
LYMPHOCYTES # BLD AUTO: ABNORMAL K/UL (ref 1–4.8)
LYMPHOCYTES # BLD AUTO: ABNORMAL K/UL (ref 1–4.8)
LYMPHOCYTES NFR BLD: 14 % (ref 18–48)
LYMPHOCYTES NFR BLD: 18 % (ref 18–48)
MAGNESIUM SERPL-MCNC: 1.7 MG/DL (ref 1.6–2.6)
MAGNESIUM SERPL-MCNC: 1.8 MG/DL (ref 1.6–2.6)
MCH RBC QN AUTO: 24.2 PG (ref 27–31)
MCH RBC QN AUTO: 24.9 PG (ref 27–31)
MCHC RBC AUTO-ENTMCNC: 29.2 G/DL (ref 32–36)
MCHC RBC AUTO-ENTMCNC: 29.2 G/DL (ref 32–36)
MCV RBC AUTO: 83 FL (ref 82–98)
MCV RBC AUTO: 85 FL (ref 82–98)
METAMYELOCYTES NFR BLD MANUAL: 2 %
METAMYELOCYTES NFR BLD MANUAL: 3.5 %
MICROSCOPIC COMMENT: NORMAL
MONOCYTES # BLD AUTO: ABNORMAL K/UL (ref 0.3–1)
MONOCYTES # BLD AUTO: ABNORMAL K/UL (ref 0.3–1)
MONOCYTES NFR BLD: 10 % (ref 4–15)
MONOCYTES NFR BLD: 8.5 % (ref 4–15)
MYELOCYTES NFR BLD MANUAL: 1 %
MYELOCYTES NFR BLD MANUAL: 1 %
NEUTROPHILS NFR BLD: 31 % (ref 38–73)
NEUTROPHILS NFR BLD: 36.5 % (ref 38–73)
NEUTS BAND NFR BLD MANUAL: 3 %
NEUTS BAND NFR BLD MANUAL: 3.5 %
NITRITE UR QL STRIP: NEGATIVE
NONHDLC SERPL-MCNC: 85 MG/DL
NRBC BLD-RTO: 3 /100 WBC
NRBC BLD-RTO: 3 /100 WBC
OVALOCYTES BLD QL SMEAR: ABNORMAL
OVALOCYTES BLD QL SMEAR: ABNORMAL
PH UR STRIP: 5 [PH] (ref 5–8)
PHOSPHATE SERPL-MCNC: 4.1 MG/DL (ref 2.7–4.5)
PLATELET # BLD AUTO: 195 K/UL (ref 150–450)
PLATELET # BLD AUTO: 232 K/UL (ref 150–450)
PLATELET BLD QL SMEAR: ABNORMAL
PLATELET BLD QL SMEAR: ABNORMAL
PMV BLD AUTO: ABNORMAL FL (ref 9.2–12.9)
PMV BLD AUTO: ABNORMAL FL (ref 9.2–12.9)
POCT GLUCOSE: 246 MG/DL (ref 70–110)
POCT GLUCOSE: 266 MG/DL (ref 70–110)
POCT GLUCOSE: 285 MG/DL (ref 70–110)
POCT GLUCOSE: 290 MG/DL (ref 70–110)
POCT GLUCOSE: 293 MG/DL (ref 70–110)
POCT GLUCOSE: 298 MG/DL (ref 70–110)
POCT GLUCOSE: 302 MG/DL (ref 70–110)
POCT GLUCOSE: 302 MG/DL (ref 70–110)
POCT GLUCOSE: 306 MG/DL (ref 70–110)
POCT GLUCOSE: 307 MG/DL (ref 70–110)
POCT GLUCOSE: 316 MG/DL (ref 70–110)
POCT GLUCOSE: 368 MG/DL (ref 70–110)
POIKILOCYTOSIS BLD QL SMEAR: ABNORMAL
POIKILOCYTOSIS BLD QL SMEAR: SLIGHT
POLYCHROMASIA BLD QL SMEAR: ABNORMAL
POLYCHROMASIA BLD QL SMEAR: ABNORMAL
POTASSIUM SERPL-SCNC: 3.5 MMOL/L (ref 3.5–5.1)
POTASSIUM SERPL-SCNC: 3.8 MMOL/L (ref 3.5–5.1)
PROCALCITONIN SERPL IA-MCNC: 0.42 NG/ML
PROMYELOCYTES NFR BLD MANUAL: 1 %
PROT SERPL-MCNC: 6.4 G/DL (ref 6–8.4)
PROT SERPL-MCNC: 6.5 G/DL (ref 6–8.4)
PROT UR QL STRIP: ABNORMAL
PROTHROMBIN TIME: 12.1 SEC (ref 9–12.5)
RBC # BLD AUTO: 4.26 M/UL (ref 4–5.4)
RBC # BLD AUTO: 4.46 M/UL (ref 4–5.4)
RBC #/AREA URNS AUTO: 0 /HPF (ref 0–4)
SARS-COV-2 RDRP RESP QL NAA+PROBE: POSITIVE
SCHISTOCYTES BLD QL SMEAR: ABNORMAL
SCHISTOCYTES BLD QL SMEAR: ABNORMAL
SCHISTOCYTES BLD QL SMEAR: PRESENT
SCHISTOCYTES BLD QL SMEAR: PRESENT
SMUDGE CELLS BLD QL SMEAR: PRESENT
SODIUM SERPL-SCNC: 136 MMOL/L (ref 136–145)
SODIUM SERPL-SCNC: 138 MMOL/L (ref 136–145)
SP GR UR STRIP: 1.02 (ref 1–1.03)
SPHEROCYTES BLD QL SMEAR: ABNORMAL
SPHEROCYTES BLD QL SMEAR: ABNORMAL
TRIGL SERPL-MCNC: 308 MG/DL (ref 30–150)
TSH SERPL DL<=0.005 MIU/L-ACNC: 1.43 UIU/ML (ref 0.4–4)
URN SPEC COLLECT METH UR: ABNORMAL
WBC # BLD AUTO: 53.9 K/UL (ref 3.9–12.7)
WBC # BLD AUTO: 57.16 K/UL (ref 3.9–12.7)
WBC #/AREA URNS AUTO: 2 /HPF (ref 0–5)
WBC OTHER NFR BLD MANUAL: 33 %
WBC OTHER NFR BLD MANUAL: 34 %

## 2024-03-24 PROCEDURE — 25000003 PHARM REV CODE 250: Performed by: INTERNAL MEDICINE

## 2024-03-24 PROCEDURE — 63600175 PHARM REV CODE 636 W HCPCS: Performed by: PHYSICIAN ASSISTANT

## 2024-03-24 PROCEDURE — 85027 COMPLETE CBC AUTOMATED: CPT | Performed by: PHYSICIAN ASSISTANT

## 2024-03-24 PROCEDURE — 84100 ASSAY OF PHOSPHORUS: CPT | Performed by: PHYSICIAN ASSISTANT

## 2024-03-24 PROCEDURE — 97530 THERAPEUTIC ACTIVITIES: CPT

## 2024-03-24 PROCEDURE — 85007 BL SMEAR W/DIFF WBC COUNT: CPT | Performed by: PHYSICIAN ASSISTANT

## 2024-03-24 PROCEDURE — 99223 1ST HOSP IP/OBS HIGH 75: CPT | Mod: ,,, | Performed by: NEUROLOGICAL SURGERY

## 2024-03-24 PROCEDURE — U0002 COVID-19 LAB TEST NON-CDC: HCPCS | Performed by: STUDENT IN AN ORGANIZED HEALTH CARE EDUCATION/TRAINING PROGRAM

## 2024-03-24 PROCEDURE — 81001 URINALYSIS AUTO W/SCOPE: CPT | Performed by: PHYSICIAN ASSISTANT

## 2024-03-24 PROCEDURE — 92610 EVALUATE SWALLOWING FUNCTION: CPT

## 2024-03-24 PROCEDURE — 27000207 HC ISOLATION

## 2024-03-24 PROCEDURE — 63600175 PHARM REV CODE 636 W HCPCS: Performed by: STUDENT IN AN ORGANIZED HEALTH CARE EDUCATION/TRAINING PROGRAM

## 2024-03-24 PROCEDURE — 85007 BL SMEAR W/DIFF WBC COUNT: CPT | Mod: 91 | Performed by: INTERNAL MEDICINE

## 2024-03-24 PROCEDURE — 63600175 PHARM REV CODE 636 W HCPCS: Performed by: INTERNAL MEDICINE

## 2024-03-24 PROCEDURE — 94761 N-INVAS EAR/PLS OXIMETRY MLT: CPT

## 2024-03-24 PROCEDURE — 63600175 PHARM REV CODE 636 W HCPCS

## 2024-03-24 PROCEDURE — 80053 COMPREHEN METABOLIC PANEL: CPT | Mod: 91 | Performed by: INTERNAL MEDICINE

## 2024-03-24 PROCEDURE — 36415 COLL VENOUS BLD VENIPUNCTURE: CPT | Performed by: INTERNAL MEDICINE

## 2024-03-24 PROCEDURE — 80061 LIPID PANEL: CPT | Performed by: PHYSICIAN ASSISTANT

## 2024-03-24 PROCEDURE — 80053 COMPREHEN METABOLIC PANEL: CPT | Performed by: PHYSICIAN ASSISTANT

## 2024-03-24 PROCEDURE — 85730 THROMBOPLASTIN TIME PARTIAL: CPT | Performed by: PHYSICIAN ASSISTANT

## 2024-03-24 PROCEDURE — 85027 COMPLETE CBC AUTOMATED: CPT | Mod: 91 | Performed by: INTERNAL MEDICINE

## 2024-03-24 PROCEDURE — 83735 ASSAY OF MAGNESIUM: CPT | Mod: 91 | Performed by: INTERNAL MEDICINE

## 2024-03-24 PROCEDURE — 84145 PROCALCITONIN (PCT): CPT | Performed by: PHYSICIAN ASSISTANT

## 2024-03-24 PROCEDURE — 25000003 PHARM REV CODE 250: Performed by: STUDENT IN AN ORGANIZED HEALTH CARE EDUCATION/TRAINING PROGRAM

## 2024-03-24 PROCEDURE — 84443 ASSAY THYROID STIM HORMONE: CPT | Performed by: PHYSICIAN ASSISTANT

## 2024-03-24 PROCEDURE — 83036 HEMOGLOBIN GLYCOSYLATED A1C: CPT | Performed by: PHYSICIAN ASSISTANT

## 2024-03-24 PROCEDURE — 93005 ELECTROCARDIOGRAM TRACING: CPT

## 2024-03-24 PROCEDURE — 20600001 HC STEP DOWN PRIVATE ROOM

## 2024-03-24 PROCEDURE — 83735 ASSAY OF MAGNESIUM: CPT | Performed by: PHYSICIAN ASSISTANT

## 2024-03-24 PROCEDURE — 25000003 PHARM REV CODE 250

## 2024-03-24 PROCEDURE — 99291 CRITICAL CARE FIRST HOUR: CPT | Mod: ,,, | Performed by: INTERNAL MEDICINE

## 2024-03-24 PROCEDURE — 97166 OT EVAL MOD COMPLEX 45 MIN: CPT

## 2024-03-24 PROCEDURE — 85652 RBC SED RATE AUTOMATED: CPT | Performed by: PHYSICIAN ASSISTANT

## 2024-03-24 PROCEDURE — 25000003 PHARM REV CODE 250: Performed by: PHYSICIAN ASSISTANT

## 2024-03-24 PROCEDURE — 99233 SBSQ HOSP IP/OBS HIGH 50: CPT | Mod: ,,, | Performed by: INTERNAL MEDICINE

## 2024-03-24 PROCEDURE — 85610 PROTHROMBIN TIME: CPT | Performed by: PHYSICIAN ASSISTANT

## 2024-03-24 PROCEDURE — 27000221 HC OXYGEN, UP TO 24 HOURS

## 2024-03-24 PROCEDURE — 97112 NEUROMUSCULAR REEDUCATION: CPT

## 2024-03-24 PROCEDURE — 93010 ELECTROCARDIOGRAM REPORT: CPT | Mod: ,,, | Performed by: INTERNAL MEDICINE

## 2024-03-24 PROCEDURE — 99900035 HC TECH TIME PER 15 MIN (STAT)

## 2024-03-24 PROCEDURE — 97535 SELF CARE MNGMENT TRAINING: CPT

## 2024-03-24 PROCEDURE — 97163 PT EVAL HIGH COMPLEX 45 MIN: CPT

## 2024-03-24 PROCEDURE — 25000003 PHARM REV CODE 250: Performed by: NURSE PRACTITIONER

## 2024-03-24 RX ORDER — HYDROXYUREA 500 MG/1
40 CAPSULE ORAL DAILY
Status: DISCONTINUED | OUTPATIENT
Start: 2024-03-24 | End: 2024-03-24

## 2024-03-24 RX ORDER — SCOLOPAMINE TRANSDERMAL SYSTEM 1 MG/1
1 PATCH, EXTENDED RELEASE TRANSDERMAL
Status: DISCONTINUED | OUTPATIENT
Start: 2024-03-24 | End: 2024-03-30

## 2024-03-24 RX ORDER — FLUCONAZOLE 40 MG/ML
400 POWDER, FOR SUSPENSION ORAL DAILY
Status: DISCONTINUED | OUTPATIENT
Start: 2024-03-24 | End: 2024-03-25

## 2024-03-24 RX ORDER — SODIUM CHLORIDE 9 MG/ML
INJECTION, SOLUTION INTRAVENOUS CONTINUOUS
Status: DISCONTINUED | OUTPATIENT
Start: 2024-03-24 | End: 2024-03-26

## 2024-03-24 RX ORDER — INSULIN ASPART 100 [IU]/ML
0-10 INJECTION, SOLUTION INTRAVENOUS; SUBCUTANEOUS EVERY 6 HOURS PRN
Status: DISCONTINUED | OUTPATIENT
Start: 2024-03-24 | End: 2024-03-25

## 2024-03-24 RX ORDER — ACETAMINOPHEN 10 MG/ML
1000 INJECTION, SOLUTION INTRAVENOUS ONCE
Status: COMPLETED | OUTPATIENT
Start: 2024-03-24 | End: 2024-03-24

## 2024-03-24 RX ORDER — ACETAMINOPHEN 325 MG/1
650 TABLET ORAL EVERY 6 HOURS PRN
Status: DISCONTINUED | OUTPATIENT
Start: 2024-03-24 | End: 2024-03-29

## 2024-03-24 RX ORDER — INSULIN ASPART 100 [IU]/ML
10 INJECTION, SOLUTION INTRAVENOUS; SUBCUTANEOUS 3 TIMES DAILY
Status: DISCONTINUED | OUTPATIENT
Start: 2024-03-24 | End: 2024-03-25

## 2024-03-24 RX ORDER — MUPIROCIN 20 MG/G
OINTMENT TOPICAL 2 TIMES DAILY
Status: COMPLETED | OUTPATIENT
Start: 2024-03-24 | End: 2024-03-29

## 2024-03-24 RX ORDER — INSULIN ASPART 100 [IU]/ML
10 INJECTION, SOLUTION INTRAVENOUS; SUBCUTANEOUS ONCE
Status: COMPLETED | OUTPATIENT
Start: 2024-03-24 | End: 2024-03-24

## 2024-03-24 RX ORDER — TRIAMCINOLONE ACETONIDE 1 MG/G
CREAM TOPICAL 2 TIMES DAILY
Status: DISCONTINUED | OUTPATIENT
Start: 2024-03-24 | End: 2024-03-30

## 2024-03-24 RX ORDER — INSULIN ASPART 100 [IU]/ML
10 INJECTION, SOLUTION INTRAVENOUS; SUBCUTANEOUS 3 TIMES DAILY
Status: DISCONTINUED | OUTPATIENT
Start: 2024-03-25 | End: 2024-03-24

## 2024-03-24 RX ADMIN — FAMOTIDINE 20 MG: 20 TABLET ORAL at 10:03

## 2024-03-24 RX ADMIN — LABETALOL HYDROCHLORIDE 10 MG: 5 INJECTION, SOLUTION INTRAVENOUS at 05:03

## 2024-03-24 RX ADMIN — GABAPENTIN 800 MG: 400 CAPSULE ORAL at 10:03

## 2024-03-24 RX ADMIN — VANCOMYCIN HYDROCHLORIDE 2000 MG: 100 INJECTION, POWDER, LYOPHILIZED, FOR SOLUTION INTRAVENOUS at 03:03

## 2024-03-24 RX ADMIN — SODIUM CHLORIDE: 9 INJECTION, SOLUTION INTRAVENOUS at 03:03

## 2024-03-24 RX ADMIN — REMDESIVIR 200 MG: 100 INJECTION, POWDER, LYOPHILIZED, FOR SOLUTION INTRAVENOUS at 09:03

## 2024-03-24 RX ADMIN — HYDROMORPHONE HYDROCHLORIDE 1 MG: 1 INJECTION, SOLUTION INTRAMUSCULAR; INTRAVENOUS; SUBCUTANEOUS at 07:03

## 2024-03-24 RX ADMIN — TRIAMCINOLONE ACETONIDE: 1 CREAM TOPICAL at 10:03

## 2024-03-24 RX ADMIN — HYDRALAZINE HYDROCHLORIDE 10 MG: 20 INJECTION, SOLUTION INTRAMUSCULAR; INTRAVENOUS at 06:03

## 2024-03-24 RX ADMIN — INSULIN DETEMIR 15 UNITS: 100 INJECTION, SOLUTION SUBCUTANEOUS at 10:03

## 2024-03-24 RX ADMIN — CEFEPIME 2 G: 2 INJECTION, POWDER, FOR SOLUTION INTRAVENOUS at 03:03

## 2024-03-24 RX ADMIN — ACYCLOVIR 400 MG: 200 CAPSULE ORAL at 02:03

## 2024-03-24 RX ADMIN — INSULIN DETEMIR 15 UNITS: 100 INJECTION, SOLUTION SUBCUTANEOUS at 12:03

## 2024-03-24 RX ADMIN — LEVETIRACETAM 500 MG: 100 INJECTION INTRAVENOUS at 12:03

## 2024-03-24 RX ADMIN — POTASSIUM CHLORIDE 40 MEQ: 7.46 INJECTION, SOLUTION INTRAVENOUS at 03:03

## 2024-03-24 RX ADMIN — ONDANSETRON 4 MG: 2 INJECTION INTRAMUSCULAR; INTRAVENOUS at 04:03

## 2024-03-24 RX ADMIN — MAGNESIUM SULFATE HEPTAHYDRATE 2 G: 40 INJECTION, SOLUTION INTRAVENOUS at 03:03

## 2024-03-24 RX ADMIN — ATORVASTATIN CALCIUM 40 MG: 40 TABLET, FILM COATED ORAL at 08:03

## 2024-03-24 RX ADMIN — LEVETIRACETAM 500 MG: 100 INJECTION INTRAVENOUS at 09:03

## 2024-03-24 RX ADMIN — ACETAMINOPHEN 1000 MG: 10 INJECTION, SOLUTION INTRAVENOUS at 08:03

## 2024-03-24 RX ADMIN — FLUCONAZOLE 400 MG: 40 POWDER, FOR SUSPENSION ORAL at 09:03

## 2024-03-24 RX ADMIN — GABAPENTIN 800 MG: 400 CAPSULE ORAL at 02:03

## 2024-03-24 RX ADMIN — VANCOMYCIN HYDROCHLORIDE 1250 MG: 1.25 INJECTION, POWDER, LYOPHILIZED, FOR SOLUTION INTRAVENOUS at 03:03

## 2024-03-24 RX ADMIN — GABAPENTIN 800 MG: 400 CAPSULE ORAL at 08:03

## 2024-03-24 RX ADMIN — ACYCLOVIR 400 MG: 200 CAPSULE ORAL at 10:03

## 2024-03-24 RX ADMIN — CEFEPIME 2 G: 2 INJECTION, POWDER, FOR SOLUTION INTRAVENOUS at 10:03

## 2024-03-24 RX ADMIN — HYDROXYUREA 3500 MG: 500 CAPSULE ORAL at 10:03

## 2024-03-24 RX ADMIN — LEVETIRACETAM 500 MG: 100 INJECTION INTRAVENOUS at 10:03

## 2024-03-24 RX ADMIN — INSULIN ASPART 8 UNITS: 100 INJECTION, SOLUTION INTRAVENOUS; SUBCUTANEOUS at 12:03

## 2024-03-24 RX ADMIN — FAMOTIDINE 20 MG: 20 TABLET ORAL at 02:03

## 2024-03-24 RX ADMIN — ALLOPURINOL 300 MG: 100 TABLET ORAL at 08:03

## 2024-03-24 RX ADMIN — TRIAMCINOLONE ACETONIDE: 1 CREAM TOPICAL at 09:03

## 2024-03-24 RX ADMIN — MONTELUKAST 10 MG: 10 TABLET, FILM COATED ORAL at 08:03

## 2024-03-24 RX ADMIN — FAMOTIDINE 20 MG: 20 TABLET ORAL at 08:03

## 2024-03-24 RX ADMIN — HYDROMORPHONE HYDROCHLORIDE 1 MG: 1 INJECTION, SOLUTION INTRAMUSCULAR; INTRAVENOUS; SUBCUTANEOUS at 03:03

## 2024-03-24 RX ADMIN — DEXAMETHASONE SODIUM PHOSPHATE 6 MG: 4 INJECTION INTRA-ARTICULAR; INTRALESIONAL; INTRAMUSCULAR; INTRAVENOUS; SOFT TISSUE at 10:03

## 2024-03-24 RX ADMIN — CEFEPIME 2 G: 2 INJECTION, POWDER, FOR SOLUTION INTRAVENOUS at 06:03

## 2024-03-24 RX ADMIN — LEVOFLOXACIN 500 MG: 500 TABLET, FILM COATED ORAL at 02:03

## 2024-03-24 RX ADMIN — PROCHLORPERAZINE EDISYLATE 5 MG: 5 INJECTION INTRAMUSCULAR; INTRAVENOUS at 10:03

## 2024-03-24 RX ADMIN — METOPROLOL TARTRATE 25 MG: 25 TABLET, FILM COATED ORAL at 02:03

## 2024-03-24 RX ADMIN — ONDANSETRON 4 MG: 2 INJECTION INTRAMUSCULAR; INTRAVENOUS at 07:03

## 2024-03-24 RX ADMIN — METOPROLOL TARTRATE 25 MG: 25 TABLET, FILM COATED ORAL at 08:03

## 2024-03-24 RX ADMIN — INSULIN ASPART 10 UNITS: 100 INJECTION, SOLUTION INTRAVENOUS; SUBCUTANEOUS at 11:03

## 2024-03-24 RX ADMIN — SODIUM CHLORIDE: 9 INJECTION, SOLUTION INTRAVENOUS at 04:03

## 2024-03-24 RX ADMIN — OXYCODONE HYDROCHLORIDE 10 MG: 10 TABLET ORAL at 06:03

## 2024-03-24 RX ADMIN — HYDROMORPHONE HYDROCHLORIDE 1 MG: 1 INJECTION, SOLUTION INTRAMUSCULAR; INTRAVENOUS; SUBCUTANEOUS at 01:03

## 2024-03-24 RX ADMIN — ACYCLOVIR 400 MG: 200 CAPSULE ORAL at 08:03

## 2024-03-24 RX ADMIN — SCOPALAMINE 1 PATCH: 1 PATCH, EXTENDED RELEASE TRANSDERMAL at 11:03

## 2024-03-24 RX ADMIN — MUPIROCIN: 20 OINTMENT TOPICAL at 10:03

## 2024-03-24 RX ADMIN — INSULIN ASPART 3 UNITS: 100 INJECTION, SOLUTION INTRAVENOUS; SUBCUTANEOUS at 03:03

## 2024-03-24 RX ADMIN — INSULIN ASPART 10 UNITS: 100 INJECTION, SOLUTION INTRAVENOUS; SUBCUTANEOUS at 02:03

## 2024-03-24 RX ADMIN — METOPROLOL TARTRATE 25 MG: 25 TABLET, FILM COATED ORAL at 10:03

## 2024-03-24 RX ADMIN — INSULIN HUMAN 3 UNITS/HR: 1 INJECTION, SOLUTION INTRAVENOUS at 07:03

## 2024-03-24 RX ADMIN — ACETAMINOPHEN 650 MG: 325 TABLET ORAL at 06:03

## 2024-03-24 RX ADMIN — INSULIN ASPART 8 UNITS: 100 INJECTION, SOLUTION INTRAVENOUS; SUBCUTANEOUS at 05:03

## 2024-03-24 RX ADMIN — AMLODIPINE BESYLATE 10 MG: 10 TABLET ORAL at 08:03

## 2024-03-24 NOTE — ASSESSMENT & PLAN NOTE
- C/w home meds:   - Amlodipine 10mg QD   - Metoprolol 25mg BID  - Home Lasix held, unclear if taking  - SBP goal <160  - C/w IV PRN hydralazine and labetalol

## 2024-03-24 NOTE — ASSESSMENT & PLAN NOTE
Likely 2/2 aspiration pneumonia  COVID, Flu, Strep negative  Cultures pending    - Wean supplemental O2 as tolerated

## 2024-03-24 NOTE — PLAN OF CARE
Problem: SLP  Goal: SLP Goal  Description: Speech Language Pathology Goals  Goals expected to be met by 3/31  1. Ongoing swallow assessment  2. SLP Speech/Language/Cognitive Evaluation    Outcome: Ongoing, Not Progressing    SLP Clinical Swallow Evaluation completed. See note for details.

## 2024-03-24 NOTE — PLAN OF CARE
[facility] Care Plan    POC reviewed with Fela Ellison and family at 0300. Pt verbalized understanding. Questions and concerns addressed. Pt progressing toward goals. See below and flowsheets for full assessment and VS info.     - CT Head completed  - Pan Cultured  - NG Tube placed (failed steele)   - SLP consult placed   - NSGY Consulted   - Oncology Consulted  - 0.9% NaCl Started   - Plan to start Insulin gtt       Is this a stroke patient? no    Neuro:  Jose Coma Scale  Best Eye Response: 4-->(E4) spontaneous  Best Motor Response: 6-->(M6) obeys commands  Best Verbal Response: 5-->(V5) oriented  Jose Coma Scale Score: 15  Pupil PERRLA: yes     24hr Temp:  [97.8 °F (36.6 °C)-101.7 °F (38.7 °C)]     CV:      BP goals:   SBP < 160  MAP > 65    Resp:           Plan: N/A    GI/:     Diet/Nutrition Received: NPO  Last Bowel Movement: 03/23/24  Voiding Characteristics: urgency, frequency    Intake/Output Summary (Last 24 hours) at 3/24/2024 0452  Last data filed at 3/24/2024 0353  Gross per 24 hour   Intake 75.07 ml   Output 300 ml   Net -224.93 ml          Labs/Accuchecks:  Recent Labs   Lab 03/24/24  0127   WBC 53.90*   RBC 4.26   HGB 10.6*   HCT 36.3*         Recent Labs   Lab 03/24/24  0127      K 3.8   CO2 23      BUN 9   CREATININE 0.8   ALKPHOS 71   ALT 9*   AST 21   BILITOT 0.6      Recent Labs   Lab 03/24/24  0127   INR 1.1   APTT 27.5      Recent Labs   Lab 03/23/24  1831   TROPONINI <0.010       Electrolytes: Electrolytes replaced  Accuchecks: ACHS    Gtts:   sodium chloride 0.9% 100 mL/hr at 03/24/24 0410       LDA/Wounds:    Nurses Note -- 4 Eyes    Is there altered skin present? Yes    Second RN/Staff Member:  ISAAC Lowry     Restraints: NO        WCTM    Adela Lopez, BSN, RN, SCRN  Neuroscience Critical Care Unit (NS[facility]U)

## 2024-03-24 NOTE — SUBJECTIVE & OBJECTIVE
Facility-Administered Medications Prior to Admission   Medication Dose Route Frequency Provider Last Rate Last Admin    0.9%  NaCl infusion (for blood administration)   Intravenous Once Kareem Goddard FNP        0.9%  NaCl infusion (for blood administration)   Intravenous Once Kareem Goddard FNP        sodium chloride 0.9% bolus 1,000 mL 1,000 mL  1,000 mL Intravenous PRN Jason Nation MD   Stopped at 10/20/23 1125     Medications Prior to Admission   Medication Sig Dispense Refill Last Dose    acyclovir (ZOVIRAX) 400 MG tablet Take 1 tablet (400 mg total) by mouth 2 (two) times daily. 60 tablet 11     albuterol (PROVENTIL/VENTOLIN HFA) 90 mcg/actuation inhaler Inhale 2 puffs into the lungs every 4 (four) hours as needed for Wheezing. Rescue       allopurinoL (ZYLOPRIM) 300 MG tablet Take 300 mg by mouth once daily.       amLODIPine (NORVASC) 10 MG tablet Take 10 mg by mouth once daily.       atorvastatin (LIPITOR) 40 MG tablet Take 40 mg by mouth once daily.       fluconazole (DIFLUCAN) 200 MG Tab Take 400 mg by mouth once daily.       furosemide (LASIX) 20 MG tablet Take 1 tablet (20 mg total) by mouth once daily. 7 tablet 0     gabapentin (NEURONTIN) 300 MG capsule Take 2 capsules TID. (Patient taking differently: Take 800 capsules by mouth 3 (three) times daily. Take 1 capsules TID) 42 capsule 0     HYDROcodone-acetaminophen (NORCO) 5-325 mg per tablet Take 1 tablet by mouth every 6 (six) hours as needed for Pain.       insulin lispro 100 unit/mL injection Inject 22 Units into the skin 3 (three) times daily before meals. Takes 22 units TID AC while on chemo - (Gives between 12 to 15 units TID AC when not on chemo) (Patient taking differently: Inject 20 Units into the skin 3 (three) times daily before meals. Takes 22 units TID AC while on chemo - (Gives between 12 to 15 units TID AC when not on chemo)) 10 mL 2     insulin NPH (NOVOLIN N NPH U-100 INSULIN) 100 unit/mL injection 20 Units before breakfast.  No longer 20 in AM       insulin  unit/mL injection Inject 50 Units into the skin every evening.       levoFLOXacin (LEVAQUIN) 500 MG tablet Take 500 mg by mouth every 24 hours.       losartan (COZAAR) 25 MG tablet Take 1 tablet (25 mg total) by mouth once daily. 90 tablet 3     metFORMIN (GLUCOPHAGE) 1000 MG tablet Take 1,000 mg by mouth 2 (two) times daily with meals.       metoprolol tartrate (LOPRESSOR) 25 MG tablet Take 25 mg by mouth 2 (two) times daily.       montelukast (SINGULAIR) 10 mg tablet Take 10 mg by mouth once daily.       morphine (MSIR) 15 MG tablet Take 30 mg by mouth 2 (two) times a day.       omeprazole (PRILOSEC) 40 MG capsule Take 1 capsule by mouth once daily.       ondansetron (ZOFRAN) 4 MG tablet Take 2 tablets (8 mg total) by mouth every 6 (six) hours as needed for Nausea. 360 tablet 0     PONATinib (ICLUSIG) 30 mg Tab Take 30 mg by mouth Daily.       prochlorperazine (COMPAZINE) 10 MG tablet Take 1 tablet (10 mg total) by mouth 4 (four) times daily. 120 tablet 1     triamcinolone acetonide 0.1% (KENALOG) 0.1 % cream Apply topically 2 (two) times daily. for 14 days 45 g 0        Review of patient's allergies indicates:  No Known Allergies    Past Medical History:   Diagnosis Date    Cancer     CML (chronic myelocytic leukemia)     DM2 (diabetes mellitus, type 2)     HTN (hypertension)     NAFLD (nonalcoholic fatty liver disease)     Neuropathy      Past Surgical History:   Procedure Laterality Date    ABDOMINAL SURGERY       SECTION      x4    CHOLECYSTECTOMY       Family History       Problem Relation (Age of Onset)    Diabetes Mother, Father          Tobacco Use    Smoking status: Never    Smokeless tobacco: Never   Substance and Sexual Activity    Alcohol use: Not Currently    Drug use: Not Currently    Sexual activity: Not on file     Review of Systems  Objective:     Weight: 93.2 kg (205 lb 7.5 oz)  Body mass index is 36.41 kg/m².  Vital Signs (Most Recent):  Temp:  97.8 °F (36.6 °C) (03/24/24 0301)  Pulse: 90 (03/24/24 0601)  Resp: (!) 24 (03/24/24 0633)  BP: (!) 153/74 (03/24/24 0601)  SpO2: (!) 93 % (03/24/24 0601) Vital Signs (24h Range):  Temp:  [97.8 °F (36.6 °C)-101.7 °F (38.7 °C)] 97.8 °F (36.6 °C)  Pulse:  [] 90  Resp:  [18-33] 24  SpO2:  [93 %-97 %] 93 %  BP: (123-170)/(69-87) 153/74                         Female External Urinary Catheter w/ Suction 03/23/24 2148 (Active)   Skin no redness;no breakdown 03/24/24 0301   Tolerance no signs/symptoms of discomfort 03/24/24 0301   Date of last wick change 03/23/24 03/23/24 2301   Time of last wick change 2301 03/23/24 2301   Output (mL) 300 mL 03/23/24 2301          Physical Exam     Neurosurgery Physical Exam  GCS 15  Aox3  FC x 4  BUE: 5/5 strength  BLE: 2/5 proximal pain limited weakness, 4/5 distally PF/DF  CN intact            Significant Labs:  Recent Labs   Lab 03/23/24 1831 03/23/24 2207 03/24/24 0127   GLU  --  291* 302*    135* 136   K 4.1 3.7 3.8   CL  --  102 102   CO2 22 22* 23   BUN 10.0 9 9   CREATININE 0.70 0.7 0.8   CALCIUM 9.0 9.3 9.2   MG 1.60 1.6 1.7     Recent Labs   Lab 03/23/24 1831 03/23/24 2207 03/24/24  0127   WBC 47.37* 52.42* 53.90*   HGB 10.5* 10.4* 10.6*   HCT 34.6* 35.3* 36.3*    204 195     Recent Labs   Lab 03/23/24 2207 03/24/24  0127   INR 1.1 1.1   APTT 29.1 27.5     Microbiology Results (last 7 days)       Procedure Component Value Units Date/Time    Blood culture [[id number]] Collected: 03/23/24 2222    Order Status: Completed Specimen: Blood from Peripheral, Forearm, Right Updated: 03/24/24 0545     Blood Culture, Routine No Growth to date    Narrative:      Site #1- Aerobic and Anaerobic    Blood culture [[id number]] Collected: 03/23/24 2223    Order Status: Completed Specimen: Blood from Peripheral, Hand, Left Updated: 03/24/24 0545     Blood Culture, Routine No Growth to date    Narrative:      Site #2 - Aerobic and Anaerobic    Culture, Respiratory with  Gram Stain [[id number]]     Order Status: No result Specimen: Respiratory           All pertinent labs from the last 24 hours have been reviewed.    Significant Diagnostics:  I have reviewed and interpreted all pertinent imaging results/findings within the past 24 hours.

## 2024-03-24 NOTE — CONSULTS
[facility]  Neurosurgery  Consult Note    Inpatient consult to Neurosurgery  Consult performed by: Ignacio Seay MD  Consult ordered by: Estrella Serna PA-C        Subjective:     Chief Complaint/Reason for Admission: thin falcine aSDH    History of Present Illness: Fela Elilson is a very pleasant 57 yo female with a PMH of DML, DM, HTN who presents with a blast crisis.  She had a fall, but did not have any head trauma. CT head was obtained with what appears to be a thin acute subdural hematoma along the falx and tentorium.  Neurosurgery consulted.  PLTs, coagulation factors wnl, no blood thinning agents.     Facility-Administered Medications Prior to Admission   Medication Dose Route Frequency Provider Last Rate Last Admin    0.9%  NaCl infusion (for blood administration)   Intravenous Once Kareem Goddard FNP        0.9%  NaCl infusion (for blood administration)   Intravenous Once Kareem Goddard FNP        sodium chloride 0.9% bolus 1,000 mL 1,000 mL  1,000 mL Intravenous PRN Jason Nation MD   Stopped at 10/20/23 1125     Medications Prior to Admission   Medication Sig Dispense Refill Last Dose    acyclovir (ZOVIRAX) 400 MG tablet Take 1 tablet (400 mg total) by mouth 2 (two) times daily. 60 tablet 11     albuterol (PROVENTIL/VENTOLIN HFA) 90 mcg/actuation inhaler Inhale 2 puffs into the lungs every 4 (four) hours as needed for Wheezing. Rescue       allopurinoL (ZYLOPRIM) 300 MG tablet Take 300 mg by mouth once daily.       amLODIPine (NORVASC) 10 MG tablet Take 10 mg by mouth once daily.       atorvastatin (LIPITOR) 40 MG tablet Take 40 mg by mouth once daily.       fluconazole (DIFLUCAN) 200 MG Tab Take 400 mg by mouth once daily.       furosemide (LASIX) 20 MG tablet Take 1 tablet (20 mg total) by mouth once daily. 7 tablet 0     gabapentin (NEURONTIN) 300 MG capsule Take 2 capsules TID. (Patient taking differently: Take 800 capsules by mouth 3 (three) times daily.  Take 1 capsules TID) 42 capsule 0     HYDROcodone-acetaminophen (NORCO) 5-325 mg per tablet Take 1 tablet by mouth every 6 (six) hours as needed for Pain.       insulin lispro 100 unit/mL injection Inject 22 Units into the skin 3 (three) times daily before meals. Takes 22 units TID AC while on chemo - (Gives between 12 to 15 units TID AC when not on chemo) (Patient taking differently: Inject 20 Units into the skin 3 (three) times daily before meals. Takes 22 units TID AC while on chemo - (Gives between 12 to 15 units TID AC when not on chemo)) 10 mL 2     insulin NPH (NOVOLIN N NPH U-100 INSULIN) 100 unit/mL injection 20 Units before breakfast. No longer 20 in AM       insulin  unit/mL injection Inject 50 Units into the skin every evening.       levoFLOXacin (LEVAQUIN) 500 MG tablet Take 500 mg by mouth every 24 hours.       losartan (COZAAR) 25 MG tablet Take 1 tablet (25 mg total) by mouth once daily. 90 tablet 3     metFORMIN (GLUCOPHAGE) 1000 MG tablet Take 1,000 mg by mouth 2 (two) times daily with meals.       metoprolol tartrate (LOPRESSOR) 25 MG tablet Take 25 mg by mouth 2 (two) times daily.       montelukast (SINGULAIR) 10 mg tablet Take 10 mg by mouth once daily.       morphine (MSIR) 15 MG tablet Take 30 mg by mouth 2 (two) times a day.       omeprazole (PRILOSEC) 40 MG capsule Take 1 capsule by mouth once daily.       ondansetron (ZOFRAN) 4 MG tablet Take 2 tablets (8 mg total) by mouth every 6 (six) hours as needed for Nausea. 360 tablet 0     PONATinib (ICLUSIG) 30 mg Tab Take 30 mg by mouth Daily.       prochlorperazine (COMPAZINE) 10 MG tablet Take 1 tablet (10 mg total) by mouth 4 (four) times daily. 120 tablet 1     triamcinolone acetonide 0.1% (KENALOG) 0.1 % cream Apply topically 2 (two) times daily. for 14 days 45 g 0        Review of patient's allergies indicates:  No Known Allergies    Past Medical History:   Diagnosis Date    Cancer     CML (chronic myelocytic leukemia)     DM2  (diabetes mellitus, type 2)     HTN (hypertension)     NAFLD (nonalcoholic fatty liver disease)     Neuropathy      Past Surgical History:   Procedure Laterality Date    ABDOMINAL SURGERY       SECTION      x4    CHOLECYSTECTOMY       Family History       Problem Relation (Age of Onset)    Diabetes Mother, Father          Tobacco Use    Smoking status: Never    Smokeless tobacco: Never   Substance and Sexual Activity    Alcohol use: Not Currently    Drug use: Not Currently    Sexual activity: Not on file     Review of Systems  Objective:     Weight: 93.2 kg (205 lb 7.5 oz)  Body mass index is 36.41 kg/m².  Vital Signs (Most Recent):  Temp: 97.8 °F (36.6 °C) (24 030)  Pulse: 90 (24)  Resp: (!) 24 (24)  BP: (!) 153/74 (24)  SpO2: (!) 93 % (24) Vital Signs (24h Range):  Temp:  [97.8 °F (36.6 °C)-101.7 °F (38.7 °C)] 97.8 °F (36.6 °C)  Pulse:  [] 90  Resp:  [18-33] 24  SpO2:  [93 %-97 %] 93 %  BP: (123-170)/(69-87) 153/74                         Female External Urinary Catheter w/ Suction 24 (Active)   Skin no redness;no breakdown 24   Tolerance no signs/symptoms of discomfort 24   Date of last wick change 24   Time of last wick change 24   Output (mL) 300 mL 24 230          Physical Exam     Neurosurgery Physical Exam  GCS 15  Aox3  FC x 4  BUE: 5/5 strength  BLE: 2/5 proximal pain limited weakness, 4/5 distally PF/DF  CN intact            Significant Labs:  Recent Labs   Lab 24  1831 24  0127   GLU  --  291* 302*    135* 136   K 4.1 3.7 3.8   CL  --  102 102   CO2 22 22* 23   BUN 10.0 9 9   CREATININE 0.70 0.7 0.8   CALCIUM 9.0 9.3 9.2   MG 1.60 1.6 1.7     Recent Labs   Lab 24  1831 24  0127   WBC 47.37* 52.42* 53.90*   HGB 10.5* 10.4* 10.6*   HCT 34.6* 35.3* 36.3*    204 195     Recent Labs   Lab 24  03/24/24  0127   INR 1.1 1.1   APTT 29.1 27.5     Microbiology Results (last 7 days)       Procedure Component Value Units Date/Time    Blood culture [[id number]] Collected: 03/23/24 2222    Order Status: Completed Specimen: Blood from Peripheral, Forearm, Right Updated: 03/24/24 0545     Blood Culture, Routine No Growth to date    Narrative:      Site #1- Aerobic and Anaerobic    Blood culture [[id number]] Collected: 03/23/24 2223    Order Status: Completed Specimen: Blood from Peripheral, Hand, Left Updated: 03/24/24 0545     Blood Culture, Routine No Growth to date    Narrative:      Site #2 - Aerobic and Anaerobic    Culture, Respiratory with Gram Stain [[id number]]     Order Status: No result Specimen: Respiratory           All pertinent labs from the last 24 hours have been reviewed.    Significant Diagnostics:  I have reviewed and interpreted all pertinent imaging results/findings within the past 24 hours.  Assessment/Plan:     * Subdural hematoma  59 yo female with AML in blast crisis who presents due to concern for fall although she did not have head trauma. CT head with very thin small acute falcine subdural hematoma which is stable on repeat CT head imaging.      --No acute neurosurgical intervention  --Repeat CT head stable  --PLT/Coagulation factors wnl, no blood thinning agents  --We will sign off, please call with any concerns          Thank you for your consult. I will sign off. Please contact us if you have any additional questions.    Ignacio Seay MD  Neurosurgery  [facility]

## 2024-03-24 NOTE — ASSESSMENT & PLAN NOTE
- Possibly 2/2 aspiration event  - Currently dysphagic, failed Zuleta  - 2L NC  - See Acute respiratory failure with hypoxia

## 2024-03-24 NOTE — PROGRESS NOTES
Pharmacokinetic Initial Assessment: IV Vancomycin    Assessment/Plan:    Initiate intravenous vancomycin with loading dose of 2000 mg once followed by a maintenance dose of vancomycin 1250 mg IV every 12 hours.  - Ms. Ellison's renal function appears stable and at baseline.   - Desired empiric serum trough concentration is 10 to 20 mcg/mL.  - Draw vancomycin trough level 60 min prior to fourth dose on 3/25 at approximately 1400.  - Please draw random level sooner than scheduled trough if renal function changes significantly.    Pharmacy will continue to follow and monitor vancomycin.      Please contact pharmacy at extension [phone] with any questions regarding this assessment.     Thank you for the consult,   Diamond Velásquez       Patient brief summary:  Fela Ellison is a 58 y.o. female initiated on antimicrobial therapy with IV Vancomycin for treatment of suspected sepsis    Actual Body Weight:   93.2 kg    Renal Function:   Estimated Creatinine Clearance: 85.2 mL/min (based on SCr of 0.8 mg/dL).    Dialysis Method (if applicable):  N/A

## 2024-03-24 NOTE — ASSESSMENT & PLAN NOTE
- Home meds include: 20u NPH before breakfast, 50u NPH QHS, 1000mg metformin BID  - Associated with hyperglycemia on admission   - Initial     - Administered SSI   - Start insulin gtt if not improving significantly on next check  - POC BG checks Q6H  - SSI PRN: moderate-dose   - May require increase to high-dose scale, f/u BG trends

## 2024-03-24 NOTE — PLAN OF CARE
Handoff     Primary Team: Networked reference to record [facility]  Room Number: 9093/9093 A     Patient Name: Fela Ellison MRN: [id number]     Date of Birth: [id number] Allergies: Patient has no known allergies.     Age: 58 y.o. Admit Date: 3/23/2024     Sex: female  BMI: Body mass index is 36.41 kg/m².     Code Status: Full Code        Illness Level (current clinical status): Watcher - Yes     Reason for Admission: Subdural hematoma    Brief HPI (pertinent PMH and diagnosis or differential diagnosis): Hx CML currently in blast crisis; Presented with altered mental status and found to have SDH on CT head; repeat imaging stable and no intervention per NSGY    Procedure Date: n/a    Hospital Course (updated, brief assessment by system or problem, significant events): Transitioned to subQ heparin; NG tube due to failed Zuleta; Suspected aspiration pneumonia; afebrile while on abx    Tasks:   - Respiratory culture f/u (concern for aspiration pna)  - Titrate subQ insulin  - PT/OT/SLP/RD    Contingency Plan (special circumstances anticipated and plan): If acute neurologic change, obtain CT head and check H/h      Discharge Disposition: Skilled Nursing Facility    Mentored By: Dr. Shubham Sevilla

## 2024-03-24 NOTE — NURSING
Nursing Transfer Note       Transfer From 9093 to 808    Transfer via bed    Transfer with  to O2, cardiac monitoring    Transported by [email or web address]    Medicines sent: Yes    Chart sent with patient: Yes    Belongings sent with patient: (specify belongings)    Notified: spouse    Bedside Neuro assessment performed: Yes    Bedside Handoff given to: Aleshia, RN    Upon arrival to floor: cardiac monitor applied, patient oriented to room, call bell in reach, and bed in lowest position

## 2024-03-24 NOTE — PT/OT/SLP EVAL
Occupational Therapy  Co- Evaluation    Name: Fela Ellison  MRN: [id number]  Admitting Diagnosis: Subdural hematoma  Recent Surgery: * No surgery found *      Recommendations:     Discharge Recommendations: Moderate Intensity Therapy  Discharge Equipment Recommendations:     Barriers to discharge:  None    Assessment:     Fela Ellison is a 58 y.o. female with a medical diagnosis of Subdural hematoma.  She presents with decrease functional status secondary to medical diagnosis. Performance deficits affecting function: weakness, impaired functional mobility, decreased safety awareness, impaired coordination, impaired endurance, gait instability, impaired fine motor, impaired balance, decreased upper extremity function, impaired self care skills, decreased lower extremity function, decreased ROM.  Patient with fair tolerance to OT session however limited by increased deconditioning therefore low activity tolerance. Patient requiring increased assistance for all mobility at this time which is far from functional baseline. Patient UE ROM moderately impaired at this time and presenting with generalized weakness. Patient is therefore appropriate for acute OT services to increase patient self care performance and functional mobility. Following DC from OHS patient should continue with a moderate intensity therapy to ensure patient safety and promote return to independence.     Rehab Prognosis: Good; patient would benefit from acute skilled OT services to address these deficits and reach maximum level of function.       Plan:     Patient to be seen 4x/week to address the above listed problems via self-care/home management, therapeutic activities, therapeutic exercises, neuromuscular re-education  Plan of Care Expires: 04/24/24  Plan of Care Reviewed with: spouse, patient    Subjective     Chief Complaint: none at this time   Patient/Family Comments/goals: DC    Occupational Profile:  Living  Environment: Patient lives with her spouse and 2 children in a [address] [place] with 4 ALFONZO with HR present.   Previous level of function: Patient able to ambulate household distances with RRW and use WC for community mobility.   Roles and Routines: Unknown   Equipment Used at Home: glucometer, walker, rolling, bedside commode, wheelchair  Assistance upon Discharge:      Patients cultural, spiritual, Jewish conflicts given the current situation: no    Objective:     Communicated with: RN prior to session.  Patient found supine with telemetry, peripheral IV, pulse ox (continuous), blood pressure cuff upon OT entry to room.    General Precautions: Standard, fall  Orthopedic Precautions: N/A  Braces: N/A  Respiratory Status: Room air    Occupational Performance:    Bed Mobility:    Patient completed Supine to Sit with total assistance  Patient completed Sit to Supine with total assistance    Functional Mobility/Transfers:  Patient completed Sit <> Stand Transfer with maximal assistance and of 2 persons  with  hand-held assist ; patient with two bouts of standing with sitting RB provided between; patient reporting feelings of nausea post session    Activities of Daily Living:  Grooming: stand by assistance patient completed facial grooming sitting EOB   Lower Body Dressing: maximal assistance sitting EOB to don socks     Cognitive/Visual Perceptual:  Cognitive/Psychosocial Skills:     -       Oriented to: Person, Place, Time, and Situation   -       Follows Commands/attention:Follows one-step commands  -       Communication: clear/fluent  -       Safety awareness/insight to disability: intact   Visual/Perceptual:      -Intact      Physical Exam:  Sensation:    -       Intact  Upper Extremity Range of Motion:     -       Right Upper Extremity: ~90 shoulder flexion; all other WFL   -       Left Upper Extremity: ~90 shoulder flexion; all other WFL   Upper Extremity Strength:    -       Right Upper Extremity: 3-/5 shoulder  flexion  -       Left Upper Extremity: 3-/5 shoulder flexion   Strength:    -       Right Upper Extremity: WFL  -       Left Upper Extremity: WFL  Fine Motor Coordination:    -       Intact    AMPAC 6 Click ADL:  AMPAC Total Score:      Treatment & Education:  Co-evaluation completed due to patient medical instability and to ensure patient safety. Provided education regarding role of OT, POC, & discharge recommendations.  Pt with no further questions/concerns at this time. OT provided education on home recommendations and fall prevention in preparation for D/C.     Patient left supine with all lines intact and call button in reach    GOALS:   Multidisciplinary Problems       Occupational Therapy Goals          Problem: Occupational Therapy    Goal Priority Disciplines Outcome Interventions   Occupational Therapy Goal     OT, PT/OT Ongoing, Progressing    Description: Goals to be met by: 24     Patient will increase functional independence with ADLs by performing:    UE Dressing with Moderate Assistance.  LE Dressing with Moderate Assistance.  Grooming while EOB with Moderate Assistance.  Toileting from bedside commode with Moderate Assistance for hygiene and clothing management.   Rolling to Bilateral with [place].   Supine to sit with Minimal Assistance.  Stand pivot transfers with Moderate Assistance.  Toilet transfer to bedside commode with Moderate Assistance.                         History:     Past Medical History:   Diagnosis Date    Cancer     CML (chronic myelocytic leukemia)     DM2 (diabetes mellitus, type 2)     HTN (hypertension)     NAFLD (nonalcoholic fatty liver disease)     Neuropathy          Past Surgical History:   Procedure Laterality Date    ABDOMINAL SURGERY       SECTION      x4    CHOLECYSTECTOMY         Time Tracking:     OT Date of Treatment: 24  OT Start Time: 1009  OT Stop Time: 1036  OT Total Time (min): 27 min    Billable Minutes:Evaluation 10  Therapeutic  Activity 17    3/24/2024

## 2024-03-24 NOTE — ASSESSMENT & PLAN NOTE
- Currently with blast crisis  - Daily CBC w/ diff  - Monitor ESR, LDH, fibrinogen  - Takes chemotherapy med PO QD   -  to bring from home to give to RN to be verified by Pharmacy   - Will continue med as recommended by Heme/Onc once evaluated  - Heme/Onc consulted, accepted by service  - OSH documentation mentions possible need for leukapheresis

## 2024-03-24 NOTE — ASSESSMENT & PLAN NOTE
Long term insulin dependence.   Home meds: 50U QHS, 20U TID WM plus metformin.  On admission, A1c 6.2, , no glucosuria, AG 11 and bicarb 23.  Ssi and ggt started overnight.    - Transition to SubQ insulin

## 2024-03-24 NOTE — PLAN OF CARE
Problem: Occupational Therapy  Goal: Occupational Therapy Goal  Description: Goals to be met by: 4/24/24     Patient will increase functional independence with ADLs by performing:    UE Dressing with Moderate Assistance.  LE Dressing with Moderate Assistance.  Grooming while EOB with Moderate Assistance.  Toileting from bedside commode with Moderate Assistance for hygiene and clothing management.   Rolling to Bilateral with [place].   Supine to sit with Minimal Assistance.  Stand pivot transfers with Moderate Assistance.  Toilet transfer to bedside commode with Moderate Assistance.    Outcome: Ongoing, Progressing

## 2024-03-24 NOTE — SUBJECTIVE & OBJECTIVE
Interval History:  Mentation improved. CT head stable. Afebrile on abx. Insulin gtt started overnight, transition to subQ.    Review of Systems   Constitutional:  Negative for chills and fever.   HENT:  Positive for trouble swallowing.         Nose pain (NGT present)   Respiratory:  Negative for chest tightness.    Cardiovascular:  Negative for chest pain.   Gastrointestinal:  Positive for constipation. Negative for abdominal pain and diarrhea.   Genitourinary:  Negative for dysuria.   Musculoskeletal:  Negative for back pain.   Neurological:  Negative for dizziness and seizures.       Objective:     Vitals:  Temp: 97.8 °F (36.6 °C)  Pulse: 95  Rhythm: normal sinus rhythm  BP: (!) 153/70  MAP (mmHg): 101  Resp: (!) 22  SpO2: 97 %    Temp  Min: 97.8 °F (36.6 °C)  Max: 101.7 °F (38.7 °C)  Pulse  Min: 77  Max: 129  BP  Min: 123/77  Max: 170/87  MAP (mmHg)  Min: 93  Max: 114  Resp  Min: 17  Max: 33  SpO2  Min: 93 %  Max: 97 %    03/23 0701 - 03/24 0700  In: 637.3 [I.V.:189.6]  Out: 1000 [Urine:1000]          Physical Exam  Constitutional:       General: She is not in acute distress.     Appearance: She is obese.   HENT:      Head: Normocephalic and atraumatic.      Nose: Nose normal. No rhinorrhea.      Mouth/Throat:      Mouth: Mucous membranes are moist.      Pharynx: Oropharynx is clear.   Eyes:      Extraocular Movements: Extraocular movements intact.      Conjunctiva/sclera: Conjunctivae normal.   Cardiovascular:      Rate and Rhythm: Normal rate and regular rhythm.   Pulmonary:      Effort: Pulmonary effort is normal. No respiratory distress.   Abdominal:      General: Bowel sounds are normal.      Palpations: Abdomen is soft.      Tenderness: There is no abdominal tenderness.   Musculoskeletal:      Right lower leg: No edema.      Left lower leg: No edema.   Skin:     General: Skin is warm and dry.   Neurological:      Mental Status: She is alert and oriented to person, place, and time.      GCS: GCS eye subscore  is 4. GCS verbal subscore is 5. GCS motor subscore is 6.          Medications:  Continuous  sodium chloride 0.9%, Last Rate: 100 mL/hr at 03/24/24 0801  insulin regular 1 units/mL infusion orderable (DKA), Last Rate: 4 Units/hr (03/24/24 0807)    Scheduled  acyclovir, 400 mg, BID  allopurinoL, 300 mg, Daily  amLODIPine, 10 mg, Daily  atorvastatin, 40 mg, Daily  ceFEPime IV (PEDS and ADULTS), 2 g, Q8H  famotidine, 20 mg, BID  fluconazole 40 mg/ml, 400 mg, Daily  gabapentin, 800 mg, TID  hydroxyurea, 3,500 mg, Daily  levETIRAcetam (Keppra) IV (PEDS and ADULTS), 500 mg, Q12H  levoFLOXacin, 500 mg, Q24H  metoprolol tartrate, 25 mg, BID  montelukast, 10 mg, Daily  triamcinolone acetonide 0.1%, , BID  vancomycin (VANCOCIN) IV (PEDS and ADULTS), 1,250 mg, Q12H    PRN  acetaminophen, 650 mg, Q6H PRN  albuterol, 2 puff, Q4H PRN  calcium gluconate IVPB, 1 g, PRN  calcium gluconate IVPB, 2 g, PRN  calcium gluconate IVPB, 3 g, PRN  dextrose 10%, 12.5 g, PRN  dextrose 10%, 12.5 g, PRN  dextrose 10%, 25 g, PRN  dextrose 10%, 25 g, PRN  glucagon (human recombinant), 1 mg, PRN  hydrALAZINE, 10 mg, Q6H PRN  HYDROmorphone, 1 mg, Q6H PRN  insulin aspart U-100, 0-10 Units, Q6H PRN  labetalol, 10 mg, Q4H PRN  magnesium sulfate IVPB, 2 g, PRN  magnesium sulfate IVPB, 4 g, PRN  ondansetron, 4 mg, Q8H PRN  oxyCODONE, 10 mg, Q4H PRN  potassium chloride, 40 mEq, PRN   And  potassium chloride, 60 mEq, PRN   And  potassium chloride, 80 mEq, PRN  prochlorperazine, 5 mg, Q4H PRN  sodium chloride 0.9%, 3 mL, PRN  sodium phosphate 15 mmol in dextrose 5 % (D5W) 250 mL IVPB, 15 mmol, PRN  sodium phosphate 20.01 mmol in dextrose 5 % (D5W) 250 mL IVPB, 20.01 mmol, PRN  sodium phosphate 30 mmol in dextrose 5 % (D5W) 250 mL IVPB, 30 mmol, PRN  vancomycin - pharmacy to dose, , pharmacy to manage frequency      Today I personally reviewed pertinent medications, lines/drains/airways, laboratory results,    Diet  Diet NPO  Diet NPO

## 2024-03-24 NOTE — ASSESSMENT & PLAN NOTE
- Takes Norco 5-325mg at home with no relief  - C/w IV Dilaudid PRN and PO oxy PRN  - Adjust analgesia as necessary to maintain patient comfort

## 2024-03-24 NOTE — HPI
Fela Ellison is a 58-year-old female with PMHx of insulin-dependent T2DM, diabetic neuropathy, essential HTN, NAFLD, obesity, former tobacco use disorder (quit 4-5 months ago), and CML (actively on chemotherapy) with blast crisis diagnosed prior to transfer at [facility], who presents as a transfer from [facility] ED in [place] for acute SDH. History is obtained primarily from chart review due to patient being Welsh-speaking, though  is at bedside assisting with interpretation, as he can speak English more fluently.     This morning (3/23), patient presented to OSH c/o back pain, BLE pain, and hip pain (refractory to home [place] prescribed for cancer-associated pain), all of which worsened after an episode of weakness causing her to slip while ambulating. Patient did not suffer head or back trauma, since she was caught before making contact with the ground or nearby objects. Patient also reportedly c/o a sore throat and feeling generally unwell. Vitals at that time were significant for tachycardia and hypertension, likely associated with pain, and she was started on IV morphine (ineffective) and IV Dilaudid.     XRays of spine and pelvis were clear. CT chest/abd/pelvis significant for splenomegaly (increased from 11.9cm on previous scan to 18.2cm), subcutaneous anasarca edema present at the level of the abdomen and pelvis (unchanged from prior), moderate colonic fecal load without pericolonic stranding, and bilateral ovarian cysts. Degenerative changes of the lower spine, specifically at L5-S1, are more apparent on this CT than prior imaging.     Labs were significant for leukocytosis (WBC 38) with 29% blasts, c/w an acute blast crisis. [facility] does not have an inpatient Heme/Onc service; therefore, initial plan was for patient to transfer to [facility] for inpatient treatment of blast crisis with Heme/Onc. Unfortunately, at 18:07PM, while still awaiting transfer, patient's  alerted OSH RN of a mental status change.  Patient exhibited new-onset confusion, disorientation to all but self, and inability to consistently follow commands. She was febrile to 101.7°F and started on broad-spectrum ABX (vancomycin and cefepime), IVF, and rectal Tylenol. CXR suggestive of RLL congestive process.    CTH showed new 3mm thickening of the interhemispheric falx suggestive of shallow SDH, with extension into R tentorium. No mass effect, MLS, hydrocephalus, sulcal effacement, or skull fracture present. Transfer center was updated on new findings, and patient was accepted instead to [facility] [place] by Dr. Sevilla for higher level of care and closer neurological monitoring.

## 2024-03-24 NOTE — ASSESSMENT & PLAN NOTE
- C/w home meds:   - Amlodipine 10mg QD   - Metoprolol 25mg BID  - Home Lasix held, unclear if taking  - SBP goal <160  - On Cleviprex at OSH, not requiring at this time  - C/w IV PRN hydralazine and labetalol  - Control pain

## 2024-03-24 NOTE — ASSESSMENT & PLAN NOTE
- As evidenced by labs  - Diagnosed at OSH prior to transfer   - Blasts 27%  - F/u CBC  - Heme/Onc consult placed, per NCC on-call, OK for eval in AM  - See CML (chronic myelocytic leukemia)

## 2024-03-24 NOTE — ASSESSMENT & PLAN NOTE
- Body mass index is 36.41 kg/m².  - Dietary, nutritional, weight loss counseling on stabilization  - Nutrition consulted, f/u recs  - PT/OT evals pending

## 2024-03-24 NOTE — NURSING
Nurses Note -- 4 Eyes      3/24/2024   5:29 PM      Skin assessed during: Transfer      [] No Altered Skin Integrity Present    []Prevention Measures Documented      [x] Yes- Altered Skin Integrity Present or Discovered   [x] LDA Added if Not in Epic (Describe Wound) Rash on abdomen.   [] New Altered Skin Integrity was Present on Admit and Documented in LDA   [] Wound Image Taken    Wound Care Consulted? Yes    Attending Nurse:  Rosemary Mercado RN/Staff Member:   ISAAC Acosta

## 2024-03-24 NOTE — CONSULTS
Hematology Oncology Consult Note    Inpatient consult to Hematology/Oncology  Consult performed by: Sachin Luther MD  Consult ordered by: Estrella Serna PA-C        SUBJECTIVE:     History of Present Illness:  Patient is a 58 y.o. female with PMHx of insulin-dependent T2DM, diabetic neuropathy, essential HTN, NAFLD, obesity, former tobacco use disorder (quit 4-5 months ago), and CML (follows at [facility] fellows' clinic) who presents as a transfer from [facility] ED in [place] for acute SDH. Per charts she presented there on 3/23 with back pain and hip pain after a fall/slip that occurred while happening. Seh also noted malaise and fatigue. Labs there were notable for significant for leukocytosis (WBC 38) with 29% blasts, c/w an acute blast crisis. PLTs 195k, Hgb 10.6.  CT chest/abd/pelvis significant for splenomegaly (increased from 11.9cm on previous scan to 18.2cm), subcutaneous anasarca edema present at the level of the abdomen and pelvis (unchanged from prior), moderate colonic fecal load without pericolonic stranding, and bilateral ovarian cysts. Degenerative changes of the lower spine, specifically at L5-S1, are more apparent on this CT than prior imaging.     She was accepted to transfer to our service though developed acute onset confusion and was found to have a new subdural hemorrhage (3mm). She was brought to NCC at [facility] for neurosurgical watch though they recommended conservative management.     She is febrile to 102.4 and started on broad-spectrum ABX (vancomycin and cefepime). CXR with RLL congestive process.     At the bedside patient encephalopathic and unable to follow commands, though arouses to voice. Hematology consulted for CML with blast crisis.           Review of patient's allergies indicates:  No Known Allergies  Past Medical History:   Diagnosis Date    Cancer     CML (chronic myelocytic leukemia)     DM2 (diabetes mellitus, type 2)     HTN (hypertension)     NAFLD  (nonalcoholic fatty liver disease)     Neuropathy      Past Surgical History:   Procedure Laterality Date    ABDOMINAL SURGERY       SECTION      x4    CHOLECYSTECTOMY       Family History   Problem Relation Age of Onset    Diabetes Mother     Diabetes Father     Cancer Neg Hx      Social History     Tobacco Use    Smoking status: Never    Smokeless tobacco: Never   Substance Use Topics    Alcohol use: Not Currently    Drug use: Not Currently     Review of Systems   Unable to perform ROS: Mental status change     OBJECTIVE:     Vital Signs:  Temp:  [98.6 °F (37 °C)-102.4 °F (39.1 °C)]   Pulse:  []   Resp:  [17-25]   BP: (139-171)/(68-82)   SpO2:  [94 %-98 %]     Physical Exam  Vitals and nursing note reviewed.   Constitutional:       General: She is not in acute distress.     Appearance: She is ill-appearing and toxic-appearing.   HENT:      Head: Normocephalic and atraumatic.      Mouth/Throat:      Mouth: Mucous membranes are moist.      Pharynx: No oropharyngeal exudate.   Eyes:      Extraocular Movements: Extraocular movements intact.      Pupils: Pupils are equal, round, and reactive to light.   Cardiovascular:      Rate and Rhythm: Normal rate and regular rhythm.      Heart sounds: No murmur heard.  Pulmonary:      Effort: Pulmonary effort is normal.      Breath sounds: No wheezing or rales.   Abdominal:      General: Abdomen is flat. Bowel sounds are normal. There is no distension.      Tenderness: There is no abdominal tenderness. There is no guarding.   Musculoskeletal:         General: No swelling or tenderness. Normal range of motion.      Cervical back: Normal range of motion.   Lymphadenopathy:      Cervical: No cervical adenopathy.   Skin:     General: Skin is warm.      Coloration: Skin is not jaundiced.      Findings: No bruising or rash.   Neurological:      General: No focal deficit present.      Cranial Nerves: No cranial nerve deficit.      Comments: Encephalopathic. Does not follow  commands. Arouses to voice       Laboratory:  CBC:   Recent Labs   Lab 03/24/24  0127   WBC 53.90*   RBC 4.26   HGB 10.6*   HCT 36.3*      MCV 85   MCH 24.9*   MCHC 29.2*     CMP:   Recent Labs   Lab 03/24/24  0127   *   CALCIUM 9.2   ALBUMIN 3.1*   PROT 6.5      K 3.8   CO2 23      BUN 9   CREATININE 0.8   ALKPHOS 71   ALT 9*   AST 21   BILITOT 0.6         Diagnostic Results:  Labs: Reviewed      ASSESSMENT/PLAN:   Relapsed CML with blast crisis  Subdural hematoma    58F with relapsed CML with blast crisis (transformed on 2022) admitted for fevers, fatigue, encephalopathy and found to have a SDH that NSGY does not recommend intervention on. Encephalopathic when seen and unable to follow commands.     Labs show clear evidence of blast crisis (29% on CBC) without cytopenias. This is in the setting of what may be sepsis vs fevers from leukemia.She normally follows at [facility] fellows clinic and has progressed through multiple treatments.   Discussed aggressive and unstable disease leading to her presentation here.     Plan:   -hydrea 40mg/kg daily for cytoreduction  -continue acyclovir, allopurinol, fluc  -continue broad spec abx and follow cultures  -prognosis guarded, will discuss goals of care moving forward  -ok to step down from NCC      Discussed with:Dr. Farhad Luther MD  Hematology/Oncology PGY-IV

## 2024-03-24 NOTE — ASSESSMENT & PLAN NOTE
58-year-old female with PMHx of insulin-dependent T2DM, diabetic neuropathy, essential HTN, NAFLD, obesity, former tobacco use disorder (quit 4-5 months ago), and CML (actively on chemotherapy) transferred from [facility] ED in [place] for acute SDH. At OSH, c/o back pain, BLE pain, and hip pain (refractory to Norco), exacerbated s/p fall. No LOC or head trauma. Pelvic and lumbar XR without acute findings. Labs significant for leukocytosis (WBC 38) with 29% blasts, c/w an acute blast crisis. Started on broad-spectrum ABX for fever of 101.7°F. CXR suggestive of RLL congestive process. Initially accepted to [facility]c service, until 6PM when patient had acute decline in mentation. CTH revealed SDH at falx with extension into R tentorium. Transferred to [facility] NCC for higher level of care.     - Admit to NCC unit  - NSGY consulted, no acute surgical intervention  - SBP goal <160   - PRN labetalol, hydralazine   - C/w home antihypertensives  - Na goal >135  - Maintain euvolemia  - Keppra 500mg Q12H x7d for seizure PPx  - Atorvastatin 40mg QD  - Seizure, fall, and aspiration precautions  - HOB >30°  - SCDs; ppx after stability scan  - TTE  - aPTT, PT/INR  - Hgb A1c, TSH, lipid panel  - Daily CBC, CMP, Mg, Phos  - PT/OT consulted for evaluation  - SLP consulted, f/u recs  - Additional consults: SW/CM, PMR, Nutrition

## 2024-03-24 NOTE — ASSESSMENT & PLAN NOTE
- SOFA score 2  - No hypotension  - Febrile at OSH, resolved with Tylenol   - C/w PRN Tylenol Q6H  - Continued on broad-spectrum ABX from OSH  - NS infusion @100mL/hr  - Monitor I/O's hourly

## 2024-03-24 NOTE — H&P
[facility] Neuro Critical Care  Neurocritical Care  History & Physical    Admit Date: 3/23/2024  Service Date: 03/24/2024  Length of Stay: 1    Subjective:     Chief Complaint: SDH (subdural hematoma)    History of Present Illness: Fela Ellison is a 58-year-old female with PMHx of insulin-dependent T2DM, diabetic neuropathy, essential HTN, NAFLD, obesity, former tobacco use disorder (quit 4-5 months ago), and CML (actively on chemotherapy) with blast crisis diagnosed prior to transfer at [facility], who presents as a transfer from [facility] ED in [place] for acute SDH. History is obtained primarily from chart review due to patient being Slovenian-speaking, though  is at bedside assisting with interpretation, as he can speak English more fluently.     This morning (3/23), patient presented to OSH c/o back pain, BLE pain, and hip pain (refractory to home [place] prescribed for cancer-associated pain), all of which worsened after an episode of weakness causing her to slip while ambulating. Patient did not suffer head or back trauma, since she was caught before making contact with the ground or nearby objects. Patient also reportedly c/o a sore throat and feeling generally unwell. Vitals at that time were significant for tachycardia and hypertension, likely associated with pain, and she was started on IV morphine (ineffective) and IV Dilaudid.     XRays of spine and pelvis were clear. CT chest/abd/pelvis significant for splenomegaly (increased from 11.9cm on previous scan to 18.2cm), subcutaneous anasarca edema present at the level of the abdomen and pelvis (unchanged from prior), moderate colonic fecal load without pericolonic stranding, and bilateral ovarian cysts. Degenerative changes of the lower spine, specifically at L5-S1, are more apparent on this CT than prior imaging.     Labs were significant for leukocytosis (WBC 38) with 29% blasts, c/w an acute blast crisis. [facility] does not have an inpatient Heme/Onc service;  therefore, initial plan was for patient to transfer to [facility] for inpatient treatment of blast crisis with Heme/Onc. Unfortunately, at 18:07PM, while still awaiting transfer, patient's  alerted OSH RN of a mental status change. Patient exhibited new-onset confusion, disorientation to all but self, and inability to consistently follow commands. She was febrile to 101.7°F and started on broad-spectrum ABX (vancomycin and cefepime), IVF, and rectal Tylenol. CXR suggestive of RLL congestive process.    CTH showed new 3mm thickening of the interhemispheric falx suggestive of shallow SDH, with extension into R tentorium. No mass effect, MLS, hydrocephalus, sulcal effacement, or skull fracture present. Transfer center was updated on new findings, and patient was accepted instead to [facility] NCC unit by Dr. Sevilla for higher level of care and closer neurological monitoring.      Past Medical History:   Diagnosis Date    Cancer     CML (chronic myelocytic leukemia)     DM2 (diabetes mellitus, type 2)     HTN (hypertension)     NAFLD (nonalcoholic fatty liver disease)     Neuropathy      Past Surgical History:   Procedure Laterality Date    ABDOMINAL SURGERY       SECTION      x4    CHOLECYSTECTOMY        Current Facility-Administered Medications on File Prior to Encounter   Medication Dose Route Frequency Provider Last Rate Last Admin    [COMPLETED] acetaminophen suppository 650 mg  650 mg Rectal ED 1 Time Bobby Us MD   650 mg at 24 181    [COMPLETED] ceFEPIme (MAXIPIME) 2 g in dextrose 5 % in water (D5W) 100 mL IVPB (MB+)  2 g Intravenous ED 1 Time Bobby Us MD   Stopped at 24 1849    [COMPLETED] HYDROmorphone injection 0.5 mg  0.5 mg Intravenous ED 1 Time Bobby Us MD   0.5 mg at 24 1552    [COMPLETED] HYDROmorphone injection 1 mg  1 mg Intravenous ED 1 Time Bobby Us MD   1 mg at 24 1450    [COMPLETED] iohexoL (OMNIPAQUE 350) 350 mg iodine/mL injection        100 mL at  03/23/24 1615    [COMPLETED] morphine injection 4 mg  4 mg Intravenous ED 1 Time Bobby Us MD   4 mg at 03/23/24 1314    [COMPLETED] sodium chloride 0.9% bolus 1,000 mL 1,000 mL  1,000 mL Intravenous ED 1 Time Bobby Us MD   Stopped at 03/23/24 1855    [DISCONTINUED] cefTRIAXone (ROCEPHIN) 2 g in dextrose 5 % in water (D5W) 100 mL IVPB (MB+)  2 g Intravenous ED 1 Time Bobby Us MD        [DISCONTINUED] clevidipine (CLEVIPREX) 50 mg/100 mL infusion  0-16 mg/hr Intravenous Continuous Bobby Us MD 6 mL/hr at 03/23/24 1906 3 mg/hr at 03/23/24 1906    [DISCONTINUED] hydrALAZINE injection 10 mg  10 mg Intravenous ED 1 Time Bobby Us MD        [DISCONTINUED] piperacillin-tazobactam (ZOSYN) 4.5 g in dextrose 5 % in water (D5W) 100 mL IVPB (MB+)  4.5 g Intravenous ED 1 Time Bobby Us MD        [DISCONTINUED] sodium chloride 0.9% bolus 500 mL 500 mL  500 mL Intravenous ED 1 Time Bobby Us MD        [DISCONTINUED] vancomycin (VANCOCIN) 2,000 mg in dextrose 5 % (D5W) 500 mL IVPB  2,000 mg Intravenous Once Bobby Us MD        [DISCONTINUED] vancomycin 1,500 mg in dextrose 5 % (D5W) 250 mL IVPB (Vial-Mate)  1,500 mg Intravenous Q12H Bobby Us MD         Current Outpatient Medications on File Prior to Encounter   Medication Sig Dispense Refill    acyclovir (ZOVIRAX) 400 MG tablet Take 1 tablet (400 mg total) by mouth 2 (two) times daily. 60 tablet 11    albuterol (PROVENTIL/VENTOLIN HFA) 90 mcg/actuation inhaler Inhale 2 puffs into the lungs every 4 (four) hours as needed for Wheezing. Rescue      allopurinoL (ZYLOPRIM) 300 MG tablet Take 300 mg by mouth once daily.      amLODIPine (NORVASC) 10 MG tablet Take 10 mg by mouth once daily.      atorvastatin (LIPITOR) 40 MG tablet Take 40 mg by mouth once daily.      fluconazole (DIFLUCAN) 200 MG Tab Take 400 mg by mouth once daily.      furosemide (LASIX) 20 MG tablet Take 1 tablet (20 mg total) by mouth once daily. 7 tablet 0     gabapentin (NEURONTIN) 300 MG capsule Take 2 capsules TID. (Patient taking differently: Take 800 capsules by mouth 3 (three) times daily. Take 1 capsules TID) 42 capsule 0    HYDROcodone-acetaminophen (NORCO) 5-325 mg per tablet Take 1 tablet by mouth every 6 (six) hours as needed for Pain.      insulin lispro 100 unit/mL injection Inject 22 Units into the skin 3 (three) times daily before meals. Takes 22 units TID AC while on chemo - (Gives between 12 to 15 units TID AC when not on chemo) (Patient taking differently: Inject 20 Units into the skin 3 (three) times daily before meals. Takes 22 units TID AC while on chemo - (Gives between 12 to 15 units TID AC when not on chemo)) 10 mL 2    insulin NPH (NOVOLIN N NPH U-100 INSULIN) 100 unit/mL injection 20 Units before breakfast. No longer 20 in AM      insulin  unit/mL injection Inject 50 Units into the skin every evening.      levoFLOXacin (LEVAQUIN) 500 MG tablet Take 500 mg by mouth every 24 hours.      losartan (COZAAR) 25 MG tablet Take 1 tablet (25 mg total) by mouth once daily. 90 tablet 3    metFORMIN (GLUCOPHAGE) 1000 MG tablet Take 1,000 mg by mouth 2 (two) times daily with meals.      metoprolol tartrate (LOPRESSOR) 25 MG tablet Take 25 mg by mouth 2 (two) times daily.      montelukast (SINGULAIR) 10 mg tablet Take 10 mg by mouth once daily.      morphine (MSIR) 15 MG tablet Take 30 mg by mouth 2 (two) times a day.      omeprazole (PRILOSEC) 40 MG capsule Take 1 capsule by mouth once daily.      ondansetron (ZOFRAN) 4 MG tablet Take 2 tablets (8 mg total) by mouth every 6 (six) hours as needed for Nausea. 360 tablet 0    PONATinib (ICLUSIG) 30 mg Tab Take 30 mg by mouth Daily.      prochlorperazine (COMPAZINE) 10 MG tablet Take 1 tablet (10 mg total) by mouth 4 (four) times daily. 120 tablet 1    triamcinolone acetonide 0.1% (KENALOG) 0.1 % cream Apply topically 2 (two) times daily. for 14 days 45 g 0      Allergies: Patient has no known  allergies.  Family History   Problem Relation Age of Onset    Diabetes Mother     Diabetes Father     Cancer Neg Hx      Social History     Tobacco Use    Smoking status: Never    Smokeless tobacco: Never   Substance Use Topics    Alcohol use: Not Currently    Drug use: Not Currently     Review of Systems   Constitutional:  Positive for activity change, chills and fever.   HENT:  Positive for trouble swallowing. Negative for hearing loss and voice change.    Eyes:  Negative for photophobia, pain and visual disturbance.   Respiratory:  Positive for shortness of breath (tachypnea 2/2 pain). Negative for apnea and cough.    Cardiovascular:  Negative for chest pain, palpitations and leg swelling.   Gastrointestinal:  Positive for nausea and vomiting. Negative for diarrhea.   Endocrine: Negative for polydipsia, polyphagia and polyuria.   Genitourinary:  Negative for decreased urine volume, difficulty urinating and dysuria.   Musculoskeletal:  Positive for back pain and gait problem. Negative for neck pain.        Positive for hip and upper BLE pain   Skin:  Positive for color change. Negative for pallor and rash.   Allergic/Immunologic: Positive for immunocompromised state (on chemotherapy). Negative for environmental allergies and food allergies.   Neurological:  Positive for weakness and headaches. Negative for tremors, seizures, syncope, facial asymmetry, speech difficulty and numbness.   Hematological:  Negative for adenopathy. Does not bruise/bleed easily.   Psychiatric/Behavioral:  Positive for agitation (secondary to pain) and sleep disturbance. The patient is nervous/anxious.    All other systems reviewed and are negative.    Objective:     Vitals:    Pulse: (!) 114  BP: 123/77  MAP (mmHg): 93  Resp: (!) 33  SpO2: 96 %    Temp  Min: 98.1 °F (36.7 °C)  Max: 101.7 °F (38.7 °C)  Pulse  Min: 77  Max: 129  BP  Min: 123/77  Max: 170/87  MAP (mmHg)  Min: 93  Max: 114  Resp  Min: 18  Max: 33  SpO2  Min: 95 %  Max: 97  %    No intake/output data recorded.            Physical Exam  Vitals and nursing note reviewed.   Constitutional:       General: She is in acute distress.      Appearance: She is obese. She is ill-appearing.      Interventions: Nasal cannula in place.   HENT:      Head: Normocephalic and atraumatic. No contusion or laceration.      Right Ear: External ear normal. No decreased hearing noted.      Left Ear: External ear normal. No decreased hearing noted.      Nose: Nose normal. No nasal deformity or signs of injury.      Mouth/Throat:      Lips: Pink. No lesions.      Mouth: Mucous membranes are moist. No lacerations or angioedema.   Eyes:      General: Lids are normal. No allergic shiner or scleral icterus.  Neck:      Trachea: Phonation normal.   Cardiovascular:      Rate and Rhythm: Regular rhythm. Tachycardia present. No extrasystoles are present.     Pulses: No decreased pulses.   Pulmonary:      Effort: Tachypnea present. No bradypnea or respiratory distress.      Comments: Increased WOB  Abdominal:      General: Abdomen is protuberant. There is no distension.      Palpations: Abdomen is soft.   Musculoskeletal:      Cervical back: Neck supple. No edema or erythema.      Right lower leg: No edema.      Left lower leg: No edema.   Skin:     General: Skin is warm and dry.      Capillary Refill: Capillary refill takes less than 2 seconds.      Findings: Rash present. Rash is scaling (Large erythematous annular scaling plaque on abdomen).   Neurological:      Comments:   Mentation: Awake, alert, and oriented x4  Following commands all extremities as able  Fluent spontaneous speech  Normal attention and concentration  GCS: 15 ([id number])  CN: hearing intact to conversational speech  EOMI, PERRL  No facial asymmetry  No aphasia, no dysarthria, limited exam 2/2 language  Failed bedside swallow  SILT, equal sensation bilaterally  JAZMYN, no BUE drift  RUE 5/5, RLE 5/5  LUE 3/5, LLE 3/5 -- BLE weakness 2/2  pain  Coordination: limited exam, appears intact  Gait: not tested for safety   Psychiatric:         Attention and Perception: Attention normal.         Mood and Affect: Mood is anxious. Affect is tearful (crying from pain).         Speech: Speech normal.         Behavior: Behavior is agitated (visibly in pain). Behavior is cooperative.        Today I personally reviewed pertinent medications, lines/drains/airways, imaging, cardiology results, laboratory results, microbiology results,    Assessment/Plan:     Neuro  * Acute SDH (subdural hematoma) not associated with recent fall  58-year-old female with PMHx of insulin-dependent T2DM, diabetic neuropathy, essential HTN, NAFLD, obesity, former tobacco use disorder (quit 4-5 months ago), and CML (actively on chemotherapy) transferred from [facility] ED in [place] for acute SDH. At OSH, c/o back pain, BLE pain, and hip pain (refractory to Norco), exacerbated s/p fall. No LOC or head trauma. Pelvic and lumbar XR without acute findings. Labs significant for leukocytosis (WBC 38) with 29% blasts, c/w an acute blast crisis. Started on broad-spectrum ABX for fever of 101.7°F. CXR suggestive of RLL congestive process. Initially accepted to [facility]e/Onc service, until 6PM when patient had acute decline in mentation. CTH revealed SDH at falx with extension into R tentorium. Transferred to [facility] NCC for higher level of care.     - AC/AP status: None  - Admit to NCC unit  - NSGY consulted, following   - No acute surgical intervention  - Q1H Neuro checks, VS, I/Os    - SBP goal <160   - PRN labetalol, hydralazine   - C/w home antihypertensives  - Na goal >135  - Maintain euvolemia    - Keppra 500mg Q12H x7d for seizure PPx  - Atorvastatin 40mg QD  - Seizure, fall, and aspiration precautions  - HOB >30°  - Hold antiplatelet and anticoagulation in setting of acute bleed  - Defer SQH, place SCDs    - TTE: pending   - aPTT, PT/INR: reviewed, WNL  - Hgb A1c, TSH, lipid panel: pending  -  Daily CBC, CMP, Mg, Phos   - Replete Mg/Phos/K to 2/3/4, respectively  - PT/OT consulted for evaluation  - SLP consulted, f/u recs  - Additional consults: SW/CM, PMR, Nutrition    Derm  Tinea corporis  - Triamcinolone BID continued   - Initial prescription indicates 14d duration   - Should end 3/26/2024, though has missed several doses  - Monitor rash Qshift    Pulmonary  Pneumonia of right lower lobe due to infectious organism  - Possibly 2/2 aspiration event  - Currently dysphagic, failed [place]  - 2L NC  - See Acute respiratory failure with hypoxia     Acute respiratory failure with hypoxia  - Requiring 2L NC to maintain SpO2 >94%   - Wean as tolerated  - ABG at OSH with pO2 67  - CXR at OSH suggestive of RLL PNA   - Also shows interstitial prominence likely 2/2 hyperviscosity from hyperleukocytosis   - Tachypneic with RR 24-36 br/min  - COVID, Flu, Strep negative  - C/w broad-spectrum ABX  - ABG PRN for abrupt increases in O2 requirements  - F/u CXR as indicated    Cardiac/Vascular  Pure hypertriglyceridemia  - TG elevated to 308   -  six months ago  - C/w home atorvastatin    Essential hypertension  - C/w home meds:   - Amlodipine 10mg QD   - Metoprolol 25mg BID  - Home Lasix held, unclear if taking  - SBP goal <160  - On Cleviprex at OSH, not requiring at this time  - C/w IV PRN hydralazine and labetalol  - Control pain    ID  Sepsis with acute hypoxic respiratory failure without septic shock  - SOFA score 2  - No hypotension  - Febrile at OSH, resolved with Tylenol   - C/w PRN Tylenol Q6H  - Continued on broad-spectrum ABX from OSH  - NS infusion @100mL/hr  - Monitor I/O's hourly    Oncology  Blast crisis phase of chronic myeloid leukemia  - As evidenced by labs  - Diagnosed at OSH prior to transfer   - Blasts 27%  - F/u CBC  - Heme/Onc consult placed, per NCC on-call, OK for eval in AM  - See CML (chronic myelocytic leukemia)     Cancer associated pain  - Takes Norco 5-325mg at home with no relief  -  C/w IV Dilaudid PRN and PO oxy PRN  - Adjust analgesia as necessary to maintain patient comfort    CML (chronic myelocytic leukemia)  - Currently with blast crisis  - Daily CBC w/ diff  - Monitor ESR, LDH, fibrinogen  - Takes chemotherapy med PO QD   -  has in truck   - Instructed to bring in tomorrow to give to RN to be verified by Pharmacy   - Will continue med as recommended by Heme/Onc once evaluated  - Heme/Onc consulted, appreciate recs  - OSH documentation mentions possible need for leukapheresis    Endocrine  Type 2 diabetes mellitus with diabetic neuropathy, with long-term current use of insulin  - Home meds include: 20u NPH before breakfast, 50u NPH QHS, 1000mg metformin BID  - Associated with hyperglycemia on admission   - Initial     - Administered SSI   - Start insulin gtt if not improving significantly on next check  - POC BG checks Q6H  - SSI PRN: moderate-dose   - May require increase to high-dose scale, f/u BG trends    Obesity, Class II, BMI 35-39.9  - Body mass index is 36.41 kg/m².  - Dietary, nutritional, weight loss counseling on stabilization  - Nutrition consulted, f/u recs  - PT/OT evals pending    GI  GERD (gastroesophageal reflux disease)  - Pepcid BID started in lieu of home omeprazole (non-formulary)    Hepatosplenomegaly  - No mention of liver enlargement on most recent CT abdomen  - Splenomegaly increased since last CT  - C/w CBC and CMP daily    NAFLD (nonalcoholic fatty liver disease)  PMHx of  - LFTs normal  - C/w daily CMP          The patient is being Prophylaxed for:  Venous Thromboembolism with: Mechanical  Stress Ulcer with: H2B  Ventilator Pneumonia with: not applicable    Activity Orders            Progressive Mobility Protocol (mobilize patient to their highest level of functioning at least twice daily) starting at 03/24 0800    Turn patient starting at 03/24 0037    Elevate HOB starting at 03/23 2209    Diet NPO: NPO starting at 03/23 2205          Full  Code        Due to a high probability of clinically significant, life-threatening deterioration, the patient required my highest level of preparedness to intervene emergently. I personally provided 55 minutes of critical care time (CCT) in directly managing the patient. CCT included obtaining a thorough history, physically examining the patient, continuous monitoring of vitals and pulse oximetry, ordering and review of lab and imaging studies, coordinating urgent treatment and development of a management plan, evaluation of patient's response to treatment, frequent reassessment for potential decompensation, and consultation with other providers. CCT was performed to assess and manage the high probability of imminent, life-threatening deterioration that could result in multi-organ failure. CTT provided was exclusive of separately billable procedures and treatment of other patients. Please see details in H&P above for further information on my assessment and treatment of the patient.    Estrella Serna PA-C  Neurocritical Care  [facility] - Neuro Critical Care

## 2024-03-24 NOTE — PROGRESS NOTES
[facility] - Neuro Critical Care  Neurocritical Care  Progress Note    Admit Date: 3/23/2024  Service Date: 03/24/2024  Length of Stay: 1    Subjective:     Chief Complaint: Subdural hematoma    History of Present Illness: Fela Ellison is a 58-year-old female with PMHx of insulin-dependent T2DM, diabetic neuropathy, essential HTN, NAFLD, obesity, former tobacco use disorder (quit 4-5 months ago), and CML (actively on chemotherapy) with blast crisis diagnosed prior to transfer at OSH, who presents as a transfer from [facility] ED in [place] for acute SDH. History is obtained primarily from chart review due to patient being Serbian-speaking, though  is at bedside assisting with interpretation, as he can speak English more fluently.     This morning (3/23), patient presented to OSH c/o back pain, BLE pain, and hip pain (refractory to home [place] prescribed for cancer-associated pain), all of which worsened after an episode of weakness causing her to slip while ambulating. Patient did not suffer head or back trauma, since she was caught before making contact with the ground or nearby objects. Patient also reportedly c/o a sore throat and feeling generally unwell. Vitals at that time were significant for tachycardia and hypertension, likely associated with pain, and she was started on IV morphine (ineffective) and IV Dilaudid.     XRays of spine and pelvis were clear. CT chest/abd/pelvis significant for splenomegaly (increased from 11.9cm on previous scan to 18.2cm), subcutaneous anasarca edema present at the level of the abdomen and pelvis (unchanged from prior), moderate colonic fecal load without pericolonic stranding, and bilateral ovarian cysts. Degenerative changes of the lower spine, specifically at L5-S1, are more apparent on this CT than prior imaging.     Labs were significant for leukocytosis (WBC 38) with 29% blasts, c/w an acute blast crisis. [facility] does not have an inpatient Heme/Onc service; therefore,  initial plan was for patient to transfer to [facility] for inpatient treatment of blast crisis with Heme/Onc. Unfortunately, at 18:07PM, while still awaiting transfer, patient's  alerted OSH RN of a mental status change. Patient exhibited new-onset confusion, disorientation to all but self, and inability to consistently follow commands. She was febrile to 101.7°F and started on broad-spectrum ABX (vancomycin and cefepime), IVF, and rectal Tylenol. CXR suggestive of RLL congestive process.    CTH showed new 3mm thickening of the interhemispheric falx suggestive of shallow SDH, with extension into R tentorium. No mass effect, MLS, hydrocephalus, sulcal effacement, or skull fracture present. Transfer center was updated on new findings, and patient was accepted instead to [facility] NCC unit by Dr. Sevilla for higher level of care and closer neurological monitoring.    Hospital Course:   3/24: Insulin gtt started overnight, transitioning to subQ. This AM with improved mentation. NG tube for failed swallow eval. CT head stable, NSGY with no surgical intervention. Afebrile since starting abx. Step down to med onc.    Interval History:  Mentation improved. CT head stable. Afebrile on abx. Insulin gtt started overnight, transition to subQ.    Review of Systems   Constitutional:  Negative for chills and fever.   HENT:  Positive for trouble swallowing.         Nose pain (NGT present)   Respiratory:  Negative for chest tightness.    Cardiovascular:  Negative for chest pain.   Gastrointestinal:  Positive for constipation. Negative for abdominal pain and diarrhea.   Genitourinary:  Negative for dysuria.   Musculoskeletal:  Negative for back pain.   Neurological:  Negative for dizziness and seizures.       Objective:     Vitals:  Temp: 97.8 °F (36.6 °C)  Pulse: 95  Rhythm: normal sinus rhythm  BP: (!) 153/70  MAP (mmHg): 101  Resp: (!) 22  SpO2: 97 %    Temp  Min: 97.8 °F (36.6 °C)  Max: 101.7 °F (38.7 °C)  Pulse  Min: 77  Max: 129  BP  Min:  123/77  Max: 170/87  MAP (mmHg)  Min: 93  Max: 114  Resp  Min: 17  Max: 33  SpO2  Min: 93 %  Max: 97 %    03/23 0701 - 03/24 0700  In: 637.3 [I.V.:189.6]  Out: 1000 [Urine:1000]          Physical Exam  Constitutional:       General: She is not in acute distress.     Appearance: She is obese.   HENT:      Head: Normocephalic and atraumatic.      Nose: Nose normal. No rhinorrhea.      Mouth/Throat:      Mouth: Mucous membranes are moist.      Pharynx: Oropharynx is clear.   Eyes:      Extraocular Movements: Extraocular movements intact.      Conjunctiva/sclera: Conjunctivae normal.   Cardiovascular:      Rate and Rhythm: Normal rate and regular rhythm.   Pulmonary:      Effort: Pulmonary effort is normal. No respiratory distress.   Abdominal:      General: Bowel sounds are normal.      Palpations: Abdomen is soft.      Tenderness: There is no abdominal tenderness.   Musculoskeletal:      Right lower leg: No edema.      Left lower leg: No edema.   Skin:     General: Skin is warm and dry.   Neurological:      Mental Status: She is alert and oriented to person, place, and time.      GCS: GCS eye subscore is 4. GCS verbal subscore is 5. GCS motor subscore is 6.          Medications:  Continuous  sodium chloride 0.9%, Last Rate: 100 mL/hr at 03/24/24 0801  insulin regular 1 units/mL infusion orderable (DKA), Last Rate: 4 Units/hr (03/24/24 0807)    Scheduled  acyclovir, 400 mg, BID  allopurinoL, 300 mg, Daily  amLODIPine, 10 mg, Daily  atorvastatin, 40 mg, Daily  ceFEPime IV (PEDS and ADULTS), 2 g, Q8H  famotidine, 20 mg, BID  fluconazole 40 mg/ml, 400 mg, Daily  gabapentin, 800 mg, TID  hydroxyurea, 3,500 mg, Daily  levETIRAcetam (Keppra) IV (PEDS and ADULTS), 500 mg, Q12H  levoFLOXacin, 500 mg, Q24H  metoprolol tartrate, 25 mg, BID  montelukast, 10 mg, Daily  triamcinolone acetonide 0.1%, , BID  vancomycin (VANCOCIN) IV (PEDS and ADULTS), 1,250 mg, Q12H    PRN  acetaminophen, 650 mg, Q6H PRN  albuterol, 2 puff, Q4H  PRN  calcium gluconate IVPB, 1 g, PRN  calcium gluconate IVPB, 2 g, PRN  calcium gluconate IVPB, 3 g, PRN  dextrose 10%, 12.5 g, PRN  dextrose 10%, 12.5 g, PRN  dextrose 10%, 25 g, PRN  dextrose 10%, 25 g, PRN  glucagon (human recombinant), 1 mg, PRN  hydrALAZINE, 10 mg, Q6H PRN  HYDROmorphone, 1 mg, Q6H PRN  insulin aspart U-100, 0-10 Units, Q6H PRN  labetalol, 10 mg, Q4H PRN  magnesium sulfate IVPB, 2 g, PRN  magnesium sulfate IVPB, 4 g, PRN  ondansetron, 4 mg, Q8H PRN  oxyCODONE, 10 mg, Q4H PRN  potassium chloride, 40 mEq, PRN   And  potassium chloride, 60 mEq, PRN   And  potassium chloride, 80 mEq, PRN  prochlorperazine, 5 mg, Q4H PRN  sodium chloride 0.9%, 3 mL, PRN  sodium phosphate 15 mmol in dextrose 5 % (D5W) 250 mL IVPB, 15 mmol, PRN  sodium phosphate 20.01 mmol in dextrose 5 % (D5W) 250 mL IVPB, 20.01 mmol, PRN  sodium phosphate 30 mmol in dextrose 5 % (D5W) 250 mL IVPB, 30 mmol, PRN  vancomycin - pharmacy to dose, , pharmacy to manage frequency      Today I personally reviewed pertinent medications, lines/drains/airways, laboratory results,    Diet  Diet NPO  Diet NPO    Assessment/Plan:     Neuro  * Subdural hematoma  58-year-old female with PMHx of insulin-dependent T2DM, diabetic neuropathy, essential HTN, NAFLD, obesity, former tobacco use disorder (quit 4-5 months ago), and CML (actively on chemotherapy) transferred from [facility] ED in [place] for acute SDH. At OSH, c/o back pain, BLE pain, and hip pain (refractory to Norco), exacerbated s/p fall. No LOC or head trauma. Pelvic and lumbar XR without acute findings. Labs significant for leukocytosis (WBC 38) with 29% blasts, c/w an acute blast crisis. Started on broad-spectrum ABX for fever of 101.7°F. CXR suggestive of RLL congestive process. Initially accepted to [facility] service, until 6PM when patient had acute decline in mentation. CTH revealed SDH at falx with extension into R tentorium. Transferred to [facility]CC for higher level of care.      - Admit to NCC unit  - NSGY consulted, no acute surgical intervention  - SBP goal <160   - PRN labetalol, hydralazine   - C/w home antihypertensives  - Na goal >135  - Maintain euvolemia  - Keppra 500mg Q12H x7d for seizure PPx  - Atorvastatin 40mg QD  - Seizure, fall, and aspiration precautions  - HOB >30°  - SCDs; hold ACAP in the setting of SDH  - TTE  - aPTT, PT/INR  - Hgb A1c, TSH, lipid panel  - Daily CBC, CMP, Mg, Phos  - PT/OT consulted for evaluation  - SLP consulted, f/u recs  - Additional consults: SW/CM, PMR, Nutrition    Derm  Tinea corporis  - Triamcinolone BID continued   - Initial prescription indicates 14d duration   - Should end 3/26/2024, though has missed several doses  - Monitor rash Qshift    Pulmonary  Pneumonia of right lower lobe due to infectious organism  - Possibly 2/2 aspiration event  - Currently dysphagic, failed Zuleta  - 2L NC  - See Acute respiratory failure with hypoxia     Acute respiratory failure with hypoxia  Likely 2/2 aspiration pneumonia  COVID, Flu, Strep negative  Cultures pending    - Wean supplemental O2 as tolerated    Cardiac/Vascular  Pure hypertriglyceridemia  - TG elevated to 308   -  six months ago  - C/w home atorvastatin    Essential hypertension  - C/w home meds:   - Amlodipine 10mg QD   - Metoprolol 25mg BID  - Home Lasix held, unclear if taking  - SBP goal <160  - C/w IV PRN hydralazine and labetalol    ID  Sepsis with acute hypoxic respiratory failure without septic shock  - SOFA score 2  - No hypotension  - Febrile at OSH, resolved with Tylenol   - C/w PRN Tylenol Q6H  - Continued on broad-spectrum ABX from OSH  - NS infusion @100mL/hr    Oncology  Cancer associated pain  - Takes Norco 5-325mg at home with no relief  - C/w IV Dilaudid PRN and PO oxy PRN  - Adjust analgesia as necessary to maintain patient comfort    CML (chronic myelocytic leukemia)  - Currently with blast crisis  - Daily CBC w/ diff  - Monitor ESR, LDH, fibrinogen  - Takes  chemotherapy med PO QD   -  to bring from home to give to RN to be verified by Pharmacy   - Will continue med as recommended by Heme/Onc once evaluated  - Heme/Onc consulted, accepted by service  - OSH documentation mentions possible need for leukapheresis    Endocrine  Type 2 diabetes mellitus with diabetic neuropathy, with long-term current use of insulin  Long term insulin dependence.   Home meds: 50U QHS, 20U TID WM plus metformin.  On admission, A1c 6.2, , no glucosuria, AG 11 and bicarb 23.  Ssi and ggt started overnight.    - Transition to SubQ insulin    Obesity, Class II, BMI 35-39.9  - Body mass index is 36.41 kg/m².  - Dietary, nutritional, weight loss counseling on stabilization  - Nutrition consulted, f/u recs  - PT/OT evals pending    GI  NAFLD (nonalcoholic fatty liver disease)  PMHx of  - LFTs normal  - C/w daily CMP        The patient is being Prophylaxed for:  Venous Thromboembolism with: Mechanical  Stress Ulcer with: Not Applicable   Ventilator Pneumonia with: not applicable    Activity Orders            Progressive Mobility Protocol (mobilize patient to their highest level of functioning at least twice daily) starting at 03/24 0800    Turn patient starting at 03/24 0037    Elevate HOB starting at 03/23 2209    Diet NPO: NPO starting at 03/23 2205          Full Code    Machelle Randhawa MD  Neurocritical Care  [facility] - Neuro Critical Care

## 2024-03-24 NOTE — ASSESSMENT & PLAN NOTE
- No mention of liver enlargement on most recent CT abdomen  - Splenomegaly increased since last CT  - C/w CBC and CMP daily

## 2024-03-24 NOTE — PLAN OF CARE
Problem: Physical Therapy  Goal: Physical Therapy Goal  Description: Goals to be met by: 2024     Patient will increase functional independence with mobility by performin. Supine to sit with MInimal Assistance  2. Sit to stand transfer with Minimal Assistance  3. Bed to chair transfer with Minimal Assistance using Rolling Walker  4. Gait  x 20 feet with Minimal Assistance using Rolling Walker.   5. Ascend/descend 4 stair with bilateral Handrails Minimal Assistance using No Assistive Device.     Outcome: Ongoing, Progressing     Pt evaluated and appropriate goals established.

## 2024-03-24 NOTE — ASSESSMENT & PLAN NOTE
- Triamcinolone BID continued   - Initial prescription indicates 14d duration   - Should end 3/26/2024, though has missed several doses  - Monitor rash Qshift

## 2024-03-24 NOTE — HPI
Fela Ellison is a very pleasant 57 yo female with a PMH of DML, DM, HTN who presents with a blast crisis.  She had a fall, but did not have any head trauma. CT head was obtained with what appears to be a thin acute subdural hematoma along the falx and tentorium.  Neurosurgery consulted.  PLTs, coagulation factors wnl, no blood thinning agents.

## 2024-03-24 NOTE — ASSESSMENT & PLAN NOTE
58-year-old female with PMHx of insulin-dependent T2DM, diabetic neuropathy, essential HTN, NAFLD, obesity, former tobacco use disorder (quit 4-5 months ago), and CML (actively on chemotherapy) transferred from [facility] ED in [place] for acute SDH. At OSH, c/o back pain, BLE pain, and hip pain (refractory to Norco), exacerbated s/p fall. No LOC or head trauma. Pelvic and lumbar XR without acute findings. Labs significant for leukocytosis (WBC 38) with 29% blasts, c/w an acute blast crisis. Started on broad-spectrum ABX for fever of 101.7°F. CXR suggestive of RLL congestive process. Initially accepted to [facility]c service, until 6PM when patient had acute decline in mentation. CTH revealed SDH at falx with extension into R tentorium. Transferred to [facility] NCC for higher level of care.     - AC/AP status: None  - Admit to NCC unit  - NSGY consulted, following   - No acute surgical intervention  - Q1H Neuro checks, VS, I/Os    - SBP goal <160   - PRN labetalol, hydralazine   - C/w home antihypertensives  - Na goal >135  - Maintain euvolemia    - Keppra 500mg Q12H x7d for seizure PPx  - Atorvastatin 40mg QD  - Seizure, fall, and aspiration precautions  - HOB >30°  - Hold antiplatelet and anticoagulation in setting of acute bleed  - Defer SQH, place SCDs    - TTE: pending   - aPTT, PT/INR: reviewed, WNL  - Hgb A1c, TSH, lipid panel: pending  - Daily CBC, CMP, Mg, Phos   - Replete Mg/Phos/K to 2/3/4, respectively  - PT/OT consulted for evaluation  - SLP consulted, f/u recs  - Additional consults: SW/CM, PMR, Nutrition

## 2024-03-24 NOTE — PT/OT/SLP EVAL
Physical Therapy Co-Evaluation and Treatment     Patient Name:  Fela Ellison   MRN:  [id number]    *co-treatment with OT  2/2 pt with potential impaired ability to tolerate 2 evaluations 2/2 medical status   Recommendations:     Discharge Recommendations: Moderate Intensity Therapy   Discharge Equipment Recommendations: none   Barriers to discharge: None    Assessment:     Fela Ellison is a 58 y.o. female admitted with a medical diagnosis of Subdural hematoma.  She presents with the following impairments/functional limitations: weakness, impaired endurance, impaired self care skills, impaired functional mobility, gait instability, impaired balance, decreased upper extremity function, decreased lower extremity function, pain. Pt demo's significant weakness and deconditioning, also limited by reports of significant pain in bilateral hips. Pt presents well below reported functional baseline. Patient currently demonstrates a need for moderate intensity therapy on a daily basis post acute secondary to a decline in functional status due to disease . Pt would continue to benefit from acute skilled therapy intervention to address deficits and progress toward prior level of function.       Rehab Prognosis: Good; patient would benefit from acute skilled PT services to address these deficits and reach maximum level of function.    Recent Surgery: * No surgery found *      Plan:     During this hospitalization, patient to be seen 4 x/week to address the identified rehab impairments via gait training, therapeutic activities, therapeutic exercises, neuromuscular re-education and progress toward the following goals:    Plan of Care Expires:  04/24/24    Subjective     Chief Complaint: fatigue pain in bilateral hips   Patient/Family Comments/goals: to get better   Pain/Comfort:  Pain Rating 1: 10/10  Location - Side 1: Bilateral  Location - Orientation 1: generalized  Location 1: hip  Pain Addressed 1:  Pre-medicate for activity, Reposition, Distraction, Cessation of Activity, Nurse notified  Pain Rating Post-Intervention 1: 10/10    Patients cultural, spiritual, Buddhism conflicts given the current situation: no    Living Environment:  Pt lives with her spouse and 2 children in a H with 4 ALFONZO with HR present.   Prior to admission, patients level of function was able to ambulate with intermittent use of a RW as needed, uses wheelchair for community mobility 2/2 poor endurance.  Equipment used at home: walker, rolling, bedside commode, wheelchair.  DME owned (not currently used): none.  Upon discharge, patient will have assistance from. family      Objective:     Communicated with RN prior to session.  Patient found HOB elevated with pulse ox (continuous), telemetry, blood pressure cuff, peripheral IV, PureWick  upon PT entry to room.    General Precautions: Standard, fall  Orthopedic Precautions:N/A   Braces: N/A  Respiratory Status: Room air    Exams:  Cognitive Exam:  Patient is AAOx4, followed all commands, communicates clearly and fluently  Gross Motor Coordination:  WFL  RLE ROM: WFL  RLE Strength: grossly 3/5   LLE ROM: WFL  LLE Strength: grossly 4/5     Functional Mobility:  Bed Mobility:     Supine to Sit: total assistance  Sit to Supine: total assistance  Transfers:     Sit to Stand:  from EOB with maximum assistance of 2 therapists. Facilitation at posterior pelvis to promote anterior weight shift and hip extension. Blocking provided at R knee.       AM-PAC 6 CLICK MOBILITY  Total Score:9       Treatment & Education:  Pt sat EOB with contact guard- minimum assistance for static sitting balance, requiring increase to moderate assistance with increased time and fatigue. After standing, pt reported increased fatigue, required return to supine. Assistance provided for optimal positioning with bed placed in chair position.   Pt educated on role of PT/POC. Pt verbalized understanding.   Pt encouraged to  only perform OOB mobility with assistance from nursing/therapy. Pt agreeable.       Patient left with bed in chair position with all lines intact, call button in reach, RN notified, and spouse present.    GOALS:   Multidisciplinary Problems       Physical Therapy Goals          Problem: Physical Therapy    Goal Priority Disciplines Outcome Goal Variances Interventions   Physical Therapy Goal     PT, PT/OT Ongoing, Progressing     Description: Goals to be met by: 2024     Patient will increase functional independence with mobility by performin. Supine to sit with MInimal Assistance  2. Sit to stand transfer with Minimal Assistance  3. Bed to chair transfer with Minimal Assistance using Rolling Walker  4. Gait  x 20 feet with Minimal Assistance using Rolling Walker.   5. Ascend/descend 4 stair with bilateral Handrails Minimal Assistance using No Assistive Device.                          History:     Past Medical History:   Diagnosis Date    Cancer     CML (chronic myelocytic leukemia)     DM2 (diabetes mellitus, type 2)     HTN (hypertension)     NAFLD (nonalcoholic fatty liver disease)     Neuropathy        Past Surgical History:   Procedure Laterality Date    ABDOMINAL SURGERY       SECTION      x4    CHOLECYSTECTOMY         Time Tracking:     PT Received On: 24  PT Start Time: 1009     PT Stop Time: 1037  PT Total Time (min): 28 min     Billable Minutes: Evaluation 8 mins and Neuromuscular Re-education 20 mins       2024

## 2024-03-24 NOTE — EICU
Nurses Note -- 4 Eyes      3/23/2024   9:49 PM      Skin assessed during: Admit      [x] No Altered Skin Integrity Present    [x]Prevention Measures Documented      [] Yes- Altered Skin Integrity Present or Discovered   [] LDA Added if Not in Epic (Describe Wound)   [] New Altered Skin Integrity was Present on Admit and Documented in LDA   [] Wound Image Taken    Wound Care Consulted? No    Attending Nurse:  Adela Lopez RN    Second RN/Staff Member:   Zeinab Christianson RN    Some bruising on abdomen noted but no skin breakdown.  Documented for BS RN by eICU RN

## 2024-03-24 NOTE — ASSESSMENT & PLAN NOTE
- Currently with blast crisis  - Daily CBC w/ diff  - Monitor ESR, LDH, fibrinogen  - Takes chemotherapy med PO QD   -  has in truck   - Instructed to bring in tomorrow to give to RN to be verified by Pharmacy   - Will continue med as recommended by Heme/Onc once evaluated  - Heme/Onc consulted, appreciate recs  - OSH documentation mentions possible need for leukapheresis

## 2024-03-24 NOTE — ASSESSMENT & PLAN NOTE
59 yo female with AML in blast crisis who presents due to concern for fall although she did not have head trauma. CT head with very thin small acute falcine subdural hematoma which is stable on repeat CT head imaging.      --No acute neurosurgical intervention  --Repeat CT head stable  --PLT/Coagulation factors wnl, no blood thinning agents  --We will sign off, please call with any concerns

## 2024-03-24 NOTE — NURSING
"Patient arrived to [facility] from [facility]  by [address], [address]     Report received from: OSDELIA RN,      Type of stroke/diagnosis: SDH     Current symptoms: AOx4, spontaneous movement x4, equal strength bilaterally, pt complains of pain "all over", nausea present, pt diaphoretic     Skin Assessment done: Yes   Wounds noted: various bruising around lower abdomen   Skin Assessment Verified by: Adela Zuleta Completed? Yes, failed     Patient Belongings on Admit: no belongings     [facility] notified: SATISH Serna   "

## 2024-03-24 NOTE — ASSESSMENT & PLAN NOTE
- Requiring 2L NC to maintain SpO2 >94%   - Wean as tolerated  - ABG at OSH with pO2 67  - CXR at OSH suggestive of RLL PNA   - Also shows interstitial prominence likely 2/2 hyperviscosity from hyperleukocytosis   - Tachypneic with RR 24-36 br/min  - COVID, Flu, Strep negative  - C/w broad-spectrum ABX  - ABG PRN for abrupt increases in O2 requirements  - F/u CXR as indicated

## 2024-03-24 NOTE — HOSPITAL COURSE
3/24: Insulin gtt started overnight, transitioning to subQ. This AM with improved mentation. NG tube for failed swallow eval. CT head stable, NSGY with no surgical intervention. Afebrile since starting abx. Step down to med onc.

## 2024-03-24 NOTE — PT/OT/SLP EVAL
Speech Language Pathology Evaluation  Bedside Swallow    Patient Name:  Fela Ellison   MRN:  [id number]  Admitting Diagnosis: Subdural hematoma    Recommendations:                 General Recommendations:  Dysphagia therapy and Speech language evaluation  Diet recommendations:  NPO, NPO   Aspiration Precautions: Continue alternate means of nutrition, Frequent oral care, and Strict aspiration precautions   General Precautions: Standard, fall, aspiration, NPO    Assessment:     Fela Ellison is a 58 y.o. female with an SLP diagnosis of Dysphagia.  She presents with s/s aspiration.    History:     Past Medical History:   Diagnosis Date    Cancer     CML (chronic myelocytic leukemia)     DM2 (diabetes mellitus, type 2)     HTN (hypertension)     NAFLD (nonalcoholic fatty liver disease)     Neuropathy        Past Surgical History:   Procedure Laterality Date    ABDOMINAL SURGERY       SECTION      x4    CHOLECYSTECTOMY         Chest X-Rays: 3/23 Cardiopericardial silhouette appearance is similar.  Bilateral lungs are remarkable for ground-glass and reticulonodular opacities suggestive of mild congestive process.  There are relative denser opacifications within the right lower lung which could represent infiltrates from infection.  No significant fluid within the pleural spaces.  No pneumothorax     Prior diet: regular    NG tube in place    Subjective   Lethargic, aroused with verbal stim.  at bedside.      used for assessment.    Pain/Comfort:  Pain Rating 1: 8/10  Location 1: throat  Pain Addressed 2: Nurse notified  Pain Rating Post-Intervention 2: 8/10    Respiratory Status: Nasal cannula, flow n L/min    Objective:     Oral Musculature Evaluation  Oral Musculature: WFL  Dentition: present and adequate  Oral Labial Strength and Mobility: WFL  Lingual Strength and Mobility: WFL  Volitional Cough: fair  Volitional Swallow: adequate - pt grimaces in pain  Voice  Prior to PO Intake: weak, slightly wet    Bedside Swallow Eval:   Consistencies Assessed:  Thin liquids small ice chips x2     Oral Phase:   Slow oral transit time    Pharyngeal Phase:   throat clearing after 1st ice chip, coughing after second ice chip  wet vocal quality after swallow    SLP educated pt &  on all recs & precautions, risks & s/s aspiration, role of SLP, POC, etc.  verbalized understanding. Pt may need reinforcement to follow.     Goals:   Multidisciplinary Problems       SLP Goals          Problem: SLP    Goal Priority Disciplines Outcome   SLP Goal     SLP Ongoing, Not Progressing   Description: Speech Language Pathology Goals  Goals expected to be met by 3/31  1. Ongoing swallow assessment  2. SLP Speech/Language/Cognitive Evaluation                         Plan:     Patient to be seen:  5 x/week   Plan of Care expires:  04/22/24  Plan of Care reviewed with:  patient, spouse   SLP Follow-Up:  Yes       Discharge recommendations:    TBD    Time Tracking:     SLP Treatment Date:   03/24/24  Speech Start Time:  1406  Speech Stop Time:  1426     Speech Total Time (min):  20 min    Billable Minutes: Eval Swallow and Oral Function 12 and Self Care/Home Management Training 8    03/24/2024

## 2024-03-24 NOTE — ASSESSMENT & PLAN NOTE
- SOFA score 2  - No hypotension  - Febrile at OSH, resolved with Tylenol   - C/w PRN Tylenol Q6H  - Continued on broad-spectrum ABX from OSH  - NS infusion @100mL/hr

## 2024-03-25 ENCOUNTER — DOCUMENTATION ONLY (OUTPATIENT)
Dept: INFUSION THERAPY | Facility: HOSPITAL | Age: 59
End: 2024-03-25

## 2024-03-25 DIAGNOSIS — I62.00 NONTRAUMATIC SUBDURAL HEMORRHAGE, UNSPECIFIED: Primary | ICD-10-CM

## 2024-03-25 PROBLEM — E44.0 MODERATE MALNUTRITION: Status: ACTIVE | Noted: 2024-03-25

## 2024-03-25 PROBLEM — R73.9 HYPERGLYCEMIA: Status: ACTIVE | Noted: 2024-03-25

## 2024-03-25 LAB
ALBUMIN SERPL BCP-MCNC: 2.7 G/DL (ref 3.5–5.2)
ALLENS TEST: ABNORMAL
ALLENS TEST: ABNORMAL
ALLENS TEST: NORMAL
ALP SERPL-CCNC: 66 U/L (ref 55–135)
ALT SERPL W/O P-5'-P-CCNC: 7 U/L (ref 10–44)
ANION GAP SERPL CALC-SCNC: 11 MMOL/L (ref 8–16)
ANISOCYTOSIS BLD QL SMEAR: SLIGHT
AST SERPL-CCNC: 13 U/L (ref 10–40)
BACTERIA UR CULT: ABNORMAL
BASOPHILS NFR BLD: 0 % (ref 0–1.9)
BILIRUB SERPL-MCNC: 0.5 MG/DL (ref 0.1–1)
BNP SERPL-MCNC: 53 PG/ML (ref 0–99)
BUN SERPL-MCNC: 14 MG/DL (ref 6–20)
CALCIUM SERPL-MCNC: 8.7 MG/DL (ref 8.7–10.5)
CHLORIDE SERPL-SCNC: 107 MMOL/L (ref 95–110)
CO2 SERPL-SCNC: 21 MMOL/L (ref 23–29)
CREAT SERPL-MCNC: 0.8 MG/DL (ref 0.5–1.4)
CRP SERPL-MCNC: 263.7 MG/L (ref 0–8.2)
DELSYS: ABNORMAL
DELSYS: ABNORMAL
DELSYS: NORMAL
DIFFERENTIAL METHOD BLD: ABNORMAL
EOSINOPHIL NFR BLD: 0 % (ref 0–8)
ERYTHROCYTE [DISTWIDTH] IN BLOOD BY AUTOMATED COUNT: 20.4 % (ref 11.5–14.5)
EST. GFR  (NO RACE VARIABLE): >60 ML/MIN/1.73 M^2
FIBRINOGEN PPP-MCNC: 616 MG/DL (ref 182–400)
GIANT PLATELETS BLD QL SMEAR: PRESENT
GLUCOSE SERPL-MCNC: 427 MG/DL (ref 70–110)
HCO3 UR-SCNC: 21.1 MMOL/L (ref 24–28)
HCO3 UR-SCNC: 23.2 MMOL/L (ref 24–28)
HCT VFR BLD AUTO: 35.3 % (ref 37–48.5)
HCT VFR BLD CALC: 35 %PCV (ref 36–54)
HCT VFR BLD CALC: 37 %PCV (ref 36–54)
HGB BLD-MCNC: 10.4 G/DL (ref 12–16)
IMM GRANULOCYTES # BLD AUTO: ABNORMAL K/UL (ref 0–0.04)
IMM GRANULOCYTES NFR BLD AUTO: ABNORMAL % (ref 0–0.5)
LACTATE SERPL-SCNC: 1.6 MMOL/L (ref 0.5–2.2)
LDH SERPL L TO P-CCNC: 1.76 MMOL/L (ref 0.5–2.2)
LDH SERPL L TO P-CCNC: 1658 U/L (ref 110–260)
LYMPHOCYTES NFR BLD: 9 % (ref 18–48)
MAGNESIUM SERPL-MCNC: 2 MG/DL (ref 1.6–2.6)
MCH RBC QN AUTO: 24.6 PG (ref 27–31)
MCHC RBC AUTO-ENTMCNC: 29.5 G/DL (ref 32–36)
MCV RBC AUTO: 84 FL (ref 82–98)
METAMYELOCYTES NFR BLD MANUAL: 1.5 %
MONOCYTES NFR BLD: 8.5 % (ref 4–15)
NEUTROPHILS NFR BLD: 50 % (ref 38–73)
NEUTS BAND NFR BLD MANUAL: 0.5 %
NRBC BLD-RTO: 2 /100 WBC
OHS QRS DURATION: 88 MS
OHS QRS DURATION: 90 MS
OHS QRS DURATION: 96 MS
OHS QTC CALCULATION: 440 MS
OHS QTC CALCULATION: 465 MS
OHS QTC CALCULATION: 477 MS
OVALOCYTES BLD QL SMEAR: ABNORMAL
PATH REV BLD -IMP: NORMAL
PCO2 BLDA: 28.5 MMHG (ref 35–45)
PCO2 BLDA: 34.5 MMHG (ref 35–45)
PH SMN: 7.44 [PH] (ref 7.35–7.45)
PH SMN: 7.48 [PH] (ref 7.35–7.45)
PHOSPHATE SERPL-MCNC: 3.7 MG/DL (ref 2.7–4.5)
PLATELET # BLD AUTO: 240 K/UL (ref 150–450)
PLATELET BLD QL SMEAR: ABNORMAL
PMV BLD AUTO: ABNORMAL FL (ref 9.2–12.9)
PO2 BLDA: 173 MMHG (ref 40–60)
PO2 BLDA: 57 MMHG (ref 40–60)
POC BE: -1 MMOL/L
POC BE: -2 MMOL/L
POC IONIZED CALCIUM: 1.23 MMOL/L (ref 1.06–1.42)
POC IONIZED CALCIUM: 1.24 MMOL/L (ref 1.06–1.42)
POC SATURATED O2: 100 % (ref 95–100)
POC SATURATED O2: 92 % (ref 95–100)
POC TCO2: 22 MMOL/L (ref 24–29)
POC TCO2: 24 MMOL/L (ref 24–29)
POCT GLUCOSE: 294 MG/DL (ref 70–110)
POCT GLUCOSE: 322 MG/DL (ref 70–110)
POCT GLUCOSE: 345 MG/DL (ref 70–110)
POCT GLUCOSE: 347 MG/DL (ref 70–110)
POCT GLUCOSE: 363 MG/DL (ref 70–110)
POCT GLUCOSE: 422 MG/DL (ref 70–110)
POCT GLUCOSE: 426 MG/DL (ref 70–110)
POIKILOCYTOSIS BLD QL SMEAR: SLIGHT
POLYCHROMASIA BLD QL SMEAR: ABNORMAL
POTASSIUM BLD-SCNC: 3.6 MMOL/L (ref 3.5–5.1)
POTASSIUM BLD-SCNC: 4 MMOL/L (ref 3.5–5.1)
POTASSIUM SERPL-SCNC: 3.7 MMOL/L (ref 3.5–5.1)
PROT SERPL-MCNC: 5.6 G/DL (ref 6–8.4)
RBC # BLD AUTO: 4.23 M/UL (ref 4–5.4)
SAMPLE: ABNORMAL
SAMPLE: ABNORMAL
SAMPLE: NORMAL
SCHISTOCYTES BLD QL SMEAR: ABNORMAL
SCHISTOCYTES BLD QL SMEAR: PRESENT
SITE: ABNORMAL
SITE: ABNORMAL
SITE: NORMAL
SMUDGE CELLS BLD QL SMEAR: PRESENT
SODIUM BLD-SCNC: 140 MMOL/L (ref 136–145)
SODIUM BLD-SCNC: 140 MMOL/L (ref 136–145)
SODIUM SERPL-SCNC: 139 MMOL/L (ref 136–145)
SPHEROCYTES BLD QL SMEAR: ABNORMAL
VANCOMYCIN TROUGH SERPL-MCNC: 16.1 UG/ML (ref 10–22)
WBC # BLD AUTO: 50.03 K/UL (ref 3.9–12.7)
WBC OTHER NFR BLD MANUAL: 31 %

## 2024-03-25 PROCEDURE — 92526 ORAL FUNCTION THERAPY: CPT

## 2024-03-25 PROCEDURE — 93010 ELECTROCARDIOGRAM REPORT: CPT | Mod: ,,, | Performed by: INTERNAL MEDICINE

## 2024-03-25 PROCEDURE — 84100 ASSAY OF PHOSPHORUS: CPT | Performed by: PHYSICIAN ASSISTANT

## 2024-03-25 PROCEDURE — 36415 COLL VENOUS BLD VENIPUNCTURE: CPT | Mod: XB | Performed by: PHYSICIAN ASSISTANT

## 2024-03-25 PROCEDURE — 63600175 PHARM REV CODE 636 W HCPCS: Performed by: STUDENT IN AN ORGANIZED HEALTH CARE EDUCATION/TRAINING PROGRAM

## 2024-03-25 PROCEDURE — 97530 THERAPEUTIC ACTIVITIES: CPT

## 2024-03-25 PROCEDURE — 80202 ASSAY OF VANCOMYCIN: CPT | Performed by: INTERNAL MEDICINE

## 2024-03-25 PROCEDURE — 25000003 PHARM REV CODE 250: Performed by: PHYSICIAN ASSISTANT

## 2024-03-25 PROCEDURE — 83735 ASSAY OF MAGNESIUM: CPT | Performed by: PHYSICIAN ASSISTANT

## 2024-03-25 PROCEDURE — 63600175 PHARM REV CODE 636 W HCPCS: Performed by: INTERNAL MEDICINE

## 2024-03-25 PROCEDURE — 83605 ASSAY OF LACTIC ACID: CPT | Performed by: INTERNAL MEDICINE

## 2024-03-25 PROCEDURE — 85007 BL SMEAR W/DIFF WBC COUNT: CPT | Performed by: PHYSICIAN ASSISTANT

## 2024-03-25 PROCEDURE — 25000003 PHARM REV CODE 250

## 2024-03-25 PROCEDURE — 25000003 PHARM REV CODE 250: Performed by: NURSE PRACTITIONER

## 2024-03-25 PROCEDURE — 25000003 PHARM REV CODE 250: Performed by: STUDENT IN AN ORGANIZED HEALTH CARE EDUCATION/TRAINING PROGRAM

## 2024-03-25 PROCEDURE — 20600001 HC STEP DOWN PRIVATE ROOM

## 2024-03-25 PROCEDURE — 83880 ASSAY OF NATRIURETIC PEPTIDE: CPT | Performed by: INTERNAL MEDICINE

## 2024-03-25 PROCEDURE — 63600175 PHARM REV CODE 636 W HCPCS

## 2024-03-25 PROCEDURE — 80053 COMPREHEN METABOLIC PANEL: CPT | Performed by: PHYSICIAN ASSISTANT

## 2024-03-25 PROCEDURE — 27000221 HC OXYGEN, UP TO 24 HOURS

## 2024-03-25 PROCEDURE — 86140 C-REACTIVE PROTEIN: CPT | Performed by: PHYSICIAN ASSISTANT

## 2024-03-25 PROCEDURE — 99900035 HC TECH TIME PER 15 MIN (STAT)

## 2024-03-25 PROCEDURE — 97535 SELF CARE MNGMENT TRAINING: CPT

## 2024-03-25 PROCEDURE — 85027 COMPLETE CBC AUTOMATED: CPT | Performed by: PHYSICIAN ASSISTANT

## 2024-03-25 PROCEDURE — 63600175 PHARM REV CODE 636 W HCPCS: Performed by: PHYSICIAN ASSISTANT

## 2024-03-25 PROCEDURE — 36415 COLL VENOUS BLD VENIPUNCTURE: CPT | Performed by: INTERNAL MEDICINE

## 2024-03-25 PROCEDURE — 36415 COLL VENOUS BLD VENIPUNCTURE: CPT | Mod: XB | Performed by: INTERNAL MEDICINE

## 2024-03-25 PROCEDURE — 93005 ELECTROCARDIOGRAM TRACING: CPT

## 2024-03-25 PROCEDURE — 83615 LACTATE (LD) (LDH) ENZYME: CPT | Performed by: PHYSICIAN ASSISTANT

## 2024-03-25 PROCEDURE — 63600175 PHARM REV CODE 636 W HCPCS: Mod: JZ,TB | Performed by: STUDENT IN AN ORGANIZED HEALTH CARE EDUCATION/TRAINING PROGRAM

## 2024-03-25 PROCEDURE — 97112 NEUROMUSCULAR REEDUCATION: CPT

## 2024-03-25 PROCEDURE — 27000207 HC ISOLATION

## 2024-03-25 PROCEDURE — 85384 FIBRINOGEN ACTIVITY: CPT | Performed by: PHYSICIAN ASSISTANT

## 2024-03-25 PROCEDURE — 25000003 PHARM REV CODE 250: Performed by: INTERNAL MEDICINE

## 2024-03-25 PROCEDURE — 94761 N-INVAS EAR/PLS OXIMETRY MLT: CPT | Mod: XB

## 2024-03-25 PROCEDURE — 82803 BLOOD GASES ANY COMBINATION: CPT

## 2024-03-25 RX ORDER — INSULIN ASPART 100 [IU]/ML
20 INJECTION, SOLUTION INTRAVENOUS; SUBCUTANEOUS 3 TIMES DAILY
Status: DISCONTINUED | OUTPATIENT
Start: 2024-03-25 | End: 2024-03-25

## 2024-03-25 RX ORDER — FAMOTIDINE 20 MG/1
20 TABLET, FILM COATED ORAL 2 TIMES DAILY
Status: DISCONTINUED | OUTPATIENT
Start: 2024-03-25 | End: 2024-03-30

## 2024-03-25 RX ORDER — INSULIN ASPART 100 [IU]/ML
20 INJECTION, SOLUTION INTRAVENOUS; SUBCUTANEOUS
Status: DISCONTINUED | OUTPATIENT
Start: 2024-03-25 | End: 2024-03-26

## 2024-03-25 RX ORDER — ACYCLOVIR 200 MG/1
400 CAPSULE ORAL 2 TIMES DAILY
Status: DISCONTINUED | OUTPATIENT
Start: 2024-03-25 | End: 2024-03-30

## 2024-03-25 RX ORDER — PREDNISONE 20 MG/1
40 TABLET ORAL DAILY
Status: DISCONTINUED | OUTPATIENT
Start: 2024-03-26 | End: 2024-03-26

## 2024-03-25 RX ORDER — LOSARTAN POTASSIUM 25 MG/1
25 TABLET ORAL DAILY
Status: DISCONTINUED | OUTPATIENT
Start: 2024-03-26 | End: 2024-03-26

## 2024-03-25 RX ORDER — ATORVASTATIN CALCIUM 40 MG/1
40 TABLET, FILM COATED ORAL DAILY
Status: DISCONTINUED | OUTPATIENT
Start: 2024-03-26 | End: 2024-03-30

## 2024-03-25 RX ORDER — LOSARTAN POTASSIUM 25 MG/1
25 TABLET ORAL DAILY
Status: DISCONTINUED | OUTPATIENT
Start: 2024-03-25 | End: 2024-03-25

## 2024-03-25 RX ORDER — INSULIN ASPART 100 [IU]/ML
0-10 INJECTION, SOLUTION INTRAVENOUS; SUBCUTANEOUS
Status: DISCONTINUED | OUTPATIENT
Start: 2024-03-25 | End: 2024-03-26

## 2024-03-25 RX ORDER — INSULIN ASPART 100 [IU]/ML
0-10 INJECTION, SOLUTION INTRAVENOUS; SUBCUTANEOUS
Status: DISCONTINUED | OUTPATIENT
Start: 2024-03-25 | End: 2024-03-25

## 2024-03-25 RX ORDER — AMLODIPINE BESYLATE 10 MG/1
10 TABLET ORAL DAILY
Status: DISCONTINUED | OUTPATIENT
Start: 2024-03-26 | End: 2024-03-27

## 2024-03-25 RX ORDER — INSULIN ASPART 100 [IU]/ML
17 INJECTION, SOLUTION INTRAVENOUS; SUBCUTANEOUS 3 TIMES DAILY
Status: DISCONTINUED | OUTPATIENT
Start: 2024-03-25 | End: 2024-03-25

## 2024-03-25 RX ORDER — FLUCONAZOLE 100 MG/1
400 TABLET ORAL DAILY
Status: DISCONTINUED | OUTPATIENT
Start: 2024-03-26 | End: 2024-03-30

## 2024-03-25 RX ORDER — ALLOPURINOL 100 MG/1
300 TABLET ORAL DAILY
Status: DISCONTINUED | OUTPATIENT
Start: 2024-03-26 | End: 2024-03-30

## 2024-03-25 RX ORDER — LEVETIRACETAM 500 MG/1
500 TABLET ORAL 2 TIMES DAILY
Status: DISCONTINUED | OUTPATIENT
Start: 2024-03-25 | End: 2024-03-27

## 2024-03-25 RX ORDER — GABAPENTIN 400 MG/1
800 CAPSULE ORAL 3 TIMES DAILY
Status: DISCONTINUED | OUTPATIENT
Start: 2024-03-25 | End: 2024-03-26

## 2024-03-25 RX ORDER — INSULIN ASPART 100 [IU]/ML
13 INJECTION, SOLUTION INTRAVENOUS; SUBCUTANEOUS 3 TIMES DAILY
Status: DISCONTINUED | OUTPATIENT
Start: 2024-03-25 | End: 2024-03-25

## 2024-03-25 RX ORDER — MONTELUKAST SODIUM 10 MG/1
10 TABLET ORAL DAILY
Status: DISCONTINUED | OUTPATIENT
Start: 2024-03-26 | End: 2024-03-30

## 2024-03-25 RX ORDER — METOPROLOL TARTRATE 25 MG/1
25 TABLET, FILM COATED ORAL 2 TIMES DAILY
Status: DISCONTINUED | OUTPATIENT
Start: 2024-03-25 | End: 2024-03-30

## 2024-03-25 RX ADMIN — HYDROXYUREA 3500 MG: 500 CAPSULE ORAL at 12:03

## 2024-03-25 RX ADMIN — INSULIN ASPART 13 UNITS: 100 INJECTION, SOLUTION INTRAVENOUS; SUBCUTANEOUS at 09:03

## 2024-03-25 RX ADMIN — LEVETIRACETAM 500 MG: 500 TABLET, FILM COATED ORAL at 09:03

## 2024-03-25 RX ADMIN — METOPROLOL TARTRATE 25 MG: 25 TABLET, FILM COATED ORAL at 09:03

## 2024-03-25 RX ADMIN — TRIAMCINOLONE ACETONIDE: 1 CREAM TOPICAL at 12:03

## 2024-03-25 RX ADMIN — VANCOMYCIN HYDROCHLORIDE 1250 MG: 1.25 INJECTION, POWDER, LYOPHILIZED, FOR SOLUTION INTRAVENOUS at 03:03

## 2024-03-25 RX ADMIN — DEXAMETHASONE SODIUM PHOSPHATE 6 MG: 4 INJECTION INTRA-ARTICULAR; INTRALESIONAL; INTRAMUSCULAR; INTRAVENOUS; SOFT TISSUE at 10:03

## 2024-03-25 RX ADMIN — OXYCODONE HYDROCHLORIDE 10 MG: 10 TABLET ORAL at 03:03

## 2024-03-25 RX ADMIN — REMDESIVIR 100 MG: 100 INJECTION, POWDER, LYOPHILIZED, FOR SOLUTION INTRAVENOUS at 12:03

## 2024-03-25 RX ADMIN — INSULIN ASPART 8 UNITS: 100 INJECTION, SOLUTION INTRAVENOUS; SUBCUTANEOUS at 12:03

## 2024-03-25 RX ADMIN — CEFEPIME 2 G: 2 INJECTION, POWDER, FOR SOLUTION INTRAVENOUS at 01:03

## 2024-03-25 RX ADMIN — ACETAMINOPHEN 650 MG: 325 TABLET ORAL at 09:03

## 2024-03-25 RX ADMIN — CEFEPIME 2 G: 2 INJECTION, POWDER, FOR SOLUTION INTRAVENOUS at 02:03

## 2024-03-25 RX ADMIN — METOPROLOL TARTRATE 25 MG: 25 TABLET, FILM COATED ORAL at 10:03

## 2024-03-25 RX ADMIN — INSULIN DETEMIR 20 UNITS: 100 INJECTION, SOLUTION SUBCUTANEOUS at 09:03

## 2024-03-25 RX ADMIN — ACYCLOVIR 400 MG: 200 CAPSULE ORAL at 09:03

## 2024-03-25 RX ADMIN — ATORVASTATIN CALCIUM 40 MG: 40 TABLET, FILM COATED ORAL at 10:03

## 2024-03-25 RX ADMIN — MUPIROCIN: 20 OINTMENT TOPICAL at 09:03

## 2024-03-25 RX ADMIN — LOSARTAN POTASSIUM 25 MG: 25 TABLET, FILM COATED ORAL at 10:03

## 2024-03-25 RX ADMIN — INSULIN ASPART 5 UNITS: 100 INJECTION, SOLUTION INTRAVENOUS; SUBCUTANEOUS at 03:03

## 2024-03-25 RX ADMIN — ONDANSETRON 4 MG: 2 INJECTION INTRAMUSCULAR; INTRAVENOUS at 06:03

## 2024-03-25 RX ADMIN — GABAPENTIN 800 MG: 400 CAPSULE ORAL at 09:03

## 2024-03-25 RX ADMIN — INSULIN ASPART 20 UNITS: 100 INJECTION, SOLUTION INTRAVENOUS; SUBCUTANEOUS at 04:03

## 2024-03-25 RX ADMIN — MUPIROCIN: 20 OINTMENT TOPICAL at 10:03

## 2024-03-25 RX ADMIN — TRIAMCINOLONE ACETONIDE: 1 CREAM TOPICAL at 09:03

## 2024-03-25 RX ADMIN — HYDROMORPHONE HYDROCHLORIDE 1 MG: 1 INJECTION, SOLUTION INTRAMUSCULAR; INTRAVENOUS; SUBCUTANEOUS at 12:03

## 2024-03-25 RX ADMIN — CEFEPIME 2 G: 2 INJECTION, POWDER, FOR SOLUTION INTRAVENOUS at 09:03

## 2024-03-25 RX ADMIN — GABAPENTIN 800 MG: 400 CAPSULE ORAL at 10:03

## 2024-03-25 RX ADMIN — LEVETIRACETAM 500 MG: 100 INJECTION INTRAVENOUS at 10:03

## 2024-03-25 RX ADMIN — MONTELUKAST 10 MG: 10 TABLET, FILM COATED ORAL at 10:03

## 2024-03-25 RX ADMIN — FLUCONAZOLE 400 MG: 40 POWDER, FOR SUSPENSION ORAL at 12:03

## 2024-03-25 RX ADMIN — HYDROMORPHONE HYDROCHLORIDE 1 MG: 1 INJECTION, SOLUTION INTRAMUSCULAR; INTRAVENOUS; SUBCUTANEOUS at 06:03

## 2024-03-25 RX ADMIN — ACETAMINOPHEN 650 MG: 325 TABLET ORAL at 01:03

## 2024-03-25 RX ADMIN — FAMOTIDINE 20 MG: 20 TABLET ORAL at 10:03

## 2024-03-25 RX ADMIN — FAMOTIDINE 20 MG: 20 TABLET ORAL at 09:03

## 2024-03-25 RX ADMIN — INSULIN ASPART 5 UNITS: 100 INJECTION, SOLUTION INTRAVENOUS; SUBCUTANEOUS at 10:03

## 2024-03-25 RX ADMIN — INSULIN ASPART 10 UNITS: 100 INJECTION, SOLUTION INTRAVENOUS; SUBCUTANEOUS at 06:03

## 2024-03-25 RX ADMIN — INSULIN ASPART 4 UNITS: 100 INJECTION, SOLUTION INTRAVENOUS; SUBCUTANEOUS at 12:03

## 2024-03-25 RX ADMIN — AMLODIPINE BESYLATE 10 MG: 10 TABLET ORAL at 10:03

## 2024-03-25 RX ADMIN — ALLOPURINOL 300 MG: 100 TABLET ORAL at 10:03

## 2024-03-25 RX ADMIN — GABAPENTIN 800 MG: 400 CAPSULE ORAL at 03:03

## 2024-03-25 RX ADMIN — ACYCLOVIR 400 MG: 200 CAPSULE ORAL at 10:03

## 2024-03-25 RX ADMIN — OXYCODONE HYDROCHLORIDE 10 MG: 10 TABLET ORAL at 05:03

## 2024-03-25 NOTE — PLAN OF CARE
Recommendations     Diet advancement per SLP - Rec'd 2000 kcal ADA diet + Boost Glucose Control ONS BID.  If unable to advance diet, place NGT & initiate TFs. Rec'd Glucerna 1.5 @ 50 mL/hr = 1800 kcals, 99 g of protein, 911 mL fluid.  RD to monitor & follow-up.     Goals: Meet % EEN, EPN by RD f/u date  Nutrition Goal Status: new  Communication of RD Recs: other (comment) (POC)

## 2024-03-25 NOTE — ASSESSMENT & PLAN NOTE
Pt presented from OSH with new SDH. Pt was initially admitted to neuro critical care. SDH felt to be small/stable and did not need to undergo surgical intervention. Pt placed on keppra for seizure proph. Pt PLT count is in the 200s.    Plan  Continue Keppra  Neuro checks q4h  Worsening mental status or one sided weakness, need Stat CTH, call neursurgery

## 2024-03-25 NOTE — CODE/ RAPID DOCUMENTATION
RAPID RESPONSE NURSE NOTE        Admit Date: 3/23/2024  LOS: 1  Code Status: Full Code   Date of Consult: 2024  : 1965  Age: 58 y.o.  Weight:   Wt Readings from Last 1 Encounters:   24 93.2 kg (205 lb 7.5 oz)     Sex: female  Race: Other   Bed: [address] [place] [address] A:   MRN: [id number]  Time Rapid Response Team page Received: N/A  Time Rapid Response Team at Bedside:   Time Rapid Response Team left Bedside:   Was the patient discharged from an ICU this admission? Yes   Was the patient discharged from a PACU within last 24 hours? No   Did the patient receive conscious sedation/general anesthesia in last 24 hours? No  Was the patient in the ED within the past 24 hours? No  Was the patient on NIPPV within the past 24 hours? No   Did this progress into an ARC or CPA: No  Attending Physician: Kev Llamas MD  Primary Service: Hematology and Oncology       SITUATION    Notified during chart review.   Reason for alert: Tachycardia & Tachypnea   Called to evaluate the patient for Circulatory    BACKGROUND     Why is the patient in the hospital?: Subdural hematoma    Patient has a past medical history of Cancer, CML (chronic myelocytic leukemia), DM2 (diabetes mellitus, type 2), HTN (hypertension), NAFLD (nonalcoholic fatty liver disease), and Neuropathy.    Last Vitals:  Temp: 102.4 °F (39.1 °C) (2100)  Pulse: 128 (2100)  Resp: 32 (2100)  BP: 126/74 (2100)  SpO2: 96 % (2100)    24 Hours Vitals Range:  Temp:  [97.8 °F (36.6 °C)-103.1 °F (39.5 °C)]   Pulse:  []   Resp:  [17-38]   BP: (123-171)/(62-82)   SpO2:  [93 %-98 %]     Labs:  Recent Labs     24  1831 24  0127   WBC 47.37* 52.42* 53.90*   HGB 10.5* 10.4* 10.6*   HCT 34.6* 35.3* 36.3*    204 195       Recent Labs     03247 24   * 136 138   K 3.7 3.8 3.5    102 107   CO2 22* 23 21*   BUN 9 9 13   CREATININE 0.7 0.8 0.8   * 302* 318*    PHOS 3.8 4.1  --    MG 1.6 1.7 1.8        Recent Labs     03/23/24  1809   PH 7.460*   PCO2 36.0   PO2 67.0*   HCO3 25.6        ASSESSMENT    Physical Exam  Constitutional:       Appearance: She is ill-appearing and diaphoretic.   Cardiovascular:      Rate and Rhythm: Regular rhythm. Tachycardia present.   Pulmonary:      Effort: Tachypnea and accessory muscle usage present.   Abdominal:      General: There is distension.      Palpations: Abdomen is soft.      Tenderness: There is abdominal tenderness.   Skin:     General: Skin is moist.      Comments: Hot   Neurological:      General: No focal deficit present.      Mental Status: She is oriented to person, place, and time. Mental status is at baseline. She is lethargic.      GCS: GCS eye subscore is 3. GCS verbal subscore is 5. GCS motor subscore is 6.      Sensory: Sensation is intact.      Motor: Weakness present.       Temp 103.1 axillary  RR 38 /62 O2 97 on 4L NC     Upon arrival to room, pt lethargic but arouses to voice. Pt extremely diaphoretic, temperature noted to be 103.1 axillary. Pt also is extremely tachypneic with RR at 38 on 4L NC. Pt answers all questions and performs tasks appropriately. Pt in sinus tach at 138.     INTERVENTIONS    The patient was seen for Respiratory problem. Staff concerns included tachypnea and increased WOB. The following interventions were performed: supplemental oxygen, PRN suctioning, portable chest x-ray, and continuous pulse ox monitoring.  Cardiac problem. Staff concerns included tachycardia. The following interventions were performed: CBC, CMP, Magnesium, portable chest x-ray, EKG, continuous cardiac monitoring continued, critical care consult, and POCT VBG with lytes and a lactic. Neuro SATISH Dickson also ordered IV tylenol.     pH 7.478, CO2 28.5, PO2 57, Lactate 1.67    Neuro CC PA consulted and came to bedside at 2035 to evaluate pt. Due to no acute change in neurological exam, declined upgrade to  [facility] unit at this time. MICU team consulted and came to bedside at 2055 -  also denied need for ICU at this time.     RECOMMENDATIONS    - Consultation to Medical ICU   - Place ice packs in axilla and groin region to achieve normothermia   - Maintain IV access and continuous telemetry monitoring     PROVIDER ESCALATION    Orders received and case discussed with SATISH Hsu with Neuro Critical Care & Dr. Murillo with Critical Care Medicine .    On call primary team BMT provider also notified via secure chat.     Primary team arrival time: 2055    Disposition: Remain in room 808.    FOLLOW UP    Bedside RNMachelle, updated on plan of care. Instructed to call the Rapid Response Nurse, [place] [phone] for additional questions or concerns.

## 2024-03-25 NOTE — ASSESSMENT & PLAN NOTE
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 Encephalopathic when seen and unable to follow commands.     Labs show clear evidence of blast crisis (29% on CBC) without cytopenias. This is in the setting of what may be sepsis vs fevers from leukemia.She normally follows at [facility] fellows clinic and has progressed through multiple treatments.   Discussed aggressive and unstable disease leading to her presentation here.      Plan:   -hydrea 40mg/kg daily for cytoreduction  -continue acyclovir, allopurinol, fluc  -continue broad spec abx and follow cultures  -prognosis guarded, will discuss goals of care moving forward

## 2024-03-25 NOTE — PROGRESS NOTES
Upon start of shift, pt febrile and tachypneic , charge RN Barb called to bedside at time of shift handoff to assess pt. RR called at 1953. RR at bedside, MD on call aware. Pt tested positive for Covid-19, pt seen by Neuro ICU and MICU, plan for pt to move to MTSU due to Covid status.     IV tylenol given, labs drawn, ice packs applied, temp down to 101. Pt a&o x4 but lethargic, remains at 4L nc, on tele and continuous pulse ox,  at bedside.      03/24/24 1857   Vital Signs   Temp (!) 102 °F (38.9 °C)   Temp Source Oral   Pulse (!) 133   Heart Rate Source Monitor   Resp (!) 32   SpO2 97 %   BP (!) 140/64   BP Location Left arm   Patient Position Lying

## 2024-03-25 NOTE — SUBJECTIVE & OBJECTIVE
Subjective:     Interval History:  Pt more alert today, able to tolerate food, removed NG tube, Glucose difficult to control    Objective:     Vital Signs (Most Recent):  Temp: 99 °F (37.2 °C) (03/25/24 1137)  Pulse: 101 (03/25/24 1347)  Resp: 20 (03/25/24 1137)  BP: (!) 165/72 (03/25/24 1137)  SpO2: 96 % (03/25/24 1137) Vital Signs (24h Range):  Temp:  [97.7 °F (36.5 °C)-103.1 °F (39.5 °C)] 99 °F (37.2 °C)  Pulse:  [] 101  Resp:  [17-38] 20  SpO2:  [96 %-100 %] 96 %  BP: (126-171)/(62-93) 165/72     Weight: 93 kg (205 lb 0.4 oz)  Body mass index is 36.33 kg/m².  Body surface area is 2.03 meters squared.    ECOG SCORE           [unfilled]    Intake/Output - Last 3 Shifts         03/23 0700  03/24 0659 03/24 0700  03/25 0659 03/25 0700  03/26 0659    P.O.  0     I.V. (mL/kg) 189.6 (2) 1428.3 (15.4)     IV Piggyback 447.7 732     Total Intake(mL/kg) 637.3 (6.8) 2160.3 (23.2)     Urine (mL/kg/hr) 1000 2200 (1)     Stool  0     Total Output 1000 2200     Net -362.7 -39.7            Stool Occurrence  0 x              Physical Exam  Constitutional:       General: She is not in acute distress.     Appearance: She is ill-appearing.   HENT:      Head: Normocephalic and atraumatic.      Mouth/Throat:      Mouth: Mucous membranes are moist.      Pharynx: Oropharynx is clear.   Eyes:      General: No scleral icterus.     Extraocular Movements: Extraocular movements intact.   Cardiovascular:      Rate and Rhythm: Normal rate and regular rhythm.   Pulmonary:      Effort: Pulmonary effort is normal. No respiratory distress.      Breath sounds: Rales present.   Abdominal:      General: Abdomen is flat. There is no distension.      Palpations: Abdomen is soft.      Tenderness: There is no abdominal tenderness.   Musculoskeletal:      Right lower leg: No edema.      Left lower leg: No edema.   Skin:     General: Skin is warm and dry.   Neurological:      Mental Status: She is oriented to person, place, and time.             Significant Labs:   All pertinent labs from the last 24 hours have been reviewed.    Diagnostic Results:  I have reviewed all pertinent imaging results/findings within the past 24 hours.

## 2024-03-25 NOTE — ASSESSMENT & PLAN NOTE
Patient with CML c/b acute blast crisis. Hematology following, patient started on hydroxyurea for cytoreduction. Blast crisit likely contributing to patient's current fevers and tachycardia. Agree with hydroxyurea per hematology and close monitoring of patient's blood counts.

## 2024-03-25 NOTE — PROGRESS NOTES
Pt is at [facility] in [place].  Pt was dx with covid 3/24/24.  Pt is scheduled for labs, provider, and possible blood 4/8/24.

## 2024-03-25 NOTE — ASSESSMENT & PLAN NOTE
Pt has known DM, is on steroids due to COVID. Most likely causing her hyperglycemia.    Plan  Increased insulin to Determir 20 BID, Aspart 13 TID with moderate sliding scale  Diabetic diet with thin liquids per speech  Glucose checks AC x HS

## 2024-03-25 NOTE — HPI
Patient is a 58 y.o. female with PMHx of insulin-dependent T2DM, diabetic neuropathy, essential HTN, NAFLD, obesity, former tobacco use disorder (quit 4-5 months ago), and CML (follows [facility]) who presents as a transfer from [facility] ED in [place] for acute SDH. Per charts she presented there on 3/23 with back pain and hip pain after a fall/slip that occurred while happening. Seh also noted malaise and fatigue. Labs there were notable for significant for leukocytosis (WBC 38) with 29% blasts, c/w an acute blast crisis. PLTs 195k, Hgb 10.6.  CT chest/abd/pelvis significant for splenomegaly (increased from 11.9cm on previous scan to 18.2cm), subcutaneous anasarca edema present at the level of the abdomen and pelvis (unchanged from prior), moderate colonic fecal load without pericolonic stranding, and bilateral ovarian cysts. Degenerative changes of the lower spine, specifically at L5-S1, are more apparent on this CT than prior imaging.     She was accepted to transfer to our service though developed acute onset confusion and was found to have a new subdural hemorrhage (3mm). She was brought to NCC at [facility] for neurosurgical watch though they recommended conservative management.     She is febrile to 102.4 and started on broad-spectrum ABX (vancomycin and cefepime). CXR with RLL congestive process.     At the bedside patient encephalopathic and unable to follow commands, though arouses to voice. Hematology consulted for CML with blast crisis.

## 2024-03-25 NOTE — PT/OT/SLP PROGRESS
Speech Language Pathology Treatment    Patient Name:  Fela Ellison   MRN:  [id number]  Admitting Diagnosis: Subdural hematoma    Recommendations:                 General Recommendations:  Cognitive-linguistic evaluation  Diet recommendations:  Regular Diet - IDDSI Level 7, Liquid Diet Level: Thin liquids - IDDSI Level 0   Aspiration Precautions: 1 bite/sip at a time, Alternating bites/sips, Avoid talking while eating, HOB to 90 degrees, Meds whole 1 at a time, Monitor for s/s of aspiration, Small bites/sips, and Strict aspiration precautions   General Precautions: Standard, airborne, aspiration, contact, droplet, fall  Communication strategies:   Czech speaking - needs an interpretor    Assessment:     Fela Ellison is a 58 y.o. female with an SLP diagnosis of possible mild Dysphagia.      Subjective     Pt's son was present and able to act as interpretor for swallowing assessment.     Pain/Comfort:  Pain Rating 1: 0/10    Respiratory Status: Nasal cannula, flow 2 L/min    Objective:     Has the patient been evaluated by SLP for swallowing?   Yes  Keep patient NPO? No   Current Respiratory Status:        Pt seen for ongoing swallowing assessment to determine if safe for oral diet.  Pt NPO with NG tube in place, but not receiving TFs at this time.  Pt repositioned to an upright position. NO significant oral motor weakness appreciated. Pt with only lower dentition present. She reports upper dentures breaking a few years ago and eating without upper dentition. Pt able to swallow upon command and volitional cough was strong.  Pt accepted PO trials of ice x 2, thin water via tsp x 1 and multiple straw sips, pudding 1/2 tsp x 1 and full tsp x 1, and 1/4 cracker x 1.  Oral transit was slow and pharyngeal swallow mildly delayed, but functional.  Pt exhibited coughing after taking large, cyclical straw sips, but tolerated small single sips without any difficulty.  SLP emphasized importance of  taking small and single sips to reduce risks of aspiration. Further education was provided to pt and sons regarding ongoing swallowing assessment, aspiration, overt s/s of aspiration, diet recommendations, aspiration precautions and safe swallowing strategies, plan to follow up to ensure diet tolerance, and plan to initiate speech/language/cognitive evaluation on next service date.  Pt did not appear to have any difficulties with communication and denies any changes in cognition. However, given diagnosis, will administer formal speech/language/cognitive evaluation to ensure no cognitive intervention is warranted. Understanding and agreement were demonstrated.     Goals:   Multidisciplinary Problems       SLP Goals          Problem: SLP    Goal Priority Disciplines Outcome   SLP Goal     SLP Ongoing, Not Progressing   Description: Speech Language Pathology Goals  Goals expected to be met by 3/31  1. Ongoing swallow assessment  2. SLP Speech/Language/Cognitive Evaluation                         Plan:     Patient to be seen:  4 x/week   Plan of Care expires:  04/22/24  Plan of Care reviewed with:  family, patient   SLP Follow-Up:  Yes       Discharge recommendations:   (tbd for SLP needs)     Time Tracking:     SLP Treatment Date:   03/25/24  Speech Start Time:  1209  Speech Stop Time:  1232     Speech Total Time (min):  23 min    Billable Minutes: Treatment Swallowing Dysfunction 13 and Self Care/Home Management Training 10    03/25/2024

## 2024-03-25 NOTE — HPI
Patient information was obtained from past medical records and ER records. HPI limited by patient's altered mentation.    Ms. Fela Ellison is a 59 yo female with a PMHx of IDDM, CML, HTN, NAFLD, and prior tobacco use who initially presented as a transfer from [facility] for acute SDH and blast crisis.     She was intiallly admitted to [facility] and then stepped down to Medical oncology floor after evaluation by Neurosurgery noted SDH to be stable with no surgical intervention recommended at the time. She was being treated for concerns for blast crisis by BMT. Critical care was initially consulted on 03/24/24 for sepsis concerns in the setting of patient being found to be COVID positive, febrile (Tmax of 103.1), -138, BP stable 120-140/60-70s, on 4L NC. Evaluation at the time did not note any indication for ICU admission as patient was stable.     During hospitalization, treated by BMT for CML with blast crisis concerns; broad spectrum antibiotics were administered for sepsis concerns; Remdesivir/Dexamethasone started for COVID-19, but Dexamethasone had to be discontinued due to hyperglycemia and Endocrinology was consulted for insulin management. Mentation was improving and patient passed swallow evaluation, so NG tube was removed and diet was resumed.     However, on 03/26/24 patient developed increasing lethargy, confusion, febrile temperatures, distended abdomen, lactic acidosis. Stat CT head was non-acute and noted stable prior known SDH. Rapid response evaluated patient for worsening mentation and patient was admitted to MICU.

## 2024-03-25 NOTE — CONSULTS
[facility] - Telemetry Stepdown  Adult Nutrition  Consult Note    SUMMARY     Recommendations    Diet advancement per SLP - Rec'd 2000 kcal ADA diet + Boost Glucose Control ONS BID.  If unable to advance diet, place NGT & initiate TFs. Rec'd Glucerna 1.5 @ 50 mL/hr = 1800 kcals, 99 g of protein, 911 mL fluid.  RD to monitor & follow-up.    Goals: Meet % EEN, EPN by RD f/u date  Nutrition Goal Status: new  Communication of RD Recs: other (comment) (POC)    Assessment and Plan    Moderate malnutrition    Malnutrition Type:  Context: acute illness or injury  Level: moderate    Related to (etiology):   Inability to consume sufficient energy     Signs and Symptoms (as evidenced by):   Weight loss, inadequate energy intake, NFPE    Malnutrition Characteristic Summary:  Weight Loss (Malnutrition): 7.5% in 3 months  Energy Intake (Malnutrition): less than 75% for greater than or equal to 1 month  Subcutaneous Fat (Malnutrition): mild depletion  Muscle Mass (Malnutrition): mild depletion    Interventions/Recommendations (treatment strategy):  Collaboration of nutrition care w/ other providers  EN?    Nutrition Diagnosis Status:   New    Malnutrition Assessment    Malnutrition Context: acute illness or injury  Malnutrition Level: moderate    Weight Loss (Malnutrition): 7.5% in 3 months  Energy Intake (Malnutrition): less than 75% for greater than or equal to 1 month  Subcutaneous Fat (Malnutrition): mild depletion  Muscle Mass (Malnutrition): mild depletion     Reason for Assessment    Reason For Assessment: consult  Diagnosis: other (see comments) (SDH)  Relevant Medical History: DM, CML, HTN  Interdisciplinary Rounds: did not attend    General Information Comments: NPO, per SLP. +Dysphagia & weight loss PTA (pt weighed 220# x 1/2024). RD feels pt meets criteria for moderate malnutrition - please see PES statement for details. Visual NFPE complete today.  Nutrition Discharge Planning: Pending clinical  "course    Nutrition/Diet History    Spiritual, Cultural Beliefs, Denominational Practices, Values that Affect Care: no  Factors Affecting Nutritional Intake: NPO    Anthropometrics    Temp: 97.7 °F (36.5 °C)  Height Method: Stated  Height: 5' 2.99" (160 cm)  Height (inches): 62.99 in  Weight Method: Bed Scale  Weight: 93 kg (205 lb 0.4 oz)  Weight (lb): 205.03 lb  Ideal Body Weight (IBW), Female: 114.95 lb  % Ideal Body Weight, Female (lb): 178.36 %  BMI (Calculated): 36.3  BMI Grade: 35 - 39.9 - obesity - grade II  Usual Body Weight (UBW), k.5 kg  % Usual Body Weight: 92.73  % Weight Change From Usual Weight: -7.46 %    Lab/Procedures/Meds    Pertinent Labs Reviewed: reviewed  Pertinent Labs Comments: A1C 6.2  Pertinent Medications Reviewed: reviewed    Estimated/Assessed Needs    Weight Used For Calorie Calculations: 90 kg (198 lb 6.6 oz) (Dosing weight)    Energy Calorie Requirements (kcal): 1751 kcal/d  Energy Need Method: [place]-St Jeor (1.2 PAL)    Protein Requirements:  g/d (1-1.2 g/kg)  Weight Used For Protein Calculations: 90 kg (198 lb 6.6 oz)    Estimated Fluid Requirement Method: other (see comments) (Per MD)  RDA Method (mL): 1751    CHO Requirement: 219g    Nutrition Prescription Ordered    Current Diet Order: NPO    Evaluation of Received Nutrient/Fluid Intake    I/O: -4L since admit    Comments: LBM: 3/23    Nutrition Risk    Level of Risk/Frequency of Follow-up:  (1x/week)     Monitor and Evaluation    Food and Nutrient Intake: enteral nutrition intake, food and beverage intake, energy intake  Food and Nutrient Adminstration: diet order, enteral and parenteral nutrition administration  Physical Activity and Function: nutrition-related ADLs and IADLs  Anthropometric Measurements: weight, weight change  Biochemical Data, Medical Tests and Procedures: inflammatory profile, lipid profile, glucose/endocrine profile, gastrointestinal profile, electrolyte and renal panel  Nutrition-Focused Physical " Findings: overall appearance     Nutrition Follow-Up    RD Follow-up?: Yes

## 2024-03-25 NOTE — SUBJECTIVE & OBJECTIVE
Past Medical History:   Diagnosis Date    Cancer     CML (chronic myelocytic leukemia)     DM2 (diabetes mellitus, type 2)     HTN (hypertension)     NAFLD (nonalcoholic fatty liver disease)     Neuropathy        Past Surgical History:   Procedure Laterality Date    ABDOMINAL SURGERY       SECTION      x4    CHOLECYSTECTOMY         Review of patient's allergies indicates:  No Known Allergies    Family History       Problem Relation (Age of Onset)    Diabetes Mother, Father          Tobacco Use    Smoking status: Never    Smokeless tobacco: Never   Substance and Sexual Activity    Alcohol use: Not Currently    Drug use: Not Currently    Sexual activity: Not on file      Review of Systems   Unable to perform ROS: Acuity of condition     Objective:     Vital Signs (Most Recent):  Temp: (!) 101.3 °F (38.5 °C) (24)  Pulse: (!) 126 (24)  Resp: 17 (24)  BP: 135/72 (24)  SpO2: 98 % (24) Vital Signs (24h Range):  Temp:  [97.8 °F (36.6 °C)-103.1 °F (39.5 °C)] 101.3 °F (38.5 °C)  Pulse:  [] 126  Resp:  [17-38] 17  SpO2:  [93 %-98 %] 98 %  BP: (123-171)/(62-82) 135/72   Weight: 93.2 kg (205 lb 7.5 oz)  Body mass index is 36.41 kg/m².      Intake/Output Summary (Last 24 hours) at 3/24/2024 2148  Last data filed at 3/24/2024 2044  Gross per 24 hour   Intake 1995.19 ml   Output 2350 ml   Net -354.81 ml          Physical Exam  Constitutional:       General: She is not in acute distress.     Appearance: Normal appearance. She is ill-appearing.   HENT:      Head: Normocephalic and atraumatic.      Mouth/Throat:      Mouth: Mucous membranes are moist.   Eyes:      Extraocular Movements: Extraocular movements intact.      Conjunctiva/sclera: Conjunctivae normal.   Cardiovascular:      Rate and Rhythm: Regular rhythm. Tachycardia present.      Heart sounds: Normal heart sounds.   Pulmonary:      Effort: Pulmonary effort is normal. No respiratory distress.      Breath  sounds: Normal breath sounds.   Abdominal:      General: Abdomen is flat. Bowel sounds are normal. There is no distension.      Palpations: Abdomen is soft.      Tenderness: There is no abdominal tenderness.      Comments: Erythematous rash extending across abdomen    Musculoskeletal:         General: No swelling.      Right lower leg: No edema.      Left lower leg: No edema.   Skin:     General: Skin is warm.   Neurological:      Mental Status: She is lethargic.            Vents:     Lines/Drains/Airways       Drain  Duration             Female External Urinary Catheter w/ Suction 03/23/24 2148 1 day              Peripheral Intravenous Line  Duration                  Peripheral IV - Single Lumen 03/23/24 20 G Anterior;Right Hand 1 day                  Significant Labs:    CBC/Anemia Profile:  Recent Labs   Lab 03/23/24 2207 03/24/24 0127 03/24/24 2048   WBC 52.42* 53.90* 57.16*   HGB 10.4* 10.6* 10.8*   HCT 35.3* 36.3* 37.0    195 232   MCV 84 85 83   RDW 19.4* 19.8* 20.2*        Chemistries:  Recent Labs   Lab 03/23/24  1244 03/23/24  1831 03/23/24 2207 03/24/24  0127 03/24/24  2048    137 135* 136 138   K 3.8 4.1 3.7 3.8 3.5   CL  --   --  102 102 107   CO2 24 22 22* 23 21*   BUN 10.7 10.0 9 9 13   CREATININE 0.75 0.70 0.7 0.8 0.8   CALCIUM 9.3 9.0 9.3 9.2 9.7   ALBUMIN 3.0* 3.3* 3.1* 3.1* 2.9*   PROT  --   --  6.5 6.5 6.4   BILITOT 0.4 0.5 0.6 0.6 0.6   ALKPHOS 65 74 73 71 71   ALT 8 9 9* 9* 10   AST 13 17 21 21 18   GLUCOSE 228* 238*  --   --   --    MG 1.60 1.60 1.6 1.7 1.8   PHOS  --   --  3.8 4.1  --        All pertinent labs within the past 24 hours have been reviewed.    Significant Imaging: I have reviewed all pertinent imaging results/findings within the past 24 hours.

## 2024-03-25 NOTE — PROGRESS NOTES
[facility] - Telemetry Stepdown  Hematology  Bone Marrow Transplant  Progress Note    Patient Name: Fela Ellison  Admission Date: 3/23/2024  Hospital Length of Stay: 2 days  Code Status: Full Code    Subjective:     Interval History:  Pt more alert today, able to tolerate food, removed NG tube, Glucose difficult to control    Objective:     Vital Signs (Most Recent):  Temp: 99 °F (37.2 °C) (03/25/24 1137)  Pulse: 101 (03/25/24 1347)  Resp: 20 (03/25/24 1137)  BP: (!) 165/72 (03/25/24 1137)  SpO2: 96 % (03/25/24 1137) Vital Signs (24h Range):  Temp:  [97.7 °F (36.5 °C)-103.1 °F (39.5 °C)] 99 °F (37.2 °C)  Pulse:  [] 101  Resp:  [17-38] 20  SpO2:  [96 %-100 %] 96 %  BP: (126-171)/(62-93) 165/72     Weight: 93 kg (205 lb 0.4 oz)  Body mass index is 36.33 kg/m².  Body surface area is 2.03 meters squared.    ECOG SCORE           [unfilled]    Intake/Output - Last 3 Shifts         03/23 0700  03/24 0659 03/24 0700  03/25 0659 03/25 0700  03/26 0659    P.O.  0     I.V. (mL/kg) 189.6 (2) 1428.3 (15.4)     IV Piggyback 447.7 732     Total Intake(mL/kg) 637.3 (6.8) 2160.3 (23.2)     Urine (mL/kg/hr) 1000 2200 (1)     Stool  0     Total Output 1000 2200     Net -362.7 -39.7            Stool Occurrence  0 x              Physical Exam  Constitutional:       General: She is not in acute distress.     Appearance: She is ill-appearing.   HENT:      Head: Normocephalic and atraumatic.      Mouth/Throat:      Mouth: Mucous membranes are moist.      Pharynx: Oropharynx is clear.   Eyes:      General: No scleral icterus.     Extraocular Movements: Extraocular movements intact.   Cardiovascular:      Rate and Rhythm: Normal rate and regular rhythm.   Pulmonary:      Effort: Pulmonary effort is normal. No respiratory distress.      Breath sounds: Rales present.   Abdominal:      General: Abdomen is flat. There is no distension.      Palpations: Abdomen is soft.      Tenderness: There is no abdominal tenderness.    Musculoskeletal:      Right lower leg: No edema.      Left lower leg: No edema.   Skin:     General: Skin is warm and dry.   Neurological:      Mental Status: She is oriented to person, place, and time.            Significant Labs:   All pertinent labs from the last 24 hours have been reviewed.    Diagnostic Results:  I have reviewed all pertinent imaging results/findings within the past 24 hours.  Assessment/Plan:     * Subdural hematoma  Pt presented from OSH with new SDH. Pt was initially admitted to neuro critical care. SDH felt to be small/stable and did not need to undergo surgical intervention. Pt placed on keppra for seizure proph. Pt PLT count is in the 200s.    Plan  Continue Keppra  Neuro checks q4h  Worsening mental status or one sided weakness, need Stat CTH, call neursurgery    Hyperglycemia  Pt has known DM, is on steroids due to COVID. Most likely causing her hyperglycemia.    Plan  Increased insulin to Determir 20 BID, Aspart 13 TID with moderate sliding scale  Diabetic diet with thin liquids per speech  Glucose checks AC x HS    COVID  Pt tested positive for covid on 3/24. Initially on 4L NC, has decreased to 2L. Pt's mental status has improved. WBC count 50K.     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     Plan  Continue home medications,  Goal BP <160 per neurosurgery with SDH  PRN in place if needed, will increase BP medications as needed    CML (chronic myelocytic leukemia)  58F with relapsed CML with blast crisis (transformed on 2022) admitted for fevers, fatigue, encephalopathy and found to have a SDH that NSGY does not recommend intervention on.  Encephalopathic when seen and unable to follow commands.     Labs show clear evidence of blast crisis (29% on CBC) without cytopenias. This is in the setting of what may be sepsis vs fevers from leukemia.She normally follows at [facility] fellows clinic and has progressed through multiple treatments.   Discussed aggressive and unstable disease leading to her presentation here.      Plan:   -hydrea 40mg/kg daily for cytoreduction  -continue acyclovir, allopurinol, fluc  -continue broad spec abx and follow cultures  -prognosis guarded, will discuss goals of care moving forward        VTE Risk Mitigation (From admission, onward)           Ordered     IP VTE HIGH RISK PATIENT  Once         03/23/24 2209     Place sequential compression device  Until discontinued         03/23/24 2209     Reason for No Pharmacological VTE Prophylaxis  Once        Question:  Reasons:  Answer:  Active Bleeding  Comment:  SDH    03/23/24 2209                    Disposition: TBD    Maged Momin, DO  Bone Marrow Transplant  [facility] - Telemetry Stepdown

## 2024-03-25 NOTE — HOSPITAL COURSE
58 yof PMH DM2 on insulin, HTN, NAFLD, CML (follows at [facility]), self pay presented from [facility] ED in [place] for acute SDH. Initially admitted to NICU for SDH. Breeding it was stable and did not need to undergo neurosurgery procedure. Placed on keppra x7 days for seizure proph. Pt was transferred to the floor, found to be covid positive. Pt started on remdesivir, prednisone, vanc/cefepime. Pt initially needed NG tube placement, able to be removed on 3/25 and started on regular diet with thin liquids per speech. Pt's glucose has been difficult to control while on steroids, discontinued steroids, getting BHB, vbg, bmp with DKA concern, which was negative. Blood glucose controlled with insulin drip after endocrine consult. Goal BP is <160. Worsening confusion on 3/26 with recent SDH repeat stat CT head. Pt continued to worsen with mentation, developing worsening fever, belly distension concern for worsening sepsis or blast crisis and was admitted to the icu. Pt's antibiotics were switched to vanc/zosyn, NG tube was placed to suction with good output. BMB on 3/27. Pending results  03/29/2024 Patient vitally stable overnight with no acute events. She remains on low NC supplementation. Labs this AM show WBC of 10.9, H/H of 8.6/29, & plt of 135K. Tmax over the last 24 hours of 100.5F and remains on Vancomycin/Zosyn for broad spectrum coverage. Endocrine consulted for steroid induced hyperglycemia and is currently on insulin drip. Patient and family requested palliative care consult yesterday to explore GOC further given guarded prognosis. Patient and family updated regarding plan for downgrade from the ICU and was agreeable and understanding.  03/30/2024 Patient downgraded from MICU overnight. Patient with persistent fevers with a Tmax of 102.9F. She remains with significant AMS this AM with her non-verbal this AM. Code status was changed to DNR by ICU team as well. Of note, labs show continued leuko-reduction with hydrea  held. Continued discussions had with family regarding prognosis in setting of blast phase CML with us working on balancing comfort and avoiding oversedation and were agreeable and understanding.   03/31/2024 Palliative care consulted yesterday with family decision to pursue comfort measures with morphine drip started with improved comfort overnight. Patient on AM rounds is resting comfortably.  updated at the bedside for further disposition planning tomorrow based on SW assessment and was agreeable and understanding.   04/01/2024 Pt was accepted and will be D/C to inpatient hospice near family in [place]

## 2024-03-25 NOTE — CARE UPDATE
"RAPID RESPONSE NURSE CHART REVIEW        Chart Reviewed: 03/25/2024, 7:40 AM    MRN: [id number]  Bed: 2883/8051 A    Dx: Subdural hematoma    Fela Ellison has a past medical history of Cancer, CML (chronic myelocytic leukemia), DM2 (diabetes mellitus, type 2), HTN (hypertension), NAFLD (nonalcoholic fatty liver disease), and Neuropathy.    Last VS: BP (!) 151/80   Pulse 103   Temp 100.3 °F (37.9 °C) (Axillary)   Resp 19   Ht 5' 2.99" (1.6 m)   Wt 93 kg (205 lb 0.4 oz)   SpO2 98%   BMI 36.33 kg/m²     24H Vital Sign Range:  Temp:  [98.1 °F (36.7 °C)-103.1 °F (39.5 °C)]   Pulse:  []   Resp:  [17-38]   BP: (126-171)/(62-82)   SpO2:  [94 %-99 %]     Level of Consciousness (AVPU): alert    Recent Labs     03/23/24 2207 03/24/24 0127 03/24/24 2048 03/25/24  0430   WBC 52.42* 53.90* 57.16*  --    HGB 10.4* 10.6* 10.8*  --    HCT 35.3* 36.3* 37.0 35*    195 232  --        Recent Labs     03/23/24 2207 03/24/24 0127 03/24/24 2048   * 136 138   K 3.7 3.8 3.5    102 107   CO2 22* 23 21*   BUN 9 9 13   CREATININE 0.7 0.8 0.8   * 302* 318*   PHOS 3.8 4.1  --    MG 1.6 1.7 1.8        Recent Labs     03/23/24  1809 03/25/24  0430   PH 7.460* 7.436   PCO2 36.0 34.5*   PO2 67.0* 173*   HCO3 25.6 23.2*   POCSATURATED  --  100   BE  --  -1        OXYGEN:  Flow (L/min): 2          MEWS score: 3    BMT team [place]  contacted for hyperglycemia . Reports will increase sliding scale. No additional concerns verbalized at this time. Instructed to call [phone] for further concerns or assistance.    Chago Bernstein RN       "

## 2024-03-25 NOTE — ASSESSMENT & PLAN NOTE
Pt placed on amlodipine 10mg, metoprolol 25mg, and losartan 25.     Plan  Continue home medications,  Goal BP <160 per neurosurgery with SDH  PRN in place if needed, will increase BP medications as needed

## 2024-03-25 NOTE — PT/OT/SLP PROGRESS
Occupational Therapy  Co- Treatment    Name: Fela Ellison  MRN: [id number]  Admitting Diagnosis:  Subdural hematoma   covid +    Session translated by pt. Son per pt. And family decision    Co-treatment performed due to patient's multiple deficits requiring two skilled therapists to appropriately and safely assess patient's strength and endurance while facilitating functional tasks in addition to accommodating for patient's activity tolerance.       Recommendations:     Discharge Recommendations: Moderate Intensity Therapy  Discharge Equipment Recommendations:  none  Barriers to discharge:  Other (Comment) (required increased assist)    Assessment:     Fela Ellison is a 58 y.o. female with a medical diagnosis of Subdural hematoma.  She presents with improvements noted in mobility and balance on this date. Pt. Continues to be a High fall risk overall and appears to have some difficulty processing commands requiring increased time even with translated instructions. Patient would benefit from continued OT services to maximize level of safety and independence with self-care tasks.   . Performance deficits affecting function are weakness, impaired endurance, impaired self care skills, impaired functional mobility, decreased upper extremity function, impaired cardiopulmonary response to activity, gait instability, impaired cognition.     Rehab Prognosis:  Good; patient would benefit from acute skilled OT services to address these deficits and reach maximum level of function.       Plan:     Patient to be seen 4 x/week to address the above listed problems via self-care/home management, therapeutic activities, therapeutic exercises, neuromuscular re-education  Plan of Care Expires: 04/24/24  Plan of Care Reviewed with: patient, family    Subjective     Chief Complaint: Pt. Reporting pain with touch to abdominal region (area noted to be very bruised)  Patient/Family Comments/goals: to use the  bedside commode  Pain/Comfort:  Pain Rating 1:  (did not rate)  Location 1: abdomen  Pain Addressed 1: Pre-medicate for activity, Reposition, Distraction, Nurse notified  Pain Rating Post-Intervention 1:  (no increased in pain noted)    Objective:     Communicated with: nurse prior to session.  Patient found supine with pulse ox (continuous), telemetry, oxygen, peripheral IV upon OT entry to room. Son and spouse present    General Precautions: Standard, airborne, contact, droplet, fall, aspiration, diabetic    Orthopedic Precautions:N/A  Braces: N/A  Respiratory Status: Nasal cannula, flow 1 L/min     Occupational Performance:     Bed Mobility:    Patient completed Scooting/Bridging with maximal assistance and 1 persons  Patient completed Supine to Sit with maximal assistance and 1 persons     Functional Mobility/Transfers:  Patient completed Sit <> Stand Transfer with minimum assistance and of 2 persons  with  no assistive device  from EOB on first trial  Min A x 2 for safety when performing step  transfer from bed to bedside commode holding bed rail for support with assist to weight shift and for safety to monitor knee /2 2buckling (right knee). Pt. Performed step transfer from bedside commode to bedside chair holding bed rail for support with Min A x 2 as above  Pt. Performed sit to stand from bedside chair holding bed rail with Min A  Functional Mobility: not tested     Activities of Daily Living:  Lower Body Dressing: total assistance to don socks  Toileting: maximal assistance for clothing management      [facility] 6 Click ADL: 14    Treatment & Education:  Pt. Educated on need for staff assist for all mobility  Therapist reviewed best technique with nurse as well as family to assist pt. Back to bed with Nursing assist: use bottom bed rail and step transfer to HOB    Patient left up in chair with all lines intact, call button in reach, nurse notified, family present, and BLE elevated    GOALS:   Multidisciplinary  Problems       Occupational Therapy Goals          Problem: Occupational Therapy    Goal Priority Disciplines Outcome Interventions   Occupational Therapy Goal     OT, PT/OT Ongoing, Progressing    Description: Goals to be met by: 4/24/24     Patient will increase functional independence with ADLs by performing:    UE Dressing with Moderate Assistance.  LE Dressing with Moderate Assistance.  Grooming while EOB with Moderate Assistance.  Toileting from bedside commode with Moderate Assistance for hygiene and clothing management.   Rolling to Bilateral with [place].   Supine to sit with Minimal Assistance.  Stand pivot transfers with Moderate Assistance.  Toilet transfer to bedside commode with Moderate Assistance.                         Time Tracking:     OT Date of Treatment: 03/25/24  OT Start Time: 1315  OT Stop Time: 1352  OT Total Time (min): 37 min    Billable Minutes:Self Care/Home Management 22  Therapeutic Activity 15    OT/ROMERO: OT          3/25/2024

## 2024-03-25 NOTE — CONSULTS
Mark Coppola - Oncology ([facility])  Critical Care Medicine  Consult Note    Patient Name: Fela Ellison  MRN: [id number]  Admission Date: 3/23/2024  Hospital Length of Stay: 1 days  Code Status: Full Code  Attending Physician: Kev Llamas MD   Primary Care Provider: Bar Trevino DO   Principal Problem: Subdural hematoma    Inpatient consult to Critical Care Medicine  Consult performed by: Gerry Villegas MD  Consult ordered by: Kev Llamas MD        Subjective:     HPI:  Ms. Fela Ellison is a 59 yo female with a PMHx of IDDM, CML, HTN, NAFLD, and prior tobacco use who initially presented as a transfer for acute SDH. She was admitted to [facility] and then recently stepped down to Medical oncology floor. There is concern for acute blast crisis, given most recent WBC 57.16 of WBC of 39.8 with 28% blasts. Critical care consulted for sepsis as patient was found to be COVID positive, febrile (Tmax of 103.1), -138, BP stable 120-140/60-70s, on 4L NC. She was started on broad spectrum abx (vanc and cefepime). Upon evaluation, patient appeared flushed and lethargic. Had a significant rash on abdomen from SQ chemotherapy.     Imaging with CXR with RLL congestive process. CT chest/abd/pelvis significant for splenomegaly, subcutaneous anasarca edema present at the level of the abdomen and pelvis, moderate colonic fecal load without pericolonic stranding, and bilateral ovarian cysts. Degenerative changes of the lower spine, specifically at L5-S1.     Hospital/ICU Course:  No notes on file    Past Medical History:   Diagnosis Date    Cancer     CML (chronic myelocytic leukemia)     DM2 (diabetes mellitus, type 2)     HTN (hypertension)     NAFLD (nonalcoholic fatty liver disease)     Neuropathy        Past Surgical History:   Procedure Laterality Date    ABDOMINAL SURGERY       SECTION      x4    CHOLECYSTECTOMY         Review of patient's allergies indicates:  No Known Allergies    Family  History       Problem Relation (Age of Onset)    Diabetes Mother, Father          Tobacco Use    Smoking status: Never    Smokeless tobacco: Never   Substance and Sexual Activity    Alcohol use: Not Currently    Drug use: Not Currently    Sexual activity: Not on file      Review of Systems   Unable to perform ROS: Acuity of condition     Objective:     Vital Signs (Most Recent):  Temp: (!) 101.3 °F (38.5 °C) (03/24/24 2124)  Pulse: (!) 126 (03/24/24 2124)  Resp: 17 (03/24/24 2124)  BP: 135/72 (03/24/24 2124)  SpO2: 98 % (03/24/24 2124) Vital Signs (24h Range):  Temp:  [97.8 °F (36.6 °C)-103.1 °F (39.5 °C)] 101.3 °F (38.5 °C)  Pulse:  [] 126  Resp:  [17-38] 17  SpO2:  [93 %-98 %] 98 %  BP: (123-171)/(62-82) 135/72   Weight: 93.2 kg (205 lb 7.5 oz)  Body mass index is 36.41 kg/m².      Intake/Output Summary (Last 24 hours) at 3/24/2024 2148  Last data filed at 3/24/2024 2044  Gross per 24 hour   Intake 1995.19 ml   Output 2350 ml   Net -354.81 ml          Physical Exam  Constitutional:       General: She is not in acute distress.     Appearance: Normal appearance. She is ill-appearing.   HENT:      Head: Normocephalic and atraumatic.      Mouth/Throat:      Mouth: Mucous membranes are moist.   Eyes:      Extraocular Movements: Extraocular movements intact.      Conjunctiva/sclera: Conjunctivae normal.   Cardiovascular:      Rate and Rhythm: Regular rhythm. Tachycardia present.      Heart sounds: Normal heart sounds.   Pulmonary:      Effort: Pulmonary effort is normal. No respiratory distress.      Breath sounds: Normal breath sounds.   Abdominal:      General: Abdomen is flat. Bowel sounds are normal. There is no distension.      Palpations: Abdomen is soft.      Tenderness: There is no abdominal tenderness.      Comments: Erythematous rash extending across abdomen    Musculoskeletal:         General: No swelling.      Right lower leg: No edema.      Left lower leg: No edema.   Skin:     General: Skin is warm.    Neurological:      Mental Status: She is lethargic.            Vents:     Lines/Drains/Airways       Drain  Duration             Female External Urinary Catheter w/ Suction 03/23/24 2148 1 day              Peripheral Intravenous Line  Duration                  Peripheral IV - Single Lumen 03/23/24 20 G Anterior;Right Hand 1 day                  Significant Labs:    CBC/Anemia Profile:  Recent Labs   Lab 03/23/24 2207 03/24/24 0127 03/24/24 2048   WBC 52.42* 53.90* 57.16*   HGB 10.4* 10.6* 10.8*   HCT 35.3* 36.3* 37.0    195 232   MCV 84 85 83   RDW 19.4* 19.8* 20.2*        Chemistries:  Recent Labs   Lab 03/23/24  1244 03/23/24  1831 03/23/24 2207 03/24/24 0127 03/24/24 2048    137 135* 136 138   K 3.8 4.1 3.7 3.8 3.5   CL  --   --  102 102 107   CO2 24 22 22* 23 21*   BUN 10.7 10.0 9 9 13   CREATININE 0.75 0.70 0.7 0.8 0.8   CALCIUM 9.3 9.0 9.3 9.2 9.7   ALBUMIN 3.0* 3.3* 3.1* 3.1* 2.9*   PROT  --   --  6.5 6.5 6.4   BILITOT 0.4 0.5 0.6 0.6 0.6   ALKPHOS 65 74 73 71 71   ALT 8 9 9* 9* 10   AST 13 17 21 21 18   GLUCOSE 228* 238*  --   --   --    MG 1.60 1.60 1.6 1.7 1.8   PHOS  --   --  3.8 4.1  --        All pertinent labs within the past 24 hours have been reviewed.    Significant Imaging: I have reviewed all pertinent imaging results/findings within the past 24 hours.    ABG  Recent Labs   Lab 03/23/24  1809   PH 7.460*   PO2 67.0*   PCO2 36.0   HCO3 25.6     Assessment/Plan:     ID  COVID  Patient tested positive for COVID-19 on 3/24. Overnight patient with recurrent high-grade fevers up to 103.1 and tachycardia to 130s. Mild increase in supplemental O2 requirements, currently on 4LNC at time of Critical Care evaluation. Fevers and tachycardia likely due to COVID though blast crisis certainly a contributing factor. Patient following commands and answering questions appropriately on exam. Recommend continued treatment of COVID as below, as well as mgmt of blast crisis per Hematology recs.  Recommend IV tylenol and ice packs for fever reduction. Continue IV cefepime given c/f aspiration pneumonia on admission.     Patient is identified as Severe COVID-19 based on hypoxemia with O2 saturations <94% on room air or on ambulation   Initiate standard COVID protocols; COVID-19 testing ,Infection Control notification  and isolation- respiratory, contact and droplet per protocol    Diagnostics: (leukopenia, hyponatremia, hyperferritinemia, elevated troponin, elevated d-dimer, age, and comorbidities are significant predictors of poor clinical outcome)  CBC and CMP    Management: Initiate targeted therapy with Remdesivir, 200mg IV x1, followed by 100mg IV daily x5 days total and Dexamethasone PO/IV 6mg daily x10 days, Maintain oxygen saturations 92-96% via Nasal Cannula 4 LPM and monitor with continuous/intermittent pulse oximetry. , Inhaled bronchodilators as needed for shortness of breath., and Continuous cardiac monitoring.    Advance Care Planning Current advance care plan has not been discussed with patient/family/POA.      Oncology  Blast crisis phase of chronic myeloid leukemia  Patient with CML c/b acute blast crisis. Hematology following, patient started on hydroxyurea for cytoreduction. Blast crisit likely contributing to patient's current fevers and tachycardia. Agree with hydroxyurea per hematology and close monitoring of patient's blood counts.       Patient stable for continued management of active medical problems on the floor. No current MICU need.     Thank you for your consult. I will sign off. Please contact us if you have any additional questions.     Gerry Villegas MD  Critical Care Medicine  [facility] - Oncology ([facility])

## 2024-03-25 NOTE — CARE UPDATE
RAPID RESPONSE NURSE PROACTIVE ROUNDING NOTE       Time of Visit: 923    Admit Date: 3/23/2024  LOS: 2  Code Status: Full Code   Date of Visit: 2024  : 1965  Age: 58 y.o.  Sex: female  Race: Other  Bed: 8086/8086 A:   MRN: [id number]  Was the patient discharged from an ICU this admission? Yes   Was the patient discharged from a PACU within last 24 hours? No   Did the patient receive conscious sedation/general anesthesia in last 24 hours? No  Was the patient in the ED within the past 24 hours? No  Was the patient on NIPPV within the past 24 hours? No   Attending Physician: Kev Llamas MD  Primary Service: Hematology and Oncology   Time spent at the bedside: < 15 min    SITUATION    Notified by previous RRN during handoff.  Reason for alert: AMS  Called to evaluate the patient for Neuro    BACKGROUND     Why is the patient in the hospital?: Subdural hematoma    Patient has a past medical history of Cancer, CML (chronic myelocytic leukemia), DM2 (diabetes mellitus, type 2), HTN (hypertension), NAFLD (nonalcoholic fatty liver disease), and Neuropathy.    Last Vitals:  Temp: 99 °F (37.2 °C) ( 113)  Pulse: 92 ( 113)  Resp: 20 ( 113)  BP: 165/72 ( 113)  SpO2: 96 % (1137)    24 Hours Vitals Range:  Temp:  [97.7 °F (36.5 °C)-103.1 °F (39.5 °C)]   Pulse:  []   Resp:  [17-38]   BP: (126-171)/(62-93)   SpO2:  [94 %-100 %]     Labs:  Recent Labs     248 24  0430 24  0701   WBC 53.90*  --  57.16*  --  50.03*   HGB 10.6*  --  10.8*  --  10.4*   HCT 36.3*   < > 37.0 35* 35.3*     --  232  --  240    < > = values in this interval not displayed.       Recent Labs     24/24/24  2048 24  0701   * 136 138 139   K 3.7 3.8 3.5 3.7    102 107 107   CO2 22* 23 21* 21*   BUN 9 9 13 14   CREATININE 0.7 0.8 0.8 0.8   * 302* 318* 427*   PHOS 3.8 4.1  --  3.7   MG 1.6 1.7 1.8 2.0         Recent Labs     03/23/24  1809 03/24/24 2011 03/25/24  0430   PH 7.460* 7.478* 7.436   PCO2 36.0 28.5* 34.5*   PO2 67.0* 57 173*   HCO3 25.6 21.1* 23.2*   POCSATURATED  --  92 100   BE  --  -2 -1        ASSESSMENT    Physical Exam  Constitutional:       General: She is in acute distress.   Pulmonary:      Effort: No respiratory distress.   Neurological:      General: No focal deficit present.      Mental Status: She is alert and oriented to person, place, and time.      GCS: GCS eye subscore is 4. GCS verbal subscore is 5. GCS motor subscore is 6.      Motor: Weakness present.      Comments: Bilateral lower extremity weakness.       INTERVENTIONS    The patient was seen for Neurological problem. Staff concerns included decreased responsiveness. The following interventions were performed: continuous pulse ox monitoring continued  and continuous cardiac monitoring continued.    RECOMMENDATIONS    Maintain telemetry and IV access. Call for any acute change in mental status.    PROVIDER ESCALATION    Yes/No  No    Orders received and case discussed with NA.    Disposition: Remain in room 8086.    FOLLOW-UP    Charge Darren GRAY  updated on plan of care. Instructed to call the Rapid Response Nurse, Bev Antonio RN at [phone] for additional questions or concerns.

## 2024-03-25 NOTE — ASSESSMENT & PLAN NOTE
Patient tested positive for COVID-19 on 3/24. Overnight patient with recurrent high-grade fevers up to 103.1 and tachycardia to 130s. Mild increase in supplemental O2 requirements, currently on 4LNC at time of Critical Care evaluation. Fevers and tachycardia likely due to COVID though blast crisis certainly a contributing factor. Patient following commands and answering questions appropriately on exam. Recommend continued treatment of COVID as below, as well as mgmt of blast crisis per Hematology recs. Recommend IV tylenol and ice packs for fever reduction. Continue IV cefepime given c/f aspiration pneumonia on admission.     Patient is identified as Severe COVID-19 based on hypoxemia with O2 saturations <94% on room air or on ambulation   Initiate standard COVID protocols; COVID-19 testing ,Infection Control notification  and isolation- respiratory, contact and droplet per protocol    Diagnostics: (leukopenia, hyponatremia, hyperferritinemia, elevated troponin, elevated d-dimer, age, and comorbidities are significant predictors of poor clinical outcome)  CBC and CMP    Management: Initiate targeted therapy with Remdesivir, 200mg IV x1, followed by 100mg IV daily x5 days total and Dexamethasone PO/IV 6mg daily x10 days, Maintain oxygen saturations 92-96% via Nasal Cannula 4 LPM and monitor with continuous/intermittent pulse oximetry. , Inhaled bronchodilators as needed for shortness of breath., and Continuous cardiac monitoring.    Advance Care Planning  Current advance care plan has not been discussed with patient/family/POA.

## 2024-03-25 NOTE — CLINICAL REVIEW
"Medical Emergency Team Follow Up Note   Critical Care Medicine    CC: Subdural hematoma  Date: 03/25/2024  Admit Date: 3/23/2024  Hospital Length of Stay: 2  MRN: [id number]  The patient location is: Bed 8086/8086 A  Dx: Subdural hematoma  Code Status: Full Code   Chart Reviewed: 03/25/2024, 2:06 PM      Medical Emergency Team Follow Up Note      SUBJECTIVE:     HPI:  Fela Ellison has a past medical history of Cancer, CML (chronic myelocytic leukemia), DM2 (diabetes mellitus, type 2), HTN (hypertension), NAFLD (nonalcoholic fatty liver disease), and Neuropathy.    Significant Events: Follow up  for evaluation of  fever/ tachycardia/COVID         OBJECTIVE:     Physical Exam  Vitals and nursing note reviewed.   Constitutional:       Appearance: Normal appearance. She is overweight.      Interventions: Nasal cannula in place.   Pulmonary:      Effort: Pulmonary effort is normal. Tachypnea present.   Genitourinary:     Comments: Purewick    Skin:     General: Skin is warm.   Neurological:      Mental Status: She is alert.      GCS: GCS eye subscore is 4. GCS verbal subscore is 5. GCS motor subscore is 6.         Last VS: BP (!) 165/72 (Patient Position: Lying)   Pulse 101   Temp 99 °F (37.2 °C) (Oral)   Resp 20   Ht 5' 2.99" (1.6 m)   Wt 93 kg (205 lb 0.4 oz)   SpO2 96%   BMI 36.33 kg/m²     24H Vital Sign Range:    Temp:  [97.7 °F (36.5 °C)-103.1 °F (39.5 °C)]   Pulse:  []   Resp:  [17-38]   BP: (126-171)/(62-93)   SpO2:  [96 %-100 %]     Level of Consciousness (AVPU): alert      Intake/Output Summary (Last 24 hours) at 3/25/2024 1406  Last data filed at 3/25/2024 0615  Gross per 24 hour   Intake 1467.59 ml   Output 2200 ml   Net -732.41 ml       Recent Labs     03/24/24  0127 03/24/24 2011 03/24/24  2048 03/25/24  0430 03/25/24  0701   WBC 53.90*  --  57.16*  --  50.03*   HGB 10.6*  --  10.8*  --  10.4*   HCT 36.3*   < > 37.0 35* 35.3*     --  232  --  240    < > = values in this " interval not displayed.       Recent Labs     03/23/24  2207 03/24/24  0127 03/24/24  2048 03/25/24  0701   * 136 138 139   K 3.7 3.8 3.5 3.7    102 107 107   CO2 22* 23 21* 21*   BUN 9 9 13 14   CREATININE 0.7 0.8 0.8 0.8   * 302* 318* 427*   PHOS 3.8 4.1  --  3.7   MG 1.6 1.7 1.8 2.0        Recent Labs     03/23/24  1809 03/24/24 2011 03/25/24  0430   PH 7.460* 7.478* 7.436   PCO2 36.0 28.5* 34.5*   PO2 67.0* 57 173*   HCO3 25.6 21.1* 23.2*   POCSATURATED  --  92 100   BE  --  -2 -1        Lab Results   Component Value Date    LACTATE 1.6 03/25/2024    LACTATE 1.8 03/23/2024         ASSESSMENT AND PLAN :     COVID/ Fever/Tachycardia  Patient was seen by the critical care team last night with concerns for fevers and tachycardia likely due to COVID but blast crisis is a possibility.  - continue broad spectrum abx for possible PNA  - Continue Remdesivir and Dexamethasone  - Trend CBC and replete prn per primary team  - repeat CXR prn  - Patient is hyperglycemic and could benefit from insukin  - Discuss advanced care planning and goals of care/treatment plan with family. Patient has 4 sons.       Please reconsult MICU if patient's condition deteriorates x [phone]    I spent a total of 25 minutes on the day of the visit.This includes face to face time and non-face to face time preparing to see the patient (eg, review of tests), obtaining and/or reviewing separately obtained history, documenting clinical information in the electronic or other health record, independently interpreting results and communicating results to the patient/family/caregiver, or care coordinator.     Fiona Winterbottom APRN, CNS  Critical Care Medicine

## 2024-03-25 NOTE — PT/OT/SLP PROGRESS
Physical Therapy Co-Treatment    Patient Name:  Fela Ellison   MRN:  [id number]    Recommendations:     Discharge Recommendations: Moderate Intensity Therapy  Discharge Equipment Recommendations: none  Barriers to discharge:  Increased skilled assistance required    Assessment:   Co-evaluation/treatment performed due to patient's multiple deficits requiring two skilled therapists to appropriately and safely assess patient's strength, endurance, functional mobility, and ADL performance while facilitating functional tasks in addition to accommodating for patient's activity tolerance and medical acuity.    Fela Ellison is a 58 y.o. female admitted with a medical diagnosis of Subdural hematoma.  She presents with the following impairments/functional limitations: weakness, impaired endurance, pain, impaired balance, impaired functional mobility, gait instability, decreased lower extremity function, impaired skin, edema, impaired coordination. Patient demonstrating good motivation to participate in co-treatment session, with good response to completed activities and provided education. Good improvements in patient's ability to perform functional mobility noted, evidenced by significantly decreased physical assistance required. Completed multiple step transfers to [facility] [place] [place] & bedside chair this date as well, requiring MinAx2. Patient demonstrates that they will greatly benefit from moderate intensity skilled physical therapy services post-acutely.    Rehab Prognosis: Good; patient would benefit from acute skilled PT services to address these deficits and reach maximum level of function.    Recent Surgery: * No surgery found *      Plan:     During this hospitalization, patient to be seen 4 x/week to address the identified rehab impairments via gait training, therapeutic activities, therapeutic exercises, neuromuscular re-education and progress toward the following goals:    Plan of Care Expires:   "04/24/24    Subjective     Chief Complaint: Pain  Patient/Family Comments/goals: To use bathroom  Pain/Comfort:  Pain Rating 1: Pain present, but not rated  Location - Orientation 1: generalized  Location 1: abdomen  Pain Addressed 1: Reposition, Distraction  Pain Rating Post-Intervention 1: Pain present, but not rated      Objective:     Communicated with RN prior to session.  Patient found HOB elevated with telemetry, pulse ox (continuous), oxygen, peripheral IV, PureWick upon PT entry to room.     General Precautions: Standard, airborne, contact, droplet, fall, aspiration, diabetic   Orthopedic Precautions:N/A   Braces: N/A   Body mass index is 36.33 kg/m².  Oxygen Device: Nasal Cannula 1L  Vitals: BP (!) 165/72 (Patient Position: Lying)   Pulse 101   Temp 99 °F (37.2 °C) (Oral)   Resp 20   Ht 5' 2.99" (1.6 m)   Wt 93 kg (205 lb 0.4 oz)   SpO2 96%   BMI 36.33 kg/m²      Functional Mobility:  Bed Mobility: Cuing required to break down complex motor sequence into single steps   Rolling Left: x1, Luis with HOB Elevated  EOB Scooting:   Anterior: MaxA  Supine>Sit: x1, MaxA with HOB Elevated    Transfers: Cuing required to break down complex motor sequence into single steps , especially step transfers  Sit<>Stand:   Trial 1: x1, MinAx2 from Edge of Bed with HHA  Trial 2: x1, Luis from Bedside Commode with HHA  Trial 3: x1, Luis from Bedside Chair with HHA and RUE assist via bed rail  Bed>BSC: x1, MinAx2 with HHA using step transfer technique to R. Observed multiple episodes of R knee buckling, however no LOB occurred. Minimal assistance to facilitate WS  BSC>Chair: x1, MinAx2 with HHA and RUE assist via bed rail using step transfer technique to R. Observed multiple episodes of R knee buckling, however no LOB occurred. Minimal assistance to facilitate WS    Gait: ~5 lat steps R, MinAx2 with HHA    Total Distance: ~5 lat steps  Gait Assessment: decreased step length , decreased fanta, decreased gait speed, " unsteady gait, minimal foot clearance    Balance:   Static Sitting: CGA  Dynamic Sitting: MaxA  Static Standing: Luis  Dynamic Standing: MinAx2    AM-PAC 6 CLICK MOBILITY  Turning over in bed (including adjusting bedclothes, sheets and blankets)?: 2  Sitting down on and standing up from a chair with arms (e.g., wheelchair, bedside commode, etc.): 3  Moving from lying on back to sitting on the side of the bed?: 2  Moving to and from a bed to a chair (including a wheelchair)?: 3  Need to walk in hospital room?: 2  Climbing 3-5 steps with a railing?: 1  Basic Mobility Total Score: 13     Treatment & Education:  Increased time required for toileting    Patient Education Provided on:  The role of physical therapy and how the patient can benefit from skilled services  The negative effects of prolonged bed rest/sedentary behavior, along with the importance of OOB activity & patient participation with PT  The importance of contacting RN, via call light, for mobility throughout the day  Pt white board updated with current therapists name and level of mobility assistance needed.     Patient and family Verbalized understanding of all topics touched on this date. All questions answered within the PT scope of practice    Patient left up in chair with all lines intact, call button in reach, RN notified, and Family & additional medical professional present..    GOALS:   Multidisciplinary Problems       Physical Therapy Goals          Problem: Physical Therapy    Goal Priority Disciplines Outcome Goal Variances Interventions   Physical Therapy Goal     PT, PT/OT Ongoing, Progressing     Description: Goals to be met by: 2024     Patient will increase functional independence with mobility by performin. Supine to sit with MInimal Assistance  2. Sit to stand transfer with Minimal Assistance  3. Bed to chair transfer with Minimal Assistance using Rolling Walker  4. Gait  x 20 feet with Minimal Assistance using Rolling Walker.    5. Ascend/descend 4 stair with bilateral Handrails Minimal Assistance using No Assistive Device.                          Time Tracking:     PT Received On: 03/25/24  PT Start Time: 1315     PT Stop Time: 1352  PT Total Time (min): 37 min     Billable Minutes: Therapeutic Activity 22 and Neuromuscular Re-education 15    Treatment Type: Treatment  PT/PTA: PT     Number of PTA visits since last PT visit: 0     03/25/2024

## 2024-03-25 NOTE — CONSULTS
Inpatient consult to Physical Medicine Rehab  Consult performed by: Michelle Luke NP  Consult ordered by: Estrella Serna PA-C  Reason for consult: Rehab      Consult received.     RUFINA Jones, FNP-C  Physical Medicine & Rehabilitation   03/25/2024

## 2024-03-25 NOTE — ASSESSMENT & PLAN NOTE
Pt tested positive for covid on 3/24. Initially on 4L NC, has decreased to 2L. Pt's mental status has improved. WBC count 50K.     Plan  Remdesivir course  Vanc/cefepime with sepsis concern  Initially started on dex  switched to prednisone once tolerated oral medications

## 2024-03-25 NOTE — NURSING
Nurses Note -- 4 Eyes      3/25/2024   2:45 AM      Skin assessed during: Transfer      [] No Altered Skin Integrity Present    []Prevention Measures Documented      [x] Yes- Altered Skin Integrity Present or Discovered   [x] LDA Added if Not in Epic (Describe Wound)   [x] New Altered Skin Integrity was Present on Admit and Documented in LDA   [] Wound Image Taken    Wound Care Consulted? No    Attending Nurse:  Tatyana Mercado RN/Staff Member:   Leida

## 2024-03-25 NOTE — PLAN OF CARE
Problem: Adult Inpatient Plan of Care  Goal: Plan of Care Review  Outcome: Ongoing, Progressing  Goal: Patient-Specific Goal (Individualized)  Outcome: Ongoing, Progressing  Goal: Absence of Hospital-Acquired Illness or Injury  Outcome: Ongoing, Progressing  Goal: Optimal Comfort and Wellbeing  Outcome: Ongoing, Progressing  Goal: Readiness for Transition of Care  Outcome: Ongoing, Progressing     Problem: Diabetes Comorbidity  Goal: Blood Glucose Level Within Targeted Range  Outcome: Ongoing, Progressing     Problem: Adjustment to Illness (Stroke, Hemorrhagic)  Goal: Optimal Coping  Outcome: Ongoing, Progressing     Problem: Bowel Elimination Impaired (Stroke, Hemorrhagic)  Goal: Effective Bowel Elimination  Outcome: Ongoing, Progressing     Problem: Cerebral Tissue Perfusion (Stroke, Hemorrhagic)  Goal: Optimal Cerebral Tissue Perfusion  Outcome: Ongoing, Progressing     Problem: Cognitive Impairment (Stroke, Hemorrhagic)  Goal: Optimal Cognitive Function  Outcome: Ongoing, Progressing     Problem: Communication Impairment (Stroke, Hemorrhagic)  Goal: Effective Communication Skills  Outcome: Ongoing, Progressing     Problem: Functional Ability Impaired (Stroke, Hemorrhagic)  Goal: Optimal Functional Ability  Outcome: Ongoing, Progressing     Problem: Pain (Stroke, Hemorrhagic)  Goal: Acceptable Pain Control  Outcome: Ongoing, Progressing     Problem: Respiratory Compromise (Stroke, Hemorrhagic)  Goal: Effective Oxygenation and Ventilation  Outcome: Ongoing, Progressing     Problem: Sensorimotor Impairment (Stroke, Hemorrhagic)  Goal: Improved Sensorimotor Function  Outcome: Ongoing, Progressing     Problem: Swallowing Impairment (Stroke, Hemorrhagic)  Goal: Optimal Eating and Swallowing Without Aspiration  Outcome: Ongoing, Progressing     Problem: Urinary Elimination Impaired (Stroke, Hemorrhagic)  Goal: Effective Urinary Elimination  Outcome: Ongoing, Progressing     Problem: Skin Injury Risk Increased  Goal:  Skin Health and Integrity  Outcome: Ongoing, Progressing     Problem: Fall Injury Risk  Goal: Absence of Fall and Fall-Related Injury  Outcome: Ongoing, Progressing     Problem: Fluid Imbalance (Pneumonia)  Goal: Fluid Balance  Outcome: Ongoing, Progressing     Problem: Infection (Pneumonia)  Goal: Resolution of Infection Signs and Symptoms  Outcome: Ongoing, Progressing     Problem: Respiratory Compromise (Pneumonia)  Goal: Effective Oxygenation and Ventilation  Outcome: Ongoing, Progressing     Problem: Adjustment to Illness (Sepsis/Septic Shock)  Goal: Optimal Coping  Outcome: Ongoing, Progressing     Problem: Bleeding (Sepsis/Septic Shock)  Goal: Absence of Bleeding  Outcome: Ongoing, Progressing     Problem: Glycemic Control Impaired (Sepsis/Septic Shock)  Goal: Blood Glucose Level Within Desired Range  Outcome: Ongoing, Progressing     Problem: Infection Progression (Sepsis/Septic Shock)  Goal: Absence of Infection Signs and Symptoms  Outcome: Ongoing, Progressing     Problem: Nutrition Impaired (Sepsis/Septic Shock)  Goal: Optimal Nutrition Intake  Outcome: Ongoing, Progressing    Pt remained free of falls or injuries during shift. No signs of acute distress noted during shift. Pt remained free of falls or injuries during shift.

## 2024-03-25 NOTE — PLAN OF CARE
Problem: Physical Therapy  Goal: Physical Therapy Goal  Description: Goals to be met by: 2024     Patient will increase functional independence with mobility by performin. Supine to sit with MInimal Assistance  2. Sit to stand transfer with Minimal Assistance  3. Bed to chair transfer with Minimal Assistance using Rolling Walker  4. Gait  x 20 feet with Minimal Assistance using Rolling Walker.   5. Ascend/descend 4 stair with bilateral Handrails Minimal Assistance using No Assistive Device.     Outcome: Ongoing, Progressing

## 2024-03-26 ENCOUNTER — PATIENT MESSAGE (OUTPATIENT)
Dept: ENDOCRINOLOGY | Facility: HOSPITAL | Age: 59
End: 2024-03-26

## 2024-03-26 PROBLEM — G93.40 ACUTE ENCEPHALOPATHY: Status: ACTIVE | Noted: 2024-03-26

## 2024-03-26 LAB
ALBUMIN SERPL BCP-MCNC: 2.5 G/DL (ref 3.5–5.2)
ALBUMIN SERPL BCP-MCNC: 2.6 G/DL (ref 3.5–5.2)
ALLENS TEST: ABNORMAL
ALLENS TEST: ABNORMAL
ALP SERPL-CCNC: 100 U/L (ref 55–135)
ALP SERPL-CCNC: 77 U/L (ref 55–135)
ALT SERPL W/O P-5'-P-CCNC: 8 U/L (ref 10–44)
ALT SERPL W/O P-5'-P-CCNC: 8 U/L (ref 10–44)
AMMONIA PLAS-SCNC: 59 UMOL/L (ref 10–50)
ANION GAP SERPL CALC-SCNC: 15 MMOL/L (ref 8–16)
ANION GAP SERPL CALC-SCNC: 8 MMOL/L (ref 8–16)
ANION GAP SERPL CALC-SCNC: 8 MMOL/L (ref 8–16)
ANISOCYTOSIS BLD QL SMEAR: SLIGHT
ANISOCYTOSIS BLD QL SMEAR: SLIGHT
ASCENDING AORTA: 3.7 CM
AST SERPL-CCNC: 20 U/L (ref 10–40)
AST SERPL-CCNC: 22 U/L (ref 10–40)
AV INDEX (PROSTH): 0.83
AV MEAN GRADIENT: 11 MMHG
AV PEAK GRADIENT: 24 MMHG
AV VALVE AREA BY VELOCITY RATIO: 2.17 CM²
AV VALVE AREA: 2.79 CM²
AV VELOCITY RATIO: 0.65
B-OH-BUTYR BLD STRIP-SCNC: 0.2 MMOL/L (ref 0–0.5)
BASOPHILS # BLD AUTO: ABNORMAL K/UL (ref 0–0.2)
BASOPHILS NFR BLD: 0 % (ref 0–1.9)
BASOPHILS NFR BLD: 0 % (ref 0–1.9)
BILIRUB SERPL-MCNC: 0.3 MG/DL (ref 0.1–1)
BILIRUB SERPL-MCNC: 0.3 MG/DL (ref 0.1–1)
BSA FOR ECHO PROCEDURE: 2.03 M2
BUN SERPL-MCNC: 16 MG/DL (ref 6–20)
BUN SERPL-MCNC: 17 MG/DL (ref 6–20)
BUN SERPL-MCNC: 19 MG/DL (ref 6–20)
BURR CELLS BLD QL SMEAR: ABNORMAL
CALCIUM SERPL-MCNC: 9.1 MG/DL (ref 8.7–10.5)
CALCIUM SERPL-MCNC: 9.6 MG/DL (ref 8.7–10.5)
CALCIUM SERPL-MCNC: 9.6 MG/DL (ref 8.7–10.5)
CHLORIDE SERPL-SCNC: 105 MMOL/L (ref 95–110)
CHLORIDE SERPL-SCNC: 107 MMOL/L (ref 95–110)
CHLORIDE SERPL-SCNC: 107 MMOL/L (ref 95–110)
CK SERPL-CCNC: 76 U/L (ref 20–180)
CO2 SERPL-SCNC: 15 MMOL/L (ref 23–29)
CO2 SERPL-SCNC: 20 MMOL/L (ref 23–29)
CO2 SERPL-SCNC: 20 MMOL/L (ref 23–29)
CREAT SERPL-MCNC: 0.8 MG/DL (ref 0.5–1.4)
CREAT SERPL-MCNC: 0.9 MG/DL (ref 0.5–1.4)
CREAT SERPL-MCNC: 0.9 MG/DL (ref 0.5–1.4)
CRP SERPL-MCNC: 116.8 MG/L (ref 0–8.2)
CV ECHO LV RWT: 0.53 CM
DACRYOCYTES BLD QL SMEAR: ABNORMAL
DACRYOCYTES BLD QL SMEAR: ABNORMAL
DELSYS: ABNORMAL
DIFFERENTIAL METHOD BLD: ABNORMAL
DIFFERENTIAL METHOD BLD: ABNORMAL
DOP CALC AO PEAK VEL: 2.46 M/S
DOP CALC AO VTI: 36.13 CM
DOP CALC LVOT AREA: 3.4 CM2
DOP CALC LVOT DIAMETER: 2.07 CM
DOP CALC LVOT PEAK VEL: 1.59 M/S
DOP CALC LVOT STROKE VOLUME: 100.71 CM3
DOP CALCLVOT PEAK VEL VTI: 29.94 CM
E WAVE DECELERATION TIME: 156.41 MSEC
E/A RATIO: 0.57
E/E' RATIO: 9.85 M/S
ECHO LV POSTERIOR WALL: 1.07 CM (ref 0.6–1.1)
EJECTION FRACTION: 63 %
EOSINOPHIL # BLD AUTO: ABNORMAL K/UL (ref 0–0.5)
EOSINOPHIL NFR BLD: 0 % (ref 0–8)
EOSINOPHIL NFR BLD: 0 % (ref 0–8)
ERYTHROCYTE [DISTWIDTH] IN BLOOD BY AUTOMATED COUNT: 20.8 % (ref 11.5–14.5)
ERYTHROCYTE [DISTWIDTH] IN BLOOD BY AUTOMATED COUNT: 21.2 % (ref 11.5–14.5)
EST. GFR  (NO RACE VARIABLE): >60 ML/MIN/1.73 M^2
FIBRINOGEN PPP-MCNC: 511 MG/DL (ref 182–400)
FRACTIONAL SHORTENING: 31 % (ref 28–44)
GIANT PLATELETS BLD QL SMEAR: PRESENT
GLUCOSE SERPL-MCNC: 414 MG/DL (ref 70–110)
GLUCOSE SERPL-MCNC: 440 MG/DL (ref 70–110)
GLUCOSE SERPL-MCNC: 456 MG/DL (ref 70–110)
HCO3 UR-SCNC: 20.2 MMOL/L (ref 24–28)
HCT VFR BLD AUTO: 36.4 % (ref 37–48.5)
HCT VFR BLD AUTO: 37 % (ref 37–48.5)
HCT VFR BLD CALC: 36 %PCV (ref 36–54)
HGB BLD-MCNC: 10.5 G/DL (ref 12–16)
HGB BLD-MCNC: 10.9 G/DL (ref 12–16)
HYPOCHROMIA BLD QL SMEAR: ABNORMAL
HYPOCHROMIA BLD QL SMEAR: ABNORMAL
IMM GRANULOCYTES # BLD AUTO: ABNORMAL K/UL (ref 0–0.04)
IMM GRANULOCYTES # BLD AUTO: ABNORMAL K/UL (ref 0–0.04)
IMM GRANULOCYTES NFR BLD AUTO: ABNORMAL % (ref 0–0.5)
IMM GRANULOCYTES NFR BLD AUTO: ABNORMAL % (ref 0–0.5)
INR PPP: 1.2 (ref 0.8–1.2)
INTERVENTRICULAR SEPTUM: 1.19 CM (ref 0.6–1.1)
LA MAJOR: 5.49 CM
LA MINOR: 5.76 CM
LA WIDTH: 3.17 CM
LACTATE SERPL-SCNC: 2.1 MMOL/L (ref 0.5–2.2)
LDH SERPL L TO P-CCNC: 2152 U/L (ref 110–260)
LDH SERPL L TO P-CCNC: 3.34 MMOL/L (ref 0.5–2.2)
LEFT ATRIUM SIZE: 3.27 CM
LEFT ATRIUM VOLUME INDEX MOD: 26.1 ML/M2
LEFT ATRIUM VOLUME INDEX: 25.4 ML/M2
LEFT ATRIUM VOLUME MOD: 50.8 CM3
LEFT ATRIUM VOLUME: 49.53 CM3
LEFT INTERNAL DIMENSION IN SYSTOLE: 2.78 CM (ref 2.1–4)
LEFT VENTRICLE DIASTOLIC VOLUME INDEX: 36.91 ML/M2
LEFT VENTRICLE DIASTOLIC VOLUME: 71.98 ML
LEFT VENTRICLE MASS INDEX: 79 G/M2
LEFT VENTRICLE SYSTOLIC VOLUME INDEX: 14.9 ML/M2
LEFT VENTRICLE SYSTOLIC VOLUME: 29.15 ML
LEFT VENTRICULAR INTERNAL DIMENSION IN DIASTOLE: 4.05 CM (ref 3.5–6)
LEFT VENTRICULAR MASS: 154.36 G
LV LATERAL E/E' RATIO: 10.67 M/S
LV SEPTAL E/E' RATIO: 9.14 M/S
LYMPHOCYTES # BLD AUTO: ABNORMAL K/UL (ref 1–4.8)
LYMPHOCYTES NFR BLD: 25 % (ref 18–48)
LYMPHOCYTES NFR BLD: 28 % (ref 18–48)
MAGNESIUM SERPL-MCNC: 1.8 MG/DL (ref 1.6–2.6)
MCH RBC QN AUTO: 23.9 PG (ref 27–31)
MCH RBC QN AUTO: 24.2 PG (ref 27–31)
MCHC RBC AUTO-ENTMCNC: 28.8 G/DL (ref 32–36)
MCHC RBC AUTO-ENTMCNC: 29.5 G/DL (ref 32–36)
MCV RBC AUTO: 82 FL (ref 82–98)
MCV RBC AUTO: 83 FL (ref 82–98)
MONOCYTES # BLD AUTO: ABNORMAL K/UL (ref 0.3–1)
MONOCYTES NFR BLD: 11 % (ref 4–15)
MONOCYTES NFR BLD: 5 % (ref 4–15)
MV PEAK A VEL: 1.13 M/S
MV PEAK E VEL: 0.64 M/S
MV STENOSIS PRESSURE HALF TIME: 45.36 MS
MV VALVE AREA P 1/2 METHOD: 4.85 CM2
NEUTROPHILS NFR BLD: 26 % (ref 38–73)
NEUTROPHILS NFR BLD: 35 % (ref 38–73)
NEUTS BAND NFR BLD MANUAL: 1 %
NEUTS BAND NFR BLD MANUAL: 6 %
NRBC BLD-RTO: 2 /100 WBC
NRBC BLD-RTO: 2 /100 WBC
OHS QRS DURATION: 90 MS
OHS QTC CALCULATION: 463 MS
OVALOCYTES BLD QL SMEAR: ABNORMAL
OVALOCYTES BLD QL SMEAR: ABNORMAL
PCO2 BLDA: 34.3 MMHG (ref 35–45)
PH SMN: 7.38 [PH] (ref 7.35–7.45)
PHOSPHATE SERPL-MCNC: 2.5 MG/DL (ref 2.7–4.5)
PISA TR MAX VEL: 1.66 M/S
PLATELET # BLD AUTO: 294 K/UL (ref 150–450)
PLATELET # BLD AUTO: 320 K/UL (ref 150–450)
PLATELET BLD QL SMEAR: ABNORMAL
PMV BLD AUTO: ABNORMAL FL (ref 9.2–12.9)
PMV BLD AUTO: ABNORMAL FL (ref 9.2–12.9)
PO2 BLDA: 46 MMHG (ref 40–60)
POC BE: -5 MMOL/L
POC IONIZED CALCIUM: 1.38 MMOL/L (ref 1.06–1.42)
POC SATURATED O2: 81 % (ref 95–100)
POC TCO2: 21 MMOL/L (ref 24–29)
POCT GLUCOSE: 252 MG/DL (ref 70–110)
POCT GLUCOSE: 304 MG/DL (ref 70–110)
POCT GLUCOSE: 365 MG/DL (ref 70–110)
POCT GLUCOSE: 368 MG/DL (ref 70–110)
POCT GLUCOSE: 373 MG/DL (ref 70–110)
POCT GLUCOSE: 388 MG/DL (ref 70–110)
POCT GLUCOSE: 388 MG/DL (ref 70–110)
POCT GLUCOSE: 390 MG/DL (ref 70–110)
POCT GLUCOSE: 404 MG/DL (ref 70–110)
POCT GLUCOSE: 424 MG/DL (ref 70–110)
POCT GLUCOSE: 436 MG/DL (ref 70–110)
POIKILOCYTOSIS BLD QL SMEAR: SLIGHT
POIKILOCYTOSIS BLD QL SMEAR: SLIGHT
POLYCHROMASIA BLD QL SMEAR: ABNORMAL
POLYCHROMASIA BLD QL SMEAR: ABNORMAL
POTASSIUM BLD-SCNC: 3.7 MMOL/L (ref 3.5–5.1)
POTASSIUM SERPL-SCNC: 3.6 MMOL/L (ref 3.5–5.1)
POTASSIUM SERPL-SCNC: 3.8 MMOL/L (ref 3.5–5.1)
POTASSIUM SERPL-SCNC: 3.9 MMOL/L (ref 3.5–5.1)
PROT SERPL-MCNC: 5.7 G/DL (ref 6–8.4)
PROT SERPL-MCNC: 5.8 G/DL (ref 6–8.4)
PROTHROMBIN TIME: 12.6 SEC (ref 9–12.5)
RA MAJOR: 4.73 CM
RA PRESSURE ESTIMATED: 3 MMHG
RA WIDTH: 3.67 CM
RBC # BLD AUTO: 4.39 M/UL (ref 4–5.4)
RBC # BLD AUTO: 4.5 M/UL (ref 4–5.4)
RIGHT VENTRICULAR END-DIASTOLIC DIMENSION: 4.33 CM
RV TB RVSP: 5 MMHG
SAMPLE: ABNORMAL
SAMPLE: ABNORMAL
SCHISTOCYTES BLD QL SMEAR: ABNORMAL
SCHISTOCYTES BLD QL SMEAR: ABNORMAL
SCHISTOCYTES BLD QL SMEAR: PRESENT
SINUS: 3.29 CM
SITE: ABNORMAL
SITE: ABNORMAL
SODIUM BLD-SCNC: 135 MMOL/L (ref 136–145)
SODIUM SERPL-SCNC: 133 MMOL/L (ref 136–145)
SODIUM SERPL-SCNC: 135 MMOL/L (ref 136–145)
SODIUM SERPL-SCNC: 137 MMOL/L (ref 136–145)
SPHEROCYTES BLD QL SMEAR: ABNORMAL
SPHEROCYTES BLD QL SMEAR: ABNORMAL
STJ: 3.03 CM
TDI LATERAL: 0.06 M/S
TDI SEPTAL: 0.07 M/S
TDI: 0.07 M/S
TR MAX PG: 11 MMHG
TRICUSPID ANNULAR PLANE SYSTOLIC EXCURSION: 1.55 CM
TV REST PULMONARY ARTERY PRESSURE: 14 MMHG
URATE SERPL-MCNC: 6.9 MG/DL (ref 2.4–5.7)
WBC # BLD AUTO: 76.07 K/UL (ref 3.9–12.7)
WBC # BLD AUTO: 88.47 K/UL (ref 3.9–12.7)
WBC OTHER NFR BLD MANUAL: 28 %
WBC OTHER NFR BLD MANUAL: 35 %
Z-SCORE OF LEFT VENTRICULAR DIMENSION IN END DIASTOLE: -3.17
Z-SCORE OF LEFT VENTRICULAR DIMENSION IN END SYSTOLE: -1.64

## 2024-03-26 PROCEDURE — 85384 FIBRINOGEN ACTIVITY: CPT | Performed by: PHYSICIAN ASSISTANT

## 2024-03-26 PROCEDURE — 25000003 PHARM REV CODE 250: Performed by: PHYSICIAN ASSISTANT

## 2024-03-26 PROCEDURE — 93005 ELECTROCARDIOGRAM TRACING: CPT

## 2024-03-26 PROCEDURE — 83735 ASSAY OF MAGNESIUM: CPT | Performed by: PHYSICIAN ASSISTANT

## 2024-03-26 PROCEDURE — 63600175 PHARM REV CODE 636 W HCPCS: Performed by: STUDENT IN AN ORGANIZED HEALTH CARE EDUCATION/TRAINING PROGRAM

## 2024-03-26 PROCEDURE — 25500020 PHARM REV CODE 255: Performed by: INTERNAL MEDICINE

## 2024-03-26 PROCEDURE — 36415 COLL VENOUS BLD VENIPUNCTURE: CPT | Mod: XB | Performed by: PHYSICIAN ASSISTANT

## 2024-03-26 PROCEDURE — 99900035 HC TECH TIME PER 15 MIN (STAT)

## 2024-03-26 PROCEDURE — 20000000 HC ICU ROOM

## 2024-03-26 PROCEDURE — 82140 ASSAY OF AMMONIA: CPT | Performed by: CLINICAL NURSE SPECIALIST

## 2024-03-26 PROCEDURE — 83615 LACTATE (LD) (LDH) ENZYME: CPT | Performed by: PHYSICIAN ASSISTANT

## 2024-03-26 PROCEDURE — 25000003 PHARM REV CODE 250

## 2024-03-26 PROCEDURE — 81206 BCR/ABL1 GENE MAJOR BP: CPT | Performed by: NURSE PRACTITIONER

## 2024-03-26 PROCEDURE — 63600175 PHARM REV CODE 636 W HCPCS: Performed by: INTERNAL MEDICINE

## 2024-03-26 PROCEDURE — 63600175 PHARM REV CODE 636 W HCPCS

## 2024-03-26 PROCEDURE — 88311 DECALCIFY TISSUE: CPT | Performed by: PATHOLOGY

## 2024-03-26 PROCEDURE — 88305 TISSUE EXAM BY PATHOLOGIST: CPT | Performed by: PATHOLOGY

## 2024-03-26 PROCEDURE — 85027 COMPLETE CBC AUTOMATED: CPT | Performed by: PHYSICIAN ASSISTANT

## 2024-03-26 PROCEDURE — 81207 BCR/ABL1 GENE MINOR BP: CPT | Mod: 91 | Performed by: NURSE PRACTITIONER

## 2024-03-26 PROCEDURE — 63600175 PHARM REV CODE 636 W HCPCS: Performed by: PHYSICIAN ASSISTANT

## 2024-03-26 PROCEDURE — 84295 ASSAY OF SERUM SODIUM: CPT

## 2024-03-26 PROCEDURE — 92526 ORAL FUNCTION THERAPY: CPT

## 2024-03-26 PROCEDURE — 84100 ASSAY OF PHOSPHORUS: CPT | Performed by: PHYSICIAN ASSISTANT

## 2024-03-26 PROCEDURE — 36415 COLL VENOUS BLD VENIPUNCTURE: CPT

## 2024-03-26 PROCEDURE — 81450 HL NEO GSAP 5-50DNA/DNA&RNA: CPT | Performed by: NURSE PRACTITIONER

## 2024-03-26 PROCEDURE — 82010 KETONE BODYS QUAN: CPT | Performed by: INTERNAL MEDICINE

## 2024-03-26 PROCEDURE — 81245 FLT3 GENE: CPT | Performed by: NURSE PRACTITIONER

## 2024-03-26 PROCEDURE — 84132 ASSAY OF SERUM POTASSIUM: CPT

## 2024-03-26 PROCEDURE — 80053 COMPREHEN METABOLIC PANEL: CPT | Performed by: PHYSICIAN ASSISTANT

## 2024-03-26 PROCEDURE — 81170 ABL1 GENE: CPT | Performed by: NURSE PRACTITIONER

## 2024-03-26 PROCEDURE — 80048 BASIC METABOLIC PNL TOTAL CA: CPT | Mod: XB

## 2024-03-26 PROCEDURE — 63700000 PHARM REV CODE 250 ALT 637 W/O HCPCS

## 2024-03-26 PROCEDURE — 86140 C-REACTIVE PROTEIN: CPT | Performed by: PHYSICIAN ASSISTANT

## 2024-03-26 PROCEDURE — 83605 ASSAY OF LACTIC ACID: CPT | Performed by: CLINICAL NURSE SPECIALIST

## 2024-03-26 PROCEDURE — 88264 CHROMOSOME ANALYSIS 20-25: CPT | Performed by: NURSE PRACTITIONER

## 2024-03-26 PROCEDURE — 88313 SPECIAL STAINS GROUP 2: CPT | Performed by: PATHOLOGY

## 2024-03-26 PROCEDURE — 63600175 PHARM REV CODE 636 W HCPCS: Mod: JZ,TB | Performed by: STUDENT IN AN ORGANIZED HEALTH CARE EDUCATION/TRAINING PROGRAM

## 2024-03-26 PROCEDURE — 93010 ELECTROCARDIOGRAM REPORT: CPT | Mod: ,,, | Performed by: INTERNAL MEDICINE

## 2024-03-26 PROCEDURE — 82803 BLOOD GASES ANY COMBINATION: CPT

## 2024-03-26 PROCEDURE — 99223 1ST HOSP IP/OBS HIGH 75: CPT | Mod: ,,, | Performed by: PHYSICIAN ASSISTANT

## 2024-03-26 PROCEDURE — 85027 COMPLETE CBC AUTOMATED: CPT | Mod: 91 | Performed by: INTERNAL MEDICINE

## 2024-03-26 PROCEDURE — 87040 BLOOD CULTURE FOR BACTERIA: CPT | Mod: 59 | Performed by: CLINICAL NURSE SPECIALIST

## 2024-03-26 PROCEDURE — 88299 UNLISTED CYTOGENETIC STUDY: CPT | Performed by: NURSE PRACTITIONER

## 2024-03-26 PROCEDURE — 85007 BL SMEAR W/DIFF WBC COUNT: CPT | Mod: 91 | Performed by: INTERNAL MEDICINE

## 2024-03-26 PROCEDURE — 97535 SELF CARE MNGMENT TRAINING: CPT

## 2024-03-26 PROCEDURE — 84550 ASSAY OF BLOOD/URIC ACID: CPT | Performed by: CLINICAL NURSE SPECIALIST

## 2024-03-26 PROCEDURE — 85610 PROTHROMBIN TIME: CPT | Performed by: CLINICAL NURSE SPECIALIST

## 2024-03-26 PROCEDURE — 25000003 PHARM REV CODE 250: Performed by: INTERNAL MEDICINE

## 2024-03-26 PROCEDURE — 85014 HEMATOCRIT: CPT

## 2024-03-26 PROCEDURE — 25000003 PHARM REV CODE 250: Performed by: STUDENT IN AN ORGANIZED HEALTH CARE EDUCATION/TRAINING PROGRAM

## 2024-03-26 PROCEDURE — 83605 ASSAY OF LACTIC ACID: CPT

## 2024-03-26 PROCEDURE — 88237 TISSUE CULTURE BONE MARROW: CPT | Performed by: NURSE PRACTITIONER

## 2024-03-26 PROCEDURE — 27000207 HC ISOLATION

## 2024-03-26 PROCEDURE — 82550 ASSAY OF CK (CPK): CPT | Performed by: CLINICAL NURSE SPECIALIST

## 2024-03-26 PROCEDURE — 85007 BL SMEAR W/DIFF WBC COUNT: CPT | Performed by: PHYSICIAN ASSISTANT

## 2024-03-26 PROCEDURE — 92523 SPEECH SOUND LANG COMPREHEN: CPT

## 2024-03-26 PROCEDURE — 88275 CYTOGENETICS 100-300: CPT | Performed by: NURSE PRACTITIONER

## 2024-03-26 PROCEDURE — 81207 BCR/ABL1 GENE MINOR BP: CPT | Performed by: NURSE PRACTITIONER

## 2024-03-26 PROCEDURE — 80053 COMPREHEN METABOLIC PANEL: CPT | Mod: 91 | Performed by: CLINICAL NURSE SPECIALIST

## 2024-03-26 PROCEDURE — 94761 N-INVAS EAR/PLS OXIMETRY MLT: CPT | Mod: XB

## 2024-03-26 PROCEDURE — 82330 ASSAY OF CALCIUM: CPT

## 2024-03-26 PROCEDURE — 27000221 HC OXYGEN, UP TO 24 HOURS

## 2024-03-26 RX ORDER — HYDROMORPHONE HYDROCHLORIDE 1 MG/ML
1 INJECTION, SOLUTION INTRAMUSCULAR; INTRAVENOUS; SUBCUTANEOUS EVERY 4 HOURS PRN
Status: DISCONTINUED | OUTPATIENT
Start: 2024-03-27 | End: 2024-03-28

## 2024-03-26 RX ORDER — ACETAMINOPHEN 10 MG/ML
1000 INJECTION, SOLUTION INTRAVENOUS ONCE
Status: COMPLETED | OUTPATIENT
Start: 2024-03-26 | End: 2024-03-26

## 2024-03-26 RX ORDER — SODIUM,POTASSIUM PHOSPHATES 280-250MG
2 POWDER IN PACKET (EA) ORAL
Status: DISCONTINUED | OUTPATIENT
Start: 2024-03-26 | End: 2024-03-27

## 2024-03-26 RX ORDER — LIDOCAINE HYDROCHLORIDE 20 MG/ML
10 INJECTION, SOLUTION EPIDURAL; INFILTRATION; INTRACAUDAL; PERINEURAL ONCE
Status: DISCONTINUED | OUTPATIENT
Start: 2024-03-26 | End: 2024-03-27

## 2024-03-26 RX ORDER — LIDOCAINE HYDROCHLORIDE 20 MG/ML
10 INJECTION, SOLUTION INFILTRATION; PERINEURAL ONCE AS NEEDED
Status: DISCONTINUED | OUTPATIENT
Start: 2024-03-26 | End: 2024-03-27

## 2024-03-26 RX ORDER — LOSARTAN POTASSIUM 50 MG/1
50 TABLET ORAL DAILY
Status: DISCONTINUED | OUTPATIENT
Start: 2024-03-26 | End: 2024-03-27

## 2024-03-26 RX ORDER — INSULIN ASPART 100 [IU]/ML
25 INJECTION, SOLUTION INTRAVENOUS; SUBCUTANEOUS
Status: DISCONTINUED | OUTPATIENT
Start: 2024-03-26 | End: 2024-03-26

## 2024-03-26 RX ORDER — INSULIN ASPART 100 [IU]/ML
10 INJECTION, SOLUTION INTRAVENOUS; SUBCUTANEOUS ONCE
Status: COMPLETED | OUTPATIENT
Start: 2024-03-26 | End: 2024-03-26

## 2024-03-26 RX ADMIN — MONTELUKAST 10 MG: 10 TABLET, FILM COATED ORAL at 10:03

## 2024-03-26 RX ADMIN — GABAPENTIN 800 MG: 400 CAPSULE ORAL at 10:03

## 2024-03-26 RX ADMIN — REMDESIVIR 100 MG: 100 INJECTION, POWDER, LYOPHILIZED, FOR SOLUTION INTRAVENOUS at 10:03

## 2024-03-26 RX ADMIN — CEFEPIME 2 G: 2 INJECTION, POWDER, FOR SOLUTION INTRAVENOUS at 11:03

## 2024-03-26 RX ADMIN — METOPROLOL TARTRATE 25 MG: 25 TABLET, FILM COATED ORAL at 10:03

## 2024-03-26 RX ADMIN — INSULIN ASPART 10 UNITS: 100 INJECTION, SOLUTION INTRAVENOUS; SUBCUTANEOUS at 10:03

## 2024-03-26 RX ADMIN — FAMOTIDINE 20 MG: 20 TABLET ORAL at 10:03

## 2024-03-26 RX ADMIN — FAMOTIDINE 20 MG: 20 TABLET ORAL at 09:03

## 2024-03-26 RX ADMIN — METOPROLOL TARTRATE 25 MG: 25 TABLET, FILM COATED ORAL at 09:03

## 2024-03-26 RX ADMIN — LEVETIRACETAM 500 MG: 500 TABLET, FILM COATED ORAL at 10:03

## 2024-03-26 RX ADMIN — INSULIN ASPART 10 UNITS: 100 INJECTION, SOLUTION INTRAVENOUS; SUBCUTANEOUS at 12:03

## 2024-03-26 RX ADMIN — AMLODIPINE BESYLATE 10 MG: 10 TABLET ORAL at 10:03

## 2024-03-26 RX ADMIN — INSULIN ASPART 20 UNITS: 100 INJECTION, SOLUTION INTRAVENOUS; SUBCUTANEOUS at 08:03

## 2024-03-26 RX ADMIN — ONDANSETRON 4 MG: 2 INJECTION INTRAMUSCULAR; INTRAVENOUS at 10:03

## 2024-03-26 RX ADMIN — POTASSIUM & SODIUM PHOSPHATES POWDER PACK 280-160-250 MG 2 PACKET: 280-160-250 PACK at 10:03

## 2024-03-26 RX ADMIN — HYDROXYUREA 3500 MG: 500 CAPSULE ORAL at 11:03

## 2024-03-26 RX ADMIN — INSULIN DETEMIR 20 UNITS: 100 INJECTION, SOLUTION SUBCUTANEOUS at 10:03

## 2024-03-26 RX ADMIN — HUMAN ALBUMIN MICROSPHERES AND PERFLUTREN 0.11 MG: 10; .22 INJECTION, SOLUTION INTRAVENOUS at 10:03

## 2024-03-26 RX ADMIN — OXYCODONE HYDROCHLORIDE 10 MG: 10 TABLET ORAL at 02:03

## 2024-03-26 RX ADMIN — INSULIN ASPART 10 UNITS: 100 INJECTION, SOLUTION INTRAVENOUS; SUBCUTANEOUS at 08:03

## 2024-03-26 RX ADMIN — ALLOPURINOL 300 MG: 100 TABLET ORAL at 10:03

## 2024-03-26 RX ADMIN — HYDROMORPHONE HYDROCHLORIDE 1 MG: 1 INJECTION, SOLUTION INTRAMUSCULAR; INTRAVENOUS; SUBCUTANEOUS at 09:03

## 2024-03-26 RX ADMIN — OXYCODONE HYDROCHLORIDE 10 MG: 10 TABLET ORAL at 04:03

## 2024-03-26 RX ADMIN — LOSARTAN POTASSIUM 50 MG: 50 TABLET, FILM COATED ORAL at 10:03

## 2024-03-26 RX ADMIN — CEFEPIME 2 G: 2 INJECTION, POWDER, FOR SOLUTION INTRAVENOUS at 04:03

## 2024-03-26 RX ADMIN — LEVETIRACETAM 500 MG: 500 TABLET, FILM COATED ORAL at 09:03

## 2024-03-26 RX ADMIN — VANCOMYCIN HYDROCHLORIDE 1250 MG: 1.25 INJECTION, POWDER, LYOPHILIZED, FOR SOLUTION INTRAVENOUS at 05:03

## 2024-03-26 RX ADMIN — MUPIROCIN: 20 OINTMENT TOPICAL at 10:03

## 2024-03-26 RX ADMIN — TRIAMCINOLONE ACETONIDE: 1 CREAM TOPICAL at 10:03

## 2024-03-26 RX ADMIN — ACETAMINOPHEN 650 MG: 325 TABLET ORAL at 04:03

## 2024-03-26 RX ADMIN — ACYCLOVIR 400 MG: 200 CAPSULE ORAL at 09:03

## 2024-03-26 RX ADMIN — POTASSIUM & SODIUM PHOSPHATES POWDER PACK 280-160-250 MG 2 PACKET: 280-160-250 PACK at 09:03

## 2024-03-26 RX ADMIN — INSULIN HUMAN 4 UNITS/HR: 1 INJECTION, SOLUTION INTRAVENOUS at 04:03

## 2024-03-26 RX ADMIN — MUPIROCIN: 20 OINTMENT TOPICAL at 09:03

## 2024-03-26 RX ADMIN — TRIAMCINOLONE ACETONIDE: 1 CREAM TOPICAL at 11:03

## 2024-03-26 RX ADMIN — ACETAMINOPHEN 1000 MG: 10 INJECTION, SOLUTION INTRAVENOUS at 07:03

## 2024-03-26 RX ADMIN — PIPERACILLIN SODIUM AND TAZOBACTAM SODIUM 4.5 G: 4; .5 INJECTION, POWDER, FOR SOLUTION INTRAVENOUS at 04:03

## 2024-03-26 RX ADMIN — VANCOMYCIN HYDROCHLORIDE 1250 MG: 1.25 INJECTION, POWDER, LYOPHILIZED, FOR SOLUTION INTRAVENOUS at 08:03

## 2024-03-26 RX ADMIN — ACYCLOVIR 400 MG: 200 CAPSULE ORAL at 10:03

## 2024-03-26 RX ADMIN — INSULIN ASPART 20 UNITS: 100 INJECTION, SOLUTION INTRAVENOUS; SUBCUTANEOUS at 12:03

## 2024-03-26 RX ADMIN — HYDROMORPHONE HYDROCHLORIDE 1 MG: 1 INJECTION, SOLUTION INTRAMUSCULAR; INTRAVENOUS; SUBCUTANEOUS at 10:03

## 2024-03-26 RX ADMIN — FLUCONAZOLE 400 MG: 100 TABLET ORAL at 10:03

## 2024-03-26 RX ADMIN — GABAPENTIN 800 MG: 400 CAPSULE ORAL at 02:03

## 2024-03-26 RX ADMIN — LABETALOL HYDROCHLORIDE 10 MG: 5 INJECTION, SOLUTION INTRAVENOUS at 11:03

## 2024-03-26 RX ADMIN — LABETALOL HYDROCHLORIDE 10 MG: 5 INJECTION, SOLUTION INTRAVENOUS at 08:03

## 2024-03-26 RX ADMIN — ATORVASTATIN CALCIUM 40 MG: 40 TABLET, FILM COATED ORAL at 10:03

## 2024-03-26 NOTE — HPI
Reason for Consult: Management of T2DM, Hyperglycemia     Diabetes diagnosis year: > 2 years ago    Home Diabetes Medications:    Per chart review:   Novolog 22 units with meals -- Takes 22 units TID AC while on chemo - (Gives between 12 to 15 units TID AC when not on chemo)   NPH 20 units AM and 50 units PM   Metformin 1000 mg BID     How often checking glucose at home?  AMIE    BG readings on regimen: AMIE  Hypoglycemia on the regimen?  AMIE  Missed doses on regimen?  AMIE    Diabetes Complications include:     Hyperglycemia    Complicating diabetes co morbidities:   Active Cancer and Glucocorticoid use       HPI:   Patient is a 58 y.o. female with PMHx of insulin-dependent T2DM, diabetic neuropathy, essential HTN, NAFLD, obesity, former tobacco use disorder (quit 4-5 months ago), and CML (actively on chemotherapy) with blast crisis diagnosed prior to transfer at OSH, who presents as a transfer from [facility] ED in [place] for acute SDH. Found to be COVID (+) and Dexamethasone therapy started. BG excursions noted. Endocrine consulted for BG management.

## 2024-03-26 NOTE — PT/OT/SLP DISCHARGE
Physical Therapy Discharge Summary    Name: Fela Ellison  MRN: [id number]   Principal Problem: Acute encephalopathy     Patient Discharged from acute Physical Therapy on 24.  Please refer to prior PT noted date on 24 for functional status.     Assessment:     Patient transferred to [facility]ICU this date, therefore invalidating current PT orders. Patient to be discharged from PT services and order discontinued. PT to be re-consulted when patient is medically appropriate.     Objective:     GOALS:   Multidisciplinary Problems       Physical Therapy Goals          Problem: Physical Therapy    Goal Priority Disciplines Outcome Goal Variances Interventions   Physical Therapy Goal     PT, PT/OT Ongoing, Progressing     Description: Goals to be met by: 2024     Patient will increase functional independence with mobility by performin. Supine to sit with MInimal Assistance  2. Sit to stand transfer with Minimal Assistance  3. Bed to chair transfer with Minimal Assistance using Rolling Walker  4. Gait  x 20 feet with Minimal Assistance using Rolling Walker.   5. Ascend/descend 4 stair with bilateral Handrails Minimal Assistance using No Assistive Device.                          Reasons for Discontinuation of Therapy Services  Transfer to lower level of care      Plan:     Patient Discharged to:  ICU .    3/26/2024

## 2024-03-26 NOTE — CONSULTS
Patient condition deteriorated throughout the day. Minimally responsive after CTH.  Wii transfer to ICU. See H+P from today

## 2024-03-26 NOTE — CARE UPDATE
"RAPID RESPONSE NURSE AI ALERT       AI alert received.    Chart Reviewed: 03/25/2024, 7:57 PM    MRN: [id number]  Bed: 8086/8086 A    Dx: Subdural hematoma    Fela Ellison has a past medical history of Cancer, CML (chronic myelocytic leukemia), DM2 (diabetes mellitus, type 2), HTN (hypertension), NAFLD (nonalcoholic fatty liver disease), and Neuropathy.    Last VS: BP (!) 150/74 (BP Location: Left arm, Patient Position: Lying)   Pulse (!) 122   Temp 100.2 °F (37.9 °C) (Oral)   Resp 18   Ht 5' 2.99" (1.6 m)   Wt 93 kg (205 lb 0.4 oz)   SpO2 95%   BMI 36.33 kg/m²     24H Vital Sign Range:  Temp:  [97.7 °F (36.5 °C)-103.1 °F (39.5 °C)]   Pulse:  []   Resp:  [17-38]   BP: (126-165)/(63-93)   SpO2:  [94 %-100 %]     Level of Consciousness (AVPU): alert    Recent Labs     03/24/24 0127 03/24/24 2011 03/24/24 2048 03/25/24  0430 03/25/24  0701   WBC 53.90*  --  57.16*  --  50.03*   HGB 10.6*  --  10.8*  --  10.4*   HCT 36.3*   < > 37.0 35* 35.3*     --  232  --  240    < > = values in this interval not displayed.       Recent Labs     03/23/24 2207 03/24/24 0127 03/24/24 2048 03/25/24  0701   * 136 138 139   K 3.7 3.8 3.5 3.7    102 107 107   CO2 22* 23 21* 21*   BUN 9 9 13 14   CREATININE 0.7 0.8 0.8 0.8   * 302* 318* 427*   PHOS 3.8 4.1  --  3.7   MG 1.6 1.7 1.8 2.0        Recent Labs     03/23/24 1809 03/24/24 2011 03/25/24  0430   PH 7.460* 7.478* 7.436   PCO2 36.0 28.5* 34.5*   PO2 67.0* 57 173*   HCO3 25.6 21.1* 23.2*   POCSATURATED  --  92 100   BE  --  -2 -1        OXYGEN:  Flow (L/min): 2          MEWS score: 3    Bedside RNKay  contacted. No concerns verbalized at this time. Instructed to call [phone] for further concerns or assistance.    Zahira Dennis RN        "

## 2024-03-26 NOTE — CARE UPDATE
"RAPID RESPONSE NURSE CHART REVIEW        Chart Reviewed: 03/26/2024, 7:53 AM    MRN: [id number]  Bed: 3079/2373 A    Dx: Subdural hematoma    Fela Ellison has a past medical history of Cancer, CML (chronic myelocytic leukemia), DM2 (diabetes mellitus, type 2), HTN (hypertension), NAFLD (nonalcoholic fatty liver disease), and Neuropathy.    Last VS: BP (!) 160/77 (BP Location: Left arm, Patient Position: Lying)   Pulse (!) 133   Temp 98.5 °F (36.9 °C) (Oral)   Resp 18   Ht 5' 2.99" (1.6 m)   Wt 93 kg (205 lb 0.4 oz)   SpO2 (!) 94%   BMI 36.33 kg/m²     24H Vital Sign Range:  Temp:  [97.7 °F (36.5 °C)-100.2 °F (37.9 °C)]   Pulse:  []   Resp:  [18-28]   BP: (110-165)/(62-93)   SpO2:  [94 %-100 %]     Level of Consciousness (AVPU): alert    Recent Labs     03/24/24 2048 03/25/24  0430 03/25/24  0701 03/26/24  0500   WBC 57.16*  --  50.03* 76.07*   HGB 10.8*  --  10.4* 10.5*   HCT 37.0 35* 35.3* 36.4*     --  240 320       Recent Labs     03/24/24  0127 03/24/24 2048 03/25/24  0701 03/26/24  0500    138 139 137   K 3.8 3.5 3.7 3.6    107 107 107   CO2 23 21* 21* 15*   BUN 9 13 14 16   CREATININE 0.8 0.8 0.8 0.8   * 318* 427* 440*   PHOS 4.1  --  3.7 2.5*   MG 1.7 1.8 2.0 1.8        Recent Labs     03/23/24  1809 03/24/24 2011 03/25/24  0430   PH 7.460* 7.478* 7.436   PCO2 36.0 28.5* 34.5*   PO2 67.0* 57 173*   HCO3 25.6 21.1* 23.2*   POCSATURATED  --  92 100   BE  --  -2 -1        OXYGEN:  Flow (L/min): 2          MEWS score: 3    Rounding completed with charge ISAAC Banks. Discussed tachycardia w/ HR in the 110s-120s. Pt maintaining goal SBP < 160. Tmax 100.2 overnight. Acetaminophen given. Pt hyperglycemic w/ anion gap of 15. Continuous normal saline infusing at 100mL/hr per MD orders. Insulin orders recently adjusted by MD. POCT BG ordered ACHS. No additional concerns verbalized at this time. Instructed to call [phone] for further concerns or assistance.    Gloria" ISAAC Downey

## 2024-03-26 NOTE — ASSESSMENT & PLAN NOTE
Pt has known DM, is on steroids due to COVID. Most likely causing her hyperglycemia. Removed but continued difficulty controlling. Ordered labs to make sure patient is not in DKA/HHS patient BHB 0.2, bicarb 15 then back up to 20, but glucose has remained in the 400s.      Plan  Increased insulin to Determir 25 BID, Aspart 25 TID with moderate sliding scale  Diabetic diet with thin liquids per speech  Glucose checks AC x HS  May consider insulin drip if continues to be uncontrolled, but hoping will improve with steroid removal and increased subQ dose  Consulted Endocrine with difficult to control hyperglycemia, endocrine recommending insulin drip

## 2024-03-26 NOTE — ASSESSMENT & PLAN NOTE
BG goal: 140-180   T2DM. BG above goal here. Receiving steroids and on lower doses than reported home doses.  Will start on sugar free clears and IIP.    - Start insulin infusion protocol   - POCT Glucose every hour   - Hypoglycemia protocol in place      ** Please notify Endocrine for any change and/or advance in diet**  ** Please call Endocrine for any BG related issues **     Discharge Planning:   TBD. Please notify endocrine prior to discharge.

## 2024-03-26 NOTE — ASSESSMENT & PLAN NOTE
Pt presented from OSH with new SDH. Pt was initially admitted to neuro critical care. SDH felt to be small/stable and did not need to undergo surgical intervention. Pt placed on keppra for seizure proph. Pt PLT count is in the 200s. Experienced worsening confusion, lethargy on 3/26 Stat CT head ordered    Plan  Continue Keppra x7 days per NICU note  Neuro checks q4h  Worsening mental status or one sided weakness, need Stat CTH,

## 2024-03-26 NOTE — PROGRESS NOTES
Pharmacokinetic Assessment Follow Up: IV Vancomycin    Vancomycin serum concentration assessment(s):  Vancomycin trough @ 1405 (collected on time) = 16.1 mcg/ml (therapeutic)     Vancomycin Regimen Plan:  Continue current regimen of 1250 mg IV every 12 hours  Next trough due 3/27 @ 0300 before 4th next dose  Goal trough: 15-20 mcg/ml    Drug levels (last 3 results):  Recent Labs   Lab Result Units 03/25/24  1405   Vancomycin-Trough ug/mL 16.1       Pharmacy will continue to follow and monitor vancomycin.    Please contact pharmacy for questions regarding this assessment.    Thank you for the consult,   Katherine McArdle, Pharm.D. 3/25/2024 7:26 PM         Patient brief summary:  Fela Ellison is a 58 y.o. female initiated on antimicrobial therapy with IV Vancomycin for treatment of sepsis    The patient's current regimen is 1250 mg IV every 12 hours     Drug Allergies:   Review of patient's allergies indicates:  No Known Allergies    Actual Body Weight:   93 kg    Renal Function:   Estimated Creatinine Clearance: 85 mL/min (based on SCr of 0.8 mg/dL).      CBC (last 72 hours):  Recent Labs   Lab Result Units 03/23/24  1244 03/23/24  1831 03/23/24  2207 03/24/24  0127 03/24/24  2048 03/25/24  0701   WBC K/uL 39.86* 47.37* 52.42* 53.90* 57.16* 50.03*   Hemoglobin g/dL  --   --  10.4* 10.6* 10.8* 10.4*   Hgb g/dL 10.1* 10.5*  --   --   --   --    Hemoglobin A1C %  --   --   --  6.2*  --   --    Hematocrit %  --   --  35.3* 36.3* 37.0 35.3*   Hct % 34.5* 34.6*  --   --   --   --    Platelets K/uL  --   --  204 195 232 240   Platelet x10(3)/mcL 159 177  --   --   --   --    Gran % %  --   --  50.0 31.0* 36.5* 50.0   Lymph % %  --   --  14.0* 18.0 14.0* 9.0*   Mono % %  --   --  11.0 10.0 8.5 8.5   Monocytes % % 28* 11  --   --   --   --    Eosinophil % %  --   --  0.0 0.0 0.0 0.0   Basophil % %  --   --  0.0 0.0 0.0 0.0   Differential Method   --   --  Manual Manual Manual Manual       Metabolic Panel  (last 72 hours):  Recent Labs   Lab Result Units 03/23/24  1244 03/23/24  1311 03/23/24  1831 03/23/24  2207 03/24/24  0127 03/24/24  0224 03/24/24  2048 03/25/24  0701   Sodium mmol/L  --   --   --  135* 136  --  138 139   Sodium Level mmol/L 138  --  137  --   --   --   --   --    Potassium mmol/L  --   --   --  3.7 3.8  --  3.5 3.7   Potassium Level mmol/L 3.8  --  4.1  --   --   --   --   --    Chloride mmol/L 103  --  104 102 102  --  107 107   CO2 mmol/L  --   --   --  22* 23  --  21* 21*   Carbon Dioxide mmol/L 24  --  22  --   --   --   --   --    Glucose mg/dL  --   --   --  291* 302*  --  318* 427*   Glucose Level mg/dL 228*  --  238*  --   --   --   --   --    Glucose, UA   --  Normal  --   --   --  Negative  --   --    BUN mg/dL  --   --   --  9 9  --  13 14   Blood Urea Nitrogen mg/dL 10.7  --  10.0  --   --   --   --   --    Creatinine mg/dL 0.75  --  0.70 0.7 0.8  --  0.8 0.8   Albumin g/dL  --   --   --  3.1* 3.1*  --  2.9* 2.7*   Albumin Level g/dL 3.0*  --  3.3*  --   --   --   --   --    Total Bilirubin mg/dL  --   --   --  0.6 0.6  --  0.6 0.5   Bilirubin Total mg/dL 0.4  --  0.5  --   --   --   --   --    Alkaline Phosphatase U/L 65  --  74 73 71  --  71 66   AST U/L  --   --   --  21 21  --  18 13   Aspartate Aminotransferase unit/L 13  --  17  --   --   --   --   --    ALT U/L  --   --   --  9* 9*  --  10 7*   Alanine Aminotransferase unit/L 8  --  9  --   --   --   --   --    Magnesium mg/dL  --   --   --  1.6 1.7  --  1.8 2.0   Magnesium Level mg/dL 1.60  --  1.60  --   --   --   --   --    Phosphorus mg/dL  --   --   --  3.8 4.1  --   --  3.7       Vancomycin Administrations:  vancomycin given in the last 96 hours                     vancomycin 1,250 mg in dextrose 5 % (D5W) 250 mL IVPB (Vial-Mate) (mg) 1,250 mg New Bag 03/25/24 1558     1,250 mg New Bag  0346     1,250 mg New Bag 03/24/24 1530    vancomycin 2 g in dextrose 5 % 500 mL IVPB ()  Restarted 03/24/24 9564     2,000 mg New Bag   0334                    Microbiologic Results:  Microbiology Results (last 7 days)       Procedure Component Value Units Date/Time    Blood culture [[id number]] Collected: 03/23/24 2222    Order Status: Completed Specimen: Blood from Peripheral, Forearm, Right Updated: 03/25/24 0613     Blood Culture, Routine No Growth to date      No Growth to date    Narrative:      Site #1- Aerobic and Anaerobic    Blood culture [[id number]] Collected: 03/23/24 2223    Order Status: Completed Specimen: Blood from Peripheral, Hand, Left Updated: 03/25/24 0613     Blood Culture, Routine No Growth to date      No Growth to date    Narrative:      Site #2 - Aerobic and Anaerobic    Culture, Respiratory with Gram Stain [[id number]]     Order Status: No result Specimen: Respiratory

## 2024-03-26 NOTE — PROGRESS NOTES
Discharge Planning    Orders sent to [facility] via [facility].  CTI completed and signed by MD and submitted via email.  Transport expected for 1700; notified on-call Maria Antonia Stanford to plan.  Safe discharge plan in place.  Will follow.

## 2024-03-26 NOTE — ASSESSMENT & PLAN NOTE
Patient with CML c/b acute blast crisis. Transferred from BMT service for AMS.    - hydrea 3500 BID per BMT  - oncology considering BMB with concern for worsening disease progression  - continue acyclovir, allopurinol, fluc  - broad spec abx  - GOC discussions

## 2024-03-26 NOTE — SUBJECTIVE & OBJECTIVE
Subjective:     Interval History:  Worsening confusion, stat CT head, removed NG tube, Glucose difficult to control    Objective:     Vital Signs (Most Recent):  Temp: 97.4 °F (36.3 °C) (03/26/24 1141)  Pulse: 103 (03/26/24 1141)  Resp: 18 (03/26/24 1408)  BP: 134/64 (03/26/24 1141)  SpO2: (!) 92 % (03/26/24 1141) Vital Signs (24h Range):  Temp:  [97.4 °F (36.3 °C)-100.2 °F (37.9 °C)] 97.4 °F (36.3 °C)  Pulse:  [100-133] 103  Resp:  [18-32] 18  SpO2:  [92 %-98 %] 92 %  BP: (110-165)/(62-80) 134/64     Weight: 93 kg (205 lb 0.4 oz)  Body mass index is 36.32 kg/m².  Body surface area is 2.03 meters squared.    ECOG SCORE           [unfilled]    Intake/Output - Last 3 Shifts         03/24 0700  03/25 0659 03/25 0700  03/26 0659 03/26 0700  03/27 0659    P.O. 0      I.V. (mL/kg) 1428.3 (15.4)      IV Piggyback 732      Total Intake(mL/kg) 2160.3 (23.2)      Urine (mL/kg/hr) 2200 (1) 1350 (0.6) 500 (0.7)    Stool 0 0     Total Output 2200 1350 500    Net -39.7 -1350 -500           Stool Occurrence 0 x 1 x              Physical Exam  Constitutional:       General: She is not in acute distress.     Appearance: She is ill-appearing.      Comments: Arousable, but lethargic/confused    HENT:      Head: Normocephalic and atraumatic.      Mouth/Throat:      Mouth: Mucous membranes are moist.      Pharynx: Oropharynx is clear.   Eyes:      General: No scleral icterus.     Extraocular Movements: Extraocular movements intact.   Cardiovascular:      Rate and Rhythm: Normal rate and regular rhythm.   Pulmonary:      Effort: Pulmonary effort is normal. No respiratory distress.      Breath sounds: Rales present.   Abdominal:      General: Abdomen is flat. There is no distension.      Palpations: Abdomen is soft.      Tenderness: There is abdominal tenderness in the right lower quadrant and left lower quadrant.      Comments: Has skin lesions on her lower abdomen   Musculoskeletal:      Right lower leg: No edema.      Left lower leg:  No edema.   Skin:     General: Skin is warm and dry.   Neurological:      Mental Status: She is lethargic and confused.      Comments: Hard to assess one sided weakness due to difficulty following commands and lethargy            Significant Labs:   All pertinent labs from the last 24 hours have been reviewed.    Diagnostic Results:  I have reviewed all pertinent imaging results/findings within the past 24 hours.

## 2024-03-26 NOTE — AI DETERIORATION ALERT
"RAPID RESPONSE NURSE AI ALERT       AI alert received.    Chart Reviewed: 03/26/2024, 4:36 AM    MRN: [id number]  Bed: 8086/8086 A    Dx: Subdural hematoma    Fela Ellison has a past medical history of Cancer, CML (chronic myelocytic leukemia), DM2 (diabetes mellitus, type 2), HTN (hypertension), NAFLD (nonalcoholic fatty liver disease), and Neuropathy.    Last VS: /62 (BP Location: Left arm, Patient Position: Lying)   Pulse (!) 118   Temp 100.2 °F (37.9 °C) (Axillary)   Resp 18   Ht 5' 2.99" (1.6 m)   Wt 93 kg (205 lb 0.4 oz)   SpO2 96%   BMI 36.33 kg/m²     24H Vital Sign Range:  Temp:  [97.7 °F (36.5 °C)-100.2 °F (37.9 °C)]   Pulse:  []   Resp:  [18-28]   BP: (110-165)/(62-93)   SpO2:  [94 %-100 %]     Level of Consciousness (AVPU): alert    Recent Labs     03/24/24 0127 03/24/24 2011 03/24/24 2048 03/25/24  0430 03/25/24  0701   WBC 53.90*  --  57.16*  --  50.03*   HGB 10.6*  --  10.8*  --  10.4*   HCT 36.3*   < > 37.0 35* 35.3*     --  232  --  240    < > = values in this interval not displayed.       Recent Labs     03/23/24 2207 03/24/24 0127 03/24/24 2048 03/25/24  0701   * 136 138 139   K 3.7 3.8 3.5 3.7    102 107 107   CO2 22* 23 21* 21*   BUN 9 9 13 14   CREATININE 0.7 0.8 0.8 0.8   * 302* 318* 427*   PHOS 3.8 4.1  --  3.7   MG 1.6 1.7 1.8 2.0        Recent Labs     03/23/24 1809 03/24/24 2011 03/25/24  0430   PH 7.460* 7.478* 7.436   PCO2 36.0 28.5* 34.5*   PO2 67.0* 57 173*   HCO3 25.6 21.1* 23.2*   POCSATURATED  --  92 100   BE  --  -2 -1        OXYGEN:  Flow (L/min): 2          MEWS score: 3    Bedside RNKay  contacted. No concerns verbalized at this time. Instructed to call [phone] for further concerns or assistance.    Sharonda Donald RN        "

## 2024-03-26 NOTE — H&P
[facility] - Cardiac Medical ICU  Critical Care Medicine  History & Physical    Patient Name: Fela Ellison  MRN: [id number]  Admission Date: 3/23/2024  Hospital Length of Stay: 3 days  Code Status: Full Code  Attending Physician: Ottoniel Paniagua MD   Primary Care Provider: Bar Trevino DO   Principal Problem: Acute encephalopathy    Subjective:     HPI:  Patient information was obtained from past medical records and ER records. HPI limited by patient's altered mentation.    Ms. Fela Ellison is a 59 yo female with a PMHx of IDDM, CML, HTN, NAFLD, and prior tobacco use who initially presented as a transfer from [facility] for acute SDH and blast crisis.     She was intiallly admitted to [facility] and then stepped down to Medical oncology floor after evaluation by Neurosurgery noted SDH to be stable with no surgical intervention recommended at the time. She was being treated for concerns for blast crisis by BMT. Critical care was initially consulted on 03/24/24 for sepsis concerns in the setting of patient being found to be COVID positive, febrile (Tmax of 103.1), -138, BP stable 120-140/60-70s, on 4L NC. Evaluation at the time did not note any indication for ICU admission as patient was stable.     During hospitalization, treated by BMT for CML with blast crisis concerns; broad spectrum antibiotics were administered for sepsis concerns; Remdesivir/Dexamethasone started for COVID-19, but Dexamethasone had to be discontinued due to hyperglycemia and Endocrinology was consulted for insulin management. Mentation was improving and patient passed swallow evaluation, so NG tube was removed and diet was resumed.     However, on 03/26/24 patient developed increasing lethargy, confusion, febrile temperatures, distended abdomen, lactic acidosis. Stat CT head was non-acute and noted stable prior known SDH. Rapid response evaluated patient for worsening mentation and patient  was admitted to MICU.          Hospital/ICU Course:  No notes on file     Past Medical History:   Diagnosis Date    Cancer     CML (chronic myelocytic leukemia)     DM2 (diabetes mellitus, type 2)     HTN (hypertension)     NAFLD (nonalcoholic fatty liver disease)     Neuropathy        Past Surgical History:   Procedure Laterality Date    ABDOMINAL SURGERY       SECTION      x4    CHOLECYSTECTOMY         Review of patient's allergies indicates:  No Known Allergies    Family History       Problem Relation (Age of Onset)    Diabetes Mother, Father          Tobacco Use    Smoking status: Never    Smokeless tobacco: Never   Substance and Sexual Activity    Alcohol use: Not Currently    Drug use: Not Currently    Sexual activity: Not on file      Review of Systems   Unable to perform ROS: Mental status change     Objective:     Vital Signs (Most Recent):  Temp: (!) 100.9 °F (38.3 °C) (24 1600)  Pulse: 109 (24 1600)  Resp: 20 (24 1600)  BP: (!) 155/72 (24 1600)  SpO2: 95 % (24 1600) Vital Signs (24h Range):  Temp:  [97.4 °F (36.3 °C)-100.9 °F (38.3 °C)] 100.9 °F (38.3 °C)  Pulse:  [] 109  Resp:  [16-32] 20  SpO2:  [92 %-98 %] 95 %  BP: (110-165)/(62-80) 155/72   Weight: 93 kg (205 lb 0.4 oz)  Body mass index is 36.32 kg/m².      Intake/Output Summary (Last 24 hours) at 3/26/2024 1644  Last data filed at 3/26/2024 0840  Gross per 24 hour   Intake --   Output 1850 ml   Net -1850 ml          Physical Exam  Vitals and nursing note reviewed.   Constitutional:       General: She is not in acute distress.     Appearance: She is obese. She is ill-appearing.      Comments: Arousable, but lethargic/confused    HENT:      Head: Normocephalic and atraumatic.      Mouth/Throat:      Mouth: Mucous membranes are moist.      Pharynx: Oropharynx is clear.   Eyes:      General: No scleral icterus.     Extraocular Movements: Extraocular movements intact.   Cardiovascular:      Rate and Rhythm:  Normal rate and regular rhythm.   Pulmonary:      Effort: Pulmonary effort is normal. No respiratory distress.      Breath sounds: Rales present.   Abdominal:      General: Abdomen is flat. There is distension.      Palpations: Abdomen is soft.      Comments: Has skin lesions on her lower abdomen  Normoactive bowel sounds  Noted hepatosplenomegaly   Musculoskeletal:      Cervical back: Normal range of motion and neck supple.      Right lower leg: No edema.      Left lower leg: No edema.   Skin:     General: Skin is warm.   Neurological:      Mental Status: She is lethargic and confused.            Vents:     Lines/Drains/Airways       Drain  Duration             Female External Urinary Catheter w/ Suction 03/23/24 2148 2 days              Peripheral Intravenous Line  Duration                  Peripheral IV - Single Lumen 03/23/24 20 G Anterior;Right Hand 3 days                  Significant Labs:    CBC/Anemia Profile:  Recent Labs   Lab 03/24/24 2048 03/25/24  0430 03/25/24  0701 03/26/24  0500 03/26/24  1347   WBC 57.16*  --  50.03* 76.07*  --    HGB 10.8*  --  10.4* 10.5*  --    HCT 37.0   < > 35.3* 36.4* 36     --  240 320  --    MCV 83  --  84 83  --    RDW 20.2*  --  20.4* 20.8*  --     < > = values in this interval not displayed.        Chemistries:  Recent Labs   Lab 03/24/24 2048 03/25/24  0701 03/26/24  0500 03/26/24  1345    139 137 135*   K 3.5 3.7 3.6 3.8    107 107 107   CO2 21* 21* 15* 20*   BUN 13 14 16 17   CREATININE 0.8 0.8 0.8 0.9   CALCIUM 9.7 8.7 9.1 9.6   ALBUMIN 2.9* 2.7* 2.5*  --    PROT 6.4 5.6* 5.8*  --    BILITOT 0.6 0.5 0.3  --    ALKPHOS 71 66 77  --    ALT 10 7* 8*  --    AST 18 13 20  --    MG 1.8 2.0 1.8  --    PHOS  --  3.7 2.5*  --        All pertinent labs within the past 24 hours have been reviewed.    Significant Imaging: I have reviewed all pertinent imaging results/findings within the past 24 hours.  Assessment/Plan:     Neuro  * Acute  encephalopathy  Patient initially presenting to [facility] with CML in blast crisis with fevers, fatigue and encephalopathy.  Admitted to BMT services and started on high dose hydrea as well as Abx/antivirals/antifungals.  Found to be COVID positive and treating with Remdesivir.  Worsening AMS with concern for leukostasis in setting of blast crisis vs drug induced with patient receiving high dose pain regimen including new gabapentin vs sepsis.  Noted to have abdominal enlargement and tense abdomen in recently CT seen organomegaly.    - NG tube placed  - Continue Remdesivir, broad spectrum Abx, antifungals  - Continue hydrea, oncology following closely  - Hold gabapentin and consider reduction of pain regimen if tolerated  - airway watch, protecting airway currently, discussions with family and agreeable for intubation if needed  - Consider repeat CT A/P  - f/u repeat cultures    Subdural hematoma  Presented from OSH with new SDH and initially admitted to [facility].  SDH stable and no need for surgical intervention per NSGY/NCC.  Worsening mentation and admitted to MICU on 3/26.  STAT CTH with unchanged size of SDH.  Stable    - Continue Keppra  - neuro checks q4h    Cardiac/Vascular  Hypercholesterolemia  Continue atorvastatin    Essential hypertension  Pt on amlodipine 10mg, metoprolol 25mg BID, and losartan 25mg at home.    Plan  Continued home medications, increased losartan to 50  Per NSGY goal bp <160 due to SDH, prns in place but will adjust bp medications as needed    ID  COVID  COVID positive on 3/24 initially on 4L NC now downtrending associated with AMS and elevated WBC with fevers in setting of blast crisis in CML.     - Remdesivir  - Vanc/Zosyn with sepsis concern and AMS  - F/u repeat cultures  - Weaned off O2  - Steroids discontinued previously with difficult to control BG and now weaned off supplemental O2       Oncology  Blast crisis phase of chronic myeloid leukemia  Patient with CML c/b acute blast crisis.  Transferred from BMT service for AMS.    - hydrea 3500 BID per BMT  - oncology considering BMB with concern for worsening disease progression  - continue acyclovir, allopurinol, fluc  - broad spec abx  - GOC discussions    Cancer associated pain  Previously on multimodal pain regimen prior to MICU admission.  Concern for AMS and drug intoxication.  Hold gabapentin and monitor pain    CML (chronic myelocytic leukemia)  See Blast crisis    Endocrine  Diabetes mellitus  Pt has known DM, is on steroids due to COVID. Most likely causing her hyperglycemia. Removed but continued difficulty controlling. Ordered labs to make sure patient is not in DKA/HHS patient BHB 0.2, bicarb 15 then back up to 20, but glucose has remained in the 400s.      Plan  Increased insulin to Determir 25 BID, Aspart 25 TID with moderate sliding scale  Diabetic diet with thin liquids per speech  Glucose checks AC x HS  May consider insulin drip if continues to be uncontrolled, but hoping will improve with steroid removal and increased subQ dose  Consulted Endocrine with difficult to control hyperglycemia, endocrine recommending insulin drip    Orthopedic  Hyperuricemia  Continue allopurinol        Critical Care Daily Checklist:    A: Awake: RASS Goal/Actual Goal:    Actual:     B: Spontaneous Breathing Trial Performed?     C: SAT & SBT Coordinated?  N/A                      D: Delirium: CAM-ICU     E: Early Mobility Performed? Yes   F: Feeding Goal: Goals: Meet % EEN, EPN by RD f/u date  Status: Nutrition Goal Status: new   Current Diet Order   Procedures    Diet Clear liquid (no sugar)/Bariatric      AS: Analgesia/Sedation None   T: Thromboembolic Prophylaxis    H: HOB > 300 Yes   U: Stress Ulcer Prophylaxis (if needed)    G: Glucose Control Insulin gtt, endocrine following   B: Bowel Function Stool Occurrence: 1   I: Indwelling Catheter (Lines & Arias) Necessity pIV   D: De-escalation of Antimicrobials/Pharmacotherapies Vanc/Zosyn,  remdesivir, fluconazole, acyclovir    Plan for the day/ETD NGT placed.  Continue broad spectrum Abx while following cultures.  Hold gabapentin with concern for drug toxicity with AMS    Code Status:  Family/Goals of Care: Full Code  Family at bedside       Critical secondary to Patient has a condition that poses threat to life and bodily function: Metabolic encephalopathy     Critical care was time spent personally by me on the following activities: development of treatment plan with patient or surrogate and bedside caregivers, discussions with consultants, evaluation of patient's response to treatment, examination of patient, ordering and performing treatments and interventions, ordering and review of laboratory studies, ordering and review of radiographic studies, pulse oximetry, re-evaluation of patient's condition. This critical care time did not overlap with that of any other provider or involve time for any procedures.     Bobby Cruz MD  Critical Care Medicine  [facility] - Cardiac Medical ICU

## 2024-03-26 NOTE — ASSESSMENT & PLAN NOTE
Previously on multimodal pain regimen prior to MICU admission.  Concern for AMS and drug intoxication.  Hold gabapentin and monitor pain

## 2024-03-26 NOTE — ASSESSMENT & PLAN NOTE
COVID positive on 3/24 initially on 4L NC now downtrending associated with AMS and elevated WBC with fevers in setting of blast crisis in CML.     - Remdesivir  - Vanc/Zosyn with sepsis concern and AMS  - F/u repeat cultures  - Weaned off O2  - Steroids discontinued previously with difficult to control BG and now weaned off supplemental O2

## 2024-03-26 NOTE — RESPIRATORY THERAPY
RAPID RESPONSE RESPIRATORY THERAPY NOTE             Code Status: Full Code   : 1965  Bed: 6077/6077 A:   MRN: [id number]  Time page Received: 1530  Time Rapid Response RT at Bedside: 1532  Time Rapid Response RT left Bedside: 1400    SITUATION    Evaluated patient for: Worsening WOB    BACKGROUND    Why is the patient in the hospital?: Acute encephalopathy    Patient has a past medical history of Cancer, CML (chronic myelocytic leukemia), DM2 (diabetes mellitus, type 2), HTN (hypertension), NAFLD (nonalcoholic fatty liver disease), and Neuropathy.    24 Hours Vitals Range:  Temp:  [97.4 °F (36.3 °C)-100.9 °F (38.3 °C)]   Pulse:  []   Resp:  [16-32]   BP: (110-165)/(62-80)   SpO2:  [92 %-98 %]     Labs:    Recent Labs     24  0127 24  2048 24  0701 24  0500 24  1345    138 139 137 135*   K 3.8 3.5 3.7 3.6 3.8    107 107 107 107   CO2 23 21* 21* 15* 20*   BUN 9 13 14 16 17   CREATININE 0.8 0.8 0.8 0.8 0.9   * 318* 427* 440* 456*   PHOS 4.1  --  3.7 2.5*  --    MG 1.7 1.8 2.0 1.8  --         Recent Labs     24  0430 24  1347   PH 7.478* 7.436 7.378   PCO2 28.5* 34.5* 34.3*   PO2 57 173* 46   HCO3 21.1* 23.2* 20.2*   POCSATURATED 92 100 81   BE -2 -1 -5*       ASSESSMENT/INTERVENTIONS  Patient alert with moderate respiratory distress. Diaphragmatic breathing, on 1L NC SpO2 97%, RR 20, . Patient appears dusky and diaphoretic. Transported patient on 4L NC with no adverse events. Report given to ANGELITA Lindsay    Last Vitals: Temp: 100.9 °F (38.3 °C) ( 1600)  Pulse: 109 ( 1600)  Resp: 20 (1600)  BP: 155/72 (1600)  SpO2: 95 % (1600)  Level of Consciousness: Level of Consciousness (AVPU): alert  Respiratory Effort: Respiratory Effort: Moderate  Expansion/Accessory Muscle Usage: Expansion/Accessory Muscles/Retractions: abdominal muscle use  All Lung Field Breath Sounds: All Lung Fields Breath Sounds:  diminished  O2 Device/Concentration: 1L NC  NIPPV: No Surgical airway: No Vent: No  ETCO2 monitored:    Ambu at bedside:      Active Orders   Respiratory Care    Oxygen Continuous     Frequency: Continuous     Number of Occurrences: Until Specified     Order Questions:      Device type: Low flow      Device: Nasal Cannula (1- 5 Liters)      LPM: 2      Titrate O2 per Oxygen Titration Protocol: Yes      To maintain SpO2 goal of: >= 90%      Notify MD of: Inability to achieve desired SpO2; Sudden change in patient status and requires 20% increase in FiO2; Patient requires >60% FiO2    POCT ARTERIAL BLOOD GAS Blood Gas     Frequency: PRN     Number of Occurrences: Until Specified     Order Comments: Daily if patient on ventilator.  Notify Physician if: see parameters below.       Order Questions:      Component: Blood Gas    POCT Venous Blood Gas     Frequency: Once     Number of Occurrences: 1 Occurrences     Order Comments: This test should be used for VBGs.  If using this order for other tests (K, creatinine, HCT, PT/INR, lactate etc)  ONLY do so in the case of an emergency or rapid response.Notify Physician if: see parameters below.         Order Questions:      Component: Blood Gas    Pulse Oximetry Continuous     Frequency: Continuous     Number of Occurrences: Until Specified       RECOMMENDATIONS  ?  We recommend: RRT Recs: Continue POC per primary team.      FOLLOW-UP    Disposition: Tx in ICU bed 6077.    Please call back the Rapid Response RTAdrienne RRT at x [phone] for any questions or concerns.

## 2024-03-26 NOTE — SUBJECTIVE & OBJECTIVE
Interval HPI:   No acute events overnight. Patient in room 8086/8086 A. Blood glucose worsening. BG above goal on current insulin regimen (SSI, prandial, and basal insulin ). Steroid use- Dexamethasone  6 mg daily (d/c'd yesterday).      Renal function- Normal   Vasopressors-  None     Diet Clear liquid (no sugar)/Bariatric     Eatin%  Nausea: No  Hypoglycemia and intervention: No  Fever: No  TPN and/or TF: No      PMH, PSH, FH, SH updated and reviewed     ROS:  Review of Systems   Unable to perform ROS: Acuity of condition       Current Medications and/or Treatments Impacting Glycemic Control  Immunotherapy:    Immunosuppressants       None          Steroids:   Hormones (From admission, onward)      None          Pressors:    Autonomic Drugs (From admission, onward)      None          Hyperglycemia/Diabetes Medications:   Antihyperglycemics (From admission, onward)      Start     Stop Route Frequency Ordered    24 2100  insulin detemir U-100 (Levemir) pen 25 Units         -- SubQ 2 times daily 24 1403    24 1645  insulin aspart U-100 pen 25 Units         -- SubQ 3 times daily with meals 24 1403    24 2253  insulin aspart U-100 pen 0-10 Units         -- SubQ Before meals & nightly PRN 24 2155             PHYSICAL EXAMINATION:  Vitals:    24 1408   BP:    Pulse:    Resp: 18   Temp:      Body mass index is 36.32 kg/m².     Physical Exam  Constitutional:       Appearance: She is ill-appearing.   HENT:      Right Ear: External ear normal.      Left Ear: External ear normal.   Pulmonary:      Comments: tachypneic  Abdominal:      General: There is distension.

## 2024-03-26 NOTE — ASSESSMENT & PLAN NOTE
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 Encephalopathic when seen and unable to follow commands.     Labs show clear evidence of blast crisis (29% on CBC) without cytopenias. This is in the setting of what may be sepsis vs fevers from leukemia.She normally follows at [facility] fellows clinic and has progressed through multiple treatments.   Discussed aggressive and unstable disease leading to her presentation here.      Plan:   -hydrea 40mg/kg daily for cytoreduction, increased to 3500mg BID due to worsening LDH  -continue acyclovir, allopurinol, fluc  -continue broad spec abx and follow cultures  -prognosis guarded, will discuss goals of care moving forward

## 2024-03-26 NOTE — ASSESSMENT & PLAN NOTE
Presented from OSH with new SDH and initially admitted to NCC.  SDH stable and no need for surgical intervention per NSGY/NCC.  Worsening mentation and admitted to MICU on 3/26.  STAT CTH with unchanged size of SDH.  Stable    - Continue Keppra  - neuro checks q4h

## 2024-03-26 NOTE — PLAN OF CARE
Problem: Adult Inpatient Plan of Care  Goal: Plan of Care Review  Outcome: Ongoing, Progressing  Goal: Patient-Specific Goal (Individualized)  Outcome: Ongoing, Progressing  Goal: Absence of Hospital-Acquired Illness or Injury  Outcome: Ongoing, Progressing  Goal: Optimal Comfort and Wellbeing  Outcome: Ongoing, Progressing  Goal: Readiness for Transition of Care  Outcome: Ongoing, Progressing     Problem: Diabetes Comorbidity  Goal: Blood Glucose Level Within Targeted Range  Outcome: Ongoing, Progressing     Problem: Adjustment to Illness (Stroke, Hemorrhagic)  Goal: Optimal Coping  Outcome: Ongoing, Progressing     Problem: Bowel Elimination Impaired (Stroke, Hemorrhagic)  Goal: Effective Bowel Elimination  Outcome: Ongoing, Progressing     Problem: Cognitive Impairment (Stroke, Hemorrhagic)  Goal: Optimal Cognitive Function  Outcome: Ongoing, Progressing     Problem: Communication Impairment (Stroke, Hemorrhagic)  Goal: Effective Communication Skills  Outcome: Ongoing, Progressing     Problem: Functional Ability Impaired (Stroke, Hemorrhagic)  Goal: Optimal Functional Ability  Outcome: Ongoing, Progressing

## 2024-03-26 NOTE — CARE UPDATE
RAPID RESPONSE NURSE PROACTIVE ROUNDING NOTE       Time of Visit: 1330    Admit Date: 3/23/2024  LOS: 3  Code Status: Full Code   Date of Visit: 2024  : 1965  Age: 58 y.o.  Sex: female  Race: Other  Bed: 8086/8086 A:   MRN: [id number]  Was the patient discharged from an ICU this admission? Yes   Was the patient discharged from a PACU within last 24 hours? No   Did the patient receive conscious sedation/general anesthesia in last 24 hours? No  Was the patient in the ED within the past 24 hours? No  Was the patient on NIPPV within the past 24 hours? No   Attending Physician: Kev Llamas MD  Primary Service: Hematology and Oncology   Time spent at the bedside: 15 -30 min    SITUATION    Notified by bedside RN via phone call.  Reason for alert: AMS  Called to evaluate the patient for Neuro    BACKGROUND     Why is the patient in the hospital?: Subdural hematoma    Patient has a past medical history of Cancer, CML (chronic myelocytic leukemia), DM2 (diabetes mellitus, type 2), HTN (hypertension), NAFLD (nonalcoholic fatty liver disease), and Neuropathy.    Last Vitals:  Temp: 97.4 °F (36.3 °C) ( 1141)  Pulse: 103 ( 1141)  Resp: 18 ( 1408)  BP: 134/64 ( 1141)  SpO2: 92 % ( 1141)    24 Hours Vitals Range:  Temp:  [97.4 °F (36.3 °C)-100.2 °F (37.9 °C)]   Pulse:  [100-133]   Resp:  [18-32]   BP: (110-165)/(62-80)   SpO2:  [92 %-98 %]     Labs:  Recent Labs     24  20424  0430 24  0701 24  0500 24  1347   WBC 57.16*  --  50.03* 76.07*  --    HGB 10.8*  --  10.4* 10.5*  --    HCT 37.0   < > 35.3* 36.4* 36     --  240 320  --     < > = values in this interval not displayed.       Recent Labs     24  0127 24  2048 24  0701 24  0500 24  1345    138 139 137 135*   K 3.8 3.5 3.7 3.6 3.8    107 107 107 107   CO2 23 21* 21* 15* 20*   BUN 9 13 14 16 17   CREATININE 0.8 0.8 0.8 0.8 0.9   * 318* 427* 440*  456*   PHOS 4.1  --  3.7 2.5*  --    MG 1.7 1.8 2.0 1.8  --         Recent Labs     03/24/24 2011 03/25/24  0430 03/26/24  1347   PH 7.478* 7.436 7.378   PCO2 28.5* 34.5* 34.3*   PO2 57 173* 46   HCO3 21.1* 23.2* 20.2*   POCSATURATED 92 100 81   BE -2 -1 -5*        ASSESSMENT    Physical Exam  Vitals and nursing note reviewed.   Constitutional:       Appearance: She is ill-appearing.      Interventions: Nasal cannula in place.   Eyes:      Pupils: Pupils are equal, round, and reactive to light.   Cardiovascular:      Heart sounds: Normal heart sounds.   Pulmonary:      Effort: Pulmonary effort is normal.   Neurological:      Mental Status: She is lethargic.      GCS: GCS eye subscore is 4. GCS verbal subscore is 4. GCS motor subscore is 6.     Patient in bed with  at bedside. Alert to self and location. Patient answers question but closes eye immediately.     INTERVENTIONS    The patient was seen for Neurological problem. Staff concerns included mental status change. The following interventions were performed: POCT glucose, BMP, continuous pulse ox monitoring continued , and continuous cardiac monitoring continued.    RECOMMENDATIONS  -continuous telemetry  -maintain IV access  -Head CT   -Increase Insulin to control glucose while on steriods  -lights on during the day and off at night to prevent delirium.    PROVIDER ESCALATION    Yes/No  Yes    Orders received and case discussed with  Fiona Winterbottom, NP.    Disposition: Remain in room 8086.    FOLLOW-UP    Bedside Marie GRAY  updated on plan of care. Instructed to call the Rapid Response Nurse, Chago Bernstein RN at [phone] for additional questions or concerns.

## 2024-03-26 NOTE — ASSESSMENT & PLAN NOTE
Pt tested positive for covid on 3/24. Initially on 4L NC, has decreased to 2L. Pt's mental status has improved. WBC count 50K.     Plan  Remdesivir course  Vanc/cefepime with sepsis concern  Initially started on dex  switched to prednisone, but discontinued due to severe glucose elevation and no O2 requirements

## 2024-03-26 NOTE — CONSULTS
[facility] - Telemetry Stepdown  Endocrinology  Diabetes Consult Note    Consult Requested by: Kev Llamas MD   Reason for admit: Subdural hematoma    HISTORY OF PRESENT ILLNESS:  Reason for Consult: Management of T2DM, Hyperglycemia     Diabetes diagnosis year: > 2 years ago    Home Diabetes Medications:    Per chart review:   Novolog 22 units with meals -- Takes 22 units TID AC while on chemo - (Gives between 12 to 15 units TID AC when not on chemo)   NPH 20 units AM and 50 units PM   Metformin 1000 mg BID     How often checking glucose at home?  AMIE    BG readings on regimen: AMIE  Hypoglycemia on the regimen?  AMIE  Missed doses on regimen?  AMIE    Diabetes Complications include:     Hyperglycemia    Complicating diabetes co morbidities:   Active Cancer and Glucocorticoid use       HPI:   Patient is a 58 y.o. female with PMHx of insulin-dependent T2DM, diabetic neuropathy, essential HTN, NAFLD, obesity, former tobacco use disorder (quit 4-5 months ago), and CML (actively on chemotherapy) with blast crisis diagnosed prior to transfer at OSH, who presents as a transfer from [facility] ED in [place] for acute SDH. Found to be COVID (+) and Dexamethasone therapy started. BG excursions noted. Endocrine consulted for BG management.         Interval HPI:   No acute events overnight. Patient in room 8086/8086 A. Blood glucose worsening. BG above goal on current insulin regimen (SSI, prandial, and basal insulin ). Steroid use- Dexamethasone  6 mg daily (d/c'd yesterday).      Renal function- Normal   Vasopressors-  None     Diet Clear liquid (no sugar)/Bariatric     Eatin%  Nausea: No  Hypoglycemia and intervention: No  Fever: No  TPN and/or TF: No      PMH, PSH, FH, SH updated and reviewed     ROS:  Review of Systems   Unable to perform ROS: Acuity of condition       Current Medications and/or Treatments Impacting Glycemic Control  Immunotherapy:    Immunosuppressants       None          Steroids:   Hormones  "(From admission, onward)      None          Pressors:    Autonomic Drugs (From admission, onward)      None          Hyperglycemia/Diabetes Medications:   Antihyperglycemics (From admission, onward)      Start     Stop Route Frequency Ordered    03/26/24 2100  insulin detemir U-100 (Levemir) pen 25 Units         -- SubQ 2 times daily 03/26/24 1403    03/26/24 1645  insulin aspart U-100 pen 25 Units         -- SubQ 3 times daily with meals 03/26/24 1403    03/25/24 2253  insulin aspart U-100 pen 0-10 Units         -- SubQ Before meals & nightly PRN 03/25/24 2155             PHYSICAL EXAMINATION:  Vitals:    03/26/24 1408   BP:    Pulse:    Resp: 18   Temp:      Body mass index is 36.32 kg/m².     Physical Exam  Constitutional:       Appearance: She is ill-appearing.   HENT:      Right Ear: External ear normal.      Left Ear: External ear normal.   Pulmonary:      Comments: tachypneic  Abdominal:      General: There is distension.            Labs Reviewed and Include   Recent Labs   Lab 03/26/24  0500 03/26/24  1345   * 456*   CALCIUM 9.1 9.6   ALBUMIN 2.5*  --    PROT 5.8*  --     135*   K 3.6 3.8   CO2 15* 20*    107   BUN 16 17   CREATININE 0.8 0.9   ALKPHOS 77  --    ALT 8*  --    AST 20  --    BILITOT 0.3  --      Lab Results   Component Value Date    WBC 76.07 (HH) 03/26/2024    HGB 10.5 (L) 03/26/2024    HCT 36 03/26/2024    MCV 83 03/26/2024     03/26/2024     Recent Labs   Lab 03/24/24  0127   TSH 1.428     Lab Results   Component Value Date    HGBA1C 6.2 (H) 03/24/2024       Nutritional status:   Body mass index is 36.32 kg/m².  Lab Results   Component Value Date    ALBUMIN 2.5 (L) 03/26/2024    ALBUMIN 2.7 (L) 03/25/2024    ALBUMIN 2.9 (L) 03/24/2024     No results found for: "PREALBUMIN"    Estimated Creatinine Clearance: 75.6 mL/min (based on SCr of 0.9 mg/dL).    Accu-Checks  Recent Labs     03/24/24  2211 03/25/24  0040 03/25/24  0611 03/25/24  0921 03/25/24  1136 " 03/25/24  1517 03/25/24  2121 03/26/24  0818 03/26/24  1225 03/26/24  1517   POCTGLUCOSE 246* 345* 426* 322* 347* 422* 363* 436* 424* 390*        ASSESSMENT and PLAN    Neuro  * Subdural hematoma  Managed per primary team       ID  COVID  Expect effects of Dexamethasone for COVID on BG to last up to 72 hours      Endocrine  Type 2 diabetes mellitus with diabetic neuropathy, with long-term current use of insulin  BG goal: 140-180   T2DM. BG above goal here. Receiving steroids and on lower doses than reported home doses.  Will start on sugar free clears and IIP.    - Start insulin infusion protocol   - POCT Glucose every hour   - Hypoglycemia protocol in place      ** Please notify Endocrine for any change and/or advance in diet**  ** Please call Endocrine for any BG related issues **     Discharge Planning:   TBD. Please notify endocrine prior to discharge.             Plan discussed with patient, family, and RN at bedside.     Jalil Dior PA-C  Endocrinology  [facility] - Tele[facility] Stepdown

## 2024-03-26 NOTE — SUBJECTIVE & OBJECTIVE
Past Medical History:   Diagnosis Date    Cancer     CML (chronic myelocytic leukemia)     DM2 (diabetes mellitus, type 2)     HTN (hypertension)     NAFLD (nonalcoholic fatty liver disease)     Neuropathy        Past Surgical History:   Procedure Laterality Date    ABDOMINAL SURGERY       SECTION      x4    CHOLECYSTECTOMY         Review of patient's allergies indicates:  No Known Allergies    Family History       Problem Relation (Age of Onset)    Diabetes Mother, Father          Tobacco Use    Smoking status: Never    Smokeless tobacco: Never   Substance and Sexual Activity    Alcohol use: Not Currently    Drug use: Not Currently    Sexual activity: Not on file      Review of Systems   Unable to perform ROS: Mental status change     Objective:     Vital Signs (Most Recent):  Temp: (!) 100.9 °F (38.3 °C) (24 1600)  Pulse: 109 (24 1600)  Resp: 20 (24 1600)  BP: (!) 155/72 (24 1600)  SpO2: 95 % (24 1600) Vital Signs (24h Range):  Temp:  [97.4 °F (36.3 °C)-100.9 °F (38.3 °C)] 100.9 °F (38.3 °C)  Pulse:  [] 109  Resp:  [16-32] 20  SpO2:  [92 %-98 %] 95 %  BP: (110-165)/(62-80) 155/72   Weight: 93 kg (205 lb 0.4 oz)  Body mass index is 36.32 kg/m².      Intake/Output Summary (Last 24 hours) at 3/26/2024 1644  Last data filed at 3/26/2024 0840  Gross per 24 hour   Intake --   Output 1850 ml   Net -1850 ml          Physical Exam  Vitals and nursing note reviewed.   Constitutional:       General: She is not in acute distress.     Appearance: She is obese. She is ill-appearing.      Comments: Arousable, but lethargic/confused    HENT:      Head: Normocephalic and atraumatic.      Mouth/Throat:      Mouth: Mucous membranes are moist.      Pharynx: Oropharynx is clear.   Eyes:      General: No scleral icterus.     Extraocular Movements: Extraocular movements intact.   Cardiovascular:      Rate and Rhythm: Normal rate and regular rhythm.   Pulmonary:      Effort: Pulmonary effort is  normal. No respiratory distress.      Breath sounds: Rales present.   Abdominal:      General: Abdomen is flat. There is distension.      Palpations: Abdomen is soft.      Comments: Has skin lesions on her lower abdomen  Normoactive bowel sounds  Noted hepatosplenomegaly   Musculoskeletal:      Cervical back: Normal range of motion and neck supple.      Right lower leg: No edema.      Left lower leg: No edema.   Skin:     General: Skin is warm.   Neurological:      Mental Status: She is lethargic and confused.            Vents:     Lines/Drains/Airways       Drain  Duration             Female External Urinary Catheter w/ Suction 03/23/24 2148 2 days              Peripheral Intravenous Line  Duration                  Peripheral IV - Single Lumen 03/23/24 20 G Anterior;Right Hand 3 days                  Significant Labs:    CBC/Anemia Profile:  Recent Labs   Lab 03/24/24 2048 03/25/24  0430 03/25/24  0701 03/26/24  0500 03/26/24  1347   WBC 57.16*  --  50.03* 76.07*  --    HGB 10.8*  --  10.4* 10.5*  --    HCT 37.0   < > 35.3* 36.4* 36     --  240 320  --    MCV 83  --  84 83  --    RDW 20.2*  --  20.4* 20.8*  --     < > = values in this interval not displayed.        Chemistries:  Recent Labs   Lab 03/24/24 2048 03/25/24  0701 03/26/24  0500 03/26/24  1345    139 137 135*   K 3.5 3.7 3.6 3.8    107 107 107   CO2 21* 21* 15* 20*   BUN 13 14 16 17   CREATININE 0.8 0.8 0.8 0.9   CALCIUM 9.7 8.7 9.1 9.6   ALBUMIN 2.9* 2.7* 2.5*  --    PROT 6.4 5.6* 5.8*  --    BILITOT 0.6 0.5 0.3  --    ALKPHOS 71 66 77  --    ALT 10 7* 8*  --    AST 18 13 20  --    MG 1.8 2.0 1.8  --    PHOS  --  3.7 2.5*  --        All pertinent labs within the past 24 hours have been reviewed.    Significant Imaging: I have reviewed all pertinent imaging results/findings within the past 24 hours.

## 2024-03-26 NOTE — ASSESSMENT & PLAN NOTE
Pt on amlodipine 10mg, metoprolol 25mg BID, and losartan 25mg at home.    Plan  Continued home medications, increased losartan to 50  Per NSGY goal bp <160 due to SDH, prns in place but will adjust bp medications as needed

## 2024-03-26 NOTE — CARE UPDATE
Called to bedside around 1530 stating patient was breathing with accessory muscles and increased O2 demand. On arrival patient was difficult to arouse, confused, lethargic. Pt was hot to the touch with new temperature >100.4. Belly was very tense and rigid. Rapid was called on the way to room and arrived quickly after. ABG, CBC, Lactic, CT abdomen were ordered. Antibiotics were switch from vanc/cefepime to vanc/zosyn to cover for anaerobes and fluids were started. ICU team arrived during this time as well case was discussed and agreed she required more intensive care. Pt was stepped up to the ICU.    Maged Momin, DO  PGY2  Bone Marrow Transplant Unit

## 2024-03-26 NOTE — PT/OT/SLP PROGRESS
Physical Therapy      Patient Name:  Fela Ellison   MRN:  [id number]    Patient not seen today secondary to Transfer to ICU in PM. Writing therapist to discharge patient.

## 2024-03-26 NOTE — ASSESSMENT & PLAN NOTE
Pt placed on amlodipine 10mg, metoprolol 25mg, and losartan 25.     Plan  Continue home medications, increased losartan to 50  Goal BP <160 per neurosurgery with SDH  PRN in place if needed, will increase BP medications as needed

## 2024-03-26 NOTE — NURSING
Patient BS unstable and mental status declining  with breathing abdominal accessory. MD and rapied response team notified. Transferred patient to the MICU with rapid response team. Gave report to ISAAC Acevedo.

## 2024-03-26 NOTE — ASSESSMENT & PLAN NOTE
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 Encephalopathic when seen and unable to follow commands.     Labs show clear evidence of blast crisis (29% on CBC) without cytopenias. This is in the setting of what may be sepsis vs fevers from leukemia.She normally follows at [facility] fellows clinic and has progressed through multiple treatments.   Discussed aggressive and unstable disease leading to her presentation here.      Plan:   -hydrea 40mg/kg daily for cytoreduction, increased to 3500mg BID due to worsening LDH  -Bone marrow biopsy on 3/27 with concern for worsening disease progression  -continue acyclovir, allopurinol, fluc  -continue broad spec abx and follow cultures  -prognosis guarded, will discuss goals of care moving forward

## 2024-03-26 NOTE — NURSING
Removed pt NG tube. Pt tolerated NG tube removal well. Pt denies pain or nausea at this time. Pt in bed with the call light in reach and the bed alarm on.

## 2024-03-26 NOTE — PT/OT/SLP EVAL
Speech Language Pathology Evaluation  Cognitive Communication + Dysphagia Treatment + Education    Patient Name:  Fela Ellison   MRN:  [id number]  Admitting Diagnosis: Subdural hematoma    Recommendations:     Recommendations:                General Recommendations:  Dysphagia therapy, Speech/language therapy, and Cognitive-linguistic therapy  Diet recommendations:  Regular Diet - IDDSI Level 7, Thin liquids - IDDSI Level 0   Aspiration Precautions: Feed only when awake/alert!!!,1 bite/sip at a time, Alternating bites/sips, Assistance with meals, Avoid talking while eating, Eliminate distractions,  Frequent oral care, HOB to 90 degrees, Meds whole 1 at a time, Monitor for s/s of aspiration, Remain upright 30 minutes post meal, Small bites/sips, and Strict aspiration precautions   General Precautions: Standard, airborne, aspiration, contact, droplet, fall  Communication strategies:  provide increased time to answer, go to room if call light pushed, and  required    Assessment:     Fela Ellison is a 58 y.o. female with an SLP diagnosis of Dysphagia and Cognitive-Linguistic Impairment.  She presents with altered mental status on this date.  Medical team aware and ordered CT scan.     History:     Past Medical History:   Diagnosis Date    Cancer     CML (chronic myelocytic leukemia)     DM2 (diabetes mellitus, type 2)     HTN (hypertension)     NAFLD (nonalcoholic fatty liver disease)     Neuropathy        Past Surgical History:   Procedure Laterality Date    ABDOMINAL SURGERY       SECTION      x4    CHOLECYSTECTOMY       History of Present Illness: Fela Ellison is a 58-year-old female with PMHx of insulin-dependent T2DM, diabetic neuropathy, essential HTN, NAFLD, obesity, former tobacco use disorder (quit 4-5 months ago), and CML (actively on chemotherapy) with blast crisis diagnosed prior to transfer at [facility], who presents as a transfer from [facility] ED in  [place] for acute SDH. History is obtained primarily from chart review due to patient being Vietnamese-speaking, though  is at bedside assisting with interpretation, as he can speak English more fluently.      This morning (3/23), patient presented to OSH c/o back pain, BLE pain, and hip pain (refractory to home [place] prescribed for cancer-associated pain), all of which worsened after an episode of weakness causing her to slip while ambulating. Patient did not suffer head or back trauma, since she was caught before making contact with the ground or nearby objects. Patient also reportedly c/o a sore throat and feeling generally unwell. Vitals at that time were significant for tachycardia and hypertension, likely associated with pain, and she was started on IV morphine (ineffective) and IV Dilaudid.      XRays of spine and pelvis were clear. CT chest/abd/pelvis significant for splenomegaly (increased from 11.9cm on previous scan to 18.2cm), subcutaneous anasarca edema present at the level of the abdomen and pelvis (unchanged from prior), moderate colonic fecal load without pericolonic stranding, and bilateral ovarian cysts. Degenerative changes of the lower spine, specifically at L5-S1, are more apparent on this CT than prior imaging.      Labs were significant for leukocytosis (WBC 38) with 29% blasts, c/w an acute blast crisis. [facility] does not have an inpatient Heme/Onc service; therefore, initial plan was for patient to transfer to [facility] for inpatient treatment of blast crisis with Heme/Onc. Unfortunately, at 18:07PM, while still awaiting transfer, patient's  alerted OSH RN of a mental status change. Patient exhibited new-onset confusion, disorientation to all but self, and inability to consistently follow commands. She was febrile to 101.7°F and started on broad-spectrum ABX (vancomycin and cefepime), IVF, and rectal Tylenol. CXR suggestive of RLL congestive process.     CTH showed new 3mm thickening of  "the interhemispheric falx suggestive of shallow SDH, with extension into R tentorium. No mass effect, MLS, hydrocephalus, sulcal effacement, or skull fracture present. Transfer center was updated on new findings, and patient was accepted instead to [facility] unit by Dr. Sevilla for higher level of care and closer neurological monitoring.         Subjective     "[place]." Pt stated when asked to orient to place. Pt with decreased alertness and responsiveness on this service date.  Pt's  present and attributing to effects of medication, but understands that team plans to send pt for CT scan.      Frankie (ID# [id number]) acted as  t/o evaluation.      Pain/Comfort:  Pain Rating 1:  (no indications of pain, but pt did not appear to feel well)    Respiratory Status: Nasal cannula, flow 2 L/min    Objective:     Cognitive Status:    Pt was oriented to current state IND and to place/hospital given binary choices.  No responses given for place/[facility] or situation. Pt was unable to orient to month despite max cues, but she oriented to year. Further assessment warranted.        Receptive Language:   Comprehension:      Pt followed 1-step commands, but not 2-steps.  Pt answered simple yes/no q's appropriately with need for repetitions at times to elicit responses. Pt did not correctly answer complex yes/no q's.      Expressive Language:  Verbal:  Pt was able to name common objects in the room on 1 out of 2 trials. Pt perseverated on initial object name despite cues.  Pt did not initiate communication of basic wants/needs on this date.       Motor Speech:  No motor speech deficits evident    Voice:   WFL     Visual-Spatial:  tba    Reading:   tba      Written Expression:   tba    Treatment: Pt accepted straw sips of water x 3 with good tolerance. Pt did not attempt to strip puree presentation from spoon, but did lick small amount from lip after SLP placed spoon on lip.  Lunch delivered, but did not attempt to " offer pt solids due to poor alertness, initiation, and responsiveness.  It is recommended that pt only be fed when fully awake/alert and able to readily accept PO presentations.  Education provided to pt and  regarding role of SLP, initiation of speech/language/cognitive evaluation, change in mental status noted compared to yesterday's ongoing swallowing assessment, recommendations to only attempt to feed pt when fully awake/alert, and SLP treatment plan and POC. Pt's  expressed understanding, but pt was unable to do so. Pt's  reported vomiting after PO intake this morning or last night.  Nurse notified who reported pt having received Zofran earlier.     Goals:   Multidisciplinary Problems       SLP Goals          Problem: SLP    Goal Priority Disciplines Outcome   SLP Goal     SLP Ongoing, Not Progressing   Description: Speech Language Pathology Goals  Goals expected to be met by 3/31  1. Ongoing swallow assessment  2. SLP Speech/Language/Cognitive Evaluation- goal initiated 3/26 but limited by AMS. Medical team aware. Pt going to for CT scan today. Continue ongoing assessment.                         Plan:   Patient to be seen:  4 x/week   Plan of Care expires:  04/22/24  Plan of Care reviewed with:  patient, spouse   SLP Follow-Up:  Yes       Discharge recommendations:  Therapy Intensity Recommendations at Discharge: Moderate Intensity Therapy     Time Tracking:     SLP Treatment Date:   03/26/24  Speech Start Time:  1305  Speech Stop Time:  1329     Speech Total Time (min):  24 min    Billable Minutes: Eval 8 , Treatment Swallowing Dysfunction 8, and Self Care/Home Management Training 8    03/26/2024

## 2024-03-26 NOTE — NURSING
Received report from prior shift nurse. Pt in bed with call light in reach and bed alarm on. Pt NG tube in place. Pt remains AAOx4. Denies pain or nausea at this time.

## 2024-03-26 NOTE — CODE/ RAPID DOCUMENTATION
Patient says her right knee is not getting any better at all. \"almost feels like it is worse\" do you want her to come back in? Xray?  Referral? Please advise RAPID RESPONSE NURSE NOTE        Admit Date: 3/23/2024  LOS: 3  Code Status: Full Code   Date of Consult: 2024  : 1965  Age: 58 y.o.  Weight:   Wt Readings from Last 1 Encounters:   24 93 kg (205 lb 0.4 oz)     Sex: female  Race: Other   Bed: [address] [place] [address] A:   MRN: [id number]  Time Rapid Response Team page Received: 1530  Time Rapid Response Team at Bedside: 1532  Time Rapid Response Team left Bedside: 1408  Was the patient discharged from an ICU this admission? Yes   Was the patient discharged from a PACU within last 24 hours? No   Did the patient receive conscious sedation/general anesthesia in last 24 hours? No  Was the patient in the ED within the past 24 hours? No  Was the patient on NIPPV within the past 24 hours? No   Did this progress into an ARC or CPA: No  Attending Physician: Ottoniel Paniagua MD  Primary Service: Intensive Care,Pulmonary Disease       SITUATION    Notified by DIVYA.   Reason for alert: Respiratory distress  Called to evaluate the patient for Respiratory    BACKGROUND     Why is the patient in the hospital?: Subdural hematoma    Patient has a past medical history of Cancer, CML (chronic myelocytic leukemia), DM2 (diabetes mellitus, type 2), HTN (hypertension), NAFLD (nonalcoholic fatty liver disease), and Neuropathy.    Last Vitals:  Temp: 100.9 °F (38.3 °C) ( 1600)  Pulse: 109 ( 1600)  Resp: 20 ( 1600)  BP: 155/72 ( 1600)  SpO2: 95 % ( 1600)    24 Hours Vitals Range:  Temp:  [97.4 °F (36.3 °C)-100.9 °F (38.3 °C)]   Pulse:  []   Resp:  [18-32]   BP: (110-165)/(62-80)   SpO2:  [92 %-98 %]     Labs:  Recent Labs     24  0430 24  0701 24  0500 24  1347   WBC 57.16*  --  50.03* 76.07*  --    HGB 10.8*  --  10.4* 10.5*  --    HCT 37.0   < > 35.3* 36.4* 36     --  240 320  --     < > = values in this interval not displayed.       Recent Labs     24  0127 24  2048 24  0701 24  0500  03/26/24  1345    138 139 137 135*   K 3.8 3.5 3.7 3.6 3.8    107 107 107 107   CO2 23 21* 21* 15* 20*   BUN 9 13 14 16 17   CREATININE 0.8 0.8 0.8 0.8 0.9   * 318* 427* 440* 456*   PHOS 4.1  --  3.7 2.5*  --    MG 1.7 1.8 2.0 1.8  --         Recent Labs     03/24/24 2011 03/25/24  0430 03/26/24  1347   PH 7.478* 7.436 7.378   PCO2 28.5* 34.5* 34.3*   PO2 57 173* 46   HCO3 21.1* 23.2* 20.2*   POCSATURATED 92 100 81   BE -2 -1 -5*        ASSESSMENT    Physical Exam  Constitutional:       General: She is in acute distress.   Eyes:      Pupils: Pupils are equal, round, and reactive to light.   Cardiovascular:      Rate and Rhythm: Normal rate and regular rhythm.   Pulmonary:      Effort: Tachypnea, accessory muscle usage and respiratory distress present.   Abdominal:      General: There is distension.   Skin:     General: Skin is warm.   Neurological:      Mental Status: She is lethargic and disoriented.     Pt developed increase WOB, abd and accessory muscle used. Abd taught and distended, increased from am rounding.      INTERVENTIONS    The patient was seen for Respiratory problem. Staff concerns included tachypnea and increased WOB. The following interventions were performed: supplemental oxygen, continuous pulse ox monitoring, and continuous cardiac monitoring.     RECOMMENDATIONS    We recommend: pt transferred to ICU 6077    PROVIDER ESCALATION    Orders received and case discussed with Dr. Momin .    Primary team arrival time: 1530    Disposition: Tx in ICU bed 1077.    FOLLOW UP  ISAAC Salazar transferred pt with rapid response team to ICU 1077.

## 2024-03-26 NOTE — ASSESSMENT & PLAN NOTE
Patient initially presenting to [facility] with CML in blast crisis with fevers, fatigue and encephalopathy.  Admitted to BMT services and started on high dose hydrea as well as Abx/antivirals/antifungals.  Found to be COVID positive and treating with Remdesivir.  Worsening AMS with concern for leukostasis in setting of blast crisis vs drug induced with patient receiving high dose pain regimen including new gabapentin vs sepsis.  Noted to have abdominal enlargement and tense abdomen in recently CT seen organomegaly.    - NG tube placed  - Continue Remdesivir, broad spectrum Abx, antifungals  - Continue hydrea, oncology following closely  - Hold gabapentin and consider reduction of pain regimen if tolerated  - airway watch, protecting airway currently, discussions with family and agreeable for intubation if needed  - Consider repeat CT A/P  - f/u repeat cultures

## 2024-03-26 NOTE — ASSESSMENT & PLAN NOTE
Pt has known DM, is on steroids due to COVID. Most likely causing her hyperglycemia. Removed but continued difficulty controlling. Ordered labs to make sure patient is not in DKA/HHS patient BHB 0.2, bicarb 15 then back up to 20, but glucose has remained in the 400s.     Plan  Increased insulin to Determir 25 BID, Aspart 25 TID with moderate sliding scale  Diabetic diet with thin liquids per speech  Glucose checks AC x HS  May consider insulin drip if continues to be uncontrolled, but hoping will improve with steroid removal and increased subQ dose  Consulted Endocrine with difficult to control hyperglycemia

## 2024-03-26 NOTE — PROGRESS NOTES
[facility] - Telemetry Stepdown  Hematology  Bone Marrow Transplant  Progress Note    Patient Name: Fela Ellison  Admission Date: 3/23/2024  Hospital Length of Stay: 3 days  Code Status: Full Code  Subjective:      Interval History:  Worsening confusion, stat CT head, removed NG tube, Glucose difficult to control     Objective:      Vital Signs (Most Recent):  Temp: 97.4 °F (36.3 °C) (03/26/24 1141)  Pulse: 103 (03/26/24 1141)  Resp: 18 (03/26/24 1408)  BP: 134/64 (03/26/24 1141)  SpO2: (!) 92 % (03/26/24 1141) Vital Signs (24h Range):  Temp:  [97.4 °F (36.3 °C)-100.2 °F (37.9 °C)] 97.4 °F (36.3 °C)  Pulse:  [100-133] 103  Resp:  [18-32] 18  SpO2:  [92 %-98 %] 92 %  BP: (110-165)/(62-80) 134/64      Weight: 93 kg (205 lb 0.4 oz)  Body mass index is 36.32 kg/m².  Body surface area is 2.03 meters squared.     ECOG SCORE              [unfilled]     Intake/Output - Last 3 Shifts           03/24 0700  03/25 0659 03/25 0700 03/26 0659 03/26 0700  03/27 0659     P.O. 0         I.V. (mL/kg) 1428.3 (15.4)         IV Piggyback 732         Total Intake(mL/kg) 2160.3 (23.2)         Urine (mL/kg/hr) 2200 (1) 1350 (0.6) 500 (0.7)     Stool 0 0       Total Output 2200 1350 500     Net -39.7 -1350 -500                 Stool Occurrence 0 x 1 x               Physical Exam  Constitutional:       General: She is not in acute distress.     Appearance: She is ill-appearing.      Comments: Arousable, but lethargic/confused    HENT:      Head: Normocephalic and atraumatic.      Mouth/Throat:      Mouth: Mucous membranes are moist.      Pharynx: Oropharynx is clear.   Eyes:      General: No scleral icterus.     Extraocular Movements: Extraocular movements intact.   Cardiovascular:      Rate and Rhythm: Normal rate and regular rhythm.   Pulmonary:      Effort: Pulmonary effort is normal. No respiratory distress.      Breath sounds: Rales present.   Abdominal:      General: Abdomen is flat. There is no distension.      Palpations:  Abdomen is soft.      Tenderness: There is abdominal tenderness in the right lower quadrant and left lower quadrant.      Comments: Has skin lesions on her lower abdomen   Musculoskeletal:      Right lower leg: No edema.      Left lower leg: No edema.   Skin:     General: Skin is warm and dry.   Neurological:      Mental Status: She is lethargic and confused.      Comments: Hard to assess one sided weakness due to difficulty following commands and lethargy            Significant Labs:   All pertinent labs from the last 24 hours have been reviewed.     Diagnostic Results:  I have reviewed all pertinent imaging results/findings within the past 24 hours.    Assessment/Plan:     * Subdural hematoma  Pt presented from OSH with new SDH. Pt was initially admitted to neuro critical care. SDH felt to be small/stable and did not need to undergo surgical intervention. Pt placed on keppra for seizure proph. Pt PLT count is in the 200s. Experienced worsening confusion, lethargy on 3/26 Stat CT head ordered    Plan  Continue Keppra x7 days per NICU note  Neuro checks q4h  Worsening mental status or one sided weakness, need Stat CTH,       Hyperglycemia  Pt has known DM, is on steroids due to COVID. Most likely causing her hyperglycemia. Removed but continued difficulty controlling. Ordered labs to make sure patient is not in DKA/HHS patient BHB 0.2, bicarb 15 then back up to 20, but glucose has remained in the 400s.     Plan  Increased insulin to Determir 25 BID, Aspart 25 TID with moderate sliding scale  Diabetic diet with thin liquids per speech  Glucose checks AC x HS  May consider insulin drip if continues to be uncontrolled, but hoping will improve with steroid removal and increased subQ dose  Consulted Endocrine with difficult to control hyperglycemia      COVID  Pt tested positive for covid on 3/24. Initially on 4L NC, has decreased to 2L. Pt's mental status has improved. WBC count 50K.     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     Plan  Continue home medications, increased losartan to 50  Goal BP <160 per neurosurgery with SDH  PRN in place if needed, will increase BP medications as needed    CML (chronic myelocytic leukemia)  58F with relapsed CML with blast crisis (transformed on 2022) admitted for fevers, fatigue, encephalopathy and found to have a SDH that NSGY does not recommend intervention on. Encephalopathic when seen and unable to follow commands.     Labs show clear evidence of blast crisis (29% on CBC) without cytopenias. This is in the setting of what may be sepsis vs fevers from leukemia.She normally follows at [facility] fellows clinic and has progressed through multiple treatments.   Discussed aggressive and unstable disease leading to her presentation here.      Plan:   -hydrea 40mg/kg daily for cytoreduction, increased to 3500mg BID due to worsening LDH  -planning for Bone marrow biopsy with concern for worsening disease progression  -continue acyclovir, allopurinol, fluc  -continue broad spec abx and follow cultures  -prognosis guarded, will discuss goals of care moving forward        VTE Risk Mitigation (From admission, onward)           Ordered     IP VTE HIGH RISK PATIENT  Once         03/23/24 2209     Place sequential compression device  Until discontinued         03/23/24 2209     Reason for No Pharmacological VTE Prophylaxis  Once        Question:  Reasons:  Answer:  Active Bleeding  Comment:  SDH    03/23/24 2209                    Disposition: TBD    Maged Momin, DO  Bone Marrow Transplant  [facility] - Telemetry  Stepdown

## 2024-03-26 NOTE — CODE/ RAPID DOCUMENTATION
"Medical Emergency Team Follow - Up  CC: Subdural hematoma  Date: 03/26/2024  Admit Date: 3/23/2024  Hospital Length of Stay: 3  MRN: [id number]  The patient location is: Bed 8086/8086 A  Dx: Subdural hematoma  Code Status: Full Code   Chart Reviewed: 03/26/2024, 1:36 PM      SUBJECTIVE:     HPI:  Fela Ellison has a past medical history of Cancer, CML (chronic myelocytic leukemia), DM2 (diabetes mellitus, type 2), HTN (hypertension), NAFLD (nonalcoholic fatty liver disease), and Neuropathy.    Significant Events: Follow up from ICU consult on 5/24. Patient more lethargic today. CTH ordered by primary team. Hyperglycemic.      OBJECTIVE:     Physical Exam  Vitals and nursing note reviewed.   Constitutional:       Appearance: She is ill-appearing.   HENT:      Head: Normocephalic.   Cardiovascular:      Rate and Rhythm: Regular rhythm. Tachycardia present.   Pulmonary:      Effort: Pulmonary effort is normal.   Neurological:      Mental Status: She is lethargic.      GCS: GCS eye subscore is 2. GCS verbal subscore is 4. GCS motor subscore is 5.         Last VS: /64 (BP Location: Left arm, Patient Position: Lying)   Pulse 103   Temp 97.4 °F (36.3 °C) (Oral)   Resp (!) 22   Ht 5' 3" (1.6 m)   Wt 93 kg (205 lb 0.4 oz)   SpO2 (!) 92%   BMI 36.32 kg/m²     24H Vital Sign Range:    Temp:  [97.4 °F (36.3 °C)-100.2 °F (37.9 °C)]   Pulse:  [100-133]   Resp:  [18-32]   BP: (110-165)/(62-80)   SpO2:  [92 %-98 %]     Level of Consciousness (AVPU): alert      Intake/Output Summary (Last 24 hours) at 3/26/2024 1336  Last data filed at 3/26/2024 0840  Gross per 24 hour   Intake --   Output 1850 ml   Net -1850 ml       Recent Labs     03/24/24  2048 03/25/24  0430 03/25/24  0701 03/26/24  0500   WBC 57.16*  --  50.03* 76.07*   HGB 10.8*  --  10.4* 10.5*   HCT 37.0 35* 35.3* 36.4*     --  240 320       Recent Labs     03/24/24  0127 03/24/24  2048 03/25/24  0701 03/26/24  0500    138 139 137   K " 3.8 3.5 3.7 3.6    107 107 107   CO2 23 21* 21* 15*   BUN 9 13 14 16   CREATININE 0.8 0.8 0.8 0.8   * 318* 427* 440*   PHOS 4.1  --  3.7 2.5*   MG 1.7 1.8 2.0 1.8        Recent Labs     03/23/24  1809 03/24/24 2011 03/25/24  0430   PH 7.460* 7.478* 7.436   PCO2 36.0 28.5* 34.5*   PO2 67.0* 57 173*   HCO3 25.6 21.1* 23.2*   POCSATURATED  --  92 100   BE  --  -2 -1        Lab Results   Component Value Date    LACTATE 1.6 03/25/2024    LACTATE 1.8 03/23/2024         ASSESSMENT AND PLAN :     AMS/Acute Encephalopathy    Patient has acute metabolic encephalopathy with unclear etiology  Treatment for underlying cause is underway. Will monitor neuro checks carefully.     - ABG  - CBC  - CMP  - Lactic acid  - Ammonia  - Blood cultures  - CT Head (unchanged- SDH)  - Consider EEG  - Frequent neuro checks   - Limit sedating medications   - Initiate insulin infusion for hyperglycemia  - NGT    Goals of Care  - Recommend patient/ family goals of care discussion with Hem/Onc. She was in blast crisis and there was a plan for a bone marrow biopsy for 3/27. Now pending based on patient condition  - Patient has a  and 4 sons. She is Nauruan- speaking     Fiona Winterbottom APRN, CNS  Critical Care Medicine

## 2024-03-27 LAB
ALBUMIN SERPL BCP-MCNC: 2.7 G/DL (ref 3.5–5.2)
ALP SERPL-CCNC: 151 U/L (ref 55–135)
ALT SERPL W/O P-5'-P-CCNC: 9 U/L (ref 10–44)
ANION GAP SERPL CALC-SCNC: 10 MMOL/L (ref 8–16)
ANION GAP SERPL CALC-SCNC: 6 MMOL/L (ref 8–16)
ANISOCYTOSIS BLD QL SMEAR: SLIGHT
AST SERPL-CCNC: 25 U/L (ref 10–40)
BASOPHILS # BLD AUTO: ABNORMAL K/UL (ref 0–0.2)
BASOPHILS NFR BLD: 0 % (ref 0–1.9)
BILIRUB SERPL-MCNC: 0.4 MG/DL (ref 0.1–1)
BUN SERPL-MCNC: 20 MG/DL (ref 6–20)
BUN SERPL-MCNC: 20 MG/DL (ref 6–20)
CALCIUM SERPL-MCNC: 10.2 MG/DL (ref 8.7–10.5)
CALCIUM SERPL-MCNC: 9.3 MG/DL (ref 8.7–10.5)
CHLORIDE SERPL-SCNC: 108 MMOL/L (ref 95–110)
CHLORIDE SERPL-SCNC: 110 MMOL/L (ref 95–110)
CO2 SERPL-SCNC: 20 MMOL/L (ref 23–29)
CO2 SERPL-SCNC: 22 MMOL/L (ref 23–29)
CREAT SERPL-MCNC: 0.7 MG/DL (ref 0.5–1.4)
CREAT SERPL-MCNC: 0.7 MG/DL (ref 0.5–1.4)
CRP SERPL-MCNC: 101 MG/L (ref 0–8.2)
DIFFERENTIAL METHOD BLD: ABNORMAL
EOSINOPHIL # BLD AUTO: ABNORMAL K/UL (ref 0–0.5)
EOSINOPHIL NFR BLD: 0 % (ref 0–8)
ERYTHROCYTE [DISTWIDTH] IN BLOOD BY AUTOMATED COUNT: 20.7 % (ref 11.5–14.5)
EST. GFR  (NO RACE VARIABLE): >60 ML/MIN/1.73 M^2
EST. GFR  (NO RACE VARIABLE): >60 ML/MIN/1.73 M^2
FIBRINOGEN PPP-MCNC: 546 MG/DL (ref 182–400)
GLUCOSE SERPL-MCNC: 202 MG/DL (ref 70–110)
GLUCOSE SERPL-MCNC: 207 MG/DL (ref 70–110)
HCT VFR BLD AUTO: 37.5 % (ref 37–48.5)
HGB BLD-MCNC: 11.1 G/DL (ref 12–16)
HYPOCHROMIA BLD QL SMEAR: ABNORMAL
IMM GRANULOCYTES # BLD AUTO: ABNORMAL K/UL (ref 0–0.04)
IMM GRANULOCYTES NFR BLD AUTO: ABNORMAL % (ref 0–0.5)
LDH SERPL L TO P-CCNC: 3748 U/L (ref 110–260)
LYMPHOCYTES # BLD AUTO: ABNORMAL K/UL (ref 1–4.8)
LYMPHOCYTES NFR BLD: 8 % (ref 18–48)
MAGNESIUM SERPL-MCNC: 1.9 MG/DL (ref 1.6–2.6)
MCH RBC QN AUTO: 24.3 PG (ref 27–31)
MCHC RBC AUTO-ENTMCNC: 29.6 G/DL (ref 32–36)
MCV RBC AUTO: 82 FL (ref 82–98)
MONOCYTES # BLD AUTO: ABNORMAL K/UL (ref 0.3–1)
MONOCYTES NFR BLD: 9 % (ref 4–15)
NEUTROPHILS NFR BLD: 39 % (ref 38–73)
NEUTS BAND NFR BLD MANUAL: 3 %
NRBC BLD-RTO: 1 /100 WBC
OVALOCYTES BLD QL SMEAR: ABNORMAL
PHOSPHATE SERPL-MCNC: 3.6 MG/DL (ref 2.7–4.5)
PLATELET # BLD AUTO: 251 K/UL (ref 150–450)
PMV BLD AUTO: ABNORMAL FL (ref 9.2–12.9)
POCT GLUCOSE: 144 MG/DL (ref 70–110)
POCT GLUCOSE: 164 MG/DL (ref 70–110)
POCT GLUCOSE: 166 MG/DL (ref 70–110)
POCT GLUCOSE: 167 MG/DL (ref 70–110)
POCT GLUCOSE: 175 MG/DL (ref 70–110)
POCT GLUCOSE: 179 MG/DL (ref 70–110)
POCT GLUCOSE: 179 MG/DL (ref 70–110)
POCT GLUCOSE: 182 MG/DL (ref 70–110)
POCT GLUCOSE: 182 MG/DL (ref 70–110)
POCT GLUCOSE: 190 MG/DL (ref 70–110)
POCT GLUCOSE: 196 MG/DL (ref 70–110)
POCT GLUCOSE: 201 MG/DL (ref 70–110)
POCT GLUCOSE: 201 MG/DL (ref 70–110)
POCT GLUCOSE: 204 MG/DL (ref 70–110)
POCT GLUCOSE: 209 MG/DL (ref 70–110)
POCT GLUCOSE: 214 MG/DL (ref 70–110)
POCT GLUCOSE: 216 MG/DL (ref 70–110)
POCT GLUCOSE: 225 MG/DL (ref 70–110)
POCT GLUCOSE: 226 MG/DL (ref 70–110)
POCT GLUCOSE: 234 MG/DL (ref 70–110)
POCT GLUCOSE: 257 MG/DL (ref 70–110)
POIKILOCYTOSIS BLD QL SMEAR: SLIGHT
POLYCHROMASIA BLD QL SMEAR: ABNORMAL
POTASSIUM SERPL-SCNC: 3.4 MMOL/L (ref 3.5–5.1)
POTASSIUM SERPL-SCNC: 4 MMOL/L (ref 3.5–5.1)
PROT SERPL-MCNC: 6.2 G/DL (ref 6–8.4)
RBC # BLD AUTO: 4.57 M/UL (ref 4–5.4)
SCHISTOCYTES BLD QL SMEAR: ABNORMAL
SODIUM SERPL-SCNC: 138 MMOL/L (ref 136–145)
SODIUM SERPL-SCNC: 138 MMOL/L (ref 136–145)
SPHEROCYTES BLD QL SMEAR: ABNORMAL
URATE SERPL-MCNC: 6.3 MG/DL (ref 2.4–5.7)
VANCOMYCIN TROUGH SERPL-MCNC: 20.9 UG/ML (ref 10–22)
WBC # BLD AUTO: 72 K/UL (ref 3.9–12.7)
WBC OTHER NFR BLD MANUAL: 41 %

## 2024-03-27 PROCEDURE — 27000207 HC ISOLATION

## 2024-03-27 PROCEDURE — 27000221 HC OXYGEN, UP TO 24 HOURS

## 2024-03-27 PROCEDURE — 85007 BL SMEAR W/DIFF WBC COUNT: CPT | Performed by: PHYSICIAN ASSISTANT

## 2024-03-27 PROCEDURE — 80053 COMPREHEN METABOLIC PANEL: CPT | Performed by: PHYSICIAN ASSISTANT

## 2024-03-27 PROCEDURE — 80048 BASIC METABOLIC PNL TOTAL CA: CPT | Performed by: INTERNAL MEDICINE

## 2024-03-27 PROCEDURE — 63600175 PHARM REV CODE 636 W HCPCS: Performed by: PHYSICIAN ASSISTANT

## 2024-03-27 PROCEDURE — 85027 COMPLETE CBC AUTOMATED: CPT | Performed by: PHYSICIAN ASSISTANT

## 2024-03-27 PROCEDURE — 94761 N-INVAS EAR/PLS OXIMETRY MLT: CPT

## 2024-03-27 PROCEDURE — 63600175 PHARM REV CODE 636 W HCPCS: Performed by: INTERNAL MEDICINE

## 2024-03-27 PROCEDURE — 25000003 PHARM REV CODE 250: Performed by: NURSE PRACTITIONER

## 2024-03-27 PROCEDURE — 63600175 PHARM REV CODE 636 W HCPCS

## 2024-03-27 PROCEDURE — 079T3ZX DRAINAGE OF BONE MARROW, PERCUTANEOUS APPROACH, DIAGNOSTIC: ICD-10-PCS | Performed by: INTERNAL MEDICINE

## 2024-03-27 PROCEDURE — 25000003 PHARM REV CODE 250: Performed by: PHYSICIAN ASSISTANT

## 2024-03-27 PROCEDURE — 88185 FLOWCYTOMETRY/TC ADD-ON: CPT | Performed by: PATHOLOGY

## 2024-03-27 PROCEDURE — 38222 DX BONE MARROW BX & ASPIR: CPT

## 2024-03-27 PROCEDURE — 99233 SBSQ HOSP IP/OBS HIGH 50: CPT | Mod: ,,, | Performed by: INTERNAL MEDICINE

## 2024-03-27 PROCEDURE — 84550 ASSAY OF BLOOD/URIC ACID: CPT | Performed by: STUDENT IN AN ORGANIZED HEALTH CARE EDUCATION/TRAINING PROGRAM

## 2024-03-27 PROCEDURE — 20000000 HC ICU ROOM

## 2024-03-27 PROCEDURE — 84100 ASSAY OF PHOSPHORUS: CPT | Performed by: PHYSICIAN ASSISTANT

## 2024-03-27 PROCEDURE — 25000003 PHARM REV CODE 250

## 2024-03-27 PROCEDURE — 25000003 PHARM REV CODE 250: Performed by: INTERNAL MEDICINE

## 2024-03-27 PROCEDURE — 83735 ASSAY OF MAGNESIUM: CPT | Performed by: PHYSICIAN ASSISTANT

## 2024-03-27 PROCEDURE — 88189 FLOWCYTOMETRY/READ 16 & >: CPT | Mod: ,,, | Performed by: PATHOLOGY

## 2024-03-27 PROCEDURE — 86140 C-REACTIVE PROTEIN: CPT | Performed by: PHYSICIAN ASSISTANT

## 2024-03-27 PROCEDURE — 63700000 PHARM REV CODE 250 ALT 637 W/O HCPCS

## 2024-03-27 PROCEDURE — 63600175 PHARM REV CODE 636 W HCPCS: Mod: JZ,TB | Performed by: STUDENT IN AN ORGANIZED HEALTH CARE EDUCATION/TRAINING PROGRAM

## 2024-03-27 PROCEDURE — 25000003 PHARM REV CODE 250: Performed by: STUDENT IN AN ORGANIZED HEALTH CARE EDUCATION/TRAINING PROGRAM

## 2024-03-27 PROCEDURE — 99900035 HC TECH TIME PER 15 MIN (STAT)

## 2024-03-27 PROCEDURE — 80202 ASSAY OF VANCOMYCIN: CPT | Performed by: INTERNAL MEDICINE

## 2024-03-27 PROCEDURE — 38222 DX BONE MARROW BX & ASPIR: CPT | Mod: RT,,, | Performed by: NURSE PRACTITIONER

## 2024-03-27 PROCEDURE — 85384 FIBRINOGEN ACTIVITY: CPT | Performed by: PHYSICIAN ASSISTANT

## 2024-03-27 PROCEDURE — 83615 LACTATE (LD) (LDH) ENZYME: CPT | Performed by: PHYSICIAN ASSISTANT

## 2024-03-27 PROCEDURE — 88184 FLOWCYTOMETRY/ TC 1 MARKER: CPT | Performed by: PATHOLOGY

## 2024-03-27 PROCEDURE — 99233 SBSQ HOSP IP/OBS HIGH 50: CPT | Mod: ,,, | Performed by: PHYSICIAN ASSISTANT

## 2024-03-27 RX ORDER — OLANZAPINE 5 MG/1
5 TABLET ORAL DAILY
Status: DISCONTINUED | OUTPATIENT
Start: 2024-03-27 | End: 2024-03-27

## 2024-03-27 RX ORDER — LEVETIRACETAM 500 MG/1
500 TABLET ORAL 2 TIMES DAILY
Status: CANCELLED | OUTPATIENT
Start: 2024-03-27 | End: 2024-03-30

## 2024-03-27 RX ORDER — SODIUM,POTASSIUM PHOSPHATES 280-250MG
2 POWDER IN PACKET (EA) ORAL
Status: DISCONTINUED | OUTPATIENT
Start: 2024-03-27 | End: 2024-03-30

## 2024-03-27 RX ORDER — AMLODIPINE BESYLATE 10 MG/1
10 TABLET ORAL DAILY
Status: DISCONTINUED | OUTPATIENT
Start: 2024-03-27 | End: 2024-03-30

## 2024-03-27 RX ORDER — LEVETIRACETAM 100 MG/ML
500 SOLUTION ORAL 2 TIMES DAILY
Status: DISCONTINUED | OUTPATIENT
Start: 2024-03-27 | End: 2024-03-30

## 2024-03-27 RX ORDER — IBUPROFEN 400 MG/1
400 TABLET ORAL ONCE
Status: COMPLETED | OUTPATIENT
Start: 2024-03-27 | End: 2024-03-27

## 2024-03-27 RX ORDER — NIFEDIPINE 30 MG/1
90 TABLET, EXTENDED RELEASE ORAL DAILY
Status: DISCONTINUED | OUTPATIENT
Start: 2024-03-27 | End: 2024-03-27

## 2024-03-27 RX ORDER — HYDROXYZINE HYDROCHLORIDE 25 MG/1
25 TABLET, FILM COATED ORAL 3 TIMES DAILY PRN
Status: DISCONTINUED | OUTPATIENT
Start: 2024-03-27 | End: 2024-04-01 | Stop reason: HOSPADM

## 2024-03-27 RX ORDER — LOSARTAN POTASSIUM 50 MG/1
100 TABLET ORAL DAILY
Status: DISCONTINUED | OUTPATIENT
Start: 2024-03-27 | End: 2024-03-30

## 2024-03-27 RX ORDER — LIDOCAINE HYDROCHLORIDE 20 MG/ML
10 INJECTION, SOLUTION EPIDURAL; INFILTRATION; INTRACAUDAL; PERINEURAL ONCE
Status: COMPLETED | OUTPATIENT
Start: 2024-03-27 | End: 2024-03-27

## 2024-03-27 RX ADMIN — OXYCODONE HYDROCHLORIDE 10 MG: 10 TABLET ORAL at 12:03

## 2024-03-27 RX ADMIN — HYDROXYZINE HYDROCHLORIDE 25 MG: 25 TABLET, FILM COATED ORAL at 12:03

## 2024-03-27 RX ADMIN — INSULIN HUMAN 8.5 UNITS/HR: 1 INJECTION, SOLUTION INTRAVENOUS at 07:03

## 2024-03-27 RX ADMIN — HYDROXYUREA 3500 MG: 500 CAPSULE ORAL at 08:03

## 2024-03-27 RX ADMIN — PIPERACILLIN SODIUM AND TAZOBACTAM SODIUM 4.5 G: 4; .5 INJECTION, POWDER, FOR SOLUTION INTRAVENOUS at 09:03

## 2024-03-27 RX ADMIN — OLANZAPINE 5 MG: 5 TABLET, FILM COATED ORAL at 12:03

## 2024-03-27 RX ADMIN — MUPIROCIN: 20 OINTMENT TOPICAL at 08:03

## 2024-03-27 RX ADMIN — TRIAMCINOLONE ACETONIDE: 1 CREAM TOPICAL at 09:03

## 2024-03-27 RX ADMIN — ATORVASTATIN CALCIUM 40 MG: 40 TABLET, FILM COATED ORAL at 08:03

## 2024-03-27 RX ADMIN — LEVETIRACETAM 500 MG: 500 SOLUTION ORAL at 08:03

## 2024-03-27 RX ADMIN — METOPROLOL TARTRATE 25 MG: 25 TABLET, FILM COATED ORAL at 08:03

## 2024-03-27 RX ADMIN — LOSARTAN POTASSIUM 100 MG: 50 TABLET, FILM COATED ORAL at 08:03

## 2024-03-27 RX ADMIN — ONDANSETRON 4 MG: 2 INJECTION INTRAMUSCULAR; INTRAVENOUS at 12:03

## 2024-03-27 RX ADMIN — HYDRALAZINE HYDROCHLORIDE 10 MG: 20 INJECTION, SOLUTION INTRAMUSCULAR; INTRAVENOUS at 01:03

## 2024-03-27 RX ADMIN — HYDROMORPHONE HYDROCHLORIDE 1 MG: 1 INJECTION, SOLUTION INTRAMUSCULAR; INTRAVENOUS; SUBCUTANEOUS at 03:03

## 2024-03-27 RX ADMIN — ACETAMINOPHEN 650 MG: 325 TABLET ORAL at 08:03

## 2024-03-27 RX ADMIN — LIDOCAINE HYDROCHLORIDE 200 MG: 20 INJECTION, SOLUTION EPIDURAL; INFILTRATION; INTRACAUDAL at 02:03

## 2024-03-27 RX ADMIN — PIPERACILLIN SODIUM AND TAZOBACTAM SODIUM 4.5 G: 4; .5 INJECTION, POWDER, FOR SOLUTION INTRAVENOUS at 01:03

## 2024-03-27 RX ADMIN — FAMOTIDINE 20 MG: 20 TABLET ORAL at 08:03

## 2024-03-27 RX ADMIN — ACETAMINOPHEN 650 MG: 325 TABLET ORAL at 01:03

## 2024-03-27 RX ADMIN — LABETALOL HYDROCHLORIDE 10 MG: 5 INJECTION, SOLUTION INTRAVENOUS at 06:03

## 2024-03-27 RX ADMIN — ACETAMINOPHEN 650 MG: 325 TABLET ORAL at 09:03

## 2024-03-27 RX ADMIN — ALLOPURINOL 300 MG: 100 TABLET ORAL at 08:03

## 2024-03-27 RX ADMIN — TRIAMCINOLONE ACETONIDE: 1 CREAM TOPICAL at 08:03

## 2024-03-27 RX ADMIN — VANCOMYCIN HYDROCHLORIDE 1250 MG: 1.25 INJECTION, POWDER, LYOPHILIZED, FOR SOLUTION INTRAVENOUS at 08:03

## 2024-03-27 RX ADMIN — HYDRALAZINE HYDROCHLORIDE 10 MG: 20 INJECTION, SOLUTION INTRAMUSCULAR; INTRAVENOUS at 08:03

## 2024-03-27 RX ADMIN — SCOPALAMINE 1 PATCH: 1 PATCH, EXTENDED RELEASE TRANSDERMAL at 08:03

## 2024-03-27 RX ADMIN — HYDROMORPHONE HYDROCHLORIDE 1 MG: 1 INJECTION, SOLUTION INTRAMUSCULAR; INTRAVENOUS; SUBCUTANEOUS at 08:03

## 2024-03-27 RX ADMIN — FLUCONAZOLE 400 MG: 100 TABLET ORAL at 08:03

## 2024-03-27 RX ADMIN — POTASSIUM CHLORIDE 10 MEQ: 7.46 INJECTION, SOLUTION INTRAVENOUS at 06:03

## 2024-03-27 RX ADMIN — OXYCODONE HYDROCHLORIDE 10 MG: 10 TABLET ORAL at 06:03

## 2024-03-27 RX ADMIN — INSULIN HUMAN 14.5 UNITS/HR: 1 INJECTION, SOLUTION INTRAVENOUS at 10:03

## 2024-03-27 RX ADMIN — AMLODIPINE BESYLATE 10 MG: 10 TABLET ORAL at 08:03

## 2024-03-27 RX ADMIN — PIPERACILLIN SODIUM AND TAZOBACTAM SODIUM 4.5 G: 4; .5 INJECTION, POWDER, FOR SOLUTION INTRAVENOUS at 03:03

## 2024-03-27 RX ADMIN — ACYCLOVIR 400 MG: 200 CAPSULE ORAL at 08:03

## 2024-03-27 RX ADMIN — LEVETIRACETAM 500 MG: 500 TABLET, FILM COATED ORAL at 08:03

## 2024-03-27 RX ADMIN — POTASSIUM & SODIUM PHOSPHATES POWDER PACK 280-160-250 MG 2 PACKET: 280-160-250 PACK at 06:03

## 2024-03-27 RX ADMIN — HYDROXYUREA 3500 MG: 500 CAPSULE ORAL at 09:03

## 2024-03-27 RX ADMIN — ACETAMINOPHEN 650 MG: 325 TABLET ORAL at 05:03

## 2024-03-27 RX ADMIN — IBUPROFEN 400 MG: 400 TABLET ORAL at 06:03

## 2024-03-27 RX ADMIN — HYDROMORPHONE HYDROCHLORIDE 1 MG: 1 INJECTION, SOLUTION INTRAMUSCULAR; INTRAVENOUS; SUBCUTANEOUS at 02:03

## 2024-03-27 RX ADMIN — REMDESIVIR 100 MG: 100 INJECTION, POWDER, LYOPHILIZED, FOR SOLUTION INTRAVENOUS at 08:03

## 2024-03-27 RX ADMIN — POTASSIUM BICARBONATE 35 MEQ: 391 TABLET, EFFERVESCENT ORAL at 08:03

## 2024-03-27 RX ADMIN — MONTELUKAST 10 MG: 10 TABLET, FILM COATED ORAL at 08:03

## 2024-03-27 NOTE — PROGRESS NOTES
Pharmacokinetic Assessment Follow Up: IV Vancomycin    Vancomycin serum concentration assessment/plan:  Trough resulted as 20.9 mcg/mL; Goal 10-20 mcg/mL, Sepsis  Although trough was drawn early, true vancomycin trough would likely be within target range.   Renal function stable  Continue Vancomycin 1250 mg IV q12h  Next level to be drawn on 3/28 at 0800.    Drug levels (last 3 results):  Recent Labs   Lab Result Units 03/25/24  1405 03/27/24  0356   Vancomycin-Trough ug/mL 16.1 20.9       Pharmacy will continue to follow and monitor vancomycin.    Please contact pharmacy at extension [phone] for questions regarding this assessment.    Thank you for the consult,   Charlotte Pruitt       Patient brief summary:  Fela Ellison is a 58 y.o. female initiated on antimicrobial therapy with IV Vancomycin for treatment of sepsis    The patient's current regimen is 1250 mg Q12H    Drug Allergies:   Review of patient's allergies indicates:  No Known Allergies    Actual Body Weight:   93 kg    Renal Function:   Estimated Creatinine Clearance: 97.2 mL/min (based on SCr of 0.7 mg/dL).,     Dialysis Method (if applicable):  N/A    CBC (last 72 hours):  Recent Labs   Lab Result Units 03/24/24 2048 03/25/24  0701 03/26/24  0500 03/26/24  1713 03/27/24  0356   WBC K/uL 57.16* 50.03* 76.07* 88.47* 72.00*   Hemoglobin g/dL 10.8* 10.4* 10.5* 10.9* 11.1*   Hematocrit % 37.0 35.3* 36.4* 37.0 37.5   Platelets K/uL 232 240 320 294 251   Gran % % 36.5* 50.0 35.0* 26.0* 39.0   Lymph % % 14.0* 9.0* 25.0 28.0 8.0*   Mono % % 8.5 8.5 11.0 5.0 9.0   Eosinophil % % 0.0 0.0 0.0 0.0 0.0   Basophil % % 0.0 0.0 0.0 0.0 0.0   Differential Method  Manual Manual Manual Manual Manual       Metabolic Panel (last 72 hours):  Recent Labs   Lab Result Units 03/24/24 2048 03/25/24  0701 03/26/24  0500 03/26/24  1345 03/26/24  1610 03/27/24  0356   Sodium mmol/L 138 139 137 135* 133* 138   Potassium mmol/L 3.5 3.7 3.6 3.8 3.9 3.4*   Chloride mmol/L  107 107 107 107 105 108   CO2 mmol/L 21* 21* 15* 20* 20* 20*   Glucose mg/dL 318* 427* 440* 456* 414* 202*   BUN mg/dL 13 14 16 17 19 20   Creatinine mg/dL 0.8 0.8 0.8 0.9 0.9 0.7   Albumin g/dL 2.9* 2.7* 2.5*  --  2.6* 2.7*   Total Bilirubin mg/dL 0.6 0.5 0.3  --  0.3 0.4   Alkaline Phosphatase U/L 71 66 77  --  100 151*   AST U/L 18 13 20  --  22 25   ALT U/L 10 7* 8*  --  8* 9*   Magnesium mg/dL 1.8 2.0 1.8  --   --  1.9   Phosphorus mg/dL  --  3.7 2.5*  --   --  3.6       Vancomycin Administrations:  vancomycin given in the last 96 hours                     vancomycin 1,250 mg in dextrose 5 % (D5W) 250 mL IVPB (Vial-Mate) (mg) 1,250 mg New Bag 03/26/24 2015     1,250 mg New Bag  0508     1,250 mg New Bag 03/25/24 1558     1,250 mg New Bag  0346     1,250 mg New Bag 03/24/24 1530    vancomycin 2 g in dextrose 5 % 500 mL IVPB ()  Restarted 03/24/24 0424     2,000 mg New Bag  0334                    Microbiologic Results:  Microbiology Results (last 7 days)       Procedure Component Value Units Date/Time    Blood culture [[id number]] Collected: 03/23/24 2222    Order Status: Completed Specimen: Blood from Peripheral, Forearm, Right Updated: 03/27/24 0612     Blood Culture, Routine No Growth to date      No Growth to date      No Growth to date      No Growth to date    Narrative:      Site #1- Aerobic and Anaerobic    Blood culture [[id number]] Collected: 03/23/24 2223    Order Status: Completed Specimen: Blood from Peripheral, Hand, Left Updated: 03/27/24 0612     Blood Culture, Routine No Growth to date      No Growth to date      No Growth to date      No Growth to date    Narrative:      Site #2 - Aerobic and Anaerobic    Blood culture [[id number]] Collected: 03/26/24 1700    Order Status: Completed Specimen: Blood from Peripheral, Antecubital, Right Updated: 03/27/24 0115     Blood Culture, Routine No Growth to date    Narrative:      Blood cultures x 2 different sites. 4 bottles total. Please  draw cultures  before administering antibiotics.    Blood culture [[id number]] Collected: 03/26/24 1721    Order Status: Completed Specimen: Blood from Peripheral, Wrist, Left Updated: 03/27/24 0115     Blood Culture, Routine No Growth to date    Narrative:      Blood cultures from 2 different sites. 4 bottles total.  Please draw before starting antibiotics.    Culture, Respiratory with Gram Stain [[id number]]     Order Status: Canceled Specimen: Respiratory

## 2024-03-27 NOTE — PROGRESS NOTES
[facility] - Cardiac Medical ICU  Hematology  Bone Marrow Transplant  Progress Note    Patient Name: Fela Ellison  Admission Date: 3/23/2024  Hospital Length of Stay: 4 days  Code Status: Full Code    Subjective:     Interval History:  improved mental status, bone marrow biopsy 3/27, continued fever    Objective:     Vital Signs (Most Recent):  Temp: 99.1 °F (37.3 °C) (03/27/24 1326)  Pulse: 87 (03/27/24 1330)  Resp: 18 (03/27/24 1330)  BP: 130/61 (03/27/24 1330)  SpO2: 98 % (03/27/24 1330) Vital Signs (24h Range):  Temp:  [99.1 °F (37.3 °C)-103.4 °F (39.7 °C)] 99.1 °F (37.3 °C)  Pulse:  [] 87  Resp:  [14-29] 18  SpO2:  [95 %-100 %] 98 %  BP: (121-192)/() 130/61     Weight: 93 kg (205 lb 0.4 oz)  Body mass index is 36.32 kg/m².  Body surface area is 2.03 meters squared.    ECOG SCORE           [unfilled]    Intake/Output - Last 3 Shifts         03/25 0700  03/26 0659 03/26 0700  03/27 0659 03/27 0700  03/28 0659    P.O.       I.V. (mL/kg)  136.2 (1.5) 64.6 (0.7)    IV Piggyback  2256.9 680.1    Total Intake(mL/kg)  2393.1 (25.7) 744.7 (8)    Urine (mL/kg/hr) 1350 (0.6) 1255 (0.6) 160 (0.2)    Stool 0      Total Output 1350 1255 160    Net -1350 +1138.1 +584.7           Stool Occurrence 1 x              Physical Exam  Constitutional:       General: She is not in acute distress.     Appearance: She is ill-appearing.      Comments: More alert than prior day   HENT:      Head: Normocephalic and atraumatic.      Mouth/Throat:      Mouth: Mucous membranes are moist.      Pharynx: Oropharynx is clear.   Eyes:      General: No scleral icterus.     Extraocular Movements: Extraocular movements intact.   Cardiovascular:      Rate and Rhythm: Normal rate and regular rhythm.   Pulmonary:      Effort: Pulmonary effort is normal. No respiratory distress.      Breath sounds: Rales present.   Abdominal:      General: Abdomen is flat. There is no distension.      Palpations: Abdomen is soft.      Tenderness:  There is abdominal tenderness in the right lower quadrant and left lower quadrant.      Comments: Has skin lesions on her lower abdomen, distension improved from yesterday   Musculoskeletal:      Right lower leg: No edema.      Left lower leg: No edema.   Skin:     General: Skin is warm and dry.   Neurological:      Comments: More alert than previous day, able to verbalize concern            Significant Labs:   All pertinent labs from the last 24 hours have been reviewed.    Diagnostic Results:  I have reviewed all pertinent imaging results/findings within the past 24 hours.  Assessment/Plan:     Hyperglycemia  Pt has known DM, is on steroids due to COVID. Most likely causing her hyperglycemia. Removed but continued difficulty controlling. Ordered labs to make sure patient is not in DKA/HHS patient BHB 0.2, bicarb 15 then back up to 20, but glucose has remained in the 400s.     Plan  Diabetic diet with thin liquids per speech (went to bariatric clear with starting insulin drip)  Glucose checks AC x HS  Placed on insulin drip, improvement in glucose control  Consulted Endocrine with difficult to control hyperglycemia      COVID  Pt tested positive for covid on 3/24. Initially on 4L NC, has decreased to 2L. Pt's mental status has improved. WBC count 50K.     Plan  Remdesivir course  Vanc/zosyn due to concern for abdominal infection causing sepsis and new mental status changes on 3/26  Initially started on dex  switched to prednisone, but discontinued due to severe glucose elevation and no O2 requirements    Subdural hematoma  Pt presented from OSH with new SDH. Pt was initially admitted to neuro critical care. SDH felt to be small/stable and did not need to undergo surgical intervention. Pt placed on keppra for seizure proph. Pt PLT count is in the 200s. 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     Plan  Continue home medications, increased losartan to 50  Goal BP <160 per neurosurgery with SDH  PRN in place if needed, will increase BP medications as needed    CML (chronic myelocytic leukemia)  58F with relapsed CML with blast crisis (transformed on 2022) admitted for fevers, fatigue, encephalopathy and found to have a SDH that NSGY does not recommend intervention on. Encephalopathic when seen and unable to follow commands.     Labs show clear evidence of blast crisis (29% on CBC) without cytopenias. This is in the setting of what may be sepsis vs fevers from leukemia.She normally follows at [facility] fellows clinic and has progressed through multiple treatments.   Discussed aggressive and unstable disease leading to her presentation here.      Plan:   -hydrea 40mg/kg daily for cytoreduction, increased to 3500mg BID due to worsening LDH  -Bone marrow biopsy on 3/27 with concern for worsening disease progression  -continue acyclovir, allopurinol, fluc  -continue broad spec abx and follow cultures  -prognosis guarded, will discuss goals of care moving forward        VTE Risk Mitigation (From admission, onward)           Ordered     IP VTE HIGH RISK PATIENT  Once         03/23/24 2209     Place sequential compression device  Until discontinued         03/23/24 2209     Reason for No Pharmacological VTE Prophylaxis  Once        Question:  Reasons:  Answer:  Active Bleeding  Comment:  SDH    03/23/24 2209                    Disposition: TBD    Maged Momin, DO  Bone Marrow Transplant  [facility] - Cardiac Medical ICU

## 2024-03-27 NOTE — ASSESSMENT & PLAN NOTE
Pt tested positive for covid on 3/24. Initially on 4L NC, has decreased to 2L. Pt's mental status has improved. WBC count 50K.     Plan  Remdesivir course  Vanc/zosyn due to concern for abdominal infection causing sepsis and new mental status changes on 3/26  Initially started on dex  switched to prednisone, but discontinued due to severe glucose elevation and no O2 requirements

## 2024-03-27 NOTE — SUBJECTIVE & OBJECTIVE
"Interval HPI:   No acute events overnight. Patient in room 6077. Blood glucose improving. BG  at and above goal on current insulin regimen (IIP ). Steroid use- Dexamethasone  6 mg daily (d/c'd 3/25).   Renal function- Normal   Vasopressors-  None      Diet Clear liquid (no sugar)/Bariatric      Eating:   CLD-sugar free  Nausea: No  Hypoglycemia and intervention: No  Fever: No  TPN and/or TF: No       BP (!) 192/72   Pulse 108   Temp (!) 101.2 °F (38.4 °C) (Rectal)   Resp 19   Ht 5' 3" (1.6 m)   Wt 93 kg (205 lb 0.4 oz)   SpO2 99%   BMI 36.32 kg/m²     Labs Reviewed and Include    Recent Labs   Lab 03/27/24  0356   *   CALCIUM 10.2   ALBUMIN 2.7*   PROT 6.2      K 3.4*   CO2 20*      BUN 20   CREATININE 0.7   ALKPHOS 151*   ALT 9*   AST 25   BILITOT 0.4     Lab Results   Component Value Date    WBC 72.00 (HH) 03/27/2024    HGB 11.1 (L) 03/27/2024    HCT 37.5 03/27/2024    MCV 82 03/27/2024     03/27/2024     Recent Labs   Lab 03/24/24  0127   TSH 1.428     Lab Results   Component Value Date    HGBA1C 6.2 (H) 03/24/2024       Nutritional status:   Body mass index is 36.32 kg/m².  Lab Results   Component Value Date    ALBUMIN 2.7 (L) 03/27/2024    ALBUMIN 2.6 (L) 03/26/2024    ALBUMIN 2.5 (L) 03/26/2024     No results found for: "PREALBUMIN"    Estimated Creatinine Clearance: 97.2 mL/min (based on SCr of 0.7 mg/dL).    Accu-Checks  Recent Labs     03/26/24  2324 03/27/24  0011 03/27/24  0103 03/27/24  0218 03/27/24  0304 03/27/24  0357 03/27/24  0517 03/27/24  0606 03/27/24  0658 03/27/24  0828   POCTGLUCOSE 252* 234* 182* 216* 201* 201* 144* 166* 164* 182*       Current Medications and/or Treatments Impacting Glycemic Control  Immunotherapy:    Immunosuppressants       None          Steroids:   Hormones (From admission, onward)      None          Pressors:    Autonomic Drugs (From admission, onward)      None          Hyperglycemia/Diabetes Medications:   Antihyperglycemics (From " admission, onward)      Start     Stop Route Frequency Ordered    03/26/24 1615  insulin regular in 0.9 % NaCl 100 unit/100 mL (1 unit/mL) infusion        Question:  Enter initial dose from Infusion Protocol Chart (Units/hr):  Answer:  4    -- IV Continuous 03/26/24 3656

## 2024-03-27 NOTE — PROGRESS NOTES
"[facility]  Endocrinology  Progress Note    Admit Date: 3/23/2024     Reason for Consult: Management of T2DM, Hyperglycemia     Diabetes diagnosis year: > 2 years ago    Home Diabetes Medications:    Per chart review:   Novolog 22 units with meals -- Takes 22 units TID AC while on chemo - (Gives between 12 to 15 units TID AC when not on chemo)   NPH 20 units AM and 50 units PM   Metformin 1000 mg BID     How often checking glucose at home?  AMIE    BG readings on regimen: AMIE  Hypoglycemia on the regimen?  AMIE  Missed doses on regimen?  AMIE    Diabetes Complications include:     Hyperglycemia    Complicating diabetes co morbidities:   Active Cancer and Glucocorticoid use       HPI:   Patient is a 58 y.o. female with PMHx of insulin-dependent T2DM, diabetic neuropathy, essential HTN, NAFLD, obesity, former tobacco use disorder (quit 4-5 months ago), and CML (actively on chemotherapy) with blast crisis diagnosed prior to transfer at OSH, who presents as a transfer from [facility] in [place] for acute SDH. Found to be COVID (+) and Dexamethasone therapy started. BG excursions noted. Endocrine consulted for BG management.         Interval HPI:   No acute events overnight. Patient in room 6077. Blood glucose improving. BG  at and above goal on current insulin regimen (IIP ). Steroid use- Dexamethasone  6 mg daily (d/c'd 3/25).   Renal function- Normal   Vasopressors-  None      Diet Clear liquid (no sugar)/Bariatric      Eating:   CLD-sugar free  Nausea: No  Hypoglycemia and intervention: No  Fever: No  TPN and/or TF: No       BP (!) 192/72   Pulse 108   Temp (!) 101.2 °F (38.4 °C) (Rectal)   Resp 19   Ht 5' 3" (1.6 m)   Wt 93 kg (205 lb 0.4 oz)   SpO2 99%   BMI 36.32 kg/m²     Labs Reviewed and Include    Recent Labs   Lab 03/27/24  0356   *   CALCIUM 10.2   ALBUMIN 2.7*   PROT 6.2      K 3.4*   CO2 20*      BUN 20   CREATININE 0.7   ALKPHOS 151*   ALT 9*   AST 25   BILITOT " "0.4     Lab Results   Component Value Date    WBC 72.00 (HH) 03/27/2024    HGB 11.1 (L) 03/27/2024    HCT 37.5 03/27/2024    MCV 82 03/27/2024     03/27/2024     Recent Labs   Lab 03/24/24  0127   TSH 1.428     Lab Results   Component Value Date    HGBA1C 6.2 (H) 03/24/2024       Nutritional status:   Body mass index is 36.32 kg/m².  Lab Results   Component Value Date    ALBUMIN 2.7 (L) 03/27/2024    ALBUMIN 2.6 (L) 03/26/2024    ALBUMIN 2.5 (L) 03/26/2024     No results found for: "PREALBUMIN"    Estimated Creatinine Clearance: 97.2 mL/min (based on SCr of 0.7 mg/dL).    Accu-Checks  Recent Labs     03/26/24  2324 03/27/24  0011 03/27/24  0103 03/27/24  0218 03/27/24  0304 03/27/24  0357 03/27/24  0517 03/27/24  0606 03/27/24  0658 03/27/24  0828   POCTGLUCOSE 252* 234* 182* 216* 201* 201* 144* 166* 164* 182*       Current Medications and/or Treatments Impacting Glycemic Control  Immunotherapy:    Immunosuppressants       None          Steroids:   Hormones (From admission, onward)      None          Pressors:    Autonomic Drugs (From admission, onward)      None          Hyperglycemia/Diabetes Medications:   Antihyperglycemics (From admission, onward)      Start     Stop Route Frequency Ordered    03/26/24 1615  insulin regular in 0.9 % NaCl 100 unit/100 mL (1 unit/mL) infusion        Question:  Enter initial dose from Infusion Protocol Chart (Units/hr):  Answer:  4    -- IV Continuous 03/26/24 1515            ASSESSMENT and PLAN    Neuro  Subdural hematoma  Managed per primary team       ID  COVID  Expect effects of Dexamethasone for COVID on BG to last up to 72 hours      Endocrine  Type 2 diabetes mellitus with diabetic neuropathy, with long-term current use of insulin  BG goal: 140-180   T2DM. BG above goal here. Receiving steroids (d/c 3/25) and was on lower doses than reported home doses.  Bg improving on IIP but on high drip rates.  Will continue on IIP and consider transition to Transition drip when " rates more stable.    - Continue insulin infusion protocol   - POCT Glucose every hour   - Hypoglycemia protocol in place      ** Please notify Endocrine for any change and/or advance in diet**  ** Please call Endocrine for any BG related issues **     Discharge Planning:   TBD. Please notify endocrine prior to discharge.             Jalil Dior PA-C  Endocrinology  [facility]

## 2024-03-27 NOTE — ASSESSMENT & PLAN NOTE
Pt on amlodipine 10mg, metoprolol 25mg BID, and losartan 25mg at home.    Plan  Continued home medications, losartan 100  Per NSGY goal bp <160 due to SDH, prns in place but will adjust bp medications as needed

## 2024-03-27 NOTE — PROGRESS NOTES
[facility]  Critical Care Medicine  Progress Note    Patient Name: Fela Ellison  MRN: [id number]  Admission Date: 3/23/2024  Hospital Length of Stay: 4 days  Code Status: Full Code  Attending Provider: Ottoniel Paniagua MD  Primary Care Provider: Bar Trevino DO   Principal Problem: Acute encephalopathy    Subjective:     HPI:  Patient information was obtained from past medical records and ER records. HPI limited by patient's altered mentation.    Ms. Fela Ellison is a 57 yo female with a PMHx of IDDM, CML, HTN, NAFLD, and prior tobacco use who initially presented as a transfer from [facility] for acute SDH and blast crisis.     She was intiallly admitted to [facility] and then stepped down to Medical oncology floor after evaluation by Neurosurgery noted SDH to be stable with no surgical intervention recommended at the time. She was being treated for concerns for blast crisis by BMT. Critical care was initially consulted on 03/24/24 for sepsis concerns in the setting of patient being found to be COVID positive, febrile (Tmax of 103.1), -138, BP stable 120-140/60-70s, on 4L NC. Evaluation at the time did not note any indication for ICU admission as patient was stable.     During hospitalization, treated by BMT for CML with blast crisis concerns; broad spectrum antibiotics were administered for sepsis concerns; Remdesivir/Dexamethasone started for COVID-19, but Dexamethasone had to be discontinued due to hyperglycemia and Endocrinology was consulted for insulin management. Mentation was improving and patient passed swallow evaluation, so NG tube was removed and diet was resumed.     However, on 03/26/24 patient developed increasing lethargy, confusion, febrile temperatures, distended abdomen, lactic acidosis. Stat CT head was non-acute and noted stable prior known SDH. Rapid response evaluated patient for worsening mentation and patient was  admitted to MICU.          Hospital/ICU Course:  Held scheduled gabapentin and other sedating drugs with encephalopathy.  Treating with broad spectrum Abx, Remdesivir for COVID, acyclovir, and fluconazole.  Slow improvement in mentation.  Oncology following with blast crisis.    Interval History/Significant Events: Continuing to have high fevers overnight, placed on cooling blanket and given Tylenol and 1x ibuprofen.  Mild improvement in mental status this am, able to answer questions.  Stated  at bedside is her son.  Unable to correctly identify year or location.  Slowly improving throughout day    Review of Systems   Unable to perform ROS: Mental status change     Objective:     Vital Signs (Most Recent):  Temp: (!) 101.2 °F (38.4 °C) (03/27/24 0618)  Pulse: 108 (03/27/24 0652)  Resp: 19 (03/27/24 0652)  BP: (!) 192/72 (03/27/24 0618)  SpO2: 99 % (03/27/24 0652) Vital Signs (24h Range):  Temp:  [97.4 °F (36.3 °C)-103.4 °F (39.7 °C)] 101.2 °F (38.4 °C)  Pulse:  [] 108  Resp:  [15-32] 19  SpO2:  [92 %-100 %] 99 %  BP: (134-192)/() 192/72   Weight: 93 kg (205 lb 0.4 oz)  Body mass index is 36.32 kg/m².      Intake/Output Summary (Last 24 hours) at 3/27/2024 0834  Last data filed at 3/27/2024 0700  Gross per 24 hour   Intake 2494.36 ml   Output 1255 ml   Net 1239.36 ml          Physical Exam  Vitals and nursing note reviewed.   Constitutional:       General: She is not in acute distress.     Appearance: She is obese. She is ill-appearing.      Comments: Arousable, but lethargic/confused    HENT:      Head: Normocephalic and atraumatic.      Nose:      Comments: NGT in place     Mouth/Throat:      Mouth: Mucous membranes are moist.      Pharynx: Oropharynx is clear.   Eyes:      General: No scleral icterus.     Extraocular Movements: Extraocular movements intact.   Cardiovascular:      Rate and Rhythm: Normal rate and regular rhythm.   Pulmonary:      Effort: Pulmonary effort is normal. No  respiratory distress.      Breath sounds: Rales present.   Abdominal:      General: Abdomen is flat. There is distension.      Palpations: Abdomen is soft.      Comments: Has skin lesions on her lower abdomen  Normoactive bowel sounds  Noted hepatosplenomegaly   Musculoskeletal:      Cervical back: Normal range of motion and neck supple.      Right lower leg: No edema.      Left lower leg: No edema.   Skin:     General: Skin is warm.   Neurological:      Mental Status: She is lethargic and confused.            Vents:     Lines/Drains/Airways       Drain  Duration                  Urethral Catheter 03/26/24 1 day         NG/OG Tube 03/27/24 0700 Left nostril <1 day              Peripheral Intravenous Line  Duration                  Peripheral IV - Single Lumen 20 G Left Antecubital -- days         Peripheral IV - Single Lumen 20 G Left;Posterior Wrist -- days         Peripheral IV - Single Lumen 03/23/24 20 G Anterior;Right Hand 4 days                  Significant Labs:    CBC/Anemia Profile:  Recent Labs   Lab 03/26/24  0500 03/26/24  1347 03/26/24  1713 03/27/24  0356   WBC 76.07*  --  88.47* 72.00*   HGB 10.5*  --  10.9* 11.1*   HCT 36.4* 36 37.0 37.5     --  294 251   MCV 83  --  82 82   RDW 20.8*  --  21.2* 20.7*        Chemistries:  Recent Labs   Lab 03/26/24  0500 03/26/24  1345 03/26/24  1610 03/27/24  0356    135* 133* 138   K 3.6 3.8 3.9 3.4*    107 105 108   CO2 15* 20* 20* 20*   BUN 16 17 19 20   CREATININE 0.8 0.9 0.9 0.7   CALCIUM 9.1 9.6 9.6 10.2   ALBUMIN 2.5*  --  2.6* 2.7*   PROT 5.8*  --  5.7* 6.2   BILITOT 0.3  --  0.3 0.4   ALKPHOS 77  --  100 151*   ALT 8*  --  8* 9*   AST 20  --  22 25   MG 1.8  --   --  1.9   PHOS 2.5*  --   --  3.6       All pertinent labs within the past 24 hours have been reviewed.    Significant Imaging:  I have reviewed all pertinent imaging results/findings within the past 24 hours.    ABG  Recent Labs   Lab 03/26/24  1347   PH 7.378   PO2 46   PCO2  34.3*   HCO3 20.2*   BE -5*     Assessment/Plan:     Neuro  * Acute encephalopathy  Patient initially presenting to [facility] with CML in blast crisis with fevers, fatigue and encephalopathy.  Admitted to BMT services and started on high dose hydrea as well as Abx/antivirals/antifungals.  Found to be COVID positive and treating with Remdesivir.  Worsening AMS with concern for leukostasis in setting of blast crisis vs drug induced with patient receiving high dose pain regimen including new gabapentin vs sepsis.  Noted to have abdominal enlargement and tense abdomen in recently CT seen organomegaly.    - NG tube placed  - Continue Remdesivir, broad spectrum Abx, antifungals  - Continue hydrea, oncology following closely  - Hold gabapentin and consider reduction of pain regimen if tolerated, slowly improving mental status  - airway watch, protecting airway currently, discussions with family and agreeable for intubation if needed  - f/u repeat cultures    Subdural hematoma  Presented from OSH with new SDH and initially admitted to [facility].  SDH stable and no need for surgical intervention per NSGY/NCC.  Worsening mentation and admitted to MICU on 3/26.  STAT CTH with unchanged size of SDH.  Stable    - Continue Keppra  - neuro checks q4h    Cardiac/Vascular  Hypercholesterolemia  Continue atorvastatin    Essential hypertension  Pt on amlodipine 10mg, metoprolol 25mg BID, and losartan 25mg at home.    Plan  Continued home medications, losartan 100  Per NSGY goal bp <160 due to SDH, prns in place but will adjust bp medications as needed    ID  COVID  COVID positive on 3/24 initially on 4L NC now downtrending associated with AMS and elevated WBC with fevers in setting of blast crisis in CML.     - Remdesivir  - Vanc/Zosyn with sepsis concern and AMS, slowly improving, consider de-escalation if continuing to improve  - F/u repeat cultures  - Weaned off O2  - Steroids discontinued previously with difficult to control BG and now weaned  off supplemental O2       Oncology  Blast crisis phase of chronic myeloid leukemia  Patient with CML c/b acute blast crisis. Transferred from BMT service for AMS.    - hydrea 3500 BID per BMT  - oncology considering BMB with concern for worsening disease progression  - continue acyclovir, allopurinol, fluc  - broad spec abx  - GOC discussions    Cancer associated pain  Previously on multimodal pain regimen prior to MICU admission.  Concern for AMS and drug intoxication.  Hold gabapentin and monitor pain    CML (chronic myelocytic leukemia)  See Blast crisis    Endocrine  Diabetes mellitus  Pt has known DM, is on steroids due to COVID. Most likely causing her hyperglycemia. Removed but continued difficulty controlling. Ordered labs to make sure patient is not in DKA/HHS patient BHB 0.2, bicarb 15 then back up to 20, but glucose has remained in the 400s.      Plan  Diabetic diet with thin liquids per speech  Glucose checks AC x HS  Endocrine consulted by BMT services prior to MICU admission with difficult to control hyperglycemia, endocrine recommending insulin drip and managing    Orthopedic  Hyperuricemia  Continue allopurinol       Critical Care Daily Checklist:    A: Awake: RASS Goal/Actual Goal: RASS Goal: 0-->alert and calm  Actual:     B: Spontaneous Breathing Trial Performed?     C: SAT & SBT Coordinated?  N/A                      D: Delirium: CAM-ICU Overall CAM-ICU: Negative   E: Early Mobility Performed? Yes   F: Feeding Goal: Goals: Meet % EEN, EPN by RD f/u date  Status: Nutrition Goal Status: new   Current Diet Order   Procedures    Diet Clear liquid (no sugar)/Bariatric      AS: Analgesia/Sedation None   T: Thromboembolic Prophylaxis    H: HOB > 300 Yes   U: Stress Ulcer Prophylaxis (if needed)    G: Glucose Control Insulin gtt   B: Bowel Function Stool Occurrence: 1   I: Indwelling Catheter (Lines & Michaels) Necessity pIV, michaels, NG   D: De-escalation of Antimicrobials/Pharmacotherapies Vanc/Zosyn,  acyclovir, fluconazole, remdesivir    Plan for the day/ETD Continue to monitor off sedative medications with slow improvement.  Consider de-escalating broad spectrum Abx.      Code Status:  Family/Goals of Care: Full Code  Family at bedside       Critical secondary to Patient has a condition that poses threat to life and bodily function: Acute encephalopathy       Critical care was time spent personally by me on the following activities: development of treatment plan with patient or surrogate and bedside caregivers, discussions with consultants, evaluation of patient's response to treatment, examination of patient, ordering and performing treatments and interventions, ordering and review of laboratory studies, ordering and review of radiographic studies, pulse oximetry, re-evaluation of patient's condition. This critical care time did not overlap with that of any other provider or involve time for any procedures.     Bobby Cruz MD  Critical Care Medicine  [facility] - Cardiac Medical ICU

## 2024-03-27 NOTE — ASSESSMENT & PLAN NOTE
BG goal: 140-180   T2DM. BG above goal here. Receiving steroids (d/c 3/25) and was on lower doses than reported home doses.  Bg improving on IIP but on high drip rates.  Will continue on IIP and consider transition to Transition drip when rates more stable.    - Continue insulin infusion protocol   - POCT Glucose every hour   - Hypoglycemia protocol in place      ** Please notify Endocrine for any change and/or advance in diet**  ** Please call Endocrine for any BG related issues **     Discharge Planning:   TBD. Please notify endocrine prior to discharge.

## 2024-03-27 NOTE — PT/OT/SLP DISCHARGE
Occupational Therapy Discharge Summary    Fela Ellison  MRN: [id number]   Principal Problem: Acute encephalopathy      Patient Discharged from acute Occupational Therapy on 03-27-24.  Please refer to prior OT note dated 03-25-24 for functional status.    Assessment:      Patient transferred to lower level of care secondary to decline in medical status    Objective:     GOALS:   Multidisciplinary Problems       Occupational Therapy Goals          Problem: Occupational Therapy    Goal Priority Disciplines Outcome Interventions   Occupational Therapy Goal     OT, PT/OT Ongoing, Progressing    Description: Goals to be met by: 4/24/24     Patient will increase functional independence with ADLs by performing:    UE Dressing with Moderate Assistance.  LE Dressing with Moderate Assistance.  Grooming while EOB with Moderate Assistance.  Toileting from bedside commode with Moderate Assistance for hygiene and clothing management.   Rolling to Bilateral with [place].   Supine to sit with Minimal Assistance.  Stand pivot transfers with Moderate Assistance.  Toilet transfer to bedside commode with Moderate Assistance.                         Reasons for Discontinuation of Therapy Services  Transfer to alternate level of care.      Plan:     Patient Discharged to:  transfer to ICU    3/27/2024

## 2024-03-27 NOTE — ASSESSMENT & PLAN NOTE
COVID positive on 3/24 initially on 4L NC now downtrending associated with AMS and elevated WBC with fevers in setting of blast crisis in CML.     - Remdesivir  - Vanc/Zosyn with sepsis concern and AMS, slowly improving, consider de-escalation if continuing to improve  - F/u repeat cultures  - Weaned off O2  - Steroids discontinued previously with difficult to control BG and now weaned off supplemental O2

## 2024-03-27 NOTE — ASSESSMENT & PLAN NOTE
Pt presented from OSH with new SDH. Pt was initially admitted to neuro critical care. SDH felt to be small/stable and did not need to undergo surgical intervention. Pt placed on keppra for seizure proph. Pt PLT count is in the 200s. Experienced worsening confusion, lethargy on 3/26 Stat CT head ordered    Plan  Continue Keppra x7 days per NICU note  Neuro checks q4h  Worsening mental status or one sided weakness, CTH was stable from prior imaging

## 2024-03-27 NOTE — ASSESSMENT & PLAN NOTE
Pt has known DM, is on steroids due to COVID. Most likely causing her hyperglycemia. Removed but continued difficulty controlling. Ordered labs to make sure patient is not in DKA/HHS patient BHB 0.2, bicarb 15 then back up to 20, but glucose has remained in the 400s.     Plan  Diabetic diet with thin liquids per speech (went to bariatric clear with starting insulin drip)  Glucose checks AC x HS  Placed on insulin drip, improvement in glucose control  Consulted Endocrine with difficult to control hyperglycemia

## 2024-03-27 NOTE — PROCEDURES
"Fela Ellison is a 58 y.o. female patient.    Temp: 99.1 °F (37.3 °C) (03/27/24 1326)  Pulse: 87 (03/27/24 1330)  Resp: 18 (03/27/24 1330)  BP: 130/61 (03/27/24 1330)  SpO2: 98 % (03/27/24 1330)  Weight: 93 kg (205 lb 0.4 oz) (03/26/24 0859)  Height: 5' 3" (160 cm) (03/26/24 0859)       Bone marrow    Date/Time: 3/27/2024 3:46 PM    Performed by: Mally Recio NP  Authorized by: Mally Recio NP    Aspiration?: Yes   Biopsy?: Yes      PROCEDURE NOTE:  Bone Marrow aspiration and biopsy  Indication: restaging CML, suspected blast crisis  Consent: Informed consent was obtained from patient.  Timeout: Done and documented.  Position: left lateral  Site: right posterior illiac crest.  Prep: Betadine.  Needle used: 11 gauge Jamshidi needle.  Anesthetic: 2% lidocaine 5 cc.  Biopsy: The biopsy needle was introduced into the marrow cavity and 22 cc's of thick clotting aspirate was obtained without complications and sent for flow, cytogenetics, FLT 3, NGS, AML FISH, DNA hold, BCR/ABL mutation, BCR/ABL p201 and BCR/ABL p190. Core biopsy obtained without difficulty and sent for routine histologic examination.  Complications: None.  EBL: minimal   Disposition: The patient was instructed to lay supine for 20 minutes.       Mally Recio NP  Hematology/BMT   3/27/2024    "

## 2024-03-27 NOTE — HOSPITAL COURSE
Held scheduled gabapentin and other sedating drugs with encephalopathy.  Treating with broad spectrum Abx, Remdesivir for COVID, acyclovir, and fluconazole - did not require dexamethasone.  Slow improvement in mentation.  Oncology following with blast crisis. Improving WBC on high dose hydrea.  Patient with intermittent high fevers associated with mental status change that resolve with fever reduction. Stable for stepdown to BMT service.

## 2024-03-27 NOTE — SUBJECTIVE & OBJECTIVE
Subjective:     Interval History:  improved mental status, bone marrow biopsy 3/27, continued fever    Objective:     Vital Signs (Most Recent):  Temp: 99.1 °F (37.3 °C) (03/27/24 1326)  Pulse: 87 (03/27/24 1330)  Resp: 18 (03/27/24 1330)  BP: 130/61 (03/27/24 1330)  SpO2: 98 % (03/27/24 1330) Vital Signs (24h Range):  Temp:  [99.1 °F (37.3 °C)-103.4 °F (39.7 °C)] 99.1 °F (37.3 °C)  Pulse:  [] 87  Resp:  [14-29] 18  SpO2:  [95 %-100 %] 98 %  BP: (121-192)/() 130/61     Weight: 93 kg (205 lb 0.4 oz)  Body mass index is 36.32 kg/m².  Body surface area is 2.03 meters squared.    ECOG SCORE           [unfilled]    Intake/Output - Last 3 Shifts         03/25 0700  03/26 0659 03/26 0700  03/27 0659 03/27 0700  03/28 0659    P.O.       I.V. (mL/kg)  136.2 (1.5) 64.6 (0.7)    IV Piggyback  2256.9 680.1    Total Intake(mL/kg)  2393.1 (25.7) 744.7 (8)    Urine (mL/kg/hr) 1350 (0.6) 1255 (0.6) 160 (0.2)    Stool 0      Total Output 1350 1255 160    Net -1350 +1138.1 +584.7           Stool Occurrence 1 x               Physical Exam  Constitutional:       General: She is not in acute distress.     Appearance: She is ill-appearing.      Comments: More alert than prior day   HENT:      Head: Normocephalic and atraumatic.      Mouth/Throat:      Mouth: Mucous membranes are moist.      Pharynx: Oropharynx is clear.   Eyes:      General: No scleral icterus.     Extraocular Movements: Extraocular movements intact.   Cardiovascular:      Rate and Rhythm: Normal rate and regular rhythm.   Pulmonary:      Effort: Pulmonary effort is normal. No respiratory distress.      Breath sounds: Rales present.   Abdominal:      General: Abdomen is flat. There is no distension.      Palpations: Abdomen is soft.      Tenderness: There is abdominal tenderness in the right lower quadrant and left lower quadrant.      Comments: Has skin lesions on her lower abdomen, distension improved from yesterday   Musculoskeletal:      Right lower leg:  No edema.      Left lower leg: No edema.   Skin:     General: Skin is warm and dry.   Neurological:      Comments: More alert than previous day, able to verbalize concern            Significant Labs:   All pertinent labs from the last 24 hours have been reviewed.    Diagnostic Results:  I have reviewed all pertinent imaging results/findings within the past 24 hours.

## 2024-03-27 NOTE — PLAN OF CARE
Problem: Adult Inpatient Plan of Care  Goal: Plan of Care Review  Outcome: Ongoing, Progressing  Goal: Patient-Specific Goal (Individualized)  Outcome: Ongoing, Progressing  Goal: Absence of Hospital-Acquired Illness or Injury  Outcome: Ongoing, Progressing  Goal: Optimal Comfort and Wellbeing  Outcome: Ongoing, Progressing  Goal: Readiness for Transition of Care  Outcome: Ongoing, Progressing     Problem: Diabetes Comorbidity  Goal: Blood Glucose Level Within Targeted Range  Outcome: Ongoing, Progressing     Problem: Adjustment to Illness (Stroke, Hemorrhagic)  Goal: Optimal Coping  Outcome: Ongoing, Progressing     Problem: Bowel Elimination Impaired (Stroke, Hemorrhagic)  Goal: Effective Bowel Elimination  Outcome: Ongoing, Progressing     Problem: Cerebral Tissue Perfusion (Stroke, Hemorrhagic)  Goal: Optimal Cerebral Tissue Perfusion  Outcome: Ongoing, Progressing     Problem: Cognitive Impairment (Stroke, Hemorrhagic)  Goal: Optimal Cognitive Function  Outcome: Ongoing, Progressing     Problem: Communication Impairment (Stroke, Hemorrhagic)  Goal: Effective Communication Skills  Outcome: Ongoing, Progressing     Problem: Functional Ability Impaired (Stroke, Hemorrhagic)  Goal: Optimal Functional Ability  Outcome: Ongoing, Progressing     Problem: Pain (Stroke, Hemorrhagic)  Goal: Acceptable Pain Control  Outcome: Ongoing, Progressing     Problem: Respiratory Compromise (Stroke, Hemorrhagic)  Goal: Effective Oxygenation and Ventilation  Outcome: Ongoing, Progressing     Problem: Sensorimotor Impairment (Stroke, Hemorrhagic)  Goal: Improved Sensorimotor Function  Outcome: Ongoing, Progressing     Problem: Swallowing Impairment (Stroke, Hemorrhagic)  Goal: Optimal Eating and Swallowing Without Aspiration  Outcome: Ongoing, Progressing     Problem: Urinary Elimination Impaired (Stroke, Hemorrhagic)  Goal: Effective Urinary Elimination  Outcome: Ongoing, Progressing     Problem: Skin Injury Risk Increased  Goal:  Skin Health and Integrity  Outcome: Ongoing, Progressing     Problem: Fall Injury Risk  Goal: Absence of Fall and Fall-Related Injury  Outcome: Ongoing, Progressing     Problem: Fluid Imbalance (Pneumonia)  Goal: Fluid Balance  Outcome: Ongoing, Progressing     Problem: Infection (Pneumonia)  Goal: Resolution of Infection Signs and Symptoms  Outcome: Ongoing, Progressing     Problem: Respiratory Compromise (Pneumonia)  Goal: Effective Oxygenation and Ventilation  Outcome: Ongoing, Progressing     Problem: Adjustment to Illness (Sepsis/Septic Shock)  Goal: Optimal Coping  Outcome: Ongoing, Progressing     Problem: Bleeding (Sepsis/Septic Shock)  Goal: Absence of Bleeding  Outcome: Ongoing, Progressing     Problem: Glycemic Control Impaired (Sepsis/Septic Shock)  Goal: Blood Glucose Level Within Desired Range  Outcome: Ongoing, Progressing     Problem: Infection Progression (Sepsis/Septic Shock)  Goal: Absence of Infection Signs and Symptoms  Outcome: Ongoing, Progressing     Problem: Nutrition Impaired (Sepsis/Septic Shock)  Goal: Optimal Nutrition Intake  Outcome: Ongoing, Progressing     Problem: Infection  Goal: Absence of Infection Signs and Symptoms  Outcome: Ongoing, Progressing     Problem: Restraint, Nonbehavioral (Nonviolent)  Goal: Absence of Harm or Injury  Outcome: Ongoing, Progressing

## 2024-03-27 NOTE — SUBJECTIVE & OBJECTIVE
Interval History/Significant Events: Continuing to have high fevers overnight, placed on cooling blanket and given Tylenol and 1x ibuprofen.  Mild improvement in mental status this am, able to answer questions.  Stated  at bedside is her son.  Unable to correctly identify year or location.  Slowly improving throughout day    Review of Systems   Unable to perform ROS: Mental status change     Objective:     Vital Signs (Most Recent):  Temp: (!) 101.2 °F (38.4 °C) (03/27/24 0618)  Pulse: 108 (03/27/24 0652)  Resp: 19 (03/27/24 0652)  BP: (!) 192/72 (03/27/24 0618)  SpO2: 99 % (03/27/24 0652) Vital Signs (24h Range):  Temp:  [97.4 °F (36.3 °C)-103.4 °F (39.7 °C)] 101.2 °F (38.4 °C)  Pulse:  [] 108  Resp:  [15-32] 19  SpO2:  [92 %-100 %] 99 %  BP: (134-192)/() 192/72   Weight: 93 kg (205 lb 0.4 oz)  Body mass index is 36.32 kg/m².      Intake/Output Summary (Last 24 hours) at 3/27/2024 0834  Last data filed at 3/27/2024 0700  Gross per 24 hour   Intake 2494.36 ml   Output 1255 ml   Net 1239.36 ml          Physical Exam  Vitals and nursing note reviewed.   Constitutional:       General: She is not in acute distress.     Appearance: She is obese. She is ill-appearing.      Comments: Arousable, but lethargic/confused    HENT:      Head: Normocephalic and atraumatic.      Nose:      Comments: NGT in place     Mouth/Throat:      Mouth: Mucous membranes are moist.      Pharynx: Oropharynx is clear.   Eyes:      General: No scleral icterus.     Extraocular Movements: Extraocular movements intact.   Cardiovascular:      Rate and Rhythm: Normal rate and regular rhythm.   Pulmonary:      Effort: Pulmonary effort is normal. No respiratory distress.      Breath sounds: Rales present.   Abdominal:      General: Abdomen is flat. There is distension.      Palpations: Abdomen is soft.      Comments: Has skin lesions on her lower abdomen  Normoactive bowel sounds  Noted hepatosplenomegaly   Musculoskeletal:       Cervical back: Normal range of motion and neck supple.      Right lower leg: No edema.      Left lower leg: No edema.   Skin:     General: Skin is warm.   Neurological:      Mental Status: She is lethargic and confused.            Vents:     Lines/Drains/Airways       Drain  Duration                  Urethral Catheter 03/26/24 1 day         NG/OG Tube 03/27/24 0700 Left nostril <1 day              Peripheral Intravenous Line  Duration                  Peripheral IV - Single Lumen 20 G Left Antecubital -- days         Peripheral IV - Single Lumen 20 G Left;Posterior Wrist -- days         Peripheral IV - Single Lumen 03/23/24 20 G Anterior;Right Hand 4 days                  Significant Labs:    CBC/Anemia Profile:  Recent Labs   Lab 03/26/24  0500 03/26/24  1347 03/26/24  1713 03/27/24  0356   WBC 76.07*  --  88.47* 72.00*   HGB 10.5*  --  10.9* 11.1*   HCT 36.4* 36 37.0 37.5     --  294 251   MCV 83  --  82 82   RDW 20.8*  --  21.2* 20.7*        Chemistries:  Recent Labs   Lab 03/26/24  0500 03/26/24  1345 03/26/24  1610 03/27/24  0356    135* 133* 138   K 3.6 3.8 3.9 3.4*    107 105 108   CO2 15* 20* 20* 20*   BUN 16 17 19 20   CREATININE 0.8 0.9 0.9 0.7   CALCIUM 9.1 9.6 9.6 10.2   ALBUMIN 2.5*  --  2.6* 2.7*   PROT 5.8*  --  5.7* 6.2   BILITOT 0.3  --  0.3 0.4   ALKPHOS 77  --  100 151*   ALT 8*  --  8* 9*   AST 20  --  22 25   MG 1.8  --   --  1.9   PHOS 2.5*  --   --  3.6       All pertinent labs within the past 24 hours have been reviewed.    Significant Imaging:  I have reviewed all pertinent imaging results/findings within the past 24 hours.

## 2024-03-27 NOTE — ASSESSMENT & PLAN NOTE
Patient initially presenting to [facility] with CML in blast crisis with fevers, fatigue and encephalopathy.  Admitted to BMT services and started on high dose hydrea as well as Abx/antivirals/antifungals.  Found to be COVID positive and treating with Remdesivir.  Worsening AMS with concern for leukostasis in setting of blast crisis vs drug induced with patient receiving high dose pain regimen including new gabapentin vs sepsis.  Noted to have abdominal enlargement and tense abdomen in recently CT seen organomegaly.    - NG tube placed  - Continue Remdesivir, broad spectrum Abx, antifungals  - Continue hydrea, oncology following closely  - Hold gabapentin and consider reduction of pain regimen if tolerated, slowly improving mental status  - airway watch, protecting airway currently, discussions with family and agreeable for intubation if needed  - f/u repeat cultures

## 2024-03-27 NOTE — ASSESSMENT & PLAN NOTE
Pt has known DM, is on steroids due to COVID. Most likely causing her hyperglycemia. Removed but continued difficulty controlling. Ordered labs to make sure patient is not in DKA/HHS patient BHB 0.2, bicarb 15 then back up to 20, but glucose has remained in the 400s.      Plan  Diabetic diet with thin liquids per speech  Glucose checks AC x HS  Endocrine consulted by BMT services prior to MICU admission with difficult to control hyperglycemia, endocrine recommending insulin drip and managing

## 2024-03-27 NOTE — PROGRESS NOTES
03/27/24 0618   Vital Signs   Temp (!) 101.2 °F (38.4 °C)   Temp Source Rectal   Pulse (!) 142   Resp (!) 29   SpO2 99 %   BP (!) 192/72   MAP (mmHg) 103        Urethral Catheter 03/26/24   Placement Date: 03/26/24   Present Prior to Hospital Arrival?: No   Site Assessment Clean;Intact;Dry   Collection Container Urimeter   Securement Method secured to top of thigh w/ adhesive device   Catheter Care Performed yes   Reason for Continuing Urinary Catheterization Critically ill in ICU and requiring hourly monitoring of intake/output     DUSTY Calderon notified of patient's current vital signs. Will admin meds as ordered. Will monitor.

## 2024-03-27 NOTE — PT/OT/SLP PROGRESS
Speech Language Pathology      Fela Ellison  MRN: [id number]    Patient not seen today secondary to  (transfer to ICU). Will await new orders when appropriate.

## 2024-03-28 LAB
ALBUMIN SERPL BCP-MCNC: 2.2 G/DL (ref 3.5–5.2)
ALP SERPL-CCNC: 149 U/L (ref 55–135)
ALT SERPL W/O P-5'-P-CCNC: 11 U/L (ref 10–44)
ANION GAP SERPL CALC-SCNC: 10 MMOL/L (ref 8–16)
ANISOCYTOSIS BLD QL SMEAR: SLIGHT
AST SERPL-CCNC: 19 U/L (ref 10–40)
BACTERIA BLD CULT: NORMAL
BACTERIA BLD CULT: NORMAL
BASOPHILS NFR BLD: 0 % (ref 0–1.9)
BILIRUB SERPL-MCNC: 0.5 MG/DL (ref 0.1–1)
BLASTS NFR BLD MANUAL: 39 %
BODY SITE - BONE MARROW: NORMAL
BUN SERPL-MCNC: 21 MG/DL (ref 6–20)
BURR CELLS BLD QL SMEAR: ABNORMAL
CALCIUM SERPL-MCNC: 9 MG/DL (ref 8.7–10.5)
CHLORIDE SERPL-SCNC: 108 MMOL/L (ref 95–110)
CLINICAL DIAGNOSIS - BONE MARROW: NORMAL
CO2 SERPL-SCNC: 21 MMOL/L (ref 23–29)
CREAT SERPL-MCNC: 0.6 MG/DL (ref 0.5–1.4)
CRP SERPL-MCNC: 247.8 MG/L (ref 0–8.2)
DACRYOCYTES BLD QL SMEAR: ABNORMAL
DIFFERENTIAL METHOD BLD: ABNORMAL
EOSINOPHIL NFR BLD: 0 % (ref 0–8)
ERYTHROCYTE [DISTWIDTH] IN BLOOD BY AUTOMATED COUNT: 20.4 % (ref 11.5–14.5)
EST. GFR  (NO RACE VARIABLE): >60 ML/MIN/1.73 M^2
FIBRINOGEN PPP-MCNC: 639 MG/DL (ref 182–400)
FLOW CYTOMETRY ANTIBODIES ANALYZED - BONE MARROW: NORMAL
FLOW CYTOMETRY COMMENT - BONE MARROW: NORMAL
FLOW CYTOMETRY INTERPRETATION - BONE MARROW: NORMAL
GLUCOSE SERPL-MCNC: 169 MG/DL (ref 70–110)
HCT VFR BLD AUTO: 32.2 % (ref 37–48.5)
HGB BLD-MCNC: 9.4 G/DL (ref 12–16)
IMM GRANULOCYTES # BLD AUTO: ABNORMAL K/UL (ref 0–0.04)
IMM GRANULOCYTES NFR BLD AUTO: ABNORMAL % (ref 0–0.5)
LDH SERPL L TO P-CCNC: 2935 U/L (ref 110–260)
LYMPHOCYTES NFR BLD: 14 % (ref 18–48)
MAGNESIUM SERPL-MCNC: 1.9 MG/DL (ref 1.6–2.6)
MCH RBC QN AUTO: 23.7 PG (ref 27–31)
MCHC RBC AUTO-ENTMCNC: 29.2 G/DL (ref 32–36)
MCV RBC AUTO: 81 FL (ref 82–98)
METAMYELOCYTES NFR BLD MANUAL: 1 %
MONOCYTES NFR BLD: 9 % (ref 4–15)
MYELOCYTES NFR BLD MANUAL: 1 %
NEUTROPHILS NFR BLD: 35 % (ref 38–73)
NEUTS BAND NFR BLD MANUAL: 1 %
NRBC BLD-RTO: 1 /100 WBC
OVALOCYTES BLD QL SMEAR: ABNORMAL
PHOSPHATE SERPL-MCNC: 5 MG/DL (ref 2.7–4.5)
PLATELET # BLD AUTO: 135 K/UL (ref 150–450)
PMV BLD AUTO: ABNORMAL FL (ref 9.2–12.9)
POCT GLUCOSE: 137 MG/DL (ref 70–110)
POCT GLUCOSE: 144 MG/DL (ref 70–110)
POCT GLUCOSE: 155 MG/DL (ref 70–110)
POCT GLUCOSE: 155 MG/DL (ref 70–110)
POCT GLUCOSE: 157 MG/DL (ref 70–110)
POCT GLUCOSE: 158 MG/DL (ref 70–110)
POCT GLUCOSE: 165 MG/DL (ref 70–110)
POCT GLUCOSE: 169 MG/DL (ref 70–110)
POCT GLUCOSE: 173 MG/DL (ref 70–110)
POCT GLUCOSE: 173 MG/DL (ref 70–110)
POCT GLUCOSE: 182 MG/DL (ref 70–110)
POCT GLUCOSE: 182 MG/DL (ref 70–110)
POCT GLUCOSE: 186 MG/DL (ref 70–110)
POCT GLUCOSE: 188 MG/DL (ref 70–110)
POCT GLUCOSE: 190 MG/DL (ref 70–110)
POCT GLUCOSE: 192 MG/DL (ref 70–110)
POCT GLUCOSE: 222 MG/DL (ref 70–110)
POCT GLUCOSE: 226 MG/DL (ref 70–110)
POIKILOCYTOSIS BLD QL SMEAR: SLIGHT
POLYCHROMASIA BLD QL SMEAR: ABNORMAL
POTASSIUM SERPL-SCNC: 4 MMOL/L (ref 3.5–5.1)
PROT SERPL-MCNC: 5.3 G/DL (ref 6–8.4)
RBC # BLD AUTO: 3.97 M/UL (ref 4–5.4)
SCHISTOCYTES BLD QL SMEAR: ABNORMAL
SMUDGE CELLS BLD QL SMEAR: PRESENT
SODIUM SERPL-SCNC: 139 MMOL/L (ref 136–145)
URATE SERPL-MCNC: 4.3 MG/DL (ref 2.4–5.7)
VANCOMYCIN TROUGH SERPL-MCNC: 17.1 UG/ML (ref 10–22)
WBC # BLD AUTO: 33.16 K/UL (ref 3.9–12.7)

## 2024-03-28 PROCEDURE — 27000207 HC ISOLATION

## 2024-03-28 PROCEDURE — 83735 ASSAY OF MAGNESIUM: CPT | Performed by: PHYSICIAN ASSISTANT

## 2024-03-28 PROCEDURE — 63600175 PHARM REV CODE 636 W HCPCS

## 2024-03-28 PROCEDURE — 63600175 PHARM REV CODE 636 W HCPCS: Mod: JZ,TB | Performed by: STUDENT IN AN ORGANIZED HEALTH CARE EDUCATION/TRAINING PROGRAM

## 2024-03-28 PROCEDURE — 63700000 PHARM REV CODE 250 ALT 637 W/O HCPCS

## 2024-03-28 PROCEDURE — 83615 LACTATE (LD) (LDH) ENZYME: CPT | Performed by: PHYSICIAN ASSISTANT

## 2024-03-28 PROCEDURE — 85007 BL SMEAR W/DIFF WBC COUNT: CPT | Performed by: PHYSICIAN ASSISTANT

## 2024-03-28 PROCEDURE — 94761 N-INVAS EAR/PLS OXIMETRY MLT: CPT

## 2024-03-28 PROCEDURE — 99233 SBSQ HOSP IP/OBS HIGH 50: CPT | Mod: ,,, | Performed by: INTERNAL MEDICINE

## 2024-03-28 PROCEDURE — 63600175 PHARM REV CODE 636 W HCPCS: Performed by: INTERNAL MEDICINE

## 2024-03-28 PROCEDURE — 84100 ASSAY OF PHOSPHORUS: CPT | Performed by: PHYSICIAN ASSISTANT

## 2024-03-28 PROCEDURE — 25000003 PHARM REV CODE 250: Performed by: STUDENT IN AN ORGANIZED HEALTH CARE EDUCATION/TRAINING PROGRAM

## 2024-03-28 PROCEDURE — 25000003 PHARM REV CODE 250

## 2024-03-28 PROCEDURE — 80053 COMPREHEN METABOLIC PANEL: CPT | Performed by: PHYSICIAN ASSISTANT

## 2024-03-28 PROCEDURE — 80202 ASSAY OF VANCOMYCIN: CPT | Performed by: INTERNAL MEDICINE

## 2024-03-28 PROCEDURE — 27000221 HC OXYGEN, UP TO 24 HOURS

## 2024-03-28 PROCEDURE — 84550 ASSAY OF BLOOD/URIC ACID: CPT | Performed by: STUDENT IN AN ORGANIZED HEALTH CARE EDUCATION/TRAINING PROGRAM

## 2024-03-28 PROCEDURE — 25000003 PHARM REV CODE 250: Performed by: PHYSICIAN ASSISTANT

## 2024-03-28 PROCEDURE — 85027 COMPLETE CBC AUTOMATED: CPT | Performed by: PHYSICIAN ASSISTANT

## 2024-03-28 PROCEDURE — 86140 C-REACTIVE PROTEIN: CPT | Performed by: PHYSICIAN ASSISTANT

## 2024-03-28 PROCEDURE — 20000000 HC ICU ROOM

## 2024-03-28 PROCEDURE — 25000003 PHARM REV CODE 250: Performed by: INTERNAL MEDICINE

## 2024-03-28 PROCEDURE — 85384 FIBRINOGEN ACTIVITY: CPT | Performed by: PHYSICIAN ASSISTANT

## 2024-03-28 PROCEDURE — 99233 SBSQ HOSP IP/OBS HIGH 50: CPT | Mod: ,,, | Performed by: NURSE PRACTITIONER

## 2024-03-28 PROCEDURE — 63600175 PHARM REV CODE 636 W HCPCS: Performed by: PHYSICIAN ASSISTANT

## 2024-03-28 RX ORDER — HYDROMORPHONE HYDROCHLORIDE 1 MG/ML
0.5 INJECTION, SOLUTION INTRAMUSCULAR; INTRAVENOUS; SUBCUTANEOUS
Status: DISCONTINUED | OUTPATIENT
Start: 2024-03-28 | End: 2024-03-29

## 2024-03-28 RX ORDER — HEPARIN SODIUM 5000 [USP'U]/ML
5000 INJECTION, SOLUTION INTRAVENOUS; SUBCUTANEOUS EVERY 8 HOURS
Status: DISCONTINUED | OUTPATIENT
Start: 2024-03-28 | End: 2024-03-30

## 2024-03-28 RX ADMIN — ACYCLOVIR 400 MG: 200 CAPSULE ORAL at 09:03

## 2024-03-28 RX ADMIN — AMLODIPINE BESYLATE 10 MG: 10 TABLET ORAL at 09:03

## 2024-03-28 RX ADMIN — PIPERACILLIN SODIUM AND TAZOBACTAM SODIUM 4.5 G: 4; .5 INJECTION, POWDER, FOR SOLUTION INTRAVENOUS at 01:03

## 2024-03-28 RX ADMIN — HYDROMORPHONE HYDROCHLORIDE 1 MG: 1 INJECTION, SOLUTION INTRAMUSCULAR; INTRAVENOUS; SUBCUTANEOUS at 04:03

## 2024-03-28 RX ADMIN — FAMOTIDINE 20 MG: 20 TABLET ORAL at 09:03

## 2024-03-28 RX ADMIN — OXYCODONE HYDROCHLORIDE 10 MG: 10 TABLET ORAL at 12:03

## 2024-03-28 RX ADMIN — HYDROXYUREA 2500 MG: 500 CAPSULE ORAL at 09:03

## 2024-03-28 RX ADMIN — METOPROLOL TARTRATE 25 MG: 25 TABLET, FILM COATED ORAL at 09:03

## 2024-03-28 RX ADMIN — PIPERACILLIN SODIUM AND TAZOBACTAM SODIUM 4.5 G: 4; .5 INJECTION, POWDER, FOR SOLUTION INTRAVENOUS at 07:03

## 2024-03-28 RX ADMIN — ATORVASTATIN CALCIUM 40 MG: 40 TABLET, FILM COATED ORAL at 09:03

## 2024-03-28 RX ADMIN — HYDROXYUREA 3500 MG: 500 CAPSULE ORAL at 09:03

## 2024-03-28 RX ADMIN — FAMOTIDINE 20 MG: 20 TABLET ORAL at 08:03

## 2024-03-28 RX ADMIN — OXYCODONE HYDROCHLORIDE 10 MG: 10 TABLET ORAL at 02:03

## 2024-03-28 RX ADMIN — OXYCODONE HYDROCHLORIDE 10 MG: 10 TABLET ORAL at 04:03

## 2024-03-28 RX ADMIN — LOSARTAN POTASSIUM 100 MG: 50 TABLET, FILM COATED ORAL at 09:03

## 2024-03-28 RX ADMIN — TRIAMCINOLONE ACETONIDE: 1 CREAM TOPICAL at 09:03

## 2024-03-28 RX ADMIN — HYDROMORPHONE HYDROCHLORIDE 1 MG: 1 INJECTION, SOLUTION INTRAMUSCULAR; INTRAVENOUS; SUBCUTANEOUS at 01:03

## 2024-03-28 RX ADMIN — METOPROLOL TARTRATE 25 MG: 25 TABLET, FILM COATED ORAL at 08:03

## 2024-03-28 RX ADMIN — INSULIN HUMAN 7.5 UNITS/HR: 1 INJECTION, SOLUTION INTRAVENOUS at 04:03

## 2024-03-28 RX ADMIN — OXYCODONE HYDROCHLORIDE 10 MG: 10 TABLET ORAL at 07:03

## 2024-03-28 RX ADMIN — HEPARIN SODIUM 5000 UNITS: 5000 INJECTION INTRAVENOUS; SUBCUTANEOUS at 02:03

## 2024-03-28 RX ADMIN — LEVETIRACETAM 500 MG: 500 SOLUTION ORAL at 09:03

## 2024-03-28 RX ADMIN — HYDROMORPHONE HYDROCHLORIDE 1 MG: 1 INJECTION, SOLUTION INTRAMUSCULAR; INTRAVENOUS; SUBCUTANEOUS at 08:03

## 2024-03-28 RX ADMIN — HEPARIN SODIUM 5000 UNITS: 5000 INJECTION INTRAVENOUS; SUBCUTANEOUS at 09:03

## 2024-03-28 RX ADMIN — LABETALOL HYDROCHLORIDE 10 MG: 5 INJECTION, SOLUTION INTRAVENOUS at 02:03

## 2024-03-28 RX ADMIN — VANCOMYCIN HYDROCHLORIDE 1250 MG: 1.25 INJECTION, POWDER, LYOPHILIZED, FOR SOLUTION INTRAVENOUS at 09:03

## 2024-03-28 RX ADMIN — HYDRALAZINE HYDROCHLORIDE 10 MG: 20 INJECTION, SOLUTION INTRAMUSCULAR; INTRAVENOUS at 03:03

## 2024-03-28 RX ADMIN — INSULIN HUMAN 10.75 UNITS/HR: 1 INJECTION, SOLUTION INTRAVENOUS at 02:03

## 2024-03-28 RX ADMIN — LABETALOL HYDROCHLORIDE 10 MG: 5 INJECTION, SOLUTION INTRAVENOUS at 07:03

## 2024-03-28 RX ADMIN — HYDROMORPHONE HYDROCHLORIDE 1 MG: 1 INJECTION, SOLUTION INTRAMUSCULAR; INTRAVENOUS; SUBCUTANEOUS at 12:03

## 2024-03-28 RX ADMIN — ACETAMINOPHEN 650 MG: 325 TABLET ORAL at 03:03

## 2024-03-28 RX ADMIN — MUPIROCIN: 20 OINTMENT TOPICAL at 09:03

## 2024-03-28 RX ADMIN — REMDESIVIR 100 MG: 100 INJECTION, POWDER, LYOPHILIZED, FOR SOLUTION INTRAVENOUS at 09:03

## 2024-03-28 RX ADMIN — HYDRALAZINE HYDROCHLORIDE 10 MG: 20 INJECTION, SOLUTION INTRAMUSCULAR; INTRAVENOUS at 05:03

## 2024-03-28 RX ADMIN — ACETAMINOPHEN 650 MG: 325 TABLET ORAL at 09:03

## 2024-03-28 RX ADMIN — MONTELUKAST 10 MG: 10 TABLET, FILM COATED ORAL at 09:03

## 2024-03-28 RX ADMIN — PIPERACILLIN SODIUM AND TAZOBACTAM SODIUM 4.5 G: 4; .5 INJECTION, POWDER, FOR SOLUTION INTRAVENOUS at 04:03

## 2024-03-28 RX ADMIN — ACYCLOVIR 400 MG: 200 CAPSULE ORAL at 08:03

## 2024-03-28 RX ADMIN — FLUCONAZOLE 400 MG: 100 TABLET ORAL at 09:03

## 2024-03-28 RX ADMIN — ACETAMINOPHEN 650 MG: 325 TABLET ORAL at 04:03

## 2024-03-28 RX ADMIN — ALLOPURINOL 300 MG: 100 TABLET ORAL at 09:03

## 2024-03-28 RX ADMIN — OXYCODONE HYDROCHLORIDE 10 MG: 10 TABLET ORAL at 05:03

## 2024-03-28 RX ADMIN — OXYCODONE HYDROCHLORIDE 10 MG: 10 TABLET ORAL at 09:03

## 2024-03-28 RX ADMIN — VANCOMYCIN HYDROCHLORIDE 1250 MG: 1.25 INJECTION, POWDER, LYOPHILIZED, FOR SOLUTION INTRAVENOUS at 10:03

## 2024-03-28 RX ADMIN — HYDROMORPHONE HYDROCHLORIDE 1 MG: 1 INJECTION, SOLUTION INTRAMUSCULAR; INTRAVENOUS; SUBCUTANEOUS at 06:03

## 2024-03-28 NOTE — SUBJECTIVE & OBJECTIVE
"Interval HPI:   Overnight events: NAEON. Remains in 6077. BG at and above goal ranges on IV intensive insulin protocol with infusion rates ranging from 3.75-10.75 u/hr. Diet Clear liquid (no sugar)/Bariatric    Eating:  < 25%  Nausea: No  Hypoglycemia and intervention: No  Fever: No  TPN and/or TF: No  If yes, type of TF/TPN and rate: n/a    BP (!) 174/74   Pulse 103   Temp 100.2 °F (37.9 °C) (Other (see comments))   Resp 19   Ht 5' 3" (1.6 m)   Wt 93 kg (205 lb 0.4 oz)   SpO2 99%   BMI 36.32 kg/m²     Labs Reviewed and Include    Recent Labs   Lab 03/28/24  0418   *   CALCIUM 9.0   ALBUMIN 2.2*   PROT 5.3*      K 4.0   CO2 21*      BUN 21*   CREATININE 0.6   ALKPHOS 149*   ALT 11   AST 19   BILITOT 0.5     Lab Results   Component Value Date    WBC 33.16 (H) 03/28/2024    HGB 9.4 (L) 03/28/2024    HCT 32.2 (L) 03/28/2024    MCV 81 (L) 03/28/2024     (L) 03/28/2024     Recent Labs   Lab 03/24/24  0127   TSH 1.428     Lab Results   Component Value Date    HGBA1C 6.2 (H) 03/24/2024       Nutritional status:   Body mass index is 36.32 kg/m².  Lab Results   Component Value Date    ALBUMIN 2.2 (L) 03/28/2024    ALBUMIN 2.7 (L) 03/27/2024    ALBUMIN 2.6 (L) 03/26/2024     No results found for: "PREALBUMIN"    Estimated Creatinine Clearance: 113.4 mL/min (based on SCr of 0.6 mg/dL).    Accu-Checks  Recent Labs     03/27/24  2339 03/28/24  0043 03/28/24  0155 03/28/24  0250 03/28/24  0335 03/28/24  0443 03/28/24  0533 03/28/24  0535 03/28/24  0630 03/28/24  0744   POCTGLUCOSE 209* 165* 173* 144* 137* 173* 192* 192* 190* 222*       Current Medications and/or Treatments Impacting Glycemic Control  Immunotherapy:    Immunosuppressants       None          Steroids:   Hormones (From admission, onward)      None          Pressors:    Autonomic Drugs (From admission, onward)      None          Hyperglycemia/Diabetes Medications:   Antihyperglycemics (From admission, onward)      Start     Stop Route " Frequency Ordered    03/26/24 161  insulin regular in 0.9 % NaCl 100 unit/100 mL (1 unit/mL) infusion        Question:  Enter initial dose from Infusion Protocol Chart (Units/hr):  Answer:  4    -- IV Continuous 03/26/24 1423

## 2024-03-28 NOTE — PLAN OF CARE
[facility] - Cardiac Medical [facility]  Initial Discharge Assessment       Primary Care Provider: Bar Trevino DO    Admission Diagnosis: SDH (subdural hematoma) [S06.5XAA]  CML (chronic myelocytic leukemia) [C92.10]    Admission Date: 3/23/2024  Expected Discharge Date: 4/3/2024         Payor: MEDICAID / Plan: PENDING MEDICAID / Product Type: Government /     Extended Emergency Contact Information  Primary Emergency Contact: Scot Sharma  Mobile Phone: [phone]  Relation: Relative  Secondary Emergency Contact: DeonKvng  Mobile Phone: [phone]  Relation: Son    Discharge Plan A: Home with family  Discharge Plan B: Other (TBD)      [facility] [address] [place] [address] [address]  [address] [facility] [address]  Phone: [phone] Fax: [phone]    [facility] [address] [facility]  [address] [facility] [address]  Phone: [phone] Fax: [phone]    [place] [id number] [address] [address]  Phone: [phone] Fax: [phone]      Initial Assessment (most recent)       Adult Discharge Assessment - 03/25/24 0947          Discharge Assessment    Discharge Plan A Home with family     Discharge Plan B Other   TBD                  Discharge Plan A and Plan B have been determined by review of patient's clinical status, future medical and therapeutic needs, and coverage/benefits for post-acute care in coordination with multidisciplinary team members.    Cherry Sidhu, LMSW [facility]  [phone]

## 2024-03-28 NOTE — ASSESSMENT & PLAN NOTE
Patient initially presenting to [facility] with CML in blast crisis with fevers, fatigue and encephalopathy.  Admitted to BMT services and started on high dose hydrea as well as Abx/antivirals/antifungals.  Found to be COVID positive and treating with Remdesivir.  Worsening AMS with concern for leukostasis in setting of blast crisis vs drug induced with patient receiving high dose pain regimen including new gabapentin vs sepsis.  Noted to have abdominal enlargement and tense abdomen in recently CT seen organomegaly.    - NG tube placed  - Continue Remdesivir, broad spectrum Abx, antifungals  - Continue hydrea, oncology following closely  - Hold gabapentin and consider reduction of pain regimen if tolerated, slowly improving mental status  - airway watch, protecting airway currently, discussions with family and agreeable for intubation if needed  - f/u repeat cultures, NGTD

## 2024-03-28 NOTE — ASSESSMENT & PLAN NOTE
COVID positive on 3/24 initially on 4L NC now downtrending associated with AMS and elevated WBC with fevers in setting of blast crisis in CML.     - Remdesivir  - Vanc/Zosyn with sepsis concern and AMS, slowly improving, consider de-escalation if continuing to improve  - F/u repeat cultures, NGTD  - Weaned off O2  - Steroids discontinued previously with difficult to control BG and now weaned off supplemental O2

## 2024-03-28 NOTE — PROGRESS NOTES
"[facility] - Card[facility] Medical [facility]  Endocrinology  Progress Note    Admit Date: 3/23/2024     Reason for Consult: Management of T2DM, Hyperglycemia     Diabetes diagnosis year: > 2 years ago    Home Diabetes Medications:    Per chart review:   Novolog 22 units with meals -- Takes 22 units TID AC while on chemo - (Gives between 12 to 15 units TID AC when not on chemo)   NPH 20 units AM and 50 units PM   Metformin 1000 mg BID     How often checking glucose at home?  AMIE    BG readings on regimen: AMIE  Hypoglycemia on the regimen?  AMIE  Missed doses on regimen?  AMIE    Diabetes Complications include:     Hyperglycemia    Complicating diabetes co morbidities:   Active Cancer and Glucocorticoid use       HPI:   Patient is a 58 y.o. female with PMHx of insulin-dependent T2DM, diabetic neuropathy, essential HTN, NAFLD, obesity, former tobacco use disorder (quit 4-5 months ago), and CML (actively on chemotherapy) with blast crisis diagnosed prior to transfer at OSH, who presents as a transfer from [facility] ED in [place] for acute SDH. Found to be COVID (+) and Dexamethasone therapy started. BG excursions noted. Endocrine consulted for BG management.         Interval HPI:   Overnight events: NAEON. Remains in 6077. BG at and above goal ranges on IV intensive insulin protocol with infusion rates ranging from 3.75-10.75 u/hr. Diet Clear liquid (no sugar)/Bariatric    Eating:  < 25%  Nausea: No  Hypoglycemia and intervention: No  Fever: No  TPN and/or TF: No  If yes, type of TF/TPN and rate: n/a    BP (!) 174/74   Pulse 103   Temp 100.2 °F (37.9 °C) (Other (see comments))   Resp 19   Ht 5' 3" (1.6 m)   Wt 93 kg (205 lb 0.4 oz)   SpO2 99%   BMI 36.32 kg/m²     Labs Reviewed and Include    Recent Labs   Lab 03/28/24  0418   *   CALCIUM 9.0   ALBUMIN 2.2*   PROT 5.3*      K 4.0   CO2 21*      BUN 21*   CREATININE 0.6   ALKPHOS 149*   ALT 11   AST 19   BILITOT 0.5     Lab Results   Component Value Date    WBC " "33.16 (H) 03/28/2024    HGB 9.4 (L) 03/28/2024    HCT 32.2 (L) 03/28/2024    MCV 81 (L) 03/28/2024     (L) 03/28/2024     Recent Labs   Lab 03/24/24  0127   TSH 1.428     Lab Results   Component Value Date    HGBA1C 6.2 (H) 03/24/2024       Nutritional status:   Body mass index is 36.32 kg/m².  Lab Results   Component Value Date    ALBUMIN 2.2 (L) 03/28/2024    ALBUMIN 2.7 (L) 03/27/2024    ALBUMIN 2.6 (L) 03/26/2024     No results found for: "PREALBUMIN"    Estimated Creatinine Clearance: 113.4 mL/min (based on SCr of 0.6 mg/dL).    Accu-Checks  Recent Labs     03/27/24  2339 03/28/24  0043 03/28/24  0155 03/28/24  0250 03/28/24  0335 03/28/24  0443 03/28/24  0533 03/28/24  0535 03/28/24  0630 03/28/24  0744   POCTGLUCOSE 209* 165* 173* 144* 137* 173* 192* 192* 190* 222*       Current Medications and/or Treatments Impacting Glycemic Control  Immunotherapy:    Immunosuppressants       None          Steroids:   Hormones (From admission, onward)      None          Pressors:    Autonomic Drugs (From admission, onward)      None          Hyperglycemia/Diabetes Medications:   Antihyperglycemics (From admission, onward)      Start     Stop Route Frequency Ordered    03/26/24 1615  insulin regular in 0.9 % NaCl 100 unit/100 mL (1 unit/mL) infusion        Question:  Enter initial dose from Infusion Protocol Chart (Units/hr):  Answer:  4    -- IV Continuous 03/26/24 1515            ASSESSMENT and PLAN    Neuro  * Acute encephalopathy  Managed per primary team  Optimize BG control      Subdural hematoma  Managed per primary team       ID  COVID  Expect effects of Dexamethasone for COVID on BG to last up to 72 hours      Endocrine  Type 2 diabetes mellitus with diabetic neuropathy, with long-term current use of insulin  BG goal: 140-180   T2DM. BG above goal here. Receiving steroids (d/c 3/25) and was on lower doses than reported home doses.  Bg improving on IIP but on high drip rates.  Will continue on IIP and consider " transition to Transition drip when mental status improves and patient ready for diet advancement     - Continue insulin infusion protocol   - POCT Glucose every hour   - Hypoglycemia protocol in place      ** Please notify Endocrine for any change and/or advance in diet**  ** Please call Endocrine for any BG related issues **     Discharge Planning:   TBD. Please notify endocrine prior to discharge.             Nusrat Torres NP  Endocrinology  [facility] - Cardiac Medical ICU

## 2024-03-28 NOTE — PLAN OF CARE
Patient has been having an elevated temp tylenol given x 2 this shift, and she is on a cooling blanket, with only a sheet covering her. Ice pack are applied  tolerating it well. She patient appears to have intermittent confusion.  is at the bedside. When patient states she is in pain her blood pressure increase.

## 2024-03-28 NOTE — ASSESSMENT & PLAN NOTE
BG goal: 140-180   T2DM. BG above goal here. Receiving steroids (d/c 3/25) and was on lower doses than reported home doses.  Bg improving on IIP but on high drip rates.  Will continue on IIP and consider transition to Transition drip when mental status improves and patient ready for diet advancement     - Continue insulin infusion protocol   - POCT Glucose every hour   - Hypoglycemia protocol in place      ** Please notify Endocrine for any change and/or advance in diet**  ** Please call Endocrine for any BG related issues **     Discharge Planning:   TBD. Please notify endocrine prior to discharge.

## 2024-03-28 NOTE — SUBJECTIVE & OBJECTIVE
Subjective:     Interval History:  bone marrow biopsy 3/27, continued fever. Regression in mental status    Objective:     Vital Signs (Most Recent):  Temp: 100.2 °F (37.9 °C) (03/28/24 0600)  Pulse: 103 (03/28/24 0807)  Resp: 19 (03/28/24 0807)  BP: (!) 174/74 (03/28/24 0700)  SpO2: 99 % (03/28/24 0807) Vital Signs (24h Range):  Temp:  [99.1 °F (37.3 °C)-101.9 °F (38.8 °C)] 100.2 °F (37.9 °C)  Pulse:  [] 103  Resp:  [12-28] 19  SpO2:  [96 %-100 %] 99 %  BP: (116-174)/(56-99) 174/74     Weight: 93 kg (205 lb 0.4 oz)  Body mass index is 36.32 kg/m².  Body surface area is 2.03 meters squared.    ECOG SCORE           [unfilled]    Intake/Output - Last 3 Shifts         03/26 0700  03/27 0659 03/27 0700  03/28 0659 03/28 0700  03/29 0659    P.O.  120     I.V. (mL/kg) 136.2 (1.5) 64.6 (0.7)     IV Piggyback 2256.9 680.1     Total Intake(mL/kg) 2393.1 (25.7) 864.7 (9.3)     Urine (mL/kg/hr) 1255 (0.6) 1890 (0.8) 300 (1.4)    Emesis/NG output  0     Stool  0     Blood  0     Total Output 1255 1890 300    Net +1138.1 -1025.3 -300                    Physical Exam  Constitutional:       General: She is not in acute distress.     Appearance: She is ill-appearing.   HENT:      Head: Normocephalic and atraumatic.      Mouth/Throat:      Mouth: Mucous membranes are moist.      Pharynx: Oropharynx is clear.   Eyes:      General: No scleral icterus.     Extraocular Movements: Extraocular movements intact.   Cardiovascular:      Rate and Rhythm: Normal rate and regular rhythm.   Pulmonary:      Effort: Pulmonary effort is normal. No respiratory distress.      Breath sounds: Rales present.   Abdominal:      General: There is distension.      Palpations: Abdomen is soft.      Tenderness: There is abdominal tenderness in the right lower quadrant and left lower quadrant.      Comments: Has skin lesions on her lower abdomen,    Musculoskeletal:      Right lower leg: No edema.      Left lower leg: No edema.   Skin:     General: Skin  is warm and dry.            Significant Labs:   All pertinent labs from the last 24 hours have been reviewed.    Diagnostic Results:  I have reviewed all pertinent imaging results/findings within the past 24 hours.

## 2024-03-28 NOTE — ASSESSMENT & PLAN NOTE
Pt placed on amlodipine 10mg, metoprolol 25mg, and losartan 25.     Plan  Continue home medications, increased losartan to 100  Goal BP <160 per neurosurgery with SDH  PRN in place if needed, will increase BP medications as needed

## 2024-03-28 NOTE — PROGRESS NOTES
Pharmacokinetic Assessment Follow Up: IV Vancomycin    Vancomycin serum concentration assessment/plan:  Trough resulted as 17.1 mcg/mL; Goal 10-20 mcg/mL, Sepsis  Renal function stable  Continue Vancomycin 1250 mg IV q12h  Next level to be drawn on 3/31 at 0800    Drug levels (last 3 results):  Recent Labs   Lab Result Units 03/25/24  1405 03/27/24  0356 03/28/24  0755   Vancomycin-Trough ug/mL 16.1 20.9 17.1       Pharmacy will continue to follow and monitor vancomycin.    Please contact pharmacy at extension [phone] for questions regarding this assessment.    Thank you for the consult,   Charlotte Pruitt       Patient brief summary:  Fela Ellison is a 58 y.o. female initiated on antimicrobial therapy with IV Vancomycin for treatment of sepsis    The patient's current regimen is 1250 mg Q12H    Drug Allergies:   Review of patient's allergies indicates:  No Known Allergies    Actual Body Weight:   93 kg     Renal Function:   Estimated Creatinine Clearance: 113.4 mL/min (based on SCr of 0.6 mg/dL).,     Dialysis Method (if applicable):  N/A    CBC (last 72 hours):  Recent Labs   Lab Result Units 03/26/24  0500 03/26/24  1713 03/27/24  0356 03/28/24  0418   WBC K/uL 76.07* 88.47* 72.00* 33.16*   Hemoglobin g/dL 10.5* 10.9* 11.1* 9.4*   Hematocrit % 36.4* 37.0 37.5 32.2*   Platelets K/uL 320 294 251 135*   Gran % % 35.0* 26.0* 39.0 35.0*   Lymph % % 25.0 28.0 8.0* 14.0*   Mono % % 11.0 5.0 9.0 9.0   Eosinophil % % 0.0 0.0 0.0 0.0   Basophil % % 0.0 0.0 0.0 0.0   Differential Method  Manual Manual Manual Manual       Metabolic Panel (last 72 hours):  Recent Labs   Lab Result Units 03/26/24  0500 03/26/24  1345 03/26/24  1610 03/27/24  0356 03/27/24  1259 03/28/24  0418   Sodium mmol/L 137 135* 133* 138 138 139   Potassium mmol/L 3.6 3.8 3.9 3.4* 4.0 4.0   Chloride mmol/L 107 107 105 108 110 108   CO2 mmol/L 15* 20* 20* 20* 22* 21*   Glucose mg/dL 440* 456* 414* 202* 207* 169*   BUN mg/dL 16 17 19 20 20 21*    Creatinine mg/dL 0.8 0.9 0.9 0.7 0.7 0.6   Albumin g/dL 2.5*  --  2.6* 2.7*  --  2.2*   Total Bilirubin mg/dL 0.3  --  0.3 0.4  --  0.5   Alkaline Phosphatase U/L 77  --  100 151*  --  149*   AST U/L 20  --  22 25  --  19   ALT U/L 8*  --  8* 9*  --  11   Magnesium mg/dL 1.8  --   --  1.9  --  1.9   Phosphorus mg/dL 2.5*  --   --  3.6  --  5.0*       Vancomycin Administrations:  vancomycin given in the last 96 hours                     vancomycin 1,250 mg in dextrose 5 % (D5W) 250 mL IVPB (Vial-Mate) (mg) 1,250 mg New Bag 03/28/24 1010     1,250 mg New Bag 03/27/24 2030     1,250 mg New Bag  0838     1,250 mg New Bag 03/26/24 2015     1,250 mg New Bag  0508     1,250 mg New Bag 03/25/24 1558     1,250 mg New Bag  0346     1,250 mg New Bag 03/24/24 1530                    Microbiologic Results:  Microbiology Results (last 7 days)       Procedure Component Value Units Date/Time    Blood culture [[id number]] Collected: 03/23/24 2223    Order Status: Completed Specimen: Blood from Peripheral, Hand, Left Updated: 03/28/24 0612     Blood Culture, Routine No Growth to date      No Growth to date      No Growth to date      No Growth to date      No Growth to date    Narrative:      Site #2 - Aerobic and Anaerobic    Blood culture [[id number]] Collected: 03/23/24 2222    Order Status: Completed Specimen: Blood from Peripheral, Forearm, Right Updated: 03/28/24 0612     Blood Culture, Routine No Growth to date      No Growth to date      No Growth to date      No Growth to date      No Growth to date    Narrative:      Site #1- Aerobic and Anaerobic    Blood culture [[id number]] Collected: 03/26/24 1700    Order Status: Completed Specimen: Blood from Peripheral, Antecubital, Right Updated: 03/27/24 1812     Blood Culture, Routine No Growth to date      No Growth to date    Narrative:      Blood cultures x 2 different sites. 4 bottles total. Please  draw cultures before administering antibiotics.    Blood culture  [[id number]] Collected: 03/26/24 1721    Order Status: Completed Specimen: Blood from Peripheral, Wrist, Left Updated: 03/27/24 1812     Blood Culture, Routine No Growth to date      No Growth to date    Narrative:      Blood cultures from 2 different sites. 4 bottles total.  Please draw before starting antibiotics.    Culture, Respiratory with Gram Stain [[id number]]     Order Status: Canceled Specimen: Respiratory

## 2024-03-28 NOTE — PROGRESS NOTES
[facility]  Hematology  Bone Marrow Transplant  Progress Note    Patient Name: Fela Ellison  Admission Date: 3/23/2024  Hospital Length of Stay: 5 days  Code Status: Full Code    Subjective:     Interval History:  bone marrow biopsy 3/27, continued fever. Regression in mental status    Objective:     Vital Signs (Most Recent):  Temp: 100.2 °F (37.9 °C) (03/28/24 0600)  Pulse: 103 (03/28/24 0807)  Resp: 19 (03/28/24 0807)  BP: (!) 174/74 (03/28/24 0700)  SpO2: 99 % (03/28/24 0807) Vital Signs (24h Range):  Temp:  [99.1 °F (37.3 °C)-101.9 °F (38.8 °C)] 100.2 °F (37.9 °C)  Pulse:  [] 103  Resp:  [12-28] 19  SpO2:  [96 %-100 %] 99 %  BP: (116-174)/(56-99) 174/74     Weight: 93 kg (205 lb 0.4 oz)  Body mass index is 36.32 kg/m².  Body surface area is 2.03 meters squared.    ECOG SCORE           [unfilled]    Intake/Output - Last 3 Shifts         03/26 0700 03/27 0659 03/27 0700 03/28 0659 03/28 0700 03/29 0659    P.O.  120     I.V. (mL/kg) 136.2 (1.5) 64.6 (0.7)     IV Piggyback 2256.9 680.1     Total Intake(mL/kg) 2393.1 (25.7) 864.7 (9.3)     Urine (mL/kg/hr) 1255 (0.6) 1890 (0.8) 300 (1.4)    Emesis/NG output  0     Stool  0     Blood  0     Total Output 1255 1890 300    Net +1138.1 -1025.3 -300                   Physical Exam  Constitutional:       General: She is not in acute distress.     Appearance: She is ill-appearing.   HENT:      Head: Normocephalic and atraumatic.      Mouth/Throat:      Mouth: Mucous membranes are moist.      Pharynx: Oropharynx is clear.   Eyes:      General: No scleral icterus.     Extraocular Movements: Extraocular movements intact.   Cardiovascular:      Rate and Rhythm: Normal rate and regular rhythm.   Pulmonary:      Effort: Pulmonary effort is normal. No respiratory distress.      Breath sounds: Rales present.   Abdominal:      General: There is distension.      Palpations: Abdomen is soft.      Tenderness: There is abdominal tenderness in the  right lower quadrant and left lower quadrant.      Comments: Has skin lesions on her lower abdomen,    Musculoskeletal:      Right lower leg: No edema.      Left lower leg: No edema.   Skin:     General: Skin is warm and dry.            Significant Labs:   All pertinent labs from the last 24 hours have been reviewed.    Diagnostic Results:  I have reviewed all pertinent imaging results/findings within the past 24 hours.  Assessment/Plan:     Hyperglycemia  Pt has known DM, is on steroids due to COVID. Most likely causing her hyperglycemia. Removed but continued difficulty controlling. Ordered labs to make sure patient is not in DKA/HHS patient BHB 0.2, bicarb 15 then back up to 20, but glucose has remained in the 400s.     Plan  Diabetic diet with thin liquids per speech (went to bariatric clear with starting insulin drip)  Glucose checks AC x HS  Placed on insulin drip, improvement in glucose control  Consulted Endocrine with difficult to control hyperglycemia      COVID  Pt tested positive for covid on 3/24. Initially on 4L NC, has decreased to 2L. Pt's mental status has improved. WBC count 50K.     Plan  Remdesivir course  Vanc/zosyn due to concern for abdominal infection causing sepsis and new mental status changes on 3/26  Initially started on dex  switched to prednisone, but discontinued due to severe glucose elevation and no O2 requirements    Subdural hematoma  Pt presented from OSH with new SDH. Pt was initially admitted to neuro critical care. SDH felt to be small/stable and did not need to undergo surgical intervention. Pt placed on keppra for seizure proph. Pt PLT count is in the 200s. 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     Plan  Continue home medications, increased losartan to 100  Goal BP <160 per neurosurgery with SDH  PRN in place if needed, will increase BP medications as needed    CML (chronic myelocytic leukemia)  58F with relapsed CML with blast crisis (transformed on 2022) admitted for fevers, fatigue, encephalopathy and found to have a SDH that NSGY does not recommend intervention on. Encephalopathic when seen and unable to follow commands.     Labs show clear evidence of blast crisis (29% on CBC) without cytopenias. This is in the setting of what may be sepsis vs fevers from leukemia.She normally follows at [facility] fellows clinic and has progressed through multiple treatments.   Discussed aggressive and unstable disease leading to her presentation here.      Plan:   -hydrea 40mg/kg daily for cytoreduction, increased to 3500mg BID due to worsening LDH  -Bone marrow biopsy on 3/27 with concern for worsening disease progression  -continue acyclovir, allopurinol, fluc  -continue broad spec abx and follow cultures  -prognosis guarded, will discuss goals of care moving forward        VTE Risk Mitigation (From admission, onward)           Ordered     IP VTE HIGH RISK PATIENT  Once         03/23/24 2209     Place sequential compression device  Until discontinued         03/23/24 2209     Reason for No Pharmacological VTE Prophylaxis  Once        Question:  Reasons:  Answer:  Active Bleeding  Comment:  SDH    03/23/24 2209                    Disposition: TBERNESTO Momin, DO  Bone Marrow Transplant  [facility]

## 2024-03-28 NOTE — PROGRESS NOTES
[facility]  Critical Care Medicine  Progress Note    Patient Name: Feal Ellison  MRN: [id number]  Admission Date: 3/23/2024  Hospital Length of Stay: 5 days  Code Status: Full Code  Attending Provider: Ottoniel Paniagua MD  Primary Care Provider: Bar Trevino DO   Principal Problem: Acute encephalopathy    Subjective:     HPI:  Patient information was obtained from past medical records and ER records. HPI limited by patient's altered mentation.    Ms. Fela Ellison is a 59 yo female with a PMHx of IDDM, CML, HTN, NAFLD, and prior tobacco use who initially presented as a transfer from [facility] for acute SDH and blast crisis.     She was intiallly admitted to [facility] and then stepped down to Medical oncology floor after evaluation by Neurosurgery noted SDH to be stable with no surgical intervention recommended at the time. She was being treated for concerns for blast crisis by BMT. Critical care was initially consulted on 03/24/24 for sepsis concerns in the setting of patient being found to be COVID positive, febrile (Tmax of 103.1), -138, BP stable 120-140/60-70s, on 4L NC. Evaluation at the time did not note any indication for ICU admission as patient was stable.     During hospitalization, treated by BMT for CML with blast crisis concerns; broad spectrum antibiotics were administered for sepsis concerns; Remdesivir/Dexamethasone started for COVID-19, but Dexamethasone had to be discontinued due to hyperglycemia and Endocrinology was consulted for insulin management. Mentation was improving and patient passed swallow evaluation, so NG tube was removed and diet was resumed.     However, on 03/26/24 patient developed increasing lethargy, confusion, febrile temperatures, distended abdomen, lactic acidosis. Stat CT head was non-acute and noted stable prior known SDH. Rapid response evaluated patient for worsening mentation and patient was  admitted to MICU.          Hospital/ICU Course:  Held scheduled gabapentin and other sedating drugs with encephalopathy.  Treating with broad spectrum Abx, Remdesivir for COVID, acyclovir, and fluconazole.  Slow improvement in mentation.  Oncology following with blast crisis. Improving WBC on high dose hydrea.  Patient with intermittent high fevers associated with mental status change that resolve with fever reduction.     Interval History/Significant Events: This early am, patient with mental status change however improved during day.  Mental status changed associated with high fever that improve with antipyretics.    Review of Systems   Unable to perform ROS: Mental status change     Objective:     Vital Signs (Most Recent):  Temp: 99.2 °F (37.3 °C) (03/28/24 1100)  Pulse: 81 (03/28/24 1230)  Resp: 19 (03/28/24 1248)  BP: (!) 114/56 (03/28/24 1230)  SpO2: 99 % (03/28/24 1230) Vital Signs (24h Range):  Temp:  [99.2 °F (37.3 °C)-101.9 °F (38.8 °C)] 99.2 °F (37.3 °C)  Pulse:  [] 81  Resp:  [11-28] 19  SpO2:  [96 %-100 %] 99 %  BP: (106-179)/(56-99) 114/56   Weight: 93 kg (205 lb 0.4 oz)  Body mass index is 36.32 kg/m².      Intake/Output Summary (Last 24 hours) at 3/28/2024 1347  Last data filed at 3/28/2024 1252  Gross per 24 hour   Intake 120 ml   Output 2745 ml   Net -2625 ml          Physical Exam  Constitutional:       General: She is not in acute distress.     Appearance: She is ill-appearing.   HENT:      Head: Normocephalic and atraumatic.      Mouth/Throat:      Mouth: Mucous membranes are moist.      Pharynx: Oropharynx is clear.   Eyes:      General: No scleral icterus.     Extraocular Movements: Extraocular movements intact.   Cardiovascular:      Rate and Rhythm: Normal rate and regular rhythm.   Pulmonary:      Effort: Pulmonary effort is normal. No respiratory distress.      Breath sounds: Rales present.   Abdominal:      General: There is distension.      Palpations: Abdomen is soft.       Tenderness: There is abdominal tenderness in the right lower quadrant and left lower quadrant.      Comments: Has skin lesions on her lower abdomen,    Musculoskeletal:      Right lower leg: No edema.      Left lower leg: No edema.   Skin:     General: Skin is warm and dry.            Vents:     Lines/Drains/Airways       Drain  Duration                  Urethral Catheter 03/26/24 2 days         NG/OG Tube 03/27/24 0700 Left nostril 1 day              Peripheral Intravenous Line  Duration                  Peripheral IV - Single Lumen 20 G Left Antecubital -- days         Peripheral IV - Single Lumen 03/23/24 20 G Anterior;Right Hand 5 days         Peripheral IV - Single Lumen 03/28/24 0830 20 G Anterior;Left Upper Arm <1 day                  Significant Labs:    CBC/Anemia Profile:  Recent Labs   Lab 03/26/24  1713 03/27/24  0356 03/28/24  0418   WBC 88.47* 72.00* 33.16*   HGB 10.9* 11.1* 9.4*   HCT 37.0 37.5 32.2*    251 135*   MCV 82 82 81*   RDW 21.2* 20.7* 20.4*        Chemistries:  Recent Labs   Lab 03/26/24  1610 03/27/24  0356 03/27/24  1259 03/28/24  0418   * 138 138 139   K 3.9 3.4* 4.0 4.0    108 110 108   CO2 20* 20* 22* 21*   BUN 19 20 20 21*   CREATININE 0.9 0.7 0.7 0.6   CALCIUM 9.6 10.2 9.3 9.0   ALBUMIN 2.6* 2.7*  --  2.2*   PROT 5.7* 6.2  --  5.3*   BILITOT 0.3 0.4  --  0.5   ALKPHOS 100 151*  --  149*   ALT 8* 9*  --  11   AST 22 25  --  19   MG  --  1.9  --  1.9   PHOS  --  3.6  --  5.0*       All pertinent labs within the past 24 hours have been reviewed.    Significant Imaging:  I have reviewed all pertinent imaging results/findings within the past 24 hours.    ABG  Recent Labs   Lab 03/26/24  1347   PH 7.378   PO2 46   PCO2 34.3*   HCO3 20.2*   BE -5*     Assessment/Plan:     Neuro  * Acute encephalopathy  Patient initially presenting to [facility] with CML in blast crisis with fevers, fatigue and encephalopathy.  Admitted to BMT services and started on high dose hydrea as well as  Abx/antivirals/antifungals.  Found to be COVID positive and treating with Remdesivir.  Worsening AMS with concern for leukostasis in setting of blast crisis vs drug induced with patient receiving high dose pain regimen including new gabapentin vs sepsis.  Noted to have abdominal enlargement and tense abdomen in recently CT seen organomegaly.    - NG tube placed  - Continue Remdesivir, broad spectrum Abx, antifungals  - Continue hydrea, oncology following closely  - Hold gabapentin and consider reduction of pain regimen if tolerated, slowly improving mental status  - airway watch, protecting airway currently, discussions with family and agreeable for intubation if needed  - f/u repeat cultures, NGTD    Subdural hematoma  Presented from OSH with new SDH and initially admitted to [facility].  SDH stable and no need for surgical intervention per NSGY/NCC.  Worsening mentation and admitted to MICU on 3/26.  STAT CTH with unchanged size of SDH.  Stable    - Continue Keppra  - neuro checks q4h    Cardiac/Vascular  Hypercholesterolemia  Continue atorvastatin    Essential hypertension  Pt on amlodipine 10mg, metoprolol 25mg BID, and losartan 25mg at home.    Plan  Continued home medications, losartan 100  Per NSGY goal bp <160 due to SDH, prns in place but will adjust bp medications as needed    ID  COVID  COVID positive on 3/24 initially on 4L NC now downtrending associated with AMS and elevated WBC with fevers in setting of blast crisis in CML.     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 Transferred from BMT service for AMS.    - hydrea 3500 BID per BMT  - S/p BMB on 3/27.  Follow up results and BMT  recommendations  - continue acyclovir, allopurinol, fluconazole  - broad spec abx  - GOC discussions    Cancer associated pain  Previously on multimodal pain regimen prior to MICU admission.  Concern for AMS and drug intoxication.  Hold gabapentin and monitor pain    CML (chronic myelocytic leukemia)  See Blast crisis    Endocrine  Diabetes mellitus  Pt has known DM, is on steroids due to COVID. Most likely causing her hyperglycemia. Removed but continued difficulty controlling. Ordered labs to make sure patient is not in DKA/HHS patient BHB 0.2, bicarb 15 then back up to 20, but glucose has remained in the 400s.      Plan  Diabetic diet with thin liquids per speech  Glucose checks AC x HS  Endocrine consulted by BMT services prior to MICU admission with difficult to control hyperglycemia, endocrine recommending insulin drip and managing    Orthopedic  Hyperuricemia  Continue allopurinol       Critical Care Daily Checklist:    A: Awake: RASS Goal/Actual Goal: RASS Goal: 0-->alert and calm  Actual:     B: Spontaneous Breathing Trial Performed?     C: SAT & SBT Coordinated?  N/A                      D: Delirium: CAM-ICU Overall CAM-ICU: Positive   E: Early Mobility Performed? No   F: Feeding Goal: Goals: Meet % EEN, EPN by RD f/u date  Status: Nutrition Goal Status: new   Current Diet Order   Procedures    Diet Clear liquid (no sugar)/Bariatric      AS: Analgesia/Sedation PRN oxycodone   T: Thromboembolic Prophylaxis heparin   H: HOB > 300 Yes   U: Stress Ulcer Prophylaxis (if needed)    G: Glucose Control Bg goal 140-180   B: Bowel Function Stool Occurrence: 1   I: Indwelling Catheter (Lines & Arias) Necessity    D: De-escalation of Antimicrobials/Pharmacotherapies Vanc/Zosyn, fluconazole, acyclovir    Plan for the day/ETD Monitor mental status off cooling blanket and with antipyretics.  Consider reducing pain regimen if correlation with change in mental status    Code Status:  Family/Goals of Care: Full  Code   at bedside       Critical secondary to Patient has a condition that poses threat to life and bodily function: Encephalopathy    Consider stepdown this evening to hospital medicine if continuing to improve      Critical care was time spent personally by me on the following activities: development of treatment plan with patient or surrogate and bedside caregivers, discussions with consultants, evaluation of patient's response to treatment, examination of patient, ordering and performing treatments and interventions, ordering and review of laboratory studies, ordering and review of radiographic studies, pulse oximetry, re-evaluation of patient's condition. This critical care time did not overlap with that of any other provider or involve time for any procedures.     Bobby Cruz MD  Critical Care Medicine  [facility] - Cardiac Medical ICU

## 2024-03-28 NOTE — SUBJECTIVE & OBJECTIVE
Interval History/Significant Events: This early am, patient with mental status change however improved during day.  Mental status changed associated with high fever that improve with antipyretics.    Review of Systems   Unable to perform ROS: Mental status change     Objective:     Vital Signs (Most Recent):  Temp: 99.2 °F (37.3 °C) (03/28/24 1100)  Pulse: 81 (03/28/24 1230)  Resp: 19 (03/28/24 1248)  BP: (!) 114/56 (03/28/24 1230)  SpO2: 99 % (03/28/24 1230) Vital Signs (24h Range):  Temp:  [99.2 °F (37.3 °C)-101.9 °F (38.8 °C)] 99.2 °F (37.3 °C)  Pulse:  [] 81  Resp:  [11-28] 19  SpO2:  [96 %-100 %] 99 %  BP: (106-179)/(56-99) 114/56   Weight: 93 kg (205 lb 0.4 oz)  Body mass index is 36.32 kg/m².      Intake/Output Summary (Last 24 hours) at 3/28/2024 1347  Last data filed at 3/28/2024 1252  Gross per 24 hour   Intake 120 ml   Output 2745 ml   Net -2625 ml          Physical Exam  Constitutional:       General: She is not in acute distress.     Appearance: She is ill-appearing.   HENT:      Head: Normocephalic and atraumatic.      Mouth/Throat:      Mouth: Mucous membranes are moist.      Pharynx: Oropharynx is clear.   Eyes:      General: No scleral icterus.     Extraocular Movements: Extraocular movements intact.   Cardiovascular:      Rate and Rhythm: Normal rate and regular rhythm.   Pulmonary:      Effort: Pulmonary effort is normal. No respiratory distress.      Breath sounds: Rales present.   Abdominal:      General: There is distension.      Palpations: Abdomen is soft.      Tenderness: There is abdominal tenderness in the right lower quadrant and left lower quadrant.      Comments: Has skin lesions on her lower abdomen,    Musculoskeletal:      Right lower leg: No edema.      Left lower leg: No edema.   Skin:     General: Skin is warm and dry.            Vents:     Lines/Drains/Airways       Drain  Duration                  Urethral Catheter 03/26/24 2 days         NG/OG Tube 03/27/24 0700 Left  nostril 1 day              Peripheral Intravenous Line  Duration                  Peripheral IV - Single Lumen 20 G Left Antecubital -- days         Peripheral IV - Single Lumen 03/23/24 20 G Anterior;Right Hand 5 days         Peripheral IV - Single Lumen 03/28/24 0830 20 G Anterior;Left Upper Arm <1 day                  Significant Labs:    CBC/Anemia Profile:  Recent Labs   Lab 03/26/24  1713 03/27/24  0356 03/28/24  0418   WBC 88.47* 72.00* 33.16*   HGB 10.9* 11.1* 9.4*   HCT 37.0 37.5 32.2*    251 135*   MCV 82 82 81*   RDW 21.2* 20.7* 20.4*        Chemistries:  Recent Labs   Lab 03/26/24  1610 03/27/24  0356 03/27/24  1259 03/28/24  0418   * 138 138 139   K 3.9 3.4* 4.0 4.0    108 110 108   CO2 20* 20* 22* 21*   BUN 19 20 20 21*   CREATININE 0.9 0.7 0.7 0.6   CALCIUM 9.6 10.2 9.3 9.0   ALBUMIN 2.6* 2.7*  --  2.2*   PROT 5.7* 6.2  --  5.3*   BILITOT 0.3 0.4  --  0.5   ALKPHOS 100 151*  --  149*   ALT 8* 9*  --  11   AST 22 25  --  19   MG  --  1.9  --  1.9   PHOS  --  3.6  --  5.0*       All pertinent labs within the past 24 hours have been reviewed.    Significant Imaging:  I have reviewed all pertinent imaging results/findings within the past 24 hours.

## 2024-03-28 NOTE — CONSULTS
[facility]  Wound Care    Patient Name:  Fela Ellison   MRN:  [id number]  Date: 3/27/2024  Diagnosis: Acute encephalopathy    History:     Past Medical History:   Diagnosis Date    Cancer     CML (chronic myelocytic leukemia)     DM2 (diabetes mellitus, type 2)     HTN (hypertension)     NAFLD (nonalcoholic fatty liver disease)     Neuropathy        Social History     Socioeconomic History    Marital status:    Tobacco Use    Smoking status: Never    Smokeless tobacco: Never   Substance and Sexual Activity    Alcohol use: Not Currently    Drug use: Not Currently   Social History Narrative    ** Merged History Encounter **          Social Determinants of Health     Financial Resource Strain: High Risk (9/18/2023)    Overall Financial Resource Strain (CARDIA)     Difficulty of Paying Living Expenses: Hard   Food Insecurity: No Food Insecurity (9/18/2023)    Hunger Vital Sign     Worried About Running Out of Food in the Last Year: Never true     Ran Out of Food in the Last Year: Never true   Transportation Needs: No Transportation Needs (9/18/2023)    PRAPARE - Transportation     Lack of Transportation (Medical): No     Lack of Transportation (Non-Medical): No   Physical Activity: Inactive (9/18/2023)    Exercise Vital Sign     Days of Exercise per Week: 0 days     Minutes of Exercise per Session: 0 min   Social Connections: Unknown (9/18/2023)    Social Connection and Isolation Panel [NHANES]     Frequency of Communication with Friends and Family: More than three times a week     Frequency of Social Gatherings with Friends and Family: More than three times a week     Attends Club or Organization Meetings: Never     Marital Status:    Housing Stability: Low Risk  (9/18/2023)    Housing Stability Vital Sign     Unable to Pay for Housing in the Last Year: No     Number of Places Lived in the Last Year: 1     Unstable Housing in the Last Year: No       Precautions:      Allergies as of 03/23/2024    (No Known Allergies)       [facility] Assessment Details/Treatment   Patient seen for wound care consultation from RN for Abdomen  Reviewed chart for this encounter.   See Flow Sheet for findings.    Pt resting in bed.  NAD noted.  Opens eyes slightly and closes.   present in room.  Agree to skin assessment.  Abdomen noted with large bruising and scar tissue across entire abdomen.   states the markings were from medication injections Pt was receiving prior to this hospitalization.   states they have been putting Triamcinolone cream to site daily.  Triamcinolone cream 1% ordered.  Sacrum noted with purple/maroon boggy area.  Applied Foam dressing for protection.  Right heel noted with non-blanchable redness with mild bogginess.  Applied Heel foam pads.  Primary Nurse present in room and will order Heel boots for protection and prevention.       RECOMMENDATIONS:  Abdomen--MD ordered Triamcinolone cream 1% to be applied BID  Sacrum--Mepilex Sacral foam dressing  Right heel-Mepilex Heel foam dressings and Heel lift boots.    Discussed POC with patient and primary nurse.   See EMR for orders & patient education.    Discussed nutrition and the role of protein in wound healing with the patient. Instructed patient to optimize protein for wound healing.    Bedside nursing to continue care & monitoring.  Bedside nursing to maintain pressure injury prevention interventions.     03/27/24 1135   WOCN Assessment   WOCN Total Time (mins) 45   Visit Date 03/27/24   Visit Time 1135   Consult Type New   WOCN Speciality Wound   Wound deep tissue injury;other  (Abdomen with scar tissue)   Number of Wounds 3   Continence Type Urinary;Fecal  (Urine-Arias, Stool-Incontinence)   Intervention assessed;changed;applied;chart review;coordination of care;orders   Teaching on-going  ()   Skin Interventions   Device Skin Pressure Protection absorbent pad utilized/changed;adhesive use  limited;positioning supports utilized;pressure points protected;skin-to-device areas padded   Pressure Reduction Devices foam padding utilized;heel offloading device utilized;positioning supports utilized;pressure-redistributing mattress utilized   Pressure Reduction Techniques weight shift assistance provided   Skin Protection adhesive use limited;incontinence pads utilized;silicone foam dressing in place;skin sealant/moisture barrier applied   Positioning   Body Position left   Head of Bed (HOB) Positioning HOB at 30 degrees   Positioning/Transfer Devices pillows   Pressure Injury Prevention    Check Moisture Management Pad Done   Sacral Foam Dressing Patient incontinent, barrier cream applied   Heel protection technique Pillow   Heel preventative measures Peel back dressing/boot, assess skin and reapply   Check Medical Devices Done        Altered Skin Integrity 03/27/24 1135 Abdomen Other (comment)   Date First Assessed/Time First Assessed: 03/27/24 1135   Location: Abdomen  Primary Wound Type: (c) Other (comment)   Wound Image    Dressing Appearance No dressing;Open to air   Drainage Amount None   Appearance Red;Maroon;Purple;Intact;Dry   Periwound Area Intact;Dry;Ecchymotic;Maroon;Redness;Scar tissue   Wound Edges Undefined   Care Cleansed with:;Soap and water   Dressing Applied  (Triamcinolone cream 1%)   Periwound Care Cleansed with pH balanced cleanser;Dry periwound area maintained        Altered Skin Integrity 03/27/24 1135 Sacral spine Purple or maroon localized area of discolored intact skin or non-intact skin or a blood-filled blister.   Date First Assessed/Time First Assessed: 03/27/24 1135   Location: Sacral spine  Description of Altered Skin Integrity: Purple or maroon localized area of discolored intact skin or non-intact skin or a blood-filled blister.   Wound Image    Description of Altered Skin Integrity Purple or maroon localized area of discolored intact skin or non-intact skin or a blood-filled  blister.   Dressing Appearance Intact   Drainage Amount None   Appearance Red;Maroon;Purple;Moist;Ecchymotic   Periwound Area Intact;Ecchymotic;Maroon;Purple;Redness   Wound Edges Irregular   Wound Length (cm) 3 cm   Wound Width (cm) 3 cm   Wound Depth (cm) 0 cm   Wound Volume (cm^3) 0 cm^3   Wound Surface Area (cm^2) 9 cm^2   Care Cleansed with:;Sterile normal saline   Dressing Foam   Periwound Care Cleansed with pH balanced cleanser;Dry periwound area maintained        Altered Skin Integrity 03/27/24 1135 Right Heel Intact skin with non-blanchable redness of localized area   Date First Assessed/Time First Assessed: 03/27/24 1135   Side: Right  Location: Heel  Description of Altered Skin Integrity: Intact skin with non-blanchable redness of localized area   Wound Image    Description of Altered Skin Integrity Intact skin with non-blanchable redness of localized area   Dressing Appearance Intact   Drainage Amount None   Appearance Intact;Red;Dry;Ecchymotic   Periwound Area Intact;Dry;Ecchymotic;Redness   Wound Length (cm) 1 cm   Wound Width (cm) 3 cm   Wound Depth (cm) 0 cm   Wound Volume (cm^3) 0 cm^3   Wound Surface Area (cm^2) 3 cm^2   Care Cleansed with:;Sterile normal saline   Dressing Foam   Periwound Care Cleansed with pH balanced cleanser;Dry periwound area maintained      Melissa Hogan RN, BSN, CWON  03/27/2024

## 2024-03-29 LAB
ALBUMIN SERPL BCP-MCNC: 2.1 G/DL (ref 3.5–5.2)
ALP SERPL-CCNC: 133 U/L (ref 55–135)
ALT SERPL W/O P-5'-P-CCNC: 11 U/L (ref 10–44)
ANION GAP SERPL CALC-SCNC: 9 MMOL/L (ref 8–16)
ANISOCYTOSIS BLD QL SMEAR: SLIGHT
AST SERPL-CCNC: 17 U/L (ref 10–40)
BACTERIA BLD CULT: NORMAL
BACTERIA BLD CULT: NORMAL
BASOPHILS # BLD AUTO: 0.01 K/UL (ref 0–0.2)
BASOPHILS NFR BLD: 0.1 % (ref 0–1.9)
BCR/ABL RESULT, P190, QUANT, BM: NORMAL
BCR/ABL,P210 RESULT: NORMAL
BILIRUB SERPL-MCNC: 0.5 MG/DL (ref 0.1–1)
BUN SERPL-MCNC: 17 MG/DL (ref 6–20)
CALCIUM SERPL-MCNC: 9.1 MG/DL (ref 8.7–10.5)
CHLORIDE SERPL-SCNC: 109 MMOL/L (ref 95–110)
CO2 SERPL-SCNC: 22 MMOL/L (ref 23–29)
CREAT SERPL-MCNC: 0.5 MG/DL (ref 0.5–1.4)
CRP SERPL-MCNC: 229.19 MG/L (ref 0–3.19)
CRP SERPL-MCNC: 230.43 MG/L (ref 0–3.19)
CRP SERPL-MCNC: 230.43 MG/L (ref 0–3.19)
DIAGNOSTIC BCR/ABL 1 RESULT: NORMAL
DIFFERENTIAL METHOD BLD: ABNORMAL
DNA/RNA EXTRACT AND HOLD RESULT: NORMAL
DNA/RNA EXTRACTION: NORMAL
EOSINOPHIL # BLD AUTO: 0 K/UL (ref 0–0.5)
EOSINOPHIL NFR BLD: 0 % (ref 0–8)
ERYTHROCYTE [DISTWIDTH] IN BLOOD BY AUTOMATED COUNT: 19.9 % (ref 11.5–14.5)
EST. GFR  (NO RACE VARIABLE): >60 ML/MIN/1.73 M^2
EXHR SPECIMEN TYPE: NORMAL
FIBRINOGEN PPP-MCNC: 735 MG/DL (ref 182–400)
GLUCOSE SERPL-MCNC: 177 MG/DL (ref 70–110)
HCT VFR BLD AUTO: 29.6 % (ref 37–48.5)
HGB BLD-MCNC: 8.6 G/DL (ref 12–16)
HYPOCHROMIA BLD QL SMEAR: ABNORMAL
IMM GRANULOCYTES # BLD AUTO: 0.27 K/UL (ref 0–0.04)
IMM GRANULOCYTES NFR BLD AUTO: 2.5 % (ref 0–0.5)
LDH SERPL L TO P-CCNC: 1903 U/L (ref 110–260)
LYMPHOCYTES # BLD AUTO: 2.7 K/UL (ref 1–4.8)
LYMPHOCYTES NFR BLD: 24.9 % (ref 18–48)
MAGNESIUM SERPL-MCNC: 1.9 MG/DL (ref 1.6–2.6)
MCH RBC QN AUTO: 23.8 PG (ref 27–31)
MCHC RBC AUTO-ENTMCNC: 29.1 G/DL (ref 32–36)
MCV RBC AUTO: 82 FL (ref 82–98)
MONOCYTES # BLD AUTO: 3.9 K/UL (ref 0.3–1)
MONOCYTES NFR BLD: 35.7 % (ref 4–15)
NARRATIVE DIAGNOSTIC REPORT-IMP: NORMAL
NEUTROPHILS # BLD AUTO: 4 K/UL (ref 1.8–7.7)
NEUTROPHILS NFR BLD: 36.8 % (ref 38–73)
NRBC BLD-RTO: 1 /100 WBC
PATH REPORT.FINAL DX SPEC: NORMAL
PATH REPORT.FINAL DX SPEC: NORMAL
PHOSPHATE SERPL-MCNC: 4.1 MG/DL (ref 2.7–4.5)
PLATELET # BLD AUTO: 73 K/UL (ref 150–450)
PLATELET BLD QL SMEAR: ABNORMAL
PMV BLD AUTO: ABNORMAL FL (ref 9.2–12.9)
POCT GLUCOSE: 119 MG/DL (ref 70–110)
POCT GLUCOSE: 122 MG/DL (ref 70–110)
POCT GLUCOSE: 129 MG/DL (ref 70–110)
POCT GLUCOSE: 142 MG/DL (ref 70–110)
POCT GLUCOSE: 149 MG/DL (ref 70–110)
POCT GLUCOSE: 166 MG/DL (ref 70–110)
POCT GLUCOSE: 168 MG/DL (ref 70–110)
POCT GLUCOSE: 168 MG/DL (ref 70–110)
POCT GLUCOSE: 169 MG/DL (ref 70–110)
POCT GLUCOSE: 173 MG/DL (ref 70–110)
POCT GLUCOSE: 174 MG/DL (ref 70–110)
POCT GLUCOSE: 176 MG/DL (ref 70–110)
POCT GLUCOSE: 182 MG/DL (ref 70–110)
POCT GLUCOSE: 190 MG/DL (ref 70–110)
POCT GLUCOSE: 217 MG/DL (ref 70–110)
POTASSIUM SERPL-SCNC: 3.8 MMOL/L (ref 3.5–5.1)
PROT SERPL-MCNC: 5.2 G/DL (ref 6–8.4)
RBC # BLD AUTO: 3.61 M/UL (ref 4–5.4)
SCHISTOCYTES BLD QL SMEAR: ABNORMAL
SODIUM SERPL-SCNC: 140 MMOL/L (ref 136–145)
SPECIMEN SOURCE: NORMAL
SPECIMEN TYPE, P190, QUANT, BM: NORMAL
SPECIMEN TYPE: NORMAL
URATE SERPL-MCNC: 3.7 MG/DL (ref 2.4–5.7)
URATE SERPL-MCNC: 3.7 MG/DL (ref 2.4–5.7)
WBC # BLD AUTO: 10.95 K/UL (ref 3.9–12.7)

## 2024-03-29 PROCEDURE — 25000003 PHARM REV CODE 250: Performed by: PHYSICIAN ASSISTANT

## 2024-03-29 PROCEDURE — 63600175 PHARM REV CODE 636 W HCPCS

## 2024-03-29 PROCEDURE — 85025 COMPLETE CBC W/AUTO DIFF WBC: CPT | Performed by: INTERNAL MEDICINE

## 2024-03-29 PROCEDURE — 63700000 PHARM REV CODE 250 ALT 637 W/O HCPCS

## 2024-03-29 PROCEDURE — 83735 ASSAY OF MAGNESIUM: CPT | Performed by: INTERNAL MEDICINE

## 2024-03-29 PROCEDURE — 27000207 HC ISOLATION

## 2024-03-29 PROCEDURE — 20600001 HC STEP DOWN PRIVATE ROOM

## 2024-03-29 PROCEDURE — 25000003 PHARM REV CODE 250

## 2024-03-29 PROCEDURE — 85384 FIBRINOGEN ACTIVITY: CPT | Performed by: INTERNAL MEDICINE

## 2024-03-29 PROCEDURE — 36415 COLL VENOUS BLD VENIPUNCTURE: CPT | Mod: XB | Performed by: INTERNAL MEDICINE

## 2024-03-29 PROCEDURE — 86141 C-REACTIVE PROTEIN HS: CPT | Performed by: INTERNAL MEDICINE

## 2024-03-29 PROCEDURE — 63600175 PHARM REV CODE 636 W HCPCS: Performed by: INTERNAL MEDICINE

## 2024-03-29 PROCEDURE — 27000221 HC OXYGEN, UP TO 24 HOURS

## 2024-03-29 PROCEDURE — 80053 COMPREHEN METABOLIC PANEL: CPT | Performed by: INTERNAL MEDICINE

## 2024-03-29 PROCEDURE — 84550 ASSAY OF BLOOD/URIC ACID: CPT | Performed by: INTERNAL MEDICINE

## 2024-03-29 PROCEDURE — 99233 SBSQ HOSP IP/OBS HIGH 50: CPT | Mod: ,,, | Performed by: INTERNAL MEDICINE

## 2024-03-29 PROCEDURE — 99233 SBSQ HOSP IP/OBS HIGH 50: CPT | Mod: ,,, | Performed by: NURSE PRACTITIONER

## 2024-03-29 PROCEDURE — 94761 N-INVAS EAR/PLS OXIMETRY MLT: CPT

## 2024-03-29 PROCEDURE — 63600175 PHARM REV CODE 636 W HCPCS: Mod: JZ,JG | Performed by: PHYSICIAN ASSISTANT

## 2024-03-29 PROCEDURE — 25000003 PHARM REV CODE 250: Performed by: INTERNAL MEDICINE

## 2024-03-29 PROCEDURE — 99900035 HC TECH TIME PER 15 MIN (STAT)

## 2024-03-29 PROCEDURE — 25000003 PHARM REV CODE 250: Performed by: STUDENT IN AN ORGANIZED HEALTH CARE EDUCATION/TRAINING PROGRAM

## 2024-03-29 PROCEDURE — 83615 LACTATE (LD) (LDH) ENZYME: CPT | Performed by: INTERNAL MEDICINE

## 2024-03-29 PROCEDURE — 84100 ASSAY OF PHOSPHORUS: CPT | Performed by: INTERNAL MEDICINE

## 2024-03-29 RX ORDER — HYDROMORPHONE HYDROCHLORIDE 1 MG/ML
1 INJECTION, SOLUTION INTRAMUSCULAR; INTRAVENOUS; SUBCUTANEOUS
Status: DISCONTINUED | OUTPATIENT
Start: 2024-03-29 | End: 2024-04-01 | Stop reason: HOSPADM

## 2024-03-29 RX ORDER — ACETAMINOPHEN 500 MG
1000 TABLET ORAL EVERY 8 HOURS PRN
Status: DISCONTINUED | OUTPATIENT
Start: 2024-03-29 | End: 2024-03-30

## 2024-03-29 RX ADMIN — HYDROXYUREA 2500 MG: 500 CAPSULE ORAL at 08:03

## 2024-03-29 RX ADMIN — MUPIROCIN: 20 OINTMENT TOPICAL at 08:03

## 2024-03-29 RX ADMIN — FAMOTIDINE 20 MG: 20 TABLET ORAL at 08:03

## 2024-03-29 RX ADMIN — OXYCODONE HYDROCHLORIDE 10 MG: 10 TABLET ORAL at 11:03

## 2024-03-29 RX ADMIN — PIPERACILLIN SODIUM AND TAZOBACTAM SODIUM 4.5 G: 4; .5 INJECTION, POWDER, FOR SOLUTION INTRAVENOUS at 05:03

## 2024-03-29 RX ADMIN — HYDROMORPHONE HYDROCHLORIDE 1 MG: 1 INJECTION, SOLUTION INTRAMUSCULAR; INTRAVENOUS; SUBCUTANEOUS at 05:03

## 2024-03-29 RX ADMIN — METOPROLOL TARTRATE 25 MG: 25 TABLET, FILM COATED ORAL at 08:03

## 2024-03-29 RX ADMIN — ACETAMINOPHEN 650 MG: 325 TABLET ORAL at 12:03

## 2024-03-29 RX ADMIN — ATORVASTATIN CALCIUM 40 MG: 40 TABLET, FILM COATED ORAL at 08:03

## 2024-03-29 RX ADMIN — HEPARIN SODIUM 5000 UNITS: 5000 INJECTION INTRAVENOUS; SUBCUTANEOUS at 05:03

## 2024-03-29 RX ADMIN — ACETAMINOPHEN 650 MG: 325 TABLET ORAL at 05:03

## 2024-03-29 RX ADMIN — OXYCODONE HYDROCHLORIDE 10 MG: 10 TABLET ORAL at 03:03

## 2024-03-29 RX ADMIN — HYDROXYZINE HYDROCHLORIDE 25 MG: 25 TABLET, FILM COATED ORAL at 12:03

## 2024-03-29 RX ADMIN — HYDROMORPHONE HYDROCHLORIDE 0.5 MG: 1 INJECTION, SOLUTION INTRAMUSCULAR; INTRAVENOUS; SUBCUTANEOUS at 05:03

## 2024-03-29 RX ADMIN — VANCOMYCIN HYDROCHLORIDE 1250 MG: 1.25 INJECTION, POWDER, LYOPHILIZED, FOR SOLUTION INTRAVENOUS at 08:03

## 2024-03-29 RX ADMIN — ACETAMINOPHEN 1000 MG: 500 TABLET ORAL at 10:03

## 2024-03-29 RX ADMIN — HYDROXYUREA 1000 MG: 500 CAPSULE ORAL at 09:03

## 2024-03-29 RX ADMIN — OXYCODONE HYDROCHLORIDE 10 MG: 10 TABLET ORAL at 07:03

## 2024-03-29 RX ADMIN — PIPERACILLIN SODIUM AND TAZOBACTAM SODIUM 4.5 G: 4; .5 INJECTION, POWDER, FOR SOLUTION INTRAVENOUS at 11:03

## 2024-03-29 RX ADMIN — MONTELUKAST 10 MG: 10 TABLET, FILM COATED ORAL at 08:03

## 2024-03-29 RX ADMIN — HYDROMORPHONE HYDROCHLORIDE 1 MG: 1 INJECTION, SOLUTION INTRAMUSCULAR; INTRAVENOUS; SUBCUTANEOUS at 08:03

## 2024-03-29 RX ADMIN — LEVETIRACETAM 500 MG: 500 SOLUTION ORAL at 09:03

## 2024-03-29 RX ADMIN — ALLOPURINOL 300 MG: 100 TABLET ORAL at 08:03

## 2024-03-29 RX ADMIN — ACETAMINOPHEN 650 MG: 325 TABLET ORAL at 11:03

## 2024-03-29 RX ADMIN — PIPERACILLIN SODIUM AND TAZOBACTAM SODIUM 4.5 G: 4; .5 INJECTION, POWDER, FOR SOLUTION INTRAVENOUS at 01:03

## 2024-03-29 RX ADMIN — AMLODIPINE BESYLATE 10 MG: 10 TABLET ORAL at 08:03

## 2024-03-29 RX ADMIN — ACYCLOVIR 400 MG: 200 CAPSULE ORAL at 08:03

## 2024-03-29 RX ADMIN — HYDROMORPHONE HYDROCHLORIDE 1 MG: 1 INJECTION, SOLUTION INTRAMUSCULAR; INTRAVENOUS; SUBCUTANEOUS at 02:03

## 2024-03-29 RX ADMIN — PIPERACILLIN SODIUM AND TAZOBACTAM SODIUM 4.5 G: 4; .5 INJECTION, POWDER, FOR SOLUTION INTRAVENOUS at 08:03

## 2024-03-29 RX ADMIN — HEPARIN SODIUM 5000 UNITS: 5000 INJECTION INTRAVENOUS; SUBCUTANEOUS at 10:03

## 2024-03-29 RX ADMIN — INSULIN HUMAN 5.5 UNITS/HR: 1 INJECTION, SOLUTION INTRAVENOUS at 04:03

## 2024-03-29 RX ADMIN — TRIAMCINOLONE ACETONIDE: 1 CREAM TOPICAL at 08:03

## 2024-03-29 RX ADMIN — HYDROMORPHONE HYDROCHLORIDE 0.5 MG: 1 INJECTION, SOLUTION INTRAMUSCULAR; INTRAVENOUS; SUBCUTANEOUS at 03:03

## 2024-03-29 RX ADMIN — LOSARTAN POTASSIUM 100 MG: 50 TABLET, FILM COATED ORAL at 08:03

## 2024-03-29 RX ADMIN — LABETALOL HYDROCHLORIDE 10 MG: 5 INJECTION, SOLUTION INTRAVENOUS at 04:03

## 2024-03-29 RX ADMIN — HEPARIN SODIUM 5000 UNITS: 5000 INJECTION INTRAVENOUS; SUBCUTANEOUS at 01:03

## 2024-03-29 RX ADMIN — LABETALOL HYDROCHLORIDE 10 MG: 5 INJECTION, SOLUTION INTRAVENOUS at 08:03

## 2024-03-29 RX ADMIN — FLUCONAZOLE 400 MG: 100 TABLET ORAL at 08:03

## 2024-03-29 RX ADMIN — HYDROMORPHONE HYDROCHLORIDE 0.5 MG: 1 INJECTION, SOLUTION INTRAMUSCULAR; INTRAVENOUS; SUBCUTANEOUS at 10:03

## 2024-03-29 RX ADMIN — HYDROMORPHONE HYDROCHLORIDE 0.5 MG: 1 INJECTION, SOLUTION INTRAMUSCULAR; INTRAVENOUS; SUBCUTANEOUS at 12:03

## 2024-03-29 NOTE — ASSESSMENT & PLAN NOTE
Pt presented from OSH with new SDH. Pt was initially admitted to neuro critical care. SDH felt to be small/stable and did not need to undergo surgical intervention. Pt placed on keppra for seizure proph. Pt PLT count is in the 200s. Experienced worsening confusion, lethargy on 3/26 Stat CT head ordered    Plan  Continue Keppra x7 days per NICU note  Neuro checks q4h

## 2024-03-29 NOTE — SUBJECTIVE & OBJECTIVE
Interval History/Significant Events: AF HDS. Patient continues to report pain. No other events at this time. Stable for step down to BMT service.    Review of Systems   Unable to perform ROS: Mental status change     Objective:     Vital Signs (Most Recent):  Temp: 98.9 °F (37.2 °C) (03/29/24 0701)  Pulse: 88 (03/29/24 1100)  Resp: 16 (03/29/24 1146)  BP: (!) 163/75 (03/29/24 1100)  SpO2: 99 % (03/29/24 1100) Vital Signs (24h Range):  Temp:  [98.7 °F (37.1 °C)-100.5 °F (38.1 °C)] 98.9 °F (37.2 °C)  Pulse:  [] 88  Resp:  [11-28] 16  SpO2:  [96 %-100 %] 99 %  BP: (114-176)/(56-92) 163/75   Weight: 93 kg (205 lb 0.4 oz)  Body mass index is 36.32 kg/m².      Intake/Output Summary (Last 24 hours) at 3/29/2024 1217  Last data filed at 3/29/2024 1100  Gross per 24 hour   Intake 2244.37 ml   Output 1950 ml   Net 294.37 ml          Physical Exam  Vitals and nursing note reviewed.   Constitutional:       General: She is not in acute distress.     Appearance: She is ill-appearing.      Comments: Ill-appearing on NC.    HENT:      Head: Normocephalic and atraumatic.      Mouth/Throat:      Mouth: Mucous membranes are moist.      Pharynx: Oropharynx is clear.   Eyes:      General: No scleral icterus.     Extraocular Movements: Extraocular movements intact.   Cardiovascular:      Rate and Rhythm: Normal rate and regular rhythm.   Pulmonary:      Effort: Pulmonary effort is normal. No respiratory distress.      Breath sounds: No rales.      Comments: Non-labored breathing on NC.   Abdominal:      General: Abdomen is flat. There is distension.      Palpations: Abdomen is soft.      Tenderness: There is abdominal tenderness in the right lower quadrant and left lower quadrant.      Comments: Has skin lesions on her lower abdomen,    Musculoskeletal:         General: Normal range of motion.      Right lower leg: No edema.      Left lower leg: No edema.   Skin:     General: Skin is warm and dry.   Neurological:      General: No  focal deficit present.      Mental Status: She is oriented to person, place, and time.   Psychiatric:         Mood and Affect: Mood normal.            Vents:     Lines/Drains/Airways       Drain  Duration                  Urethral Catheter 03/26/24 3 days         NG/OG Tube 03/27/24 0700 Left nostril 2 days              Peripheral Intravenous Line  Duration                  Peripheral IV - Single Lumen 20 G Left Antecubital -- days         Peripheral IV - Single Lumen 03/28/24 0830 20 G Anterior;Left Upper Arm 1 day         Peripheral IV - Single Lumen 03/28/24 1430 20 G Anterior;Right Forearm <1 day                  Significant Labs:    CBC/Anemia Profile:  Recent Labs   Lab 03/28/24  0418 03/29/24  0705   WBC 33.16* 10.95   HGB 9.4* 8.6*   HCT 32.2* 29.6*   *  --    MCV 81* 82   RDW 20.4* 19.9*        Chemistries:  Recent Labs   Lab 03/27/24  1259 03/28/24  0418 03/29/24  0709    139 140   K 4.0 4.0 3.8    108 109   CO2 22* 21* 22*   BUN 20 21* 17   CREATININE 0.7 0.6 0.5   CALCIUM 9.3 9.0 9.1   ALBUMIN  --  2.2* 2.1*   PROT  --  5.3* 5.2*   BILITOT  --  0.5 0.5   ALKPHOS  --  149* 133   ALT  --  11 11   AST  --  19 17   MG  --  1.9 1.9   PHOS  --  5.0* 4.1       All pertinent labs within the past 24 hours have been reviewed.    Significant Imaging:  I have reviewed all pertinent imaging results/findings within the past 24 hours.

## 2024-03-29 NOTE — ASSESSMENT & PLAN NOTE
Pt on amlodipine 10mg, metoprolol 25mg BID, and losartan 25mg at home.    -- Restart home medications as tolerated  -- SBP goal < 180 or lower if new symptoms

## 2024-03-29 NOTE — ASSESSMENT & PLAN NOTE
Presented from OSH with new SDH and initially admitted to NCC.  SDH stable and no need for surgical intervention per NSGY/NCC.  Worsening mentation and admitted to MICU on 3/26.  STAT CTH with unchanged size of SDH.  Stable    -- Stable, no intervention per NSGY  -- Continue Keppra  -- Neuro checks q4h

## 2024-03-29 NOTE — SUBJECTIVE & OBJECTIVE
Subjective:     Interval History: Patient vitally stable overnight with no acute events. She remains on low NC supplementation. Labs this AM show WBC of 10.9, H/H of 8.6/29, & plt of 135K. Tmax over the last 24 hours of 100.5F and remains on Vancomycin/Zosyn for broad spectrum coverage. Endocrine consulted for steroid induced hyperglycemia and is currently on insulin drip. Patient and family requested palliative care consult yesterday to explore GOC further given guarded prognosis. Patient and family updated regarding plan for downgrade from the ICU and was agreeable and understanding.     Objective:     Vital Signs (Most Recent):  Temp: 98.9 °F (37.2 °C) (03/29/24 0701)  Pulse: 88 (03/29/24 1100)  Resp: 14 (03/29/24 1100)  BP: (!) 163/75 (03/29/24 1100)  SpO2: 99 % (03/29/24 1100) Vital Signs (24h Range):  Temp:  [98.7 °F (37.1 °C)-100.5 °F (38.1 °C)] 98.9 °F (37.2 °C)  Pulse:  [] 88  Resp:  [11-28] 14  SpO2:  [96 %-100 %] 99 %  BP: (114-176)/(56-92) 163/75     Weight: 93 kg (205 lb 0.4 oz)  Body mass index is 36.32 kg/m².  Body surface area is 2.03 meters squared.    ECOG SCORE           [unfilled]    Intake/Output - Last 3 Shifts         03/27 0700  03/28 0659 03/28 0700 03/29 0659 03/29 0700 03/30 0659    P.O. 120      I.V. (mL/kg) 64.6 (0.7) 334.2 (3.6) 67.7 (0.7)    IV Piggyback 680.1 1459.1 383.3    Total Intake(mL/kg) 864.7 (9.3) 1793.3 (19.3) 451.1 (4.9)    Urine (mL/kg/hr) 1890 (0.8) 1915 (0.9) 425 (1)    Emesis/NG output 0 0     Other  0     Stool 0 0     Blood 0 0     Total Output 1890 1915 425    Net -1025.3 -121.7 +26.1           Urine Occurrence  0 x     Stool Occurrence  0 x     Emesis Occurrence  0 x              Physical Exam  Vitals and nursing note reviewed.   Constitutional:       Comments: Ill-appearing on NC.    Cardiovascular:      Rate and Rhythm: Normal rate and regular rhythm.   Pulmonary:      Comments: Non-labored breathing on NC.   Abdominal:      General: Abdomen is flat.       Palpations: Abdomen is soft.   Musculoskeletal:         General: Normal range of motion.   Neurological:      General: No focal deficit present.      Mental Status: She is oriented to person, place, and time.   Psychiatric:         Mood and Affect: Mood normal.            Significant Labs:   All pertinent labs from the last 24 hours have been reviewed.    Diagnostic Results:  I have reviewed all pertinent imaging results/findings within the past 24 hours.

## 2024-03-29 NOTE — PROGRESS NOTES
[facility] - Cardiac Medical ICU  Hematology  Bone Marrow Transplant  Progress Note    Patient Name: Fela Ellison  Admission Date: 3/23/2024  Hospital Length of Stay: 6 days  Code Status: Full Code    Subjective:     Interval History: Patient vitally stable overnight with no acute events. She remains on low NC supplementation. Labs this AM show WBC of 10.9, H/H of 8.6/29, & plt of 135K. Tmax over the last 24 hours of 100.5F and remains on Vancomycin/Zosyn for broad spectrum coverage. Endocrine consulted for steroid induced hyperglycemia and is currently on insulin drip. Patient and family requested palliative care consult yesterday to explore GOC further given guarded prognosis. Patient and family updated regarding plan for downgrade from the ICU and was agreeable and understanding.     Objective:     Vital Signs (Most Recent):  Temp: 98.9 °F (37.2 °C) (03/29/24 0701)  Pulse: 88 (03/29/24 1100)  Resp: 16 (03/29/24 1146)  BP: (!) 163/75 (03/29/24 1100)  SpO2: 99 % (03/29/24 1100) Vital Signs (24h Range):  Temp:  [98.7 °F (37.1 °C)-100.5 °F (38.1 °C)] 98.9 °F (37.2 °C)  Pulse:  [] 88  Resp:  [11-28] 16  SpO2:  [96 %-100 %] 99 %  BP: (114-176)/(56-92) 163/75     Weight: 93 kg (205 lb 0.4 oz)  Body mass index is 36.32 kg/m².  Body surface area is 2.03 meters squared.    ECOG SCORE           [unfilled]    Intake/Output - Last 3 Shifts         03/27 0700  03/28 0659 03/28 0700 03/29 0659 03/29 0700 03/30 0659    P.O. 120      I.V. (mL/kg) 64.6 (0.7) 334.2 (3.6) 67.7 (0.7)    IV Piggyback 680.1 1459.1 383.3    Total Intake(mL/kg) 864.7 (9.3) 1793.3 (19.3) 451.1 (4.9)    Urine (mL/kg/hr) 1890 (0.8) 1915 (0.9) 800 (1.6)    Emesis/NG output 0 0     Other  0     Stool 0 0     Blood 0 0     Total Output 1890 1915 800    Net -1025.3 -121.7 -348.9           Urine Occurrence  0 x     Stool Occurrence  0 x     Emesis Occurrence  0 x              Physical Exam  Vitals and nursing note reviewed.   Constitutional:        Appearance: Normal appearance.      Comments: Ill-appearing female with AMS.    Cardiovascular:      Rate and Rhythm: Normal rate and regular rhythm.      Pulses: Normal pulses.      Heart sounds: Normal heart sounds.   Pulmonary:      Effort: Pulmonary effort is normal.      Comments: Non-labored breathing on NC.   Abdominal:      Comments: Diffuse abdominal scarring.    Neurological:      Mental Status: She is disoriented.      Comments: Not oriented times 3 on AM rounds, but waxing and waning.    Psychiatric:      Comments: Non-verbal on AM rounds.             Significant Labs:   All pertinent labs from the last 24 hours have been reviewed.    Diagnostic Results:  I have reviewed all pertinent imaging results/findings within the past 24 hours.  Assessment/Plan:     * Acute encephalopathy  Likely multi-factorial secondary SDH, Blast crisis, & COVID-19 with fevers    Plan:  -Monitor for acute changes in mental status, low threshold for further work-up with CT Brain        Hyperglycemia  Pt has known DM, is on steroids due to COVID. Most likely causing her hyperglycemia. Removed but continued difficulty controlling. Ordered labs to make sure patient is not in DKA/HHS patient BHB 0.2, bicarb 15 then back up to 20, but glucose has remained in the 400s.     Plan  Diabetic diet with thin liquids per speech (went to bariatric clear with starting insulin drip)  Glucose checks AC x HS  Placed on insulin drip, improvement in glucose control  Consulted Endocrine with difficult to control hyperglycemia      COVID  Pt tested positive for covid on 3/24. Initially on 4L NC, has decreased to 2L. Pt's mental status has improved. WBC count 50K.     Plan  Remdesivir course and Dexamethasone completed  Wean O2 as able        Subdural hematoma  Pt presented from OSH with new SDH. Pt was initially admitted to neuro critical care. SDH felt to be small/stable and did not need to undergo surgical intervention. Pt placed on keppra for  "seizure proph. Pt PLT count is in the 200s. 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     Plan  Continue home medications, increased losartan to 100  Goal BP <160 per neurosurgery with SDH  PRN in place if needed, will increase BP medications as needed    Diabetes mellitus  Currently on Insulin drip with Endocrine consulted      Plan:  -Follow-up Endocrine recs and wean insulin drip as able      CML (chronic myelocytic leukemia)  58F with relapsed CML with blast crisis (transformed on 2022) admitted for fevers, fatigue, encephalopathy and found to have a SDH that NSGY does not recommend intervention on. Encephalopathic when seen and unable to follow commands.     Labs show clear evidence of blast crisis (29% on CBC) without cytopenias. This is in the setting of what may be sepsis vs fevers from leukemia.She normally follows at [facility] fellows clinic and has progressed through multiple treatments.   Discussed aggressive and unstable disease leading to her presentation here.     Marrow consistent with CML with blast crisis "     Plan:   -Continue hydrea at 1,000mg BID, likely decrease further on 3/30/24  -Bone marrow biopsy on 3/27 with concern for worsening disease progression  -continue acyclovir, allopurinol, fluc  -Discontinue Vancomycin today, continue Zosyn  -prognosis guarded, will discuss goals of care moving forward, at this time hospice would be appropriate  -Follow-up palliative consult  -Continue Ponatinib, based on clinical course and GOC talks can discuss asciminib vs. other for additional treatment        VTE Risk Mitigation (From admission, onward)           Ordered     heparin " (porcine) injection 5,000 Units  Every 8 hours         03/28/24 1201     IP VTE HIGH RISK PATIENT  Once         03/23/24 2209     Place sequential compression device  Until discontinued         03/23/24 2209     Reason for No Pharmacological VTE Prophylaxis  Once        Question:  Reasons:  Answer:  Active Bleeding  Comment:  SDH    03/23/24 2209                    Disposition: Downgrade to BMT Service.     Maged Garcia, DO  Bone Marrow Transplant  [facility] - Cardiac Medical ICU

## 2024-03-29 NOTE — ASSESSMENT & PLAN NOTE
Pt has known DM, is on steroids due to COVID. Most likely causing her hyperglycemia. Removed but continued difficulty controlling. Ordered labs to make sure patient is not in DKA/HHS patient BHB 0.2, bicarb 15 then back up to 20, but glucose has remained in the 400s.      -- Diabetic diet with thin liquids per speech when tolerated  -- Glucose checks AC x HS  -- Endocrine consulted by BMT services prior to MICU admission with difficult to control hyperglycemia; endocrine recommending insulin gtt and managing

## 2024-03-29 NOTE — ASSESSMENT & PLAN NOTE
Previously on multimodal pain regimen prior to MICU admission.  Concern for AMS and drug intoxication.      -- Hold gabapentin and monitor pain  -- PRN dilaudid + oxy for pain  -- Appreciate Pal Care assistance

## 2024-03-29 NOTE — ASSESSMENT & PLAN NOTE
COVID positive on 3/24 initially on 4L NC now downtrending associated with AMS and elevated WBC with fevers in setting of blast crisis in CML.     -- Now on RA  -- S/P Remdesivir x 5 days  -- Vanc/Zosyn with sepsis concern and AMS, slowly improving, de-escalating Vancomycin as appropriate  -- F/U repeat cultures, NGTD  -- Weaned off O2, now RA  -- Steroids discontinued previously with difficult to control BG and now weaned off supplemental O2

## 2024-03-29 NOTE — SUBJECTIVE & OBJECTIVE
"Interval HPI:   Overnight events: Remains in 6077. NAEON. BG within goal ranges on IV intensive insulin protocol with infusion rates ranging from 5.5-12.5 u/hr overnight. Diet Clear liquid (no sugar)/Bariatric    Eating:   <25%  Nausea: No  Hypoglycemia and intervention: No  Fever: No  TPN and/or TF: No  If yes, type of TF/TPN and rate: n/a    BP (!) 163/75 (BP Location: Right arm, Patient Position: Lying)   Pulse 88   Temp 98.9 °F (37.2 °C) (Axillary)   Resp 16   Ht 5' 3" (1.6 m)   Wt 93 kg (205 lb 0.4 oz)   SpO2 99%   BMI 36.32 kg/m²     Labs Reviewed and Include    Recent Labs   Lab 03/29/24  0709   *   CALCIUM 9.1   ALBUMIN 2.1*   PROT 5.2*      K 3.8   CO2 22*      BUN 17   CREATININE 0.5   ALKPHOS 133   ALT 11   AST 17   BILITOT 0.5     Lab Results   Component Value Date    WBC 10.95 03/29/2024    HGB 8.6 (L) 03/29/2024    HCT 29.6 (L) 03/29/2024    MCV 82 03/29/2024     (L) 03/28/2024     Recent Labs   Lab 03/24/24  0127   TSH 1.428     Lab Results   Component Value Date    HGBA1C 6.2 (H) 03/24/2024       Nutritional status:   Body mass index is 36.32 kg/m².  Lab Results   Component Value Date    ALBUMIN 2.1 (L) 03/29/2024    ALBUMIN 2.2 (L) 03/28/2024    ALBUMIN 2.7 (L) 03/27/2024     No results found for: "PREALBUMIN"    Estimated Creatinine Clearance: 136.1 mL/min (based on SCr of 0.5 mg/dL).    Accu-Checks  Recent Labs     03/29/24  0037 03/29/24  0340 03/29/24  0445 03/29/24  0455 03/29/24  0553 03/29/24  0750 03/29/24  0837 03/29/24  0928 03/29/24  1042 03/29/24  1139   POCTGLUCOSE 129* 142* 166* 169* 173* 168* 168* 190* 182* 149*       Current Medications and/or Treatments Impacting Glycemic Control  Immunotherapy:    Immunosuppressants       None          Steroids:   Hormones (From admission, onward)      None          Pressors:    Autonomic Drugs (From admission, onward)      None          Hyperglycemia/Diabetes Medications:   Antihyperglycemics (From admission, " onward)      Start     Stop Route Frequency Ordered    03/26/24 1615  insulin regular in 0.9 % NaCl 100 unit/100 mL (1 unit/mL) infusion        Question:  Enter initial dose from Infusion Protocol Chart (Units/hr):  Answer:  4    -- IV Continuous 03/26/24 5215

## 2024-03-29 NOTE — ASSESSMENT & PLAN NOTE
Pt tested positive for covid on 3/24. Initially on 4L NC, has decreased to 2L. Pt's mental status has improved. WBC count 50K.     Plan  Remdesivir course and Dexamethasone completed  Wean O2 as able

## 2024-03-29 NOTE — ASSESSMENT & PLAN NOTE
Patient initially presenting to [facility] with CML in blast crisis with fevers, fatigue and encephalopathy.  Admitted to BMT services and started on high dose hydrea as well as Abx/antivirals/antifungals.  Found to be COVID positive and treating with Remdesivir.      A/P: Worsening AMS with concern for leukostasis in setting of blast crisis vs drug induced with patient receiving high dose pain regimen including new gabapentin vs sepsis     -- NG tube placed; dietary consult for TF  -- Slowly improving mental status; continues to be disoriented x 4, but responds to pain (groans)  -- Airway watch, protecting airway currently, discussions with family and agreeable for intubation if needed

## 2024-03-29 NOTE — ASSESSMENT & PLAN NOTE
Likely multi-factorial secondary SDH, Blast crisis, & COVID-19 with fevers    Plan:  -Monitor for acute changes in mental status, low threshold for further work-up with CT Brain

## 2024-03-29 NOTE — ASSESSMENT & PLAN NOTE
BG goal: 140-180   T2DM. BG above goal here. Receiving steroids (d/c 3/25) and was on lower doses than reported home doses.  Bg improving on IIP but on high drip rates.  Will continue on IIP and consider transition to Transition drip when mental status improves and patient ready for diet advancement. Spacing BG checks to q 2 hrs to help determine a transition insulin infusion rate     - Continue insulin infusion protocol   - Change POCT Glucose every 2 hours   - Hypoglycemia protocol in place      ** Please notify Endocrine for any change and/or advance in diet**  ** Please call Endocrine for any BG related issues **     Discharge Planning:   TBD. Please notify endocrine prior to discharge.

## 2024-03-29 NOTE — ASSESSMENT & PLAN NOTE
Currently on Insulin drip with Endocrine consulted      Plan:  -Follow-up Endocrine recs and wean insulin drip as able

## 2024-03-29 NOTE — PROGRESS NOTES
"[facility] - Card[facility] Medical [facility]  Endocrinology  Progress Note    Admit Date: 3/23/2024     Reason for Consult: Management of T2DM, Hyperglycemia     Diabetes diagnosis year: > 2 years ago    Home Diabetes Medications:    Per chart review:   Novolog 22 units with meals -- Takes 22 units TID AC while on chemo - (Gives between 12 to 15 units TID AC when not on chemo)   NPH 20 units AM and 50 units PM   Metformin 1000 mg BID     How often checking glucose at home?  AMIE    BG readings on regimen: AMIE  Hypoglycemia on the regimen?  AMIE  Missed doses on regimen?  AMIE    Diabetes Complications include:     Hyperglycemia    Complicating diabetes co morbidities:   Active Cancer and Glucocorticoid use       HPI:   Patient is a 58 y.o. female with PMHx of insulin-dependent T2DM, diabetic neuropathy, essential HTN, NAFLD, obesity, former tobacco use disorder (quit 4-5 months ago), and CML (actively on chemotherapy) with blast crisis diagnosed prior to transfer at OSH, who presents as a transfer from [facility] in [place] for acute SDH. Found to be COVID (+) and Dexamethasone therapy started. BG excursions noted. Endocrine consulted for BG management.         Interval HPI:   Overnight events: Remains in 6077. NAEON. BG within goal ranges on IV intensive insulin protocol with infusion rates ranging from 5.5-12.5 u/hr overnight. Diet Clear liquid (no sugar)/Bariatric    Eating:   <25%  Nausea: No  Hypoglycemia and intervention: No  Fever: No  TPN and/or TF: No  If yes, type of TF/TPN and rate: n/a    BP (!) 163/75 (BP Location: Right arm, Patient Position: Lying)   Pulse 88   Temp 98.9 °F (37.2 °C) (Axillary)   Resp 16   Ht 5' 3" (1.6 m)   Wt 93 kg (205 lb 0.4 oz)   SpO2 99%   BMI 36.32 kg/m²     Labs Reviewed and Include    Recent Labs   Lab 03/29/24  0709   *   CALCIUM 9.1   ALBUMIN 2.1*   PROT 5.2*      K 3.8   CO2 22*      BUN 17   CREATININE 0.5   ALKPHOS 133   ALT 11   AST 17   BILITOT 0.5     Lab " "Results   Component Value Date    WBC 10.95 03/29/2024    HGB 8.6 (L) 03/29/2024    HCT 29.6 (L) 03/29/2024    MCV 82 03/29/2024     (L) 03/28/2024     Recent Labs   Lab 03/24/24  0127   TSH 1.428     Lab Results   Component Value Date    HGBA1C 6.2 (H) 03/24/2024       Nutritional status:   Body mass index is 36.32 kg/m².  Lab Results   Component Value Date    ALBUMIN 2.1 (L) 03/29/2024    ALBUMIN 2.2 (L) 03/28/2024    ALBUMIN 2.7 (L) 03/27/2024     No results found for: "PREALBUMIN"    Estimated Creatinine Clearance: 136.1 mL/min (based on SCr of 0.5 mg/dL).    Accu-Checks  Recent Labs     03/29/24  0037 03/29/24  0340 03/29/24  0445 03/29/24  0455 03/29/24  0553 03/29/24  0750 03/29/24  0837 03/29/24  0928 03/29/24  1042 03/29/24  1139   POCTGLUCOSE 129* 142* 166* 169* 173* 168* 168* 190* 182* 149*       Current Medications and/or Treatments Impacting Glycemic Control  Immunotherapy:    Immunosuppressants       None          Steroids:   Hormones (From admission, onward)      None          Pressors:    Autonomic Drugs (From admission, onward)      None          Hyperglycemia/Diabetes Medications:   Antihyperglycemics (From admission, onward)      Start     Stop Route Frequency Ordered    03/26/24 1615  insulin regular in 0.9 % NaCl 100 unit/100 mL (1 unit/mL) infusion        Question:  Enter initial dose from Infusion Protocol Chart (Units/hr):  Answer:  4    -- IV Continuous 03/26/24 1515            ASSESSMENT and PLAN    Neuro  * Acute encephalopathy  Managed per primary team  Optimize BG control      Subdural hematoma  Managed per primary team       ID  COVID  Expect effects of Dexamethasone for COVID on BG to last up to 72 hours      Endocrine  Type 2 diabetes mellitus with diabetic neuropathy, with long-term current use of insulin  BG goal: 140-180   T2DM. BG above goal here. Receiving steroids (d/c 3/25) and was on lower doses than reported home doses.  Bg improving on IIP but on high drip rates.  " Will continue on IIP and consider transition to Transition drip when mental status improves and patient ready for diet advancement. Spacing BG checks to q 2 hrs to help determine a transition insulin infusion rate     - Continue insulin infusion protocol   - Change POCT Glucose every 2 hours   - Hypoglycemia protocol in place      ** Please notify Endocrine for any change and/or advance in diet**  ** Please call Endocrine for any BG related issues **     Discharge Planning:   TBD. Please notify endocrine prior to discharge.             Nusrat Torres NP  Endocrinology  [facility] - Cardiac Medical ICU

## 2024-03-29 NOTE — PROGRESS NOTES
Therapy with Vancomycin complete and/or consult discontinued by provider.  Pharmacy will sign off, please re-consult as needed.    Olga Lidia Moeller D 3/29/2024 11:49 AM

## 2024-03-29 NOTE — ASSESSMENT & PLAN NOTE
"58F with relapsed CML with blast crisis (transformed on 2022) admitted for fevers, fatigue, encephalopathy and found to have a SDH that NSGY does not recommend intervention on. Encephalopathic when seen and unable to follow commands.     Labs show clear evidence of blast crisis (29% on CBC) without cytopenias. This is in the setting of what may be sepsis vs fevers from leukemia.She normally follows at [facility] clinic and has progressed through multiple treatments.   Discussed aggressive and unstable disease leading to her presentation here.     Marrow consistent with CML with blast crisis "     Plan:   -Continue hydrea at 1,000mg BID, likely decrease further on 3/30/24  -Bone marrow biopsy on 3/27 with concern for worsening disease progression  -continue acyclovir, allopurinol, fluc  -Discontinue Vancomycin today, continue Zosyn  -prognosis guarded, will discuss goals of care moving forward, at this time hospice would be appropriate  -Follow-up palliative consult  -Continue Ponatinib, based on clinical course and GOC talks can discuss asciminib vs. other for additional treatment  "

## 2024-03-29 NOTE — PROGRESS NOTES
[facility] Card[facility] Medical [facility]  Critical Care Medicine  Progress Note    Patient Name: Fela Ellison  MRN: [id number]  Admission Date: 3/23/2024  Hospital Length of Stay: 6 days  Code Status: Full Code  Attending Provider: Ottoniel Paniagua MD  Primary Care Provider: Bar Trevino DO   Principal Problem: Blast crisis phase of chronic myeloid leukemia    Subjective:     HPI:  Patient information was obtained from past medical records and ER records. HPI limited by patient's altered mentation.    Ms. Fela Ellison is a 57 yo female with a PMHx of IDDM, CML, HTN, NAFLD, and prior tobacco use who initially presented as a transfer from [facility] for acute SDH and blast crisis.     She was intiallly admitted to [facility] and then stepped down to Medical oncology floor after evaluation by Neurosurgery noted SDH to be stable with no surgical intervention recommended at the time. She was being treated for concerns for blast crisis by BMT. Critical care was initially consulted on 03/24/24 for sepsis concerns in the setting of patient being found to be COVID positive, febrile (Tmax of 103.1), -138, BP stable 120-140/60-70s, on 4L NC. Evaluation at the time did not note any indication for ICU admission as patient was stable.     During hospitalization, treated by BMT for CML with blast crisis concerns; broad spectrum antibiotics were administered for sepsis concerns; Remdesivir/Dexamethasone started for COVID-19, but Dexamethasone had to be discontinued due to hyperglycemia and Endocrinology was consulted for insulin management. Mentation was improving and patient passed swallow evaluation, so NG tube was removed and diet was resumed.     However, on 03/26/24 patient developed increasing lethargy, confusion, febrile temperatures, distended abdomen, lactic acidosis. Stat CT head was non-acute and noted stable prior known SDH. Rapid response evaluated patient for worsening  mentation and patient was admitted to MICU.          Hospital/ICU Course:  Held scheduled gabapentin and other sedating drugs with encephalopathy.  Treating with broad spectrum Abx, Remdesivir for COVID, acyclovir, and fluconazole - did not require dexamethasone.  Slow improvement in mentation.  Oncology following with blast crisis. Improving WBC on high dose hydrea.  Patient with intermittent high fevers associated with mental status change that resolve with fever reduction. Stable for stepdown to BMT service.    Interval History/Significant Events: AF HDS. Patient continues to report pain. No other events at this time. Stable for step down to BMT service.    Review of Systems   Unable to perform ROS: Mental status change     Objective:     Vital Signs (Most Recent):  Temp: 98.9 °F (37.2 °C) (03/29/24 0701)  Pulse: 88 (03/29/24 1100)  Resp: 16 (03/29/24 1146)  BP: (!) 163/75 (03/29/24 1100)  SpO2: 99 % (03/29/24 1100) Vital Signs (24h Range):  Temp:  [98.7 °F (37.1 °C)-100.5 °F (38.1 °C)] 98.9 °F (37.2 °C)  Pulse:  [] 88  Resp:  [11-28] 16  SpO2:  [96 %-100 %] 99 %  BP: (114-176)/(56-92) 163/75   Weight: 93 kg (205 lb 0.4 oz)  Body mass index is 36.32 kg/m².      Intake/Output Summary (Last 24 hours) at 3/29/2024 1217  Last data filed at 3/29/2024 1100  Gross per 24 hour   Intake 2244.37 ml   Output 1950 ml   Net 294.37 ml          Physical Exam  Vitals and nursing note reviewed.   Constitutional:       General: She is not in acute distress.     Appearance: She is ill-appearing.      Comments: Ill-appearing on NC.    HENT:      Head: Normocephalic and atraumatic.      Mouth/Throat:      Mouth: Mucous membranes are moist.      Pharynx: Oropharynx is clear.   Eyes:      General: No scleral icterus.     Extraocular Movements: Extraocular movements intact.   Cardiovascular:      Rate and Rhythm: Normal rate and regular rhythm.   Pulmonary:      Effort: Pulmonary effort is normal. No respiratory distress.       Breath sounds: No rales.      Comments: Non-labored breathing on NC.   Abdominal:      General: Abdomen is flat. There is distension.      Palpations: Abdomen is soft.      Tenderness: There is abdominal tenderness in the right lower quadrant and left lower quadrant.      Comments: Has skin lesions on her lower abdomen,    Musculoskeletal:         General: Normal range of motion.      Right lower leg: No edema.      Left lower leg: No edema.   Skin:     General: Skin is warm and dry.   Neurological:      General: No focal deficit present.      Mental Status: She is oriented to person, place, and time.   Psychiatric:         Mood and Affect: Mood normal.            Vents:     Lines/Drains/Airways       Drain  Duration                  Urethral Catheter 03/26/24 3 days         NG/OG Tube 03/27/24 0700 Left nostril 2 days              Peripheral Intravenous Line  Duration                  Peripheral IV - Single Lumen 20 G Left Antecubital -- days         Peripheral IV - Single Lumen 03/28/24 0830 20 G Anterior;Left Upper Arm 1 day         Peripheral IV - Single Lumen 03/28/24 1430 20 G Anterior;Right Forearm <1 day                  Significant Labs:    CBC/Anemia Profile:  Recent Labs   Lab 03/28/24  0418 03/29/24  0705   WBC 33.16* 10.95   HGB 9.4* 8.6*   HCT 32.2* 29.6*   *  --    MCV 81* 82   RDW 20.4* 19.9*        Chemistries:  Recent Labs   Lab 03/27/24  1259 03/28/24  0418 03/29/24  0709    139 140   K 4.0 4.0 3.8    108 109   CO2 22* 21* 22*   BUN 20 21* 17   CREATININE 0.7 0.6 0.5   CALCIUM 9.3 9.0 9.1   ALBUMIN  --  2.2* 2.1*   PROT  --  5.3* 5.2*   BILITOT  --  0.5 0.5   ALKPHOS  --  149* 133   ALT  --  11 11   AST  --  19 17   MG  --  1.9 1.9   PHOS  --  5.0* 4.1       All pertinent labs within the past 24 hours have been reviewed.    Significant Imaging:  I have reviewed all pertinent imaging results/findings within the past 24 hours.    ABG  Recent Labs   Lab 03/26/24  1347   PH 7.378    PO2 46   PCO2 34.3*   HCO3 20.2*   BE -5*     Assessment/Plan:     Neuro  Acute encephalopathy  Patient initially presenting to [facility] with CML in blast crisis with fevers, fatigue and encephalopathy.  Admitted to BMT services and started on high dose hydrea as well as Abx/antivirals/antifungals.  Found to be COVID positive and treating with Remdesivir.      A/P: Worsening AMS with concern for leukostasis in setting of blast crisis vs drug induced with patient receiving high dose pain regimen including new gabapentin vs sepsis     -- NG tube placed; dietary consult for TF  -- Slowly improving mental status; continues to be disoriented x 4, but responds to pain (groans)  -- Airway watch, protecting airway currently, discussions with family and agreeable for intubation if needed      Subdural hematoma  Presented from OSH with new SDH and initially admitted to NCC.  SDH stable and no need for surgical intervention per NSGY/NCC.  Worsening mentation and admitted to MICU on 3/26.  STAT CTH with unchanged size of SDH.  Stable    -- Stable, no intervention per NSGY  -- Continue Keppra  -- Neuro checks q4h    Pulmonary  Acute respiratory failure with hypoxia  RESOLVED    -- Stable on RA    Cardiac/Vascular  Hypercholesterolemia  -- Continue atorvastatin    Essential hypertension  Pt on amlodipine 10mg, metoprolol 25mg BID, and losartan 25mg at home.    -- Restart home medications as tolerated  -- SBP goal < 180 or lower if new symptoms    ID  COVID  COVID positive on 3/24 initially on 4L NC now downtrending associated with AMS and elevated WBC with fevers in setting of blast crisis in CML.     -- Now on RA  -- S/P Remdesivir x 5 days  -- Vanc/Zosyn with sepsis concern and AMS, slowly improving, de-escalating Vancomycin as appropriate  -- F/U repeat cultures, NGTD  -- Weaned off O2, now RA  -- Steroids discontinued previously with difficult to control BG and now weaned off supplemental O2     Oncology  * Blast crisis phase of  chronic myeloid leukemia  Patient with CML c/b acute blast crisis. Transferred from BMT service for AMS.     -- Recurrent fevers attributable to blast crisis; has improved greatly with hydroxyurea treatment  -- Continuing hydroxyurea (dosing per BMT)  -- S/p BMB on 03/27.  Follow up results and BMT recommendations  -- Continue acyclovir, allopurinol, fluconazole ppx  -- Continuing empiric broad spec abx, although cause of fevers less likely to be infectious  -- Palliative Care consultation for GOC discussions; discussed with BMT -- limited oncologic options at this point  -- Stable for stepdown to BMT service 03/29    Cancer associated pain  Previously on multimodal pain regimen prior to MICU admission.  Concern for AMS and drug intoxication.      -- Hold gabapentin and monitor pain  -- PRN dilaudid + oxy for pain  -- Appreciate Pal Care assistance    CML (chronic myelocytic leukemia)  See Blast crisis phase of chronic myeloid leukemia A/P    Endocrine  Moderate malnutrition  Nutrition consulted. Most recent weight and BMI monitored-     Measurements:  Wt Readings from Last 1 Encounters:   03/26/24 93 kg (205 lb 0.4 oz)   Body mass index is 36.32 kg/m².    Patient has been screened and assessed by RD.    Malnutrition Type:  Context: acute illness or injury  Level: moderate    Malnutrition Characteristic Summary:  Weight Loss (Malnutrition): 7.5% in 3 months  Energy Intake (Malnutrition): less than 75% for greater than or equal to 1 month  Subcutaneous Fat (Malnutrition): mild depletion  Muscle Mass (Malnutrition): mild depletion    Interventions/Recommendations (treatment strategy):  1.      Diabetes mellitus  Pt has known DM, is on steroids due to COVID. Most likely causing her hyperglycemia. Removed but continued difficulty controlling. Ordered labs to make sure patient is not in DKA/HHS patient BHB 0.2, bicarb 15 then back up to 20, but glucose has remained in the 400s.      -- Diabetic diet with thin liquids per  speech when tolerated  -- Glucose checks AC x HS  -- Endocrine consulted by BMT services prior to MICU admission with difficult to control hyperglycemia; endocrine recommending insulin gtt and managing    GI  GERD (gastroesophageal reflux disease)  -- Continue famotidine    Orthopedic  Hyperuricemia  See Blast Crisis A/P       Critical Care Daily Checklist:    A: Awake: RASS Goal/Actual Goal: RASS Goal: 0-->alert and calm  Actual:     B: Spontaneous Breathing Trial Performed?     C: SAT & SBT Coordinated?  N/A                      D: Delirium: CAM-ICU Overall CAM-ICU: Negative   E: Early Mobility Performed? No   F: Feeding Goal: Goals: Meet % EEN, EPN by RD f/u date  Status: Nutrition Goal Status: new   Current Diet Order   Procedures    Diet Clear liquid (no sugar)/Bariatric      AS: Analgesia/Sedation PRN DILAUDID + OXYCODONE   T: Thromboembolic Prophylaxis HEPARIN   H: HOB > 300 Yes   U: Stress Ulcer Prophylaxis (if needed) FAMOTIDINE   G: Glucose Control INSULIN GTT   B: Bowel Function Stool Occurrence: 0   I: Indwelling Catheter (Lines & Yu) Necessity YU CATHETER   D: De-escalation of Antimicrobials/Pharmacotherapies D/C VANC, CONTINUE ZOSYN, + PPX    Plan for the day/ETD STEPDOWN TO BMT    Code Status:  Family/Goals of Care: Full Code         Critical secondary to Patient has an abrupt change in neurologic status: ALTERED MENTAL STATUS, AIRWAY WATCH      Critical care was time spent personally by me on the following activities: development of treatment plan with patient or surrogate and bedside caregivers, discussions with consultants, evaluation of patient's response to treatment, examination of patient, ordering and performing treatments and interventions, ordering and review of laboratory studies, ordering and review of radiographic studies, pulse oximetry, re-evaluation of patient's condition. This critical care time did not overlap with that of any other provider or involve time for any  procedures.     Edison Bradshaw MD  Critical Care Medicine  [facility] - Cardiac Medical ICU

## 2024-03-29 NOTE — SUBJECTIVE & OBJECTIVE
Subjective:     Interval History: Patient vitally stable overnight with no acute events. She remains on low NC supplementation. Labs this AM show WBC of 10.9, H/H of 8.6/29, & plt of 135K. Tmax over the last 24 hours of 100.5F and remains on Vancomycin/Zosyn for broad spectrum coverage. Endocrine consulted for steroid induced hyperglycemia and is currently on insulin drip. Patient and family requested palliative care consult yesterday to explore GOC further given guarded prognosis. Patient and family updated regarding plan for downgrade from the ICU and was agreeable and understanding.     Objective:     Vital Signs (Most Recent):  Temp: 98.9 °F (37.2 °C) (03/29/24 0701)  Pulse: 88 (03/29/24 1100)  Resp: 16 (03/29/24 1146)  BP: (!) 163/75 (03/29/24 1100)  SpO2: 99 % (03/29/24 1100) Vital Signs (24h Range):  Temp:  [98.7 °F (37.1 °C)-100.5 °F (38.1 °C)] 98.9 °F (37.2 °C)  Pulse:  [] 88  Resp:  [11-28] 16  SpO2:  [96 %-100 %] 99 %  BP: (114-176)/(56-92) 163/75     Weight: 93 kg (205 lb 0.4 oz)  Body mass index is 36.32 kg/m².  Body surface area is 2.03 meters squared.    ECOG SCORE           [unfilled]    Intake/Output - Last 3 Shifts         03/27 0700  03/28 0659 03/28 0700 03/29 0659 03/29 0700 03/30 0659    P.O. 120      I.V. (mL/kg) 64.6 (0.7) 334.2 (3.6) 67.7 (0.7)    IV Piggyback 680.1 1459.1 383.3    Total Intake(mL/kg) 864.7 (9.3) 1793.3 (19.3) 451.1 (4.9)    Urine (mL/kg/hr) 1890 (0.8) 1915 (0.9) 800 (1.6)    Emesis/NG output 0 0     Other  0     Stool 0 0     Blood 0 0     Total Output 1890 1915 800    Net -1025.3 -121.7 -348.9           Urine Occurrence  0 x     Stool Occurrence  0 x     Emesis Occurrence  0 x              Physical Exam  Vitals and nursing note reviewed.   Constitutional:       Appearance: Normal appearance.      Comments: Ill-appearing female with AMS.    Cardiovascular:      Rate and Rhythm: Normal rate and regular rhythm.      Pulses: Normal pulses.      Heart sounds: Normal  heart sounds.   Pulmonary:      Effort: Pulmonary effort is normal.      Comments: Non-labored breathing on NC.   Abdominal:      Comments: Diffuse abdominal scarring.    Neurological:      Mental Status: She is disoriented.      Comments: Not oriented times 3 on AM rounds, but waxing and waning.    Psychiatric:      Comments: Non-verbal on AM rounds.             Significant Labs:   All pertinent labs from the last 24 hours have been reviewed.    Diagnostic Results:  I have reviewed all pertinent imaging results/findings within the past 24 hours.

## 2024-03-29 NOTE — CONSULTS
Nutrition consult received regarding TF recommendations.  RD following, please see note from 3/25 for full assessment.    Recommendations  Diet advancement per SLP - Rec'd 2000 kcal ADA diet + Boost Glucose Control ONS BID.  If unable to advance diet, place NGT & initiate TFs. Rec'd Glucerna 1.5 @ 50 mL/hr = 1800 kcals, 99 g of protein, 911 mL fluid.  RD to monitor & follow-up.     Goals: Meet % EEN, EPN by RD f/u date  Nutrition Goal Status: new  Communication of RD Recs: other (comment) (POC)    Thanks!  Priscilla MS, RD, LDN

## 2024-03-29 NOTE — PROGRESS NOTES
[facility] - Card[facility] Medical [facility]  Hematology  Bone Marrow Transplant  Progress Note    Patient Name: Fela Ellison  Admission Date: 3/23/2024  Hospital Length of Stay: 6 days  Code Status: Full Code    Subjective:     Interval History: Patient vitally stable overnight with no acute events. She remains on low NC supplementation. Labs this AM show WBC of 10.9, H/H of 8.6/29, & plt of 135K. Tmax over the last 24 hours of 100.5F and remains on Vancomycin/Zosyn for broad spectrum coverage. Endocrine consulted for steroid induced hyperglycemia and is currently on insulin drip. Patient and family requested palliative care consult yesterday to explore GOC further given guarded prognosis. Patient and family updated regarding plan for downgrade from the ICU and was agreeable and understanding.     Objective:     Vital Signs (Most Recent):  Temp: 98.9 °F (37.2 °C) (03/29/24 0701)  Pulse: 88 (03/29/24 1100)  Resp: 14 (03/29/24 1100)  BP: (!) 163/75 (03/29/24 1100)  SpO2: 99 % (03/29/24 1100) Vital Signs (24h Range):  Temp:  [98.7 °F (37.1 °C)-100.5 °F (38.1 °C)] 98.9 °F (37.2 °C)  Pulse:  [] 88  Resp:  [11-28] 14  SpO2:  [96 %-100 %] 99 %  BP: (114-176)/(56-92) 163/75     Weight: 93 kg (205 lb 0.4 oz)  Body mass index is 36.32 kg/m².  Body surface area is 2.03 meters squared.    ECOG SCORE           [unfilled]    Intake/Output - Last 3 Shifts         03/27 0700  03/28 0659 03/28 0700 03/29 0659 03/29 0700 03/30 0659    P.O. 120      I.V. (mL/kg) 64.6 (0.7) 334.2 (3.6) 67.7 (0.7)    IV Piggyback 680.1 1459.1 383.3    Total Intake(mL/kg) 864.7 (9.3) 1793.3 (19.3) 451.1 (4.9)    Urine (mL/kg/hr) 1890 (0.8) 1915 (0.9) 425 (1)    Emesis/NG output 0 0     Other  0     Stool 0 0     Blood 0 0     Total Output 1890 1915 425    Net -1025.3 -121.7 +26.1           Urine Occurrence  0 x     Stool Occurrence  0 x     Emesis Occurrence  0 x              Physical Exam  Vitals and nursing note reviewed.   Constitutional:        Comments: Ill-appearing on NC.    Cardiovascular:      Rate and Rhythm: Normal rate and regular rhythm.   Pulmonary:      Comments: Non-labored breathing on NC.   Abdominal:      General: Abdomen is flat.      Palpations: Abdomen is soft.   Musculoskeletal:         General: Normal range of motion.   Neurological:      General: No focal deficit present.      Mental Status: She is oriented to person, place, and time.   Psychiatric:         Mood and Affect: Mood normal.            Significant Labs:   All pertinent labs from the last 24 hours have been reviewed.    Diagnostic Results:  I have reviewed all pertinent imaging results/findings within the past 24 hours.  Assessment/Plan:     * Acute encephalopathy  Likely multi-factorial secondary SDH, Blast crisis, & COVID-19 with fevers    Plan:  -Monitor for acute changes in mental status, low threshold for further work-up with CT Brain        Hyperglycemia  Pt has known DM, is on steroids due to COVID. Most likely causing her hyperglycemia. Removed but continued difficulty controlling. Ordered labs to make sure patient is not in DKA/HHS patient BHB 0.2, bicarb 15 then back up to 20, but glucose has remained in the 400s.     Plan  Diabetic diet with thin liquids per speech (went to bariatric clear with starting insulin drip)  Glucose checks AC x HS  Placed on insulin drip, improvement in glucose control  Consulted Endocrine with difficult to control hyperglycemia      COVID  Pt tested positive for covid on 3/24. Initially on 4L NC, has decreased to 2L. Pt's mental status has improved. WBC count 50K.     Plan  Remdesivir course and Dexamethasone completed  Wean O2 as able        Subdural hematoma  Pt presented from OSH with new SDH. Pt was initially admitted to neuro critical care. SDH felt to be small/stable and did not need to undergo surgical intervention. Pt placed on keppra for seizure proph. Pt PLT count is in the 200s. 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     Plan  Continue home medications, increased losartan to 100  Goal BP <160 per neurosurgery with SDH  PRN in place if needed, will increase BP medications as needed    Diabetes mellitus  Currently on Insulin drip with Endocrine consulted      Plan:  -Follow-up Endocrine recs and wean insulin drip as able      CML (chronic myelocytic leukemia)  58F with relapsed CML with blast crisis (transformed on 2022) admitted for fevers, fatigue, encephalopathy and found to have a SDH that NSGY does not recommend intervention on. Encephalopathic when seen and unable to follow commands.     Labs show clear evidence of blast crisis (29% on CBC) without cytopenias. This is in the setting of what may be sepsis vs fevers from leukemia.She normally follows at [facility] fellows clinic and has progressed through multiple treatments.   Discussed aggressive and unstable disease leading to her presentation here.     Marrow consistent with CML with blast crisis "     Plan:   -Continue hydrea at 1,000mg BID, likely decrease further on 3/30/24  -Bone marrow biopsy on 3/27 with concern for worsening disease progression  -continue acyclovir, allopurinol, fluc  -Discontinue Vancomycin today, continue Zosyn  -prognosis guarded, will discuss goals of care moving forward, at this time hospice would be appropriate  -Follow-up palliative consult  -Continue Ponatinib, based on clinical course and GOC talks can discuss asciminib vs. other for additional treatment        VTE Risk Mitigation (From admission, onward)           Ordered     heparin (porcine) injection 5,000 Units  Every 8 hours         03/28/24 1201     IP VTE HIGH RISK " PATIENT  Once         03/23/24 2209     Place sequential compression device  Until discontinued         03/23/24 2209     Reason for No Pharmacological VTE Prophylaxis  Once        Question:  Reasons:  Answer:  Active Bleeding  Comment:  SDH    03/23/24 2209                    Disposition: Transfer to BMT Service.    Maged Garcia DO  Bone Marrow Transplant  [facility] - Card[facility]

## 2024-03-29 NOTE — ASSESSMENT & PLAN NOTE
Patient with CML c/b acute blast crisis. Transferred from BMT service for AMS.     -- Recurrent fevers attributable to blast crisis; has improved greatly with hydroxyurea treatment  -- Continuing hydroxyurea (dosing per BMT)  -- S/p BMB on 03/27.  Follow up results and BMT recommendations  -- Continue acyclovir, allopurinol, fluconazole ppx  -- Continuing empiric broad spec abx, although cause of fevers less likely to be infectious  -- Palliative Care consultation for GOC discussions; discussed with BMT -- limited oncologic options at this point  -- Stable for stepdown to BMT service 03/29

## 2024-03-29 NOTE — ASSESSMENT & PLAN NOTE
Nutrition consulted. Most recent weight and BMI monitored-     Measurements:  Wt Readings from Last 1 Encounters:   03/26/24 93 kg (205 lb 0.4 oz)   Body mass index is 36.32 kg/m².    Patient has been screened and assessed by RD.    Malnutrition Type:  Context: acute illness or injury  Level: moderate    Malnutrition Characteristic Summary:  Weight Loss (Malnutrition): 7.5% in 3 months  Energy Intake (Malnutrition): less than 75% for greater than or equal to 1 month  Subcutaneous Fat (Malnutrition): mild depletion  Muscle Mass (Malnutrition): mild depletion    Interventions/Recommendations (treatment strategy):  1.

## 2024-03-29 NOTE — PLAN OF CARE
MICU DAILY GOALS     Family/Goals of care/Code Status   Code Status: Full Code    24H Vital Sign Range  Temp:  [98.7 °F (37.1 °C)-100.5 °F (38.1 °C)]   Pulse:  []   Resp:  [12-28]   BP: (116-182)/(59-92)   SpO2:  [96 %-100 %]      Shift Events (include procedures and significant events)   No acute events throughout shift; Cont' insulin gt; Accucheck Q2 hrs; Stepdown orders; awaiting bed.    AWAKE RASS: Goal - RASS Goal: 0-->alert and calm  Actual - RASS (Mccullough Agitation-Sedation Scale): alert and calm    Restraint necessity: Not necessary   BREATHE SBT: Not intubated    Coordinate A & B, analgesics/sedatives Pain: managed   SAT: Not intubated   Delirium CAM-ICU: Overall CAM-ICU: Negative   Early(intubated/ Progressive (non-intubated) Mobility MOVE Screen (INTUBATED ONLY): Not intubated    Activity: Activity Management: Arm raise - L1, Rolling - L1   Feeding/Nutrition Diet order: Diet/Nutrition Received: NPO,     Thrombus DVT prophylaxis: VTE Required Core Measure: Pharmacological prophylaxis initiated/maintained   HOB Elevation Head of Bed (HOB) Positioning: HOB at 30-45 degrees   Ulcer Prophylaxis GI: yes   Glucose control managed Glycemic Management: blood glucose monitored   Skin Skin assessed during: Daily Assessment    Sacrum intact/not altered? No  Heels intact/not altered? No  Surgical wound? No    CHECK ONE!   (no altered skin or altered skin) and sub boxes:  [] No Altered Skin Integrity Present    []Prevention Measures Documented    [] Altered Skin Integrity Present or Discovered   [] LDA present in EPIC, daily doc completed              [] LDA added if not in EPIC (describe wound).                    When describing wound, do not stage, use descriptive words only.    [] Wound Image Taken (required on admit,                   transfer/discharge and every Tuesday)    Wound Care Consulted? Yes    4 EYES:  Attending Nurse (1st set of eyes):     Second RN/Staff Member (2nd set of eyes):    Bowel  Function no issues    Indwelling Catheter Necessity      Urethral Catheter 03/26/24-Reason for Continuing Urinary Catheterization: Critically ill in ICU and requiring hourly monitoring of intake/output       YES   De-escalation Antibiotics Yes        VS and assessment per flow sheet, patient progressing towards goals as tolerated, plan of care reviewed with patient/, all concerns addressed, will continue to monitor.

## 2024-03-30 PROBLEM — Z51.5 PALLIATIVE CARE ENCOUNTER: Status: ACTIVE | Noted: 2024-03-30

## 2024-03-30 PROBLEM — Z51.5 COMFORT MEASURES ONLY STATUS: Status: ACTIVE | Noted: 2024-03-30

## 2024-03-30 LAB
ALBUMIN SERPL BCP-MCNC: 2.3 G/DL (ref 3.5–5.2)
ALP SERPL-CCNC: 147 U/L (ref 55–135)
ALT SERPL W/O P-5'-P-CCNC: 10 U/L (ref 10–44)
ANION GAP SERPL CALC-SCNC: 9 MMOL/L (ref 8–16)
ANISOCYTOSIS BLD QL SMEAR: SLIGHT
AST SERPL-CCNC: 13 U/L (ref 10–40)
BASOPHILS # BLD AUTO: ABNORMAL K/UL (ref 0–0.2)
BASOPHILS NFR BLD: 0 % (ref 0–1.9)
BILIRUB SERPL-MCNC: 0.6 MG/DL (ref 0.1–1)
BLASTS NFR BLD MANUAL: 52 %
BUN SERPL-MCNC: 10 MG/DL (ref 6–20)
CALCIUM SERPL-MCNC: 8.9 MG/DL (ref 8.7–10.5)
CHLORIDE SERPL-SCNC: 105 MMOL/L (ref 95–110)
CO2 SERPL-SCNC: 26 MMOL/L (ref 23–29)
CREAT SERPL-MCNC: 0.5 MG/DL (ref 0.5–1.4)
CRP SERPL-MCNC: 211.4 MG/L (ref 0–8.2)
DACRYOCYTES BLD QL SMEAR: ABNORMAL
DIFFERENTIAL METHOD BLD: ABNORMAL
EOSINOPHIL # BLD AUTO: ABNORMAL K/UL (ref 0–0.5)
EOSINOPHIL NFR BLD: 0 % (ref 0–8)
ERYTHROCYTE [DISTWIDTH] IN BLOOD BY AUTOMATED COUNT: 19.2 % (ref 11.5–14.5)
EST. GFR  (NO RACE VARIABLE): >60 ML/MIN/1.73 M^2
FIBRINOGEN PPP-MCNC: >800 MG/DL (ref 182–400)
FLT3 RESULT: NORMAL
GLUCOSE SERPL-MCNC: 198 MG/DL (ref 70–110)
HCT VFR BLD AUTO: 28.7 % (ref 37–48.5)
HGB BLD-MCNC: 8.5 G/DL (ref 12–16)
HYPOCHROMIA BLD QL SMEAR: ABNORMAL
IMM GRANULOCYTES # BLD AUTO: ABNORMAL K/UL (ref 0–0.04)
IMM GRANULOCYTES NFR BLD AUTO: ABNORMAL % (ref 0–0.5)
LYMPHOCYTES # BLD AUTO: ABNORMAL K/UL (ref 1–4.8)
LYMPHOCYTES NFR BLD: 12 % (ref 18–48)
MCH RBC QN AUTO: 24 PG (ref 27–31)
MCHC RBC AUTO-ENTMCNC: 29.6 G/DL (ref 32–36)
MCV RBC AUTO: 81 FL (ref 82–98)
MONOCYTES # BLD AUTO: ABNORMAL K/UL (ref 0.3–1)
MONOCYTES NFR BLD: 3 % (ref 4–15)
NEUTROPHILS NFR BLD: 33 % (ref 38–73)
NRBC BLD-RTO: 1 /100 WBC
OHS QRS DURATION: 90 MS
OHS QTC CALCULATION: 463 MS
OVALOCYTES BLD QL SMEAR: ABNORMAL
PATH REPORT.FINAL DX SPEC: NORMAL
PLATELET # BLD AUTO: 48 K/UL (ref 150–450)
PLATELET BLD QL SMEAR: ABNORMAL
PMV BLD AUTO: ABNORMAL FL (ref 9.2–12.9)
POCT GLUCOSE: 161 MG/DL (ref 70–110)
POCT GLUCOSE: 161 MG/DL (ref 70–110)
POCT GLUCOSE: 170 MG/DL (ref 70–110)
POCT GLUCOSE: 191 MG/DL (ref 70–110)
POCT GLUCOSE: 198 MG/DL (ref 70–110)
POCT GLUCOSE: 209 MG/DL (ref 70–110)
POCT GLUCOSE: 228 MG/DL (ref 70–110)
POIKILOCYTOSIS BLD QL SMEAR: SLIGHT
POLYCHROMASIA BLD QL SMEAR: ABNORMAL
POTASSIUM SERPL-SCNC: 3.5 MMOL/L (ref 3.5–5.1)
PROT SERPL-MCNC: 4.9 G/DL (ref 6–8.4)
RBC # BLD AUTO: 3.54 M/UL (ref 4–5.4)
SODIUM SERPL-SCNC: 140 MMOL/L (ref 136–145)
SPECIMEN TYPE: NORMAL
WBC # BLD AUTO: 6.1 K/UL (ref 3.9–12.7)

## 2024-03-30 PROCEDURE — 25000003 PHARM REV CODE 250

## 2024-03-30 PROCEDURE — 25000003 PHARM REV CODE 250: Performed by: PHYSICIAN ASSISTANT

## 2024-03-30 PROCEDURE — 27000207 HC ISOLATION

## 2024-03-30 PROCEDURE — 99233 SBSQ HOSP IP/OBS HIGH 50: CPT | Mod: ,,, | Performed by: NURSE PRACTITIONER

## 2024-03-30 PROCEDURE — 93010 ELECTROCARDIOGRAM REPORT: CPT | Mod: ,,, | Performed by: INTERNAL MEDICINE

## 2024-03-30 PROCEDURE — 93005 ELECTROCARDIOGRAM TRACING: CPT

## 2024-03-30 PROCEDURE — 85384 FIBRINOGEN ACTIVITY: CPT | Performed by: PHYSICIAN ASSISTANT

## 2024-03-30 PROCEDURE — 25000003 PHARM REV CODE 250: Performed by: STUDENT IN AN ORGANIZED HEALTH CARE EDUCATION/TRAINING PROGRAM

## 2024-03-30 PROCEDURE — 80053 COMPREHEN METABOLIC PANEL: CPT | Performed by: PHYSICIAN ASSISTANT

## 2024-03-30 PROCEDURE — 63700000 PHARM REV CODE 250 ALT 637 W/O HCPCS

## 2024-03-30 PROCEDURE — 63600175 PHARM REV CODE 636 W HCPCS: Performed by: STUDENT IN AN ORGANIZED HEALTH CARE EDUCATION/TRAINING PROGRAM

## 2024-03-30 PROCEDURE — 85007 BL SMEAR W/DIFF WBC COUNT: CPT | Performed by: PHYSICIAN ASSISTANT

## 2024-03-30 PROCEDURE — 85027 COMPLETE CBC AUTOMATED: CPT | Performed by: PHYSICIAN ASSISTANT

## 2024-03-30 PROCEDURE — 86140 C-REACTIVE PROTEIN: CPT | Performed by: PHYSICIAN ASSISTANT

## 2024-03-30 PROCEDURE — 63600175 PHARM REV CODE 636 W HCPCS

## 2024-03-30 PROCEDURE — 20600001 HC STEP DOWN PRIVATE ROOM

## 2024-03-30 PROCEDURE — 36415 COLL VENOUS BLD VENIPUNCTURE: CPT | Performed by: PHYSICIAN ASSISTANT

## 2024-03-30 RX ORDER — LORAZEPAM 2 MG/ML
1 INJECTION INTRAMUSCULAR EVERY 4 HOURS PRN
Status: DISCONTINUED | OUTPATIENT
Start: 2024-03-30 | End: 2024-04-01 | Stop reason: HOSPADM

## 2024-03-30 RX ORDER — MORPHINE SULFATE 1 MG/ML
0-10 INJECTION, SOLUTION INTRAVENOUS CONTINUOUS
Status: DISCONTINUED | OUTPATIENT
Start: 2024-03-30 | End: 2024-04-01 | Stop reason: HOSPADM

## 2024-03-30 RX ORDER — GLYCOPYRROLATE 0.2 MG/ML
0.1 INJECTION INTRAMUSCULAR; INTRAVENOUS 3 TIMES DAILY PRN
Status: DISCONTINUED | OUTPATIENT
Start: 2024-03-30 | End: 2024-04-01 | Stop reason: HOSPADM

## 2024-03-30 RX ORDER — MORPHINE SULFATE 2 MG/ML
2 INJECTION, SOLUTION INTRAMUSCULAR; INTRAVENOUS ONCE
Status: COMPLETED | OUTPATIENT
Start: 2024-03-30 | End: 2024-03-30

## 2024-03-30 RX ORDER — ONDANSETRON HYDROCHLORIDE 2 MG/ML
8 INJECTION, SOLUTION INTRAVENOUS EVERY 8 HOURS PRN
Status: DISCONTINUED | OUTPATIENT
Start: 2024-03-30 | End: 2024-04-01 | Stop reason: HOSPADM

## 2024-03-30 RX ORDER — ACETAMINOPHEN 325 MG/1
650 TABLET ORAL ONCE
Status: COMPLETED | OUTPATIENT
Start: 2024-03-30 | End: 2024-03-30

## 2024-03-30 RX ADMIN — TRIAMCINOLONE ACETONIDE: 1 CREAM TOPICAL at 10:03

## 2024-03-30 RX ADMIN — ACETAMINOPHEN 650 MG: 325 TABLET ORAL at 11:03

## 2024-03-30 RX ADMIN — MORPHINE SULFATE 2 MG: 2 INJECTION, SOLUTION INTRAMUSCULAR; INTRAVENOUS at 03:03

## 2024-03-30 RX ADMIN — HEPARIN SODIUM 5000 UNITS: 5000 INJECTION INTRAVENOUS; SUBCUTANEOUS at 05:03

## 2024-03-30 RX ADMIN — HYDROMORPHONE HYDROCHLORIDE 1 MG: 1 INJECTION, SOLUTION INTRAMUSCULAR; INTRAVENOUS; SUBCUTANEOUS at 04:03

## 2024-03-30 RX ADMIN — AMLODIPINE BESYLATE 10 MG: 10 TABLET ORAL at 09:03

## 2024-03-30 RX ADMIN — OXYCODONE HYDROCHLORIDE 10 MG: 10 TABLET ORAL at 02:03

## 2024-03-30 RX ADMIN — Medication 0.5 MG/HR: at 01:03

## 2024-03-30 RX ADMIN — OXYCODONE HYDROCHLORIDE 10 MG: 10 TABLET ORAL at 06:03

## 2024-03-30 RX ADMIN — FAMOTIDINE 20 MG: 20 TABLET ORAL at 09:03

## 2024-03-30 RX ADMIN — ATORVASTATIN CALCIUM 40 MG: 40 TABLET, FILM COATED ORAL at 09:03

## 2024-03-30 RX ADMIN — MONTELUKAST 10 MG: 10 TABLET, FILM COATED ORAL at 09:03

## 2024-03-30 RX ADMIN — METOPROLOL TARTRATE 25 MG: 25 TABLET, FILM COATED ORAL at 09:03

## 2024-03-30 RX ADMIN — PIPERACILLIN SODIUM AND TAZOBACTAM SODIUM 4.5 G: 4; .5 INJECTION, POWDER, FOR SOLUTION INTRAVENOUS at 09:03

## 2024-03-30 RX ADMIN — ALLOPURINOL 300 MG: 100 TABLET ORAL at 09:03

## 2024-03-30 RX ADMIN — FLUCONAZOLE 400 MG: 100 TABLET ORAL at 09:03

## 2024-03-30 RX ADMIN — ACYCLOVIR 400 MG: 200 CAPSULE ORAL at 09:03

## 2024-03-30 RX ADMIN — HYDROMORPHONE HYDROCHLORIDE 1 MG: 1 INJECTION, SOLUTION INTRAMUSCULAR; INTRAVENOUS; SUBCUTANEOUS at 08:03

## 2024-03-30 RX ADMIN — LOSARTAN POTASSIUM 100 MG: 50 TABLET, FILM COATED ORAL at 09:03

## 2024-03-30 RX ADMIN — HYDROMORPHONE HYDROCHLORIDE 1 MG: 1 INJECTION, SOLUTION INTRAMUSCULAR; INTRAVENOUS; SUBCUTANEOUS at 09:03

## 2024-03-30 RX ADMIN — ACETAMINOPHEN 1000 MG: 500 TABLET ORAL at 04:03

## 2024-03-30 RX ADMIN — LEVETIRACETAM 500 MG: 500 SOLUTION ORAL at 09:03

## 2024-03-30 RX ADMIN — HYDROMORPHONE HYDROCHLORIDE 1 MG: 1 INJECTION, SOLUTION INTRAMUSCULAR; INTRAVENOUS; SUBCUTANEOUS at 01:03

## 2024-03-30 RX ADMIN — HYDROMORPHONE HYDROCHLORIDE 1 MG: 1 INJECTION, SOLUTION INTRAMUSCULAR; INTRAVENOUS; SUBCUTANEOUS at 12:03

## 2024-03-30 NOTE — SUBJECTIVE & OBJECTIVE
"Interval HPI:   Overnight events: Downgraded from MICU to [address]. Persistent fevers and significant AMS overnight. BG well controlled on IV intensive insulin protocol with infusion rates ranging from 1.7-5.9 u/hr. Diet Clear liquid (no sugar)/Bariatric    Eating:   <25%  Nausea: No  Hypoglycemia and intervention: No  Fever: Yes  TPN and/or TF: No  If yes, type of TF/TPN and rate: n/a    BP (!) 153/65 (BP Location: Right arm, Patient Position: Lying)   Pulse (!) 124   Temp (!) 101.6 °F (38.7 °C) (Axillary)   Resp 18   Ht 5' 3" (1.6 m)   Wt 93 kg (205 lb 0.4 oz)   SpO2 95%   BMI 36.32 kg/m²     Labs Reviewed and Include    Recent Labs   Lab 03/30/24  0531   *   CALCIUM 8.9   ALBUMIN 2.3*   PROT 4.9*      K 3.5   CO2 26      BUN 10   CREATININE 0.5   ALKPHOS 147*   ALT 10   AST 13   BILITOT 0.6     Lab Results   Component Value Date    WBC 6.10 03/30/2024    HGB 8.5 (L) 03/30/2024    HCT 28.7 (L) 03/30/2024    MCV 81 (L) 03/30/2024    PLT 48 (L) 03/30/2024     Recent Labs   Lab 03/24/24  0127   TSH 1.428     Lab Results   Component Value Date    HGBA1C 6.2 (H) 03/24/2024       Nutritional status:   Body mass index is 36.32 kg/m².  Lab Results   Component Value Date    ALBUMIN 2.3 (L) 03/30/2024    ALBUMIN 2.1 (L) 03/29/2024    ALBUMIN 2.2 (L) 03/28/2024     No results found for: "PREALBUMIN"    Estimated Creatinine Clearance: 136.1 mL/min (based on SCr of 0.5 mg/dL).    Accu-Checks  Recent Labs     03/29/24  1542 03/29/24  1731 03/29/24  1923 03/29/24  2141 03/29/24  2336 03/30/24  0158 03/30/24  0436 03/30/24  0649 03/30/24  0816 03/30/24  0957   POCTGLUCOSE 122* 176* 174* 198* 217* 228* 209* 191* 170* 161*       Current Medications and/or Treatments Impacting Glycemic Control  Immunotherapy:    Immunosuppressants       None          Steroids:   Hormones (From admission, onward)      None          Pressors:    Autonomic Drugs (From admission, onward)      None          Hyperglycemia/Diabetes " Medications:   Antihyperglycemics (From admission, onward)      Start     Stop Route Frequency Ordered    03/26/24 1615  insulin regular in 0.9 % NaCl 100 unit/100 mL (1 unit/mL) infusion        Question:  Enter initial dose from Infusion Protocol Chart (Units/hr):  Answer:  4    -- IV Continuous 03/26/24 9899

## 2024-03-30 NOTE — SUBJECTIVE & OBJECTIVE
Interval History: pt lying in bed, NGT in place. Stepped down from ICU to floor overnight.     Past Medical History:   Diagnosis Date    Cancer     CML (chronic myelocytic leukemia)     DM2 (diabetes mellitus, type 2)     HTN (hypertension)     NAFLD (nonalcoholic fatty liver disease)     Neuropathy        Past Surgical History:   Procedure Laterality Date    ABDOMINAL SURGERY       SECTION      x4    CHOLECYSTECTOMY         Review of patient's allergies indicates:  No Known Allergies    Medications:  Continuous Infusions:   morphine       Scheduled Meds:  PRN Meds:glycopyrrolate, HYDROmorphone, hydrOXYzine HCL, lorazepam, ondansetron    Family History       Problem Relation (Age of Onset)    Diabetes Mother, Father          Tobacco Use    Smoking status: Never    Smokeless tobacco: Never   Substance and Sexual Activity    Alcohol use: Not Currently    Drug use: Not Currently    Sexual activity: Not on file       Review of Systems  Objective:     Vital Signs (Most Recent):  Temp: (!) 101.9 °F (38.8 °C) (24 1145)  Pulse: (!) 126 (24 1145)  Resp: 18 (24 1211)  BP: (!) 119/56 (24 1145)  SpO2: 98 % (24 1145) Vital Signs (24h Range):  Temp:  [98.7 °F (37.1 °C)-102.9 °F (39.4 °C)] 101.9 °F (38.8 °C)  Pulse:  [] 126  Resp:  [12-24] 18  SpO2:  [95 %-100 %] 98 %  BP: (119-182)/(56-83) 119/56     Weight: 93 kg (205 lb 0.4 oz)  Body mass index is 36.32 kg/m².       Physical Exam  Constitutional:       Appearance: She is obese. She is toxic-appearing and diaphoretic.   HENT:      Head: Normocephalic and atraumatic.   Pulmonary:      Effort: Pulmonary effort is normal. No respiratory distress.   Skin:     Findings: Bruising present.   Neurological:      Mental Status: She is disoriented.            Review of Symptoms      Symptom Assessment (ESAS 0-10 Scale)  Pain:  0  Dyspnea:  0  Anxiety:  0  Nausea:  0  Depression:  0  Anorexia:  0  Fatigue:  0  Insomnia:  0  Restlessness:   0  Agitation:  0     CAM / Delirium:  Positive      Performance Status:  10    Living Arrangements:  Lives with family and Lives >50 miles from facility    Psychosocial/Cultural:   See Palliative Psychosocial Note: Yes  **Primary  to Follow**  Palliative Care  Consult: Yes        Advance Care Planning   Advance Directives:   Do Not Resuscitate Status: Yes      Decision Making:  Family answered questions and Patient unable to communicate due to disease severity/cognitive impairment  Goals of Care: The family endorses that what is most important right now is to focus on symptom/pain control and quality of life, even if it means sacrificing a little time    Accordingly, we have decided that the best plan to meet the patient's goals includes pivot to comfort-focused care         Significant Labs: All pertinent labs within the past 24 hours have been reviewed.  CBC:   Recent Labs   Lab 03/30/24  0531   WBC 6.10   HGB 8.5*   HCT 28.7*   MCV 81*   PLT 48*     BMP:  Recent Labs   Lab 03/30/24  0531   *      K 3.5      CO2 26   BUN 10   CREATININE 0.5   CALCIUM 8.9     LFT:  Lab Results   Component Value Date    AST 13 03/30/2024    ALKPHOS 147 (H) 03/30/2024    BILITOT 0.6 03/30/2024     Albumin:   Albumin   Date Value Ref Range Status   03/30/2024 2.3 (L) 3.5 - 5.2 g/dL Final     Protein:   Total Protein   Date Value Ref Range Status   03/30/2024 4.9 (L) 6.0 - 8.4 g/dL Final     Lactic acid:   Lab Results   Component Value Date    LACTATE 2.1 03/26/2024    LACTATE 1.6 03/25/2024       Significant Imaging: I have reviewed all pertinent imaging results/findings within the past 24 hours.

## 2024-03-30 NOTE — PLAN OF CARE
Patient arrived around 2330. Quiet, alert, in bed.  at bedside. Insulin gtt infusing. Q2 CBG, Q4 neuro. Safety and isolation precautions maintained, call light in reach. Will continue to monitor.     Problem: Adult Inpatient Plan of Care  Goal: Plan of Care Review  Outcome: Ongoing, Progressing     Problem: Adult Inpatient Plan of Care  Goal: Patient-Specific Goal (Individualized)  Outcome: Ongoing, Progressing     Problem: Adult Inpatient Plan of Care  Goal: Absence of Hospital-Acquired Illness or Injury  Outcome: Ongoing, Progressing     Problem: Adult Inpatient Plan of Care  Goal: Optimal Comfort and Wellbeing  Outcome: Ongoing, Progressing     Problem: Adult Inpatient Plan of Care  Goal: Readiness for Transition of Care  Outcome: Ongoing, Progressing

## 2024-03-30 NOTE — SUBJECTIVE & OBJECTIVE
Subjective:     Interval History: Patient downgraded from MICU overnight. Patient with persistent fevers with a Tmax of 102.9F. She remains with significant AMS this AM with her non-verbal this AM. Code status was changed to DNR by ICU team as well. Of note, labs show continued leuko-reduction with hydrea held. Continued discussions had with family regarding prognosis in setting of blast phase CML with us working on balancing comfort and avoiding oversedation and were agreeable and understanding.     Objective:     Vital Signs (Most Recent):  Temp: (!) 101.6 °F (38.7 °C) (03/30/24 0815)  Pulse: (!) 124 (03/30/24 0815)  Resp: 18 (03/30/24 0936)  BP: (!) 153/65 (03/30/24 0815)  SpO2: 95 % (03/30/24 0815) Vital Signs (24h Range):  Temp:  [98.7 °F (37.1 °C)-101.6 °F (38.7 °C)] 101.6 °F (38.7 °C)  Pulse:  [] 124  Resp:  [12-24] 18  SpO2:  [95 %-100 %] 95 %  BP: (129-182)/(59-83) 153/65     Weight: 93 kg (205 lb 0.4 oz)  Body mass index is 36.32 kg/m².  Body surface area is 2.03 meters squared.    ECOG SCORE           [unfilled]    Intake/Output - Last 3 Shifts         03/28 0700  03/29 0659 03/29 0700 03/30 0659 03/30 0700 03/31 0659    P.O.       I.V. (mL/kg) 334.2 (3.6) 113 (1.2)     IV Piggyback 1459.1 538.8     Total Intake(mL/kg) 1793.3 (19.3) 651.8 (7)     Urine (mL/kg/hr) 1915 (0.9) 1925 (0.9)     Emesis/NG output 0      Other 0      Stool 0      Blood 0      Total Output 1915 1925     Net -121.7 -1273.3            Urine Occurrence 0 x      Stool Occurrence 0 x      Emesis Occurrence 0 x               Physical Exam  Vitals and nursing note reviewed.   Constitutional:       Appearance: Normal appearance.      Comments: Ill-appearing female with AMS.    Cardiovascular:      Rate and Rhythm: Normal rate and regular rhythm.      Pulses: Normal pulses.      Heart sounds: Normal heart sounds.   Pulmonary:      Effort: Pulmonary effort is normal.      Comments: Non-labored breathing on NC.   Abdominal:       Comments: Diffuse abdominal scarring.    Neurological:      Mental Status: She is disoriented.      Comments: Not oriented times 3 on AM rounds, but waxing and waning.    Psychiatric:      Comments: Non-verbal on AM rounds.             Significant Labs:   All pertinent labs from the last 24 hours have been reviewed.    Diagnostic Results:  I have reviewed all pertinent imaging results/findings within the past 24 hours.

## 2024-03-30 NOTE — CLINICAL REVIEW
Sepsis Screen (most recent)       Sepsis Screen (IP) - 03/30/24 2874       Is the patient's history or complaint suggestive of a possible infection? Yes  -    Are there at least two of the following signs and symptoms present? Yes   TMax 102.9 -    Sepsis signs/symptoms - Hyper or Hypothermia Hyperthermia >100.4 or Hypothermia < 96.8  -    Sepsis signs/symptoms - Tachycardia Tachycardia     >90  -    Sepsis signs/symptoms - Altered Mental Status Altered Mental Status  -    Are any of the following organ dysfunction criteria present and not considered to be due to a chronic condition? Yes  -    Organ Dysfunction Criteria Total Bilirubin > 2.0 Platelet count < 100,000  -    Initiate Sepsis Protocol No  -    Reason sepsis not considered End of Life Care  -              User Key  (r) = Recorded By, (t) = Taken By, (c) = Cosigned By      Initials Name    Angélica Salinas RN

## 2024-03-30 NOTE — PROGRESS NOTES
Pharmacokinetic Initial Assessment: IV Vancomycin    Assessment/Plan:    Initiate intravenous vancomycin with vancomycin 1250 mg IV every 12 hours  Desired empiric serum trough concentration is 10 to 20 mcg/mL  Draw vancomycin trough level 60 min prior to fourth dose on 3/31 at approximately 2300  Pharmacy will continue to follow and monitor vancomycin.      Please contact pharmacy at extension [phone] with any questions regarding this assessment.     Thank you for the consult,   Benji Farhad       Patient brief summary:  Fela Ellison is a 58 y.o. female initiated on antimicrobial therapy with IV Vancomycin for treatment of suspected sepsis    Drug Allergies:   Review of patient's allergies indicates:  No Known Allergies    Actual Body Weight:   93 kg    Renal Function:   Estimated Creatinine Clearance: 136.1 mL/min (based on SCr of 0.5 mg/dL).,     Dialysis Method (if applicable):  N/A    CBC (last 72 hours):  Recent Labs   Lab Result Units 03/28/24  0418 03/29/24  0705 03/30/24  0531   WBC K/uL 33.16* 10.95 6.10   Hemoglobin g/dL 9.4* 8.6* 8.5*   Hematocrit % 32.2* 29.6* 28.7*   Platelets K/uL 135* 73* 48*   Gran % % 35.0* 36.8* 33.0*   Lymph % % 14.0* 24.9 12.0*   Mono % % 9.0 35.7* 3.0*   Eosinophil % % 0.0 0.0 0.0   Basophil % % 0.0 0.1 0.0   Differential Method  Manual Automated Manual       Metabolic Panel (last 72 hours):  Recent Labs   Lab Result Units 03/27/24  1259 03/28/24  0418 03/29/24  0709 03/30/24  0531   Sodium mmol/L 138 139 140 140   Potassium mmol/L 4.0 4.0 3.8 3.5   Chloride mmol/L 110 108 109 105   CO2 mmol/L 22* 21* 22* 26   Glucose mg/dL 207* 169* 177* 198*   BUN mg/dL 20 21* 17 10   Creatinine mg/dL 0.7 0.6 0.5 0.5   Albumin g/dL  --  2.2* 2.1* 2.3*   Total Bilirubin mg/dL  --  0.5 0.5 0.6   Alkaline Phosphatase U/L  --  149* 133 147*   AST U/L  --  19 17 13   ALT U/L  --  11 11 10   Magnesium mg/dL  --  1.9 1.9  --    Phosphorus mg/dL  --  5.0* 4.1  --        Drug levels (last  3 results):  Recent Labs   Lab Result Units 03/28/24  0755   Vancomycin-Trough ug/mL 17.1       Microbiologic Results:  Microbiology Results (last 7 days)       Procedure Component Value Units Date/Time    Blood culture [[id number]]     Order Status: Sent Specimen: Blood     Blood culture [[id number]]     Order Status: Sent Specimen: Blood     Blood culture [[id number]] Collected: 03/26/24 1721    Order Status: Completed Specimen: Blood from Peripheral, Wrist, Left Updated: 03/29/24 1812     Blood Culture, Routine No Growth to date      No Growth to date      No Growth to date      No Growth to date    Narrative:      Blood cultures from 2 different sites. 4 bottles total.  Please draw before starting antibiotics.    Blood culture [[id number]] Collected: 03/26/24 1700    Order Status: Completed Specimen: Blood from Peripheral, Antecubital, Right Updated: 03/29/24 1812     Blood Culture, Routine No Growth to date      No Growth to date      No Growth to date      No Growth to date    Narrative:      Blood cultures x 2 different sites. 4 bottles total. Please  draw cultures before administering antibiotics.    Blood culture [[id number]] Collected: 03/23/24 2222    Order Status: Completed Specimen: Blood from Peripheral, Forearm, Right Updated: 03/29/24 0612     Blood Culture, Routine No growth after 5 days.    Narrative:      Site #1- Aerobic and Anaerobic    Blood culture [[id number]] Collected: 03/23/24 2223    Order Status: Completed Specimen: Blood from Peripheral, Hand, Left Updated: 03/29/24 0612     Blood Culture, Routine No growth after 5 days.    Narrative:      Site #2 - Aerobic and Anaerobic    Culture, Respiratory with Gram Stain [[id number]]     Order Status: Canceled Specimen: Respiratory

## 2024-03-30 NOTE — PROGRESS NOTES
"[facility] ([place] [address])  Endocrinology  Progress Note    Admit Date: 3/23/2024     Reason for Consult: Management of T2DM, Hyperglycemia     Diabetes diagnosis year: > 2 years ago    Home Diabetes Medications:    Per chart review:   Novolog 22 units with meals -- Takes 22 units TID AC while on chemo - (Gives between 12 to 15 units TID AC when not on chemo)   NPH 20 units AM and 50 units PM   Metformin 1000 mg BID     How often checking glucose at home?  AMIE    BG readings on regimen: AMIE  Hypoglycemia on the regimen?  AMIE  Missed doses on regimen?  AMIE    Diabetes Complications include:     Hyperglycemia    Complicating diabetes co morbidities:   Active Cancer and Glucocorticoid use       HPI:   Patient is a 58 y.o. female with PMHx of insulin-dependent T2DM, diabetic neuropathy, essential HTN, NAFLD, obesity, former tobacco use disorder (quit 4-5 months ago), and CML (actively on chemotherapy) with blast crisis diagnosed prior to transfer at [facility], who presents as a transfer from [facility] ED in [place] for acute SDH. Found to be COVID (+) and Dexamethasone therapy started. BG excursions noted. Endocrine consulted for BG management.         Interval HPI:   Overnight events: [place] MICU to [address]. Persistent fevers and significant AMS overnight. BG well controlled on IV intensive insulin protocol with infusion rates ranging from 1.7-5.9 u/hr. Diet Clear liquid (no sugar)/Bariatric    Eating:   <25%  Nausea: No  Hypoglycemia and intervention: No  Fever: Yes  TPN and/or TF: No  If yes, type of TF/TPN and rate: n/a    BP (!) 153/65 (BP Location: Right arm, Patient Position: Lying)   Pulse (!) 124   Temp (!) 101.6 °F (38.7 °C) (Axillary)   Resp 18   Ht 5' 3" (1.6 m)   Wt 93 kg (205 lb 0.4 oz)   SpO2 95%   BMI 36.32 kg/m²     Labs Reviewed and Include    Recent Labs   Lab 03/30/24  0531   *   CALCIUM 8.9   ALBUMIN 2.3*   PROT 4.9*      K 3.5   CO2 26      BUN 10 " "  CREATININE 0.5   ALKPHOS 147*   ALT 10   AST 13   BILITOT 0.6     Lab Results   Component Value Date    WBC 6.10 03/30/2024    HGB 8.5 (L) 03/30/2024    HCT 28.7 (L) 03/30/2024    MCV 81 (L) 03/30/2024    PLT 48 (L) 03/30/2024     Recent Labs   Lab 03/24/24  0127   TSH 1.428     Lab Results   Component Value Date    HGBA1C 6.2 (H) 03/24/2024       Nutritional status:   Body mass index is 36.32 kg/m².  Lab Results   Component Value Date    ALBUMIN 2.3 (L) 03/30/2024    ALBUMIN 2.1 (L) 03/29/2024    ALBUMIN 2.2 (L) 03/28/2024     No results found for: "PREALBUMIN"    Estimated Creatinine Clearance: 136.1 mL/min (based on SCr of 0.5 mg/dL).    Accu-Checks  Recent Labs     03/29/24  1542 03/29/24  1731 03/29/24  1923 03/29/24  2141 03/29/24  2336 03/30/24  0158 03/30/24  0436 03/30/24  0649 03/30/24  0816 03/30/24  0957   POCTGLUCOSE 122* 176* 174* 198* 217* 228* 209* 191* 170* 161*       Current Medications and/or Treatments Impacting Glycemic Control  Immunotherapy:    Immunosuppressants       None          Steroids:   Hormones (From admission, onward)      None          Pressors:    Autonomic Drugs (From admission, onward)      None          Hyperglycemia/Diabetes Medications:   Antihyperglycemics (From admission, onward)      Start     Stop Route Frequency Ordered    03/26/24 1615  insulin regular in 0.9 % NaCl 100 unit/100 mL (1 unit/mL) infusion        Question:  Enter initial dose from Infusion Protocol Chart (Units/hr):  Answer:  4    -- IV Continuous 03/26/24 1515            ASSESSMENT and PLAN    Neuro  Subdural hematoma  Managed per primary team       Acute encephalopathy  Managed per primary team  Optimize BG control      ID  COVID  Expect effects of Dexamethasone for COVID on BG to last up to 72 hours      Oncology  * Blast crisis phase of chronic myeloid leukemia  Managed per primary team  Avoid hypoglycemia        Endocrine  Type 2 diabetes mellitus with diabetic neuropathy, with long-term current use " of insulin  BG goal: 140-180   T2DM. BG above goal here. Receiving steroids (d/c 3/25) and was on lower doses than reported home doses.  Bg improving on IIP but on high drip rates.  Will continue on IIP and consider transition to Transition drip when mental status improves and patient ready for diet advancement. Spacing BG checks to q 2 hrs to help determine a transition insulin infusion rate     - Continue insulin infusion protocol   - POCT Glucose every 2 hours   - Hypoglycemia protocol in place      ** Please notify Endocrine for any change and/or advance in diet**  ** Please call Endocrine for any BG related issues **     Discharge Planning:   TBD. Please notify endocrine prior to discharge.             Nusrat Torres NP  Endocrinology  [facility] ([place])

## 2024-03-30 NOTE — PLAN OF CARE
Plan of Care Note:    Patient family met with palliative care this afternoon and given overall clinical decline and poor treatment options in the setting of blast phase CML opted for comfort focused measures only at this time. Labs, medications, and other measures discontinued to align with those goals. Comfort order set with Morphine drip, Ativan, and Glyopryolate ordered. Family visited at the bedside and requested a  to administer the sacrament of last rites with charge nurse updated.       Maged Garcia D.O.  Hematology/Oncology Fellow, PGY-V

## 2024-03-30 NOTE — CONSULTS
[facility] ([place] [address])  Palliative Medicine  Consult Note    Patient Name: Fela Ellison  MRN: [id number]  Admission Date: 3/23/2024  Hospital Length of Stay: 7 days  Code Status: DNR   Attending Provider: Kamran García MD  Consulting Provider: Hemant Edwards DO  Primary Care Physician: Bar Trevino DO  Principal Problem:Blast crisis phase of chronic myeloid leukemia    Patient information was obtained from spouse/SO, relative(s), past medical records, and primary team.      Inpatient consult to Palliative Care  Consult performed by: Hemant Edwards DO  Consult ordered by: Bobby Cruz MD        Assessment/Plan:     Palliative Care  Palliative care encounter  Pt with aggressive CML continuing to deline despite prior aggressive therapies  Family wishing to transition to comfort focused measures 3/30/24  Will re-addressthe idea of inpatient hospice vs home hospice tomorrow.     Ativan IV PRN anxiety  Morphine IV prn pain or dyspnea  Glycopyrrolate/repositioning PRN for secretions        Thank you for your consult. I will follow-up with patient. Please contact us if you have any additional questions.    Subjective:     HPI:   Fela Ellison is a 58 y.o. female with CML, DM2, Obesity, NAFLD admitted to hospital for SDH found to be in blast crisis of her aggressive CML. Despite aggressive care over the past several days, patient has continued to decline and now is enephalopathic. Pt's family requesting palliative care consultation to discuss goals of care in light of her serious illness.    Met with patient's  Zachary at bedside, as well as sons Daniel and Frandy. They are all very concerned that ms. Chahal appears to be very uncomfortable. They are able to report that despite treating her for infections, she has continued to decline in terms of her mentation. They were informed that she is so ill, she is not able to receive any chemotherapy to treat  her CML, which is driving her overall decline. We discussed that while we may be unable to give her therapy to prolong her life, we are still able to care for her in a way that ensures she is as comfortable as possible. They agree that their loved one is dying and do not wish to artificially prolong that process but instead insure that she is comfortable.     We discussed that after we transitioning our orders to focus on comfort, and that we are unsure how much time she has but it could be hours, maybe days to short weeks but that ultimately we cannot predict that. They are okay with making her comfortable now, and re-addressing the idea of inpatient hospice vs home hospice tomorrow.     Hospital Course:  No notes on file    Interval History: pt lying in bed, NGT in place. Stepped down from ICU to floor overnight.     Past Medical History:   Diagnosis Date    Cancer     CML (chronic myelocytic leukemia)     DM2 (diabetes mellitus, type 2)     HTN (hypertension)     NAFLD (nonalcoholic fatty liver disease)     Neuropathy        Past Surgical History:   Procedure Laterality Date    ABDOMINAL SURGERY       SECTION      x4    CHOLECYSTECTOMY         Review of patient's allergies indicates:  No Known Allergies    Medications:  Continuous Infusions:   morphine       Scheduled Meds:  PRN Meds:glycopyrrolate, HYDROmorphone, hydrOXYzine HCL, lorazepam, ondansetron    Family History       Problem Relation (Age of Onset)    Diabetes Mother, Father          Tobacco Use    Smoking status: Never    Smokeless tobacco: Never   Substance and Sexual Activity    Alcohol use: Not Currently    Drug use: Not Currently    Sexual activity: Not on file       Review of Systems  Objective:     Vital Signs (Most Recent):  Temp: (!) 101.9 °F (38.8 °C) (24 1145)  Pulse: (!) 126 (24 1145)  Resp: 18 (24 1211)  BP: (!) 119/56 (24 1145)  SpO2: 98 % (24 1145) Vital Signs (24h Range):  Temp:  [98.7 °F (37.1  °C)-102.9 °F (39.4 °C)] 101.9 °F (38.8 °C)  Pulse:  [] 126  Resp:  [12-24] 18  SpO2:  [95 %-100 %] 98 %  BP: (119-182)/(56-83) 119/56     Weight: 93 kg (205 lb 0.4 oz)  Body mass index is 36.32 kg/m².       Physical Exam  Constitutional:       Appearance: She is obese. She is toxic-appearing and diaphoretic.   HENT:      Head: Normocephalic and atraumatic.   Pulmonary:      Effort: Pulmonary effort is normal. No respiratory distress.   Skin:     Findings: Bruising present.   Neurological:      Mental Status: She is disoriented.            Review of Symptoms      Symptom Assessment (ESAS 0-10 Scale)  Pain:  0  Dyspnea:  0  Anxiety:  0  Nausea:  0  Depression:  0  Anorexia:  0  Fatigue:  0  Insomnia:  0  Restlessness:  0  Agitation:  0     CAM / Delirium:  Positive      Performance Status:  10    Living Arrangements:  Lives with family and Lives >50 miles from facility    Psychosocial/Cultural:   See Palliative Psychosocial Note: Yes  **Primary  to Follow**  Palliative Care  Consult: Yes        Advance Care Planning  Advance Directives:   Do Not Resuscitate Status: Yes      Decision Making:  Family answered questions and Patient unable to communicate due to disease severity/cognitive impairment  Goals of Care: The family endorses that what is most important right now is to focus on symptom/pain control and quality of life, even if it means sacrificing a little time    Accordingly, we have decided that the best plan to meet the patient's goals includes pivot to comfort-focused care         Significant Labs: All pertinent labs within the past 24 hours have been reviewed.  CBC:   Recent Labs   Lab 03/30/24  0531   WBC 6.10   HGB 8.5*   HCT 28.7*   MCV 81*   PLT 48*     BMP:  Recent Labs   Lab 03/30/24  0531   *      K 3.5      CO2 26   BUN 10   CREATININE 0.5   CALCIUM 8.9     LFT:  Lab Results   Component Value Date    AST 13 03/30/2024    ALKPHOS 147 (H) 03/30/2024     BILITOT 0.6 03/30/2024     Albumin:   Albumin   Date Value Ref Range Status   03/30/2024 2.3 (L) 3.5 - 5.2 g/dL Final     Protein:   Total Protein   Date Value Ref Range Status   03/30/2024 4.9 (L) 6.0 - 8.4 g/dL Final     Lactic acid:   Lab Results   Component Value Date    LACTATE 2.1 03/26/2024    LACTATE 1.6 03/25/2024       Significant Imaging: I have reviewed all pertinent imaging results/findings within the past 24 hours.      I spent a total of 76 minutes on the day of the visit. This includes face to face time in discussion of goals of care, symptom assessment, coordination of care and emotional support.  This also includes non-face to face time preparing to see the patient (eg, review of tests/imaging), obtaining and/or reviewing separately obtained history, documenting clinical information in the electronic or other health record, independently interpreting results and communicating results to the patient/family/caregiver, or care coordinator.    Hemant Edwards, DO  Palliative Medicine  [facility] ([place]

## 2024-03-30 NOTE — PROGRESS NOTES
[facility] ([place] [address])  Hematology  Bone Marrow Transplant  Progress Note    Patient Name: Fela Ellison  Admission Date: 3/23/2024  Hospital Length of Stay: 7 days  Code Status: DNR    Subjective:     Interval History: Patient downgraded from MICU overnight. Patient with persistent fevers with a Tmax of 102.9F. She remains with significant AMS this AM with her non-verbal this AM. Code status was changed to DNR by ICU team as well. Of note, labs show continued leuko-reduction with hydrea held. Continued discussions had with family regarding prognosis in setting of blast phase CML with us working on balancing comfort and avoiding oversedation and were agreeable and understanding.     Objective:     Vital Signs (Most Recent):  Temp: (!) 101.6 °F (38.7 °C) (03/30/24 0815)  Pulse: (!) 124 (03/30/24 0815)  Resp: 18 (03/30/24 0936)  BP: (!) 153/65 (03/30/24 0815)  SpO2: 95 % (03/30/24 0815) Vital Signs (24h Range):  Temp:  [98.7 °F (37.1 °C)-101.6 °F (38.7 °C)] 101.6 °F (38.7 °C)  Pulse:  [] 124  Resp:  [12-24] 18  SpO2:  [95 %-100 %] 95 %  BP: (129-182)/(59-83) 153/65     Weight: 93 kg (205 lb 0.4 oz)  Body mass index is 36.32 kg/m².  Body surface area is 2.03 meters squared.    ECOG SCORE           [unfilled]    Intake/Output - Last 3 Shifts         03/28 0700  03/29 0659 03/29 0700  03/30 0659 03/30 0700 03/31 0659    P.O.       I.V. (mL/kg) 334.2 (3.6) 113 (1.2)     IV Piggyback 1459.1 538.8     Total Intake(mL/kg) 1793.3 (19.3) 651.8 (7)     Urine (mL/kg/hr) 1915 (0.9) 1925 (0.9)     Emesis/NG output 0      Other 0      Stool 0      Blood 0      Total Output 1915 1925     Net -121.7 -1273.3            Urine Occurrence 0 x      Stool Occurrence 0 x      Emesis Occurrence 0 x              Physical Exam  Vitals and nursing note reviewed.   Constitutional:       Appearance: Normal appearance.      Comments: Ill-appearing female with AMS.    Cardiovascular:      Rate and Rhythm: Normal  rate and regular rhythm.      Pulses: Normal pulses.      Heart sounds: Normal heart sounds.   Pulmonary:      Effort: Pulmonary effort is normal.      Comments: Non-labored breathing on NC.   Abdominal:      Comments: Diffuse abdominal scarring.    Neurological:      Mental Status: She is disoriented.      Comments: Not oriented times 3 on AM rounds, but waxing and waning.    Psychiatric:      Comments: Non-verbal on AM rounds.             Significant Labs:   All pertinent labs from the last 24 hours have been reviewed.    Diagnostic Results:  I have reviewed all pertinent imaging results/findings within the past 24 hours.  Assessment/Plan:     * Blast crisis phase of chronic myeloid leukemia  See CML    Acute encephalopathy  Likely multi-factorial secondary SDH, Blast crisis, & COVID-19 with fevers    Plan:  -Monitor for acute changes in mental status, low threshold for further work-up with CT Brain  -Continue PRN pain meds at this time and add long-acting when able based on mental status          Hyperglycemia  Pt has known DM, is on steroids due to COVID. Most likely causing her hyperglycemia. Removed but continued difficulty controlling. Ordered labs to make sure patient is not in DKA/HHS patient BHB 0.2, bicarb 15 then back up to 20, but glucose has remained in the 400s.     Plan  Diabetic diet with thin liquids per speech (went to bariatric clear with starting insulin drip)  Glucose checks AC x HS  Placed on insulin drip, improvement in glucose control  Consulted Endocrine with difficult to control hyperglycemia      COVID  Pt tested positive for covid on 3/24. Initially on 4L NC, has decreased to 2L. Pt's mental status has improved. WBC count 50K.     Plan  Remdesivir course and Dexamethasone completed  Wean O2 as able        Subdural hematoma  Pt presented from OSH with new SDH. Pt was initially admitted to neuro critical care. SDH felt to be small/stable and did not need to undergo surgical intervention.  "Pt placed on keppra for seizure proph. Pt PLT count is in the 200s. Experienced worsening confusion, lethargy on 3/26 Stat CT head ordered    Plan  Continue Keppra x7 days per NICU note  Neuro checks q4h      Hyperuricemia  Allopurinol 300mg daily    Hypercholesterolemia  Cont atorvastatin    Essential hypertension  Pt placed on amlodipine 10mg, metoprolol 25mg, and losartan 25.     Plan  Continue home medications, increased losartan to 100  Goal BP <160 per neurosurgery with SDH  PRN in place if needed, will increase BP medications as needed    Diabetes mellitus  Currently on Insulin drip with Endocrine consulted      Plan:  -Follow-up Endocrine recs and wean insulin drip as able      CML (chronic myelocytic leukemia)  58F with relapsed CML with blast crisis (transformed on 2022) admitted for fevers, fatigue, encephalopathy and found to have a SDH that NSGY does not recommend intervention on. Encephalopathic when seen and unable to follow commands.     Labs show clear evidence of blast crisis (29% on CBC) without cytopenias. This is in the setting of what may be sepsis vs fevers from leukemia.She normally follows at [facility] fellows clinic and has progressed through multiple treatments.   Discussed aggressive and unstable disease leading to her presentation here.     Marrow consistent with CML with blast crisis "     Plan:   -Hold Hydrea on 3/30/24  -Bone marrow biopsy on 3/27 with concern for worsening disease progression  -continue acyclovir, allopurinol, fluc  -Continue Zosyn, adding back vancomycin due to recurrent fevers  -prognosis guarded, will discuss goals of care moving forward, at this time hospice would be appropriate, changed to DNR  -Follow-up palliative consult  -Continue Ponatinib, based on clinical course and GOC talks can discuss asciminib vs. other for additional treatment        VTE Risk Mitigation (From admission, onward)           Ordered     heparin (porcine) injection 5,000 Units  Every 8 hours   "       03/28/24 1201     IP VTE HIGH RISK PATIENT  Once         03/23/24 2209     Place sequential compression device  Until discontinued         03/23/24 2209     Reason for No Pharmacological VTE Prophylaxis  Once        Question:  Reasons:  Answer:  Active Bleeding  Comment:  SDH    03/23/24 2209                    Disposition: Remain on BMT, Pending C conversations    Maged Garcia, DO  Bone Marrow Transplant  [facility] ([place]

## 2024-03-30 NOTE — ASSESSMENT & PLAN NOTE
Pt with aggressive CML continuing to deline despite prior aggressive therapies  Family wishing to transition to comfort focused measures 3/30/24  Will re-addressthe idea of inpatient hospice vs home hospice tomorrow.     Ativan IV PRN anxiety  Morphine IV prn pain or dyspnea  Glycopyrrolate/repositioning PRN for secretions

## 2024-03-30 NOTE — NURSING
Nurses Note -- 4 Eyes      3/29/24   2330      Skin assessed during: Transfer      [] No Altered Skin Integrity Present    []Prevention Measures Documented      [x] Yes- Altered Skin Integrity Present or Discovered   [x] LDA Added if Not in Epic (Describe Wound)   [] New Altered Skin Integrity was Present on Admit and Documented in LDA   [] Wound Image Taken    Wound Care Consulted? No    Attending Nurse:  ISAAC Zazueta    Second RN/Staff Member:  ISAAC Ellison

## 2024-03-30 NOTE — HPI
Fela Ellison is a 58 y.o. female with CML, DM2, Obesity, NAFLD admitted to hospital for SDH found to be in blast crisis of her aggressive CML. Despite aggressive care over the past several days, patient has continued to decline and now is enephalopathic. Pt's family requesting palliative care consultation to discuss goals of care in light of her serious illness.    Met with patient's  Zachary at bedside, as well as sons Daniel and Frandy. They are all very concerned that ms. Chahal appears to be very uncomfortable. They are able to report that despite treating her for infections, she has continued to decline in terms of her mentation. They were informed that she is so ill, she is not able to receive any chemotherapy to treat her CML, which is driving her overall decline. We discussed that while we may be unable to give her therapy to prolong her life, we are still able to care for her in a way that ensures she is as comfortable as possible. They agree that their loved one is dying and do not wish to artificially prolong that process but instead insure that she is comfortable.     We discussed that after we transitioning our orders to focus on comfort, and that we are unsure how much time she has but it could be hours, maybe days to short weeks but that ultimately we cannot predict that. They are okay with making her comfortable now, and re-addressing the idea of inpatient hospice vs home hospice tomorrow.

## 2024-03-30 NOTE — PROGRESS NOTES
VANCOMYCIN DOSING BY PHARMACY DISCONTINUATION NOTE    Fela Ellison is a 58 y.o. female who had been consulted for vancomycin dosing.    The pharmacy consult for vancomycin dosing has been discontinued.     Vancomycin Dosing by Pharmacy Consult will sign-off. Please reconsult if necessary. Thank you for allowing us to participate in this patient's care.       Benji Llamas  [id number]

## 2024-03-30 NOTE — ASSESSMENT & PLAN NOTE
"58F with relapsed CML with blast crisis (transformed on 2022) admitted for fevers, fatigue, encephalopathy and found to have a SDH that NSGY does not recommend intervention on. Encephalopathic when seen and unable to follow commands.     Labs show clear evidence of blast crisis (29% on CBC) without cytopenias. This is in the setting of what may be sepsis vs fevers from leukemia.She normally follows at [facility] clinic and has progressed through multiple treatments.   Discussed aggressive and unstable disease leading to her presentation here.     Marrow consistent with CML with blast crisis "     Plan:   -Hold Hydrea on 3/30/24  -Bone marrow biopsy on 3/27 with concern for worsening disease progression  -continue acyclovir, allopurinol, fluc  -Continue Zosyn, adding back vancomycin due to recurrent fevers  -prognosis guarded, will discuss goals of care moving forward, at this time hospice would be appropriate, changed to DNR  -Follow-up palliative consult  -Continue Ponatinib, based on clinical course and GOC talks can discuss asciminib vs. other for additional treatment  "

## 2024-03-30 NOTE — ASSESSMENT & PLAN NOTE
Likely multi-factorial secondary SDH, Blast crisis, & COVID-19 with fevers    Plan:  -Monitor for acute changes in mental status, low threshold for further work-up with CT Brain  -Continue PRN pain meds at this time and add long-acting when able based on mental status

## 2024-03-30 NOTE — ASSESSMENT & PLAN NOTE
BG goal: 140-180   T2DM. BG above goal here. Receiving steroids (d/c 3/25) and was on lower doses than reported home doses.  Bg improving on IIP but on high drip rates.  Will continue on IIP and consider transition to Transition drip when mental status improves and patient ready for diet advancement. Spacing BG checks to q 2 hrs to help determine a transition insulin infusion rate     - Continue insulin infusion protocol   - POCT Glucose every 2 hours   - Hypoglycemia protocol in place      ** Please notify Endocrine for any change and/or advance in diet**  ** Please call Endocrine for any BG related issues **     Discharge Planning:   TBD. Please notify endocrine prior to discharge.

## 2024-03-30 NOTE — ACP (ADVANCE CARE PLANNING)
GOALS OF CARE NOTE    Patient currently does NOT have decision-making capacity. Patient is currently NOT oriented and is NOT aware of current situation. Initiated the discussion of goals of care and code status with the patient's , Zachary, and the adult children at the bedside.     In the event where patient's heart stops or patient stops breathing, patient would NOT want life-sustaining measures (ie CPR, intubation). Per  and adult children wishes, code status confirmed as DNR. States that goals/wishes are to continue medical treatment at this time. Wishes to speak with Heme Onc about what treatment options there are and with Palliative Care for GOC and subsequent options if patient is unresponsive to treatment options.    Prognosis - Guarded  Functional status - Poor  Designated MPOA - Zachary Bradshaw MD  Internal Medicine PGY-3  [facility]

## 2024-03-31 LAB
BACTERIA BLD CULT: NORMAL
BACTERIA BLD CULT: NORMAL

## 2024-03-31 PROCEDURE — 63600175 PHARM REV CODE 636 W HCPCS: Performed by: STUDENT IN AN ORGANIZED HEALTH CARE EDUCATION/TRAINING PROGRAM

## 2024-03-31 PROCEDURE — 27000207 HC ISOLATION

## 2024-03-31 PROCEDURE — 94761 N-INVAS EAR/PLS OXIMETRY MLT: CPT

## 2024-03-31 PROCEDURE — 63600175 PHARM REV CODE 636 W HCPCS

## 2024-03-31 PROCEDURE — 25000003 PHARM REV CODE 250

## 2024-03-31 PROCEDURE — 20600001 HC STEP DOWN PRIVATE ROOM

## 2024-03-31 PROCEDURE — 25000003 PHARM REV CODE 250: Performed by: STUDENT IN AN ORGANIZED HEALTH CARE EDUCATION/TRAINING PROGRAM

## 2024-03-31 PROCEDURE — 99233 SBSQ HOSP IP/OBS HIGH 50: CPT | Mod: ,,, | Performed by: INTERNAL MEDICINE

## 2024-03-31 PROCEDURE — 51701 INSERT BLADDER CATHETER: CPT

## 2024-03-31 PROCEDURE — 93005 ELECTROCARDIOGRAM TRACING: CPT

## 2024-03-31 RX ADMIN — ONDANSETRON 8 MG: 2 INJECTION INTRAMUSCULAR; INTRAVENOUS at 01:03

## 2024-03-31 RX ADMIN — HYDROMORPHONE HYDROCHLORIDE 1 MG: 1 INJECTION, SOLUTION INTRAMUSCULAR; INTRAVENOUS; SUBCUTANEOUS at 02:03

## 2024-03-31 RX ADMIN — HYDROMORPHONE HYDROCHLORIDE 1 MG: 1 INJECTION, SOLUTION INTRAMUSCULAR; INTRAVENOUS; SUBCUTANEOUS at 06:03

## 2024-03-31 RX ADMIN — HYDROMORPHONE HYDROCHLORIDE 1 MG: 1 INJECTION, SOLUTION INTRAMUSCULAR; INTRAVENOUS; SUBCUTANEOUS at 09:03

## 2024-03-31 RX ADMIN — Medication 2.5 MG/HR: at 08:03

## 2024-03-31 RX ADMIN — HYDROXYZINE HYDROCHLORIDE 25 MG: 25 TABLET, FILM COATED ORAL at 02:03

## 2024-03-31 RX ADMIN — HYDROMORPHONE HYDROCHLORIDE 1 MG: 1 INJECTION, SOLUTION INTRAMUSCULAR; INTRAVENOUS; SUBCUTANEOUS at 04:03

## 2024-03-31 NOTE — ASSESSMENT & PLAN NOTE
Plan:    -Titrate Morphine drip, PRN ativan, and Glycopyrrolate based on symptoms  -SW consult tomorrow to assist with dispo planning in regard to in-patient vs home hospice options

## 2024-03-31 NOTE — ASSESSMENT & PLAN NOTE
Pt with aggressive CML continuing to deline despite prior aggressive therapies  Family wishing to transition to comfort focused measures 3/30/24  Pt appears comfortable on exam with morphine infusion.    wishing to pursue inpatient hospice near [place]    Recommendations:  Ativan IV PRN anxiety  Morphine IV prn pain or dyspnea  Glycopyrrolate/repositioning PRN for secretions  Place hospice orders and notify SW/CM to sen referrals for inpatient hospice in the [facility]

## 2024-03-31 NOTE — ASSESSMENT & PLAN NOTE
Likely multi-factorial secondary SDH, Blast crisis, & COVID-19 with fevers    Plan:  Comfort measures as noted below

## 2024-03-31 NOTE — NURSING
Turning from side to side with pillows. Skin intact. Bruising noted on torso and legs. Dilaudid 1 mg given iv for pain and effective.  at bedside,

## 2024-03-31 NOTE — PLAN OF CARE
Patient is sedated on mso4 drip. Vs stable. Hr 120's. No wob or sign of distress. Safety measures in place. Family at bedside.      Problem: Adult Inpatient Plan of Care  Goal: Plan of Care Review  Outcome: Ongoing, Progressing  Goal: Patient-Specific Goal (Individualized)  Outcome: Ongoing, Progressing  Goal: Absence of Hospital-Acquired Illness or Injury  Outcome: Ongoing, Progressing  Goal: Optimal Comfort and Wellbeing  Outcome: Ongoing, Progressing  Goal: Readiness for Transition of Care  Outcome: Ongoing, Progressing     Problem: Diabetes Comorbidity  Goal: Blood Glucose Level Within Targeted Range  Outcome: Ongoing, Progressing     Problem: Adjustment to Illness (Stroke, Hemorrhagic)  Goal: Optimal Coping  Outcome: Ongoing, Progressing     Problem: Bowel Elimination Impaired (Stroke, Hemorrhagic)  Goal: Effective Bowel Elimination  Outcome: Ongoing, Progressing     Problem: Cerebral Tissue Perfusion (Stroke, Hemorrhagic)  Goal: Optimal Cerebral Tissue Perfusion  Outcome: Ongoing, Progressing     Problem: Cognitive Impairment (Stroke, Hemorrhagic)  Goal: Optimal Cognitive Function  Outcome: Ongoing, Progressing     Problem: Communication Impairment (Stroke, Hemorrhagic)  Goal: Effective Communication Skills  Outcome: Ongoing, Progressing

## 2024-03-31 NOTE — CARE UPDATE
Notified by primary team that patient is being transitioned to comfort care today. Insulin infusion and BG checks d/c by primary team.        Endocrine will sign off at this time.

## 2024-03-31 NOTE — PLAN OF CARE
Problem: Adult Inpatient Plan of Care  Goal: Plan of Care Review  Outcome: Ongoing, Progressing  Goal: Patient-Specific Goal (Individualized)  Outcome: Ongoing, Progressing  Goal: Absence of Hospital-Acquired Illness or Injury  Outcome: Ongoing, Progressing  Goal: Optimal Comfort and Wellbeing  Outcome: Ongoing, Progressing  Goal: Readiness for Transition of Care  Outcome: Ongoing, Progressing     Problem: Diabetes Comorbidity  Goal: Blood Glucose Level Within Targeted Range  Outcome: Ongoing, Progressing     Problem: Adjustment to Illness (Stroke, Hemorrhagic)  Goal: Optimal Coping  Outcome: Ongoing, Progressing     Problem: Bowel Elimination Impaired (Stroke, Hemorrhagic)  Goal: Effective Bowel Elimination  Outcome: Ongoing, Progressing     Problem: Cerebral Tissue Perfusion (Stroke, Hemorrhagic)  Goal: Optimal Cerebral Tissue Perfusion  Outcome: Ongoing, Progressing     Problem: Cognitive Impairment (Stroke, Hemorrhagic)  Goal: Optimal Cognitive Function  Outcome: Ongoing, Progressing     Problem: Communication Impairment (Stroke, Hemorrhagic)  Goal: Effective Communication Skills  Outcome: Ongoing, Progressing     Problem: Functional Ability Impaired (Stroke, Hemorrhagic)  Goal: Optimal Functional Ability  Outcome: Ongoing, Progressing     Problem: Pain (Stroke, Hemorrhagic)  Goal: Acceptable Pain Control  Outcome: Ongoing, Progressing     Problem: Respiratory Compromise (Stroke, Hemorrhagic)  Goal: Effective Oxygenation and Ventilation  Outcome: Ongoing, Progressing     Problem: Sensorimotor Impairment (Stroke, Hemorrhagic)  Goal: Improved Sensorimotor Function  Outcome: Ongoing, Progressing     Problem: Swallowing Impairment (Stroke, Hemorrhagic)  Goal: Optimal Eating and Swallowing Without Aspiration  Outcome: Ongoing, Progressing     Problem: Urinary Elimination Impaired (Stroke, Hemorrhagic)  Goal: Effective Urinary Elimination  Outcome: Ongoing, Progressing     Problem: Skin Injury Risk Increased  Goal:  Skin Health and Integrity  Outcome: Ongoing, Progressing     Problem: Fall Injury Risk  Goal: Absence of Fall and Fall-Related Injury  Outcome: Ongoing, Progressing     Problem: Fluid Imbalance (Pneumonia)  Goal: Fluid Balance  Outcome: Ongoing, Progressing     Problem: Infection (Pneumonia)  Goal: Resolution of Infection Signs and Symptoms  Outcome: Ongoing, Progressing     Problem: Respiratory Compromise (Pneumonia)  Goal: Effective Oxygenation and Ventilation  Outcome: Ongoing, Progressing     Problem: Adjustment to Illness (Sepsis/Septic Shock)  Goal: Optimal Coping  Outcome: Ongoing, Progressing     Problem: Bleeding (Sepsis/Septic Shock)  Goal: Absence of Bleeding  Outcome: Ongoing, Progressing     Problem: Glycemic Control Impaired (Sepsis/Septic Shock)  Goal: Blood Glucose Level Within Desired Range  Outcome: Ongoing, Progressing     Problem: Infection Progression (Sepsis/Septic Shock)  Goal: Absence of Infection Signs and Symptoms  Outcome: Ongoing, Progressing     Problem: Nutrition Impaired (Sepsis/Septic Shock)  Goal: Optimal Nutrition Intake  Outcome: Ongoing, Progressing     Problem: Infection  Goal: Absence of Infection Signs and Symptoms  Outcome: Ongoing, Progressing     Problem: Restraint, Nonbehavioral (Nonviolent)  Goal: Absence of Harm or Injury  Outcome: Ongoing, Progressing     Problem: Impaired Wound Healing  Goal: Optimal Wound Healing  Outcome: Ongoing, Progressing     Problem: Coping Ineffective  Goal: Effective Coping  Outcome: Ongoing, Progressing     Problem: Palliative Care  Goal: Enhanced Quality of Life  Outcome: Ongoing, Progressing

## 2024-03-31 NOTE — NURSING
Patient is  Alert  at this hour .  Patient  was  moaning in pain couple of  time , PRN meds  givne  even on time dose  of  Morphine givne . Patient is now  in Morphine drip , titrated  couple of time . Patient's    is at  the bed bedside .Code  status was changed  today . Patient  pulled  the  NG  , provider made  aware .Call light within reach ,safety precaution maintained .Handoff  given to  night  nurse .

## 2024-03-31 NOTE — PROGRESS NOTES
[facility] - Stepdown Flex ([facility]-)  Hematology  Bone Marrow Transplant  Progress Note    Patient Name: Fela Ellison  Admission Date: 3/23/2024  Hospital Length of Stay: 8 days  Code Status: DNR    Subjective:     Interval History: Palliative care consulted yesterday with family decision to pursue comfort measures with morphine drip started with improved comfort overnight. Patient on AM rounds is resting comfortably.  updated at the bedside for further disposition planning tomorrow based on SW assessment and was agreeable and understanding.   Objective:     Vital Signs (Most Recent):  Temp: 100.2 °F (37.9 °C) (03/31/24 0940)  Pulse: (!) 126 (03/31/24 0940)  Resp: 18 (03/31/24 0942)  BP: 138/66 (03/31/24 0940)  SpO2: 97 % (03/31/24 0940) Vital Signs (24h Range):  Temp:  [98.7 °F (37.1 °C)-102.9 °F (39.4 °C)] 100.2 °F (37.9 °C)  Pulse:  [] 126  Resp:  [14-20] 18  SpO2:  [95 %-99 %] 97 %  BP: (119-211)/() 138/66     Weight: 93 kg (205 lb 0.4 oz)  Body mass index is 36.32 kg/m².  Body surface area is 2.03 meters squared.    ECOG SCORE           [unfilled]    Intake/Output - Last 3 Shifts         03/29 0700  03/30 0659 03/30 0700 03/31 0659 03/31 0700 04/01 0659    I.V. (mL/kg) 113 (1.2)      IV Piggyback 538.8      Total Intake(mL/kg) 651.8 (7)      Urine (mL/kg/hr) 1925 (0.9) 740 (0.3)     Emesis/NG output       Other       Stool       Blood       Total Output 1925 740     Net -1273.3 -740                    Physical Exam  Vitals and nursing note reviewed.   Constitutional:       Appearance: Normal appearance.      Comments: Ill-appearing female with AMS.    Cardiovascular:      Rate and Rhythm: Normal rate and regular rhythm.      Pulses: Normal pulses.      Heart sounds: Normal heart sounds.   Pulmonary:      Effort: Pulmonary effort is normal.      Comments: Non-labored breathing on NC.   Abdominal:      Comments: Diffuse abdominal scarring.    Neurological:      Mental Status:  She is disoriented.      Comments: Not oriented times 3 on AM rounds, but waxing and waning.    Psychiatric:      Comments: Non-verbal on AM rounds.             Significant Labs:   All pertinent labs from the last 24 hours have been reviewed.    Diagnostic Results:  I have reviewed all pertinent imaging results/findings within the past 24 hours.  Assessment/Plan:     * Blast crisis phase of chronic myeloid leukemia  See CML    Comfort measures only status  Plan:    -Titrate Morphine drip, PRN ativan, and Glycopyrrolate based on symptoms  -SW consult tomorrow to assist with dispo planning in regard to in-patient vs home hospice options      Acute encephalopathy  Likely multi-factorial secondary SDH, Blast crisis, & COVID-19 with fevers    Plan:  Comfort measures as noted below            Hyperglycemia  Pt has known DM, is on steroids due to COVID. Most likely causing her hyperglycemia. Removed but continued difficulty controlling. Ordered labs to make sure patient is not in DKA/HHS patient BHB 0.2, bicarb 15 then back up to 20, but glucose has remained in the 400s.     Plan  Diabetic diet with thin liquids per speech (went to bariatric clear with starting insulin drip)  Glucose checks AC x HS  Placed on insulin drip, improvement in glucose control  Consulted Endocrine with difficult to control hyperglycemia      COVID  Pt tested positive for covid on 3/24. Initially on 4L NC, has decreased to 2L. Pt's mental status has improved. WBC count 50K.     Plan  Remdesivir course and Dexamethasone completed  Wean O2 as able        SDH (subdural hematoma)  Pt presented from OSH with new SDH. Pt was initially admitted to neuro critical care. SDH felt to be small/stable and did not need to undergo surgical intervention. Pt placed on keppra for seizure proph. Pt PLT count is in the 200s. Experienced worsening confusion, lethargy on 3/26 Stat CT head ordered    Plan  Continue Keppra x7 days per NICU note  Neuro checks  "q4h      Hyperuricemia  Allopurinol 300mg daily    Hypercholesterolemia  Cont atorvastatin    Essential hypertension  Pt placed on amlodipine 10mg, metoprolol 25mg, and losartan 25.     Plan  Continue home medications, increased losartan to 100  Goal BP <160 per neurosurgery with SDH  PRN in place if needed, will increase BP medications as needed    Diabetes mellitus  Currently on Insulin drip with Endocrine consulted      Plan:  -Follow-up Endocrine recs and wean insulin drip as able      CML (chronic myelocytic leukemia)  58F with relapsed CML with blast crisis (transformed on 2022) admitted for fevers, fatigue, encephalopathy and found to have a SDH that NSGY does not recommend intervention on. Encephalopathic when seen and unable to follow commands.     Labs show clear evidence of blast crisis (29% on CBC) without cytopenias. This is in the setting of what may be sepsis vs fevers from leukemia.She normally follows at [facility] fellows clinic and has progressed through multiple treatments.   Discussed aggressive and unstable disease leading to her presentation here.     [place] consistent with CML with blast crisis "     Plan:   -Transitioned to comfort measures on 3/30/24        VTE Risk Mitigation (From admission, onward)           Ordered     IP VTE HIGH RISK PATIENT  Once         03/23/24 2209     Place sequential compression device  Until discontinued         03/23/24 2209     Reason for No Pharmacological VTE Prophylaxis  Once        Question:  Reasons:  Answer:  Active Bleeding  Comment:  SDH    03/23/24 2209                    Disposition: Remain on BMT Service.     aMged Garcia, DO  Bone Marrow Transplant  [facility] - Stepdown Flex ([place]-)        "

## 2024-03-31 NOTE — ASSESSMENT & PLAN NOTE
"58F with relapsed CML with blast crisis (transformed on 2022) admitted for fevers, fatigue, encephalopathy and found to have a SDH that NSGY does not recommend intervention on. Encephalopathic when seen and unable to follow commands.     Labs show clear evidence of blast crisis (29% on CBC) without cytopenias. This is in the setting of what may be sepsis vs fevers from leukemia.She normally follows at [facility] fellows clinic and has progressed through multiple treatments.   Discussed aggressive and unstable disease leading to her presentation here.     [place] consistent with CML with blast crisis "     Plan:   -Transitioned to comfort measures on 3/30/24  "

## 2024-03-31 NOTE — SUBJECTIVE & OBJECTIVE
Interval History: Pt resting comfortably in bed, eyes open to voice but pt non-responsive. No visible signs of increased work of breathing or pain behaviors.  and son present at bedside.     Medications:  Continuous Infusions:   morphine 2.5 mg/hr (03/31/24 0705)     Scheduled Meds:  PRN Meds:glycopyrrolate, HYDROmorphone, hydrOXYzine HCL, lorazepam, ondansetron    Objective:     Vital Signs (Most Recent):  Temp: (!) 100.4 °F (38 °C) (03/31/24 1553)  Pulse: (!) 127 (03/31/24 1553)  Resp: 18 (03/31/24 1553)  BP: (!) 141/73 (03/31/24 1553)  SpO2: 97 % (03/31/24 1553) Vital Signs (24h Range):  Temp:  [98.7 °F (37.1 °C)-100.4 °F (38 °C)] 100.4 °F (38 °C)  Pulse:  [] 127  Resp:  [14-20] 18  SpO2:  [95 %-99 %] 97 %  BP: (136-211)/() 141/73     Weight: 93 kg (205 lb 0.4 oz)  Body mass index is 36.32 kg/m².       Physical Exam  Constitutional:       General: She is not in acute distress.     Appearance: She is obese. She is diaphoretic. She is not ill-appearing.   HENT:      Head: Normocephalic and atraumatic.   Pulmonary:      Effort: Pulmonary effort is normal. No respiratory distress.   Skin:     Findings: Bruising present.   Neurological:      Mental Status: She is disoriented.            Review of Symptoms      Symptom Assessment (ESAS 0-10 Scale)  Pain:  0  Dyspnea:  0  Anxiety:  0  Nausea:  0  Depression:  0  Anorexia:  0  Fatigue:  0  Insomnia:  0  Restlessness:  0  Agitation:  0         Performance Status:  20    Psychosocial/Cultural:   See Palliative Psychosocial Note: Yes  **Primary  to Follow**  Palliative Care  Consult: Yes        Advance Care Planning   Advance Directives:     Decision Making:  Family answered questions and Patient unable to communicate due to disease severity/cognitive impairment  Goals of Care: What is most important right now is to focus on symptom/pain control, quality of life, even if it means sacrificing a little time. Accordingly, we have  "decided that the best plan to meet the patient's goals includes pivot to comfort-focused care, enrolling in inpatient hospice near [place]         Significant Labs: None  CBC:   Recent Labs   Lab 03/30/24  0531   WBC 6.10   HGB 8.5*   HCT 28.7*   MCV 81*   PLT 48*     BMP:  No results for input(s): "GLU", "NA", "K", "CL", "CO2", "BUN", "CREATININE", "CALCIUM", "MG" in the last 24 hours.  LFT:  Lab Results   Component Value Date    AST 13 03/30/2024    ALKPHOS 147 (H) 03/30/2024    BILITOT 0.6 03/30/2024     Albumin:   Albumin   Date Value Ref Range Status   03/30/2024 2.3 (L) 3.5 - 5.2 g/dL Final     Protein:   Total Protein   Date Value Ref Range Status   03/30/2024 4.9 (L) 6.0 - 8.4 g/dL Final     Lactic acid:   Lab Results   Component Value Date    LACTATE 2.1 03/26/2024    LACTATE 1.6 03/25/2024       Significant Imaging: None  "

## 2024-03-31 NOTE — PROGRESS NOTES
[facility] - Stepdown Flex ([place] [id number])  Palliative Medicine  Progress Note    Patient Name: Fela Ellison  MRN: [id number]  Admission Date: 3/23/2024  Hospital Length of Stay: 8 days  Code Status: DNR   Attending Provider: Kamran García MD  Consulting Provider: Hemant Edwards DO  Primary Care Physician: Bar Trevino DO  Principal Problem:Blast crisis phase of chronic myeloid leukemia    Patient information was obtained from spouse/SO, relative(s), past medical records, and primary team.      Assessment/Plan:     Palliative Care  Palliative care encounter  Pt with aggressive CML continuing to deline despite prior aggressive therapies  Family wishing to transition to comfort focused measures 3/30/24  Pt appears comfortable on exam with morphine infusion.    wishing to pursue inpatient hospice near [place]    Recommendations:  Ativan IV PRN anxiety  Morphine IV prn pain or dyspnea  Glycopyrrolate/repositioning PRN for secretions  Place hospice orders and notify SW/CM to sen referrals for inpatient hospice in the [place] Area        I will follow-up with patient. Please contact us if you have any additional questions.    Subjective:     Chief Complaint: No chief complaint on file.      HPI:   Fela Ellison is a 58 y.o. female with CML, DM2, Obesity, NAFLD admitted to hospital for SDH found to be in blast crisis of her aggressive CML. Despite aggressive care over the past several days, patient has continued to decline and now is enephalopathic. Pt's family requesting palliative care consultation to discuss goals of care in light of her serious illness.    Met with patient's  Zachary at bedside, as well as sons Daniel and Frandy. They are all very concerned that ms. Chahal appears to be very uncomfortable. They are able to report that despite treating her for infections, she has continued to decline in terms of her mentation. They were informed that she is so ill, she  is not able to receive any chemotherapy to treat her CML, which is driving her overall decline. We discussed that while we may be unable to give her therapy to prolong her life, we are still able to care for her in a way that ensures she is as comfortable as possible. They agree that their loved one is dying and do not wish to artificially prolong that process but instead insure that she is comfortable.     We discussed that after we transitioning our orders to focus on comfort, and that we are unsure how much time she has but it could be hours, maybe days to short weeks but that ultimately we cannot predict that. They are okay with making her comfortable now, and re-addressing the idea of inpatient hospice vs home hospice tomorrow.     Hospital Course:  No notes on file    Interval History: Pt resting comfortably in bed, eyes open to voice but pt non-responsive. No visible signs of increased work of breathing or pain behaviors.  and son present at bedside.     Medications:  Continuous Infusions:   morphine 2.5 mg/hr (03/31/24 0705)     Scheduled Meds:  PRN Meds:glycopyrrolate, HYDROmorphone, hydrOXYzine HCL, lorazepam, ondansetron    Objective:     Vital Signs (Most Recent):  Temp: (!) 100.4 °F (38 °C) (03/31/24 1553)  Pulse: (!) 127 (03/31/24 1553)  Resp: 18 (03/31/24 1553)  BP: (!) 141/73 (03/31/24 1553)  SpO2: 97 % (03/31/24 1553) Vital Signs (24h Range):  Temp:  [98.7 °F (37.1 °C)-100.4 °F (38 °C)] 100.4 °F (38 °C)  Pulse:  [] 127  Resp:  [14-20] 18  SpO2:  [95 %-99 %] 97 %  BP: (136-211)/() 141/73     Weight: 93 kg (205 lb 0.4 oz)  Body mass index is 36.32 kg/m².       Physical Exam  Constitutional:       General: She is not in acute distress.     Appearance: She is obese. She is diaphoretic. She is not ill-appearing.   HENT:      Head: Normocephalic and atraumatic.   Pulmonary:      Effort: Pulmonary effort is normal. No respiratory distress.   Skin:     Findings: Bruising present.  "  Neurological:      Mental Status: She is disoriented.            Review of Symptoms      Symptom Assessment (ESAS 0-10 Scale)  Pain:  0  Dyspnea:  0  Anxiety:  0  Nausea:  0  Depression:  0  Anorexia:  0  Fatigue:  0  Insomnia:  0  Restlessness:  0  Agitation:  0         Performance Status:  20    Psychosocial/Cultural:   See Palliative Psychosocial Note: Yes  **Primary  to Follow**  Palliative Care  Consult: Yes        Advance Care Planning  Advance Directives:     Decision Making:  Family answered questions and Patient unable to communicate due to disease severity/cognitive impairment  Goals of Care: What is most important right now is to focus on symptom/pain control, quality of life, even if it means sacrificing a little time. Accordingly, we have decided that the best plan to meet the patient's goals includes pivot to comfort-focused care, enrolling in inpatient hospice near [place]         Significant Labs: None  CBC:   Recent Labs   Lab 03/30/24  0531   WBC 6.10   HGB 8.5*   HCT 28.7*   MCV 81*   PLT 48*     BMP:  No results for input(s): "GLU", "NA", "K", "CL", "CO2", "BUN", "CREATININE", "CALCIUM", "MG" in the last 24 hours.  LFT:  Lab Results   Component Value Date    AST 13 03/30/2024    ALKPHOS 147 (H) 03/30/2024    BILITOT 0.6 03/30/2024     Albumin:   Albumin   Date Value Ref Range Status   03/30/2024 2.3 (L) 3.5 - 5.2 g/dL Final     Protein:   Total Protein   Date Value Ref Range Status   03/30/2024 4.9 (L) 6.0 - 8.4 g/dL Final     Lactic acid:   Lab Results   Component Value Date    LACTATE 2.1 03/26/2024    LACTATE 1.6 03/25/2024       Significant Imaging: None    Hemant Edwards,   Palliative Medicine  [facility] - Stepdown Flex ([place]-[facility])                "

## 2024-04-01 VITALS
RESPIRATION RATE: 17 BRPM | WEIGHT: 205 LBS | OXYGEN SATURATION: 95 % | HEIGHT: 63 IN | DIASTOLIC BLOOD PRESSURE: 73 MMHG | TEMPERATURE: 99 F | BODY MASS INDEX: 36.32 KG/M2 | SYSTOLIC BLOOD PRESSURE: 144 MMHG | HEART RATE: 109 BPM

## 2024-04-01 LAB
OHS QRS DURATION: 92 MS
OHS QRS DURATION: 96 MS
OHS QTC CALCULATION: 479 MS
OHS QTC CALCULATION: 489 MS

## 2024-04-01 PROCEDURE — 99233 SBSQ HOSP IP/OBS HIGH 50: CPT | Mod: 95,,, | Performed by: CLINICAL NURSE SPECIALIST

## 2024-04-01 PROCEDURE — 93005 ELECTROCARDIOGRAM TRACING: CPT

## 2024-04-01 PROCEDURE — 63600175 PHARM REV CODE 636 W HCPCS

## 2024-04-01 PROCEDURE — 99233 SBSQ HOSP IP/OBS HIGH 50: CPT | Mod: ,,, | Performed by: INTERNAL MEDICINE

## 2024-04-01 PROCEDURE — 93010 ELECTROCARDIOGRAM REPORT: CPT | Mod: ,,, | Performed by: INTERNAL MEDICINE

## 2024-04-01 RX ORDER — BISACODYL 10 MG/1
10 SUPPOSITORY RECTAL DAILY PRN
Status: DISCONTINUED | OUTPATIENT
Start: 2024-04-01 | End: 2024-04-01 | Stop reason: HOSPADM

## 2024-04-01 RX ORDER — HYDROXYZINE HYDROCHLORIDE 25 MG/1
25 TABLET, FILM COATED ORAL 3 TIMES DAILY PRN
Qty: 90 TABLET | Refills: 0 | Status: SHIPPED | OUTPATIENT
Start: 2024-04-01

## 2024-04-01 RX ORDER — ONDANSETRON HYDROCHLORIDE 2 MG/ML
8 INJECTION, SOLUTION INTRAVENOUS EVERY 8 HOURS PRN
Qty: 60 EACH | Refills: 0 | Status: SHIPPED | OUTPATIENT
Start: 2024-04-01

## 2024-04-01 RX ORDER — BISACODYL 10 MG/1
10 SUPPOSITORY RECTAL DAILY PRN
Qty: 10 SUPPOSITORY | Refills: 0 | Status: SHIPPED | OUTPATIENT
Start: 2024-04-01

## 2024-04-01 RX ORDER — GLYCOPYRROLATE 0.2 MG/ML
0.1 INJECTION INTRAMUSCULAR; INTRAVENOUS 3 TIMES DAILY PRN
Qty: 20 ML | Refills: 0 | Status: SHIPPED | OUTPATIENT
Start: 2024-04-01

## 2024-04-01 RX ADMIN — HYDROMORPHONE HYDROCHLORIDE 1 MG: 1 INJECTION, SOLUTION INTRAMUSCULAR; INTRAVENOUS; SUBCUTANEOUS at 10:04

## 2024-04-01 RX ADMIN — HYDROMORPHONE HYDROCHLORIDE 1 MG: 1 INJECTION, SOLUTION INTRAMUSCULAR; INTRAVENOUS; SUBCUTANEOUS at 02:04

## 2024-04-01 NOTE — PROGRESS NOTES
[facility] - Stepdown Flex ([facility]-)  Hematology  Bone Marrow Transplant  Progress Note    Patient Name: Fela Ellison  Admission Date: 3/23/2024  Hospital Length of Stay: 9 days  Code Status: DNR    Subjective:     Interval History: Palliative care consulted with family decision to pursue comfort measures with morphine drip started with improved comfort overnight. Patient on AM rounds is resting comfortably.  updated at the bedside for further disposition planning tomorrow based on SW assessment and was agreeable and understanding. With self pay status adding in difficulty finding placement.  Objective:     Vital Signs (Most Recent):  Temp: 99.2 °F (37.3 °C) (04/01/24 0600)  Pulse: 105 (04/01/24 0420)  Resp: 15 (04/01/24 0420)  BP: 139/67 (04/01/24 0420)  SpO2: 99 % (04/01/24 0420) Vital Signs (24h Range):  Temp:  [99 °F (37.2 °C)-101 °F (38.3 °C)] 99.2 °F (37.3 °C)  Pulse:  [105-128] 105  Resp:  [15-21] 15  SpO2:  [95 %-99 %] 99 %  BP: (132-211)/() 139/67     Weight: 93 kg (205 lb 0.4 oz)  Body mass index is 36.32 kg/m².  Body surface area is 2.03 meters squared.    ECOG SCORE           [unfilled]    Intake/Output - Last 3 Shifts         03/30 0700  03/31 0659 03/31 0700 04/01 0659 04/01 0700 04/02 0659    I.V. (mL/kg)       IV Piggyback       Total Intake(mL/kg)       Urine (mL/kg/hr) 740 (0.3) 1020 (0.5)     Total Output 740 1020     Net -740 -1020                    Physical Exam  Vitals and nursing note reviewed.   Constitutional:       Appearance: Normal appearance.      Comments: Ill-appearing female with AMS.    Cardiovascular:      Rate and Rhythm: Normal rate and regular rhythm.      Pulses: Normal pulses.      Heart sounds: Normal heart sounds.   Pulmonary:      Effort: Pulmonary effort is normal.      Comments: Non-labored breathing on NC.   Abdominal:      Comments: Diffuse abdominal scarring.    Neurological:      Mental Status: She is disoriented.      Comments: Not  oriented times 3 on AM rounds, but waxing and waning.    Psychiatric:      Comments: Minimally verbal, waxes and wanes            Significant Labs:   All pertinent labs from the last 24 hours have been reviewed.    Diagnostic Results:  I have reviewed all pertinent imaging results/findings within the past 24 hours.  Assessment/Plan:     * Blast crisis phase of chronic myeloid leukemia  See CML    Comfort measures only status  Plan:    -Titrate Morphine drip, PRN ativan, and Glycopyrrolate based on symptoms  -SW consult tomorrow to assist with dispo planning in regard to in-patient vs home hospice options      Acute encephalopathy  Likely multi-factorial secondary SDH, Blast crisis, & COVID-19 with fevers    Plan:  Comfort measures as noted below            Hyperglycemia  Pt has known DM, is on steroids due to COVID. Most likely causing her hyperglycemia. Removed but continued difficulty controlling. Ordered labs to make sure patient is not in DKA/HHS patient BHB 0.2, bicarb 15 then back up to 20, but glucose has remained in the 400s.     Plan  Diabetic diet with thin liquids per speech (went to bariatric clear with starting insulin drip)  Glucose checks AC x HS  Placed on insulin drip, improvement in glucose control  Consulted Endocrine with difficult to control hyperglycemia      COVID  Pt tested positive for covid on 3/24. Initially on 4L NC, has decreased to 2L. Pt's mental status has improved. WBC count 50K.     Plan  Remdesivir course and Dexamethasone completed  Wean O2 as able        SDH (subdural hematoma)  Pt presented from OSH with new SDH. Pt was initially admitted to neuro critical care. SDH felt to be small/stable and did not need to undergo surgical intervention. Pt placed on keppra for seizure proph. Pt PLT count is in the 200s. Experienced worsening confusion, lethargy on 3/26 Stat CT head ordered    Plan  Continue Keppra x7 days per NICU note  Neuro checks q4h      Hyperuricemia  Allopurinol 300mg  "daily    Hypercholesterolemia  Cont atorvastatin    Essential hypertension  Pt placed on amlodipine 10mg, metoprolol 25mg, and losartan 25.     Plan  Continue home medications, increased losartan to 100  Goal BP <160 per neurosurgery with SDH  PRN in place if needed, will increase BP medications as needed    Diabetes mellitus  Currently on Insulin drip with Endocrine consulted      Plan:  -Follow-up Endocrine recs and wean insulin drip as able      CML (chronic myelocytic leukemia)  58F with relapsed CML with blast crisis (transformed on 2022) admitted for fevers, fatigue, encephalopathy and found to have a SDH that NSGY does not recommend intervention on. Encephalopathic when seen and unable to follow commands.     Labs show clear evidence of blast crisis (29% on CBC) without cytopenias. This is in the setting of what may be sepsis vs fevers from leukemia.She normally follows at [facility] fellows clinic and has progressed through multiple treatments.   Discussed aggressive and unstable disease leading to her presentation here.     Marrow consistent with CML with blast crisis "     Plan:   -Transitioned to comfort measures on 3/30/24  -pt's self pay status adding some difficulty to finding placement        VTE Risk Mitigation (From admission, onward)           Ordered     IP VTE HIGH RISK PATIENT  Once         03/23/24 2209     Place sequential compression device  Until discontinued         03/23/24 2209     Reason for No Pharmacological VTE Prophylaxis  Once        Question:  Reasons:  Answer:  Active Bleeding  Comment:  SDH    03/23/24 2209                    Disposition: Hospice    Maged Momin, DO  Bone Marrow Transplant  [facility] - Stepdown Flex ([facility])        "

## 2024-04-01 NOTE — PROGRESS NOTES
[facility]ola - Stepdown Flex ([place] [address])  Palliative Medicine  Progress Note    Patient Name: Fela Ellison  MRN: [id number]  Admission Date: 3/23/2024  Hospital Length of Stay: 9 days  Code Status: DNR   Attending Provider: Kamran García MD  Consulting Provider: BECKIE Armenta  Primary Care Physician: Bar rTevino DO  Principal Problem:Blast crisis phase of chronic myeloid leukemia    Patient information was obtained from spouse/SO and primary team.      Assessment/Plan:     Palliative Care  Palliative care encounter  Mrs. Ellison is a 57 yo lady with progressive CML who continues to decline despite continued aggressive treatment.  Patinet was transitioned to comfort care  3/30/24 and now with morphine infusion at 3 mg/hr.    Patient appears comfortable with no behavioral signs of pain.   keeping vigile at bedside.     Goals of Care established- will continue comfort based care in setting of inpatient hospice.  . Family amenable to inpatient hospice in [facility].   Accepted to [facility]     Symptom Management as per [facility] comfort care order set.    Recommendations:  Ativan IV PRN anxiety  Morphine IV prn pain or dyspnea  Glycopyrrolate/repositioning PRN for secretions    Hospice orders have been written per primary team.  Anticipating transfer to inpatient hospice in [facility]             I will sign off. Please contact us if you have any additional questions.    Subjective:     Chief Complaint: No chief complaint on file.      HPI:   Fela Ellison is a 58 y.o. female with CML, DM2, Obesity, NAFLD admitted to hospital for SDH found to be in blast crisis of her aggressive CML. Despite aggressive care over the past several days, patient has continued to decline and now is enephalopathic. Pt's family requesting palliative care consultation to discuss goals of care in light of her serious illness.    Met with patient's  Zachary at  bedside, as well as sons Daniel and Frandy. They are all very concerned that ms. Chahal appears to be very uncomfortable. They are able to report that despite treating her for infections, she has continued to decline in terms of her mentation. They were informed that she is so ill, she is not able to receive any chemotherapy to treat her CML, which is driving her overall decline. We discussed that while we may be unable to give her therapy to prolong her life, we are still able to care for her in a way that ensures she is as comfortable as possible. They agree that their loved one is dying and do not wish to artificially prolong that process but instead insure that she is comfortable.     We discussed that after we transitioning our orders to focus on comfort, and that we are unsure how much time she has but it could be hours, maybe days to short weeks but that ultimately we cannot predict that. They are okay with making her comfortable now, and re-addressing the idea of inpatient hospice vs home hospice tomorrow.     Hospital Course:  No notes on file    No new subjective & objective note has been filed under this hospital service since the last note was generated.      Gloria May, CNS  Palliative Medicine  [facility] - Stepdown Flex ([place])    > 50% of 50  min visit spent in chart review, face to face discussion of goals of care,  symptom assessment, coordination of care and emotional support

## 2024-04-01 NOTE — NURSING
Pt left on stretcher with 2 EMT's with  at her side, was transported to ambulance and then to go to inpatient hospice, I called report earlier and also called to tell them she was on the way, family took all belongings with them, she was alert, no distress noted.

## 2024-04-01 NOTE — PROGRESS NOTES
[facility]ola - Stepdown Flex ([facility]-)  Palliative Medicine  Progress Note    Patient Name: Fela Ellison  MRN: [id number]  Admission Date: 3/23/2024  Hospital Length of Stay: 9 days  Code Status: DNR   Attending Provider: Kamran García MD  Consulting Provider: BECKIE Armenta  Primary Care Physician: Bar Trevino DO  Principal Problem:Blast crisis phase of chronic myeloid leukemia    Patient information was obtained from patient, relative(s), and primary team.      Assessment/Plan:     Palliative Care  Palliative care encounter  Mrs. Ellison is a 59 yo lady with progressive CML who continues to decline despite continued aggressive treatment.  Patinet was transitioned to comfort care  3/30/24 and now with morphine infusion at 3 mg/hr.    Patient appears comfortable with no behavioral signs of pain.   keeping vigile at bedside.     Goals of Care established- will continue comfort based care in setting of inpatient hospice in the [facility].   Hospice orders have been written per primary  team          I will sign off. Please contact us if you have any additional questions.    Subjective:     Chief Complaint: No chief complaint on file.      HPI:   Fela Ellison is a 58 y.o. female with CML, DM2, Obesity, NAFLD admitted to hospital for SDH found to be in blast crisis of her aggressive CML. Despite aggressive care over the past several days, patient has continued to decline and now is enephalopathic. Pt's family requesting palliative care consultation to discuss goals of care in light of her serious illness.    Met with patient's  Zachary at bedside, as well as sons Daniel and Frandy. They are all very concerned that ms. Chahal appears to be very uncomfortable. They are able to report that despite treating her for infections, she has continued to decline in terms of her mentation. They were informed that she is so ill, she is not able to  receive any chemotherapy to treat her CML, which is driving her overall decline. We discussed that while we may be unable to give her therapy to prolong her life, we are still able to care for her in a way that ensures she is as comfortable as possible. They agree that their loved one is dying and do not wish to artificially prolong that process but instead insure that she is comfortable.     We discussed that after we transitioning our orders to focus on comfort, and that we are unsure how much time she has but it could be hours, maybe days to short weeks but that ultimately we cannot predict that. They are okay with making her comfortable now, and re-addressing the idea of inpatient hospice vs home hospice tomorrow.     Hospital Course:  No notes on file    Interval History: Pt resting comfortably in bed, morphine drip in progress.  Appears comfortable no No visible signs of increased work of breathing or pain behaviors.   holding ward at bedside.     Medications:  Continuous Infusions:   morphine 3 mg/hr (04/01/24 0208)     Scheduled Meds:  PRN Meds:bisacodyL, glycopyrrolate, HYDROmorphone, hydrOXYzine HCL, lorazepam, ondansetron    Objective:     Vital Signs (Most Recent):  Temp: 99.2 °F (37.3 °C) (04/01/24 0800)  Pulse: 109 (04/01/24 0800)  Resp: 17 (04/01/24 1056)  BP: (!) 144/73 (04/01/24 0800)  SpO2: 95 % (04/01/24 0800) Vital Signs (24h Range):  Temp:  [99.2 °F (37.3 °C)-101 °F (38.3 °C)] 99.2 °F (37.3 °C)  Pulse:  [105-111] 109  Resp:  [15-21] 17  SpO2:  [95 %-99 %] 95 %  BP: (132-144)/(58-73) 144/73     Weight: 93 kg (205 lb 0.4 oz)  Body mass index is 36.32 kg/m².       Physical Exam  Constitutional:       General: She is not in acute distress.     Appearance: She is obese. She is diaphoretic. She is not ill-appearing.   HENT:      Head: Normocephalic and atraumatic.   Pulmonary:      Effort: Pulmonary effort is normal. No respiratory distress.   Skin:     Findings: Bruising present.  "  Neurological:      Mental Status: She is disoriented.            Review of Symptoms      Symptom Assessment (ESAS 0-10 Scale)  Pain:  0  Dyspnea:  0  Anxiety:  0  Nausea:  0  Depression:  0  Anorexia:  0  Fatigue:  0  Insomnia:  0  Restlessness:  0  Agitation:  0         Performance Status:  20    Psychosocial/Cultural:   See Palliative Psychosocial Note: Yes  **Primary  to Follow**  Palliative Care  Consult: Yes        Advance Care Planning  Advance Directives:     Decision Making:  Family answered questions and Patient unable to communicate due to disease severity/cognitive impairment  Goals of Care: What is most important right now is to focus on symptom/pain control, quality of life, even if it means sacrificing a little time. Accordingly, we have decided that the best plan to meet the patient's goals includes pivot to comfort-focused care.         Significant Labs: None  CBC:   Recent Labs   Lab 03/30/24  0531   WBC 6.10   HGB 8.5*   HCT 28.7*   MCV 81*   PLT 48*       BMP:  No results for input(s): "GLU", "NA", "K", "CL", "CO2", "BUN", "CREATININE", "CALCIUM", "MG" in the last 24 hours.  LFT:  Lab Results   Component Value Date    AST 13 03/30/2024    ALKPHOS 147 (H) 03/30/2024    BILITOT 0.6 03/30/2024     Albumin:   Albumin   Date Value Ref Range Status   03/30/2024 2.3 (L) 3.5 - 5.2 g/dL Final     Protein:   Total Protein   Date Value Ref Range Status   03/30/2024 4.9 (L) 6.0 - 8.4 g/dL Final     Lactic acid:   Lab Results   Component Value Date    LACTATE 2.1 03/26/2024    LACTATE 1.6 03/25/2024       Significant Imaging: None      > 50% of   min visit spent in chart review, face to face discussion of goals of care,  symptom assessment, coordination of care and emotional support  Gloria May, CNS  Palliative Medicine  [facility] - Stepdown Flex ([place])                "

## 2024-04-01 NOTE — PLAN OF CARE
[facility]  Department of Hospital Medicine  [address] [place] [address]  [phone] after hours  [phone] fax    HOSPICE  ORDERS    04/01/2024    Admit to Hospice: Inpatient Hospice Service    Diagnoses:   Active Hospital Problems    Diagnosis  POA    *Blast crisis phase of chronic myeloid leukemia [C92.10]  Yes    Comfort measures only status [Z51.5]  Not Applicable    Palliative care encounter [Z51.5]  Not Applicable    Acute encephalopathy [G93.40]  Yes    Moderate malnutrition [E44.0]  Unknown    Hyperglycemia [R73.9]  Yes    SDH (subdural hematoma) [S06.5XAA]  Yes    Pure hypertriglyceridemia [E78.1]  Yes    Pneumonia of right lower lobe due to infectious organism [J18.9]  Yes    Sepsis with acute hypoxic respiratory failure without septic shock [A41.9, R65.20, J96.01]  Yes    GERD (gastroesophageal reflux disease) [K21.9]  Yes    Cancer associated pain [G89.3]  Yes    Tinea corporis [B35.4]  Yes    Hepatosplenomegaly [R16.2]  Yes    Obesity, Class II, BMI 35-39.9 [E66.9]  Yes     Chronic    NAFLD (nonalcoholic fatty liver disease) [K76.0]  Yes    CML (chronic myelocytic leukemia) [C92.10]  Yes    Diabetes mellitus [E11.9]  Yes    Hyperuricemia [E79.0]  Yes    Hypercholesterolemia [E78.00]  Yes    Acute respiratory failure with hypoxia [J96.01]  Yes     Last Assessment & Plan:    Formatting of this note might be different from the original.   History & Physical CTA from [place] negative for PE.  Chest x-ray certainly with interstitial prominence which is likely due to hyperviscosity related to hyperleukocytosis.  Continue supplemental O2 and plan for urgent leukapheresis.      Discharge Summary       Follow-up      Essential hypertension [I10]  Yes     Last Assessment & Plan:    Formatting of this note might be different from the original.   History & Physical Blood pressure is acceptable.  Continue her home regimen.      Discharge Summary        Follow-up      Type 2 diabetes mellitus with diabetic neuropathy, with long-term current use of insulin [E11.40, Z79.4]  Not Applicable     Last Assessment & Plan:    Formatting of this note might be different from the original.   History & Physical Cover with sliding scale.      Discharge Summary       Follow-up        Resolved Hospital Problems   No resolved problems to display.       Hospice Qualifying Diagnoses: AML blast crisis       Patient has a life expectancy < 6 months due to:  Primary Hospice Diagnosis:  AML blast crisis  Comorbid Conditions Contributing to Decline:      Vital Signs: Routine per Hospice Protocol.    Code Status: DNR    Allergies: Review of patient's allergies indicates:  No Known Allergies    Diet: Clear Liquid, pleasure feeds if mentally able    Activities: As tolerated    Goals of Care Treatment Preferences:  Code Status: DNR          What is most important right now is to focus on symptom/pain control, quality of life, even if it means sacrificing a little time.  Accordingly, we have decided that the best plan to meet the patient's goals includes pivot to comfort-focused care.      Nursing: Per Hospice Routine.    Arias Care: Empty Arias bag Q shift and PRN.  Change Arias every month.    Routine Skin for Bedridden Patients: Apply moisture barrier cream to all skin folds and   wet areas in perineal area daily and after baths and all bowel movements.      Oxygen: Can be up to 4L NC if needed  Other Miscellaneous Care:     Medications:        Medication List        START taking these medications      bisacodyL 10 mg Supp  Commonly known as: DULCOLAX  Place 1 suppository (10 mg total) rectally daily as needed (constipation).     glycopyrrolate 0.2 mg/mL injection  Commonly known as: ROBINUL  Inject 0.5 mLs (0.1 mg total) into the vein 3 (three) times daily as needed (secretions).     hydrOXYzine HCL 25 MG tablet  Commonly known as: ATARAX  Take 1 tablet (25 mg total) by mouth 3 (three)  times daily as needed for Itching or Anxiety.     ondansetron 4 mg/2 mL Soln  Inject 8 mg into the vein every 8 (eight) hours as needed.            STOP taking these medications      acyclovir 400 MG tablet  Commonly known as: ZOVIRAX     albuterol 90 mcg/actuation inhaler  Commonly known as: PROVENTIL/VENTOLIN HFA     allopurinoL 300 MG tablet  Commonly known as: ZYLOPRIM     amLODIPine 10 MG tablet  Commonly known as: NORVASC     atorvastatin 40 MG tablet  Commonly known as: LIPITOR     fluconazole 200 MG Tab  Commonly known as: DIFLUCAN     furosemide 20 MG tablet  Commonly known as: LASIX     gabapentin 300 MG capsule  Commonly known as: NEURONTIN     HYDROcodone-acetaminophen 5-325 mg per tablet  Commonly known as: NORCO     ICLUSIG 30 mg Tab  Generic drug: PONATinib     insulin lispro 100 unit/mL injection     insulin  unit/mL injection     levoFLOXacin 500 MG tablet  Commonly known as: LEVAQUIN     losartan 25 MG tablet  Commonly known as: COZAAR     metFORMIN 1000 MG tablet  Commonly known as: GLUCOPHAGE     metoprolol tartrate 25 MG tablet  Commonly known as: LOPRESSOR     montelukast 10 mg tablet  Commonly known as: SINGULAIR     morphine 15 MG tablet  Commonly known as: MSIR     NovoLIN N NPH U-100 Insulin 100 unit/mL injection  Generic drug: insulin NPH     omeprazole 40 MG capsule  Commonly known as: PRILOSEC     ondansetron 4 MG tablet  Commonly known as: ZOFRAN     prochlorperazine 10 MG tablet  Commonly known as: COMPAZINE     triamcinolone acetonide 0.1% 0.1 % cream  Commonly known as: KENALOG            CONTINUE  Dilaudid 1mg IV q3h prn for severe pain  Lorazepam Injection 1 mg q4hrs prn anxiety      DIABETES CARE: no glucose checks    Future Orders:  Hospice Medical Director may dictate new orders for comfortable care measures & sign death certificate.        _________________________________  Maged Momin DO  04/01/2024

## 2024-04-01 NOTE — ASSESSMENT & PLAN NOTE
Mrs. Ellison is a 57 yo lady with progressive CML who continues to decline despite continued aggressive treatment.  Epi was transitioned to comfort care  3/30/24 and now with morphine infusion at 3 mg/hr.    Patient appears comfortable with no behavioral signs of pain.   keeping vigile at bedside.     Goals of Care established- will continue comfort based care in setting of inpatient hospice.  . Family amenable to inpatient hospice in [facility].   Accepted to [facility]     Symptom Management as per [facility] comfort care order set.    Recommendations:  Ativan IV PRN anxiety  Morphine IV prn pain or dyspnea  Glycopyrrolate/repositioning PRN for secretions    Hospice orders have been written per primary team.  Anticipating transfer to inpatient hospice in [facility]

## 2024-04-01 NOTE — ASSESSMENT & PLAN NOTE
"58F with relapsed CML with blast crisis (transformed on 2022) admitted for fevers, fatigue, encephalopathy and found to have a SDH that NSGY does not recommend intervention on. Encephalopathic when seen and unable to follow commands.     Labs show clear evidence of blast crisis (29% on CBC) without cytopenias. This is in the setting of what may be sepsis vs fevers from leukemia.She normally follows at \[facility]\"" fellows clinic and has progressed through multiple treatments.   Discussed aggressive and unstable disease leading to her presentation here.     [place] consistent with CML with blast crisis "     Plan:   -Transitioned to comfort measures on 3/30/24  -pt's self pay status adding some difficulty to finding placement  "

## 2024-04-01 NOTE — NURSING
Nurses Note -- 4 Eyes      4/1/2024   6:38 AM      Skin assessed during: Q Shift Change      [] No Altered Skin Integrity Present    []Prevention Measures Documented      [x] Yes- Altered Skin Integrity Present or Discovered   [x] LDA Added if Not in Epic (Describe Wound)   [] New Altered Skin Integrity was Present on Admit and Documented in LDA   [] Wound Image Taken    Wound Care Consulted? No    Attending Nurse:  Solomon Mercado RN/Staff Member:  Adrienne GRAY

## 2024-04-01 NOTE — PROGRESS NOTES
[facility] - Stepdown Flex ([facility])  Adult Nutrition  Progress Note    SUMMARY       Recommendations    Please re-consult if nutrition intervention warranted.    Goals: Meet % EEN, EPN by RD f/u date  Nutrition Goal Status: new  Communication of RD Recs: other (comment) (POC)    Reason for Assessment    Reason For Assessment: RD follow-up  Diagnosis: other (see comments) (SDH)  Relevant Medical History: DM, CML, HTN  Interdisciplinary Rounds: did not attend    General Information Comments: Pt transitioned to comfort care, awaiting hospice placement. Pt w/ clear liquid diet ordered - poor PO intake noted.    Nutrition Follow-Up    RD Follow-up?: No

## 2024-04-01 NOTE — DISCHARGE SUMMARY
[facility] - [facility]down [facility] ([place] [id number])  Hematology  Bone Marrow Transplant  Discharge Summary      Patient Name: Fela Ellison  MRN: [id number]  Admission Date: 3/23/2024  Hospital Length of Stay: 9 days  Discharge Date and Time:  04/01/2024 4:27 PM  Attending Physician: Kamran García MD   Discharging Provider: Maged Momin DO  Primary Care Provider: Bar Trevino DO    HPI: Patient is a 58 y.o. female with PMHx of insulin-dependent T2DM, diabetic neuropathy, essential HTN, NAFLD, obesity, former tobacco use disorder (quit 4-5 months ago), and CML (follows at [facility] fellows' clinic) who presents as a transfer from [facility] ED in [place] for acute SDH. Per charts she presented there on 3/23 with back pain and hip pain after a fall/slip that occurred while happening. Seh also noted malaise and fatigue. Labs there were notable for significant for leukocytosis (WBC 38) with 29% blasts, c/w an acute blast crisis. PLTs 195k, Hgb 10.6.  CT chest/abd/pelvis significant for splenomegaly (increased from 11.9cm on previous scan to 18.2cm), subcutaneous anasarca edema present at the level of the abdomen and pelvis (unchanged from prior), moderate colonic fecal load without pericolonic stranding, and bilateral ovarian cysts. Degenerative changes of the lower spine, specifically at L5-S1, are more apparent on this CT than prior imaging.     She was accepted to transfer to our service though developed acute onset confusion and was found to have a new subdural hemorrhage (3mm). She was brought to NCC at [facility] for neurosurgical watch though they recommended conservative management.     She is febrile to 102.4 and started on broad-spectrum ABX (vancomycin and cefepime). CXR with RLL congestive process.     At the bedside patient encephalopathic and unable to follow commands, though arouses to voice. Hematology consulted for CML with blast crisis.     * No surgery found *     Hospital Course: 58 yof PMH DM2 on  insulin, HTN, NAFLD, CML (follows at [facility]), self pay presented from [facility] ED in [place] for acute SDH. Initially admitted to NICU for SDH. Liberal it was stable and did not need to undergo neurosurgery procedure. Placed on keppra x7 days for seizure proph. Pt was transferred to the floor, found to be covid positive. Pt started on remdesivir, prednisone, vanc/cefepime. Pt initially needed NG tube placement, able to be removed on 3/25 and started on regular diet with thin liquids per speech. Pt's glucose has been difficult to control while on steroids, discontinued steroids, getting BHB, vbg, bmp with DKA concern, which was negative. Blood glucose controlled with insulin drip after endocrine consult. Goal BP is <160. Worsening confusion on 3/26 with recent SDH repeat stat CT head. Pt continued to worsen with mentation, developing worsening fever, belly distension concern for worsening sepsis or blast crisis and was admitted to the icu. Pt's antibiotics were switched to vanc/zosyn, NG tube was placed to suction with good output. BMB on 3/27. Pending results  03/29/2024 Patient vitally stable overnight with no acute events. She remains on low NC supplementation. Labs this AM show WBC of 10.9, H/H of 8.6/29, & plt of 135K. Tmax over the last 24 hours of 100.5F and remains on Vancomycin/Zosyn for broad spectrum coverage. Endocrine consulted for steroid induced hyperglycemia and is currently on insulin drip. Patient and family requested palliative care consult yesterday to explore GOC further given guarded prognosis. Patient and family updated regarding plan for downgrade from the ICU and was agreeable and understanding.  03/30/2024 Patient downgraded from MICU overnight. Patient with persistent fevers with a Tmax of 102.9F. She remains with significant AMS this AM with her non-verbal this AM. Code status was changed to DNR by ICU team as well. Of note, labs show continued leuko-reduction with hydrea held. Continued  discussions had with family regarding prognosis in setting of blast phase CML with us working on balancing comfort and avoiding oversedation and were agreeable and understanding.   03/31/2024 Palliative care consulted yesterday with family decision to pursue comfort measures with morphine drip started with improved comfort overnight. Patient on AM rounds is resting comfortably.  updated at the bedside for further disposition planning tomorrow based on SW assessment and was agreeable and understanding.   04/01/2024 Pt was accepted and will be D/C to inpatient hospice near family in [place]     Goals of Care Treatment Preferences:  Code Status: DNR          What is most important right now is to focus on symptom/pain control, quality of life, even if it means sacrificing a little time.  Accordingly, we have decided that the best plan to meet the patient's goals includes pivot to comfort-focused care.      Consults (From admission, onward)          Status Ordering Provider     Inpatient consult to Registered Dietitian/Nutritionist  Once        Provider:  (Not yet assigned)    Completed KIET SWEENEY     Inpatient consult to Palliative Care  Once        Provider:  (Not yet assigned)    Completed VENKAT KILGORE     Inpatient consult to Skin Integrity  Practitioner  Once        Provider:  Jannet Koenig, NP    Acknowledged LILIANA ALLISON     Inpatient consult to Critical Care Medicine  Once        Provider:  Winterbottom, Fiona, APRN, CNS    Completed WINTERBOTTOM, FIONA     Inpatient consult to Endocrinology  Once        Provider:  (Not yet assigned)    Completed SEVERINO GAINES     Inpatient consult to Critical Care Medicine  Once        Provider:  (Not yet assigned)    Completed MONSE BOB     Inpatient consult to Neurosurgery  Once        Provider:  (Not yet assigned)    Completed ISI BARAHONA     Inpatient consult to Hematology/Oncology  Once        Provider:  (Not yet assigned)     Completed ISI BARAHONA     Inpatient consult to Physical Medicine Rehab  Once        Provider:  (Not yet assigned)    ISI Hernandez     Inpatient consult to Registered Dietitian/Nutritionist  Once        Provider:  (Not yet assigned)    ISI Hernandez     IP consult to case management/social work  Once        Provider:  (Not yet assigned)    ISI Rodriguez            Significant Diagnostic Studies: N/A    Pending Diagnostic Studies:       Procedure Component Value Units Date/Time    AML FISH, Bone Marrow (Ages over 30 yrs) [[id number]] Collected: 03/26/24 1111    Order Status: Sent Lab Status: In process Updated: 03/27/24 1646    Specimen: Bone Marrow     EKG 12-lead [[id number]]     Order Status: Sent Lab Status: No result     EKG 12-lead [[id number]]     Order Status: Sent Lab Status: No result     EKG 12-lead [[id number]]     Order Status: Sent Lab Status: No result     EKG, 12 - Lead [[id number]]     Order Status: Sent Lab Status: No result           Final Active Diagnoses:    Diagnosis Date Noted POA    PRINCIPAL PROBLEM:  Blast crisis phase of chronic myeloid leukemia [C92.10] 05/09/2022 Yes    Comfort measures only status [Z51.5] 03/30/2024 Not Applicable    Palliative care encounter [Z51.5] 03/30/2024 Not Applicable    Acute encephalopathy [G93.40] 03/26/2024 Yes    Moderate malnutrition [E44.0] 03/25/2024 Unknown    Hyperglycemia [R73.9] 03/25/2024 Yes    SDH (subdural hematoma) [S06.5XAA] 03/24/2024 Yes    Pure hypertriglyceridemia [E78.1] 03/24/2024 Yes    Pneumonia of right lower lobe due to infectious organism [J18.9] 03/24/2024 Yes    Sepsis with acute hypoxic respiratory failure without septic shock [A41.9, R65.20, J96.01] 03/24/2024 Yes    GERD (gastroesophageal reflux disease) [K21.9] 03/24/2024 Yes    Cancer associated pain [G89.3] 02/11/2024 Yes    Tinea corporis [B35.4] 05/05/2023 Yes    Hepatosplenomegaly [R16.2] 10/26/2020 Yes     Obesity, Class II, BMI 35-39.9 [E66.9] 10/26/2020 Yes     Chronic    NAFLD (nonalcoholic fatty liver disease) [K76.0] 10/26/2020 Yes    CML (chronic myelocytic leukemia) [C92.10] 10/26/2020 Yes    Diabetes mellitus [E11.9] 10/26/2020 Yes    Hyperuricemia [E79.0] 10/26/2020 Yes    Hypercholesterolemia [E78.00] 10/26/2020 Yes    Acute respiratory failure with hypoxia [J96.01] 10/25/2020 Yes    Essential hypertension [I10] 10/25/2020 Yes    Type 2 diabetes mellitus with diabetic neuropathy, with long-term current use of insulin [E11.40, Z79.4] 10/25/2020 Not Applicable      Problems Resolved During this Admission:      Discharged Condition: stable    Disposition: Hospice/Home    Follow Up:    Patient Instructions:   No discharge procedures on file.  Medications:  Reconciled Home Medications:      Medication List        START taking these medications      bisacodyL 10 mg Supp  Commonly known as: DULCOLAX  Place 1 suppository (10 mg total) rectally daily as needed (constipation).     glycopyrrolate 0.2 mg/mL injection  Commonly known as: ROBINUL  Inject 0.5 mLs (0.1 mg total) into the vein 3 (three) times daily as needed (secretions).     hydrOXYzine HCL 25 MG tablet  Commonly known as: ATARAX  Take 1 tablet (25 mg total) by mouth 3 (three) times daily as needed for Itching or Anxiety.     ondansetron 4 mg/2 mL Soln  Inject 8 mg into the vein every 8 (eight) hours as needed.            STOP taking these medications      acyclovir 400 MG tablet  Commonly known as: ZOVIRAX     albuterol 90 mcg/actuation inhaler  Commonly known as: PROVENTIL/VENTOLIN HFA     allopurinoL 300 MG tablet  Commonly known as: ZYLOPRIM     amLODIPine 10 MG tablet  Commonly known as: NORVASC     atorvastatin 40 MG tablet  Commonly known as: LIPITOR     fluconazole 200 MG Tab  Commonly known as: DIFLUCAN     furosemide 20 MG tablet  Commonly known as: LASIX     gabapentin 300 MG capsule  Commonly known as: NEURONTIN     HYDROcodone-acetaminophen  5-325 mg per tablet  Commonly known as: NORCO     ICLUSIG 30 mg Tab  Generic drug: PONATinib     insulin lispro 100 unit/mL injection     insulin  unit/mL injection     levoFLOXacin 500 MG tablet  Commonly known as: LEVAQUIN     losartan 25 MG tablet  Commonly known as: COZAAR     metFORMIN 1000 MG tablet  Commonly known as: GLUCOPHAGE     metoprolol tartrate 25 MG tablet  Commonly known as: LOPRESSOR     montelukast 10 mg tablet  Commonly known as: SINGULAIR     morphine 15 MG tablet  Commonly known as: MSIR     NovoLIN N NPH U-100 Insulin 100 unit/mL injection  Generic drug: insulin NPH     omeprazole 40 MG capsule  Commonly known as: PRILOSEC     ondansetron 4 MG tablet  Commonly known as: ZOFRAN     prochlorperazine 10 MG tablet  Commonly known as: COMPAZINE     triamcinolone acetonide 0.1% 0.1 % cream  Commonly known as: KENALOG          CONTINUE  Dilaudid 1mg IV q3h prn for severe pain  Lorazepam Injection 1 mg q4hrs prn anxiety    Maged Momin, DO  Bone Marrow Transplant  [facility] - [facility] ([place]-)

## 2024-04-01 NOTE — PROGRESS NOTES
Received request to assist with inpatient hospice.  Team reports denial from [facility]; no referrals noted in chart or [facility]Port.  BMT weekend on-call not notified after stepdown of any needs.    Reached out to Sunshine at [facility]; they have no record of referral.  ISAAC Jones, will evaluate today for GIP hospice here vs. local inpatient hospice closer to home.   Karen reports family has a strong preference for inpatient care closer to home; they would prefer [facility] who are associated with her current outpatient palliative care provider.  Referral sent via CarePort; spoke to Charlotte ([phone]) who will review and reach out by phone to nurse and spouse.    Accepted and approved for transfer today per Corinne who requests report to [phone].  Notified ISAAC Loyd.  Sent request for ASAP ambulance transport with airborne/contact/droplet precautions to [facility] transfer center; pickup expected for 1700.  Will follow.

## 2024-04-01 NOTE — PLAN OF CARE
Pt to be transferred today to inpatient hospice, she remain on morphine infusion at 4ml/hr, it controls her pain, she is resting comfortably, family remains at bedside, I called report, awaiting transportation, no distress noted.      Problem: Diabetes Comorbidity  Goal: Blood Glucose Level Within Targeted Range  Outcome: Adequate for Care Transition     Problem: Bowel Elimination Impaired (Stroke, Hemorrhagic)  Goal: Effective Bowel Elimination  Outcome: Adequate for Care Transition     Problem: Cerebral Tissue Perfusion (Stroke, Hemorrhagic)  Goal: Optimal Cerebral Tissue Perfusion  Outcome: Adequate for Care Transition     Problem: Cognitive Impairment (Stroke, Hemorrhagic)  Goal: Optimal Cognitive Function  Outcome: Adequate for Care Transition     Problem: Functional Ability Impaired (Stroke, Hemorrhagic)  Goal: Optimal Functional Ability  Outcome: Adequate for Care Transition     Problem: Respiratory Compromise (Stroke, Hemorrhagic)  Goal: Effective Oxygenation and Ventilation  Outcome: Adequate for Care Transition     Problem: Sensorimotor Impairment (Stroke, Hemorrhagic)  Goal: Improved Sensorimotor Function  Outcome: Adequate for Care Transition     Problem: Urinary Elimination Impaired (Stroke, Hemorrhagic)  Goal: Effective Urinary Elimination  Outcome: Adequate for Care Transition     Problem: Fluid Imbalance (Pneumonia)  Goal: Fluid Balance  Outcome: Adequate for Care Transition     Problem: Infection (Pneumonia)  Goal: Resolution of Infection Signs and Symptoms  Outcome: Adequate for Care Transition     Problem: Respiratory Compromise (Pneumonia)  Goal: Effective Oxygenation and Ventilation  Outcome: Adequate for Care Transition     Problem: Adjustment to Illness (Sepsis/Septic Shock)  Goal: Optimal Coping  Outcome: Adequate for Care Transition     Problem: Infection  Goal: Absence of Infection Signs and Symptoms  Outcome: Adequate for Care Transition     Problem: Impaired Wound Healing  Goal: Optimal  Wound Healing  Outcome: Adequate for Care Transition     Problem: Coping Ineffective  Goal: Effective Coping  Outcome: Adequate for Care Transition

## 2024-04-01 NOTE — SUBJECTIVE & OBJECTIVE
Subjective:     Interval History: Palliative care consulted with family decision to pursue comfort measures with morphine drip started with improved comfort overnight. Patient on AM rounds is resting comfortably.  updated at the bedside for further disposition planning tomorrow based on SW assessment and was agreeable and understanding. With self pay status adding in difficulty finding placement.  Objective:     Vital Signs (Most Recent):  Temp: 99.2 °F (37.3 °C) (04/01/24 0600)  Pulse: 105 (04/01/24 0420)  Resp: 15 (04/01/24 0420)  BP: 139/67 (04/01/24 0420)  SpO2: 99 % (04/01/24 0420) Vital Signs (24h Range):  Temp:  [99 °F (37.2 °C)-101 °F (38.3 °C)] 99.2 °F (37.3 °C)  Pulse:  [105-128] 105  Resp:  [15-21] 15  SpO2:  [95 %-99 %] 99 %  BP: (132-211)/() 139/67     Weight: 93 kg (205 lb 0.4 oz)  Body mass index is 36.32 kg/m².  Body surface area is 2.03 meters squared.    ECOG SCORE           [unfilled]    Intake/Output - Last 3 Shifts         03/30 0700  03/31 0659 03/31 0700  04/01 0659 04/01 0700  04/02 0659    I.V. (mL/kg)       IV Piggyback       Total Intake(mL/kg)       Urine (mL/kg/hr) 740 (0.3) 1020 (0.5)     Total Output 740 1020     Net -740 -1020                     Physical Exam  Vitals and nursing note reviewed.   Constitutional:       Appearance: Normal appearance.      Comments: Ill-appearing female with AMS.    Cardiovascular:      Rate and Rhythm: Normal rate and regular rhythm.      Pulses: Normal pulses.      Heart sounds: Normal heart sounds.   Pulmonary:      Effort: Pulmonary effort is normal.      Comments: Non-labored breathing on NC.   Abdominal:      Comments: Diffuse abdominal scarring.    Neurological:      Mental Status: She is disoriented.      Comments: Not oriented times 3 on AM rounds, but waxing and waning.    Psychiatric:      Comments: Minimally verbal, waxes and wanes            Significant Labs:   All pertinent labs from the last 24 hours have been  reviewed.    Diagnostic Results:  I have reviewed all pertinent imaging results/findings within the past 24 hours.

## 2024-04-01 NOTE — SUBJECTIVE & OBJECTIVE
Interval History: Pt resting comfortably in bed, morphine drip in progress.  Appears comfortable no No visible signs of increased work of breathing or pain behaviors.   holding ward at bedside.     Medications:  Continuous Infusions:   morphine 3 mg/hr (04/01/24 0208)     Scheduled Meds:  PRN Meds:bisacodyL, glycopyrrolate, HYDROmorphone, hydrOXYzine HCL, lorazepam, ondansetron    Objective:     Vital Signs (Most Recent):  Temp: 99.2 °F (37.3 °C) (04/01/24 0800)  Pulse: 109 (04/01/24 0800)  Resp: 17 (04/01/24 1056)  BP: (!) 144/73 (04/01/24 0800)  SpO2: 95 % (04/01/24 0800) Vital Signs (24h Range):  Temp:  [99.2 °F (37.3 °C)-101 °F (38.3 °C)] 99.2 °F (37.3 °C)  Pulse:  [105-111] 109  Resp:  [15-21] 17  SpO2:  [95 %-99 %] 95 %  BP: (132-144)/(58-73) 144/73     Weight: 93 kg (205 lb 0.4 oz)  Body mass index is 36.32 kg/m².       Physical Exam  Constitutional:       General: She is not in acute distress.     Appearance: She is obese. She is diaphoretic. She is not ill-appearing.   HENT:      Head: Normocephalic and atraumatic.   Pulmonary:      Effort: Pulmonary effort is normal. No respiratory distress.   Skin:     Findings: Bruising present.   Neurological:      Mental Status: She is disoriented.            Review of Symptoms      Symptom Assessment (ESAS 0-10 Scale)  Pain:  0  Dyspnea:  0  Anxiety:  0  Nausea:  0  Depression:  0  Anorexia:  0  Fatigue:  0  Insomnia:  0  Restlessness:  0  Agitation:  0         Performance Status:  20    Psychosocial/Cultural:   See Palliative Psychosocial Note: Yes  **Primary  to Follow**  Palliative Care  Consult: Yes        Advance Care Planning   Advance Directives:     Decision Making:  Family answered questions and Patient unable to communicate due to disease severity/cognitive impairment  Goals of Care: What is most important right now is to focus on symptom/pain control, quality of life, even if it means sacrificing a little time. Accordingly,  "we have decided that the best plan to meet the patient's goals includes pivot to comfort-focused care.         Significant Labs: None  CBC:   Recent Labs   Lab 03/30/24  0531   WBC 6.10   HGB 8.5*   HCT 28.7*   MCV 81*   PLT 48*       BMP:  No results for input(s): "GLU", "NA", "K", "CL", "CO2", "BUN", "CREATININE", "CALCIUM", "MG" in the last 24 hours.  LFT:  Lab Results   Component Value Date    AST 13 03/30/2024    ALKPHOS 147 (H) 03/30/2024    BILITOT 0.6 03/30/2024     Albumin:   Albumin   Date Value Ref Range Status   03/30/2024 2.3 (L) 3.5 - 5.2 g/dL Final     Protein:   Total Protein   Date Value Ref Range Status   03/30/2024 4.9 (L) 6.0 - 8.4 g/dL Final     Lactic acid:   Lab Results   Component Value Date    LACTATE 2.1 03/26/2024    LACTATE 1.6 03/25/2024       Significant Imaging: None  "

## 2024-04-01 NOTE — ASSESSMENT & PLAN NOTE
"58F with relapsed CML with blast crisis (transformed on 2022) admitted for fevers, fatigue, encephalopathy and found to have a SDH that NSGY does not recommend intervention on. Encephalopathic when seen and unable to follow commands.     Labs show clear evidence of blast crisis (29% on CBC) without cytopenias. This is in the setting of what may be sepsis vs fevers from leukemia.She normally follows at [facility] fellows clinic and has progressed through multiple treatments.   Discussed aggressive and unstable disease leading to her presentation here.     [place] consistent with CML with blast crisis "     Plan:   -Transitioned to comfort measures on 3/30/24  -Pt was accepted for inpatient hospice. Planning for D/C on 4/1 with medications  "

## 2024-04-01 NOTE — ASSESSMENT & PLAN NOTE
Mrs. Ellison is a 57 yo lady with progressive CML who continues to decline despite continued aggressive treatment.  Epi was transitioned to comfort care  3/30/24 and now with morphine infusion at 3 mg/hr.    Patient appears comfortable with no behavioral signs of pain.   keeping vigile at bedside.     Goals of Care established- will continue comfort based care. Family amenable to inpatient hospice in [facility].   Accepted to [facility]     Symptom Management as per [facility] comfort care order set.    Recommendations:  Ativan IV PRN anxiety  Morphine IV prn pain or dyspnea  Glycopyrrolate/repositioning PRN for secretions      Place hospice orders and notify SW/CM to sen referrals for inpatient hospice in the [facility]

## 2024-04-02 ENCOUNTER — DOCUMENTATION ONLY (OUTPATIENT)
Dept: HEMATOLOGY/ONCOLOGY | Facility: CLINIC | Age: 59
End: 2024-04-02

## 2024-04-02 LAB
BCR/ABL SPECIMEN TYPE (BONE MARROW): NORMAL
BCR/ABL1 KINASE DOMAIN MUT ANL BLD/T: 210
PATH REPORT.FINAL DX SPEC: NORMAL

## 2024-04-02 NOTE — NURSING
Received message from Yanni at [facility] that patient is being discharged from [facility] to hospice care at [facility] in [place].

## 2024-04-05 LAB
AML FISH REASON FOR REFERRAL (BM): NORMAL
ANNOTATION COMMENT IMP: NORMAL
CELLS W CYTOGENETIC ABNL BLD/T: NORMAL
CHROM ANALY RESULT (ISCN): NORMAL
CHROM BANDING METHOD: NORMAL
CHROMOSOME ANALYSIS BM ADDITIONAL INFORMATION: NORMAL
CHROMOSOME ANALYSIS BM RELEASED BY: NORMAL
CHROMOSOME ANALYSIS BM RESULT SUMMARY: NORMAL
CLINICAL CYTOGENETICIST REVIEW: NORMAL
CLINICAL CYTOGENETICIST REVIEW: NORMAL
KARYOTYP MAR: NORMAL
LAB TEST METHOD: NORMAL
MOL DX INTERP BLD/T QL: NORMAL
PROVIDER SIGNING NAME: NORMAL
REASON FOR REFERRAL (NARRATIVE): NORMAL
REF LAB TEST METHOD: NORMAL
SPECIMEN SOURCE: NORMAL
SPECIMEN SOURCE: NORMAL
SPECIMEN: NORMAL
TEST PERFORMANCE INFO SPEC: NORMAL

## 2024-04-08 LAB
COMMENT: NORMAL
FINAL PATHOLOGIC DIAGNOSIS: NORMAL
GROSS: NORMAL
Lab: NORMAL
MICROSCOPIC EXAM: NORMAL
SUPPLEMENTAL DIAGNOSIS: NORMAL

## 2024-04-24 LAB
ANNOTATION COMMENT IMP: NORMAL
NGS CLINCIAL TRIALS: NORMAL
NGS INDICATION OF TEST: NORMAL
NGS INTERPRETATION: NORMAL
NGS ONCOHEME PANEL GENE LIST: NORMAL
NGS PATHOGENIC MUTATIONS DETECTED: NORMAL
NGS REVIEWED BY:: NORMAL
NGS VARIANTS OF UNKNOWN SIGNIFICANCE: NORMAL
NGSHM RESULT, BONE MARROW: NORMAL
REF LAB TEST METHOD: NORMAL
SPECIMEN SOURCE: NORMAL
TEST PERFORMANCE INFO SPEC: NORMAL